# Patient Record
Sex: MALE | Race: WHITE | Employment: OTHER | URBAN - METROPOLITAN AREA
[De-identification: names, ages, dates, MRNs, and addresses within clinical notes are randomized per-mention and may not be internally consistent; named-entity substitution may affect disease eponyms.]

---

## 2017-02-08 ENCOUNTER — OFFICE VISIT (OUTPATIENT)
Dept: INTERNAL MEDICINE | Facility: CLINIC | Age: 77
End: 2017-02-08
Payer: COMMERCIAL

## 2017-02-08 VITALS
WEIGHT: 215 LBS | HEART RATE: 63 BPM | TEMPERATURE: 97.5 F | OXYGEN SATURATION: 98 % | SYSTOLIC BLOOD PRESSURE: 144 MMHG | HEIGHT: 73 IN | BODY MASS INDEX: 28.49 KG/M2 | DIASTOLIC BLOOD PRESSURE: 70 MMHG

## 2017-02-08 DIAGNOSIS — E78.2 MIXED HYPERLIPIDEMIA: ICD-10-CM

## 2017-02-08 DIAGNOSIS — I10 ESSENTIAL HYPERTENSION, BENIGN: Primary | ICD-10-CM

## 2017-02-08 DIAGNOSIS — E11.9 TYPE 2 DIABETES MELLITUS WITHOUT COMPLICATION, WITHOUT LONG-TERM CURRENT USE OF INSULIN (H): ICD-10-CM

## 2017-02-08 DIAGNOSIS — Z12.5 SCREENING FOR PROSTATE CANCER: ICD-10-CM

## 2017-02-08 DIAGNOSIS — N18.30 CKD (CHRONIC KIDNEY DISEASE) STAGE 3, GFR 30-59 ML/MIN (H): ICD-10-CM

## 2017-02-08 LAB — HBA1C MFR BLD: 6.1 % (ref 4.3–6)

## 2017-02-08 PROCEDURE — 83036 HEMOGLOBIN GLYCOSYLATED A1C: CPT | Performed by: INTERNAL MEDICINE

## 2017-02-08 PROCEDURE — 36415 COLL VENOUS BLD VENIPUNCTURE: CPT | Performed by: INTERNAL MEDICINE

## 2017-02-08 PROCEDURE — 99214 OFFICE O/P EST MOD 30 MIN: CPT | Performed by: INTERNAL MEDICINE

## 2017-02-08 PROCEDURE — G0103 PSA SCREENING: HCPCS | Performed by: INTERNAL MEDICINE

## 2017-02-08 PROCEDURE — 80053 COMPREHEN METABOLIC PANEL: CPT | Performed by: INTERNAL MEDICINE

## 2017-02-08 RX ORDER — SIMVASTATIN 10 MG
10 TABLET ORAL AT BEDTIME
Qty: 90 TABLET | Refills: 3 | Status: ON HOLD | OUTPATIENT
Start: 2017-02-08 | End: 2017-09-18

## 2017-02-08 RX ORDER — LISINOPRIL 20 MG/1
20 TABLET ORAL DAILY
Qty: 90 TABLET | Refills: 0 | Status: SHIPPED | OUTPATIENT
Start: 2017-02-08 | End: 2017-06-27

## 2017-02-08 RX ORDER — ATENOLOL 100 MG/1
100 TABLET ORAL DAILY
Qty: 90 TABLET | Refills: 1 | Status: SHIPPED | OUTPATIENT
Start: 2017-02-08 | End: 2017-08-07

## 2017-02-08 NOTE — MR AVS SNAPSHOT
After Visit Summary   2/8/2017    Merlin J Anderson    MRN: [id number]           Patient Information     Date Of Birth          1940        Visit Information        Provider Department      2/8/2017 9:00 AM Sujit Duke MD [facility]        Today's Diagnoses     Essential hypertension, benign    -  1     Mixed hyperlipidemia         Type 2 diabetes mellitus without complication, without long-term current use of insulin (H)         Screening for prostate cancer         CKD (chronic kidney disease) stage 3, GFR 30-59 ml/min            Follow-ups after your visit        Who to contact     If you have questions or need follow up information about today's clinic visit or your schedule please contact [facility] directly at [phone].  Normal or non-critical lab and imaging results will be communicated to you by MyChart, letter or phone within 4 business days after the clinic has received the results. If you do not hear from us within 7 days, please contact the clinic through AboutOnehart or phone. If you have a critical or abnormal lab result, we will notify you by phone as soon as possible.  Submit refill requests through OptiNose or call your pharmacy and they will forward the refill request to us. Please allow 3 business days for your refill to be completed.          Additional Information About Your Visit        MyChart Information     OptiNose gives you secure access to your electronic health record. If you see a primary care provider, you can also send messages to your care team and make appointments. If you have questions, please call your primary care clinic.  If you do not have a primary care provider, please call [phone] and they will assist you.        Care EveryWhere ID     This is your Care EveryWhere ID. This could be used by other organizations to access your [place] medical records  [id number]        Your Vitals Were     Pulse Temperature  "Height BMI (Body Mass Index) Pulse Oximetry       63 97.5  F (36.4  C) (Oral) 6' 1\" (1.854 m) 28.37 kg/m2 98%        Blood Pressure from Last 3 Encounters:   02/08/17 144/70   08/01/16 150/78   12/16/15 130/70    Weight from Last 3 Encounters:   02/08/17 215 lb (97.523 kg)   08/01/16 213 lb (96.616 kg)   06/23/16 210 lb (95.255 kg)              We Performed the Following     Comprehensive metabolic panel     Hemoglobin A1c     Prostate spec antigen screen          Where to get your medicines      These medications were sent to [id number] - [place] - [address]  [address], [facility] [address]     Phone:  [phone]    - atenolol 100 MG tablet  - blood glucose monitoring lancets  - blood glucose monitoring test strip  - lisinopril 20 MG tablet  - metFORMIN 500 MG tablet  - simvastatin 10 MG tablet       Primary Care Provider Office Phone # Fax #    Trever Jiang [phone]       [facility] [address]  [facility] [address]        Thank you!     Thank you for choosing [facility]  for your care. Our goal is always to provide you with excellent care. Hearing back from our patients is one way we can continue to improve our services. Please take a few minutes to complete the written survey that you may receive in the mail after your visit with us. Thank you!             Your Updated Medication List - Protect others around you: Learn how to safely use, store and throw away your medicines at www.disposemymeds.org.          This list is accurate as of: 2/8/17  9:34 AM.  Always use your most recent med list.                   Brand Name Dispense Instructions for use    aspirin 81 MG tablet      Take by mouth daily       atenolol 100 MG tablet    TENORMIN    90 tablet    Take 1 tablet (100 mg) by mouth daily       blood glucose calibration solution     1 Bottle    Use to calibrate blood glucose monitor as needed as directed.    "    blood glucose monitoring lancets     1 Box    Use to test blood sugar 1 times daily or as directed.       blood glucose monitoring test strip    ACCU-CHEK PRISCILLA    100 each    Use to test blood sugars 1 times daily or as directed.       fluticasone 50 MCG/ACT spray    FLONASE    3 Package    Spray 1-2 sprays into both nostrils daily.       lisinopril 20 MG tablet    PRINIVIL/ZESTRIL    90 tablet    Take 1 tablet (20 mg) by mouth daily       metFORMIN 500 MG tablet    GLUCOPHAGE    180 tablet    Take 1 tablet (500 mg) by mouth 2 times daily (with meals)       Multi-vitamin Tabs tablet   Generic drug:  multivitamin, therapeutic with minerals     120    ONCE DAILY       simvastatin 10 MG tablet    ZOCOR    90 tablet    Take 1 tablet (10 mg) by mouth At Bedtime at bedtime.

## 2017-02-08 NOTE — NURSING NOTE
"Chief Complaint   Patient presents with     Diabetes     DM, HTN and Lipids     initial /70 mmHg  Pulse 63  Temp(Src) 97.5  F (36.4  C) (Oral)  Ht 6' 1\" (1.854 m)  Wt 215 lb (97.523 kg)  BMI 28.37 kg/m2  SpO2 98% Estimated body mass index is 28.37 kg/(m^2) as calculated from the following:    Height as of this encounter: 6' 1\" (1.854 m).    Weight as of this encounter: 215 lb (97.523 kg)..  bp completed using cuff size large  CLEMENTINA AMAYA LPN  "

## 2017-02-08 NOTE — PROGRESS NOTES
.  SUBJECTIVE:                                                    Merlin J Anderson is a 76 year old male who presents to clinic today for the following health issues:      New patient seen for assessment of chronic medical conditions. SOLEDAD from Dr Jiang.    Diabetes Follow-up      Patient is checking blood sugars: 1 times a week usually 140s    Diabetic concerns: None     Symptoms of hypoglycemia (low blood sugar): none     Paresthesias (numbness or burning in feet) or sores: No     Date of last diabetic eye exam: x1 year ago   Has H/O DM. On diet , exercise and PO Metformin. Blood sugars are controlled. No parestesias. No hypoglycemias.    Hyperlipidemia Follow-Up      Rate your low fat/cholesterol diet?: good    Taking statin?  Yes, possible muscle aches from statin    Other lipid medications/supplements?:  Fenofibrate, without side effects   Has H/O hyperlipidemia. On medical treatment and diet. No side effects. No muscle weakness, myalgias or upset stomach.     Hypertension Follow-up      Outpatient blood pressures are not being checked.    Low Salt Diet: no added salt     Has h/o HTN. on medical treatment. BP has been controlled. No side effects from medications. No CP, HA, dizziness. good compliance with medications and low salt diet.    Has h/o CRF. Symptoms include none. Monitoring BP, BG, medications, avoiding OTC NSAIDs. Needs periodic recheck of kidney function.    Has had PSA check 6 month ago. Was slightly higher than prior test. Still in normal range.       Amount of exercise or physical activity: treadmill and outside walking in the summer    Problems taking medications regularly: No    Medication side effects: none    Diet: low salt and low fat/cholesterol        Problem list and histories reviewed & adjusted, as indicated.  Additional history: as documented    Problem list, Medication list, Allergies, and Medical/Social/Surgical histories reviewed in EPIC and updated as  "appropriate.    ROS:  Constitutional, HEENT, cardiovascular, pulmonary, GI, , musculoskeletal, neuro, skin, endocrine and psych systems are negative, except as otherwise noted.    OBJECTIVE:                                                    /70 mmHg  Pulse 63  Temp(Src) 97.5  F (36.4  C) (Oral)  Ht 6' 1\" (1.854 m)  Wt 215 lb (97.523 kg)  BMI 28.37 kg/m2  SpO2 98%  Body mass index is 28.37 kg/(m^2).  GENERAL: healthy, alert and no distress  EYES: Eyes grossly normal to inspection, PERRL and conjunctivae and sclerae normal  HENT: ear canals and TM's normal, nose and mouth without ulcers or lesions  NECK: no adenopathy, no asymmetry, masses, or scars and thyroid normal to palpation  RESP: lungs clear to auscultation - no rales, rhonchi or wheezes  CV: regular rate and rhythm, normal S1 S2, no S3 or S4, no murmur, click or rub, no peripheral edema and peripheral pulses strong  ABDOMEN: soft, nontender, no hepatosplenomegaly, no masses and bowel sounds normal  MS: no gross musculoskeletal defects noted, no edema  NEURO: Normal strength and tone, mentation intact and speech normal    Diagnostic Test Results:  lab     ASSESSMENT/PLAN:                                                      Problem List Items Addressed This Visit     Mixed hyperlipidemia    Relevant Medications    simvastatin (ZOCOR) 10 MG tablet    Other Relevant Orders    Comprehensive metabolic panel (Completed)    Essential hypertension, benign - Primary    Relevant Medications    lisinopril (PRINIVIL/ZESTRIL) 20 MG tablet    atenolol (TENORMIN) 100 MG tablet    Other Relevant Orders    Comprehensive metabolic panel (Completed)    CKD (chronic kidney disease) stage 3, GFR 30-59 ml/min    Type 2 diabetes mellitus without complication (H)    Relevant Medications    metFORMIN (GLUCOPHAGE) 500 MG tablet    blood glucose monitoring (ACCU-CHEK PRISCILLA) test strip    blood glucose monitoring (ACCU-CHEK MULTICLIX) lancets    Other Relevant Orders    " Hemoglobin A1c (Completed)      Other Visit Diagnoses     Screening for prostate cancer         Relevant Orders     Prostate spec antigen screen (Completed)            Recheck lab work   Cont treatment   Monitor BP   Monitor BG      Follow-Up:in 6 months     Sujit Dkue MD  [facility]

## 2017-02-09 LAB
ALBUMIN SERPL-MCNC: 4.1 G/DL (ref 3.4–5)
ALP SERPL-CCNC: 75 U/L (ref 40–150)
ALT SERPL W P-5'-P-CCNC: 32 U/L (ref 0–70)
ANION GAP SERPL CALCULATED.3IONS-SCNC: 6 MMOL/L (ref 3–14)
AST SERPL W P-5'-P-CCNC: 20 U/L (ref 0–45)
BILIRUB SERPL-MCNC: 0.7 MG/DL (ref 0.2–1.3)
BUN SERPL-MCNC: 22 MG/DL (ref 7–30)
CALCIUM SERPL-MCNC: 9.4 MG/DL (ref 8.5–10.1)
CHLORIDE SERPL-SCNC: 107 MMOL/L (ref 94–109)
CO2 SERPL-SCNC: 27 MMOL/L (ref 20–32)
CREAT SERPL-MCNC: 1.53 MG/DL (ref 0.66–1.25)
GFR SERPL CREATININE-BSD FRML MDRD: 44 ML/MIN/1.7M2
GLUCOSE SERPL-MCNC: 136 MG/DL (ref 70–99)
POTASSIUM SERPL-SCNC: 5 MMOL/L (ref 3.4–5.3)
PROT SERPL-MCNC: 7.2 G/DL (ref 6.8–8.8)
PSA SERPL-ACNC: 2.51 UG/L (ref 0–4)
SODIUM SERPL-SCNC: 140 MMOL/L (ref 133–144)

## 2017-03-27 DIAGNOSIS — I10 ESSENTIAL HYPERTENSION, BENIGN: ICD-10-CM

## 2017-03-27 NOTE — TELEPHONE ENCOUNTER
Lisinopril      Last Written Prescription Date: 02/08/17  Last Fill Quantity: 90, # refills: 0  Last Office Visit with G, P or [facility] prescribing provider: 02/08/17       Potassium   Date Value Ref Range Status   02/08/2017 5.0 3.4 - 5.3 mmol/L Final     Creatinine   Date Value Ref Range Status   02/08/2017 1.53 (H) 0.66 - 1.25 mg/dL Final     BP Readings from Last 3 Encounters:   02/08/17 144/70   08/01/16 150/78   12/16/15 130/70

## 2017-03-28 RX ORDER — LISINOPRIL 20 MG/1
20 TABLET ORAL DAILY
Qty: 90 TABLET | Refills: 0 | OUTPATIENT
Start: 2017-03-28

## 2017-06-27 DIAGNOSIS — I10 ESSENTIAL HYPERTENSION, BENIGN: ICD-10-CM

## 2017-06-27 RX ORDER — LISINOPRIL 20 MG/1
20 TABLET ORAL DAILY
Qty: 90 TABLET | Refills: 0 | Status: SHIPPED | OUTPATIENT
Start: 2017-06-27 | End: 2017-08-08

## 2017-06-27 NOTE — TELEPHONE ENCOUNTER
Lisinopril      Last Written Prescription Date: 02/08/17  Last Fill Quantity: 90, # refills: 0  Last Office Visit with G, P or [facility] prescribing provider: 02/08/17       Potassium   Date Value Ref Range Status   02/08/2017 5.0 3.4 - 5.3 mmol/L Final     Creatinine   Date Value Ref Range Status   02/08/2017 1.53 (H) 0.66 - 1.25 mg/dL Final     BP Readings from Last 3 Encounters:   02/08/17 144/70   08/01/16 150/78   12/16/15 130/70

## 2017-07-25 DIAGNOSIS — E78.2 MIXED HYPERLIPIDEMIA: ICD-10-CM

## 2017-07-25 RX ORDER — SIMVASTATIN 10 MG
10 TABLET ORAL AT BEDTIME
Qty: 90 TABLET | Refills: 3 | OUTPATIENT
Start: 2017-07-25

## 2017-07-25 NOTE — TELEPHONE ENCOUNTER
Simvastatin     Last Written Prescription Date: 02/08/17  Last Fill Quantity: 90, # refills: 3  Last Office Visit with G, P or [facility] prescribing provider: 02/08/17  Next 5 appointments (look out 90 days)     Aug 08, 2017  8:40 AM CDT   PHYSICAL with Sujit Duke MD   [facility]    [address]  [facility] [address]   [phone]                   Lab Results   Component Value Date    CHOL 137 08/01/2016     Lab Results   Component Value Date    HDL 40 08/01/2016     Lab Results   Component Value Date    LDL 73 08/01/2016     Lab Results   Component Value Date    TRIG 118 08/01/2016     Lab Results   Component Value Date    CHOLHDLRATIO 4.0 11/04/2015       Labs showing if normal/abnormal  Lab Results   Component Value Date    CHOL 137 08/01/2016    TRIG 118 08/01/2016    HDL 40 08/01/2016    LDL 73 08/01/2016    VLDL 43 (H) 11/04/2015    CHOLHDLRATIO 4.0 11/04/2015

## 2017-08-07 DIAGNOSIS — I10 ESSENTIAL HYPERTENSION, BENIGN: ICD-10-CM

## 2017-08-07 RX ORDER — ATENOLOL 100 MG/1
TABLET ORAL
Qty: 90 TABLET | Refills: 0 | Status: SHIPPED | OUTPATIENT
Start: 2017-08-07 | End: 2017-08-08

## 2017-08-07 NOTE — TELEPHONE ENCOUNTER
Atenolol      Last Written Prescription Date: 02/08/17  Last Fill Quantity: 90, # refills: 1    Last Office Visit with [facility], [facility] or [facility] prescribing provider:  02/08/17   Future Office Visit:    Next 5 appointments (look out 90 days)     Aug 08, 2017  8:40 AM CDT   PHYSICAL with Sujit Duke MD   [facility]    [address] [facility] [id number] [phone]                    BP Readings from Last 3 Encounters:   02/08/17 144/70   08/01/16 150/78   12/16/15 130/70

## 2017-08-08 ENCOUNTER — OFFICE VISIT (OUTPATIENT)
Dept: INTERNAL MEDICINE | Facility: CLINIC | Age: 77
End: 2017-08-08
Payer: COMMERCIAL

## 2017-08-08 VITALS
OXYGEN SATURATION: 98 % | SYSTOLIC BLOOD PRESSURE: 164 MMHG | WEIGHT: 215 LBS | HEIGHT: 73 IN | HEART RATE: 60 BPM | BODY MASS INDEX: 28.49 KG/M2 | DIASTOLIC BLOOD PRESSURE: 70 MMHG | TEMPERATURE: 98.4 F

## 2017-08-08 DIAGNOSIS — E78.5 HYPERLIPIDEMIA LDL GOAL <100: ICD-10-CM

## 2017-08-08 DIAGNOSIS — I10 ESSENTIAL HYPERTENSION, BENIGN: ICD-10-CM

## 2017-08-08 DIAGNOSIS — Z00.00 ROUTINE GENERAL MEDICAL EXAMINATION AT A HEALTH CARE FACILITY: Primary | ICD-10-CM

## 2017-08-08 DIAGNOSIS — M1A.9XX0 CHRONIC GOUT WITHOUT TOPHUS, UNSPECIFIED CAUSE, UNSPECIFIED SITE: ICD-10-CM

## 2017-08-08 DIAGNOSIS — Z12.5 SCREENING FOR PROSTATE CANCER: ICD-10-CM

## 2017-08-08 DIAGNOSIS — J30.2 SEASONAL ALLERGIC RHINITIS, UNSPECIFIED CHRONICITY, UNSPECIFIED TRIGGER: ICD-10-CM

## 2017-08-08 DIAGNOSIS — R09.89 BRUIT OF RIGHT CAROTID ARTERY: ICD-10-CM

## 2017-08-08 LAB
ALBUMIN UR-MCNC: 30 MG/DL
APPEARANCE UR: CLEAR
BILIRUB UR QL STRIP: NEGATIVE
COLOR UR AUTO: YELLOW
ERYTHROCYTE [DISTWIDTH] IN BLOOD BY AUTOMATED COUNT: 12.9 % (ref 10–15)
GLUCOSE UR STRIP-MCNC: NEGATIVE MG/DL
HCT VFR BLD AUTO: 40 % (ref 40–53)
HGB BLD-MCNC: 13.5 G/DL (ref 13.3–17.7)
HGB UR QL STRIP: NEGATIVE
KETONES UR STRIP-MCNC: NEGATIVE MG/DL
LEUKOCYTE ESTERASE UR QL STRIP: NEGATIVE
MCH RBC QN AUTO: 31.1 PG (ref 26.5–33)
MCHC RBC AUTO-ENTMCNC: 33.8 G/DL (ref 31.5–36.5)
MCV RBC AUTO: 92 FL (ref 78–100)
NITRATE UR QL: NEGATIVE
PH UR STRIP: 5.5 PH (ref 5–7)
PLATELET # BLD AUTO: 170 10E9/L (ref 150–450)
RBC # BLD AUTO: 4.34 10E12/L (ref 4.4–5.9)
RBC #/AREA URNS AUTO: NORMAL /HPF (ref 0–2)
SP GR UR STRIP: 1.01 (ref 1–1.03)
URN SPEC COLLECT METH UR: ABNORMAL
UROBILINOGEN UR STRIP-ACNC: 0.2 EU/DL (ref 0.2–1)
WBC # BLD AUTO: 7.8 10E9/L (ref 4–11)
WBC #/AREA URNS AUTO: NORMAL /HPF (ref 0–2)

## 2017-08-08 PROCEDURE — 80061 LIPID PANEL: CPT | Performed by: INTERNAL MEDICINE

## 2017-08-08 PROCEDURE — 84443 ASSAY THYROID STIM HORMONE: CPT | Performed by: INTERNAL MEDICINE

## 2017-08-08 PROCEDURE — 80053 COMPREHEN METABOLIC PANEL: CPT | Performed by: INTERNAL MEDICINE

## 2017-08-08 PROCEDURE — 36415 COLL VENOUS BLD VENIPUNCTURE: CPT | Performed by: INTERNAL MEDICINE

## 2017-08-08 PROCEDURE — G0103 PSA SCREENING: HCPCS | Performed by: INTERNAL MEDICINE

## 2017-08-08 PROCEDURE — 81001 URINALYSIS AUTO W/SCOPE: CPT | Performed by: INTERNAL MEDICINE

## 2017-08-08 PROCEDURE — 99397 PER PM REEVAL EST PAT 65+ YR: CPT | Performed by: INTERNAL MEDICINE

## 2017-08-08 PROCEDURE — 85027 COMPLETE CBC AUTOMATED: CPT | Performed by: INTERNAL MEDICINE

## 2017-08-08 PROCEDURE — 84550 ASSAY OF BLOOD/URIC ACID: CPT | Performed by: INTERNAL MEDICINE

## 2017-08-08 RX ORDER — LISINOPRIL 20 MG/1
20 TABLET ORAL DAILY
Qty: 90 TABLET | Refills: 3 | Status: ON HOLD | OUTPATIENT
Start: 2017-08-08 | End: 2017-09-18

## 2017-08-08 RX ORDER — METOPROLOL SUCCINATE 200 MG/1
200 TABLET, EXTENDED RELEASE ORAL DAILY
Qty: 90 TABLET | Refills: 3 | Status: SHIPPED | OUTPATIENT
Start: 2017-08-08 | End: 2017-08-16

## 2017-08-08 RX ORDER — FLUTICASONE PROPIONATE 50 MCG
1-2 SPRAY, SUSPENSION (ML) NASAL DAILY
Qty: 1 BOTTLE | Refills: 3 | Status: ON HOLD | OUTPATIENT
Start: 2017-08-08 | End: 2017-09-18

## 2017-08-08 NOTE — PROGRESS NOTES
SUBJECTIVE:   Merlin J Anderson is a 77 year old male who presents for Preventive Visit.    Are you in the first 12 months of your Medicare coverage?  No    Physical   Annual:     Getting at least 3 servings of Calcium per day::  Yes    Bi-annual eye exam::  Yes    Dental care twice a year::  Yes    Sleep apnea or symptoms of sleep apnea::  None    Frequency of exercise::  4-5 days/week    Duration of exercise::  15-30 minutes    Taking medications regularly::  Yes    Medication side effects::  Not applicable    Additional concerns today::  YES      COGNITIVE SCREEN  1) Repeat 3 items (Banana, Sunrise, Chair)    2) Clock draw: NORMAL  3) 3 item recall: Recalls 3 objects  Results: 3 items recalled: COGNITIVE IMPAIRMENT LESS LIKELY    Mini-CogTM Copyright S Wally. Licensed by the author for use in [facility] Management e-Tools; reprinted with permission (naomi@[email or web address]). All rights reserved.          Reviewed and updated as needed this visit by clinical staff         Reviewed and updated as needed this visit by Provider      Social History   Substance Use Topics     Smoking status: Never Smoker     Smokeless tobacco: Never Used     Alcohol use Yes      Comment: 1 monthly or less.       The patient does not drink >3 drinks per day nor >7 drinks per week.        PROBLEMS TO ADD ON...    Has H/O hyperlipidemia. On medical treatment and diet. No side effects. No muscle weakness, myalgias or upset stomach.   Has h/o HTN. on medical treatment. BP has been controlled. No side effects from medications. No CP, HA, dizziness. good compliance with medications and low salt diet.      Today's PHQ-2 Score: 0  PHQ-2 ( 1999 [employer]) 8/7/2017   Q1: Little interest or pleasure in doing things 0   Q2: Feeling down, depressed or hopeless 0   PHQ-2 Score 0   Q1: Little interest or pleasure in doing things Not at all   Q2: Feeling down, depressed or hopeless Not at all   PHQ-2 Score 0       Do you feel safe in your environment - Yes    Do you have  a Health Care Directive?: Yes: Advance Directive has been received and scanned.    Current providers sharing in care for this patient include:   Patient Care Team:  Sujit Duke MD as PCP - General (Internal Medicine)      Hearing impairment: No    Ability to successfully perform activities of daily living: Yes, no assistance needed     Fall risk:  Fallen 2 or more times in the past year?: No  Any fall with injury in the past year?: No      Home safety:  none identified      The following health maintenance items are reviewed in Epic and correct as of today:Health Maintenance   Topic Date Due     FOOT EXAM Q1 YEAR  02/18/2014     EYE EXAM Q1 YEAR  02/18/2014     TSH W/ FREE T4 REFLEX Q2 YEAR  09/03/2016     MICROALBUMIN Q1 YEAR  12/16/2016     ADVANCE DIRECTIVE PLANNING Q5 YRS  06/18/2017     LIPID MONITORING Q1 YEAR  08/01/2017     FALL RISK ASSESSMENT  08/01/2017     A1C Q6 MO  08/08/2017     INFLUENZA VACCINE (SYSTEM ASSIGNED)  09/01/2017     CREATININE Q1 YEAR  02/08/2018     COLONOSCOPY Q10 YR  10/17/2018     TETANUS Q10 YR  12/16/2025     PNEUMOCOCCAL  Completed     Labs reviewed in EPIC    Pneumonia Vaccine:Adults age 65+ who received Pneumovax (PPSV23) at 65 years or older: Should be given PCV13 > 1 year after their most recent PPSV23    ROS:  C: NEGATIVE for fever, chills, change in weight  I: NEGATIVE for worrisome rashes, moles or lesions  E: NEGATIVE for vision changes or irritation  E/M: NEGATIVE for ear, mouth and throat problems  R: NEGATIVE for significant cough or SOB  B: NEGATIVE for masses, tenderness or discharge  CV: NEGATIVE for chest pain, palpitations or peripheral edema  GI: NEGATIVE for nausea, abdominal pain, heartburn, or change in bowel habits  : NEGATIVE for frequency, dysuria, or hematuria  M: NEGATIVE for significant arthralgias or myalgia  N: NEGATIVE for weakness, dizziness or paresthesias  E: NEGATIVE for temperature intolerance, skin/hair changes  H: NEGATIVE for bleeding  "problems  P: NEGATIVE for changes in mood or affect    OBJECTIVE:   There were no vitals taken for this visit. Estimated body mass index is 28.37 kg/(m^2) as calculated from the following:    Height as of 2/8/17: 6' 1\" (1.854 m).    Weight as of 2/8/17: 215 lb (97.5 kg).  EXAM:   GENERAL: healthy, alert and no distress  EYES: Eyes grossly normal to inspection, PERRL and conjunctivae and sclerae normal  HENT: ear canals and TM's normal, nose and mouth without ulcers or lesions  NECK: no adenopathy, no asymmetry, masses, or scars and thyroid normal to palpation, right carotid bruit   RESP: lungs clear to auscultation - no rales, rhonchi or wheezes  CV: regular rate and rhythm, normal S1 S2, no S3 or S4, no murmur, click or rub, no peripheral edema and peripheral pulses strong  ABDOMEN: soft, nontender, no hepatosplenomegaly, no masses and bowel sounds normal  RECTAL: normal sphincter tone, no rectal masses, prostate normal size, smooth, nontender without nodules or masses  MS: no gross musculoskeletal defects noted, no edema  SKIN: no suspicious lesions or rashes  NEURO: Normal strength and tone, mentation intact and speech normal  PSYCH: mentation appears normal, affect normal/bright    ASSESSMENT / PLAN:       ICD-10-CM    1. Routine general medical examination at a health care facility Z00.00 Lipid panel reflex to direct LDL     Prostate spec antigen screen     CBC with platelets     Comprehensive metabolic panel     TSH with free T4 reflex     *UA reflex to Microscopic and Culture ([facility] (except [place] and [place])     Uric acid     US Carotid Bilateral   2. Essential hypertension, benign I10 lisinopril (PRINIVIL/ZESTRIL) 20 MG tablet     metoprolol (TOPROL-XL) 200 MG 24 hr tablet     CBC with platelets     Comprehensive metabolic panel     TSH with free T4 reflex     *UA reflex to Microscopic and Culture ([facility] (except [place] and [place])     US Carotid " "Bilateral   3. Seasonal allergic rhinitis, unspecified chronicity, unspecified trigger J30.2 fluticasone (FLONASE) 50 MCG/ACT spray   4. Hyperlipidemia LDL goal <100 E78.5 Lipid panel reflex to direct LDL   5. Chronic gout without tophus, unspecified cause, unspecified site [id number].[id number] Uric acid   6. Bruit of right carotid artery R09.89 US Carotid Bilateral   7. Screening for prostate cancer Z12.5 Prostate spec antigen screen       End of Life Planning:  Patient currently has an advanced directive: Yes.  Practitioner is supportive of decision.    COUNSELING:  Reviewed preventive health counseling, as reflected in patient instructions       Regular exercise       Healthy diet/nutrition       Vision screening       Hearing screening       Dental care       Colon cancer screening       Prostate cancer screening    Change from Atenolol to Metoprolol - BP not controlled and Atenolol currently not available/ monitor BP in a month.     Estimated body mass index is 28.37 kg/(m^2) as calculated from the following:    Height as of 2/8/17: 6' 1\" (1.854 m).    Weight as of 2/8/17: 215 lb (97.5 kg).     reports that he has never smoked. He has never used smokeless tobacco.        Appropriate preventive services were discussed with this patient, including applicable screening as appropriate for cardiovascular disease, diabetes, osteopenia/osteoporosis, and glaucoma.  As appropriate for age/gender, discussed screening for colorectal cancer, prostate cancer, breast cancer, and cervical cancer. Checklist reviewing preventive services available has been given to the patient.    Reviewed patients plan of care and provided an AVS. The Intermediate Care Plan ( asthma action plan, low back pain action plan, and migraine action plan) for Merlin meets the Care Plan requirement. This Care Plan has been established and reviewed with the Patient.    Counseling Resources:  ATP IV Guidelines  Pooled Cohorts Equation Calculator  Breast Cancer Risk " Calculator  FRAX Risk Assessment  ICSI Preventive Guidelines  Dietary Guidelines for Americans, 2010  USDA's MyPlate  ASA Prophylaxis  Lung CA Screening    Sujit Duke MD  [facility] for HPI/ROS submitted by the patient on 8/7/2017   PHQ-2 Score: 0

## 2017-08-08 NOTE — MR AVS SNAPSHOT
After Visit Summary   8/8/2017    Merlin J Anderson    MRN: [id number]           Patient Information     Date Of Birth          1940        Visit Information        Provider Department      8/8/2017 8:40 AM Sujit Duke MD [facility]        Today's Diagnoses     Routine general medical examination at a health care facility    -  1    Essential hypertension, benign        Seasonal allergic rhinitis, unspecified chronicity, unspecified trigger        Hyperlipidemia LDL goal <100        Chronic gout without tophus, unspecified cause, unspecified site        Bruit of right carotid artery        Screening for prostate cancer           Follow-ups after your visit        Future tests that were ordered for you today     Open Future Orders        Priority Expected Expires Ordered    US Carotid Bilateral Routine  8/8/2018 8/8/2017            Who to contact     If you have questions or need follow up information about today's clinic visit or your schedule please contact [facility] directly at [phone].  Normal or non-critical lab and imaging results will be communicated to you by MyChart, letter or phone within 4 business days after the clinic has received the results. If you do not hear from us within 7 days, please contact the clinic through "[employer] or phone. If you have a critical or abnormal lab result, we will notify you by phone as soon as possible.  Submit refill requests through [employer] or call your pharmacy and they will forward the refill request to us. Please allow 3 business days for your refill to be completed.          Additional Information About Your Visit        MyChart Information     [employer] gives you secure access to your electronic health record. If you see a primary care provider, you can also send messages to your care team and make appointments. If you have questions, please call your primary care clinic.  If you do not have a primary care  "provider, please call [phone] and they will assist you.        Care EveryWhere ID     This is your Care EveryWhere ID. This could be used by other organizations to access your [facility] medical records  [id number]        Your Vitals Were     Pulse Temperature Height Pulse Oximetry BMI (Body Mass Index)       60 98.4  F (36.9  C) (Oral) 6' 1\" (1.854 m) 98% 28.37 kg/m2        Blood Pressure from Last 3 Encounters:   08/08/17 164/70   02/08/17 144/70   08/01/16 150/78    Weight from Last 3 Encounters:   08/08/17 215 lb (97.5 kg)   02/08/17 215 lb (97.5 kg)   08/01/16 213 lb (96.6 kg)              We Performed the Following     *UA reflex to Microscopic and Culture ([facility] (except [place] and [place])     CBC with platelets     Comprehensive metabolic panel     Lipid panel reflex to direct LDL     Prostate spec antigen screen     TSH with free T4 reflex     Uric acid          Today's Medication Changes          These changes are accurate as of: 8/8/17  9:29 AM.  If you have any questions, ask your nurse or doctor.               Start taking these medicines.        Dose/Directions    metoprolol 200 MG 24 hr tablet   Commonly known as:  TOPROL-XL   Used for:  Essential hypertension, benign   Started by:  Sujit Duke MD        Dose:  200 mg   Take 1 tablet (200 mg) by mouth daily   Quantity:  90 tablet   Refills:  3         Stop taking these medicines if you haven't already. Please contact your care team if you have questions.     atenolol 100 MG tablet   Commonly known as:  TENORMIN   Stopped by:  Sujit Duke MD                Where to get your medicines      These medications were sent to [id number] - [place], [place] - [address]  [address], [facility] [address]     Phone:  [phone]     fluticasone 50 MCG/ACT spray    lisinopril 20 MG tablet    metoprolol 200 MG 24 hr tablet                Primary Care Provider Office Phone # Fax #    Sujit ROSEN " MD Srikanth [phone]       [facility] [address]  [facility] [address]        Equal Access to Services     RAUDEL ESTRELLA : Stephany Galaviz, washerrie noble, qadipakta kaalmada joseph, kaye margaritain hayaana abernathyradha luna alyssa hawley. So [facility] [phone].    ATENCIÓN: Si habla español, tiene a pa disposición servicios gratuitos de asistencia lingüística. Llame al [phone].    We comply with applicable federal civil rights laws and [place] laws. We do not discriminate on the basis of race, color, national origin, age, disability sex, sexual orientation or gender identity.            Thank you!     Thank you for choosing [facility]  for your care. Our goal is always to provide you with excellent care. Hearing back from our patients is one way we can continue to improve our services. Please take a few minutes to complete the written survey that you may receive in the mail after your visit with us. Thank you!             Your Updated Medication List - Protect others around you: Learn how to safely use, store and throw away your medicines at www.disposemymeds.org.          This list is accurate as of: 8/8/17  9:29 AM.  Always use your most recent med list.                   Brand Name Dispense Instructions for use Diagnosis    aspirin 81 MG tablet      Take by mouth daily        blood glucose calibration solution     1 Bottle    Use to calibrate blood glucose monitor as needed as directed.    Type 2 diabetes mellitus without complication (H)       blood glucose monitoring lancets     1 Box    Use to test blood sugar 1 times daily or as directed.    Type 2 diabetes mellitus without complication, without long-term current use of insulin (H)       blood glucose monitoring test strip    ACCU-CHEK PRISCILLA    100 each    Use to test blood sugars 1 times daily or as directed.    Type 2 diabetes mellitus without complication, without long-term current use of insulin  (H)       fluticasone 50 MCG/ACT spray    FLONASE    1 Bottle    Spray 1-2 sprays into both nostrils daily    Seasonal allergic rhinitis, unspecified chronicity, unspecified trigger       lisinopril 20 MG tablet    PRINIVIL/ZESTRIL    90 tablet    Take 1 tablet (20 mg) by mouth daily    Essential hypertension, benign       metFORMIN 500 MG tablet    GLUCOPHAGE    180 tablet    Take 1 tablet (500 mg) by mouth 2 times daily (with meals)    Type 2 diabetes mellitus without complication, without long-term current use of insulin (H)       metoprolol 200 MG 24 hr tablet    TOPROL-XL    90 tablet    Take 1 tablet (200 mg) by mouth daily    Essential hypertension, benign       Multi-vitamin Tabs tablet   Generic drug:  multivitamin, therapeutic with minerals     120    ONCE DAILY    Routine general medical examination at a health care facility, Pain in joint, shoulder region       simvastatin 10 MG tablet    ZOCOR    90 tablet    Take 1 tablet (10 mg) by mouth At Bedtime at bedtime.    Mixed hyperlipidemia

## 2017-08-08 NOTE — NURSING NOTE
"Chief Complaint   Patient presents with     Wellness Visit     Fasting Px       Initial /70  Pulse 60  Temp 98.4  F (36.9  C) (Oral)  Ht 6' 1\" (1.854 m)  Wt 215 lb (97.5 kg)  SpO2 98%  BMI 28.37 kg/m2 Estimated body mass index is 28.37 kg/(m^2) as calculated from the following:    Height as of this encounter: 6' 1\" (1.854 m).    Weight as of this encounter: 215 lb (97.5 kg).  Medication Reconciliation: complete   La Yung MA      "

## 2017-08-09 LAB
ALBUMIN SERPL-MCNC: 4.1 G/DL (ref 3.4–5)
ALP SERPL-CCNC: 63 U/L (ref 40–150)
ALT SERPL W P-5'-P-CCNC: 24 U/L (ref 0–70)
ANION GAP SERPL CALCULATED.3IONS-SCNC: 9 MMOL/L (ref 3–14)
AST SERPL W P-5'-P-CCNC: 16 U/L (ref 0–45)
BILIRUB SERPL-MCNC: 0.7 MG/DL (ref 0.2–1.3)
BUN SERPL-MCNC: 28 MG/DL (ref 7–30)
CALCIUM SERPL-MCNC: 9.5 MG/DL (ref 8.5–10.1)
CHLORIDE SERPL-SCNC: 106 MMOL/L (ref 94–109)
CHOLEST SERPL-MCNC: 156 MG/DL
CO2 SERPL-SCNC: 24 MMOL/L (ref 20–32)
CREAT SERPL-MCNC: 1.69 MG/DL (ref 0.66–1.25)
GFR SERPL CREATININE-BSD FRML MDRD: 40 ML/MIN/1.7M2
GLUCOSE SERPL-MCNC: 145 MG/DL (ref 70–99)
HDLC SERPL-MCNC: 36 MG/DL
LDLC SERPL CALC-MCNC: 75 MG/DL
NONHDLC SERPL-MCNC: 120 MG/DL
POTASSIUM SERPL-SCNC: 5.3 MMOL/L (ref 3.4–5.3)
PROT SERPL-MCNC: 7.2 G/DL (ref 6.8–8.8)
PSA SERPL-ACNC: 2.54 UG/L (ref 0–4)
SODIUM SERPL-SCNC: 139 MMOL/L (ref 133–144)
TRIGL SERPL-MCNC: 227 MG/DL
TSH SERPL DL<=0.005 MIU/L-ACNC: 2.51 MU/L (ref 0.4–4)
URATE SERPL-MCNC: 8.3 MG/DL (ref 3.5–7.2)

## 2017-08-14 ENCOUNTER — TELEPHONE (OUTPATIENT)
Dept: INTERNAL MEDICINE | Facility: CLINIC | Age: 77
End: 2017-08-14

## 2017-08-14 NOTE — TELEPHONE ENCOUNTER
Atenolol      Last Written Prescription Date: 02/08/17  Last Fill Quantity: 90, # refills: 1    Last Office Visit with [facility], [facility] or [facility] prescribing provider:  08/08/17   Future Office Visit:    Next 5 appointments (look out 90 days)     Sep 08, 2017  9:30 AM CDT   Nurse Only with RI IM NURSE   [facility] ([facility])    [address]  [facility] [id number] [phone]                    BP Readings from Last 3 Encounters:   08/08/17 164/70   02/08/17 144/70   08/01/16 150/78

## 2017-08-15 NOTE — TELEPHONE ENCOUNTER
Called pt, left detailed vm (per consent to communicate) that atenolol was changed to metoprolol per shortage.

## 2017-08-16 ENCOUNTER — TELEPHONE (OUTPATIENT)
Dept: INTERNAL MEDICINE | Facility: CLINIC | Age: 77
End: 2017-08-16

## 2017-08-16 DIAGNOSIS — I10 ESSENTIAL HYPERTENSION, BENIGN: ICD-10-CM

## 2017-08-16 RX ORDER — ATENOLOL 100 MG/1
TABLET ORAL
Qty: 90 TABLET | Refills: 3 | Status: ON HOLD | OUTPATIENT
Start: 2017-08-16 | End: 2017-09-18

## 2017-08-16 NOTE — TELEPHONE ENCOUNTER
Patient called stating that he went to [facility] to  the Metoprolol and it was $70 plus dollars. So patient went to the [facility] pharmacy and asked if they have Atenolol and they stated they have Atenolol in stock. Patient is requesting to be switched back to Atenolol and have script sent to the [facility] pharmacy in [place] because it only costs the patient $10 dollars.    PCP please review and advise  Thank you,   JAQUAN Rader

## 2017-08-17 ENCOUNTER — HOSPITAL ENCOUNTER (OUTPATIENT)
Dept: ULTRASOUND IMAGING | Facility: CLINIC | Age: 77
Discharge: HOME | End: 2017-08-17
Attending: INTERNAL MEDICINE | Admitting: INTERNAL MEDICINE
Payer: COMMERCIAL

## 2017-08-17 DIAGNOSIS — I10 ESSENTIAL HYPERTENSION, BENIGN: ICD-10-CM

## 2017-08-17 DIAGNOSIS — R09.89 BRUIT OF RIGHT CAROTID ARTERY: ICD-10-CM

## 2017-08-17 DIAGNOSIS — Z00.00 ROUTINE GENERAL MEDICAL EXAMINATION AT A HEALTH CARE FACILITY: ICD-10-CM

## 2017-08-17 PROCEDURE — 93880 EXTRACRANIAL BILAT STUDY: CPT

## 2017-08-24 ENCOUNTER — TELEPHONE (OUTPATIENT)
Dept: OTHER | Facility: CLINIC | Age: 77
End: 2017-08-24

## 2017-08-24 ENCOUNTER — TELEPHONE (OUTPATIENT)
Dept: INTERNAL MEDICINE | Facility: CLINIC | Age: 77
End: 2017-08-24

## 2017-08-24 DIAGNOSIS — I65.29: Primary | ICD-10-CM

## 2017-08-24 DIAGNOSIS — I65.29 CAROTID ARTERY STENOSIS: Primary | ICD-10-CM

## 2017-08-24 NOTE — TELEPHONE ENCOUNTER
Pt referred to [facility] by  for carotid artery stenosis.    Pt saw  5/2009 and José Luis Hart PA-C 7/21/2010.  Plan was for one year follow-up.  Pt had bilateral carotid US 8/17/17:  IMPRESSION: There has been an interval increase in peak systolic and  diastolic velocities in the right internal carotid artery now equaling  463 and 92 cm/s versus 144 and 31 cm/s on the prior exam. This would  suggest a greater than or equal to 70% stenosis.     No significant change in the peak systolic and diastolic velocities in  the left internal carotid artery. Per sonographic velocity criteria,  there is a 50-69% stenosis in the left internal carotid artery.    Pt needs to be scheduled for CTA head/neck and consult with Vascular Surgery (, if possible).  Will route to scheduling to coordinate an appointment next available.    Madison Cowart RN BSN

## 2017-08-25 NOTE — TELEPHONE ENCOUNTER
Left message on home number for patient to call back to schedule CTA & consult appointment for carotid stenosis with Vascular Surgery.

## 2017-08-28 ENCOUNTER — MYC MEDICAL ADVICE (OUTPATIENT)
Dept: INTERNAL MEDICINE | Facility: CLINIC | Age: 77
End: 2017-08-28

## 2017-08-28 ENCOUNTER — OFFICE VISIT (OUTPATIENT)
Dept: INTERNAL MEDICINE | Facility: CLINIC | Age: 77
End: 2017-08-28
Payer: COMMERCIAL

## 2017-08-28 VITALS
HEART RATE: 64 BPM | DIASTOLIC BLOOD PRESSURE: 84 MMHG | SYSTOLIC BLOOD PRESSURE: 176 MMHG | TEMPERATURE: 98.4 F | BODY MASS INDEX: 28.36 KG/M2 | OXYGEN SATURATION: 99 % | HEIGHT: 73 IN | WEIGHT: 214 LBS

## 2017-08-28 DIAGNOSIS — Z71.89 ADVANCED DIRECTIVES, COUNSELING/DISCUSSION: ICD-10-CM

## 2017-08-28 DIAGNOSIS — I10 ESSENTIAL HYPERTENSION, BENIGN: ICD-10-CM

## 2017-08-28 DIAGNOSIS — I65.23 BILATERAL CAROTID ARTERY STENOSIS: Primary | ICD-10-CM

## 2017-08-28 PROCEDURE — 99214 OFFICE O/P EST MOD 30 MIN: CPT | Performed by: INTERNAL MEDICINE

## 2017-08-28 NOTE — ASSESSMENT & PLAN NOTE
Advance Care Planning 8/28/2017: ACP Review of Chart / Resources Provided:  Reviewed chart for advance care plan.  Merlin J Anderson has no plan or code status on file. Discussed available resources and provided with information.   Added by Flakita Macedo

## 2017-08-28 NOTE — NURSING NOTE
"Chief Complaint   Patient presents with     Results       Initial /84 (BP Location: Right arm, Patient Position: Chair, Cuff Size: Adult Large)  Pulse 64  Temp 98.4  F (36.9  C) (Oral)  Ht 6' 1\" (1.854 m)  Wt 214 lb (97.1 kg)  SpO2 99%  BMI 28.23 kg/m2 Estimated body mass index is 28.23 kg/(m^2) as calculated from the following:    Height as of this encounter: 6' 1\" (1.854 m).    Weight as of this encounter: 214 lb (97.1 kg).  Medication Reconciliation: complete   Flakita Macedo CMA      "

## 2017-08-28 NOTE — MR AVS SNAPSHOT
After Visit Summary   8/28/2017    Merlin J Anderson    MRN: [id number]           Patient Information     Date Of Birth          1940        Visit Information        Provider Department      8/28/2017 7:40 AM Ashely Burrows MD [facility]        Today's Diagnoses     Bilateral carotid artery stenosis    -  1    Essential hypertension, benign        Advanced directives, counseling/discussion           Follow-ups after your visit        Your next 10 appointments already scheduled     Aug 30, 2017  9:00 AM CDT   CTA ANGIOGRAM HEAD NECK with [id number]   [facility] ([facility])    [address]  [facility] [id number] [phone]           Please bring any scans or X-rays taken at other hospitals, if similar tests were done. Also bring a list of your medicines, including vitamins, minerals and over-the-counter drugs. It is safest to leave personal items at home.  Be sure to tell your doctor:   If you have any allergies.   If there s any chance you are pregnant.   If you are breastfeeding.   If you have any special needs.  You will have contrast for this exam. To prepare:   Do not eat or drink for 2 hours before your exam. If you need to take medicine, you may take it with small sips of water. (We may ask you to take liquid medicine as well.)   The day before your exam, drink extra fluids at least six 8-ounce glasses (unless your doctor tells you to restrict your fluids).  Patients over 70 or patients with diabetes or kidney problems:   If you haven t had a blood test (creatinine test) within the last 30 days, go to your clinic or Diagnostic Imaging Department for this test.  If you have diabetes:   If your kidney function is normal, continue taking your metformin (Avandamet, Glucophage, Glucovance, Metaglip) on the day of your exam.   If your kidney function is abnormal, wait 48 hours before restarting  this medicine.  Please wear loose clothing, such as a sweat suit or jogging clothes. Avoid snaps, zippers and other metal. We may ask you to undress and put on a hospital gown.  If you have any questions, please call the Imaging Department where you will have your exam.            Aug 31, 2017  1:15 PM CDT   CONSULT with Catalino Scott MD   [facility]    [address]  [facility] [id number] [phone]            Sep 08, 2017  9:30 AM CDT   Nurse Only with RI IM NURSE   [facility]    [address]  [facility] [address] [phone]              Who to contact     If you have questions or need follow up information about today's clinic visit or your schedule please contact [facility] directly at [phone].  Normal or non-critical lab and imaging results will be communicated to you by [employer], letter or phone within 4 business days after the clinic has received the results. If you do not hear from us within 7 days, please contact the clinic through [employer] or phone. If you have a critical or abnormal lab result, we will notify you by phone as soon as possible.  Submit refill requests through [employer] or call your pharmacy and they will forward the refill request to us. Please allow 3 business days for your refill to be completed.          Additional Information About Your Visit        [employer] Information     [employer] gives you secure access to your electronic health record. If you see a primary care provider, you can also send messages to your care team and make appointments. If you have questions, please call your primary care clinic.  If you do not have a primary care provider, please call [phone] and they will assist you.        Care EveryWhere ID     This is your Care EveryWhere ID. This could be used by other organizations to access your [place]  "medical records  [id number]        Your Vitals Were     Pulse Temperature Height Pulse Oximetry BMI (Body Mass Index)       64 98.4  F (36.9  C) (Oral) 6' 1\" (1.854 m) 99% 28.23 kg/m2        Blood Pressure from Last 3 Encounters:   08/28/17 176/84   08/08/17 164/70   02/08/17 144/70    Weight from Last 3 Encounters:   08/28/17 214 lb (97.1 kg)   08/08/17 215 lb (97.5 kg)   02/08/17 215 lb (97.5 kg)              Today, you had the following     No orders found for display       Primary Care Provider Office Phone # Fax #    Sujit Duke [phone]       [address] [facility] [address]        Equal Access to Services     [facility]: Hadii jovita henning hadasho Soeligio, waaxda luqadaha, qaybta kaalmada aderadhayada, kaye shah . So [facility] [phone].    ATENCIÓN: Si habla español, tiene a pa disposición servicios gratuitos de asistencia lingüística. Sandra al [phone].    We comply with applicable federal civil rights laws and [place] laws. We do not discriminate on the basis of race, color, national origin, age, disability sex, sexual orientation or gender identity.            Thank you!     Thank you for choosing [facility]  for your care. Our goal is always to provide you with excellent care. Hearing back from our patients is one way we can continue to improve our services. Please take a few minutes to complete the written survey that you may receive in the mail after your visit with us. Thank you!             Your Updated Medication List - Protect others around you: Learn how to safely use, store and throw away your medicines at www.disposemymeds.org.          This list is accurate as of: 8/28/17  7:55 PM.  Always use your most recent med list.                   Brand Name Dispense Instructions for use Diagnosis    aspirin 81 MG tablet      Take by mouth daily        atenolol 100 MG tablet    TENORMIN    90 tablet    TAKE 1 TABLET (100 " MG) BYMOUTH DAILY    Essential hypertension, benign       blood glucose calibration solution     1 Bottle    Use to calibrate blood glucose monitor as needed as directed.    Type 2 diabetes mellitus without complication (H)       blood glucose monitoring lancets     1 Box    Use to test blood sugar 1 times daily or as directed.    Type 2 diabetes mellitus without complication, without long-term current use of insulin (H)       blood glucose monitoring test strip    ACCU-CHEK PRISCILLA    100 each    Use to test blood sugars 1 times daily or as directed.    Type 2 diabetes mellitus without complication, without long-term current use of insulin (H)       fluticasone 50 MCG/ACT spray    FLONASE    1 Bottle    Spray 1-2 sprays into both nostrils daily    Seasonal allergic rhinitis, unspecified chronicity, unspecified trigger       lisinopril 20 MG tablet    PRINIVIL/ZESTRIL    90 tablet    Take 1 tablet (20 mg) by mouth daily    Essential hypertension, benign       metFORMIN 500 MG tablet    GLUCOPHAGE    180 tablet    Take 1 tablet (500 mg) by mouth 2 times daily (with meals)    Type 2 diabetes mellitus without complication, without long-term current use of insulin (H)       Multi-vitamin Tabs tablet   Generic drug:  multivitamin, therapeutic with minerals     120    ONCE DAILY    Routine general medical examination at a health care facility, Pain in joint, shoulder region       simvastatin 10 MG tablet    ZOCOR    90 tablet    Take 1 tablet (10 mg) by mouth At Bedtime at bedtime.    Mixed hyperlipidemia

## 2017-08-28 NOTE — PROGRESS NOTES
"Dr Barrios's note          ASSESSMENT & PLAN:                                                      (I65.23) Bilateral carotid artery stenosis  (primary encounter diagnosis)  Comment:   Plan: appointment with vascular center    (I10) Essential hypertension, benign  Comment: Not controlled   Plan: Increase lisinopril to 20 mg twice a day    (Z71.89) Advanced directives, counseling/discussion  Comment:   Plan:        Chief complaint:                                                      Discussed carotids  Wife present      SUBJECTIVE:   Merlin J Anderson is a 77 year old male who presents to clinic today for the following health issues:    The patient and wife are here today to clarify the carotid US results and how to pursue it further. They are very frustrated because they have 2 referrals to vascular specialist: vascular clinic and vascular health center and 2 different numbers. I explained them that [facility] has only once center in [place] and some of the doctors come here in [place]. They haven't called any of the numbers yet.   Wife wants specific names, \" not any surgeon\". I explained her that I trusts all the vs surgeon there. I reviewed the chart. He saw dr Gabriel Scott 2010. At first he didn't remembered seing dr Scott, but then he remembered dr Scott recommended no surgery at that time. Wife wants another name at that clinic so I gave dr Renae's name.     Reviewing the chart I noticed he received a call from the vs ctr but he admits he didn't returned it. The call was about scheduling CT angio and appointment.     Wife asks my opinion about surgery for carotids. I explained them we need more info ( CT angio) before discussing surgery, and this will be discussed with the vs surgeon.     Then she is worried about the upcoming vacation: he will be involved in water sports.  She doesn't want to miss the vacation and she wants reassurance from me that he will be well for the vacation.. I explain that I consider  " "for me health  more important than a vacation ( but it is my personal thought). I think he needs an answer how severe is the carotid stenosis. For sure no water sports. Postponing or the vacation will depend on the Ct angio results.         Review of Systems:                                                      ROS: negative for fever, chills, cough, wheezes, chest pain, shortness of breath, vomiting, abdominal pain, leg swelling     A 10-point review of systems was obtained.  Those pertinent are above and in the in the Subjective section.  The rest of the systems are negative.           OBJECTIVE:             Physical exam:  Blood pressure 176/84, pulse 64, temperature 98.4  F (36.9  C), temperature source Oral, height 6' 1\" (1.854 m), weight 214 lb (97.1 kg), SpO2 99 %.     PMHx: reviewed  Past Medical History:   Diagnosis Date     Diabetes mellitus (H)     Pre diabetic     Essential hypertension, benign       PSHx: reviewed  Past Surgical History:   Procedure Laterality Date     C NONSPECIFIC PROCEDURE  04/97    Neg. colonoscopy.     C NONSPECIFIC PROCEDURE      S/P ing. hernia.     DECOMPRESSION, FUSION LUMBAR POSTERIOR ONE LEVEL, COMBINED  8/24/2012    Procedure: COMBINED DECOMPRESSION, FUSION LUMBAR POSTERIOR ONE LEVEL;  Posterior Fusion wtih Decompression L4-5;  Surgeon: Rod Carbajal MD;  Location: [facility] OR     HERNIA REPAIR          Meds: reviewed  Current Outpatient Prescriptions   Medication Sig Dispense Refill     atenolol (TENORMIN) 100 MG tablet TAKE 1 TABLET (100 MG) BYMOUTH DAILY 90 tablet 3     lisinopril (PRINIVIL/ZESTRIL) 20 MG tablet Take 1 tablet (20 mg) by mouth daily 90 tablet 3     fluticasone (FLONASE) 50 MCG/ACT spray Spray 1-2 sprays into both nostrils daily 1 Bottle 3     simvastatin (ZOCOR) 10 MG tablet Take 1 tablet (10 mg) by mouth At Bedtime at bedtime. 90 tablet 3     metFORMIN (GLUCOPHAGE) 500 MG tablet Take 1 tablet (500 mg) by mouth 2 times daily (with meals) 180 tablet 4     " blood glucose monitoring (ACCU-CHEK PRISCILLA) test strip Use to test blood sugars 1 times daily or as directed. 100 each 1     blood glucose monitoring (ACCU-CHEK MULTICLIX) lancets Use to test blood sugar 1 times daily or as directed. 1 Box 1     blood glucose calibration (ACCU-CHEK PRISCILLA) solution Use to calibrate blood glucose monitor as needed as directed. 1 Bottle 0     aspirin 81 MG tablet Take by mouth daily       MULTI-VITAMIN OR TABS ONCE DAILY 120 0       Soc Hx: reviewed  Fam Hx: reviewed      Time spent with the patient  30 min, more than 50% in counseling and coordinating care, Re above medical problems: carotid stenosis     Ashely Barrios MD  Internal Medicine

## 2017-08-30 ENCOUNTER — HOSPITAL ENCOUNTER (OUTPATIENT)
Dept: CT IMAGING | Facility: CLINIC | Age: 77
Discharge: HOME | End: 2017-08-30
Attending: SURGERY | Admitting: SURGERY
Payer: COMMERCIAL

## 2017-08-30 DIAGNOSIS — I65.29 CAROTID ARTERY STENOSIS: ICD-10-CM

## 2017-08-30 LAB
CREAT BLD-MCNC: 1.6 MG/DL (ref 0.66–1.25)
GFR SERPL CREATININE-BSD FRML MDRD: 42 ML/MIN/1.7M2

## 2017-08-30 PROCEDURE — 25000128 H RX IP 250 OP 636: Performed by: SURGERY

## 2017-08-30 PROCEDURE — 82565 ASSAY OF CREATININE: CPT

## 2017-08-30 PROCEDURE — 70498 CT ANGIOGRAPHY NECK: CPT

## 2017-08-30 RX ORDER — IOPAMIDOL 755 MG/ML
500 INJECTION, SOLUTION INTRAVASCULAR ONCE
Status: COMPLETED | OUTPATIENT
Start: 2017-08-30 | End: 2017-08-30

## 2017-08-30 RX ADMIN — IOPAMIDOL 70 ML: 755 INJECTION, SOLUTION INTRAVENOUS at 09:10

## 2017-08-30 RX ADMIN — SODIUM CHLORIDE 80 ML: 9 INJECTION, SOLUTION INTRAVENOUS at 09:10

## 2017-08-31 ENCOUNTER — ALLIED HEALTH/NURSE VISIT (OUTPATIENT)
Dept: NURSING | Facility: CLINIC | Age: 77
End: 2017-08-31
Payer: COMMERCIAL

## 2017-08-31 ENCOUNTER — OFFICE VISIT (OUTPATIENT)
Dept: SURGERY | Facility: CLINIC | Age: 77
End: 2017-08-31
Payer: COMMERCIAL

## 2017-08-31 VITALS
OXYGEN SATURATION: 98 % | WEIGHT: 214 LBS | DIASTOLIC BLOOD PRESSURE: 64 MMHG | BODY MASS INDEX: 28.36 KG/M2 | HEIGHT: 73 IN | SYSTOLIC BLOOD PRESSURE: 138 MMHG | HEART RATE: 55 BPM

## 2017-08-31 DIAGNOSIS — I65.23 BILATERAL CAROTID ARTERY STENOSIS: Primary | ICD-10-CM

## 2017-08-31 DIAGNOSIS — E11.9 TYPE 2 DIABETES MELLITUS WITHOUT COMPLICATION (H): Primary | ICD-10-CM

## 2017-08-31 PROCEDURE — 99207 ZZC NO CHARGE NURSE ONLY: CPT

## 2017-08-31 PROCEDURE — 99204 OFFICE O/P NEW MOD 45 MIN: CPT | Performed by: SURGERY

## 2017-08-31 NOTE — NURSING NOTE
Pt walks into clinic stating he needs labs done after his CT scan. He takes Metformin and is holding since the scan yesterday. Per his paperwork from Dr Scott's office, he needs to repeat his Creatinine, but they did not order this.     Reviewed with Dr Barrios. She states to order BMP and A1C under Dr Duke and to have him follow up with Dr Duke.   She states OK to have him Hold metformin until he sees Dr Duke on 9/8.

## 2017-08-31 NOTE — MR AVS SNAPSHOT
After Visit Summary   8/31/2017    Merlin J Anderson    MRN: [id number]           Patient Information     Date Of Birth          1940        Visit Information        Provider Department      8/31/2017 3:00 PM Rn, [facility]        Today's Diagnoses     Type 2 diabetes mellitus without complication (H)    -  1       Follow-ups after your visit        Your next 10 appointments already scheduled     Sep 05, 2017 11:30 AM CDT   LAB with [facility]   [facility] ([facility])    [address]  [facility] [id number] [phone]           Patient must bring picture ID. Patient should be prepared to give a urine specimen  Please do not eat 10-12 hours before your appointment if you are coming in fasting for labs on lipids, cholesterol, or glucose (sugar). Pregnant women should follow their Care Team instructions. Water with medications is okay. Do not drink coffee or other fluids. If you have concerns about taking  your medications, please ask at office or if scheduling via [employer], send a message by clicking on Secure Messaging, Message Your Care Team.            Sep 08, 2017 11:20 AM CDT   Office Visit with Sujit Duke MD   [facility] ([facility])    [address]  [facility] [id number] [phone]           Bring a current list of meds and any records pertaining to this visit. For Physicals, please bring immunization records and any forms needing to be filled out. Please arrive 10 minutes early to complete paperwork.              Future tests that were ordered for you today     Open Future Orders        Priority Expected Expires Ordered    Hemoglobin A1c Routine  8/31/2018 8/31/2017    Basic metabolic panel  (Ca, Cl, CO2, Creat, Gluc, K, Na, BUN) Routine  8/31/2018 8/31/2017            Who to contact     If you have questions or need follow up information about today's clinic visit or  your schedule please contact [facility] directly at [phone].  Normal or non-critical lab and imaging results will be communicated to you by MyChart, letter or phone within 4 business days after the clinic has received the results. If you do not hear from us within 7 days, please contact the clinic through MyChart or phone. If you have a critical or abnormal lab result, we will notify you by phone as soon as possible.  Submit refill requests through [employer] or call your pharmacy and they will forward the refill request to us. Please allow 3 business days for your refill to be completed.          Additional Information About Your Visit        [facility] Information     [employer] gives you secure access to your electronic health record. If you see a primary care provider, you can also send messages to your care team and make appointments. If you have questions, please call your primary care clinic.  If you do not have a primary care provider, please call [phone] and they will assist you.        Care EveryWhere ID     This is your Care EveryWhere ID. This could be used by other organizations to access your [facility] medical records  [id number]         Blood Pressure from Last 3 Encounters:   08/31/17 138/64   08/28/17 176/84   08/08/17 164/70    Weight from Last 3 Encounters:   08/31/17 214 lb (97.1 kg)   08/28/17 214 lb (97.1 kg)   08/08/17 215 lb (97.5 kg)               Primary Care Provider Office Phone # Fax #    Sujit Duke [phone]       [address] [facility] [address]        Equal Access to Services     [facility] : Hadii aad ku hadasho Soomaali, waaxda luqadaha, qaybta kaalmada adeegvenancio, kaye shah . So [facility] [phone].    ATENCIÓN: Si habla español, tiene a pa disposición servicios gratuitos de asistencia lingüística. Llame al [phone].    We comply with applicable federal civil rights laws and [place] laws. We  do not discriminate on the basis of race, color, national origin, age, disability sex, sexual orientation or gender identity.            Thank you!     Thank you for choosing [facility]  for your care. Our goal is always to provide you with excellent care. Hearing back from our patients is one way we can continue to improve our services. Please take a few minutes to complete the written survey that you may receive in the mail after your visit with us. Thank you!             Your Updated Medication List - Protect others around you: Learn how to safely use, store and throw away your medicines at www.disposemymeds.org.          This list is accurate as of: 8/31/17  3:04 PM.  Always use your most recent med list.                   Brand Name Dispense Instructions for use Diagnosis    aspirin 81 MG tablet      Take by mouth daily        atenolol 100 MG tablet    TENORMIN    90 tablet    TAKE 1 TABLET (100 MG) BYMOUTH DAILY    Essential hypertension, benign       blood glucose calibration solution     1 Bottle    Use to calibrate blood glucose monitor as needed as directed.    Type 2 diabetes mellitus without complication (H)       blood glucose monitoring lancets     1 Box    Use to test blood sugar 1 times daily or as directed.    Type 2 diabetes mellitus without complication, without long-term current use of insulin (H)       blood glucose monitoring test strip    ACCU-CHEK PRISCILLA    100 each    Use to test blood sugars 1 times daily or as directed.    Type 2 diabetes mellitus without complication, without long-term current use of insulin (H)       fluticasone 50 MCG/ACT spray    FLONASE    1 Bottle    Spray 1-2 sprays into both nostrils daily    Seasonal allergic rhinitis, unspecified chronicity, unspecified trigger       lisinopril 20 MG tablet    PRINIVIL/ZESTRIL    90 tablet    Take 1 tablet (20 mg) by mouth daily    Essential hypertension, benign       metFORMIN 500 MG tablet    GLUCOPHAGE    180  tablet    Take 1 tablet (500 mg) by mouth 2 times daily (with meals)    Type 2 diabetes mellitus without complication, without long-term current use of insulin (H)       Multi-vitamin Tabs tablet   Generic drug:  multivitamin, therapeutic with minerals     120    ONCE DAILY    Routine general medical examination at a health care facility, Pain in joint, shoulder region       simvastatin 10 MG tablet    ZOCOR    90 tablet    Take 1 tablet (10 mg) by mouth At Bedtime at bedtime.    Mixed hyperlipidemia

## 2017-08-31 NOTE — PROGRESS NOTES
HPI:  Merlin J Anderson is a 77-year-old gentleman with metabolic syndrome who presents at this time for evaluation of worsening bilateral carotid stenoses.  I last saw him in 2009 at which time he had minimal carotid disease.  Recent carotid ultrasound was suggestive of a greater than 70% right ICA stenosis.  He underwent a carotid CTA yesterday.  He presents with his wife to discuss that study and his treatment options.    He and his wife live independently in their own home.  He still does his own yard work.  He rides a bike 1 mile per day.  He still drives a car.  He is a right-handed individual who is a retired .  He has no history of stroke or TIA.        ROS (Review of Systems):      Positive for diabetes and DM controlled with Oral Medication.   Cardiovascular: Positive for hypertension and hyperlipidemia. Negative for Stroke/TIA.   MUSCULOSKELETAL: Positive for back pain.  All other systems reviewed and are negative        Physical Exam   Vitals reviewed.  Constitutional: He appears well-developed and well-nourished.   Eyes: EOM are normal. Pupils are equal, round, and reactive to light.   HENT:   Head: Normocephalic and atraumatic.   Neck: No JVD present. Carotid bruit is present.   Somewhat decreased range of motion in the neck   Cardiovascular: Intact distal pulses.    Soft right carotid bruit  Effort normal and breath sounds normal.   Lymphadenopathy:     He has no cervical adenopathy.   Neurological: He is alert and oriented to person, place, and time.   Skin: Skin is warm and dry.   Psychiatric/Behavioral: mood and affect normal, normal behavior, normal thought content and normal judgment     Imaging:  CT ANGIOGRAM OF THE HEAD AND NECK WITHOUT AND WITH CONTRAST  8/30/2017  9:18 AM      HISTORY: Evaluate stenosis of carotid arteries.     TECHNIQUE: Precontrast localizing scans were followed by CT  angiography with an injection of 70 mL nonionic contrast material IV  with scans  through the head and neck.  Images were transferred to a  separate 3-D workstation where multiplanar reformations and 3-D images  were created.  Estimates of carotid stenoses are made relative to the  distal internal carotid artery diameters except as noted.   Radiation  dose for this scan was reduced using automated exposure control,  adjustment of the mA and/or kV according to patient size, or iterative  reconstruction technique.     COMPARISON: Carotid ultrasound 8/17/2017.     FINDINGS:       [place]: Normal. Large-caliber vessels are patent. No  evidence for aneurysm.     Neck arteries:     Right carotid artery: 1 cm distal to the origin of the right internal  carotid artery there is a high-grade short segment stenosis of  approximately 70%. There is also high-grade stenosis of the external  carotid artery at its origin.     Left carotid artery: Calcified atheromatous plaque at the left carotid  bifurcation. There is a stenosis that extends over a 1 cm length and  reaches a maximum of approximately 50-55%.     Antegrade flow in normal caliber vertebral arteries.     The origin of the great vessels off the aortic arch appear patent.         IMPRESSION:  1. Atheromatous disease of both carotid bifurcations with  approximately 70% stenosis of the proximal right internal carotid  artery and 50-55% stenosis left internal carotid artery origin.  2. Normal [place] of [place].     PARVEEN SALAS MD        ASSESSMENT:  1.  Greater than 70% asymptomatic right ICA stenosis and this active 77-year-old gentleman.  2.  Asymptomatic 50% left ICA stenosis.    RECOMMENDATION:  I had a lengthy discussion with Merlin and his wife regarding asymptomatic carotid disease.  We discussed the ACAS trial.  I recommend prophylactic right carotid endarterectomy.  I discussed the specifics of the procedure including potential complications and the anticipated postoperative course.  I quoted him my personal perioperative stroke  rate of less than 1%.  All questions were answered and they verbalize understanding to all the above.  He would like to proceed with right carotid endarterectomy with patch angioplasty.  We will schedule this in a timely manner at [facility].  He may continue his aspirin  uninterrupted.    Total face-to-face time was 45 minutes, greater than 50% spent providing counseling and education.    Gabriel Scott M.D.

## 2017-08-31 NOTE — LETTER
[facility] [address] [address]  [place] [address]  Phone: [phone]  Fax: [phone]        2017      RE:  Merlin J. Anderson-:  40    HPI:  Merlin J Anderson is a 77-year-old gentleman with metabolic syndrome who presents at this time for evaluation of worsening bilateral carotid stenoses.  I last saw him in  at which time he had minimal carotid disease.  Recent carotid ultrasound was suggestive of a greater than 70% right ICA stenosis.  He underwent a carotid CTA yesterday.  He presents with his wife to discuss that study and his treatment options.     He and his wife live independently in their own home.  He still does his own yard work.  He rides a bike 1 mile per day.  He still drives a car.  He is a right-handed individual who is a retired .  He has no history of stroke or TIA.      ROS (Review of Systems):      Positive for diabetes and DM controlled with Oral Medication.   Cardiovascular: Positive for hypertension and hyperlipidemia. Negative for Stroke/TIA.   MUSCULOSKELETAL: Positive for back pain.  All other systems reviewed and are negative      Physical Exam   Vitals reviewed.  Constitutional: He appears well-developed and well-nourished.   Eyes: EOM are normal. Pupils are equal, round, and reactive to light.   HENT:   Head: Normocephalic and atraumatic.   Neck: No JVD present. Carotid bruit is present.   Somewhat decreased range of motion in the neck   Cardiovascular: Intact distal pulses.    Soft right carotid bruit  Effort normal and breath sounds normal.   Lymphadenopathy:     He has no cervical adenopathy.   Neurological: He is alert and oriented to person, place, and time.   Skin: Skin is warm and dry.   Psychiatric/Behavioral: mood and affect normal, normal behavior, normal thought content and normal judgment      Imaging:  CT ANGIOGRAM OF THE HEAD AND NECK WITHOUT AND WITH CONTRAST  2017  9:18 AM        HISTORY: Evaluate stenosis of carotid arteries.      TECHNIQUE: Precontrast localizing scans were followed by CT  angiography with an injection of 70 mL nonionic contrast material IV  with scans through the head and neck.  Images were transferred to a  separate 3-D workstation where multiplanar reformations and 3-D images  were created.  Estimates of carotid stenoses are made relative to the  distal internal carotid artery diameters except as noted.   Radiation  dose for this scan was reduced using automated exposure control,  adjustment of the mA and/or kV according to patient size, or iterative  reconstruction technique.      COMPARISON: Carotid ultrasound 8/17/2017.      FINDINGS:        [place] of [place]: Normal. Large-caliber vessels are patent. No  evidence for aneurysm.      Neck arteries:      Right carotid artery: 1 cm distal to the origin of the right internal  carotid artery there is a high-grade short segment stenosis of  approximately 70%. There is also high-grade stenosis of the external  carotid artery at its origin.      Left carotid artery: Calcified atheromatous plaque at the left carotid  bifurcation. There is a stenosis that extends over a 1 cm length and  reaches a maximum of approximately 50-55%.      Antegrade flow in normal caliber vertebral arteries.      The origin of the great vessels off the aortic arch appear patent.          IMPRESSION:  1. Atheromatous disease of both carotid bifurcations with  approximately 70% stenosis of the proximal right internal carotid  artery and 50-55% stenosis left internal carotid artery origin.  2. Normal [place] of [place].      PARVEEN SALAS MD           ASSESSMENT:  1.  Greater than 70% asymptomatic right ICA stenosis and this active 77-year-old gentleman.  2.  Asymptomatic 50% left ICA stenosis.     RECOMMENDATION:  I had a lengthy discussion with Merlin and his wife regarding asymptomatic carotid disease.  We discussed the ACAS trial.  I  recommend prophylactic right carotid endarterectomy.  I discussed the specifics of the procedure including potential complications and the anticipated postoperative course.  I quoted him my personal perioperative stroke rate of less than 1%.  All questions were answered and they verbalize understanding to all the above.  He would like to proceed with right carotid endarterectomy with patch angioplasty.  We will schedule this in a timely manner at [facility].  He may continue his aspirin  uninterrupted.        Gabriel Scott M.D.

## 2017-09-05 DIAGNOSIS — E11.9 TYPE 2 DIABETES MELLITUS WITHOUT COMPLICATION (H): ICD-10-CM

## 2017-09-05 LAB
ANION GAP SERPL CALCULATED.3IONS-SCNC: 7 MMOL/L (ref 3–14)
BUN SERPL-MCNC: 25 MG/DL (ref 7–30)
CALCIUM SERPL-MCNC: 9.5 MG/DL (ref 8.5–10.1)
CHLORIDE SERPL-SCNC: 107 MMOL/L (ref 94–109)
CO2 SERPL-SCNC: 25 MMOL/L (ref 20–32)
CREAT SERPL-MCNC: 1.61 MG/DL (ref 0.66–1.25)
GFR SERPL CREATININE-BSD FRML MDRD: 42 ML/MIN/1.7M2
GLUCOSE SERPL-MCNC: 129 MG/DL (ref 70–99)
HBA1C MFR BLD: 6.1 % (ref 4.3–6)
POTASSIUM SERPL-SCNC: 5.3 MMOL/L (ref 3.4–5.3)
SODIUM SERPL-SCNC: 139 MMOL/L (ref 133–144)

## 2017-09-05 PROCEDURE — 83036 HEMOGLOBIN GLYCOSYLATED A1C: CPT | Performed by: INTERNAL MEDICINE

## 2017-09-05 PROCEDURE — 36415 COLL VENOUS BLD VENIPUNCTURE: CPT | Performed by: INTERNAL MEDICINE

## 2017-09-05 PROCEDURE — 80048 BASIC METABOLIC PNL TOTAL CA: CPT | Performed by: INTERNAL MEDICINE

## 2017-09-08 ENCOUNTER — OFFICE VISIT (OUTPATIENT)
Dept: INTERNAL MEDICINE | Facility: CLINIC | Age: 77
End: 2017-09-08
Payer: COMMERCIAL

## 2017-09-08 VITALS
WEIGHT: 213.7 LBS | BODY MASS INDEX: 28.32 KG/M2 | HEART RATE: 58 BPM | DIASTOLIC BLOOD PRESSURE: 80 MMHG | OXYGEN SATURATION: 98 % | SYSTOLIC BLOOD PRESSURE: 120 MMHG | HEIGHT: 73 IN | TEMPERATURE: 98.6 F

## 2017-09-08 DIAGNOSIS — E78.2 MIXED HYPERLIPIDEMIA: ICD-10-CM

## 2017-09-08 DIAGNOSIS — Z01.818 PREOP GENERAL PHYSICAL EXAM: Primary | ICD-10-CM

## 2017-09-08 DIAGNOSIS — I65.21 STENOSIS OF RIGHT CAROTID ARTERY: ICD-10-CM

## 2017-09-08 DIAGNOSIS — N18.30 CKD (CHRONIC KIDNEY DISEASE) STAGE 3, GFR 30-59 ML/MIN (H): ICD-10-CM

## 2017-09-08 DIAGNOSIS — I10 BENIGN ESSENTIAL HYPERTENSION: ICD-10-CM

## 2017-09-08 DIAGNOSIS — E11.9 TYPE 2 DIABETES MELLITUS WITHOUT COMPLICATION, WITHOUT LONG-TERM CURRENT USE OF INSULIN (H): ICD-10-CM

## 2017-09-08 DIAGNOSIS — Z23 NEEDS FLU SHOT: ICD-10-CM

## 2017-09-08 DIAGNOSIS — I73.9 PVD (PERIPHERAL VASCULAR DISEASE) (H): ICD-10-CM

## 2017-09-08 PROCEDURE — 90662 IIV NO PRSV INCREASED AG IM: CPT | Performed by: INTERNAL MEDICINE

## 2017-09-08 PROCEDURE — 99215 OFFICE O/P EST HI 40 MIN: CPT | Mod: 25 | Performed by: INTERNAL MEDICINE

## 2017-09-08 PROCEDURE — 93000 ELECTROCARDIOGRAM COMPLETE: CPT | Performed by: INTERNAL MEDICINE

## 2017-09-08 PROCEDURE — G0008 ADMIN INFLUENZA VIRUS VAC: HCPCS | Performed by: INTERNAL MEDICINE

## 2017-09-08 NOTE — NURSING NOTE
"Chief Complaint   Patient presents with     Pre-Op Exam       Initial /80 (BP Location: Left arm, Patient Position: Sitting, Cuff Size: Adult Large)  Pulse 58  Temp 98.6  F (37  C) (Oral)  Ht 6' 1\" (1.854 m)  Wt 213 lb 11.2 oz (96.9 kg)  SpO2 98%  BMI 28.19 kg/m2 Estimated body mass index is 28.19 kg/(m^2) as calculated from the following:    Height as of this encounter: 6' 1\" (1.854 m).    Weight as of this encounter: 213 lb 11.2 oz (96.9 kg).  Medication Reconciliation: complete    "

## 2017-09-08 NOTE — PATIENT INSTRUCTIONS
Before Your Surgery      Call your surgeon if there is any change in your health. This includes signs of a cold or flu (such as a sore throat, runny nose, cough, rash or fever).    Do not smoke, drink alcohol or take over the counter medicine (unless your surgeon or primary care doctor tells you to) for the 24 hours before and after surgery.    If you take prescribed drugs: Follow your doctor s orders about which medicines to take and which to stop until after surgery.    Eating and drinking prior to surgery: follow the instructions from your surgeon    Take a shower or bath the night before surgery. Use the soap your surgeon gave you to gently clean your skin. If you do not have soap from your surgeon, use your regular soap. Do not shave or scrub the surgery site.  Wear clean pajamas and have clean sheets on your bed.     CONTINUE THE SAME MEDICATIONS  HOLD METFORMIN  START CHECKING YOUR BLOOD SUGARS ONCE DAILY  CHECK YOUR BLOOD PRESSURE

## 2017-09-08 NOTE — PROGRESS NOTES
[place] [address]  [facility] [address]  [phone]  Dept: [phone]    PRE-OP EVALUATION:  Today's date: 2017    Merlin J Anderson (: 1940) presents for pre-operative evaluation assessment as requested by Catalino Kramer.  He requires evaluation and anesthesia risk assessment prior to undergoing surgery/procedure for treatment of carotid artery .  Proposed procedure: RIGHT COROTID ENDARTERECTOMY, WITH PATCH ANGIOPLASTY, WITH EEG    Date of Surgery/ Procedure: 17  Time of Surgery/ Procedure: am  Hospital[facility]Surgical Facility: [facility]    Primary Physician: Sujit Duke  Type of Anesthesia Anticipated: General    Patient has a Health Care Directive or Living Will:  NO    1. NO - Do you have a history of heart attack, stroke, stent, bypass or surgery on an artery in the head, neck, heart or legs?  2. NO - Do you ever have any pain or discomfort in your chest?  3. NO - Do you have a history of  Heart Failure?  4. NO - Are you troubled by shortness of breath when: walking on the level, up a slight hill or at night?  5. NO - Do you currently have a cold, bronchitis or other respiratory infection?  6. NO - Do you have a cough, shortness of breath or wheezing?  7. NO - Do you sometimes get pains in the calves of your legs when you walk?  8. NO - Do you or anyone in your family have previous history of blood clots?  9. NO - Do you or does anyone in your family have a serious bleeding problem such as prolonged bleeding following surgeries or cuts?  10. NO - Have you ever had problems with anemia or been told to take iron pills?  11. NO - Have you had any abnormal blood loss such as black, tarry or bloody stools, or abnormal vaginal bleeding?  12. NO - Have you ever had a blood transfusion?  13. NO - Have you or any of your relatives ever had problems with anesthesia?  14. NO - Do you have sleep apnea, excessive snoring or daytime drowsiness?  15. NO - Do you have  any prosthetic heart valves?  16. NO - Do you have prosthetic joints?  17. NO - Is there any chance that you may be pregnant?        HPI:                                                      Brief HPI related to upcoming procedure: scheduled for right carotid artery endarterectomy for progression of carotid stenosis.   No acute complaints, no medication change or new medical conditions.  Has h/o HTN. on medical treatment. BP has not been well controlled. No side effects from medications. No CP, HA, dizziness. good compliance with medications and low salt diet.  Has H/O hyperlipidemia. On medical treatment and diet. No side effects. No muscle weakness, myalgias or upset stomach.   Has H/O DM. On diet , exercise . Blood sugars are controlled. No parestesias. No hypoglycemias.    Patient has stage 3 CKD. Now is off Metformin because of recent angiogram and renal function remains stable , last creatinin 1.6      See problem list for active medical problems.  Problems all longstanding and stable, except as noted/documented.  See ROS for pertinent symptoms related to these conditions.                                                                                                  .    MEDICAL HISTORY:                                                    Patient Active Problem List    Diagnosis Date Noted     Type 2 diabetes mellitus without complication (H) 11/30/2015     Priority: Medium     Seasonal allergic rhinitis 04/15/2013     Priority: Medium     Lumbago 11/13/2012     Priority: Medium     Arthrodesis status 11/13/2012     Priority: Medium     Lumbar radiculopathy 06/27/2012     Priority: Medium     Advanced directives, counseling/discussion 06/18/2012     Priority: Medium     Patient states has Advance Directive and will bring in a copy to clinic. 6/18/2012          CKD (chronic kidney disease) stage 3, GFR 30-59 ml/min 12/27/2011     Priority: Medium     HYPERLIPIDEMIA LDL GOAL <100 10/31/2010     Priority: Medium      HTN (hypertension) 06/29/2009     Priority: Medium     (Problem list name updated by automated process. Provider to review and confirm.)       Mixed hyperlipidemia 11/14/2002     Priority: Medium     Essential hypertension, benign 11/14/2002     Priority: Medium     Allergic rhinitis due to pollen 11/14/2002     Priority: Medium     Gout 11/14/2002     Priority: Medium     Problem list name updated by automated process. Provider to review        Past Medical History:   Diagnosis Date     Diabetes mellitus (H)     Pre diabetic     Essential hypertension, benign      Past Surgical History:   Procedure Laterality Date     C NONSPECIFIC PROCEDURE  04/97    Neg. colonoscopy.     C NONSPECIFIC PROCEDURE      S/P ing. hernia.     DECOMPRESSION, FUSION LUMBAR POSTERIOR ONE LEVEL, COMBINED  8/24/2012    Procedure: COMBINED DECOMPRESSION, FUSION LUMBAR POSTERIOR ONE LEVEL;  Posterior Fusion wtih Decompression L4-5;  Surgeon: Rod Carbajal MD;  Location: [facility] OR     HERNIA REPAIR       Current Outpatient Prescriptions   Medication Sig Dispense Refill     atenolol (TENORMIN) 100 MG tablet TAKE 1 TABLET (100 MG) BYMOUTH DAILY 90 tablet 3     lisinopril (PRINIVIL/ZESTRIL) 20 MG tablet Take 1 tablet (20 mg) by mouth daily 90 tablet 3     fluticasone (FLONASE) 50 MCG/ACT spray Spray 1-2 sprays into both nostrils daily 1 Bottle 3     simvastatin (ZOCOR) 10 MG tablet Take 1 tablet (10 mg) by mouth At Bedtime at bedtime. 90 tablet 3     metFORMIN (GLUCOPHAGE) 500 MG tablet Take 1 tablet (500 mg) by mouth 2 times daily (with meals) 180 tablet 4     aspirin 81 MG tablet Take by mouth daily       MULTI-VITAMIN OR TABS ONCE DAILY 120 0     blood glucose monitoring (ACCU-CHEK PRISCILLA) test strip Use to test blood sugars 1 times daily or as directed. 100 each 1     blood glucose monitoring (ACCU-CHEK MULTICLIX) lancets Use to test blood sugar 1 times daily or as directed. 1 Box 1     blood glucose calibration (ACCU-CHEK PRISCILLA)  "solution Use to calibrate blood glucose monitor as needed as directed. 1 Bottle 0     OTC products: None, except as noted above    No Known Allergies   Latex Allergy: NO    Social History   Substance Use Topics     Smoking status: Never Smoker     Smokeless tobacco: Never Used     Alcohol use Yes      Comment: 1 monthly or less.     History   Drug Use No       REVIEW OF SYSTEMS:                                                    C: NEGATIVE for fever, chills, change in weight  I: NEGATIVE for worrisome rashes, moles or lesions  E: NEGATIVE for vision changes or irritation  E/M: NEGATIVE for ear, mouth and throat problems  R: NEGATIVE for significant cough or SOB  B: NEGATIVE for masses, tenderness or discharge  CV: NEGATIVE for chest pain, palpitations or peripheral edema  GI: NEGATIVE for nausea, abdominal pain, heartburn, or change in bowel habits  : NEGATIVE for frequency, dysuria, or hematuria  M: NEGATIVE for significant arthralgias or myalgia  N: NEGATIVE for weakness, dizziness or paresthesias  E: NEGATIVE for temperature intolerance, skin/hair changes  H: NEGATIVE for bleeding problems  P: NEGATIVE for changes in mood or affect    EXAM:                                                    /80 (BP Location: Left arm, Patient Position: Sitting, Cuff Size: Adult Large)  Pulse 58  Temp 98.6  F (37  C) (Oral)  Ht 6' 1\" (1.854 m)  Wt 213 lb 11.2 oz (96.9 kg)  SpO2 98%  BMI 28.19 kg/m2    GENERAL APPEARANCE: healthy, alert and no distress     EYES: EOMI,  PERRL     HENT: ear canals and TM's normal and nose and mouth without ulcers or lesions     NECK: no adenopathy, no asymmetry, masses, or scars and thyroid normal to palpation     RESP: lungs clear to auscultation - no rales, rhonchi or wheezes     CV: regular rates and rhythm, normal S1 S2, no S3 or S4 and no murmur, click or rub     ABDOMEN:  soft, nontender, no HSM or masses and bowel sounds normal     MS: extremities normal- no gross deformities " noted, no evidence of inflammation in joints, FROM in all extremities.     SKIN: no suspicious lesions or rashes     NEURO: Normal strength and tone, sensory exam grossly normal, mentation intact and speech normal     PSYCH: mentation appears normal. and affect normal/bright     LYMPHATICS: No axillary, cervical, or supraclavicular nodes    DIAGNOSTICS:                                                    EKG: sinus bradycardia, normal axis, normal intervals, no acute ST/T changes c/w ischemia, no LVH by voltage criteria, unchanged from previous tracings    Recent Labs   Lab Test  09/05/17   1129  08/08/17   0937  02/08/17   0933   09/03/14   1037   08/23/12   0905   HGB   --   13.5   --    --   13.6   < >   --    PLT   --   170   --    --   168   < >   --    INR   --    --    --    --    --    --   0.96   NA  139  139  140   < >  139   < >   --    POTASSIUM  5.3  5.3  5.0   < >  5.0   < >  4.2   CR  1.61*  1.69*  1.53*   < >  1.46*   < >  1.35*   A1C  6.1*   --   6.1*   < >  6.3*   < >   --     < > = values in this interval not displayed.        IMPRESSION:                                                    Reason for surgery/procedure: carotid artery stenosis   Diagnosis/reason for consult: preoperative evaluation/ clearance      The proposed surgical procedure is considered INTERMEDIATE risk.    REVISED CARDIAC RISK INDEX  The patient has the following serious cardiovascular risks for perioperative complications such as (MI, PE, VFib and 3  AV Block):  No serious cardiac risks  INTERPRETATION: 0 risks: Class I (very low risk - 0.4% complication rate)    The patient has the following additional risks for perioperative complications:  No identified additional risks    No diagnosis found.    RECOMMENDATIONS:                                                      Continue to hold Metformin. Diabetic monitoring and blood pressure monitoring advised.     --Patient is to take all scheduled medications on the day of  surgery.    APPROVAL GIVEN to proceed with proposed procedure, without further diagnostic evaluation       Signed Electronically by: Sujit Duke MD    Copy of this evaluation report is provided to requesting physician.    [place] Preop Guidelines

## 2017-09-08 NOTE — MR AVS SNAPSHOT
After Visit Summary   9/8/2017    Merlin J Anderson    MRN: [id number]           Patient Information     Date Of Birth          1940        Visit Information        Provider Department      9/8/2017 11:20 AM Sujit Duke MD [facility]        Today's Diagnoses     Preop general physical exam    -  1    Benign essential hypertension          Care Instructions      Before Your Surgery      Call your surgeon if there is any change in your health. This includes signs of a cold or flu (such as a sore throat, runny nose, cough, rash or fever).    Do not smoke, drink alcohol or take over the counter medicine (unless your surgeon or primary care doctor tells you to) for the 24 hours before and after surgery.    If you take prescribed drugs: Follow your doctor s orders about which medicines to take and which to stop until after surgery.    Eating and drinking prior to surgery: follow the instructions from your surgeon    Take a shower or bath the night before surgery. Use the soap your surgeon gave you to gently clean your skin. If you do not have soap from your surgeon, use your regular soap. Do not shave or scrub the surgery site.  Wear clean pajamas and have clean sheets on your bed.     CONTINUE THE SAME MEDICATIONS  HOLD METFORMIN  START CHECKING YOUR BLOOD SUGARS ONCE DAILY  CHECK YOUR BLOOD PRESSURE           Follow-ups after your visit        Your next 10 appointments already scheduled     Sep 18, 2017   Procedure with Catalino Scott MD   [facility] (--)    [address]  [facility] [address]   [phone]            Sep 18, 2017 11:30 AM CDT   [facility] OR with Catalino Scott MD   [facility] ([facility])    [facility] ([facility])   [phone]              Who to contact     If you have questions or need follow up information  "about today's clinic visit or your schedule please contact [facility] directly at [phone].  Normal or non-critical lab and imaging results will be communicated to you by MyChart, letter or phone within 4 business days after the clinic has received the results. If you do not hear from us within 7 days, please contact the clinic through [employer] or phone. If you have a critical or abnormal lab result, we will notify you by phone as soon as possible.  Submit refill requests through [employer]Sage or call your pharmacy and they will forward the refill request to us. Please allow 3 business days for your refill to be completed.          Additional Information About Your Visit        [employer] Information     [employer] gives you secure access to your electronic health record. If you see a primary care provider, you can also send messages to your care team and make appointments. If you have questions, please call your primary care clinic.  If you do not have a primary care provider, please call [phone] and they will assist you.        Care EveryWhere ID     This is your Care EveryWhere ID. This could be used by other organizations to access your [place] medical records  [id number]        Your Vitals Were     Pulse Temperature Height Pulse Oximetry BMI (Body Mass Index)       58 98.6  F (37  C) (Oral) 6' 1\" (1.854 m) 98% 28.19 kg/m2        Blood Pressure from Last 3 Encounters:   09/08/17 120/80   08/31/17 138/64   08/28/17 176/84    Weight from Last 3 Encounters:   09/08/17 213 lb 11.2 oz (96.9 kg)   08/31/17 214 lb (97.1 kg)   08/28/17 214 lb (97.1 kg)              We Performed the Following     EKG 12-lead complete w/read - Clinics          Today's Medication Changes          These changes are accurate as of: 9/8/17 12:19 PM.  If you have any questions, ask your nurse or doctor.               Start taking these medicines.        Dose/Directions    order for DME   Used for:  Benign essential hypertension "   Started by:  Sujit Duke MD        Check blood pressure daily   Quantity:  1 Device   Refills:  0         These medicines have changed or have updated prescriptions.        Dose/Directions    lisinopril 20 MG tablet   Commonly known as:  PRINIVIL/ZESTRIL   This may have changed:  when to take this   Used for:  Essential hypertension, benign        Dose:  20 mg   Take 1 tablet (20 mg) by mouth daily   Quantity:  90 tablet   Refills:  3            Where to get your medicines      Some of these will need a paper prescription and others can be bought over the counter.  Ask your nurse if you have questions.     Bring a paper prescription for each of these medications     order for DME                Primary Care Provider Office Phone # Fax #    Sujit Duke [phone]       [address]  [facility] [address]        Equal Access to Services     RAUDEL ESTRELLA : Stephany henning hadasho Soomaali, waaxda luqadaha, qaybta kaalmada adeegyada, kaye shah . So [facility] [phone].    ATENCIÓN: Si habla español, tiene a pa disposición servicios gratuitos de asistencia lingüística. Llame al [phone].    We comply with applicable federal civil rights laws and [place] laws. We do not discriminate on the basis of race, color, national origin, age, disability sex, sexual orientation or gender identity.            Thank you!     Thank you for choosing [facility]  for your care. Our goal is always to provide you with excellent care. Hearing back from our patients is one way we can continue to improve our services. Please take a few minutes to complete the written survey that you may receive in the mail after your visit with us. Thank you!             Your Updated Medication List - Protect others around you: Learn how to safely use, store and throw away your medicines at www.disposemymeds.org.          This list is accurate as of: 9/8/17 12:19 PM.   Always use your most recent med list.                   Brand Name Dispense Instructions for use Diagnosis    aspirin 81 MG tablet      Take by mouth daily        atenolol 100 MG tablet    TENORMIN    90 tablet    TAKE 1 TABLET (100 MG) BYMOUTH DAILY    Essential hypertension, benign       blood glucose calibration solution     1 Bottle    Use to calibrate blood glucose monitor as needed as directed.    Type 2 diabetes mellitus without complication (H)       blood glucose monitoring lancets     1 Box    Use to test blood sugar 1 times daily or as directed.    Type 2 diabetes mellitus without complication, without long-term current use of insulin (H)       blood glucose monitoring test strip    ACCU-CHEK PRISCILLA    100 each    Use to test blood sugars 1 times daily or as directed.    Type 2 diabetes mellitus without complication, without long-term current use of insulin (H)       fluticasone 50 MCG/ACT spray    FLONASE    1 Bottle    Spray 1-2 sprays into both nostrils daily    Seasonal allergic rhinitis, unspecified chronicity, unspecified trigger       lisinopril 20 MG tablet    PRINIVIL/ZESTRIL    90 tablet    Take 1 tablet (20 mg) by mouth daily    Essential hypertension, benign       metFORMIN 500 MG tablet    GLUCOPHAGE    180 tablet    Take 1 tablet (500 mg) by mouth 2 times daily (with meals)    Type 2 diabetes mellitus without complication, without long-term current use of insulin (H)       Multi-vitamin Tabs tablet   Generic drug:  multivitamin, therapeutic with minerals     120    ONCE DAILY    Routine general medical examination at a health care facility, Pain in joint, shoulder region       order for DME     1 Device    Check blood pressure daily    Benign essential hypertension       simvastatin 10 MG tablet    ZOCOR    90 tablet    Take 1 tablet (10 mg) by mouth At Bedtime at bedtime.    Mixed hyperlipidemia

## 2017-09-14 NOTE — H&P (VIEW-ONLY)
[place] [address]  [facility] [address]  [phone]  Dept: [phone]    PRE-OP EVALUATION:  Today's date: 2017    Merlin J Anderson (: 1940) presents for pre-operative evaluation assessment as requested by Catalino Kramer.  He requires evaluation and anesthesia risk assessment prior to undergoing surgery/procedure for treatment of carotid artery .  Proposed procedure: RIGHT COROTID ENDARTERECTOMY, WITH PATCH ANGIOPLASTY, WITH EEG    Date of Surgery/ Procedure: 17  Time of Surgery/ Procedure: am  Hospital/Surgical Facility: [facility]    Primary Physician: Sujit Duke  Type of Anesthesia Anticipated: General    Patient has a Health Care Directive or Living Will:  NO    1. NO - Do you have a history of heart attack, stroke, stent, bypass or surgery on an artery in the head, neck, heart or legs?  2. NO - Do you ever have any pain or discomfort in your chest?  3. NO - Do you have a history of  Heart Failure?  4. NO - Are you troubled by shortness of breath when: walking on the level, up a slight hill or at night?  5. NO - Do you currently have a cold, bronchitis or other respiratory infection?  6. NO - Do you have a cough, shortness of breath or wheezing?  7. NO - Do you sometimes get pains in the calves of your legs when you walk?  8. NO - Do you or anyone in your family have previous history of blood clots?  9. NO - Do you or does anyone in your family have a serious bleeding problem such as prolonged bleeding following surgeries or cuts?  10. NO - Have you ever had problems with anemia or been told to take iron pills?  11. NO - Have you had any abnormal blood loss such as black, tarry or bloody stools, or abnormal vaginal bleeding?  12. NO - Have you ever had a blood transfusion?  13. NO - Have you or any of your relatives ever had problems with anesthesia?  14. NO - Do you have sleep apnea, excessive snoring or daytime drowsiness?  15. NO - Do you have  any prosthetic heart valves?  16. NO - Do you have prosthetic joints?  17. NO - Is there any chance that you may be pregnant?        HPI:                                                      Brief HPI related to upcoming procedure: scheduled for right carotid artery endarterectomy for progression of carotid stenosis.   No acute complaints, no medication change or new medical conditions.  Has h/o HTN. on medical treatment. BP has not been well controlled. No side effects from medications. No CP, HA, dizziness. good compliance with medications and low salt diet.  Has H/O hyperlipidemia. On medical treatment and diet. No side effects. No muscle weakness, myalgias or upset stomach.   Has H/O DM. On diet , exercise . Blood sugars are controlled. No parestesias. No hypoglycemias.    Patient has stage 3 CKD. Now is off Metformin because of recent angiogram and renal function remains stable , last creatinin 1.6      See problem list for active medical problems.  Problems all longstanding and stable, except as noted/documented.  See ROS for pertinent symptoms related to these conditions.                                                                                                  .    MEDICAL HISTORY:                                                    Patient Active Problem List    Diagnosis Date Noted     Type 2 diabetes mellitus without complication (H) 11/30/2015     Priority: Medium     Seasonal allergic rhinitis 04/15/2013     Priority: Medium     Lumbago 11/13/2012     Priority: Medium     Arthrodesis status 11/13/2012     Priority: Medium     Lumbar radiculopathy 06/27/2012     Priority: Medium     Advanced directives, counseling/discussion 06/18/2012     Priority: Medium     Patient states has Advance Directive and will bring in a copy to clinic. 6/18/2012          CKD (chronic kidney disease) stage 3, GFR 30-59 ml/min 12/27/2011     Priority: Medium     HYPERLIPIDEMIA LDL GOAL <100 10/31/2010     Priority: Medium      HTN (hypertension) 06/29/2009     Priority: Medium     (Problem list name updated by automated process. Provider to review and confirm.)       Mixed hyperlipidemia 11/14/2002     Priority: Medium     Essential hypertension, benign 11/14/2002     Priority: Medium     Allergic rhinitis due to pollen 11/14/2002     Priority: Medium     Gout 11/14/2002     Priority: Medium     Problem list name updated by automated process. Provider to review        Past Medical History:   Diagnosis Date     Diabetes mellitus (H)     Pre diabetic     Essential hypertension, benign      Past Surgical History:   Procedure Laterality Date     C NONSPECIFIC PROCEDURE  04/97    Neg. colonoscopy.     C NONSPECIFIC PROCEDURE      S/P ing. hernia.     DECOMPRESSION, FUSION LUMBAR POSTERIOR ONE LEVEL, COMBINED  8/24/2012    Procedure: COMBINED DECOMPRESSION, FUSION LUMBAR POSTERIOR ONE LEVEL;  Posterior Fusion wtih Decompression L4-5;  Surgeon: Rod Carbajal MD;  Location: [facility] OR     HERNIA REPAIR       Current Outpatient Prescriptions   Medication Sig Dispense Refill     atenolol (TENORMIN) 100 MG tablet TAKE 1 TABLET (100 MG) BYMOUTH DAILY 90 tablet 3     lisinopril (PRINIVIL/ZESTRIL) 20 MG tablet Take 1 tablet (20 mg) by mouth daily 90 tablet 3     fluticasone (FLONASE) 50 MCG/ACT spray Spray 1-2 sprays into both nostrils daily 1 Bottle 3     simvastatin (ZOCOR) 10 MG tablet Take 1 tablet (10 mg) by mouth At Bedtime at bedtime. 90 tablet 3     metFORMIN (GLUCOPHAGE) 500 MG tablet Take 1 tablet (500 mg) by mouth 2 times daily (with meals) 180 tablet 4     aspirin 81 MG tablet Take by mouth daily       MULTI-VITAMIN OR TABS ONCE DAILY 120 0     blood glucose monitoring (ACCU-CHEK PRISCILLA) test strip Use to test blood sugars 1 times daily or as directed. 100 each 1     blood glucose monitoring (ACCU-CHEK MULTICLIX) lancets Use to test blood sugar 1 times daily or as directed. 1 Box 1     blood glucose calibration (ACCU-CHEK PRISCILLA)  "solution Use to calibrate blood glucose monitor as needed as directed. 1 Bottle 0     OTC products: None, except as noted above    No Known Allergies   Latex Allergy: NO    Social History   Substance Use Topics     Smoking status: Never Smoker     Smokeless tobacco: Never Used     Alcohol use Yes      Comment: 1 monthly or less.     History   Drug Use No       REVIEW OF SYSTEMS:                                                    C: NEGATIVE for fever, chills, change in weight  I: NEGATIVE for worrisome rashes, moles or lesions  E: NEGATIVE for vision changes or irritation  E/M: NEGATIVE for ear, mouth and throat problems  R: NEGATIVE for significant cough or SOB  B: NEGATIVE for masses, tenderness or discharge  CV: NEGATIVE for chest pain, palpitations or peripheral edema  GI: NEGATIVE for nausea, abdominal pain, heartburn, or change in bowel habits  : NEGATIVE for frequency, dysuria, or hematuria  M: NEGATIVE for significant arthralgias or myalgia  N: NEGATIVE for weakness, dizziness or paresthesias  E: NEGATIVE for temperature intolerance, skin/hair changes  H: NEGATIVE for bleeding problems  P: NEGATIVE for changes in mood or affect    EXAM:                                                    /80 (BP Location: Left arm, Patient Position: Sitting, Cuff Size: Adult Large)  Pulse 58  Temp 98.6  F (37  C) (Oral)  Ht 6' 1\" (1.854 m)  Wt 213 lb 11.2 oz (96.9 kg)  SpO2 98%  BMI 28.19 kg/m2    GENERAL APPEARANCE: healthy, alert and no distress     EYES: EOMI,  PERRL     HENT: ear canals and TM's normal and nose and mouth without ulcers or lesions     NECK: no adenopathy, no asymmetry, masses, or scars and thyroid normal to palpation     RESP: lungs clear to auscultation - no rales, rhonchi or wheezes     CV: regular rates and rhythm, normal S1 S2, no S3 or S4 and no murmur, click or rub     ABDOMEN:  soft, nontender, no HSM or masses and bowel sounds normal     MS: extremities normal- no gross deformities " noted, no evidence of inflammation in joints, FROM in all extremities.     SKIN: no suspicious lesions or rashes     NEURO: Normal strength and tone, sensory exam grossly normal, mentation intact and speech normal     PSYCH: mentation appears normal. and affect normal/bright     LYMPHATICS: No axillary, cervical, or supraclavicular nodes    DIAGNOSTICS:                                                    EKG: sinus bradycardia, normal axis, normal intervals, no acute ST/T changes c/w ischemia, no LVH by voltage criteria, unchanged from previous tracings    Recent Labs   Lab Test  09/05/17   1129  08/08/17   0937  02/08/17   0933   09/03/14   1037   08/23/12   0905   HGB   --   13.5   --    --   13.6   < >   --    PLT   --   170   --    --   168   < >   --    INR   --    --    --    --    --    --   0.96   NA  139  139  140   < >  139   < >   --    POTASSIUM  5.3  5.3  5.0   < >  5.0   < >  4.2   CR  1.61*  1.69*  1.53*   < >  1.46*   < >  1.35*   A1C  6.1*   --   6.1*   < >  6.3*   < >   --     < > = values in this interval not displayed.        IMPRESSION:                                                    Reason for surgery/procedure: carotid artery stenosis   Diagnosis/reason for consult: preoperative evaluation/ clearance      The proposed surgical procedure is considered INTERMEDIATE risk.    REVISED CARDIAC RISK INDEX  The patient has the following serious cardiovascular risks for perioperative complications such as (MI, PE, VFib and 3  AV Block):  No serious cardiac risks  INTERPRETATION: 0 risks: Class I (very low risk - 0.4% complication rate)    The patient has the following additional risks for perioperative complications:  No identified additional risks    No diagnosis found.    RECOMMENDATIONS:                                                      Continue to hold Metformin. Diabetic monitoring and blood pressure monitoring advised.     --Patient is to take all scheduled medications on the day of  surgery.    APPROVAL GIVEN to proceed with proposed procedure, without further diagnostic evaluation       Signed Electronically by: Sujit uDke MD    Copy of this evaluation report is provided to requesting physician.    [facility] Preop Guidelines

## 2017-09-18 ENCOUNTER — APPOINTMENT (OUTPATIENT)
Dept: SURGERY | Facility: PHYSICIAN GROUP | Age: 77
End: 2017-09-18
Payer: COMMERCIAL

## 2017-09-18 ENCOUNTER — HOSPITAL ENCOUNTER (INPATIENT)
Facility: CLINIC | Age: 77
LOS: 2 days | Discharge: HOME | DRG: 039 | End: 2017-09-20
Attending: SURGERY | Admitting: SURGERY
Payer: COMMERCIAL

## 2017-09-18 ENCOUNTER — ANESTHESIA (OUTPATIENT)
Dept: SURGERY | Facility: CLINIC | Age: 77
DRG: 039 | End: 2017-09-18
Payer: COMMERCIAL

## 2017-09-18 ENCOUNTER — ANESTHESIA EVENT (OUTPATIENT)
Dept: SURGERY | Facility: CLINIC | Age: 77
DRG: 039 | End: 2017-09-18
Payer: COMMERCIAL

## 2017-09-18 DIAGNOSIS — R33.9 URINARY RETENTION: ICD-10-CM

## 2017-09-18 DIAGNOSIS — Z98.890 STATUS POST CAROTID ENDARTERECTOMY: ICD-10-CM

## 2017-09-18 DIAGNOSIS — I77.9 RIGHT-SIDED CAROTID ARTERY DISEASE (H): Primary | ICD-10-CM

## 2017-09-18 DIAGNOSIS — E11.9 TYPE 2 DIABETES MELLITUS WITHOUT COMPLICATION, WITHOUT LONG-TERM CURRENT USE OF INSULIN (H): ICD-10-CM

## 2017-09-18 DIAGNOSIS — K59.03 DRUG-INDUCED CONSTIPATION: ICD-10-CM

## 2017-09-18 LAB
ABO + RH BLD: NORMAL
ABO + RH BLD: NORMAL
ANION GAP SERPL CALCULATED.3IONS-SCNC: 8 MMOL/L (ref 3–14)
BLD GP AB SCN SERPL QL: NORMAL
BLOOD BANK CMNT PATIENT-IMP: NORMAL
BUN SERPL-MCNC: 29 MG/DL (ref 7–30)
CALCIUM SERPL-MCNC: 9.2 MG/DL (ref 8.5–10.1)
CHLORIDE SERPL-SCNC: 109 MMOL/L (ref 94–109)
CHOLEST SERPL-MCNC: 133 MG/DL
CO2 SERPL-SCNC: 24 MMOL/L (ref 20–32)
CREAT SERPL-MCNC: 1.64 MG/DL (ref 0.66–1.25)
GFR SERPL CREATININE-BSD FRML MDRD: 41 ML/MIN/1.7M2
GLUCOSE BLDC GLUCOMTR-MCNC: 171 MG/DL (ref 70–99)
GLUCOSE BLDC GLUCOMTR-MCNC: 173 MG/DL (ref 70–99)
GLUCOSE BLDC GLUCOMTR-MCNC: 197 MG/DL (ref 70–99)
GLUCOSE SERPL-MCNC: 152 MG/DL (ref 70–99)
HBA1C MFR BLD: 6.3 % (ref 4.3–6)
HDLC SERPL-MCNC: 37 MG/DL
HGB BLD-MCNC: 13.4 G/DL (ref 13.3–17.7)
LDLC SERPL CALC-MCNC: 61 MG/DL
NONHDLC SERPL-MCNC: 96 MG/DL
PLATELET # BLD AUTO: 164 10E9/L (ref 150–450)
POTASSIUM SERPL-SCNC: 4.6 MMOL/L (ref 3.4–5.3)
SODIUM SERPL-SCNC: 141 MMOL/L (ref 133–144)
SPECIMEN EXP DATE BLD: NORMAL
TRIGL SERPL-MCNC: 174 MG/DL

## 2017-09-18 PROCEDURE — 35301 RECHANNELING OF ARTERY: CPT | Mod: RT | Performed by: SURGERY

## 2017-09-18 PROCEDURE — 25000128 H RX IP 250 OP 636: Performed by: NURSE ANESTHETIST, CERTIFIED REGISTERED

## 2017-09-18 PROCEDURE — 25000132 ZZH RX MED GY IP 250 OP 250 PS 637: Performed by: SURGERY

## 2017-09-18 PROCEDURE — 25000125 ZZHC RX 250: Performed by: SURGERY

## 2017-09-18 PROCEDURE — 40000884 ZZH STATISTIC STEP DOWN HRS NIGHT

## 2017-09-18 PROCEDURE — 25000566 ZZH SEVOFLURANE, EA 15 MIN: Performed by: SURGERY

## 2017-09-18 PROCEDURE — 86900 BLOOD TYPING SEROLOGIC ABO: CPT | Performed by: SURGERY

## 2017-09-18 PROCEDURE — 95955 EEG DURING SURGERY: CPT

## 2017-09-18 PROCEDURE — 40000170 ZZH STATISTIC PRE-PROCEDURE ASSESSMENT II: Performed by: SURGERY

## 2017-09-18 PROCEDURE — C1763 CONN TISS, NON-HUMAN: HCPCS | Performed by: SURGERY

## 2017-09-18 PROCEDURE — 71000013 ZZH RECOVERY PHASE 1 LEVEL 1 EA ADDTL HR: Performed by: SURGERY

## 2017-09-18 PROCEDURE — 25000131 ZZH RX MED GY IP 250 OP 636 PS 637: Performed by: INTERNAL MEDICINE

## 2017-09-18 PROCEDURE — 03CK0ZZ EXTIRPATION OF MATTER FROM RIGHT INTERNAL CAROTID ARTERY, OPEN APPROACH: ICD-10-PCS | Performed by: SURGERY

## 2017-09-18 PROCEDURE — 80061 LIPID PANEL: CPT | Performed by: SURGERY

## 2017-09-18 PROCEDURE — 25000128 H RX IP 250 OP 636: Performed by: SURGERY

## 2017-09-18 PROCEDURE — 83036 HEMOGLOBIN GLYCOSYLATED A1C: CPT | Performed by: SURGERY

## 2017-09-18 PROCEDURE — 36415 COLL VENOUS BLD VENIPUNCTURE: CPT | Performed by: SURGERY

## 2017-09-18 PROCEDURE — 93010 ELECTROCARDIOGRAM REPORT: CPT | Performed by: INTERNAL MEDICINE

## 2017-09-18 PROCEDURE — 37000008 ZZH ANESTHESIA TECHNICAL FEE, 1ST 30 MIN: Performed by: SURGERY

## 2017-09-18 PROCEDURE — 03UK0KZ SUPPLEMENT RIGHT INTERNAL CAROTID ARTERY WITH NONAUTOLOGOUS TISSUE SUBSTITUTE, OPEN APPROACH: ICD-10-PCS | Performed by: SURGERY

## 2017-09-18 PROCEDURE — 86901 BLOOD TYPING SEROLOGIC RH(D): CPT | Performed by: SURGERY

## 2017-09-18 PROCEDURE — 25000128 H RX IP 250 OP 636: Performed by: ANESTHESIOLOGY

## 2017-09-18 PROCEDURE — 27210794 ZZH OR GENERAL SUPPLY STERILE: Performed by: SURGERY

## 2017-09-18 PROCEDURE — 71000012 ZZH RECOVERY PHASE 1 LEVEL 1 FIRST HR: Performed by: SURGERY

## 2017-09-18 PROCEDURE — 93005 ELECTROCARDIOGRAM TRACING: CPT

## 2017-09-18 PROCEDURE — 36000063 ZZH SURGERY LEVEL 4 EA 15 ADDTL MIN: Performed by: SURGERY

## 2017-09-18 PROCEDURE — 85049 AUTOMATED PLATELET COUNT: CPT | Performed by: SURGERY

## 2017-09-18 PROCEDURE — 99223 1ST HOSP IP/OBS HIGH 75: CPT | Performed by: INTERNAL MEDICINE

## 2017-09-18 PROCEDURE — 00000146 ZZHCL STATISTIC GLUCOSE BY METER IP

## 2017-09-18 PROCEDURE — 27210995 ZZH RX 272: Performed by: SURGERY

## 2017-09-18 PROCEDURE — 25000132 ZZH RX MED GY IP 250 OP 250 PS 637: Performed by: INTERNAL MEDICINE

## 2017-09-18 PROCEDURE — 40000885 ZZH STATISTIC STEP DOWN HRS EVENING

## 2017-09-18 PROCEDURE — S0020 INJECTION, BUPIVICAINE HYDRO: HCPCS | Performed by: SURGERY

## 2017-09-18 PROCEDURE — 21400002 ZZH R&B CCU CICU CRITICAL

## 2017-09-18 PROCEDURE — 80048 BASIC METABOLIC PNL TOTAL CA: CPT | Performed by: SURGERY

## 2017-09-18 PROCEDURE — 37000009 ZZH ANESTHESIA TECHNICAL FEE, EACH ADDTL 15 MIN: Performed by: SURGERY

## 2017-09-18 PROCEDURE — 25000125 ZZHC RX 250: Performed by: NURSE ANESTHETIST, CERTIFIED REGISTERED

## 2017-09-18 PROCEDURE — 36000093 ZZH SURGERY LEVEL 4 1ST 30 MIN: Performed by: SURGERY

## 2017-09-18 PROCEDURE — 99207 ZZC NON-BILLABLE SERV PER CHARTING: CPT | Performed by: PHYSICIAN ASSISTANT

## 2017-09-18 PROCEDURE — 86850 RBC ANTIBODY SCREEN: CPT | Performed by: SURGERY

## 2017-09-18 PROCEDURE — 85018 HEMOGLOBIN: CPT | Performed by: SURGERY

## 2017-09-18 PROCEDURE — 40000061 ZZH STATISTIC EEG TIME EA 10 MIN

## 2017-09-18 DEVICE — GRAFT PATCH VASC XENOSURE BIOLOGIC 0.8X08CM E0.8P8: Type: IMPLANTABLE DEVICE | Site: CAROTID | Status: FUNCTIONAL

## 2017-09-18 RX ORDER — LIDOCAINE HYDROCHLORIDE 20 MG/ML
INJECTION, SOLUTION INFILTRATION; PERINEURAL PRN
Status: DISCONTINUED | OUTPATIENT
Start: 2017-09-18 | End: 2017-09-18

## 2017-09-18 RX ORDER — ONDANSETRON 2 MG/ML
4 INJECTION INTRAMUSCULAR; INTRAVENOUS EVERY 6 HOURS PRN
Status: DISCONTINUED | OUTPATIENT
Start: 2017-09-18 | End: 2017-09-20 | Stop reason: HOSPADM

## 2017-09-18 RX ORDER — PROCHLORPERAZINE MALEATE 5 MG
5 TABLET ORAL EVERY 6 HOURS PRN
Status: DISCONTINUED | OUTPATIENT
Start: 2017-09-18 | End: 2017-09-20 | Stop reason: HOSPADM

## 2017-09-18 RX ORDER — NALOXONE HYDROCHLORIDE 0.4 MG/ML
.1-.4 INJECTION, SOLUTION INTRAMUSCULAR; INTRAVENOUS; SUBCUTANEOUS
Status: DISCONTINUED | OUTPATIENT
Start: 2017-09-18 | End: 2017-09-18

## 2017-09-18 RX ORDER — BISACODYL 10 MG
10 SUPPOSITORY, RECTAL RECTAL DAILY PRN
Status: DISCONTINUED | OUTPATIENT
Start: 2017-09-18 | End: 2017-09-20 | Stop reason: HOSPADM

## 2017-09-18 RX ORDER — ASPIRIN 600 MG/1
600 SUPPOSITORY RECTAL ONCE
Status: COMPLETED | OUTPATIENT
Start: 2017-09-18 | End: 2017-09-18

## 2017-09-18 RX ORDER — EPHEDRINE SULFATE 50 MG/ML
INJECTION, SOLUTION INTRAMUSCULAR; INTRAVENOUS; SUBCUTANEOUS PRN
Status: DISCONTINUED | OUTPATIENT
Start: 2017-09-18 | End: 2017-09-18

## 2017-09-18 RX ORDER — HEPARIN SODIUM 1000 [USP'U]/ML
INJECTION, SOLUTION INTRAVENOUS; SUBCUTANEOUS PRN
Status: DISCONTINUED | OUTPATIENT
Start: 2017-09-18 | End: 2017-09-18

## 2017-09-18 RX ORDER — MULTIPLE VITAMINS W/ MINERALS TAB 9MG-400MCG
1 TAB ORAL EVERY MORNING
Status: DISCONTINUED | OUTPATIENT
Start: 2017-09-19 | End: 2017-09-20 | Stop reason: HOSPADM

## 2017-09-18 RX ORDER — LISINOPRIL 20 MG/1
20 TABLET ORAL 2 TIMES DAILY
Status: DISCONTINUED | OUTPATIENT
Start: 2017-09-18 | End: 2017-09-20 | Stop reason: HOSPADM

## 2017-09-18 RX ORDER — ONDANSETRON 4 MG/1
4 TABLET, ORALLY DISINTEGRATING ORAL EVERY 30 MIN PRN
Status: DISCONTINUED | OUTPATIENT
Start: 2017-09-18 | End: 2017-09-18

## 2017-09-18 RX ORDER — FENTANYL CITRATE 0.05 MG/ML
25-50 INJECTION, SOLUTION INTRAMUSCULAR; INTRAVENOUS
Status: DISCONTINUED | OUTPATIENT
Start: 2017-09-18 | End: 2017-09-18

## 2017-09-18 RX ORDER — NEOSTIGMINE METHYLSULFATE 1 MG/ML
VIAL (ML) INJECTION PRN
Status: DISCONTINUED | OUTPATIENT
Start: 2017-09-18 | End: 2017-09-18

## 2017-09-18 RX ORDER — POTASSIUM CHLORIDE 7.45 MG/ML
10 INJECTION INTRAVENOUS
Status: DISCONTINUED | OUTPATIENT
Start: 2017-09-18 | End: 2017-09-20 | Stop reason: HOSPADM

## 2017-09-18 RX ORDER — ONDANSETRON 4 MG/1
4 TABLET, ORALLY DISINTEGRATING ORAL EVERY 6 HOURS PRN
Status: DISCONTINUED | OUTPATIENT
Start: 2017-09-18 | End: 2017-09-20 | Stop reason: HOSPADM

## 2017-09-18 RX ORDER — MULTIPLE VITAMINS W/ MINERALS TAB 9MG-400MCG
1 TAB ORAL EVERY MORNING
COMMUNITY

## 2017-09-18 RX ORDER — FENTANYL CITRATE 50 UG/ML
50-100 INJECTION, SOLUTION INTRAMUSCULAR; INTRAVENOUS EVERY 5 MIN PRN
Status: DISCONTINUED | OUTPATIENT
Start: 2017-09-18 | End: 2017-09-18

## 2017-09-18 RX ORDER — AMOXICILLIN 250 MG
1-2 CAPSULE ORAL 2 TIMES DAILY PRN
Status: DISCONTINUED | OUTPATIENT
Start: 2017-09-18 | End: 2017-09-20 | Stop reason: HOSPADM

## 2017-09-18 RX ORDER — ONDANSETRON 2 MG/ML
4 INJECTION INTRAMUSCULAR; INTRAVENOUS EVERY 6 HOURS PRN
Status: DISCONTINUED | OUTPATIENT
Start: 2017-09-18 | End: 2017-09-18

## 2017-09-18 RX ORDER — SODIUM CHLORIDE, SODIUM LACTATE, POTASSIUM CHLORIDE, CALCIUM CHLORIDE 600; 310; 30; 20 MG/100ML; MG/100ML; MG/100ML; MG/100ML
INJECTION, SOLUTION INTRAVENOUS CONTINUOUS
Status: DISCONTINUED | OUTPATIENT
Start: 2017-09-18 | End: 2017-09-18

## 2017-09-18 RX ORDER — CEFAZOLIN SODIUM 2 G/100ML
2 INJECTION, SOLUTION INTRAVENOUS
Status: COMPLETED | OUTPATIENT
Start: 2017-09-18 | End: 2017-09-18

## 2017-09-18 RX ORDER — LIDOCAINE 40 MG/G
CREAM TOPICAL
Status: DISCONTINUED | OUTPATIENT
Start: 2017-09-18 | End: 2017-09-20 | Stop reason: HOSPADM

## 2017-09-18 RX ORDER — POTASSIUM CHLORIDE 1500 MG/1
20-40 TABLET, EXTENDED RELEASE ORAL
Status: DISCONTINUED | OUTPATIENT
Start: 2017-09-18 | End: 2017-09-20 | Stop reason: HOSPADM

## 2017-09-18 RX ORDER — LABETALOL HYDROCHLORIDE 5 MG/ML
INJECTION, SOLUTION INTRAVENOUS PRN
Status: DISCONTINUED | OUTPATIENT
Start: 2017-09-18 | End: 2017-09-18

## 2017-09-18 RX ORDER — PROCHLORPERAZINE 25 MG
12.5 SUPPOSITORY, RECTAL RECTAL EVERY 12 HOURS PRN
Status: DISCONTINUED | OUTPATIENT
Start: 2017-09-18 | End: 2017-09-20 | Stop reason: HOSPADM

## 2017-09-18 RX ORDER — ONDANSETRON 4 MG/1
4 TABLET, ORALLY DISINTEGRATING ORAL EVERY 6 HOURS PRN
Status: DISCONTINUED | OUTPATIENT
Start: 2017-09-18 | End: 2017-09-18

## 2017-09-18 RX ORDER — PROPOFOL 10 MG/ML
INJECTION, EMULSION INTRAVENOUS PRN
Status: DISCONTINUED | OUTPATIENT
Start: 2017-09-18 | End: 2017-09-18

## 2017-09-18 RX ORDER — ASPIRIN 81 MG/1
81 TABLET ORAL EVERY MORNING
Status: DISCONTINUED | OUTPATIENT
Start: 2017-09-19 | End: 2017-09-20 | Stop reason: HOSPADM

## 2017-09-18 RX ORDER — ACETAMINOPHEN 325 MG/1
650 TABLET ORAL EVERY 4 HOURS PRN
Status: DISCONTINUED | OUTPATIENT
Start: 2017-09-21 | End: 2017-09-20 | Stop reason: HOSPADM

## 2017-09-18 RX ORDER — ATENOLOL 100 MG/1
100 TABLET ORAL EVERY MORNING
Status: DISCONTINUED | OUTPATIENT
Start: 2017-09-19 | End: 2017-09-20 | Stop reason: HOSPADM

## 2017-09-18 RX ORDER — LABETALOL HYDROCHLORIDE 5 MG/ML
10 INJECTION, SOLUTION INTRAVENOUS
Status: DISCONTINUED | OUTPATIENT
Start: 2017-09-18 | End: 2017-09-18

## 2017-09-18 RX ORDER — POTASSIUM CL/LIDO/0.9 % NACL 10MEQ/0.1L
10 INTRAVENOUS SOLUTION, PIGGYBACK (ML) INTRAVENOUS
Status: DISCONTINUED | OUTPATIENT
Start: 2017-09-18 | End: 2017-09-20 | Stop reason: HOSPADM

## 2017-09-18 RX ORDER — SODIUM CHLORIDE, SODIUM LACTATE, POTASSIUM CHLORIDE, CALCIUM CHLORIDE 600; 310; 30; 20 MG/100ML; MG/100ML; MG/100ML; MG/100ML
INJECTION, SOLUTION INTRAVENOUS CONTINUOUS PRN
Status: DISCONTINUED | OUTPATIENT
Start: 2017-09-18 | End: 2017-09-18

## 2017-09-18 RX ORDER — OXYCODONE HYDROCHLORIDE 5 MG/1
5-10 TABLET ORAL
Status: DISCONTINUED | OUTPATIENT
Start: 2017-09-18 | End: 2017-09-18 | Stop reason: DRUGHIGH

## 2017-09-18 RX ORDER — HYDROMORPHONE HYDROCHLORIDE 1 MG/ML
.3-.5 INJECTION, SOLUTION INTRAMUSCULAR; INTRAVENOUS; SUBCUTANEOUS EVERY 5 MIN PRN
Status: DISCONTINUED | OUTPATIENT
Start: 2017-09-18 | End: 2017-09-18

## 2017-09-18 RX ORDER — ATORVASTATIN CALCIUM 10 MG/1
10 TABLET, FILM COATED ORAL DAILY
Status: DISCONTINUED | OUTPATIENT
Start: 2017-09-18 | End: 2017-09-20 | Stop reason: HOSPADM

## 2017-09-18 RX ORDER — HYDROMORPHONE HYDROCHLORIDE 1 MG/ML
0.2 INJECTION, SOLUTION INTRAMUSCULAR; INTRAVENOUS; SUBCUTANEOUS
Status: DISCONTINUED | OUTPATIENT
Start: 2017-09-18 | End: 2017-09-18 | Stop reason: DRUGHIGH

## 2017-09-18 RX ORDER — DEXAMETHASONE SODIUM PHOSPHATE 4 MG/ML
INJECTION, SOLUTION INTRA-ARTICULAR; INTRALESIONAL; INTRAMUSCULAR; INTRAVENOUS; SOFT TISSUE PRN
Status: DISCONTINUED | OUTPATIENT
Start: 2017-09-18 | End: 2017-09-18

## 2017-09-18 RX ORDER — NICOTINE POLACRILEX 4 MG
15-30 LOZENGE BUCCAL
Status: DISCONTINUED | OUTPATIENT
Start: 2017-09-18 | End: 2017-09-20 | Stop reason: HOSPADM

## 2017-09-18 RX ORDER — SODIUM CHLORIDE 9 MG/ML
INJECTION, SOLUTION INTRAVENOUS CONTINUOUS
Status: DISCONTINUED | OUTPATIENT
Start: 2017-09-18 | End: 2017-09-20 | Stop reason: HOSPADM

## 2017-09-18 RX ORDER — MEPERIDINE HYDROCHLORIDE 25 MG/ML
12.5 INJECTION INTRAMUSCULAR; INTRAVENOUS; SUBCUTANEOUS EVERY 5 MIN PRN
Status: DISCONTINUED | OUTPATIENT
Start: 2017-09-18 | End: 2017-09-18

## 2017-09-18 RX ORDER — HYDRALAZINE HYDROCHLORIDE 20 MG/ML
2.5-5 INJECTION INTRAMUSCULAR; INTRAVENOUS EVERY 10 MIN PRN
Status: DISCONTINUED | OUTPATIENT
Start: 2017-09-18 | End: 2017-09-18

## 2017-09-18 RX ORDER — NALOXONE HYDROCHLORIDE 0.4 MG/ML
.1-.4 INJECTION, SOLUTION INTRAMUSCULAR; INTRAVENOUS; SUBCUTANEOUS
Status: DISCONTINUED | OUTPATIENT
Start: 2017-09-18 | End: 2017-09-20 | Stop reason: HOSPADM

## 2017-09-18 RX ORDER — HEPARIN SODIUM 5000 [USP'U]/.5ML
5000 INJECTION, SOLUTION INTRAVENOUS; SUBCUTANEOUS EVERY 8 HOURS
Status: DISCONTINUED | OUTPATIENT
Start: 2017-09-19 | End: 2017-09-20 | Stop reason: HOSPADM

## 2017-09-18 RX ORDER — DEXTROSE MONOHYDRATE 25 G/50ML
25-50 INJECTION, SOLUTION INTRAVENOUS
Status: DISCONTINUED | OUTPATIENT
Start: 2017-09-18 | End: 2017-09-20 | Stop reason: HOSPADM

## 2017-09-18 RX ORDER — ALBUTEROL SULFATE 0.83 MG/ML
2.5 SOLUTION RESPIRATORY (INHALATION) EVERY 4 HOURS PRN
Status: DISCONTINUED | OUTPATIENT
Start: 2017-09-18 | End: 2017-09-20 | Stop reason: HOSPADM

## 2017-09-18 RX ORDER — HYDROMORPHONE HYDROCHLORIDE 1 MG/ML
.2-.4 INJECTION, SOLUTION INTRAMUSCULAR; INTRAVENOUS; SUBCUTANEOUS
Status: DISCONTINUED | OUTPATIENT
Start: 2017-09-18 | End: 2017-09-20 | Stop reason: HOSPADM

## 2017-09-18 RX ORDER — GLYCOPYRROLATE 0.2 MG/ML
INJECTION, SOLUTION INTRAMUSCULAR; INTRAVENOUS PRN
Status: DISCONTINUED | OUTPATIENT
Start: 2017-09-18 | End: 2017-09-18

## 2017-09-18 RX ORDER — HEPARIN SODIUM 1000 [USP'U]/ML
INJECTION, SOLUTION INTRAVENOUS; SUBCUTANEOUS PRN
Status: DISCONTINUED | OUTPATIENT
Start: 2017-09-18 | End: 2017-09-18 | Stop reason: HOSPADM

## 2017-09-18 RX ORDER — PROTAMINE SULFATE 10 MG/ML
INJECTION, SOLUTION INTRAVENOUS PRN
Status: DISCONTINUED | OUTPATIENT
Start: 2017-09-18 | End: 2017-09-18

## 2017-09-18 RX ORDER — FENTANYL CITRATE 50 UG/ML
INJECTION, SOLUTION INTRAMUSCULAR; INTRAVENOUS PRN
Status: DISCONTINUED | OUTPATIENT
Start: 2017-09-18 | End: 2017-09-18

## 2017-09-18 RX ORDER — MAGNESIUM HYDROXIDE 1200 MG/15ML
LIQUID ORAL PRN
Status: DISCONTINUED | OUTPATIENT
Start: 2017-09-18 | End: 2017-09-18 | Stop reason: HOSPADM

## 2017-09-18 RX ORDER — ACETAMINOPHEN 325 MG/1
650 TABLET ORAL EVERY 4 HOURS PRN
Status: DISCONTINUED | OUTPATIENT
Start: 2017-09-18 | End: 2017-09-18

## 2017-09-18 RX ORDER — LABETALOL HYDROCHLORIDE 5 MG/ML
10 INJECTION, SOLUTION INTRAVENOUS EVERY 10 MIN PRN
Status: DISCONTINUED | OUTPATIENT
Start: 2017-09-18 | End: 2017-09-20 | Stop reason: HOSPADM

## 2017-09-18 RX ORDER — ONDANSETRON 2 MG/ML
INJECTION INTRAMUSCULAR; INTRAVENOUS PRN
Status: DISCONTINUED | OUTPATIENT
Start: 2017-09-18 | End: 2017-09-18

## 2017-09-18 RX ORDER — BUPIVACAINE HYDROCHLORIDE 5 MG/ML
INJECTION, SOLUTION PERINEURAL PRN
Status: DISCONTINUED | OUTPATIENT
Start: 2017-09-18 | End: 2017-09-18 | Stop reason: HOSPADM

## 2017-09-18 RX ORDER — OXYCODONE HYDROCHLORIDE 5 MG/1
5 TABLET ORAL
Status: DISCONTINUED | OUTPATIENT
Start: 2017-09-18 | End: 2017-09-18

## 2017-09-18 RX ORDER — AMOXICILLIN 250 MG
1-2 CAPSULE ORAL 2 TIMES DAILY
Status: DISCONTINUED | OUTPATIENT
Start: 2017-09-18 | End: 2017-09-20 | Stop reason: HOSPADM

## 2017-09-18 RX ORDER — FLUTICASONE PROPIONATE 50 MCG
2 SPRAY, SUSPENSION (ML) NASAL DAILY PRN
Status: DISCONTINUED | OUTPATIENT
Start: 2017-09-18 | End: 2017-09-20 | Stop reason: HOSPADM

## 2017-09-18 RX ORDER — POTASSIUM CHLORIDE 1.5 G/1.58G
20-40 POWDER, FOR SOLUTION ORAL
Status: DISCONTINUED | OUTPATIENT
Start: 2017-09-18 | End: 2017-09-20 | Stop reason: HOSPADM

## 2017-09-18 RX ORDER — ACETAMINOPHEN 325 MG/1
975 TABLET ORAL EVERY 8 HOURS
Status: DISCONTINUED | OUTPATIENT
Start: 2017-09-18 | End: 2017-09-20 | Stop reason: HOSPADM

## 2017-09-18 RX ORDER — POLYETHYLENE GLYCOL 3350 17 G/17G
17 POWDER, FOR SOLUTION ORAL DAILY PRN
Status: DISCONTINUED | OUTPATIENT
Start: 2017-09-18 | End: 2017-09-20 | Stop reason: HOSPADM

## 2017-09-18 RX ORDER — ONDANSETRON 2 MG/ML
4 INJECTION INTRAMUSCULAR; INTRAVENOUS EVERY 30 MIN PRN
Status: DISCONTINUED | OUTPATIENT
Start: 2017-09-18 | End: 2017-09-18

## 2017-09-18 RX ORDER — POTASSIUM CHLORIDE 29.8 MG/ML
20 INJECTION INTRAVENOUS
Status: DISCONTINUED | OUTPATIENT
Start: 2017-09-18 | End: 2017-09-20 | Stop reason: HOSPADM

## 2017-09-18 RX ADMIN — DEXMEDETOMIDINE HYDROCHLORIDE 0.4 MCG/KG/HR: 100 INJECTION, SOLUTION INTRAVENOUS at 10:35

## 2017-09-18 RX ADMIN — ONDANSETRON 4 MG: 2 INJECTION INTRAMUSCULAR; INTRAVENOUS at 14:10

## 2017-09-18 RX ADMIN — INSULIN ASPART 2 UNITS: 100 INJECTION, SOLUTION INTRAVENOUS; SUBCUTANEOUS at 19:27

## 2017-09-18 RX ADMIN — PROPOFOL 200 MG: 10 INJECTION, EMULSION INTRAVENOUS at 10:23

## 2017-09-18 RX ADMIN — ROCURONIUM BROMIDE 20 MG: 10 INJECTION INTRAVENOUS at 12:40

## 2017-09-18 RX ADMIN — GLYCOPYRROLATE 0.6 MG: 0.2 INJECTION, SOLUTION INTRAMUSCULAR; INTRAVENOUS at 15:09

## 2017-09-18 RX ADMIN — SODIUM CHLORIDE, POTASSIUM CHLORIDE, SODIUM LACTATE AND CALCIUM CHLORIDE: 600; 310; 30; 20 INJECTION, SOLUTION INTRAVENOUS at 15:04

## 2017-09-18 RX ADMIN — LABETALOL HYDROCHLORIDE 5 MG: 5 INJECTION, SOLUTION INTRAVENOUS at 15:11

## 2017-09-18 RX ADMIN — SODIUM CHLORIDE, POTASSIUM CHLORIDE, SODIUM LACTATE AND CALCIUM CHLORIDE: 600; 310; 30; 20 INJECTION, SOLUTION INTRAVENOUS at 09:07

## 2017-09-18 RX ADMIN — Medication 10 MG: at 10:26

## 2017-09-18 RX ADMIN — FENTANYL CITRATE 100 MCG: 50 INJECTION, SOLUTION INTRAMUSCULAR; INTRAVENOUS at 10:23

## 2017-09-18 RX ADMIN — CEFAZOLIN SODIUM 1 G: 2 INJECTION, SOLUTION INTRAVENOUS at 13:00

## 2017-09-18 RX ADMIN — SODIUM CHLORIDE, POTASSIUM CHLORIDE, SODIUM LACTATE AND CALCIUM CHLORIDE: 600; 310; 30; 20 INJECTION, SOLUTION INTRAVENOUS at 14:32

## 2017-09-18 RX ADMIN — FENTANYL CITRATE 50 MCG: 50 INJECTION, SOLUTION INTRAMUSCULAR; INTRAVENOUS at 12:42

## 2017-09-18 RX ADMIN — PHENYLEPHRINE HYDROCHLORIDE 50 MCG: 10 INJECTION, SOLUTION INTRAMUSCULAR; INTRAVENOUS; SUBCUTANEOUS at 12:18

## 2017-09-18 RX ADMIN — LISINOPRIL 20 MG: 20 TABLET ORAL at 20:22

## 2017-09-18 RX ADMIN — Medication 2000 UNITS: at 13:12

## 2017-09-18 RX ADMIN — CEFAZOLIN SODIUM 2 G: 2 INJECTION, SOLUTION INTRAVENOUS at 10:34

## 2017-09-18 RX ADMIN — PHENYLEPHRINE HYDROCHLORIDE 0.25 MCG/KG/MIN: 10 INJECTION, SOLUTION INTRAMUSCULAR; INTRAVENOUS; SUBCUTANEOUS at 10:35

## 2017-09-18 RX ADMIN — GLYCOPYRROLATE 0.2 MG: 0.2 INJECTION, SOLUTION INTRAMUSCULAR; INTRAVENOUS at 10:59

## 2017-09-18 RX ADMIN — ASPIRIN 600 MG: 600 SUPPOSITORY RECTAL at 16:12

## 2017-09-18 RX ADMIN — Medication 5 MG: at 10:39

## 2017-09-18 RX ADMIN — SODIUM CHLORIDE, POTASSIUM CHLORIDE, SODIUM LACTATE AND CALCIUM CHLORIDE: 600; 310; 30; 20 INJECTION, SOLUTION INTRAVENOUS at 10:27

## 2017-09-18 RX ADMIN — ROCURONIUM BROMIDE 50 MG: 10 INJECTION INTRAVENOUS at 10:23

## 2017-09-18 RX ADMIN — FENTANYL CITRATE 75 MCG: 50 INJECTION, SOLUTION INTRAMUSCULAR; INTRAVENOUS at 09:35

## 2017-09-18 RX ADMIN — SODIUM CHLORIDE, POTASSIUM CHLORIDE, SODIUM LACTATE AND CALCIUM CHLORIDE: 600; 310; 30; 20 INJECTION, SOLUTION INTRAVENOUS at 11:00

## 2017-09-18 RX ADMIN — PHENYLEPHRINE HYDROCHLORIDE 50 MCG: 10 INJECTION, SOLUTION INTRAMUSCULAR; INTRAVENOUS; SUBCUTANEOUS at 14:34

## 2017-09-18 RX ADMIN — LIDOCAINE HYDROCHLORIDE 100 MG: 20 INJECTION, SOLUTION INFILTRATION; PERINEURAL at 10:23

## 2017-09-18 RX ADMIN — Medication 10 MG: at 10:43

## 2017-09-18 RX ADMIN — LABETALOL HYDROCHLORIDE 5 MG: 5 INJECTION, SOLUTION INTRAVENOUS at 15:14

## 2017-09-18 RX ADMIN — FENTANYL CITRATE 100 MCG: 50 INJECTION, SOLUTION INTRAMUSCULAR; INTRAVENOUS at 10:52

## 2017-09-18 RX ADMIN — Medication 8000 UNITS: at 12:12

## 2017-09-18 RX ADMIN — PHENYLEPHRINE HYDROCHLORIDE 25 MCG: 10 INJECTION, SOLUTION INTRAMUSCULAR; INTRAVENOUS; SUBCUTANEOUS at 14:56

## 2017-09-18 RX ADMIN — PHENYLEPHRINE HYDROCHLORIDE 50 MCG: 10 INJECTION, SOLUTION INTRAMUSCULAR; INTRAVENOUS; SUBCUTANEOUS at 14:42

## 2017-09-18 RX ADMIN — MIDAZOLAM HYDROCHLORIDE 2 MG: 1 INJECTION, SOLUTION INTRAMUSCULAR; INTRAVENOUS at 10:12

## 2017-09-18 RX ADMIN — ROCURONIUM BROMIDE 10 MG: 10 INJECTION INTRAVENOUS at 13:52

## 2017-09-18 RX ADMIN — PROTAMINE SULFATE 40 MG: 10 INJECTION, SOLUTION INTRAVENOUS at 14:42

## 2017-09-18 RX ADMIN — SENNOSIDES AND DOCUSATE SODIUM 1 TABLET: 8.6; 5 TABLET ORAL at 20:22

## 2017-09-18 RX ADMIN — ROCURONIUM BROMIDE 20 MG: 10 INJECTION INTRAVENOUS at 11:27

## 2017-09-18 RX ADMIN — ACETAMINOPHEN 975 MG: 325 TABLET, FILM COATED ORAL at 21:52

## 2017-09-18 RX ADMIN — DEXAMETHASONE SODIUM PHOSPHATE 4 MG: 4 INJECTION, SOLUTION INTRA-ARTICULAR; INTRALESIONAL; INTRAMUSCULAR; INTRAVENOUS; SOFT TISSUE at 13:56

## 2017-09-18 RX ADMIN — NEOSTIGMINE METHYLSULFATE 3 MG: 1 INJECTION INTRAMUSCULAR; INTRAVENOUS; SUBCUTANEOUS at 15:09

## 2017-09-18 RX ADMIN — ROCURONIUM BROMIDE 10 MG: 10 INJECTION INTRAVENOUS at 14:06

## 2017-09-18 ASSESSMENT — VISUAL ACUITY: OU: NORMAL ACUITY

## 2017-09-18 NOTE — DISCHARGE INSTRUCTIONS
Carotid Endartectomy  Post-Operative Instructions    Typical length of stay in the hospital is 1-2 days.  On occasion, an evening in the Intensive Care Unit may be required for blood pressure regulation.    Activity    Most people are able to resume normal activities 1-2 weeks after surgery.  The key is to gradually increase your level of activity as you are able.  It is not uncommon to feel easily fatigued - this will improve with time.      You should avoid heavy lifting more than 30 lbs for 1 week.      You may return to work when you feel able - typically 1-2 weeks after surgery, depending on the type of work you do.      You should not drive a car for 1 week after surgery.  In addition,  you can not be taking prescription strength pain medication and you must feel strong enough to drive safely.    Incision Care    You can shower or bathe 2 days after surgery - avoid rubbing and soaking your incision.      You may have strips of white tape called  steri-strips  across your incision; they should stay on for 5-7 days or until the ends start to curl up.  You can then remove the steri-strips, or we will remove them at your post-operative appointment.      Avoid shaving directly over the incision for 2-3 weeks.    Common Concerns    You may notice mild skin numbness on the side of your surgery in your neck, chin, face, and earlobe.  This is due to nerve  irritation  and will gradually resolve over the next several months.  Call our office if you experience any short-lasting episode of numbness or weakness affecting one side of your body.      Some bruising and firmness around you incision can be expected.  This will soften and resolve over the next few weeks.  You may notice that some discoloration spreads to an area lower than the incision, such as the shoulder, neck or upper chest.  Likewise, swelling can occur near the incision or below the chin.  Call our office if the swelling or bruising worsens, or if you have  difficulty swallowing.    Diet    You may resume a regular diet before you leave the hospital.  You may find that it is best to try smaller, more frequent meals until your appetite returns.    Medications    You may be started on a blood thinner after surgery - typically Aspirin and / or Plavix.      You may be given a prescription for pain medication after surgery.  If you need to have this refilled, please call your pharmacy where you had it filled.  They will then call us for approval.  Please do not call after hours for a refill on pain medication.    Post-Operative Appointments    You need to see your surgeon in the clinic approximately 1-2 weeks after surgery.  Post-operative appointment:   Date: _____________________________  Time: ____________________________  Dr. ______________________________      You will have an ultrasound test and evaluation with your surgeon 90 days (3 months) after surgery.      We will notify you by mail when you are due to schedule further ultrasound testing and evaluation; typically this is done annually.     When You Should Call Our Office    Body aches, chills or temperature greater than 101      Incision redness or drainage      Loss of vision in one eye      Loss of strength / weakness in one side of your body      Severe headache      Difficulty with speech      If you will be having an invasive procedure, such as a colonoscopy or dental work within one year of your surgery, you may need to take an antibiotic prior to the procedure if you had a Dacron patch with your surgery; please call us so we can assist you with this.    Call our main number, [phone], for assistance with any concerns or questions.     Revised 5/2014

## 2017-09-18 NOTE — PROGRESS NOTES
"Surgeon at bedside talking to patient regarding surgery outcome and plan of care.parameter given for BP \"less than 150\".   "

## 2017-09-18 NOTE — PROGRESS NOTES
ENDARTERECTOMY MONITORING WITH EEG # [id number], ORDERED BY DR RODRIGUEZ  SURGERY WENT WELL, PT DELAYED WAKING UP; MOVED ALL EXTREMITIES

## 2017-09-18 NOTE — ANESTHESIA PREPROCEDURE EVALUATION
Anesthesia Evaluation     . Pt has had prior anesthetic.     No history of anesthetic complications          ROS/MED HX    ENT/Pulmonary:      (-) sleep apnea   Neurologic:       Cardiovascular:     (+) Dyslipidemia, hypertension----. : . . . :. . 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      Psychiatric:         Infectious Disease:         Malignancy:         Other:                                    Anesthesia Plan      History & Physical Review  History and physical reviewed and following examination; no interval change.    ASA Status:  3 .    NPO Status:  > 8 hours    Plan for General and ETT with Intravenous induction. Maintenance will be Balanced.    PONV prophylaxis:  Ondansetron (or other 5HT-3) and Dexamethasone or Solumedrol  Additional equipment: Arterial Line and 2nd IV precedex gtt      Postoperative Care  Postoperative pain management:  IV analgesics and Oral pain medications.      Consents  Anesthetic plan, risks, benefits and alternatives discussed with:  Patient..                      Procedure: Procedure(s):  ENDARTERECTOMY CAROTID  Preop diagnosis: RIGHT COROTID STENOSIS    No Known Allergies  Past Medical History:   Diagnosis Date     Diabetes mellitus (H)     Pre diabetic     Essential hypertension, benign      Past Surgical History:   Procedure Laterality Date     C NONSPECIFIC PROCEDURE  04/97    Neg. colonoscopy.     C NONSPECIFIC PROCEDURE      S/P ing. hernia.     DECOMPRESSION, FUSION LUMBAR POSTERIOR ONE LEVEL, COMBINED  8/24/2012    Procedure: COMBINED DECOMPRESSION, FUSION LUMBAR POSTERIOR ONE LEVEL;  Posterior Fusion wtih Decompression L4-5;  Surgeon: Rod Carbajal MD;  Location:  OR      HERNIA REPAIR       Prior to Admission medications    Medication Sig Start Date End Date Taking? Authorizing Provider   ASPIRIN EC PO Take 81 mg by mouth every morning   Yes Reported, Patient   Atenolol (TENORMIN PO) Take 100 mg by mouth every morning   Yes Reported, Patient   Fluticasone Propionate (FLONASE NA) Spray 1-2 sprays in nostril daily as needed   Yes Reported, Patient   multivitamin, therapeutic with minerals (MULTI-VITAMIN) TABS tablet Take 1 tablet by mouth every morning   Yes Reported, Patient   Lisinopril (PRINIVIL PO) Take 20 mg by mouth 2 times daily   Yes Reported, Patient   Simvastatin (ZOCOR PO) Take 10 mg by mouth At Bedtime   Yes Reported, Patient   [place] [facility] [place] 9/8/17   Sujit Duke MD   blood glucose monitoring (ACCU-CHEK PRISCILLA) test strip Use to test blood sugars 1 times daily or as directed. 2/8/17   Sujit Duke MD   blood glucose monitoring (ACCU-CHEK MULTICLIX) lancets Use to test blood sugar 1 times daily or as directed. 2/8/17   Sujit Duke MD   blood glucose calibration (ACCU-CHEK PRISCILLA) solution Use to calibrate blood glucose monitor as needed as directed. 12/29/15   Trever Jiang MD     Current Facility-Administered Medications Ordered in Epic   Medication Dose Route Frequency Last Rate Last Dose     chlorhexidine 2 % pads   Topical Once        Or     antimicrobial soap   Topical Once         ceFAZolin sodium-dextrose (ANCEF) infusion 2 g  2 g Intravenous Pre-Op/Pre-procedure x 1 dose         lidocaine 1 % 1 mL  1 mL Other Q1H PRN         lactated ringers infusion   Intravenous Continuous         No current [facility]-ordered outpatient prescriptions on file.     Wt Readings from Last 1 Encounters:   09/08/17 96.9 kg (213 lb 11.2 oz)     Temp Readings from Last 1 Encounters:   09/08/17 37  C (98.6  F) (Oral)     BP Readings from Last 6 Encounters:   09/08/17 120/80   08/31/17 138/64   08/28/17 176/84   08/08/17 164/70   02/08/17 144/70    08/01/16 150/78     Pulse Readings from Last 4 Encounters:   09/08/17 58   08/31/17 55   08/28/17 64   08/08/17 60     Resp Readings from Last 1 Encounters:   12/16/15 16     SpO2 Readings from Last 1 Encounters:   09/08/17 98%     Recent Labs   Lab Test  09/05/17   1129  08/08/17   0937   NA  139  139   POTASSIUM  5.3  5.3   CHLORIDE  107  106   CO2  25  24   ANIONGAP  7  9   GLC  129*  145*   BUN  25  28   CR  1.61*  1.69*   VICKY  9.5  9.5     Recent Labs   Lab Test  08/08/17   0937  02/08/17   0933   AST  16  20   ALT  24  32     Recent Labs   Lab Test  08/08/17   0937  09/03/14   1037   WBC  7.8  6.5   HGB  13.5  13.6   PLT  170  168     Recent Labs   Lab Test  08/23/12   0905   INR  0.96      No results for input(s): TROPI in the last 04056 hours.  RECENT LABS:   ECG:   ECHO:   CXR:

## 2017-09-18 NOTE — ANESTHESIA POSTPROCEDURE EVALUATION
Patient: Merlin CHAU Timmy    Procedure(s):  RIGHT CAROTID ENDARTERECTOMY, WITH PATCH ANGIOPLASTY, WITH ELECTOENCEPHALOGAM            (EEG) - Wound Class: I-Clean    Diagnosis:RIGHT COROTID STENOSIS  Diagnosis Additional Information: No value filed.    Anesthesia Type:  General, ETT    Note:  Anesthesia Post Evaluation    Patient location during evaluation: PACU  Patient participation: Able to fully participate in evaluation  Level of consciousness: awake  Pain management: adequate  Airway patency: patent  Cardiovascular status: acceptable  Respiratory status: acceptable  Hydration status: acceptable  PONV: none     Anesthetic complications: None          Last vitals:  Vitals:    09/18/17 1703 09/18/17 1723 09/18/17 1725   BP:  131/66    Resp:  14    Temp:      SpO2: 99%  97%         Electronically Signed By: China Gu  September 18, 2017  5:37 PM

## 2017-09-18 NOTE — IP AVS SNAPSHOT
[place] [facility] [facility]    [address] [place] [address]    Phone:  [phone]                                       After Visit Summary   9/18/2017    Merlin J Anderson    MRN: [id number]           After Visit Summary Signature Page     I have received my discharge instructions, and my questions have been answered. I have discussed any challenges I see with this plan with the nurse or doctor.    ..........................................................................................................................................  Patient/Patient Representative Signature      ..........................................................................................................................................  Patient Representative Print Name and Relationship to Patient    ..................................................               ................................................  Date                                            Time    ..........................................................................................................................................  Reviewed by Signature/Title    ...................................................              ..............................................  Date                                                            Time

## 2017-09-18 NOTE — PROGRESS NOTES
Admission medication history interview status for the 9/18/2017  admission is complete. See EPIC admission navigator for prior to admission medications     Medication history source reliability:Good    Medication history interview source(s):Patient    Medication history resources (including written lists, pill bottles, clinic record):Patient mailed in a med list prior to day of procedure.    Primary pharmacy.[facility], [place], [facility] [place] ([place])    Additional medication history information not noted on PTA med list :None    Time spent in this activity: 30 minutes    Prior to Admission medications    Medication Sig Last Dose Taking? Auth Provider   ASPIRIN EC PO Take 81 mg by mouth every morning 9/18/2017 at 0700 Yes Reported, Patient   Atenolol (TENORMIN PO) Take 100 mg by mouth every morning 9/18/2017 at 0700 Yes Reported, Patient   Fluticasone Propionate (FLONASE NA) Spray 1-2 sprays in nostril daily as needed 9/17/2017 at am Yes Reported, Patient   multivitamin, therapeutic with minerals (MULTI-VITAMIN) TABS tablet Take 1 tablet by mouth every morning 9/17/2017 at 0800 Yes Reported, Patient   Lisinopril (PRINIVIL PO) Take 20 mg by mouth 2 times daily 9/18/2017 at 0700 Yes Reported, Patient   Simvastatin (ZOCOR PO) Take 10 mg by mouth At Bedtime 9/17/2017 at hs Yes Reported, Patient   order for DME Check blood pressure daily   Sujit Duke MD   blood glucose monitoring (ACCU-CHEK PRISCILLA) test strip Use to test blood sugars 1 times daily or as directed.   Sujit Duke MD   blood glucose monitoring (ACCU-CHEK MULTICLIX) lancets Use to test blood sugar 1 times daily or as directed.   Sujit Duke MD   blood glucose calibration (ACCU-CHEK PRISCILLA) solution Use to calibrate blood glucose monitor as needed as directed.   Trever Jiang MD

## 2017-09-18 NOTE — IP AVS SNAPSHOT
MRN:[id number]                      After Visit Summary   9/18/2017    Merlin J Anderson    MRN: [id number]           Thank you!     Thank you for choosing [facility] for your care. Our goal is always to provide you with excellent care. Hearing back from our patients is one way we can continue to improve our services. Please take a few minutes to complete the written survey that you may receive in the mail after you visit with us. Thank you!        Patient Information     Date Of Birth          1940        Designated Caregiver       Most Recent Value    Caregiver    Will someone help with your care after discharge? yes    Name of designated caregiver sun     Phone number of caregiver [phone]    Caregiver address [address]       About your hospital stay     You were admitted on:  September 18, 2017 You last received care in the:  [place] [address] [facility]    You were discharged on:  September 20, 2017        Reason for your hospital stay       Asymptomatic Right Carotid Stenosis                  Who to Call     For medical emergencies, please call 911.  For non-urgent questions about your medical care, please call your primary care provider or clinic, [phone]  For questions related to your surgery, please call your surgery clinic        Attending Provider     Provider Specialty    Catalino Scott MD Vascular Surgery       Primary Care Provider Office Phone # Fax #    Sujit Duke [phone]       When to contact your care team       Intractable headache with nausea vomiting, changes in vision, speech. Any motor or sensory changes. Redness, pain and drainage from the wound.                  After Care Instructions     Activity       Your activity upon discharge: no heavy lifting >10lbs, pushing or pulling with the implant side for 6 weeks.            Diet       Follow this diet upon discharge: Orders Placed This  Encounter      Combination Diet Regular Diet Adult; 7431-5964 Calories: Moderate Consistent CHO (4-6 CHO units/meal); Low Saturated Fat Diet            Discharge Instructions           Discharge Instructions       Prior to discharge, discharging Rn to please:    1-Call Dr. Naresh Alvarez's office to schedule the patient for a voiding trial with any MD in Dr. Alvarez's office in one week.  2-Call [facility] at [phone] to schedule f/u with operating Vascular Surgeon.            Wound care and dressings       Instructions to care for your wound at home: Remove dressing over the right neck 09/20. May shower. Sponge over incision with soap and water. Do not scrub. Pat dry. Do not submerge underwater for 4 weeks.                  Follow-up Appointments     Follow Up and recommended labs and tests       Follow up with Dr. Scott in 2 weeks for post-operative check.                  Further instructions from your care team       Carotid Endartectomy  Post-Operative Instructions    Typical length of stay in the hospital is 1-2 days.  On occasion, an evening in the Intensive Care Unit may be required for blood pressure regulation.    Activity    Most people are able to resume normal activities 1-2 weeks after surgery.  The key is to gradually increase your level of activity as you are able.  It is not uncommon to feel easily fatigued - this will improve with time.      You should avoid heavy lifting more than 30 lbs for 1 week.      You may return to work when you feel able - typically 1-2 weeks after surgery, depending on the type of work you do.      You should not drive a car for 1 week after surgery.  In addition,  you can not be taking prescription strength pain medication and you must feel strong enough to drive safely.    Incision Care    You can shower or bathe 2 days after surgery - avoid rubbing and soaking your incision.      You may have strips of white tape called  steri-strips  across your incision;  they should stay on for 5-7 days or until the ends start to curl up.  You can then remove the steri-strips, or we will remove them at your post-operative appointment.      Avoid shaving directly over the incision for 2-3 weeks.    Common Concerns    You may notice mild skin numbness on the side of your surgery in your neck, chin, face, and earlobe.  This is due to nerve  irritation  and will gradually resolve over the next several months.  Call our office if you experience any short-lasting episode of numbness or weakness affecting one side of your body.      Some bruising and firmness around you incision can be expected.  This will soften and resolve over the next few weeks.  You may notice that some discoloration spreads to an area lower than the incision, such as the shoulder, neck or upper chest.  Likewise, swelling can occur near the incision or below the chin.  Call our office if the swelling or bruising worsens, or if you have difficulty swallowing.    Diet    You may resume a regular diet before you leave the hospital.  You may find that it is best to try smaller, more frequent meals until your appetite returns.    Medications    You may be started on a blood thinner after surgery - typically Aspirin and / or Plavix.      You may be given a prescription for pain medication after surgery.  If you need to have this refilled, please call your pharmacy where you had it filled.  They will then call us for approval.  Please do not call after hours for a refill on pain medication.    Post-Operative Appointments    You need to see your surgeon in the clinic approximately 1-2 weeks after surgery.  Post-operative appointment:   Date: _____________________________  Time: ____________________________  Dr. ______________________________      You will have an ultrasound test and evaluation with your surgeon 90 days (3 months) after surgery.      We will notify you by mail when you are due to schedule further ultrasound  testing and evaluation; typically this is done annually.     When You Should Call Our Office    Body aches, chills or temperature greater than 101      Incision redness or drainage      Loss of vision in one eye      Loss of strength / weakness in one side of your body      Severe headache      Difficulty with speech      If you will be having an invasive procedure, such as a colonoscopy or dental work within one year of your surgery, you may need to take an antibiotic prior to the procedure if you had a Dacron patch with your surgery; please call us so we can assist you with this.    Call our main number, [phone], for assistance with any concerns or questions.     Revised 5/2014      Pending Results     No orders found from 9/16/2017 to 9/19/2017.            Statement of Approval     Ordered          09/20/17 0959  I have reviewed and agree with all the recommendations and orders detailed in this document.  EFFECTIVE NOW     Approved and electronically signed by:  Juan Garcia MD           09/19/17 0758  I have reviewed and agree with all the recommendations and orders detailed in this document.  EFFECTIVE NOW     Associated Diagnoses:  Right-sided carotid artery disease (H) [I77.9], Status post carotid endarterectomy [Z98.890]    Approved and electronically signed by:  Edgar Enrique MD             Admission Information     Date & Time Provider Department Dept. Phone    9/18/2017 Catalino Scott MD [place] [address] [facility] [phone]      Your Vitals Were     Blood Pressure Pulse Temperature Respirations Height Weight    129/63 (BP Location: Left arm) 58 98.4  F (36.9  C) (Oral) 16 1.829 m (6') 95.6 kg (210 lb 11.2 oz)    Pulse Oximetry BMI (Body Mass Index)                95% 28.58 kg/m2          MyChart Information     [email or web address] gives you secure access to your electronic health record. If you see a primary care provider, you can also send messages to your care team and  make appointments. If you have questions, please call your primary care clinic.  If you do not have a primary care provider, please call [phone] and they will assist you.        Care EveryWhere ID     This is your Care EveryWhere ID. This could be used by other organizations to access your [facility] medical records  [id number]        Equal Access to Services     RAUDEL ESTRELLA : Hadashly henning hadpreeti Soeligio, waaxda luqadaha, qaybta kaalmada joseph, kaye gordonbonnystephon shah . So [facility] [phone].    ATENCIÓN: Si habla español, tiene a pa disposición servicios gratuitos de asistencia lingüística. Sandra al [phone].    We comply with applicable federal civil rights laws and [place] laws. We do not discriminate on the basis of race, color, national origin, age, disability sex, sexual orientation or gender identity.               Review of your medicines      START taking        Dose / Directions    atorvastatin 40 MG tablet   Commonly known as:  LIPITOR        Dose:  40 mg   Take 1 tablet (40 mg) by mouth daily   Quantity:  90 tablet   Refills:  0       metFORMIN 500 MG tablet   Commonly known as:  GLUCOPHAGE   Used for:  Type 2 diabetes mellitus without complication, without long-term current use of insulin (H)        Dose:  500 mg   Take 1 tablet (500 mg) by mouth 2 times daily (with meals)   Quantity:  180 tablet   Refills:  3       oxyCODONE 5 MG IR tablet   Commonly known as:  ROXICODONE   Used for:  Status post carotid endarterectomy        Dose:  5 mg   Take 1 tablet (5 mg) by mouth every 4 hours as needed for moderate to severe pain or pain maximum 30 tablet(s) per day   Quantity:  30 tablet   Refills:  0       senna-docusate 8.6-50 MG per tablet   Commonly known as:  SENOKOT-S;PERICOLACE   Used for:  Drug-induced constipation        Dose:  1-2 tablet   Take 1-2 tablets by mouth 2 times daily   Quantity:  100 tablet   Refills:  0       tamsulosin 0.4 MG capsule   Commonly known as:   FLOMAX   Used for:  Urinary retention        Dose:  0.8 mg   Take 2 capsules (0.8 mg) by mouth daily   Quantity:  14 capsule   Refills:  0         CONTINUE these medicines which have NOT CHANGED        Dose / Directions    ASPIRIN EC PO        Dose:  81 mg   Take 81 mg by mouth every morning   Refills:  0       blood glucose calibration solution   Used for:  Type 2 diabetes mellitus without complication (H)        Use to calibrate blood glucose monitor as needed as directed.   Quantity:  1 Bottle   Refills:  0       blood glucose monitoring lancets   Used for:  Type 2 diabetes mellitus without complication, without long-term current use of insulin (H)        Use to test blood sugar 1 times daily or as directed.   Quantity:  1 Box   Refills:  1       blood glucose monitoring test strip   Commonly known as:  ACCU-CHEK PRISCILLA   Used for:  Type 2 diabetes mellitus without complication, without long-term current use of insulin (H)        Use to test blood sugars 1 times daily or as directed.   Quantity:  100 each   Refills:  1       FLONASE NA        Dose:  1-2 spray   Spray 1-2 sprays in nostril daily as needed   Refills:  0       Multi-vitamin Tabs tablet        Dose:  1 tablet   Take 1 tablet by mouth every morning   Refills:  0       order for DME   Used for:  Benign essential hypertension        Check blood pressure daily   Quantity:  1 Device   Refills:  0       PRINIVIL PO        Dose:  20 mg   Take 20 mg by mouth 2 times daily   Refills:  0       TENORMIN PO        Dose:  100 mg   Take 100 mg by mouth every morning   Refills:  0         STOP taking     ZOCOR PO                Where to get your medicines      These medications were sent to [facility] - [address]  [address], [place] [address]     Phone:  [phone]     tamsulosin 0.4 MG capsule         Some of these will need a paper prescription and others can be bought over the counter. Ask your nurse if you  have questions.     Bring a paper prescription for each of these medications     atorvastatin 40 MG tablet    oxyCODONE 5 MG IR tablet    senna-docusate 8.6-50 MG per tablet       You don't need a prescription for these medications     metFORMIN 500 MG tablet                Protect others around you: Learn how to safely use, store and throw away your medicines at www.disposemymeds.org.             Medication List: This is a list of all your medications and when to take them. Check marks below indicate your daily home schedule. Keep this list as a reference.      Medications           Morning Afternoon Evening Bedtime As Needed    ASPIRIN EC PO   Take 81 mg by mouth every morning   Last time this was given:  81 mg on 9/20/2017  9:23 AM                                   atorvastatin 40 MG tablet   Commonly known as:  LIPITOR   Take 1 tablet (40 mg) by mouth daily   Last time this was given:  10 mg on 9/20/2017  9:23 AM   Next Dose Due:  Start TOMORROW evening, 9/20 (already had this morning 9/19)                                   blood glucose calibration solution   Use to calibrate blood glucose monitor as needed as directed.                                   blood glucose monitoring lancets   Use to test blood sugar 1 times daily or as directed.                                   blood glucose monitoring test strip   Commonly known as:  ACCU-CHEK PRISCILLA   Use to test blood sugars 1 times daily or as directed.                                   FLONASE NA   Spray 1-2 sprays in nostril daily as needed                                   metFORMIN 500 MG tablet   Commonly known as:  GLUCOPHAGE   Take 1 tablet (500 mg) by mouth 2 times daily (with meals)                                Multi-vitamin Tabs tablet   Take 1 tablet by mouth every morning   Last time this was given:  1 tablet on 9/20/2017  9:23 AM                                   order for DME   Check blood pressure daily                                    oxyCODONE 5 MG IR tablet   Commonly known as:  ROXICODONE   Take 1 tablet (5 mg) by mouth every 4 hours as needed for moderate to severe pain or pain maximum 30 tablet(s) per day                                   PRINIVIL PO   Take 20 mg by mouth 2 times daily   Last time this was given:  20 mg on 9/20/2017  9:23 AM                                      senna-docusate 8.6-50 MG per tablet   Commonly known as:  SENOKOT-S;PERICOLACE   Take 1-2 tablets by mouth 2 times daily   Last time this was given:  2 tablets on 9/20/2017  9:23 AM                                   tamsulosin 0.4 MG capsule   Commonly known as:  FLOMAX   Take 2 capsules (0.8 mg) by mouth daily   Last time this was given:  0.8 mg on 9/20/2017  7:44 AM                                TENORMIN PO   Take 100 mg by mouth every morning   Last time this was given:  100 mg on 9/20/2017  9:23 AM

## 2017-09-18 NOTE — CONSULTS
VASCULAR MEDICINE CONSULTATION      REQUESTING PHYSICIAN:  Catalino Scott MD.      PRIMARY PHYSICIAN:  Sujit Duke MD.      REASON FOR CONSULT REQUEST:  Hypertension, hyperlipidemia and glycemic evaluation and management in a 77-year-old multifocal (bilateral carotid artery disease) patient presenting for prophylactic right carotid endarterectomy and a high-grade right internal carotid artery asymptomatic stenosis.      IMPRESSION:       1.  Hyperlipidemia.      Merlin Anderson is a lipemically well-controlled hyperlipidemic vasculopath.  His LDL cholesterol while taking 10 mg of simvastatin is 61 mg/dl.  HDL is 37, triglycerides are 174 and total cholesterol is 133.  Non-HDL cholesterol is 96.  At present I recommend the following:        a.  Stop Zocor.        b.  Initiate Lipitor 10 mg daily.  While admittedly already lipemically well controlled, the patient is nonetheless a vasculopath.  As such, I would prefer that the patient be on a high potency statin.  Given the fact that his LDL is already well controlled at 61, I would not recommend aggressive statin dosing and, as such, would just simply maintain 10 mg per day.     2.  Type 2 diabetes.      The patient is a glycemically well-controlled type 2 diabetic.  He does not require oral medication to control his diabetes.  As such, I recommend the following:        a.  Q.i.d. Accu-Cheks with medium insulin resistance sliding scale coverage NovoLog of glycemic excursions        b.  Moderate intensity carbohydrate diet.        c.  Hospitalist consult for after hours medical coverage.      CHIEF COMPLAINT:  Asymptomatic progressive carotid artery stenosis.      HISTORY OF PRESENT ILLNESS:  The patient is a 77-year-old white male, hypertensive, hyperlipidemic type 2 diabetic who has known asymptomatic carotid stenoses.  The patient was last seen in 2009, at which time he was noted to have minimal carotid artery disease.  A recent carotid ultrasound was  suggestive of greater than 70% right internal carotid artery stenosis.  A CT angiogram was undertaken recently, and this revealed a 70% right internal carotid artery stenosis and a 50-55% left internal carotid artery stenosis.  Prophylactic carotid endarterectomy was recommended, and the patient wishes to proceed accordingly.   previous.      PAST MEDICAL HISTORY:   1.  Hypertension.   2.  Diabetes.   3.  Hyperlipidemia.   4.  Bilateral carotid artery stenoses.      PAST SURGICAL HISTORY:   1.  Herniorrhaphy, details and date unknown.   2.  Lumbar fusion and decompression on 2012.   3.  Colonoscopy In .   4.  Inguinal hernia repair, date unknown.      FAMILY HISTORY:  Notable for a father who is  with diabetes.      MEDICATIONS PRIOR TO ADMISSION:     1.  Aspirin 81 mg daily.   2.  Atenolol 100 mg daily.   3.  Lisinopril 20 mg p.o. b.i.d.   4.  MVI.   5.  Flonase 1-2 sprays to each nostril daily.   6.  Simvastatin 10 mg daily.      ALLERGIES:  No known drug allergies.      PHYSICAL EXAMINATION:   VITAL SIGNS:  Blood pressure is 152/71, respirations are 20, heart rate is 51 and temperature is 97.8 degrees Fahrenheit.   HEENT:  Oropharynx is within normal limits.   NECK:  No JVD, thyromegaly or lymphadenopathy.  There is a soft right carotid bruit.  No left carotid bruit.   LUNGS:  Clear to auscultation bilaterally without rales, wheezes or rhonchi.   HEART:  Regular rate and rhythm.  Normal S1 and S2.  No S3, S4, murmur, gallop or rub.   ABDOMEN:  Normoactive bowel sounds.  Soft, nondistended and nontender.   EXTREMITIES:  Without cyanosis, clubbing or edema.   NEUROLOGIC:  Nonfocal.   DERMATOLOGIC:  No suspicious lumps or masses.      LABORATORY DATA:  LDL is 61.  Hemoglobin A1c is 6.3%.         KEELY SIMPSON MD             D: 2017 12:42   T: 2017 13:23   MT: [id number]      Name:     ANDERSON, MERLIN   MRN:      [id number]        Account:       [id number]   :      1940            Consult Date:  09/18/2017      Document: [id number]       cc: Sujit Scott MD

## 2017-09-18 NOTE — ANESTHESIA CARE TRANSFER NOTE
Patient: Merlin J Anderson    Procedure(s):  RIGHT CAROTID ENDARTERECTOMY, WITH PATCH ANGIOPLASTY, WITH ELECTOENCEPHALOGAM            (EEG) - Wound Class: I-Clean    Diagnosis: RIGHT COROTID STENOSIS  Diagnosis Additional Information: No value filed.    Anesthesia Type:   General, ETT     Note:  Airway :Face Mask  Patient transferred to:PACU        Vitals: (Last set prior to Anesthesia Care Transfer)    CRNA VITALS  9/18/2017 1510 - 9/18/2017 1550      9/18/2017             ART BP: 92/47    ART Mean: 71                Electronically Signed By: DIRK Roldan CRNA  September 18, 2017  3:50 PM

## 2017-09-18 NOTE — PROGRESS NOTES
Vascular Surgery Progress Note    Patient seen and evaluated at the bedside. No complaints. Pain well controlled.     /53  Temp 98.4  F (36.9  C) (Temporal)  Resp (!) 49  Ht 6'  Wt 209 lb  SpO2 97%  BMI 28.35 kg/m2      Intake/Output Summary (Last 24 hours) at 09/18/17 1600  Last data filed at 09/18/17 1504   Gross per 24 hour   Intake             3000 ml   Output              725 ml   Net             2275 ml     General: Elderly male resting supine NAD  HEENT: Right neck dressing CDI. No visible swelling at the neck.   Cor: RRR  Pulm: Unlabored breathing   Neuro: CN II-XII intact. Gross motor and sensation intact. Tongue midline on protrusion.  Strenght 5/5    Assessment: Mr. Warner is a 76 yo right handed male with a history of hypertension and diabetes s/p right carotid endarterectomy for asymptomatic right carotid stenosis.     Plan:     1. Incentive spirometry  2. Bedrest tonight. OOB to chair tomorrow.   3. Discontinue baldwin 6 am.   4. SBP goal < 150mmHG.   5. Advance diet as tolerated.   6. Plan for discharge tomorrow.     Edgar Enriuqe MD   Vascular Surgery Fellow  Pager: [phone]

## 2017-09-18 NOTE — PROGRESS NOTES
HOSPITALIST CONSULT CHART CHECK:    Hospitalist service was consulted for cross coverage only. We will peripherally follow and chart check throughout the week.  We will assume care over the weekend for Vascular Medicine if the patient remains in the hospital and Vascular Surgery is not rounding.     - For vascular medical concerns during business hours M-F, call the [facility] at [phone] to have the rounding/on call Vascular Medicine (NOT VASCULAR SURGERY) MD paged.    - After business hours M-F, for medical concerns on this patient, please page hospitalist staff.    - For vascular surgical questions, please page the appropriate surgeon (primary vascular surgeon or on call vascular surgeon) based upon the time of day.     Assessment & Plan   Merlin J Anderson is a 77 year old male who was admitted on 9/18/2017.    POD # 0 Right carotid endarterectomy  -Vascular surgery is managing at this point.     -Analgesic management and DVT prophylaxis to Vascular Surgery.     -HGB checked in the morning to assess for acute blood loss anemia.     -Encourage IS.     -SBP goal < 150mmHG.    Hyperlipidemia:  -Zocor discontinue and Lipitor started    Diabetes Mellitus type 2:  - Medium dose SSI  -Does not require oral agents for management.  - blood glucoses have been less than 200 since admission  -last A1c on 9/18/17 was 6.3      DVT Prophylaxis: Defer to primary service  Code Status: Full Code      Rosalva Childs PA-C

## 2017-09-18 NOTE — ANESTHESIA PROCEDURE NOTES
ARTERIAL LINE PROCEDURE NOTE:   Pre-Procedure  Performed by JO ANN FORDE  Location: pre-op      Pre-Anesthestic Checklist: patient identified, IV checked, risks and benefits discussed, informed consent, monitors and equipment checked and pre-op evaluation    Timeout  Correct Patient: Yes   Correct Procedure: Yes   Correct Site: Yes   Correct Laterality: N/A   Correct Position: Yes   Site Marked: N/A   .   Procedure Documentation      supine  .Skin infiltrated with mL of 1% lidocaine. Injection technique: Seldinger Technique  .  .  Patient Prep;all elements of maximal sterile barrier technique followed, mask, hat, sterile gown, sterile gloves, draped, hand hygiene, chlorhexidine gluconate and isopropyl alcohol    Assessment/Narrative    Catheter: 20 gauge, 1.75 in/4.5 cm quick cath (integral wire)          Arterial waveform: Yes IBP within 10% of NIBP: Yes

## 2017-09-18 NOTE — BRIEF OP NOTE
[facility] Brief Operative Note    Pre-operative diagnosis: RIGHT COROTID STENOSIS   Post-operative diagnosis Same   Procedure: Procedure(s):  RIGHT CAROTID ENDARTERECTOMY, WITH PATCH ANGIOPLASTY, WITH ELECTOENCEPHALOGAM            (EEG) - Wound Class: I-Clean   Surgeon(s): Surgeon(s) and Role:     * Catalino Scott MD - Primary     * Edgar Enrique MD - Assisting  Bautista Garcia MD - Assisting   Estimated blood loss: 75 mL    Specimens: * No specimens in log *   Findings: Right carotid bifurcation stenosis from atherosclerotic disease ~70% s/p CEA w/ normal EEG throughout, no shunting required. Bovine pericardial patch repair.    Bautista Garcia  General Surgery PGY4  [phone]

## 2017-09-19 LAB
ANION GAP SERPL CALCULATED.3IONS-SCNC: 7 MMOL/L (ref 3–14)
BASOPHILS # BLD AUTO: 0 10E9/L (ref 0–0.2)
BASOPHILS NFR BLD AUTO: 0.2 %
BUN SERPL-MCNC: 28 MG/DL (ref 7–30)
CALCIUM SERPL-MCNC: 8.8 MG/DL (ref 8.5–10.1)
CHLORIDE SERPL-SCNC: 109 MMOL/L (ref 94–109)
CO2 SERPL-SCNC: 25 MMOL/L (ref 20–32)
CREAT SERPL-MCNC: 1.56 MG/DL (ref 0.66–1.25)
DIFFERENTIAL METHOD BLD: ABNORMAL
EOSINOPHIL # BLD AUTO: 0 10E9/L (ref 0–0.7)
EOSINOPHIL NFR BLD AUTO: 0.1 %
ERYTHROCYTE [DISTWIDTH] IN BLOOD BY AUTOMATED COUNT: 12.9 % (ref 10–15)
GFR SERPL CREATININE-BSD FRML MDRD: 43 ML/MIN/1.7M2
GLUCOSE BLDC GLUCOMTR-MCNC: 131 MG/DL (ref 70–99)
GLUCOSE BLDC GLUCOMTR-MCNC: 134 MG/DL (ref 70–99)
GLUCOSE BLDC GLUCOMTR-MCNC: 136 MG/DL (ref 70–99)
GLUCOSE BLDC GLUCOMTR-MCNC: 171 MG/DL (ref 70–99)
GLUCOSE BLDC GLUCOMTR-MCNC: 193 MG/DL (ref 70–99)
GLUCOSE SERPL-MCNC: 149 MG/DL (ref 70–99)
HCT VFR BLD AUTO: 36.1 % (ref 40–53)
HGB BLD-MCNC: 12.7 G/DL (ref 13.3–17.7)
IMM GRANULOCYTES # BLD: 0 10E9/L (ref 0–0.4)
IMM GRANULOCYTES NFR BLD: 0.2 %
LYMPHOCYTES # BLD AUTO: 1 10E9/L (ref 0.8–5.3)
LYMPHOCYTES NFR BLD AUTO: 7.4 %
MCH RBC QN AUTO: 31.8 PG (ref 26.5–33)
MCHC RBC AUTO-ENTMCNC: 35.2 G/DL (ref 31.5–36.5)
MCV RBC AUTO: 90 FL (ref 78–100)
MONOCYTES # BLD AUTO: 1.1 10E9/L (ref 0–1.3)
MONOCYTES NFR BLD AUTO: 8 %
NEUTROPHILS # BLD AUTO: 11.2 10E9/L (ref 1.6–8.3)
NEUTROPHILS NFR BLD AUTO: 84.1 %
NRBC # BLD AUTO: 0 10*3/UL
NRBC BLD AUTO-RTO: 0 /100
PLATELET # BLD AUTO: 175 10E9/L (ref 150–450)
POTASSIUM SERPL-SCNC: 4.5 MMOL/L (ref 3.4–5.3)
RBC # BLD AUTO: 4 10E12/L (ref 4.4–5.9)
SODIUM SERPL-SCNC: 141 MMOL/L (ref 133–144)
WBC # BLD AUTO: 13.3 10E9/L (ref 4–11)

## 2017-09-19 PROCEDURE — 85025 COMPLETE CBC W/AUTO DIFF WBC: CPT | Performed by: INTERNAL MEDICINE

## 2017-09-19 PROCEDURE — 80048 BASIC METABOLIC PNL TOTAL CA: CPT | Performed by: INTERNAL MEDICINE

## 2017-09-19 PROCEDURE — 99233 SBSQ HOSP IP/OBS HIGH 50: CPT | Performed by: INTERNAL MEDICINE

## 2017-09-19 PROCEDURE — 00000146 ZZHCL STATISTIC GLUCOSE BY METER IP

## 2017-09-19 PROCEDURE — 36415 COLL VENOUS BLD VENIPUNCTURE: CPT | Performed by: INTERNAL MEDICINE

## 2017-09-19 PROCEDURE — 99207 ZZC NON-BILLABLE SERV PER CHARTING: CPT | Performed by: PHYSICIAN ASSISTANT

## 2017-09-19 PROCEDURE — 25000132 ZZH RX MED GY IP 250 OP 250 PS 637: Performed by: SURGERY

## 2017-09-19 PROCEDURE — 25000132 ZZH RX MED GY IP 250 OP 250 PS 637: Performed by: INTERNAL MEDICINE

## 2017-09-19 PROCEDURE — 25000128 H RX IP 250 OP 636: Performed by: SURGERY

## 2017-09-19 PROCEDURE — 21400002 ZZH R&B CCU CICU CRITICAL

## 2017-09-19 RX ORDER — AMOXICILLIN 250 MG
1-2 CAPSULE ORAL 2 TIMES DAILY
Qty: 100 TABLET | Refills: 0 | Status: ON HOLD | OUTPATIENT
Start: 2017-09-19 | End: 2018-10-26

## 2017-09-19 RX ORDER — ATORVASTATIN CALCIUM 40 MG/1
40 TABLET, FILM COATED ORAL DAILY
Qty: 90 TABLET | Refills: 0 | Status: SHIPPED | OUTPATIENT
Start: 2017-09-19 | End: 2017-12-11

## 2017-09-19 RX ORDER — TAMSULOSIN HYDROCHLORIDE 0.4 MG/1
0.4 CAPSULE ORAL DAILY
Qty: 14 CAPSULE | Refills: 0 | Status: SHIPPED | OUTPATIENT
Start: 2017-09-19 | End: 2017-09-20

## 2017-09-19 RX ORDER — TAMSULOSIN HYDROCHLORIDE 0.4 MG/1
0.4 CAPSULE ORAL DAILY
Status: DISCONTINUED | OUTPATIENT
Start: 2017-09-19 | End: 2017-09-20 | Stop reason: DRUGHIGH

## 2017-09-19 RX ORDER — OXYCODONE HYDROCHLORIDE 5 MG/1
5 TABLET ORAL EVERY 4 HOURS PRN
Qty: 30 TABLET | Refills: 0 | Status: SHIPPED | OUTPATIENT
Start: 2017-09-19 | End: 2018-04-04

## 2017-09-19 RX ADMIN — LISINOPRIL 20 MG: 20 TABLET ORAL at 08:16

## 2017-09-19 RX ADMIN — SENNOSIDES AND DOCUSATE SODIUM 2 TABLET: 8.6; 5 TABLET ORAL at 08:16

## 2017-09-19 RX ADMIN — LISINOPRIL 20 MG: 20 TABLET ORAL at 21:07

## 2017-09-19 RX ADMIN — TAMSULOSIN HYDROCHLORIDE 0.4 MG: 0.4 CAPSULE ORAL at 13:11

## 2017-09-19 RX ADMIN — HEPARIN SODIUM 5000 UNITS: 5000 INJECTION, SOLUTION INTRAVENOUS; SUBCUTANEOUS at 05:54

## 2017-09-19 RX ADMIN — ACETAMINOPHEN 975 MG: 325 TABLET, FILM COATED ORAL at 21:06

## 2017-09-19 RX ADMIN — ASPIRIN 81 MG: 81 TABLET, COATED ORAL at 08:16

## 2017-09-19 RX ADMIN — ATORVASTATIN CALCIUM 10 MG: 10 TABLET, FILM COATED ORAL at 08:16

## 2017-09-19 RX ADMIN — SENNOSIDES AND DOCUSATE SODIUM 2 TABLET: 8.6; 5 TABLET ORAL at 21:06

## 2017-09-19 RX ADMIN — ATENOLOL 100 MG: 100 TABLET ORAL at 08:16

## 2017-09-19 RX ADMIN — INSULIN ASPART 1 UNITS: 100 INJECTION, SOLUTION INTRAVENOUS; SUBCUTANEOUS at 18:42

## 2017-09-19 RX ADMIN — SODIUM CHLORIDE: 9 INJECTION, SOLUTION INTRAVENOUS at 00:18

## 2017-09-19 RX ADMIN — HEPARIN SODIUM 5000 UNITS: 5000 INJECTION, SOLUTION INTRAVENOUS; SUBCUTANEOUS at 13:11

## 2017-09-19 RX ADMIN — ACETAMINOPHEN 975 MG: 325 TABLET, FILM COATED ORAL at 05:53

## 2017-09-19 RX ADMIN — ACETAMINOPHEN 975 MG: 325 TABLET, FILM COATED ORAL at 13:11

## 2017-09-19 RX ADMIN — MULTIPLE VITAMINS W/ MINERALS TAB 1 TABLET: TAB at 08:16

## 2017-09-19 RX ADMIN — HEPARIN SODIUM 5000 UNITS: 5000 INJECTION, SOLUTION INTRAVENOUS; SUBCUTANEOUS at 21:07

## 2017-09-19 ASSESSMENT — VISUAL ACUITY: OU: NORMAL ACUITY

## 2017-09-19 NOTE — PROGRESS NOTES
Vascular Surgery Progress Note     Patient seen and examined at the bedside. No complaints overnight. Drank water with no difficulty swallowing (choking, coughing).     /62  Temp 97.8  F (36.6  C)  Resp 16  Ht 6'  Wt 211 lb 3.2 oz  SpO2 98%  BMI 28.64 kg/m2    Intake/Output Summary (Last 24 hours) at 09/19/17 0746  Last data filed at 09/19/17 0600   Gross per 24 hour   Intake             4813 ml   Output             1975 ml   Net             2838 ml     General: Elderly male resting supine NAD  HEENT: Right neck dressing CDI. No visible swelling at the neck.   Cor: RRR  Pulm: Unlabored breathing   Neuro: CN II-XII intact. Shoulder shrug symmetrical. No depression of oral commissure with smile Gross motor and sensation intact. Tongue midline on protrusion.  Strength 5/5     Assessment: Mr. Warner is a 78 yo right handed male with a history of hypertension and diabetes POD 1 s/p right carotid endarterectomy for asymptomatic right carotid stenosis.      Plan:     1. Jose discontinued this AM. Await adequate void.   2. Ambulate ad berkley  3. Diet as tolerated.   4. Plan for discharge today if he voids.   5. Patient instructed to remove dressing tomorrow.     Edgar Enrique MD   Vascular Surgery Fellow  Pager: [phone]    As above.  Still with urinary retention.  Will keep another day - appreciate Dr Garcia's assistance.    Gabriel Scott MD

## 2017-09-19 NOTE — PLAN OF CARE
Problem: Goal Outcome Summary  Goal: Goal Outcome Summary  Outcome: Improving  VSS, neuros intact, adequate urine in baldwin, minimal pain, dressing CDI, plan to discharge tomorrow

## 2017-09-19 NOTE — OP NOTE
DATE OF PROCEDURE:  09/18/2017      PREOPERATIVE DIAGNOSIS:  Asymptomatic right carotid stenosis.      POSTOPERATIVE DIAGNOSIS:  Asymptomatic right carotid stenosis.      PROCEDURE:  Right carotid endarterectomy with bovine pericardial patch angioplasty.      SURGEON:  Catalino Scott MD      :  DILLON LOWERY MD   ASSISTANT:   FLOR AMARO MD, [facility] surgery resident      ANESTHESIA:  General endotracheal anesthesia.      ESTIMATED BLOOD LOSS:  75 mL.      OPERATIVE INDICATIONS:  Merlin Anderson is a 77-year-old gentleman with metabolic syndrome who has been followed for many years for a slowly progressing right internal carotid stenosis.  His most recent imaging has demonstrated an approximately 80% right internal carotid stenosis.  He is a very active individual and after a discussion as to the risks and/or benefits of carotid endarterectomy he has decided to proceed with surgery.      OPERATIVE FINDINGS:  Exposure was a bit more challenging due to this patient's arthritic neck and the fact that he could not extend his neck or turn his head very well.  There was a focal 80-85% stenosis involving the internal carotid artery 1 cm above the level of the bifurcation.  Distal to that point the vessel was fairly soft and disease-free.      DESCRIPTION OF PROCEDURE:  After informed consent was obtained, the patient was brought to the operating room and placed on the table in a supine position, after which general endotracheal anesthesia was achieved without incident.  His right neck was prepped and draped in the usual sterile fashion.  We began with a right-sided sternocleidomastoid incision.  Dissection proceeded sharply downward to isolate the mid cervical common carotid at the level of the omohyoid muscle.  A posterior vagus nerve was identified and carefully avoided throughout the remainder of this dissection.  Dissection continued cephalad along the common carotid artery.  We divided a  facial vein between silk ties.  The external carotid and superior thyroidal arteries were dissected free and both were controlled with a single red vessel loop.  Dissection then continued distally up the internal carotid.  We followed the ansa cephalad to its confluence with the hypoglossal nerve.  Great care was taken to avoid any injury to the hypoglossal nerve, but we did divide the ansa between silk ties.  With these maneuvers, we had roughly 3 cm of the internal carotid exposed and we were above the palpable level of disease.  Distal control was achieved with a yellow vessel loop.  He was given 8,000 units of IV heparin.  We performed test clamping of the carotid and there were no changes in our EEG waveform.  An arteriotomy was made on the mid cervical carotid and extended up the internal carotid to a point above the level of the disease.  The proximal and distal intima was scored with a [place] blade.  The endarterectomy plane was developed and continued distally.  We obtained an excellent distal taper point.  We also obtained an excellent proximal taper point.  We performed an eversion endarterectomy of the external carotid artery and the plaque was removed from the field.  Residual shards of fibrinous debris were removed from the endarterectomized surface until we were fully satisfied with the quality of the endarterectomy.  The proximal and distal taper points were tacked down with a few interrupted 7-0 Prolene sutures.  A bovine pericardial patch was selected.  This was subsequently secured using running 6-0 Prolene suture.  Prior to placing the final stitches in the patch angioplasty suture line, all clamps and vessel loops were briefly released to flush any debris out of the endarterectomized lumen.  The lumen was copiously irrigated and observed to be clear.  We placed the final sutures in the patch angioplasty suture line and this was secured as flow was preferentially restored up the external carotid.   After several heartbeats we briefly relinquished control of the internal carotid.  Immediately noted was a small hole in the vessel just distal to our distal taper point.  We reobtained carotid control and this area was repaired with a single pledgeted 7-0 Prolene suture.  Following this, flow was restored once again first up the external carotid before relinquishing control of the distal internal carotid.  At this point, we had excellent hemostasis of the carotid artery itself.  There was some diffuse oozing coming from the wound bed.  Surgicel gauze and gentle compression was held over the wound for about 10 minutes.  We performed some additional pinpoint electrocautery and ligated a few bleeding areas with interrupted silk ties.  Heparin was partially reversed with 40 mg of protamine.  Ultimately we had excellent hemostasis.  Closure then proceeded utilizing interrupted 2-0 Vicryl to reapproximate deep cervical fascia.  Platysma was closed with a running 3-0 Vicryl suture.  Skin was closed with a 4-0 Monocryl running subcuticular stitch.  Steri-Strips and a sterile dressing were applied.  The final sponge and needle count were reported as correct.  The patient tolerated the procedure without incident.  He was observed to follow commands with all 4 extremities.  He was subsequently extubated and returned hemodynamically stable to the recovery room.  Again, in the recovery room, he followed commands with all 4 extremities and his tongue was in the midline.         CATALINO SCOTT MD             D: 2017 18:22   T: 2017 23:21   MT: [id number]      Name:     ANDERSON, MERLIN   MRN:      [id number]        Account:        [id number]   :      1940           Procedure Date: 2017      Document: [id number]       cc: Catalino Scott MD

## 2017-09-19 NOTE — PLAN OF CARE
Problem: Goal Outcome Summary  Goal: Goal Outcome Summary  Outcome: Adequate for Discharge Date Met:  09/19/17  Pt A&Ox4. VSS, AF. Neuro checks normal.  R carotid dressing CDI with no drainage.  Pt feeling well and ready for DC. Pain under control with no intervention per pt.  Pt only able to void  ~100mL x2 all day and bladder scanned for >600mL. MD aware, pt to stay tonight. Flomax ordered and given. Pt and wife aware.

## 2017-09-19 NOTE — PLAN OF CARE
Problem: Goal Outcome Summary  Goal: Goal Outcome Summary  Outcome: No Change  A&O. Denies pain. Neuros intact. PVRs >700, waiting to place catheter d/t pt slowly having increased void amounts. Able to place baldwin per Dr. Baldwin if retention persists

## 2017-09-19 NOTE — PLAN OF CARE
Problem: Goal Outcome Summary  Goal: Goal Outcome Summary  Outcome: Improving  A&O.  AVSS.  Neuros intact.   Denies pain.  Tele SR with 1st degree AVB.  LLFA saline locked.  Garcia with adequate output.  Right neck dressing CDI.  IS 1500.  Denies passing gas.

## 2017-09-19 NOTE — PROGRESS NOTES
HOSPITALIST CONSULT CHART CHECK:    Hospitalist service was consulted for cross coverage only. We will peripherally follow and chart check throughout the week.  We will assume care over the weekend for Vascular Medicine if the patient remains in the hospital and Vascular Surgery is not rounding.     - For vascular medical concerns during business hours M-F, call the [facility] at [phone] to have the rounding/on call Vascular Medicine (NOT VASCULAR SURGERY) MD paged.    - After business hours M-F, for medical concerns on this patient, please page hospitalist staff.    - For vascular surgical questions, please page the appropriate surgeon (primary vascular surgeon or on call vascular surgeon) based upon the time of day.     Assessment & Plan   Merlin J Anderson is a 77 year old male who was admitted on 9/18/2017.    POD # 1 Right carotid endarterectomy  -Vascular surgery is managing at this point.     -Analgesic management and DVT prophylaxis to Vascular Surgery.     -HGB checked in the morning to assess for acute blood loss anemia.     -Encourage IS.     -SBP goal < 150mmHG.    Hyperlipidemia:  -Zocor discontinue and Lipitor started    Diabetes Mellitus type 2:  - Medium dose SSI  - Does not require oral agents for management.  - blood glucoses have been less than 200 since admission  - last A1c on 9/18/17 was 6.3      DVT Prophylaxis: Defer to primary service  Code Status: Prior      Raúl Grimes PA-C

## 2017-09-19 NOTE — PROGRESS NOTES
[facility]  Vascular Medicine Progress Note          Assessment and Plan:   Active Problems:        Carotid artery disease (H)    Assessment: doing well POD1 Rt CEA, CN intact    Plan: discharge home today if able to urinate (or with Garcia and voiding trial) , Flomax to be added      Hyperlipidemia, LDL goal < 70    Assessment: at goal on simva    Plan: switched to Lipitor due to CV disease, get lipid panel in three months through PCP      Type 2 Diabetes    Assessment: a1c < 6.5% on no oral meds, ACs a little high last night    Plan: SS cvg while here               Interval History:   doing well; no cp, sob, n/v/d, or abd pain.              Review of Systems:   The 10 point Review of Systems is negative other than noted in the HPI             Medications:       chlorhexidine   Topical Once    Or     antimicrobial soap   Topical Once     aspirin EC EC tablet 81 mg  81 mg Oral QAM     atenolol (TENORMIN) tablet 100 mg  100 mg Oral QAM     lisinopril (PRINIVIL/ZESTRIL) tablet 20 mg  20 mg Oral BID     multivitamin, therapeutic with minerals  1 tablet Oral QAM     atorvastatin  10 mg Oral Daily     insulin aspart  1-7 Units Subcutaneous TID AC     insulin aspart  1-5 Units Subcutaneous At Bedtime     sodium chloride (PF)  3 mL Intracatheter Q8H     heparin  5,000 Units Subcutaneous Q8H     acetaminophen  975 mg Oral Q8H     senna-docusate  1-2 tablet Oral BID                  Physical Exam:     Patient Vitals for the past 24 hrs:   BP Temp Temp src Pulse Heart Rate Resp SpO2 Weight   09/19/17 0815 - - - 64 - - - -   09/19/17 0754 128/63 97.9  F (36.6  C) Oral - 58 16 97 % -   09/19/17 0744 - - - - - - - 95.8 kg (211 lb 3.2 oz)   09/19/17 0600 126/62 - - - 59 16 98 % -   09/19/17 0400 117/59 97.8  F (36.6  C) - - 60 16 97 % -   09/19/17 0200 112/58 - - - 66 16 96 % -   09/19/17 0000 104/62 98  F (36.7  C) - - 71 16 96 % -   09/18/17 2200 121/64 - - - 76 17 95 % -   09/18/17 2100 123/57 - - - 73 17 95 %  -   09/18/17 2000 121/64 - - - 73 18 94 % -   09/18/17 1958 - 98  F (36.7  C) Oral - - - - -   09/18/17 1830 130/61 - - - 78 16 94 % -   09/18/17 1800 126/65 - - - 79 19 95 % -   09/18/17 1730 132/69 - - - 81 19 96 % -   09/18/17 1725 - - - - - - 97 % -   09/18/17 1723 131/66 - - - 85 14 - -   09/18/17 1703 - - - - - - 99 % -   09/18/17 1700 120/57 98.8  F (37.1  C) - - 82 15 96 % -   09/18/17 1650 125/60 98.4  F (36.9  C) - - 73 17 98 % -   09/18/17 1640 120/61 98.4  F (36.9  C) - - 74 18 99 % -   09/18/17 1630 124/62 98.6  F (37  C) - - 77 19 98 % -   09/18/17 1620 127/59 98.6  F (37  C) - - 71 13 98 % -   09/18/17 1610 128/61 98.4  F (36.9  C) - - 73 21 97 % -   09/18/17 1600 121/80 98.8  F (37.1  C) - - 70 17 99 % -   09/18/17 1550 129/53 - - - 68 20 95 % -   09/18/17 1545 128/56 98.4  F (36.9  C) Temporal - - 20 97 % -   09/18/17 0935 155/76 - - - 55 20 97 % -     Wt Readings from Last 4 Encounters:   09/19/17 95.8 kg (211 lb 3.2 oz)   09/08/17 96.9 kg (213 lb 11.2 oz)   08/31/17 97.1 kg (214 lb)   08/28/17 97.1 kg (214 lb)   I/O last 3 completed shifts:  In: 4813 [P.O.:550; I.V.:4263]  Out: 1975 [Urine:1900; Blood:75]      Constitutional: normal  Eyes: normal  ENT: normal  Neck: no hematoma, incision C/D/I  Hematologic / Lymphatic: normal  Back: normal  Lungs: No increased work of breathing, good air exchange, clear to auscultation bilaterally, no crackles or wheezing.  Cardiovascular: Regular rate and rhythm, normal S1 and S2, no S3 or S4, and no murmur noted.  Chest / Breast: normal  Abdomen: normal  Musculoskeletal: normal  Neurologic: normal  Neuropsychiatric: normal  Skin: normal           Data:   All laboratory and imaging data in the past 24 hours reviewed  Results for orders placed or performed during the hospital encounter of 09/18/17 (from the past 24 hour(s))   Glucose by meter   Result Value Ref Range    Glucose 173 (H) 70 - 99 mg/dL   Glucose by meter   Result Value Ref Range    Glucose 197 (H) 70  - 99 mg/dL   Glucose by meter   Result Value Ref Range    Glucose 171 (H) 70 - 99 mg/dL   Glucose by meter   Result Value Ref Range    Glucose 193 (H) 70 - 99 mg/dL   Glucose by meter   Result Value Ref Range    Glucose 131 (H) 70 - 99 mg/dL   Basic metabolic panel   Result Value Ref Range    Sodium 141 133 - 144 mmol/L    Potassium 4.5 3.4 - 5.3 mmol/L    Chloride 109 94 - 109 mmol/L    Carbon Dioxide 25 20 - 32 mmol/L    Anion Gap 7 3 - 14 mmol/L    Glucose 149 (H) 70 - 99 mg/dL    Urea Nitrogen 28 7 - 30 mg/dL    Creatinine 1.56 (H) 0.66 - 1.25 mg/dL    GFR Estimate 43 (L) >60 mL/min/1.7m2    GFR Estimate If Black 52 (L) >60 mL/min/1.7m2    Calcium 8.8 8.5 - 10.1 mg/dL   CBC with platelets differential   Result Value Ref Range    WBC 13.3 (H) 4.0 - 11.0 10e9/L    RBC Count 4.00 (L) 4.4 - 5.9 10e12/L    Hemoglobin 12.7 (L) 13.3 - 17.7 g/dL    Hematocrit 36.1 (L) 40.0 - 53.0 %    MCV 90 78 - 100 fl    MCH 31.8 26.5 - 33.0 pg    MCHC 35.2 31.5 - 36.5 g/dL    RDW 12.9 10.0 - 15.0 %    Platelet Count 175 150 - 450 10e9/L    Diff Method Automated Method     % Neutrophils 84.1 %    % Lymphocytes 7.4 %    % Monocytes 8.0 %    % Eosinophils 0.1 %    % Basophils 0.2 %    % Immature Granulocytes 0.2 %    Nucleated RBCs 0 0 /100    Absolute Neutrophil 11.2 (H) 1.6 - 8.3 10e9/L    Absolute Lymphocytes 1.0 0.8 - 5.3 10e9/L    Absolute Monocytes 1.1 0.0 - 1.3 10e9/L    Absolute Eosinophils 0.0 0.0 - 0.7 10e9/L    Absolute Basophils 0.0 0.0 - 0.2 10e9/L    Abs Immature Granulocytes 0.0 0 - 0.4 10e9/L    Absolute Nucleated RBC 0.0

## 2017-09-19 NOTE — PROGRESS NOTES
Still having difficulty voiding. Flomax started. Patient wishes to remain hospitalized tonight. Garcia catheter will be placed if has continued urinary retention. He will be discharged to home tomorrow with a leg bag and a  appointment in one week for a voiding trial if he has persistent retention tomorrow.

## 2017-09-20 VITALS
HEIGHT: 72 IN | BODY MASS INDEX: 28.54 KG/M2 | OXYGEN SATURATION: 95 % | RESPIRATION RATE: 16 BRPM | HEART RATE: 58 BPM | DIASTOLIC BLOOD PRESSURE: 63 MMHG | SYSTOLIC BLOOD PRESSURE: 129 MMHG | TEMPERATURE: 98.4 F | WEIGHT: 210.7 LBS

## 2017-09-20 LAB
GLUCOSE BLDC GLUCOMTR-MCNC: 129 MG/DL (ref 70–99)
INTERPRETATION ECG - MUSE: NORMAL

## 2017-09-20 PROCEDURE — 25000132 ZZH RX MED GY IP 250 OP 250 PS 637: Performed by: INTERNAL MEDICINE

## 2017-09-20 PROCEDURE — 25000132 ZZH RX MED GY IP 250 OP 250 PS 637: Performed by: SURGERY

## 2017-09-20 PROCEDURE — 00000146 ZZHCL STATISTIC GLUCOSE BY METER IP

## 2017-09-20 PROCEDURE — 25000125 ZZHC RX 250: Performed by: SURGERY

## 2017-09-20 PROCEDURE — 99233 SBSQ HOSP IP/OBS HIGH 50: CPT | Performed by: INTERNAL MEDICINE

## 2017-09-20 PROCEDURE — 25000128 H RX IP 250 OP 636: Performed by: SURGERY

## 2017-09-20 RX ORDER — TAMSULOSIN HYDROCHLORIDE 0.4 MG/1
0.8 CAPSULE ORAL DAILY
Status: DISCONTINUED | OUTPATIENT
Start: 2017-09-20 | End: 2017-09-20 | Stop reason: HOSPADM

## 2017-09-20 RX ORDER — TAMSULOSIN HYDROCHLORIDE 0.4 MG/1
0.8 CAPSULE ORAL DAILY
Qty: 14 CAPSULE | Refills: 0 | Status: SHIPPED | OUTPATIENT
Start: 2017-09-20 | End: 2018-04-04

## 2017-09-20 RX ADMIN — ACETAMINOPHEN 975 MG: 325 TABLET, FILM COATED ORAL at 06:10

## 2017-09-20 RX ADMIN — HEPARIN SODIUM 5000 UNITS: 5000 INJECTION, SOLUTION INTRAVENOUS; SUBCUTANEOUS at 06:11

## 2017-09-20 RX ADMIN — ATENOLOL 100 MG: 100 TABLET ORAL at 09:23

## 2017-09-20 RX ADMIN — LIDOCAINE HYDROCHLORIDE 10 ML: 20 JELLY TOPICAL at 06:52

## 2017-09-20 RX ADMIN — LISINOPRIL 20 MG: 20 TABLET ORAL at 09:23

## 2017-09-20 RX ADMIN — ATORVASTATIN CALCIUM 10 MG: 10 TABLET, FILM COATED ORAL at 09:23

## 2017-09-20 RX ADMIN — ASPIRIN 81 MG: 81 TABLET, COATED ORAL at 09:23

## 2017-09-20 RX ADMIN — TAMSULOSIN HYDROCHLORIDE 0.8 MG: 0.4 CAPSULE ORAL at 07:44

## 2017-09-20 RX ADMIN — SENNOSIDES AND DOCUSATE SODIUM 2 TABLET: 8.6; 5 TABLET ORAL at 09:23

## 2017-09-20 RX ADMIN — MULTIPLE VITAMINS W/ MINERALS TAB 1 TABLET: TAB at 09:23

## 2017-09-20 ASSESSMENT — VISUAL ACUITY
OU: NORMAL ACUITY
OU: NORMAL ACUITY

## 2017-09-20 NOTE — PROGRESS NOTES
Vascular Surgery Progress Note     Patient seen and evaluated at the bedside. Pain is well controlled. No motor-sensory complaints. PVR 600cc this am per RN. Patient has no complaints of suprapubic discomfort.     /68 (BP Location: Left arm)  Pulse 66  Temp 98.4  F (36.9  C) (Oral)  Resp 16  Ht 6'  Wt 210 lb 11.2 oz  SpO2 97%  BMI 28.58 kg/m2    Intake/Output Summary (Last 24 hours) at 09/20/17 0730  Last data filed at 09/20/17 0604   Gross per 24 hour   Intake              125 ml   Output             2800 ml   Net            -2675 ml     General: Elderly male resting supine NAD  HEENT: Right neck incision CDI. No visible swelling at the neck. No hematoma.  Cor: RRR  Pulm: Unlabored breathing   Abd: S/NT/ND.   Neuro: CN II-XII intact. Shoulder shrug symmetrical. No depression of oral commissure with smile Gross motor and sensation intact. Tongue midline on protrusion.  Strength 5/5      Assessment: Mr. Warner is a 76 yo right handed male with a history of hypertension and diabetes POD 2 s/p right carotid endarterectomy for asymptomatic right carotid stenosis.       Plan:     1. Continue to hold narcotic medication. 0.8 mg Flomax given this morning. Straight cath x 1 now. Will reassess. Patient may need baldwin if unable to void adequately by noon.   2. Ambulate as tolerated  3. Likely discharge today.        Edgar Enrique MD   Vascular Surgery Fellow  Pager: [phone]

## 2017-09-20 NOTE — PLAN OF CARE
"Problem: Goal Outcome Summary  Goal: Goal Outcome Summary  Outcome: Improving  A/o4. VSS. IS 2000, needs encouragement. Neruos intact. Urinary retention improving, last void 325, and pt states \"urge to go happening more frequent.\" Incision CDI. Pain minimal 3/10, mgt w/scheduled tyl. Plan to d/c Wed if voiding continues.      "

## 2017-09-20 NOTE — PLAN OF CARE
Problem: Goal Outcome Summary  Goal: Goal Outcome Summary  Outcome: Adequate for Discharge Date Met:  09/20/17  Pt ready for DC and agrees with plan. Pain under control per pt.  VSS, AF. Neuro intact. Ambulating around room independently.  Garcia care, leg bag and standard bag teaching done with pt and wife with video, handout, and demonstrations. Pt and wife demonstrated understanding of cares and technique.  Wife is taking pt home.  DC instructions thoroughly reviewed with pt. Pt's belongings and meds sent with pt.  Pt denies any outstanding needs, Qs, or concerns and states understanding of plan.

## 2017-09-20 NOTE — DISCHARGE SUMMARY
[facility]    Discharge Summary  Vascular Medicine    Date of Admission:  9/18/2017  Date of Discharge:  9/20/2017  Discharging Provider: Juan Baldwin MD  Date of Service (when I saw the patient): 09/20/17    Discharge Diagnoses   1. Asymptomatic right carotid artery stenosis s/p right carotid endarterectomy 9/18/17    2. Hyperlipidemia      3.  Type 2 diabetes    4. Post-operative urinary retention      Hospital Course   Merlin J Anderson was admitted on 9/18/2017.  The following problems were addressed during his hospitalization:    1. Asymptomatic right carotid artery stenosis s/p right carotid endarterectomy 9/18/17    He tolerated surgery well. His pain is well controlled. He did have trouble with urinary retention and will be discharging to home with a baldwin catheter (see below). He is discharging to home on aspirin and should follow up with Dr. Scott in 2 weeks for a post-operative check.     2. Hyperlipidemia.       He is a lipemically well-controlled hyperlipidemic vasculopath. His LDL cholesterol while taking 10 mg of simvastatin is 61 mg/dl.  HDL is 37, triglycerides are 174 and total cholesterol is 133.  Non-HDL cholesterol is 96. Although his lipids are well controlled, he is a vasculopath. Therefore, it is preferred that he be on a high potency statin. He will be discharged on Lipitor 10 mg daily.      3.  Type 2 diabetes.       The patient is a glycemically well-controlled type 2 diabetic. He has been on metformin 500 mg BID as an outpatient. This should be resumed on discharge. His sugars were monitored in the hospital and he was provided with sliding scale insulin as needed.       4. Post-operative urinary retention    He failed a voiding trial and continues to have urinary retention. Therefore, he will be discharged to home with Flomax and a baldwin catheter. He should follow up with Urology in one week for another voiding trial.     Code Status   Full Code    Primary Care  Physician   Sujit Duke    Time Spent on this Encounter   Spent greater than 30 minutes discharging this patient.    Discharge Disposition   Discharged to home  Condition at discharge: Stable    Consultations This Hospital Stay   ANESTHESIOLOGY IP CONSULT  HOSPITALIST IP CONSULT    Discharge Orders     Reason for your hospital stay   Asymptomatic Right Carotid Stenosis     Follow Up and recommended labs and tests   Follow up with Dr. Scott in 2 weeks for post-operative check.     Activity   Your activity upon discharge: no heavy lifting >10lbs, pushing or pulling with the implant side for 6 weeks.     Wound care and dressings   Instructions to care for your wound at home: Remove dressing over the right neck 09/20. May shower. Sponge over incision with soap and water. Do not scrub. Pat dry. Do not submerge underwater for 4 weeks.     Discharge Instructions     When to contact your care team   Intractable headache with nausea vomiting, changes in vision, speech. Any motor or sensory changes. Redness, pain and drainage from the wound.     Discharge Instructions   Prior to discharge, discharging Rn to please:    1-Call Dr. Naresh Alvarez's office to schedule the patient for a voiding trial with any MD in Dr. Alvarez's office in one week.  2-Call [facility] at [phone] to schedule f/u with operating Vascular Surgeon.     Full Code     Diet   Follow this diet upon discharge: Orders Placed This Encounter     Combination Diet Regular Diet Adult; 0791-0004 Calories: Moderate Consistent CHO (4-6 CHO units/meal); Low Saturated Fat Diet       Discharge Medications   Current Discharge Medication List      START taking these medications    Details   tamsulosin (FLOMAX) 0.4 MG capsule Take 2 capsules (0.8 mg) by mouth daily  Qty: 14 capsule, Refills: 0    Associated Diagnoses: Urinary retention      metFORMIN (GLUCOPHAGE) 500 MG tablet Take 1 tablet (500 mg) by mouth 2 times daily (with meals)  Qty: 180 tablet,  Refills: 3    Associated Diagnoses: Type 2 diabetes mellitus without complication, without long-term current use of insulin (H)      senna-docusate (SENOKOT-S;PERICOLACE) 8.6-50 MG per tablet Take 1-2 tablets by mouth 2 times daily  Qty: 100 tablet, Refills: 0    Associated Diagnoses: Drug-induced constipation      atorvastatin (LIPITOR) 40 MG tablet Take 1 tablet (40 mg) by mouth daily  Qty: 90 tablet, Refills: 0    Associated Diagnoses: Right-sided carotid artery disease (H)      oxyCODONE (ROXICODONE) 5 MG IR tablet Take 1 tablet (5 mg) by mouth every 4 hours as needed for moderate to severe pain or pain maximum 30 tablet(s) per day  Qty: 30 tablet, Refills: 0    Associated Diagnoses: Status post carotid endarterectomy         CONTINUE these medications which have NOT CHANGED    Details   ASPIRIN EC PO Take 81 mg by mouth every morning      Atenolol (TENORMIN PO) Take 100 mg by mouth every morning      Fluticasone Propionate (FLONASE NA) Spray 1-2 sprays in nostril daily as needed      multivitamin, therapeutic with minerals (MULTI-VITAMIN) TABS tablet Take 1 tablet by mouth every morning      Lisinopril (PRINIVIL PO) Take 20 mg by mouth 2 times daily      order for DME Check blood pressure daily  Qty: 1 Device, Refills: 0    Associated Diagnoses: Benign essential hypertension      blood glucose monitoring (ACCU-CHEK PRISCILLA) test strip Use to test blood sugars 1 times daily or as directed.  Qty: 100 each, Refills: 1    Associated Diagnoses: Type 2 diabetes mellitus without complication, without long-term current use of insulin (H)      blood glucose monitoring (ACCU-CHEK MULTICLIX) lancets Use to test blood sugar 1 times daily or as directed.  Qty: 1 Box, Refills: 1    Associated Diagnoses: Type 2 diabetes mellitus without complication, without long-term current use of insulin (H)      blood glucose calibration (ACCU-CHEK PRISCILLA) solution Use to calibrate blood glucose monitor as needed as directed.  Qty: 1 Bottle,  Refills: 0    Associated Diagnoses: Type 2 diabetes mellitus without complication (H)         STOP taking these medications       Simvastatin (ZOCOR PO) Comments:   Reason for Stopping:             Allergies   No Known Allergies     Data   Most Recent 3 CBC's:  Recent Labs   Lab Test  09/19/17 0820 09/18/17   0905 08/08/17   0937  09/03/14   1037   WBC  13.3*   --   7.8  6.5   HGB  12.7*  13.4  13.5  13.6   MCV  90   --   92  93   PLT  175  164  170  168      Most Recent 3 BMP's:  Recent Labs   Lab Test  09/19/17   0820 09/18/17   0905  09/05/17   1129   NA  141  141  139   POTASSIUM  4.5  4.6  5.3   CHLORIDE  109  109  107   CO2  25  24  25   BUN  28  29  25   CR  1.56*  1.64*  1.61*   ANIONGAP  7  8  7   VICKY  8.8  9.2  9.5   GLC  149*  152*  129*     Most Recent 2 LFT's:  Recent Labs   Lab Test  08/08/17   0937  02/08/17   0933   AST  16  20   ALT  24  32   ALKPHOS  63  75   BILITOTAL  0.7  0.7     Most Recent INR's and Anticoagulation Dosing History:  Anticoagulation Dose History     Recent Dosing and Labs Latest Ref Rng & Units 8/23/2012    INR 0.86 - 1.14 0.96        Most Recent Cholesterol Panel:  Recent Labs   Lab Test  09/18/17   0905   CHOL  133   LDL  61   HDL  37*   TRIG  174*     Most Recent TSH, T4 and A1c Labs:  Recent Labs   Lab Test  09/18/17   0905   08/08/17   0937   TSH   --    --   2.51   A1C  6.3*   < >   --     < > = values in this interval not displayed.     Results for orders placed or performed during the hospital encounter of 08/30/17   CTA Angiogram Head Neck    Narrative    CT ANGIOGRAM OF THE HEAD AND NECK WITHOUT AND WITH CONTRAST  8/30/2017  9:18 AM     HISTORY: Evaluate stenosis of carotid arteries.    TECHNIQUE: Precontrast localizing scans were followed by CT  angiography with an injection of 70 mL nonionic contrast material IV  with scans through the head and neck.  Images were transferred to a  separate 3-D workstation where multiplanar reformations and 3-D images  were  created.  Estimates of carotid stenoses are made relative to the  distal internal carotid artery diameters except as noted.   Radiation  dose for this scan was reduced using automated exposure control,  adjustment of the mA and/or kV according to patient size, or iterative  reconstruction technique.    COMPARISON: Carotid ultrasound 8/17/2017.    FINDINGS:      [place]: Normal. Large-caliber vessels are patent. No  evidence for aneurysm.    Neck arteries:    Right carotid artery: 1 cm distal to the origin of the right internal  carotid artery there is a high-grade short segment stenosis of  approximately 70%. There is also high-grade stenosis of the external  carotid artery at its origin.    Left carotid artery: Calcified atheromatous plaque at the left carotid  bifurcation. There is a stenosis that extends over a 1 cm length and  reaches a maximum of approximately 50-55%.    Antegrade flow in normal caliber vertebral arteries.    The origin of the great vessels off the aortic arch appear patent.      Impression    IMPRESSION:  1. Atheromatous disease of both carotid bifurcations with  approximately 70% stenosis of the proximal right internal carotid  artery and 50-55% stenosis left internal carotid artery origin.  2. Normal [place].    PARVEEN SALAS MD

## 2017-09-20 NOTE — PROGRESS NOTES
[facility]  Vascular Medicine Progress Note          Assessment and Plan:   Active Problems:        Carotid artery disease (H)    Assessment: doing well POD2 Rt CEA, CN intact, urinary retention iprioving    Plan: discharge home today if able to urinate (or with Garcia and voiding trial in  office in one week) , Flomax to be added      Hyperlipidemia, LDL goal < 70    Assessment: at goal on simva    Plan: switched to Lipitor due to CV disease, get lipid panel in three months through PCP      Type 2 Diabetes    Assessment: a1c < 6.5% on no oral meds, ACs a little high last night    Plan: SS cvg while here               Interval History:   doing well; no cp, sob, n/v/d, or abd pain.              Review of Systems:   The 10 point Review of Systems is negative other than noted in the HPI             Medications:       lidocaine         tamsulosin  0.8 mg Oral Daily     chlorhexidine   Topical Once    Or     antimicrobial soap   Topical Once     aspirin EC EC tablet 81 mg  81 mg Oral QAM     atenolol  100 mg Oral QAM     lisinopril (PRINIVIL/ZESTRIL) tablet 20 mg  20 mg Oral BID     multivitamin, therapeutic with minerals  1 tablet Oral QAM     atorvastatin  10 mg Oral Daily     insulin aspart  1-7 Units Subcutaneous TID AC     insulin aspart  1-5 Units Subcutaneous At Bedtime     sodium chloride (PF)  3 mL Intracatheter Q8H     heparin  5,000 Units Subcutaneous Q8H     acetaminophen  975 mg Oral Q8H     senna-docusate  1-2 tablet Oral BID                  Physical Exam:     Patient Vitals for the past 24 hrs:   BP Temp Temp src Pulse Heart Rate Resp SpO2 Weight   09/20/17 0752 132/69 97.8  F (36.6  C) Oral 61 - 16 94 % -   09/20/17 0500 - - - - - - - 95.6 kg (210 lb 11.2 oz)   09/20/17 0400 142/68 98.4  F (36.9  C) Oral - 64 16 97 % -   09/20/17 0215 - - - - - 16 - -   09/20/17 0126 - - - - - 16 - -   09/19/17 2352 137/67 98.2  F (36.8  C) Oral - 66 16 94 % -   09/19/17 2005 141/68 98.5  F (36.9  C)  Oral 66 - 16 94 % -   09/19/17 1553 135/63 98  F (36.7  C) Oral 60 - 16 95 % -     Wt Readings from Last 4 Encounters:   09/20/17 95.6 kg (210 lb 11.2 oz)   09/08/17 96.9 kg (213 lb 11.2 oz)   08/31/17 97.1 kg (214 lb)   08/28/17 97.1 kg (214 lb)   I/O last 3 completed shifts:  In: 125 [P.O.:125]  Out: 2800 [Urine:2800]      Constitutional: normal  Eyes: normal  ENT: normal  Neck: no hematoma, incision C/D/I  Hematologic / Lymphatic: normal  Back: normal  Lungs: No increased work of breathing, good air exchange, clear to auscultation bilaterally, no crackles or wheezing.  Cardiovascular: Regular rate and rhythm, normal S1 and S2, no S3 or S4, and no murmur noted.  Chest / Breast: normal  Abdomen: normal  Musculoskeletal: normal  Neurologic: normal  Neuropsychiatric: normal  Skin: normal           Data:   All laboratory and imaging data in the past 24 hours reviewed  Results for orders placed or performed during the hospital encounter of 09/18/17 (from the past 24 hour(s))   Glucose by meter   Result Value Ref Range    Glucose 136 (H) 70 - 99 mg/dL   Glucose by meter   Result Value Ref Range    Glucose 171 (H) 70 - 99 mg/dL   Glucose by meter   Result Value Ref Range    Glucose 134 (H) 70 - 99 mg/dL   Glucose by meter   Result Value Ref Range    Glucose 129 (H) 70 - 99 mg/dL

## 2017-09-20 NOTE — PROCEDURES
CAROTID ENDARTERECTOMY ELECTROENCEPHALOGRAPHIC MONITORING       EEG #       Dr. Catalino Scott performed surgery on Merlin Anderson on  for right carotid stenosis.      The baseline wake recording and anesthetic patterns were symmetric.  There was no change with occlusion or following restoration of flow.       IMPRESSION:  The study indicates that collateral flow was adequate during the acute period of occlusion.         MIC WESLEY MD, PHD, [facility]             D: 2017 09:25   T: 2017 10:17   MT: CD      Name:     ANDERSON, MERLIN   MRN:      [id number]        Account:        [id number]   :      1940           Procedure Date: 2017      Document: [id number]

## 2017-09-20 NOTE — PLAN OF CARE
Problem: Goal Outcome Summary  Goal: Goal Outcome Summary  Outcome: Improving  Patient A&Ox4. VSS on RA. CMS and neuros intact. Incision open to air. Tolerating mod carb diet. Up independently. Voiding in urinal. Scheduled tylenol for pain. Possible discharge today pending retention improvement.

## 2017-09-21 ENCOUNTER — TELEPHONE (OUTPATIENT)
Dept: INTERNAL MEDICINE | Facility: CLINIC | Age: 77
End: 2017-09-21

## 2017-09-21 DIAGNOSIS — I77.9 CAROTID ARTERIAL DISEASE (H): Primary | ICD-10-CM

## 2017-09-21 NOTE — TELEPHONE ENCOUNTER
IP F/U    Date: 09/20/17  Diagnosis: Carotid Arterial Disease with stenosis  Is patient active in care coordination? No  Was patient in TCU? No

## 2017-09-25 ENCOUNTER — ALLIED HEALTH/NURSE VISIT (OUTPATIENT)
Dept: PHARMACY | Facility: CLINIC | Age: 77
End: 2017-09-25
Payer: COMMERCIAL

## 2017-09-25 DIAGNOSIS — E11.9 TYPE 2 DIABETES MELLITUS WITHOUT COMPLICATION, WITHOUT LONG-TERM CURRENT USE OF INSULIN (H): ICD-10-CM

## 2017-09-25 DIAGNOSIS — I77.9 RIGHT-SIDED CAROTID ARTERY DISEASE (H): Primary | ICD-10-CM

## 2017-09-25 DIAGNOSIS — I10 ESSENTIAL HYPERTENSION: ICD-10-CM

## 2017-09-25 PROCEDURE — 99605 MTMS BY PHARM NP 15 MIN: CPT | Performed by: PHARMACIST

## 2017-09-25 NOTE — PROGRESS NOTES
SUBJECTIVE/OBJECTIVE:                Merlin J Anderson is a 77 year old male called for a transitions of care visit.  He was discharged from [facility] on 9/20/17 for Carotid Endarterectomy. Spoke to Fatou, his wife today (consent to communication on file)    Chief Complaint: No concerns - she questions why only one side was blocked.    Allergies/ADRs: None  Tobacco: No tobacco use     Alcohol: Less than 1 beverage / month  PMH: Reviewed in Epic    Medication Adherence: no issues reported    Carotid Arterial Disease: Followed by Dr. Garcia.  Wife reports pain managed with APAP; has not needed the oxycodone.  Has urinary retention s/p surgery and discharge with cath and tamsulosin.  Scheduled to have cath removed this Wed.  Statin therapy was changed to high-intensity statin at discharge, no concerns with change in medication and no side effects.    Diabetes:  Pt currently taking metformin 500 mg BID. Pt is not experiencing side effects.  SMBG: one time daily.   Ranges (patient reported): 'good'  Patient is not experiencing hypoglycemia  Recent symptoms of high blood sugar? none  Eye exam: due  Foot exam: due  Microalbumin is not < 30 mg/g. Pt is taking an ACEi/ARB.  Aspirin: Taking 81mg daily and denies side effects    Hypertension: Current medications include atenolol 100 mg daily and lisinopril 20 mg BID.  Patient does not self-monitor BP.  Patient reports no current medication side effects.      Current labs include:BP Readings from Last 3 Encounters:   09/20/17 129/63   09/08/17 120/80   08/31/17 138/64     Today's Vitals: There were no vitals taken for this visit.  Lab Results   Component Value Date    A1C 6.3 09/18/2017   .  Lab Results   Component Value Date    CHOL 133 09/18/2017     Lab Results   Component Value Date    TRIG 174 09/18/2017     Lab Results   Component Value Date    HDL 37 09/18/2017     Lab Results   Component Value Date    LDL 61 09/18/2017       Liver Function Studies -   Recent Labs   Lab  Test  08/08/17   0937   PROTTOTAL  7.2   ALBUMIN  4.1   BILITOTAL  0.7   ALKPHOS  63   AST  16   ALT  24       Lab Results   Component Value Date    UCRR 108 12/16/2015    MICROL 117 12/16/2015    UMALCR 108.33 (H) 12/16/2015       Last Basic Metabolic Panel:  Lab Results   Component Value Date     09/19/2017      Lab Results   Component Value Date    POTASSIUM 4.5 09/19/2017     Lab Results   Component Value Date    CHLORIDE 109 09/19/2017     Lab Results   Component Value Date    BUN 28 09/19/2017     Lab Results   Component Value Date    CR 1.56 09/19/2017     GFR Estimate   Date Value Ref Range Status   09/19/2017 43 (L) >60 mL/min/1.7m2 Final     Comment:     Non  GFR Calc   09/18/2017 41 (L) >60 mL/min/1.7m2 Final     Comment:     Non  GFR Calc   09/05/2017 42 (L) >60 mL/min/1.7m2 Final     Comment:     Non  GFR Calc     GFR Estimate If Black   Date Value Ref Range Status   09/19/2017 52 (L) >60 mL/min/1.7m2 Final     Comment:      GFR Calc   09/18/2017 50 (L) >60 mL/min/1.7m2 Final     Comment:      GFR Calc   09/05/2017 51 (L) >60 mL/min/1.7m2 Final     Comment:      GFR Calc     TSH   Date Value Ref Range Status   08/08/2017 2.51 0.40 - 4.00 mU/L Final   ]    Most Recent Immunizations   Administered Date(s) Administered     Influenza (H1N1) 01/10/2010     Influenza (High Dose) 3 valent vaccine 09/08/2017     Influenza (IIV3) 09/30/2011     Pneumococcal (PCV 13) 10/02/2015     Pneumococcal 23 valent 12/31/2010     TD (ADULT, 7+) 04/15/2008     TDAP Vaccine (Adacel) 12/16/2015     Zoster vaccine, live 08/24/2011       ASSESSMENT:                 Current medications were reviewed today.        Medication Adherence: no issues identified  Carotid Arterial Disease: stable.  Schedule for follow-up with surgeon and will bring up her question about why only one side was blocked.  She will schedule with Dr. Duke  after Wed apt.    Diabetes:  Home readings stable per patient.  A1c at goal.    Hypertension: stable    PLAN:                Post Discharge Medication Reconciliation Status: discharge medications reconciled, continue medications without change.    Continue current regimen.  Schedule with PCP as planned.    I spent 15 minutes with this patient today  . All changes were made via collaborative practice agreement with Sujit Duke A copy of the visit note was provided to the patient's primary care provider.    Will follow up as needed.    The patient was sent via [employer] a summary of these recommendations as an after visit summary.    Tricia Chambers , Pharm D  [phone] (phone)  [phone] (pager)  Medication Therapy Management Pharmacist

## 2017-09-25 NOTE — TELEPHONE ENCOUNTER
"ED/Discharge Protocol    \"Hi, my name is Shahana Ramirez, a registered nurse, and I am calling on behalf of Dr. Duke's office at [facility].  I am calling to follow up and see how things are going for you after your recent visit.\"    \"I see that you were in the (ER/UC/IP) on 9/18/17.    How are you doing now that you are home?\" pt reports doing well. Denies pain, has been using Tylenol occasionally for pain control.    Is patient experiencing symptoms that may require a hospital visit?  None.    Discharge Instructions    \"Let's review your discharge instructions.  What is/are the follow-up recommendations?  Pt. Response: F/U with urology for catheter removal. F/U with surgeon in 2 weeks.    \"Were you instructed to make a follow-up appointment?\"  Pt. Response: Yes.  Has appointment been made?   Yes      \"When you see the provider, I would recommend that you bring your discharge instructions with you.    Medications    \"How many new medications are you on since your hospitalization/ED visit?\"    2 or more - Epic MTM referral needed  \"How many of your current medicines changed (dose, timing, name, etc.) while you were in the hospital/ED visit?\"   0-1  \"Do you have questions about your medications?\"   No  \"Were you newly diagnosed with heart failure, COPD, diabetes or did you have a heart attack?\"   No  For patients on insulin: \"Did you start on insulin in the hospital or did you have your insulin dose changed?\"   No    Medication reconciliation completed? Yes    Was MTM referral placed (*Make sure to put transitions as reason for referral)?   Yes - \"The Medication Therapy  will call you within the next few days to schedule an appointment with an MTM pharmacist to discuss your medicines and make sure they are working as well as they possibly can for you. This visit can be done in a [facility] clinic or on the phone and is at no charge to you.\"    Call Summary    \"Do you have any questions or concerns " "about your condition or care plan at the moment?\"    Yes pt states his right foot is slightly swollen, called Dr. Scott's office, pt was advised to ambulate. Pt reports swelling decreased with ambulation, swelling was not significant, denies any bruising, SOB, or chest pain. Advised pt to elevate LE when sitting, increase ambulation and if symptoms do not resolve or worsen, call to clinic to schedule an office visit. Patient verbalizes understanding, agrees to plan of care, and has no further questions.  Triage nurse advice given: F/U as instructed per discharge summary.    Patient was in ER once in the past year (assess appropriateness of ER visits.)      \"If you have questions or things don't continue to improve, we encourage you contact us through the main clinic number,  [phone].  Even if the clinic is not open, triage nurses are available 24/7 to help you.     We would like you to know that our clinic has extended hours (provide information).  We also have urgent care (provide details on closest location and hours/contact info)\"      \"Thank you for your time and take care!\"        "

## 2017-09-25 NOTE — MR AVS SNAPSHOT
After Visit Summary   9/25/2017    Merlin J Anderson    MRN: [id number]           Patient Information     Date Of Birth          1940        Visit Information        Provider Department      9/25/2017 3:00 PM Tricia Chambers, [facility]        Today's Diagnoses     Right-sided carotid artery disease (H)    -  1    Type 2 diabetes mellitus without complication, without long-term current use of insulin (H)        Essential hypertension           Follow-ups after your visit        Your next 10 appointments already scheduled     Oct 05, 2017  1:15 PM CDT   Post Op with Catalino Scott MD   [facility]    [address] [facility] [address]  [facility] [address] [phone]              Who to contact     If you have questions or need follow up information about today's clinic visit or your schedule please contact [facility] directly at [phone].  Normal or non-critical lab and imaging results will be communicated to you by [employer], letter or phone within 4 business days after the clinic has received the results. If you do not hear from us within 7 days, please contact the clinic through [employer] or phone. If you have a critical or abnormal lab result, we will notify you by phone as soon as possible.  Submit refill requests through BuzzDoes or call your pharmacy and they will forward the refill request to us. Please allow 3 business days for your refill to be completed.          Additional Information About Your Visit        MyChart Information     [employer]Does gives you secure access to your electronic health record. If you see a primary care provider, you can also send messages to your care team and make appointments. If you have questions, please call your primary care clinic.  If you do not have a primary care provider, please call [phone] and they will assist you.        Care EveryWhere ID     This is your Care  EveryWhere ID. This could be used by other organizations to access your [place] medical records  [id number]         Blood Pressure from Last 3 Encounters:   09/20/17 129/63   09/08/17 120/80   08/31/17 138/64    Weight from Last 3 Encounters:   09/20/17 210 lb 11.2 oz (95.6 kg)   09/08/17 213 lb 11.2 oz (96.9 kg)   08/31/17 214 lb (97.1 kg)              Today, you had the following     No orders found for display       Primary Care Provider Office Phone # Fax #    Sujit Duke [phone]       [address] [facility] [address]        Equal Access to Services     RAUDEL ESTRELLA : Hadii jovita henning hadasho Soomaali, waaxda luqadaha, qaybta kaalmada adeegyada, kaye shah . So [facility] [phone].    ATENCIÓN: Si habla español, tiene a pa disposición servicios gratuitos de asistencia lingüística. Llame al [phone].    We comply with applicable federal civil rights laws and [place] laws. We do not discriminate on the basis of race, color, national origin, age, disability sex, sexual orientation or gender identity.            Thank you!     Thank you for choosing [facility]  for your care. Our goal is always to provide you with excellent care. Hearing back from our patients is one way we can continue to improve our services. Please take a few minutes to complete the written survey that you may receive in the mail after your visit with us. Thank you!             Your Updated Medication List - Protect others around you: Learn how to safely use, store and throw away your medicines at www.disposemymeds.org.          This list is accurate as of: 9/25/17  3:25 PM.  Always use your most recent med list.                   Brand Name Dispense Instructions for use Diagnosis    ASPIRIN EC PO      Take 81 mg by mouth every morning        atorvastatin 40 MG tablet    LIPITOR    90 tablet    Take 1 tablet (40 mg) by mouth daily    Right-sided carotid artery disease  (H)       blood glucose calibration solution     1 Bottle    Use to calibrate blood glucose monitor as needed as directed.    Type 2 diabetes mellitus without complication (H)       blood glucose monitoring lancets     1 Box    Use to test blood sugar 1 times daily or as directed.    Type 2 diabetes mellitus without complication, without long-term current use of insulin (H)       blood glucose monitoring test strip    ACCU-CHEK PRISCILLA    100 each    Use to test blood sugars 1 times daily or as directed.    Type 2 diabetes mellitus without complication, without long-term current use of insulin (H)       FLONASE NA      Spray 1-2 sprays in nostril daily as needed        metFORMIN 500 MG tablet    GLUCOPHAGE    180 tablet    Take 1 tablet (500 mg) by mouth 2 times daily (with meals)    Type 2 diabetes mellitus without complication, without long-term current use of insulin (H)       Multi-vitamin Tabs tablet      Take 1 tablet by mouth every morning        order for DME     1 Device    Check blood pressure daily    Benign essential hypertension       oxyCODONE 5 MG IR tablet    ROXICODONE    30 tablet    Take 1 tablet (5 mg) by mouth every 4 hours as needed for moderate to severe pain or pain maximum 30 tablet(s) per day    Status post carotid endarterectomy       PRINIVIL PO      Take 20 mg by mouth 2 times daily        senna-docusate 8.6-50 MG per tablet    SENOKOT-S;PERICOLACE    100 tablet    Take 1-2 tablets by mouth 2 times daily    Drug-induced constipation       tamsulosin 0.4 MG capsule    FLOMAX    14 capsule    Take 2 capsules (0.8 mg) by mouth daily    Urinary retention       TENORMIN PO      Take 100 mg by mouth every morning

## 2017-09-27 ENCOUNTER — TRANSFERRED RECORDS (OUTPATIENT)
Dept: HEALTH INFORMATION MANAGEMENT | Facility: CLINIC | Age: 77
End: 2017-09-27

## 2017-09-29 NOTE — ANESTHESIA POSTPROCEDURE EVALUATION
Patient: Merlin J Anderson    Procedure(s):  RIGHT CAROTID ENDARTERECTOMY, WITH PATCH ANGIOPLASTY, WITH ELECTOENCEPHALOGAM            (EEG) - Wound Class: I-Clean    Diagnosis:RIGHT COROTID STENOSIS  Diagnosis Additional Information: No value filed.    Anesthesia Type:  General, ETT    Note:  Anesthesia Post Evaluation    Patient location during evaluation: PACU  Patient participation: Able to fully participate in evaluation  Level of consciousness: awake  Pain management: adequate  Airway patency: patent  Cardiovascular status: acceptable  Respiratory status: acceptable  Hydration status: acceptable  PONV: none     Anesthetic complications: None          Last vitals:  Vitals:    09/20/17 0400 09/20/17 0752 09/20/17 1111   BP: 142/68 132/69 129/63   Pulse:  61 58   Resp: 16 16 16   Temp: 36.9  C (98.4  F) 36.6  C (97.8  F) 36.9  C (98.4  F)   SpO2: 97% 94% 95%         Electronically Signed By: Dayanara Rodriguez MD, MD  September 29, 2017  2:33 PM

## 2017-09-29 NOTE — ADDENDUM NOTE
Addendum  created 09/29/17 1434 by Jennifer, Dayanara Dent MD    Anesthesia Attestations filed, Sign clinical note

## 2017-10-05 ENCOUNTER — OFFICE VISIT (OUTPATIENT)
Dept: SURGERY | Facility: CLINIC | Age: 77
End: 2017-10-05
Payer: COMMERCIAL

## 2017-10-05 VITALS
HEIGHT: 72 IN | OXYGEN SATURATION: 98 % | DIASTOLIC BLOOD PRESSURE: 74 MMHG | WEIGHT: 210 LBS | BODY MASS INDEX: 28.44 KG/M2 | HEART RATE: 60 BPM | SYSTOLIC BLOOD PRESSURE: 122 MMHG

## 2017-10-05 DIAGNOSIS — Z09 SURGICAL FOLLOWUP VISIT: Primary | ICD-10-CM

## 2017-10-05 PROCEDURE — 99024 POSTOP FOLLOW-UP VISIT: CPT | Performed by: SURGERY

## 2017-10-05 NOTE — PROGRESS NOTES
Merlin J Anderson presents today 2 weeks status post right carotid endarterectomy performed for a high-grade, asymptomatic right carotid stenosis.  The only issue from that surgery was postoperative urinary retention.  He was discharged to home with a Garcia catheter and it was subsequently removed in his urologist office last week.  Apart from that, he was without complaints.    Exam:  The right neck incision is healing nicely.  His neurologic exam is intact.    IMPRESSION:  1.  2 weeks status post right carotid endarterectomy clinically doing well.    PLAN:  He will continue his medical regimen including daily aspirin.  Follow-up will be with me in 3 months for a postoperative right carotid ultrasound.    Gabriel Scott M.D.

## 2017-10-05 NOTE — LETTER
[facility] [place] [address]  [place] [address]  Phone: [phone]  Fax: [phone]    2017    Re: Merlin CHAU Timmy : 1940    Merlin J Anderson presents today 2 weeks status post right carotid endarterectomy performed for a high-grade, asymptomatic right carotid stenosis.  The only issue from that surgery was postoperative urinary retention.  He was discharged to home with a Garcia catheter and it was subsequently removed in his urologist office last week.  Apart from that, he was without complaints.     Exam:  The right neck incision is healing nicely.  His neurologic exam is intact.     IMPRESSION:  1.  2 weeks status post right carotid endarterectomy clinically doing well.     PLAN:  He will continue his medical regimen including daily aspirin.  Follow-up will be with me in 3 months for a postoperative right carotid ultrasound.     Gabriel Scott M.D.

## 2017-10-05 NOTE — MR AVS SNAPSHOT
After Visit Summary   10/5/2017    Merlin J Anderson    MRN: [id number]           Patient Information     Date Of Birth          1940        Visit Information        Provider Department      10/5/2017 1:15 PM Catalino Scott MD [facility]      Today's Diagnoses     Surgical followup visit    -  1       Follow-ups after your visit        Follow-up notes from your care team     Return in about 3 months (around 1/5/2018) for Right carotid ultrasound.      Who to contact     If you have questions or need follow up information about today's clinic visit or your schedule please contact [facility] directly at [phone].  Normal or non-critical lab and imaging results will be communicated to you by MyChart, letter or phone within 4 business days after the clinic has received the results. If you do not hear from us within 7 days, please contact the clinic through [employer] or phone. If you have a critical or abnormal lab result, we will notify you by phone as soon as possible.  Submit refill requests through Sano or call your pharmacy and they will forward the refill request to us. Please allow 3 business days for your refill to be completed.          Additional Information About Your Visit        MyChart Information     [employer] gives you secure access to your electronic health record. If you see a primary care provider, you can also send messages to your care team and make appointments. If you have questions, please call your primary care clinic.  If you do not have a primary care provider, please call [phone] and they will assist you.        Care EveryWhere ID     This is your Care EveryWhere ID. This could be used by other organizations to access your [place] medical records  [id number]        Your Vitals Were     Pulse Height Pulse Oximetry BMI (Body Mass Index)          60 6' (1.829 m) 98% 28.48 kg/m2          Blood Pressure from Last 3 Encounters:   10/05/17 122/74   09/20/17 129/63   09/08/17 120/80    Weight from Last 3 Encounters:   10/05/17 210 lb (95.3 kg)   09/20/17 210 lb 11.2 oz (95.6 kg)   09/08/17 213 lb 11.2 oz (96.9 kg)              Today, you had the following     No orders found for display       Primary Care Provider Office Phone # Fax #    Sujit Duke [phone]       [address] [facility] [address]        Equal Access to Services     [facility]: Hadii aad ku hadasho Soomaali, waaxda luqadaha, qaybta kaalmada adeegyada, kaye shah . So [facility] [phone].    ATENCIÓN: Si habla español, tiene a pa disposición servicios gratuitos de asistencia lingüística. Llame al [phone].    We comply with applicable federal civil rights laws and [place] laws. We do not discriminate on the basis of race, color, national origin, age, disability, sex, sexual orientation, or gender identity.            Thank you!     Thank you for choosing [facility]  for your care. Our goal is always to provide you with excellent care. Hearing back from our patients is one way we can continue to improve our services. Please take a few minutes to complete the written survey that you may receive in the mail after your visit with us. Thank you!             Your Updated Medication List - Protect others around you: Learn how to safely use, store and throw away your medicines at www.disposemymeds.org.          This list is accurate as of: 10/5/17  1:36 PM.  Always use your most recent med list.                   Brand Name Dispense Instructions for use Diagnosis    ASPIRIN EC PO      Take 81 mg by mouth every morning        atorvastatin 40 MG tablet    LIPITOR    90 tablet    Take 1 tablet (40 mg) by mouth daily    Right-sided carotid artery disease (H)       blood glucose calibration solution     1 Bottle    Use to calibrate blood glucose monitor as  needed as directed.    Type 2 diabetes mellitus without complication (H)       blood glucose monitoring lancets     1 Box    Use to test blood sugar 1 times daily or as directed.    Type 2 diabetes mellitus without complication, without long-term current use of insulin (H)       blood glucose monitoring test strip    ACCU-CHEK PRISCILLA    100 each    Use to test blood sugars 1 times daily or as directed.    Type 2 diabetes mellitus without complication, without long-term current use of insulin (H)       FLONASE NA      Spray 1-2 sprays in nostril daily as needed        metFORMIN 500 MG tablet    GLUCOPHAGE    180 tablet    Take 1 tablet (500 mg) by mouth 2 times daily (with meals)    Type 2 diabetes mellitus without complication, without long-term current use of insulin (H)       Multi-vitamin Tabs tablet      Take 1 tablet by mouth every morning        order for DME     1 Device    Check blood pressure daily    Benign essential hypertension       oxyCODONE 5 MG IR tablet    ROXICODONE    30 tablet    Take 1 tablet (5 mg) by mouth every 4 hours as needed for moderate to severe pain or pain maximum 30 tablet(s) per day    Status post carotid endarterectomy       PRINIVIL PO      Take 20 mg by mouth 2 times daily        senna-docusate 8.6-50 MG per tablet    SENOKOT-S;PERICOLACE    100 tablet    Take 1-2 tablets by mouth 2 times daily    Drug-induced constipation       tamsulosin 0.4 MG capsule    FLOMAX    14 capsule    Take 2 capsules (0.8 mg) by mouth daily    Urinary retention       TENORMIN PO      Take 100 mg by mouth every morning

## 2017-10-06 DIAGNOSIS — I65.29 CAROTID STENOSIS: Primary | ICD-10-CM

## 2017-12-11 DIAGNOSIS — I77.9 RIGHT-SIDED CAROTID ARTERY DISEASE (H): ICD-10-CM

## 2017-12-12 RX ORDER — ATORVASTATIN CALCIUM 40 MG/1
40 TABLET, FILM COATED ORAL DAILY
Qty: 90 TABLET | Refills: 0 | Status: SHIPPED | OUTPATIENT
Start: 2017-12-12 | End: 2018-03-12

## 2018-01-18 ENCOUNTER — HOSPITAL ENCOUNTER (OUTPATIENT)
Dept: ULTRASOUND IMAGING | Facility: CLINIC | Age: 78
Discharge: HOME | End: 2018-01-18
Attending: SURGERY | Admitting: SURGERY
Payer: COMMERCIAL

## 2018-01-18 ENCOUNTER — OFFICE VISIT (OUTPATIENT)
Dept: SURGERY | Facility: CLINIC | Age: 78
End: 2018-01-18
Attending: SURGERY
Payer: COMMERCIAL

## 2018-01-18 VITALS
RESPIRATION RATE: 16 BRPM | SYSTOLIC BLOOD PRESSURE: 158 MMHG | HEIGHT: 72 IN | WEIGHT: 210 LBS | OXYGEN SATURATION: 97 % | HEART RATE: 60 BPM | DIASTOLIC BLOOD PRESSURE: 70 MMHG | BODY MASS INDEX: 28.44 KG/M2

## 2018-01-18 DIAGNOSIS — Z98.890 HISTORY OF RIGHT-SIDED CAROTID ENDARTERECTOMY: Primary | ICD-10-CM

## 2018-01-18 DIAGNOSIS — I65.29 CAROTID STENOSIS: ICD-10-CM

## 2018-01-18 PROCEDURE — 99213 OFFICE O/P EST LOW 20 MIN: CPT | Performed by: SURGERY

## 2018-01-18 PROCEDURE — 93882 EXTRACRANIAL UNI/LTD STUDY: CPT | Mod: RT

## 2018-01-18 NOTE — LETTER
[facility] [address] [place]  [place] [address]  Phone: [phone]  Fax: [phone]    2018    Re: Merlin J Anderson, : 1940    Merlin J Anderson returns today for months status post right carotid endarterectomy performed for an asymptomatic 70% right carotid stenosis.  Apart from mild yo-incisional paresthesias he is entirely without complaints.     Exam:  Well-healed right-sided neck incision.  Neurologic exam nonfocal.     Imaging:  RIGHT CAROTID ULTRASOUND   2018 12:52 PM       HISTORY:  Carotid stenosis. Follow-up carotid endarterectomy.      COMPARISON: 2017.      RIGHT CAROTID FINDINGS:  Minimal plaque at the common carotid, carotid  bulb and internal carotid artery.  Right ICA PSV:  143  cm/sec.  Right ICA EDV:  43 cm/sec.  Right ICA/CCA PSV Ratio:  1.8    These indicate  50 - 69%  diameter stenosis of the right ICA.    Right Vertebral: Antegrade flow.   Right ECA: Antegrade flow.       Causes of Decreased Accuracy:   None.           IMPRESSION:   50-69% diameter stenosis of the right ICA relative to  the distal ICA diameter. Postoperative changes of carotid  endarterectomy.      TRACY DELGADO DO      ASSESSMENT:  4 months status post right carotid endarterectomy clinically doing well.  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     PLAN:  I reviewed all the above with Mr. Warner.  He will continue his medical regimen including daily aspirin.  Vascular surgical follow-up will be with me in 1 year for repeat bilateral carotid ultrasonography.     Gabriel Scott M.D.

## 2018-01-18 NOTE — NURSING NOTE
Chief Complaint   Patient presents with     RECHECK     R carotid-  HX of right CEA  3 month F/U       Initial /70 (BP Location: Left arm, Cuff Size: Adult Large)  Pulse 60  Resp 16  Ht 6' (1.829 m)  Wt 210 lb (95.3 kg)  SpO2 97%  BMI 28.48 kg/m2 Estimated body mass index is 28.48 kg/(m^2) as calculated from the following:    Height as of this encounter: 6' (1.829 m).    Weight as of this encounter: 210 lb (95.3 kg).  Medication Reconciliation: complete     Chelsi Reyes  CMA

## 2018-01-18 NOTE — PROGRESS NOTES
Merlin J Anderson returns today for months status post right carotid endarterectomy performed for an asymptomatic 70% right carotid stenosis.  Apart from mild yo-incisional paresthesias he is entirely without complaints.    Exam:  Well-healed right-sided neck incision.  Neurologic exam nonfocal.    Imaging:  RIGHT CAROTID ULTRASOUND   1/18/2018 12:52 PM      HISTORY:  Carotid stenosis. Follow-up carotid endarterectomy.     COMPARISON: 8/17/2017.     RIGHT CAROTID FINDINGS:  Minimal plaque at the common carotid, carotid  bulb and internal carotid artery.  Right ICA PSV:  143  cm/sec.  Right ICA EDV:  43 cm/sec.  Right ICA/CCA PSV Ratio:  1.8    These indicate  50 - 69%  diameter stenosis of the right ICA.    Right Vertebral: Antegrade flow.   Right ECA: Antegrade flow.      Causes of Decreased Accuracy:   None.          IMPRESSION:   50-69% diameter stenosis of the right ICA relative to  the distal ICA diameter. Postoperative changes of carotid  endarterectomy.     TRACY DELGADO DO     ASSESSMENT:  4 months status post right carotid endarterectomy clinically doing well.  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    PLAN:  I reviewed all the above with Mr. Warner.  He will continue his medical regimen including daily aspirin.  Vascular surgical follow-up will be with me in 1 year for repeat bilateral carotid ultrasonography.    Total face-to-face time was 15 minutes, greater than 50% spent providing counseling and education.    Gabriel Scott M.D.

## 2018-01-18 NOTE — MR AVS SNAPSHOT
After Visit Summary   1/18/2018    Merlin J Anderson    MRN: [id number]           Patient Information     Date Of Birth          1940        Visit Information        Provider Department      1/18/2018 1:15 PM Catalino Scott MD [facility]      Today's Diagnoses     History of right-sided carotid endarterectomy    -  1       Follow-ups after your visit        Follow-up notes from your care team     Return in about 1 year (around 1/18/2019) for Bilateral carotid ultrasound.      Who to contact     If you have questions or need follow up information about today's clinic visit or your schedule please contact [facility] directly at [phone].  Normal or non-critical lab and imaging results will be communicated to you by [employer], letter or phone within 4 business days after the clinic has received the results. If you do not hear from us within 7 days, please contact the clinic through [employer] or phone. If you have a critical or abnormal lab result, we will notify you by phone as soon as possible.  Submit refill requests through [employer]Mobo or call your pharmacy and they will forward the refill request to us. Please allow 3 business days for your refill to be completed.          Additional Information About Your Visit        MyChart Information     [employer] gives you secure access to your electronic health record. If you see a primary care provider, you can also send messages to your care team and make appointments. If you have questions, please call your primary care clinic.  If you do not have a primary care provider, please call [phone] and they will assist you.        Care EveryWhere ID     This is your Care EveryWhere ID. This could be used by other organizations to access your [place] medical records  [id number]        Your Vitals Were     Pulse Respirations Height Pulse Oximetry BMI (Body Mass Index)       60  16 6' (1.829 m) 97% 28.48 kg/m2        Blood Pressure from Last 3 Encounters:   01/18/18 158/70   10/05/17 122/74   09/20/17 129/63    Weight from Last 3 Encounters:   01/18/18 210 lb (95.3 kg)   10/05/17 210 lb (95.3 kg)   09/20/17 210 lb 11.2 oz (95.6 kg)              Today, you had the following     No orders found for display       Primary Care Provider Office Phone # Fax #    Sujit Duke [phone]       [address] [facility] [address]        Equal Access to Services     [facility] : Hadii jovita Galaviz, walitoda aide, qaybta kaalmada joseph, kaye shah . So [facility] [phone].    ATENCIÓN: Si habla español, tiene a pa disposición servicios gratuitos de asistencia lingüística. Llame al [phone].    We comply with applicable federal civil rights laws and [place] laws. We do not discriminate on the basis of race, color, national origin, age, disability, sex, sexual orientation, or gender identity.            Thank you!     Thank you for choosing [facility]  for your care. Our goal is always to provide you with excellent care. Hearing back from our patients is one way we can continue to improve our services. Please take a few minutes to complete the written survey that you may receive in the mail after your visit with us. Thank you!             Your Updated Medication List - Protect others around you: Learn how to safely use, store and throw away your medicines at www.disposemymeds.org.          This list is accurate as of: 1/18/18  1:33 PM.  Always use your most recent med list.                   Brand Name Dispense Instructions for use Diagnosis    ASPIRIN EC PO      Take 81 mg by mouth every morning        atorvastatin 40 MG tablet    LIPITOR    90 tablet    Take 1 tablet (40 mg) by mouth daily    Right-sided carotid artery disease (H)       blood glucose calibration solution     1 Bottle    Use to  calibrate blood glucose monitor as needed as directed.    Type 2 diabetes mellitus without complication (H)       blood glucose monitoring lancets     1 Box    Use to test blood sugar 1 times daily or as directed.    Type 2 diabetes mellitus without complication, without long-term current use of insulin (H)       blood glucose monitoring test strip    ACCU-CHEK PRISCILLA    100 each    Use to test blood sugars 1 times daily or as directed.    Type 2 diabetes mellitus without complication, without long-term current use of insulin (H)       FLONASE NA      Spray 1-2 sprays in nostril daily as needed        metFORMIN 500 MG tablet    GLUCOPHAGE    180 tablet    Take 1 tablet (500 mg) by mouth 2 times daily (with meals)    Type 2 diabetes mellitus without complication, without long-term current use of insulin (H)       Multi-vitamin Tabs tablet      Take 1 tablet by mouth every morning        order for DME     1 Device    Check blood pressure daily    Benign essential hypertension       oxyCODONE IR 5 MG tablet    ROXICODONE    30 tablet    Take 1 tablet (5 mg) by mouth every 4 hours as needed for moderate to severe pain or pain maximum 30 tablet(s) per day    Status post carotid endarterectomy       PRINIVIL PO      Take 20 mg by mouth 2 times daily        senna-docusate 8.6-50 MG per tablet    SENOKOT-S;PERICOLACE    100 tablet    Take 1-2 tablets by mouth 2 times daily    Drug-induced constipation       tamsulosin 0.4 MG capsule    FLOMAX    14 capsule    Take 2 capsules (0.8 mg) by mouth daily    Urinary retention       TENORMIN PO      Take 100 mg by mouth every morning

## 2018-02-19 DIAGNOSIS — I10 BENIGN ESSENTIAL HYPERTENSION: Primary | ICD-10-CM

## 2018-02-20 RX ORDER — ATENOLOL 100 MG/1
100 TABLET ORAL EVERY MORNING
Qty: 30 TABLET | Refills: 1 | Status: SHIPPED | OUTPATIENT
Start: 2018-02-20 | End: 2018-04-04

## 2018-02-20 NOTE — TELEPHONE ENCOUNTER
"Requested Prescriptions   Pending Prescriptions Disp Refills     atenolol (TENORMIN) 100 MG tablet 15 tablet      Sig: Take 1 tablet (100 mg) by mouth every morning    Beta-Blockers Protocol Passed    2/19/2018  2:43 PM       Passed - Blood pressure under 140/90 in past 12 months    BP Readings from Last 3 Encounters:   01/18/18 158/70   10/05/17 122/74   09/20/17 129/63                Passed - Patient is age 6 or older       Passed - Recent or future visit with authorizing provider's specialty    Patient had office visit in the last year or has a visit in the next 30 days with authorizing provider.  See \"Patient Info\" tab in inbasket, or \"Choose Columns\" in Meds & Orders section of the refill encounter.             Routing refill request to provider for review/approval because:   out of range:  Blood pressure        "

## 2018-03-12 DIAGNOSIS — I77.9 RIGHT-SIDED CAROTID ARTERY DISEASE (H): ICD-10-CM

## 2018-03-15 RX ORDER — ATORVASTATIN CALCIUM 40 MG/1
40 TABLET, FILM COATED ORAL DAILY
Qty: 90 TABLET | Refills: 1 | Status: SHIPPED | OUTPATIENT
Start: 2018-03-15 | End: 2018-09-11

## 2018-03-28 DIAGNOSIS — E11.9 TYPE 2 DIABETES MELLITUS WITHOUT COMPLICATION, WITHOUT LONG-TERM CURRENT USE OF INSULIN (H): ICD-10-CM

## 2018-03-28 NOTE — TELEPHONE ENCOUNTER
Refill request for Metformin. Pt has upcoming diabetes appt. On 4/4/18    Last OV 9/8/17.   Provider approval needed, elevated Cr.     Lab Results   Component Value Date    A1C 6.3 09/18/2017    A1C 6.1 09/05/2017    A1C 6.1 02/08/2017    A1C 6.0 08/01/2016    A1C 6.5 12/16/2015     BP Readings from Last 3 Encounters:   01/18/18 158/70   10/05/17 122/74   09/20/17 129/63     Creatinine   Date Value Ref Range Status   09/19/2017 1.56 (H) 0.66 - 1.25 mg/dL Final   ]  LDL Cholesterol Calculated   Date Value Ref Range Status   09/18/2017 61 <100 mg/dL Final     Comment:     Desirable:       <100 mg/dl   ]

## 2018-04-04 ENCOUNTER — OFFICE VISIT (OUTPATIENT)
Dept: INTERNAL MEDICINE | Facility: CLINIC | Age: 78
End: 2018-04-04
Payer: COMMERCIAL

## 2018-04-04 VITALS
OXYGEN SATURATION: 99 % | SYSTOLIC BLOOD PRESSURE: 124 MMHG | HEIGHT: 72 IN | TEMPERATURE: 97.7 F | HEART RATE: 63 BPM | DIASTOLIC BLOOD PRESSURE: 80 MMHG | WEIGHT: 213.7 LBS | BODY MASS INDEX: 28.94 KG/M2

## 2018-04-04 DIAGNOSIS — E78.5 HYPERLIPIDEMIA LDL GOAL <100: Primary | ICD-10-CM

## 2018-04-04 DIAGNOSIS — R33.9 URINARY RETENTION: ICD-10-CM

## 2018-04-04 DIAGNOSIS — E11.9 TYPE 2 DIABETES MELLITUS WITHOUT COMPLICATION, WITHOUT LONG-TERM CURRENT USE OF INSULIN (H): ICD-10-CM

## 2018-04-04 DIAGNOSIS — I10 BENIGN ESSENTIAL HYPERTENSION: ICD-10-CM

## 2018-04-04 LAB
ALBUMIN SERPL-MCNC: 3.8 G/DL (ref 3.4–5)
ALP SERPL-CCNC: 88 U/L (ref 40–150)
ALT SERPL W P-5'-P-CCNC: 33 U/L (ref 0–70)
ANION GAP SERPL CALCULATED.3IONS-SCNC: 4 MMOL/L (ref 3–14)
AST SERPL W P-5'-P-CCNC: 21 U/L (ref 0–45)
BILIRUB SERPL-MCNC: 0.8 MG/DL (ref 0.2–1.3)
BUN SERPL-MCNC: 26 MG/DL (ref 7–30)
CALCIUM SERPL-MCNC: 9 MG/DL (ref 8.5–10.1)
CHLORIDE SERPL-SCNC: 107 MMOL/L (ref 94–109)
CO2 SERPL-SCNC: 29 MMOL/L (ref 20–32)
CREAT SERPL-MCNC: 1.46 MG/DL (ref 0.66–1.25)
GFR SERPL CREATININE-BSD FRML MDRD: 47 ML/MIN/1.7M2
GLUCOSE SERPL-MCNC: 181 MG/DL (ref 70–99)
HBA1C MFR BLD: 6.7 % (ref 0–6.4)
POTASSIUM SERPL-SCNC: 4.5 MMOL/L (ref 3.4–5.3)
PROT SERPL-MCNC: 7 G/DL (ref 6.8–8.8)
SODIUM SERPL-SCNC: 140 MMOL/L (ref 133–144)

## 2018-04-04 PROCEDURE — 36415 COLL VENOUS BLD VENIPUNCTURE: CPT | Performed by: INTERNAL MEDICINE

## 2018-04-04 PROCEDURE — 80053 COMPREHEN METABOLIC PANEL: CPT | Performed by: INTERNAL MEDICINE

## 2018-04-04 PROCEDURE — 83036 HEMOGLOBIN GLYCOSYLATED A1C: CPT | Performed by: INTERNAL MEDICINE

## 2018-04-04 PROCEDURE — 99214 OFFICE O/P EST MOD 30 MIN: CPT | Performed by: INTERNAL MEDICINE

## 2018-04-04 RX ORDER — TAMSULOSIN HYDROCHLORIDE 0.4 MG/1
0.4 CAPSULE ORAL DAILY
Qty: 90 CAPSULE | Refills: 3 | Status: SHIPPED | OUTPATIENT
Start: 2018-04-04 | End: 2019-05-20

## 2018-04-04 RX ORDER — ATENOLOL 100 MG/1
100 TABLET ORAL EVERY MORNING
Qty: 90 TABLET | Refills: 3 | Status: ON HOLD | OUTPATIENT
Start: 2018-04-04 | End: 2018-10-31

## 2018-04-04 RX ORDER — LISINOPRIL 20 MG/1
20 TABLET ORAL DAILY
Qty: 90 TABLET | Refills: 3 | Status: ON HOLD | OUTPATIENT
Start: 2018-04-04 | End: 2018-10-31

## 2018-04-04 NOTE — PROGRESS NOTES
SUBJECTIVE:   Merlin J Anderson is a 77 year old male who presents to clinic today for the following health issues:      Diabetes Follow-up    Has H/O DM. On diet , exercise and PO Metformin. Blood sugars are fluctuating, elevated to 200. No parestesias. No hypoglycemias.        Patient is checking blood sugars: 2 times a week    Diabetic concerns: None     Symptoms of hypoglycemia (low blood sugar): none     Paresthesias (numbness or burning in feet) or sores: No     Date of last diabetic eye exam: 2016    Hyperlipidemia Follow-Up    Has H/O hyperlipidemia. On medical treatment  With Lipitor, exercise and diet. No side effects. No muscle weakness, myalgias or upset stomach.         Rate your low fat/cholesterol diet?: fair    Taking statin?  Yes, no muscle aches from statin    Other lipid medications/supplements?:  none    Hypertension Follow-up    Has h/o HTN. on medical treatment. BP has been controlled. No side effects from medications. No CP, HA, dizziness. good compliance with medications and low salt diet.      Outpatient blood pressures are being checked at store.  Results are 130/65.    Low Salt Diet: no added salt    BP Readings from Last 2 Encounters:   01/18/18 158/70   10/05/17 122/74     Hemoglobin A1C (%)   Date Value   09/18/2017 6.3 (H)   09/05/2017 6.1 (H)     LDL Cholesterol Calculated (mg/dL)   Date Value   09/18/2017 61   08/08/2017 75       Amount of exercise or physical activity: 4-5 days/week for an average of 15-30 minutes    Problems taking medications regularly: No    Medication side effects: none    Diet: low salt        PROBLEMS TO ADD ON...    Has had episode of urine retention after surgery 6 months ago. Had a Garcia for a week, then was on Flomax. Tried to stop it but symptoms recurred. Now has restarted it and feels better.     Problem list and histories reviewed & adjusted, as indicated.  Additional history: as documented    Patient Active Problem List   Diagnosis     Mixed  hyperlipidemia     Essential hypertension, benign     Allergic rhinitis due to pollen     Gout     HTN (hypertension)     HYPERLIPIDEMIA LDL GOAL <100     CKD (chronic kidney disease) stage 3, GFR 30-59 ml/min     Advanced directives, counseling/discussion     Lumbar radiculopathy     Lumbago     Arthrodesis status     Seasonal allergic rhinitis     Type 2 diabetes mellitus without complication (H)     PVD (peripheral vascular disease) (H)     Carotid arterial disease (H)     Carotid artery disease (H)     Past Surgical History:   Procedure Laterality Date     C NONSPECIFIC PROCEDURE  04/97    Neg. colonoscopy.     C NONSPECIFIC PROCEDURE      S/P ing. hernia.     DECOMPRESSION, FUSION LUMBAR POSTERIOR ONE LEVEL, COMBINED  8/24/2012    Procedure: COMBINED DECOMPRESSION, FUSION LUMBAR POSTERIOR ONE LEVEL;  Posterior Fusion wtih Decompression L4-5;  Surgeon: Rod Carbajal MD;  Location: [facility] OR     ENDARTERECTOMY CAROTID Right 9/18/2017    Procedure: ENDARTERECTOMY CAROTID;  RIGHT CAROTID ENDARTERECTOMY, WITH PATCH ANGIOPLASTY, WITH ELECTOENCEPHALOGAM            (EEG);  Surgeon: Catalino Scott MD;  Location: [facility] OR     HERNIA REPAIR         Social History   Substance Use Topics     Smoking status: Never Smoker     Smokeless tobacco: Never Used     Alcohol use Yes      Comment: 1 monthly or less.     Family History   Problem Relation Age of Onset     DIABETES Father      Unknown/Adopted Mother          Current Outpatient Prescriptions   Medication Sig Dispense Refill     metFORMIN (GLUCOPHAGE) 500 MG tablet Take 1 tablet (500 mg) by mouth 2 times daily (with meals) 180 tablet 1     atenolol (TENORMIN) 100 MG tablet Take 1 tablet (100 mg) by mouth every morning 90 tablet 3     tamsulosin (FLOMAX) 0.4 MG capsule Take 1 capsule (0.4 mg) by mouth daily 90 capsule 3     lisinopril (PRINIVIL) 20 MG tablet Take 1 tablet (20 mg) by mouth daily 90 tablet 3     atorvastatin (LIPITOR) 40 MG tablet Take 1 tablet (40 mg)  by mouth daily 90 tablet 1     ASPIRIN EC PO Take 81 mg by mouth every morning       Fluticasone Propionate (FLONASE NA) Spray 1-2 sprays in nostril daily as needed       multivitamin, therapeutic with minerals (MULTI-VITAMIN) TABS tablet Take 1 tablet by mouth every morning       order for DME Check blood pressure daily 1 Device 0     blood glucose monitoring (ACCU-CHEK PRISCILLA) test strip Use to test blood sugars 1 times daily or as directed. 100 each 1     blood glucose monitoring (ACCU-CHEK MULTICLIX) lancets Use to test blood sugar 1 times daily or as directed. 1 Box 1     blood glucose calibration (ACCU-CHEK PRISCILLA) solution Use to calibrate blood glucose monitor as needed as directed. 1 Bottle 0     [DISCONTINUED] metFORMIN (GLUCOPHAGE) 500 MG tablet Take 1 tablet (500 mg) by mouth 2 times daily (with meals) 60 tablet 0     [DISCONTINUED] atenolol (TENORMIN) 100 MG tablet Take 1 tablet (100 mg) by mouth every morning 30 tablet 1     senna-docusate (SENOKOT-S;PERICOLACE) 8.6-50 MG per tablet Take 1-2 tablets by mouth 2 times daily (Patient not taking: Reported on 1/18/2018) 100 tablet 0     [DISCONTINUED] Lisinopril (PRINIVIL PO) Take 20 mg by mouth 2 times daily         Reviewed and updated as needed this visit by clinical staff  Meds       Reviewed and updated as needed this visit by Provider         ROS:  Constitutional, HEENT, cardiovascular, pulmonary, gi and gu systems are negative, except as otherwise noted.    OBJECTIVE:     /80 (BP Location: Left arm, Patient Position: Sitting, Cuff Size: Adult Large)  Pulse 63  Temp 97.7  F (36.5  C) (Oral)  Ht 6' (1.829 m)  Wt 213 lb 11.2 oz (96.9 kg)  SpO2 99%  BMI 28.98 kg/m2  Body mass index is 28.98 kg/(m^2).   GENERAL: healthy, alert and no distress  NECK: no adenopathy, no asymmetry, masses, or scars and thyroid normal to palpation  RESP: lungs clear to auscultation - no rales, rhonchi or wheezes  CV: regular rate and rhythm, normal S1 S2, no S3 or  S4, no murmur, click or rub, no peripheral edema and peripheral pulses strong  ABDOMEN: soft, nontender, no hepatosplenomegaly, no masses and bowel sounds normal  MS: no gross musculoskeletal defects noted, no edema    Diagnostic Test Results:  none     ASSESSMENT/PLAN:     Problem List Items Addressed This Visit     HYPERLIPIDEMIA LDL GOAL <100 - Primary    Type 2 diabetes mellitus without complication (H)    Relevant Medications    metFORMIN (GLUCOPHAGE) 500 MG tablet    Other Relevant Orders    Hemoglobin A1c    Comprehensive metabolic panel      Other Visit Diagnoses     Benign essential hypertension        Relevant Medications    atenolol (TENORMIN) 100 MG tablet    lisinopril (PRINIVIL) 20 MG tablet    Urinary retention        Relevant Medications    tamsulosin (FLOMAX) 0.4 MG capsule           Assess DM control  Cont treatment   Monitor BP, controlled   Cont Flomax , improved BPH symptoms     Follow-Up:in 6 months     Sujit Duke MD  [facility]

## 2018-04-04 NOTE — MR AVS SNAPSHOT
After Visit Summary   4/4/2018    Merlin J Anderson    MRN: [id number]           Patient Information     Date Of Birth          1940        Visit Information        Provider Department      4/4/2018 8:20 AM Sujit Duke MD [facility]        Today's Diagnoses     Type 2 diabetes mellitus without complication, without long-term current use of insulin (H)        Benign essential hypertension        Urinary retention           Follow-ups after your visit        Who to contact     If you have questions or need follow up information about today's clinic visit or your schedule please contact [facility] directly at [phone].  Normal or non-critical lab and imaging results will be communicated to you by MyChart, letter or phone within 4 business days after the clinic has received the results. If you do not hear from us within 7 days, please contact the clinic through [employer] or phone. If you have a critical or abnormal lab result, we will notify you by phone as soon as possible.  Submit refill requests through [employer] or call your pharmacy and they will forward the refill request to us. Please allow 3 business days for your refill to be completed.          Additional Information About Your Visit        MyChart Information     [employer] gives you secure access to your electronic health record. If you see a primary care provider, you can also send messages to your care team and make appointments. If you have questions, please call your primary care clinic.  If you do not have a primary care provider, please call [phone] and they will assist you.        Care EveryWhere ID     This is your Care EveryWhere ID. This could be used by other organizations to access your [facility] medical records  [id number]        Your Vitals Were     Pulse Temperature Height Pulse Oximetry BMI (Body Mass Index)       63 97.7  F (36.5  C) (Oral) 6' (1.829 m) 99% 28.98 kg/m2         Blood Pressure from Last 3 Encounters:   04/04/18 124/80   01/18/18 158/70   10/05/17 122/74    Weight from Last 3 Encounters:   04/04/18 213 lb 11.2 oz (96.9 kg)   01/18/18 210 lb (95.3 kg)   10/05/17 210 lb (95.3 kg)              We Performed the Following     Comprehensive metabolic panel     Hemoglobin A1c          Today's Medication Changes          These changes are accurate as of 4/4/18  8:53 AM.  If you have any questions, ask your nurse or doctor.               These medicines have changed or have updated prescriptions.        Dose/Directions    lisinopril 20 MG tablet   Commonly known as:  PRINIVIL   This may have changed:    - medication strength  - when to take this   Used for:  Benign essential hypertension   Changed by:  Sujit Duke MD        Dose:  20 mg   Take 1 tablet (20 mg) by mouth daily   Quantity:  90 tablet   Refills:  3         Stop taking these medicines if you haven't already. Please contact your care team if you have questions.     oxyCODONE IR 5 MG tablet   Commonly known as:  ROXICODONE   Stopped by:  Sujit Duke MD                Where to get your medicines      These medications were sent to [id number] - [place], [place] - [address]  [address] [address] [facility] [address]     Phone:  [phone]     atenolol 100 MG tablet    lisinopril 20 MG tablet    metFORMIN 500 MG tablet    tamsulosin 0.4 MG capsule                Primary Care Provider Office Phone # Fax #    Sujit Duke [phone]       [address] [facility] [address]        Equal Access to Services     [facility] AH: Hadii aad ku hadasho Soomaali, waaxda luqadaha, qaybta kaalmada adeegyada, kaye gauthier hayryan hawley. So [facility] [phone].    ATENCIÓN: Si habla español, tiene a pa disposición servicios gratuitos de asistencia lingüística. Llame al [phone].    We comply with applicable federal civil rights laws and [place] laws. We do not  discriminate on the basis of race, color, national origin, age, disability, sex, sexual orientation, or gender identity.            Thank you!     Thank you for choosing [facility]  for your care. Our goal is always to provide you with excellent care. Hearing back from our patients is one way we can continue to improve our services. Please take a few minutes to complete the written survey that you may receive in the mail after your visit with us. Thank you!             Your Updated Medication List - Protect others around you: Learn how to safely use, store and throw away your medicines at www.disposemymeds.org.          This list is accurate as of 4/4/18  8:53 AM.  Always use your most recent med list.                   Brand Name Dispense Instructions for use Diagnosis    ASPIRIN EC PO      Take 81 mg by mouth every morning        atenolol 100 MG tablet    TENORMIN    90 tablet    Take 1 tablet (100 mg) by mouth every morning    Benign essential hypertension       atorvastatin 40 MG tablet    LIPITOR    90 tablet    Take 1 tablet (40 mg) by mouth daily    Right-sided carotid artery disease (H)       blood glucose calibration solution     1 Bottle    Use to calibrate blood glucose monitor as needed as directed.    Type 2 diabetes mellitus without complication (H)       blood glucose monitoring lancets     1 Box    Use to test blood sugar 1 times daily or as directed.    Type 2 diabetes mellitus without complication, without long-term current use of insulin (H)       blood glucose monitoring test strip    ACCU-CHEK PRISCILLA    100 each    Use to test blood sugars 1 times daily or as directed.    Type 2 diabetes mellitus without complication, without long-term current use of insulin (H)       FLONASE NA      Spray 1-2 sprays in nostril daily as needed        lisinopril 20 MG tablet    PRINIVIL    90 tablet    Take 1 tablet (20 mg) by mouth daily    Benign essential hypertension       metFORMIN 500 MG  tablet    GLUCOPHAGE    180 tablet    Take 1 tablet (500 mg) by mouth 2 times daily (with meals)    Type 2 diabetes mellitus without complication, without long-term current use of insulin (H)       Multi-vitamin Tabs tablet      Take 1 tablet by mouth every morning        order for DME     1 Device    Check blood pressure daily    Benign essential hypertension       senna-docusate 8.6-50 MG per tablet    SENOKOT-S;PERICOLACE    100 tablet    Take 1-2 tablets by mouth 2 times daily    Drug-induced constipation       tamsulosin 0.4 MG capsule    FLOMAX    90 capsule    Take 1 capsule (0.4 mg) by mouth daily    Urinary retention

## 2018-04-04 NOTE — NURSING NOTE
Chief Complaint   Patient presents with     Recheck Medication       Initial /80 (BP Location: Left arm, Patient Position: Sitting, Cuff Size: Adult Large)  Pulse 63  Temp 97.7  F (36.5  C) (Oral)  Ht 6' (1.829 m)  Wt 213 lb 11.2 oz (96.9 kg)  SpO2 99%  BMI 28.98 kg/m2 Estimated body mass index is 28.98 kg/(m^2) as calculated from the following:    Height as of this encounter: 6' (1.829 m).    Weight as of this encounter: 213 lb 11.2 oz (96.9 kg).  Medication Reconciliation: complete   Katelynn Guan MA

## 2018-05-18 ENCOUNTER — OFFICE VISIT (OUTPATIENT)
Dept: INTERNAL MEDICINE | Facility: CLINIC | Age: 78
End: 2018-05-18
Payer: COMMERCIAL

## 2018-05-18 VITALS
TEMPERATURE: 98 F | HEIGHT: 72 IN | DIASTOLIC BLOOD PRESSURE: 68 MMHG | SYSTOLIC BLOOD PRESSURE: 148 MMHG | HEART RATE: 64 BPM | OXYGEN SATURATION: 97 % | WEIGHT: 216 LBS | BODY MASS INDEX: 29.26 KG/M2

## 2018-05-18 DIAGNOSIS — H25.9 AGE-RELATED CATARACT OF BOTH EYES, UNSPECIFIED AGE-RELATED CATARACT TYPE: ICD-10-CM

## 2018-05-18 DIAGNOSIS — N18.30 CKD (CHRONIC KIDNEY DISEASE) STAGE 3, GFR 30-59 ML/MIN (H): ICD-10-CM

## 2018-05-18 DIAGNOSIS — I10 ESSENTIAL HYPERTENSION, BENIGN: ICD-10-CM

## 2018-05-18 DIAGNOSIS — E11.9 TYPE 2 DIABETES MELLITUS WITHOUT COMPLICATION, WITHOUT LONG-TERM CURRENT USE OF INSULIN (H): ICD-10-CM

## 2018-05-18 DIAGNOSIS — Z01.818 PREOP GENERAL PHYSICAL EXAM: Primary | ICD-10-CM

## 2018-05-18 DIAGNOSIS — E78.5 HYPERLIPIDEMIA LDL GOAL <100: ICD-10-CM

## 2018-05-18 PROCEDURE — 99215 OFFICE O/P EST HI 40 MIN: CPT | Performed by: INTERNAL MEDICINE

## 2018-05-18 NOTE — PATIENT INSTRUCTIONS
Before Your Surgery      Call your surgeon if there is any change in your health. This includes signs of a cold or flu (such as a sore throat, runny nose, cough, rash or fever).    Do not smoke, drink alcohol or take over the counter medicine (unless your surgeon or primary care doctor tells you to) for the 24 hours before and after surgery.    If you take prescribed drugs: Follow your doctor s orders about which medicines to take and which to stop until after surgery.    Eating and drinking prior to surgery: follow the instructions from your surgeon    Take a shower or bath the night before surgery. Use the soap your surgeon gave you to gently clean your skin. If you do not have soap from your surgeon, use your regular soap. Do not shave or scrub the surgery site.  Wear clean pajamas and have clean sheets on your bed.     Hold Metformin on the day of surgery.   Keep all other medications as usual.

## 2018-05-18 NOTE — PROGRESS NOTES
[place] [address]  [facility] [address]  [phone]  Dept: [phone]    PRE-OP EVALUATION:  Today's date: 2018    Merlin J Anderson (: 1940) presents for pre-operative evaluation assessment as requested by Dr. Martinez.  He requires evaluation and anesthesia risk assessment prior to undergoing surgery/procedure for treatment of eye cataracts .    Fax number for surgical facility: [phone]  Primary Physician: Sujit Duke  Type of Anesthesia Anticipated: to be determined  Rt Cataract Removal  Patient has a Health Care Directive or Living Will:  YES     Preop Questions 2018   Who is doing your surgery? [facility]   What are you having done? cataract removal   Date of Surgery/Procedure: may 22   1.  Do you have a history of Heart attack, stroke, stent, coronary bypass surgery, or other heart surgery? No   2.  Do you ever have any pain or discomfort in your chest? No   3.  Do you have a history of  Heart Failure? No   4.   Are you troubled by shortness of breath when:  walking on a level surface, or up a slight hill, or at night? No   5.  Do you currently have a cold, bronchitis or other respiratory infection? No   6.  Do you have a cough, shortness of breath, or wheezing? No   7.  Do you sometimes get pains in the calves of your legs when you walk? No   8. Do you or anyone in your family have previous history of blood clots? No   9.  Do you or does anyone in your family have a serious bleeding problem such as prolonged bleeding following surgeries or cuts? No   10. Have you ever had problems with anemia or been told to take iron pills? No   11. Have you had any abnormal blood loss such as black, tarry or bloody stools? No   12. Have you ever had a blood transfusion? UNKNOWN -    13. Have you or any of your relatives ever had problems with anesthesia? No   14. Do you have sleep apnea, excessive snoring or daytime drowsiness? No   15. Do you have any  prosthetic heart valves? No   16. Do you have prosthetic joints? No         HPI:     HPI related to upcoming procedure: scheduled for bilateral eye cataract surgery.   No acute complaints, no medication change or new medical conditions.  Has h/o HTN. on medical treatment. BP has been controlled. No side effects from medications. No CP, HA, dizziness. good compliance with medications and low salt diet.  Has H/O hyperlipidemia. On medical treatment and diet. No side effects. No muscle weakness, myalgias or upset stomach.   Has H/O DM. On diet , exercise and PO Metformin. Blood sugars are controlled. No parestesias. No hypoglycemias.        See problem list for active medical problems.  Problems all longstanding and stable, except as noted/documented.  See ROS for pertinent symptoms related to these conditions.                                                                                                                                                          .    MEDICAL HISTORY:     Patient Active Problem List    Diagnosis Date Noted     Carotid arterial disease (H) 09/18/2017     Priority: Medium     Carotid artery disease (H) 09/18/2017     Priority: Medium     PVD (peripheral vascular disease) (H) 09/08/2017     Priority: Medium     Type 2 diabetes mellitus without complication (H) 11/30/2015     Priority: Medium     Seasonal allergic rhinitis 04/15/2013     Priority: Medium     Lumbago 11/13/2012     Priority: Medium     Arthrodesis status 11/13/2012     Priority: Medium     Lumbar radiculopathy 06/27/2012     Priority: Medium     Advanced directives, counseling/discussion 06/18/2012     Priority: Medium     Patient states has Advance Directive and will bring in a copy to clinic. 6/18/2012          CKD (chronic kidney disease) stage 3, GFR 30-59 ml/min 12/27/2011     Priority: Medium     HYPERLIPIDEMIA LDL GOAL <100 10/31/2010     Priority: Medium     HTN (hypertension) 06/29/2009     Priority: Medium      (Problem list name updated by automated process. Provider to review and confirm.)       Mixed hyperlipidemia 11/14/2002     Priority: Medium     Essential hypertension, benign 11/14/2002     Priority: Medium     Allergic rhinitis due to pollen 11/14/2002     Priority: Medium     Gout 11/14/2002     Priority: Medium     Problem list name updated by automated process. Provider to review        Past Medical History:   Diagnosis Date     Diabetes mellitus (H)     Pre diabetic     Essential hypertension, benign      Past Surgical History:   Procedure Laterality Date     C NONSPECIFIC PROCEDURE  04/97    Neg. colonoscopy.     C NONSPECIFIC PROCEDURE      S/P ing. hernia.     DECOMPRESSION, FUSION LUMBAR POSTERIOR ONE LEVEL, COMBINED  8/24/2012    Procedure: COMBINED DECOMPRESSION, FUSION LUMBAR POSTERIOR ONE LEVEL;  Posterior Fusion wtih Decompression L4-5;  Surgeon: Rod Carbajal MD;  Location: [facility] OR     ENDARTERECTOMY CAROTID Right 9/18/2017    Procedure: ENDARTERECTOMY CAROTID;  RIGHT CAROTID ENDARTERECTOMY, WITH PATCH ANGIOPLASTY, WITH ELECTOENCEPHALOGAM            (EEG);  Surgeon: Catalino Scott MD;  Location: [facility] OR     HERNIA REPAIR       Current Outpatient Prescriptions   Medication Sig Dispense Refill     ASPIRIN EC PO Take 81 mg by mouth every morning       atenolol (TENORMIN) 100 MG tablet Take 1 tablet (100 mg) by mouth every morning 90 tablet 3     atorvastatin (LIPITOR) 40 MG tablet Take 1 tablet (40 mg) by mouth daily 90 tablet 1     blood glucose calibration (ACCU-CHEK PRISCILLA) solution Use to calibrate blood glucose monitor as needed as directed. 1 Bottle 0     blood glucose monitoring (ACCU-CHEK PRISCILLA) test strip Use to test blood sugars 1 times daily or as directed. 100 each 1     blood glucose monitoring (ACCU-CHEK MULTICLIX) lancets Use to test blood sugar 1 times daily or as directed. 1 Box 1     Fluticasone Propionate (FLONASE NA) Spray 1-2 sprays in nostril daily as needed        lisinopril (PRINIVIL) 20 MG tablet Take 1 tablet (20 mg) by mouth daily 90 tablet 3     metFORMIN (GLUCOPHAGE) 500 MG tablet Take 1 tablet (500 mg) by mouth 2 times daily (with meals) 180 tablet 1     multivitamin, therapeutic with minerals (MULTI-VITAMIN) TABS tablet Take 1 tablet by mouth every morning       order for DME Check blood pressure daily 1 Device 0     senna-docusate (SENOKOT-S;PERICOLACE) 8.6-50 MG per tablet Take 1-2 tablets by mouth 2 times daily (Patient not taking: Reported on 1/18/2018) 100 tablet 0     tamsulosin (FLOMAX) 0.4 MG capsule Take 1 capsule (0.4 mg) by mouth daily 90 capsule 3     OTC products: None, except as noted above    No Known Allergies   Latex Allergy: NO    Social History   Substance Use Topics     Smoking status: Never Smoker     Smokeless tobacco: Never Used     Alcohol use Yes      Comment: 1 monthly or less.     History   Drug Use No       REVIEW OF SYSTEMS:   CONSTITUTIONAL: NEGATIVE for fever, chills, change in weight  INTEGUMENTARY/SKIN: NEGATIVE for worrisome rashes, moles or lesions  EYES: NEGATIVE for vision changes or irritation  ENT/MOUTH: NEGATIVE for ear, mouth and throat problems  RESP: NEGATIVE for significant cough or SOB  BREAST: NEGATIVE for masses, tenderness or discharge  CV: NEGATIVE for chest pain, palpitations or peripheral edema  GI: NEGATIVE for nausea, abdominal pain, heartburn, or change in bowel habits  : NEGATIVE for frequency, dysuria, or hematuria  MUSCULOSKELETAL: NEGATIVE for significant arthralgias or myalgia  NEURO: NEGATIVE for weakness, dizziness or paresthesias  ENDOCRINE: NEGATIVE for temperature intolerance, skin/hair changes  HEME: NEGATIVE for bleeding problems  PSYCHIATRIC: NEGATIVE for changes in mood or affect    EXAM:   Ht 6' (1.829 m)    GENERAL APPEARANCE: healthy, alert and no distress     EYES: EOMI,  PERRL     HENT: ear canals and TM's normal and nose and mouth without ulcers or lesions     NECK: no adenopathy, no  asymmetry, masses, or scars and thyroid normal to palpation     RESP: lungs clear to auscultation - no rales, rhonchi or wheezes     CV: regular rates and rhythm, normal S1 S2, no S3 or S4 and no murmur, click or rub     ABDOMEN:  soft, nontender, no HSM or masses and bowel sounds normal     MS: extremities normal- no gross deformities noted, no evidence of inflammation in joints, FROM in all extremities.     SKIN: no suspicious lesions or rashes     NEURO: Normal strength and tone, sensory exam grossly normal, mentation intact and speech normal     PSYCH: mentation appears normal. and affect normal/bright     LYMPHATICS: No cervical adenopathy    DIAGNOSTICS:   EKG: appears normal, NSR, normal axis, normal intervals, no acute ST/T changes c/w ischemia, no LVH by voltage criteria, unchanged from previous tracings    Recent Labs   Lab Test  04/04/18   0901  09/19/17   0820  09/18/17   0905   08/23/12   0905   HGB   --   12.7*  13.4   < >   --    PLT   --   175  164   < >   --    INR   --    --    --    --   0.96   NA  140  141  141   < >   --    POTASSIUM  4.5  4.5  4.6   < >  4.2   CR  1.46*  1.56*  1.64*   < >  1.35*   A1C  6.7*   --   6.3*   < >   --     < > = values in this interval not displayed.        IMPRESSION:   Reason for surgery/procedure: eye cataracts   Diagnosis/reason for consult: preoperative evaluation/ clearance      The proposed surgical procedure is considered LOW risk.    REVISED CARDIAC RISK INDEX  The patient has the following serious cardiovascular risks for perioperative complications such as (MI, PE, VFib and 3  AV Block):  No serious cardiac risks  INTERPRETATION: 0 risks: Class I (very low risk - 0.4% complication rate)    The patient has the following additional risks for perioperative complications:  No identified additional risks      ICD-10-CM    1. Preop general physical exam Z01.818        RECOMMENDATIONS:         --Patient is to take all scheduled medications on the day of surgery  EXCEPT for modifications listed below.  Hold Metformin for the morning of surgery.     APPROVAL GIVEN to proceed with proposed procedure, without further diagnostic evaluation       Signed Electronically by: Sujit Duke MD    Copy of this evaluation report is provided to requesting physician.    [facility] Preop Guidelines    Revised Cardiac Risk Index

## 2018-05-18 NOTE — MR AVS SNAPSHOT
After Visit Summary   5/18/2018    Merlin J [place]    MRN: [id number]           Patient Information     Date Of Birth          1940        Visit Information        Provider Department      5/18/2018 2:00 PM Sujit Duke MD [facility]        Today's Diagnoses     Preop general physical exam    -  1      Care Instructions      Before Your Surgery      Call your surgeon if there is any change in your health. This includes signs of a cold or flu (such as a sore throat, runny nose, cough, rash or fever).    Do not smoke, drink alcohol or take over the counter medicine (unless your surgeon or primary care doctor tells you to) for the 24 hours before and after surgery.    If you take prescribed drugs: Follow your doctor s orders about which medicines to take and which to stop until after surgery.    Eating and drinking prior to surgery: follow the instructions from your surgeon    Take a shower or bath the night before surgery. Use the soap your surgeon gave you to gently clean your skin. If you do not have soap from your surgeon, use your regular soap. Do not shave or scrub the surgery site.  Wear clean pajamas and have clean sheets on your bed.     Hold Metformin on the day of surgery.   Keep all other medications as usual.           Follow-ups after your visit        Who to contact     If you have questions or need follow up information about today's clinic visit or your schedule please contact [facility] directly at [phone].  Normal or non-critical lab and imaging results will be communicated to you by MyChart, letter or phone within 4 business days after the clinic has received the results. If you do not hear from us within 7 days, please contact the clinic through MyChart or phone. If you have a critical or abnormal lab result, we will notify you by phone as soon as possible.  Submit refill requests through [facility] or call your pharmacy and they will  forward the refill request to us. Please allow 3 business days for your refill to be completed.          Additional Information About Your Visit        [email or web address] Information     [facility] gives you secure access to your electronic health record. If you see a primary care provider, you can also send messages to your care team and make appointments. If you have questions, please call your primary care clinic.  If you do not have a primary care provider, please call [phone] and they will assist you.        Care EveryWhere ID     This is your Care EveryWhere ID. This could be used by other organizations to access your [place] medical records  [id number]        Your Vitals Were     Pulse Temperature Height Pulse Oximetry BMI (Body Mass Index)       64 98  F (36.7  C) (Oral) 6' (1.829 m) 97% 29.29 kg/m2        Blood Pressure from Last 3 Encounters:   05/18/18 148/68   04/04/18 124/80   01/18/18 158/70    Weight from Last 3 Encounters:   05/18/18 216 lb (98 kg)   04/04/18 213 lb 11.2 oz (96.9 kg)   01/18/18 210 lb (95.3 kg)              Today, you had the following     No orders found for display       Primary Care Provider Office Phone # Fax #    Sujit Duke [phone]       [address] [facility] [address]        Equal Access to Services     [facility] SAMEER : Hadii aad ku hadasho Soomaali, waaxda luqadaha, qaybta kaalmada adeegyada, kaye shah . So [facility] [phone].    ATENCIÓN: Si habla español, tiene a pa disposición servicios gratuitos de asistencia lingüística. Llame al [phone].    We comply with applicable federal civil rights laws and [place] laws. We do not discriminate on the basis of race, color, national origin, age, disability, sex, sexual orientation, or gender identity.            Thank you!     Thank you for choosing [facility]  for your care. Our goal is always to provide you with excellent care. Hearing back from our  patients is one way we can continue to improve our services. Please take a few minutes to complete the written survey that you may receive in the mail after your visit with us. Thank you!             Your Updated Medication List - Protect others around you: Learn how to safely use, store and throw away your medicines at www.disposemymeds.org.          This list is accurate as of 5/18/18  2:21 PM.  Always use your most recent med list.                   Brand Name Dispense Instructions for use Diagnosis    ASPIRIN EC PO      Take 81 mg by mouth every morning        atenolol 100 MG tablet    TENORMIN    90 tablet    Take 1 tablet (100 mg) by mouth every morning    Benign essential hypertension       atorvastatin 40 MG tablet    LIPITOR    90 tablet    Take 1 tablet (40 mg) by mouth daily    Right-sided carotid artery disease (H)       blood glucose calibration solution     1 Bottle    Use to calibrate blood glucose monitor as needed as directed.    Type 2 diabetes mellitus without complication (H)       blood glucose monitoring lancets     1 Box    Use to test blood sugar 1 times daily or as directed.    Type 2 diabetes mellitus without complication, without long-term current use of insulin (H)       blood glucose monitoring test strip    ACCU-CHEK PRISCILLA    100 each    Use to test blood sugars 1 times daily or as directed.    Type 2 diabetes mellitus without complication, without long-term current use of insulin (H)       FLONASE NA      Spray 1-2 sprays in nostril daily as needed        lisinopril 20 MG tablet    PRINIVIL    90 tablet    Take 1 tablet (20 mg) by mouth daily    Benign essential hypertension       metFORMIN 500 MG tablet    GLUCOPHAGE    180 tablet    Take 1 tablet (500 mg) by mouth 2 times daily (with meals)    Type 2 diabetes mellitus without complication, without long-term current use of insulin (H)       Multi-vitamin Tabs tablet      Take 1 tablet by mouth every morning        order for DME     1  Device    Check blood pressure daily    Benign essential hypertension       senna-docusate 8.6-50 MG per tablet    SENOKOT-S;PERICOLACE    100 tablet    Take 1-2 tablets by mouth 2 times daily    Drug-induced constipation       tamsulosin 0.4 MG capsule    FLOMAX    90 capsule    Take 1 capsule (0.4 mg) by mouth daily    Urinary retention

## 2018-09-11 DIAGNOSIS — E78.5 HYPERLIPIDEMIA LDL GOAL <100: Primary | ICD-10-CM

## 2018-09-11 DIAGNOSIS — I77.9 RIGHT-SIDED CAROTID ARTERY DISEASE (H): ICD-10-CM

## 2018-09-12 NOTE — TELEPHONE ENCOUNTER
"Requested Prescriptions   Pending Prescriptions Disp Refills     atorvastatin (LIPITOR) 40 MG tablet  Last Written Prescription Date:  3/15/18  Last Fill Quantity: 90,  # refills: 1   Last office visit: 5/18/2018 with prescribing provider:  Srikanth   Future Office Visit:     90 tablet 1     Sig: Take 1 tablet (40 mg) by mouth daily    Statins Protocol Passed    9/11/2018  7:37 PM       Passed - LDL on file in past 12 months    Recent Labs   Lab Test  09/18/17   0905   LDL  61            Passed - No abnormal creatine kinase in past 12 months    No lab results found.            Passed - Recent (12 mo) or future (30 days) visit within the authorizing provider's specialty    Patient had office visit in the last 12 months or has a visit in the next 30 days with authorizing provider or within the authorizing provider's specialty.  See \"Patient Info\" tab in inbasket, or \"Choose Columns\" in Meds & Orders section of the refill encounter.           Passed - Patient is age 18 or older          "

## 2018-09-13 RX ORDER — ATORVASTATIN CALCIUM 40 MG/1
40 TABLET, FILM COATED ORAL DAILY
Qty: 90 TABLET | Refills: 0 | Status: SHIPPED | OUTPATIENT
Start: 2018-09-13 | End: 2018-10-02

## 2018-09-13 NOTE — TELEPHONE ENCOUNTER
Medication is being filled for 1 time refill only due to:  Patient needs labs --lipid panel in Sept 2018. Future labs ordered in chart.     happyview message sent.

## 2018-10-02 ENCOUNTER — OFFICE VISIT (OUTPATIENT)
Dept: INTERNAL MEDICINE | Facility: CLINIC | Age: 78
End: 2018-10-02
Payer: COMMERCIAL

## 2018-10-02 ENCOUNTER — TELEPHONE (OUTPATIENT)
Dept: INTERNAL MEDICINE | Facility: CLINIC | Age: 78
End: 2018-10-02

## 2018-10-02 VITALS
BODY MASS INDEX: 28.71 KG/M2 | OXYGEN SATURATION: 95 % | WEIGHT: 212 LBS | DIASTOLIC BLOOD PRESSURE: 76 MMHG | TEMPERATURE: 98 F | HEIGHT: 72 IN | SYSTOLIC BLOOD PRESSURE: 140 MMHG | HEART RATE: 63 BPM

## 2018-10-02 DIAGNOSIS — E78.5 HYPERLIPIDEMIA LDL GOAL <100: ICD-10-CM

## 2018-10-02 DIAGNOSIS — I77.9 RIGHT-SIDED CAROTID ARTERY DISEASE, UNSPECIFIED TYPE (H): ICD-10-CM

## 2018-10-02 DIAGNOSIS — R06.02 SOB (SHORTNESS OF BREATH): ICD-10-CM

## 2018-10-02 DIAGNOSIS — E11.9 TYPE 2 DIABETES MELLITUS WITHOUT COMPLICATION, WITHOUT LONG-TERM CURRENT USE OF INSULIN (H): Primary | ICD-10-CM

## 2018-10-02 DIAGNOSIS — Z12.5 SCREENING FOR PROSTATE CANCER: ICD-10-CM

## 2018-10-02 DIAGNOSIS — I10 ESSENTIAL HYPERTENSION: ICD-10-CM

## 2018-10-02 LAB
ERYTHROCYTE [DISTWIDTH] IN BLOOD BY AUTOMATED COUNT: 12.8 % (ref 10–15)
HBA1C MFR BLD: 6.7 % (ref 0–5.6)
HCT VFR BLD AUTO: 39.7 % (ref 40–53)
HGB BLD-MCNC: 13.5 G/DL (ref 13.3–17.7)
MCH RBC QN AUTO: 31.2 PG (ref 26.5–33)
MCHC RBC AUTO-ENTMCNC: 34 G/DL (ref 31.5–36.5)
MCV RBC AUTO: 92 FL (ref 78–100)
PLATELET # BLD AUTO: 166 10E9/L (ref 150–450)
RBC # BLD AUTO: 4.33 10E12/L (ref 4.4–5.9)
WBC # BLD AUTO: 7.7 10E9/L (ref 4–11)

## 2018-10-02 PROCEDURE — 80061 LIPID PANEL: CPT | Performed by: INTERNAL MEDICINE

## 2018-10-02 PROCEDURE — 83036 HEMOGLOBIN GLYCOSYLATED A1C: CPT | Performed by: INTERNAL MEDICINE

## 2018-10-02 PROCEDURE — 80053 COMPREHEN METABOLIC PANEL: CPT | Performed by: INTERNAL MEDICINE

## 2018-10-02 PROCEDURE — 99214 OFFICE O/P EST MOD 30 MIN: CPT | Performed by: INTERNAL MEDICINE

## 2018-10-02 PROCEDURE — 36415 COLL VENOUS BLD VENIPUNCTURE: CPT | Performed by: INTERNAL MEDICINE

## 2018-10-02 PROCEDURE — 84443 ASSAY THYROID STIM HORMONE: CPT | Performed by: INTERNAL MEDICINE

## 2018-10-02 PROCEDURE — G0103 PSA SCREENING: HCPCS | Performed by: INTERNAL MEDICINE

## 2018-10-02 PROCEDURE — 82043 UR ALBUMIN QUANTITATIVE: CPT | Performed by: INTERNAL MEDICINE

## 2018-10-02 PROCEDURE — 85027 COMPLETE CBC AUTOMATED: CPT | Performed by: INTERNAL MEDICINE

## 2018-10-02 RX ORDER — ATORVASTATIN CALCIUM 40 MG/1
40 TABLET, FILM COATED ORAL DAILY
Qty: 90 TABLET | Refills: 3 | Status: SHIPPED | OUTPATIENT
Start: 2018-10-02 | End: 2019-09-16

## 2018-10-02 NOTE — NURSING NOTE
Vital signs:  Temp: 98  F (36.7  C) Temp src: Oral BP: 140/76 Pulse: 63     SpO2: 95 %     Height: 6' (182.9 cm) Weight: 212 lb (96.2 kg)  Estimated body mass index is 28.75 kg/(m^2) as calculated from the following:    Height as of this encounter: 6' (1.829 m).    Weight as of this encounter: 212 lb (96.2 kg).

## 2018-10-02 NOTE — LETTER
October 3, 2018      Merlin J Anderson  [address] [place] [address]        Dear ,    We are writing to inform you of your test results.    Slightly decreased kidney function, no change, rest of the results are acceptable. Follow up in 6 months.    Resulted Orders   CBC with platelets   Result Value Ref Range    WBC 7.7 4.0 - 11.0 10e9/L    RBC Count 4.33 (L) 4.4 - 5.9 10e12/L    Hemoglobin 13.5 13.3 - 17.7 g/dL    Hematocrit 39.7 (L) 40.0 - 53.0 %    MCV 92 78 - 100 fl    MCH 31.2 26.5 - 33.0 pg    MCHC 34.0 31.5 - 36.5 g/dL    RDW 12.8 10.0 - 15.0 %    Platelet Count 166 150 - 450 10e9/L   Comprehensive metabolic panel   Result Value Ref Range    Sodium 136 133 - 144 mmol/L    Potassium 4.4 3.4 - 5.3 mmol/L    Chloride 106 94 - 109 mmol/L    Carbon Dioxide 24 20 - 32 mmol/L    Anion Gap 6 3 - 14 mmol/L    Glucose 161 (H) 70 - 99 mg/dL      Comment:      Fasting specimen    Urea Nitrogen 25 7 - 30 mg/dL    Creatinine 1.43 (H) 0.66 - 1.25 mg/dL    GFR Estimate 48 (L) >60 mL/min/1.7m2      Comment:      Non  GFR Calc    GFR Estimate If Black 58 (L) >60 mL/min/1.7m2      Comment:       GFR Calc    Calcium 9.1 8.5 - 10.1 mg/dL    Bilirubin Total 0.7 0.2 - 1.3 mg/dL    Albumin 3.8 3.4 - 5.0 g/dL    Protein Total 6.9 6.8 - 8.8 g/dL    Alkaline Phosphatase 77 40 - 150 U/L    ALT 30 0 - 70 U/L    AST 18 0 - 45 U/L   Lipid panel reflex to direct LDL Fasting   Result Value Ref Range    Cholesterol 110 <200 mg/dL    Triglycerides 182 (H) <150 mg/dL      Comment:      Borderline high:  150-199 mg/dl  High:             200-499 mg/dl  Very high:       >499 mg/dl  Fasting specimen      HDL Cholesterol 41 >39 mg/dL    LDL Cholesterol Calculated 33 <100 mg/dL      Comment:      Desirable:       <100 mg/dl    Non HDL Cholesterol 69 <130 mg/dL   TSH with free T4 reflex   Result Value Ref Range    TSH 2.05 0.40 - 4.00 mU/L   Prostate spec antigen screen   Result Value Ref Range     PSA 3.28 0 - 4 ug/L      Comment:      Assay Method:  Chemiluminescence using Siemens Vista analyzer   Hemoglobin A1c   Result Value Ref Range    Hemoglobin A1C 6.7 (H) 0 - 5.6 %      Comment:      Normal <5.7% Prediabetes 5.7-6.4%  Diabetes 6.5% or higher - adopted from ADA   consensus guidelines.     Albumin Random Urine Quantitative with Creat Ratio   Result Value Ref Range    Creatinine Urine 88 mg/dL    Albumin Urine mg/L 261 mg/L    Albumin Urine mg/g Cr 294.92 (H) 0 - 17 mg/g Cr       If you have any questions or concerns, please call the clinic at the number listed above.       Sincerely,        Sujit Duke MD

## 2018-10-02 NOTE — PROGRESS NOTES
SUBJECTIVE:   Merlin J Anderson is a 78 year old male who presents to clinic today for the following health issues:    Patient is seen for a follow up visit.  Concerns for SOB on exertion. For several month.   His son has unexpectedly  2 weeks ago. Going through grieving with his wife.     Diabetes Follow-up  Has H/O DM. On diet , exercise and PO medications. Blood sugars are controlled. No parestesias. No hypoglycemias.      Patient is checking blood sugars: not at all    Diabetic concerns: None     Symptoms of hypoglycemia (low blood sugar): none     Paresthesias (numbness or burning in feet) or sores: No     Date of last diabetic eye exam: 2018    Diabetes Management Resources    Hyperlipidemia Follow-Up  Has H/O hyperlipidemia. On medical treatment and diet. No side effects. No muscle weakness, myalgias or upset stomach.       Rate your low fat/cholesterol diet?: fair    Taking statin?  Yes, no muscle aches from statin    Other lipid medications/supplements?:  none    Hypertension Follow-up  Has h/o HTN. on medical treatment. BP has been controlled. No side effects from medications. No CP, HA, dizziness. good compliance with medications and low salt diet.      Outpatient blood pressures are being checked at home occasionally.  Results are normal.    Low Salt Diet: tries low salt diet    BP Readings from Last 2 Encounters:   18 148/68   18 124/80     Hemoglobin A1C (%)   Date Value   2018 6.7 (H)   2017 6.3 (H)     LDL Cholesterol Calculated (mg/dL)   Date Value   2017 61   2017 75       Amount of exercise or physical activity: daily bike ride ( 30 minutes)    Problems taking medications regularly: No    Medication side effects: none    Diet: regular         PROBLEMS TO ADD ON...  Has h/o PVD- had CEA surgery last year. Follows with vascular.   Discussed preventive measures.     Problem list and histories reviewed & adjusted, as indicated.  Additional history: as  documented    Patient Active Problem List   Diagnosis     Mixed hyperlipidemia     Essential hypertension, benign     Allergic rhinitis due to pollen     Gout     HTN (hypertension)     HYPERLIPIDEMIA LDL GOAL <100     CKD (chronic kidney disease) stage 3, GFR 30-59 ml/min (H)     Advanced directives, counseling/discussion     Lumbar radiculopathy     Lumbago     Arthrodesis status     Seasonal allergic rhinitis     Type 2 diabetes mellitus without complication (H)     PVD (peripheral vascular disease) (H)     Carotid arterial disease (H)     Carotid artery disease (H)     Past Surgical History:   Procedure Laterality Date     C NONSPECIFIC PROCEDURE  04/97    Neg. colonoscopy.     C NONSPECIFIC PROCEDURE      S/P ing. hernia.     DECOMPRESSION, FUSION LUMBAR POSTERIOR ONE LEVEL, COMBINED  8/24/2012    Procedure: COMBINED DECOMPRESSION, FUSION LUMBAR POSTERIOR ONE LEVEL;  Posterior Fusion wtih Decompression L4-5;  Surgeon: Rod Carbajal MD;  Location: [facility] OR     ENDARTERECTOMY CAROTID Right 9/18/2017    Procedure: ENDARTERECTOMY CAROTID;  RIGHT CAROTID ENDARTERECTOMY, WITH PATCH ANGIOPLASTY, WITH ELECTOENCEPHALOGAM            (EEG);  Surgeon: Catalino Scott MD;  Location: [facility] OR     HERNIA REPAIR         Social History   Substance Use Topics     Smoking status: Never Smoker     Smokeless tobacco: Never Used     Alcohol use Yes      Comment: 1 monthly or less.     Family History   Problem Relation Age of Onset     Diabetes Father      Unknown/Adopted Mother          Current Outpatient Prescriptions   Medication Sig Dispense Refill     ASPIRIN EC PO Take 81 mg by mouth every morning       atenolol (TENORMIN) 100 MG tablet Take 1 tablet (100 mg) by mouth every morning 90 tablet 3     atorvastatin (LIPITOR) 40 MG tablet Take 1 tablet (40 mg) by mouth daily 90 tablet 3     Fluticasone Propionate (FLONASE NA) Spray 1-2 sprays in nostril daily as needed       lisinopril (PRINIVIL) 20 MG tablet Take 1 tablet (20  mg) by mouth daily 90 tablet 3     metFORMIN (GLUCOPHAGE) 500 MG tablet Take 1 tablet (500 mg) by mouth 2 times daily (with meals) 180 tablet 1     multivitamin, therapeutic with minerals (MULTI-VITAMIN) TABS tablet Take 1 tablet by mouth every morning       senna-docusate (SENOKOT-S;PERICOLACE) 8.6-50 MG per tablet Take 1-2 tablets by mouth 2 times daily 100 tablet 0     tamsulosin (FLOMAX) 0.4 MG capsule Take 1 capsule (0.4 mg) by mouth daily 90 capsule 3     blood glucose calibration (ACCU-CHEK PRISCILLA) solution Use to calibrate blood glucose monitor as needed as directed. 1 Bottle 0     blood glucose monitoring (ACCU-CHEK PRISCILLA) test strip Use to test blood sugars 1 times daily or as directed. 100 each 1     blood glucose monitoring (ACCU-CHEK MULTICLIX) lancets Use to test blood sugar 1 times daily or as directed. 1 Box 1     [DISCONTINUED] atorvastatin (LIPITOR) 40 MG tablet Take 1 tablet (40 mg) by mouth daily 90 tablet 0     [DISCONTINUED] metFORMIN (GLUCOPHAGE) 500 MG tablet Take 1 tablet (500 mg) by mouth 2 times daily (with meals) 180 tablet 1       Reviewed and updated as needed this visit by clinical staff       Reviewed and updated as needed this visit by Provider         ROS:  Constitutional, HEENT, cardiovascular, pulmonary, GI, , musculoskeletal, neuro, skin, endocrine and psych systems are negative, except as otherwise noted.    OBJECTIVE:     /76  Pulse 63  Temp 98  F (36.7  C) (Oral)  Ht 6' (1.829 m)  Wt 212 lb (96.2 kg)  SpO2 95%  BMI 28.75 kg/m2  Body mass index is 28.75 kg/(m^2).   GENERAL: healthy, alert and no distress  EYES: Eyes grossly normal to inspection, PERRL and conjunctivae and sclerae normal  HENT: ear canals and TM's normal, nose and mouth without ulcers or lesions  NECK: no adenopathy, no asymmetry, masses, or scars and thyroid normal to palpation  RESP: lungs clear to auscultation - no rales, rhonchi or wheezes  CV: regular rate and rhythm, normal S1 S2, no S3 or S4,  no murmur, click or rub, no peripheral edema and peripheral pulses strong  ABDOMEN: soft, nontender, no hepatosplenomegaly, no masses and bowel sounds normal  MS: no gross musculoskeletal defects noted, no edema  SKIN: no suspicious lesions or rashes  NEURO: Normal strength and tone, mentation intact and speech normal    Diagnostic Test Results:  none     ASSESSMENT/PLAN:     Problem List Items Addressed This Visit     HTN (hypertension)    Relevant Orders    CBC with platelets    Comprehensive metabolic panel    TSH with free T4 reflex    HYPERLIPIDEMIA LDL GOAL <100    Relevant Medications    atorvastatin (LIPITOR) 40 MG tablet    Other Relevant Orders    Lipid panel reflex to direct LDL Fasting    Type 2 diabetes mellitus without complication (H) - Primary    Relevant Medications    metFORMIN (GLUCOPHAGE) 500 MG tablet    Other Relevant Orders    Hemoglobin A1c    Albumin Random Urine Quantitative with Creat Ratio    Carotid artery disease (H)    Relevant Medications    atorvastatin (LIPITOR) 40 MG tablet      Other Visit Diagnoses     Screening for prostate cancer        Relevant Orders    Prostate spec antigen screen    SOB (shortness of breath)        Relevant Orders    Exercise Stress Echocardiogram           Assess lab work   Cont treatment  Preventive labs   Refer for stress ECHO, has risk factors for IHD, symptoms of SOB    Follow-Up:in 6 months     Sujit Duke MD  [facility]

## 2018-10-02 NOTE — TELEPHONE ENCOUNTER
Panel Management Review      Patient has the following on his problem list:     Diabetes    ASA: Passed    Last A1C  Lab Results   Component Value Date    A1C 6.7 10/02/2018    A1C 6.7 04/04/2018    A1C 6.3 09/18/2017    A1C 6.1 09/05/2017    A1C 6.1 02/08/2017     A1C tested: Passed    Last LDL:    Lab Results   Component Value Date    CHOL 133 09/18/2017     Lab Results   Component Value Date    HDL 37 09/18/2017     Lab Results   Component Value Date    LDL 61 09/18/2017     Lab Results   Component Value Date    TRIG 174 09/18/2017     Lab Results   Component Value Date    CHOLHDLRATIO 4.0 11/04/2015     Lab Results   Component Value Date    NHDL 96 09/18/2017       Is the patient on a Statin? YES             Is the patient on Aspirin? YES    Medications     HMG CoA Reductase Inhibitors    atorvastatin (LIPITOR) 40 MG tablet    atorvastatin (LIPITOR) 40 MG tablet    Salicylates    ASPIRIN EC PO          Last three blood pressure readings:  BP Readings from Last 3 Encounters:   10/02/18 140/76   05/18/18 148/68   04/04/18 124/80       Date of last diabetes office visit: today     Tobacco History:     History   Smoking Status     Never Smoker   Smokeless Tobacco     Never Used         Hypertension   Last three blood pressure readings:  BP Readings from Last 3 Encounters:   10/02/18 140/76   05/18/18 148/68   04/04/18 124/80     Blood pressure: MONITOR    HTN Guidelines:  Age 18-59 BP range:  Less than 140/90  Age 60-85 with Diabetes:  Less than 140/90  Age 60-85 without Diabetes:  less than 150/90      Composite cancer screening  Chart review shows that this patient is due/due soon for the following Colonoscopy  Summary:    Patient is due/failing the following:   COLONOSCOPY    Action needed:   Patient needs referral/order: Colonoscopy    Type of outreach:    Spoke to Pt, will not order Colonoscopy at this time    Questions for provider review:    None                                                                                                                                     La Yung MA       Chart routed to none .

## 2018-10-02 NOTE — MR AVS SNAPSHOT
After Visit Summary   10/2/2018    Merlin J Anderson    MRN: [id number]           Patient Information     Date Of Birth          1940        Visit Information        Provider Department      10/2/2018 9:20 AM Sujit Duke MD [facility]        Today's Diagnoses     Type 2 diabetes mellitus without complication, without long-term current use of insulin (H)    -  1    Right-sided carotid artery disease, unspecified type (H)        Hyperlipidemia LDL goal <100        Screening for prostate cancer        Essential hypertension        SOB (shortness of breath)           Follow-ups after your visit        Future tests that were ordered for you today     Open Future Orders        Priority Expected Expires Ordered    Exercise Stress Echocardiogram Routine  10/2/2019 10/2/2018            Who to contact     If you have questions or need follow up information about today's clinic visit or your schedule please contact [facility] directly at [phone].  Normal or non-critical lab and imaging results will be communicated to you by MyChart, letter or phone within 4 business days after the clinic has received the results. If you do not hear from us within 7 days, please contact the clinic through [employer] or phone. If you have a critical or abnormal lab result, we will notify you by phone as soon as possible.  Submit refill requests through [employer] or call your pharmacy and they will forward the refill request to us. Please allow 3 business days for your refill to be completed.          Additional Information About Your Visit        MyChart Information     [employer] gives you secure access to your electronic health record. If you see a primary care provider, you can also send messages to your care team and make appointments. If you have questions, please call your primary care clinic.  If you do not have a primary care provider, please call [phone] and they will assist  you.        Care EveryWhere ID     This is your Care EveryWhere ID. This could be used by other organizations to access your [facility] medical records  [id number]        Your Vitals Were     Pulse Temperature Height Pulse Oximetry BMI (Body Mass Index)       63 98  F (36.7  C) (Oral) 6' (1.829 m) 95% 28.75 kg/m2        Blood Pressure from Last 3 Encounters:   10/02/18 140/76   05/18/18 148/68   04/04/18 124/80    Weight from Last 3 Encounters:   10/02/18 212 lb (96.2 kg)   05/18/18 216 lb (98 kg)   04/04/18 213 lb 11.2 oz (96.9 kg)              We Performed the Following     Albumin Random Urine Quantitative with Creat Ratio     CBC with platelets     Comprehensive metabolic panel     Hemoglobin A1c     Lipid panel reflex to direct LDL Fasting     Prostate spec antigen screen     TSH with free T4 reflex          Where to get your medicines      These medications were sent to [id number] - [place], [place] - [address]  [address], [facility] [address]     Phone:  [phone]     atorvastatin 40 MG tablet    metFORMIN 500 MG tablet          Primary Care Provider Office Phone # Fax #    Sujit Duke [phone]       [address]  [facility] [address]        Equal Access to Services     RAUDEL ESTRELLA AH: Hadii jovita ku hadasho Soomaali, waaxda luqadaha, qaybta kaalmada adeegyada, waxay margaritain haynehemiasn sushil hawley. So [facility] [phone].    ATENCIÓN: Si habla español, tiene a pa disposición servicios gratuitos de asistencia lingüística. Llame al [phone].    We comply with applicable federal civil rights laws and [place] laws. We do not discriminate on the basis of race, color, national origin, age, disability, sex, sexual orientation, or gender identity.            Thank you!     Thank you for choosing [facility]  for your care. Our goal is always to provide you with excellent care. Hearing back from our patients is one way we can continue  to improve our services. Please take a few minutes to complete the written survey that you may receive in the mail after your visit with us. Thank you!             Your Updated Medication List - Protect others around you: Learn how to safely use, store and throw away your medicines at www.disposemymeds.org.          This list is accurate as of 10/2/18 10:05 AM.  Always use your most recent med list.                   Brand Name Dispense Instructions for use Diagnosis    ASPIRIN EC PO      Take 81 mg by mouth every morning        atenolol 100 MG tablet    TENORMIN    90 tablet    Take 1 tablet (100 mg) by mouth every morning    Benign essential hypertension       atorvastatin 40 MG tablet    LIPITOR    90 tablet    Take 1 tablet (40 mg) by mouth daily    Right-sided carotid artery disease, unspecified type (H), Hyperlipidemia LDL goal <100       blood glucose calibration solution     1 Bottle    Use to calibrate blood glucose monitor as needed as directed.    Type 2 diabetes mellitus without complication (H)       blood glucose monitoring lancets     1 Box    Use to test blood sugar 1 times daily or as directed.    Type 2 diabetes mellitus without complication, without long-term current use of insulin (H)       blood glucose monitoring test strip    ACCU-CHEK PRISCILLA    100 each    Use to test blood sugars 1 times daily or as directed.    Type 2 diabetes mellitus without complication, without long-term current use of insulin (H)       FLONASE NA      Spray 1-2 sprays in nostril daily as needed        lisinopril 20 MG tablet    PRINIVIL    90 tablet    Take 1 tablet (20 mg) by mouth daily    Benign essential hypertension       metFORMIN 500 MG tablet    GLUCOPHAGE    180 tablet    Take 1 tablet (500 mg) by mouth 2 times daily (with meals)    Type 2 diabetes mellitus without complication, without long-term current use of insulin (H)       Multi-vitamin Tabs tablet      Take 1 tablet by mouth every morning         senna-docusate 8.6-50 MG per tablet    SENOKOT-S;PERICOLACE    100 tablet    Take 1-2 tablets by mouth 2 times daily    Drug-induced constipation       tamsulosin 0.4 MG capsule    FLOMAX    90 capsule    Take 1 capsule (0.4 mg) by mouth daily    Urinary retention

## 2018-10-03 LAB
ALBUMIN SERPL-MCNC: 3.8 G/DL (ref 3.4–5)
ALP SERPL-CCNC: 77 U/L (ref 40–150)
ALT SERPL W P-5'-P-CCNC: 30 U/L (ref 0–70)
ANION GAP SERPL CALCULATED.3IONS-SCNC: 6 MMOL/L (ref 3–14)
AST SERPL W P-5'-P-CCNC: 18 U/L (ref 0–45)
BILIRUB SERPL-MCNC: 0.7 MG/DL (ref 0.2–1.3)
BUN SERPL-MCNC: 25 MG/DL (ref 7–30)
CALCIUM SERPL-MCNC: 9.1 MG/DL (ref 8.5–10.1)
CHLORIDE SERPL-SCNC: 106 MMOL/L (ref 94–109)
CHOLEST SERPL-MCNC: 110 MG/DL
CO2 SERPL-SCNC: 24 MMOL/L (ref 20–32)
CREAT SERPL-MCNC: 1.43 MG/DL (ref 0.66–1.25)
CREAT UR-MCNC: 88 MG/DL
GFR SERPL CREATININE-BSD FRML MDRD: 48 ML/MIN/1.7M2
GLUCOSE SERPL-MCNC: 161 MG/DL (ref 70–99)
HDLC SERPL-MCNC: 41 MG/DL
LDLC SERPL CALC-MCNC: 33 MG/DL
MICROALBUMIN UR-MCNC: 261 MG/L
MICROALBUMIN/CREAT UR: 294.92 MG/G CR (ref 0–17)
NONHDLC SERPL-MCNC: 69 MG/DL
POTASSIUM SERPL-SCNC: 4.4 MMOL/L (ref 3.4–5.3)
PROT SERPL-MCNC: 6.9 G/DL (ref 6.8–8.8)
PSA SERPL-ACNC: 3.28 UG/L (ref 0–4)
SODIUM SERPL-SCNC: 136 MMOL/L (ref 133–144)
TRIGL SERPL-MCNC: 182 MG/DL
TSH SERPL DL<=0.005 MIU/L-ACNC: 2.05 MU/L (ref 0.4–4)

## 2018-10-25 ENCOUNTER — TELEPHONE (OUTPATIENT)
Dept: CARDIOLOGY | Facility: CLINIC | Age: 78
End: 2018-10-25

## 2018-10-25 ENCOUNTER — OFFICE VISIT (OUTPATIENT)
Dept: CARDIOLOGY | Facility: CLINIC | Age: 78
End: 2018-10-25
Payer: COMMERCIAL

## 2018-10-25 ENCOUNTER — HOSPITAL ENCOUNTER (OUTPATIENT)
Dept: CARDIOLOGY | Facility: CLINIC | Age: 78
Discharge: HOME | End: 2018-10-25
Attending: INTERNAL MEDICINE | Admitting: INTERNAL MEDICINE
Payer: COMMERCIAL

## 2018-10-25 DIAGNOSIS — N28.9 RENAL INSUFFICIENCY: Primary | ICD-10-CM

## 2018-10-25 DIAGNOSIS — R94.39 ABNORMAL CARDIOVASCULAR STRESS TEST: Primary | ICD-10-CM

## 2018-10-25 DIAGNOSIS — R06.02 SOB (SHORTNESS OF BREATH): ICD-10-CM

## 2018-10-25 PROCEDURE — 25500064 ZZH RX 255 OP 636: Performed by: INTERNAL MEDICINE

## 2018-10-25 PROCEDURE — 93321 DOPPLER ECHO F-UP/LMTD STD: CPT | Mod: 26 | Performed by: INTERNAL MEDICINE

## 2018-10-25 PROCEDURE — 93321 DOPPLER ECHO F-UP/LMTD STD: CPT | Mod: TC

## 2018-10-25 PROCEDURE — 93325 DOPPLER ECHO COLOR FLOW MAPG: CPT | Mod: 26 | Performed by: INTERNAL MEDICINE

## 2018-10-25 PROCEDURE — 99205 OFFICE O/P NEW HI 60 MIN: CPT | Performed by: INTERNAL MEDICINE

## 2018-10-25 PROCEDURE — 93016 CV STRESS TEST SUPVJ ONLY: CPT | Performed by: INTERNAL MEDICINE

## 2018-10-25 PROCEDURE — 93350 STRESS TTE ONLY: CPT | Mod: 26 | Performed by: INTERNAL MEDICINE

## 2018-10-25 PROCEDURE — 93018 CV STRESS TEST I&R ONLY: CPT | Performed by: INTERNAL MEDICINE

## 2018-10-25 RX ORDER — POTASSIUM CHLORIDE 750 MG/1
20 TABLET, EXTENDED RELEASE ORAL
Status: CANCELLED | OUTPATIENT
Start: 2018-10-25

## 2018-10-25 RX ORDER — LIDOCAINE 40 MG/G
CREAM TOPICAL
Status: CANCELLED | OUTPATIENT
Start: 2018-10-25

## 2018-10-25 RX ORDER — ASPIRIN 81 MG/1
81 TABLET ORAL DAILY
Status: CANCELLED | OUTPATIENT
Start: 2018-10-25

## 2018-10-25 RX ORDER — NITROGLYCERIN 0.4 MG/1
TABLET SUBLINGUAL
Qty: 25 TABLET | Refills: 3 | Status: SHIPPED | OUTPATIENT
Start: 2018-10-25

## 2018-10-25 RX ORDER — SODIUM CHLORIDE 9 MG/ML
INJECTION, SOLUTION INTRAVENOUS CONTINUOUS
Status: CANCELLED | OUTPATIENT
Start: 2018-10-25

## 2018-10-25 RX ADMIN — HUMAN ALBUMIN MICROSPHERES AND PERFLUTREN 3 ML: 10; .22 INJECTION, SOLUTION INTRAVENOUS at 10:30

## 2018-10-25 NOTE — LETTER
10/25/2018    Sujit Duke MD  [address] [facility] [address]    RE: Merlin J Anderson       Dear Colleague,    I had the pleasure of seeing Merlin J Anderson in the [facility].    HPI and Plan:   See dictation(#[id number])    No orders of the defined types were placed in this encounter.      Orders Placed This Encounter   Medications     nitroGLYcerin (NITROSTAT) 0.4 MG sublingual tablet     Sig: For chest pain place 1 tablet under the tongue every 5 minutes for 3 doses. If symptoms persist 5 minutes after 1st dose call 911.     Dispense:  25 tablet     Refill:  3       There are no discontinued medications.      Encounter Diagnosis   Name Primary?     Abnormal cardiovascular stress test Yes       CURRENT MEDICATIONS:  Current Outpatient Prescriptions   Medication Sig Dispense Refill     nitroGLYcerin (NITROSTAT) 0.4 MG sublingual tablet For chest pain place 1 tablet under the tongue every 5 minutes for 3 doses. If symptoms persist 5 minutes after 1st dose call 911. 25 tablet 3     ASPIRIN EC PO Take 81 mg by mouth every morning       atenolol (TENORMIN) 100 MG tablet Take 1 tablet (100 mg) by mouth every morning 90 tablet 3     atorvastatin (LIPITOR) 40 MG tablet Take 1 tablet (40 mg) by mouth daily 90 tablet 3     blood glucose calibration (ACCU-CHEK PRISCILLA) solution Use to calibrate blood glucose monitor as needed as directed. 1 Bottle 0     blood glucose monitoring (ACCU-CHEK PRISCILLA) test strip Use to test blood sugars 1 times daily or as directed. 100 each 1     blood glucose monitoring (ACCU-CHEK MULTICLIX) lancets Use to test blood sugar 1 times daily or as directed. 1 Box 1     Fluticasone Propionate (FLONASE NA) Spray 1-2 sprays in nostril daily as needed       lisinopril (PRINIVIL) 20 MG tablet Take 1 tablet (20 mg) by mouth daily 90 tablet 3     metFORMIN (GLUCOPHAGE) 500 MG tablet Take 1 tablet (500 mg) by mouth 2 times daily (with meals) 180 tablet 1      multivitamin, therapeutic with minerals (MULTI-VITAMIN) TABS tablet Take 1 tablet by mouth every morning       senna-docusate (SENOKOT-S;PERICOLACE) 8.6-50 MG per tablet Take 1-2 tablets by mouth 2 times daily 100 tablet 0     tamsulosin (FLOMAX) 0.4 MG capsule Take 1 capsule (0.4 mg) by mouth daily 90 capsule 3       ALLERGIES   No Known Allergies    PAST MEDICAL HISTORY:  Past Medical History:   Diagnosis Date     Diabetes mellitus (H)     Pre diabetic     Essential hypertension, benign        PAST SURGICAL HISTORY:  Past Surgical History:   Procedure Laterality Date     C NONSPECIFIC PROCEDURE  04/97    Neg. colonoscopy.     C NONSPECIFIC PROCEDURE      S/P ing. hernia.     DECOMPRESSION, FUSION LUMBAR POSTERIOR ONE LEVEL, COMBINED  8/24/2012    Procedure: COMBINED DECOMPRESSION, FUSION LUMBAR POSTERIOR ONE LEVEL;  Posterior Fusion wtih Decompression L4-5;  Surgeon: Rod Carbajal MD;  Location: [facility] OR     ENDARTERECTOMY CAROTID Right 9/18/2017    Procedure: ENDARTERECTOMY CAROTID;  RIGHT CAROTID ENDARTERECTOMY, WITH PATCH ANGIOPLASTY, WITH ELECTOENCEPHALOGAM            (EEG);  Surgeon: Catalino Scott MD;  Location: [facility] OR     HERNIA REPAIR         FAMILY HISTORY:  Family History   Problem Relation Age of Onset     Diabetes Father      Unknown/Adopted Mother        SOCIAL HISTORY:  Social History     Social History     Marital status:      Spouse name: N/A     Number of children: N/A     Years of education: N/A     Social History Main Topics     Smoking status: Never Smoker     Smokeless tobacco: Never Used     Alcohol use Yes      Comment: 1 monthly or less.     Drug use: No     Sexual activity: Yes     Partners: Female     Other Topics Concern     Caffeine Concern No     Occupational Exposure No     Hobby Hazards No     Sleep Concern No     Stress Concern No     Weight Concern No     Special Diet No     Back Care No     Exercise Yes     bikes, treadmill      Seat Belt Yes     Social History  Narrative       Review of Systems: a complete ROS was performed and was negative except as in HPI.  Skin:          Eyes:         ENT:         Respiratory:          Cardiovascular:         Gastroenterology:        Genitourinary:         Musculoskeletal:         Neurologic:         Psychiatric:         Heme/Lymph/Imm:         Endocrine:           Physical Exam:  Vitals: There were no vitals taken for this visit.    Constitutional:           Skin:             Head:           Eyes:           Lymph:      ENT:           Neck:           Respiratory:            Cardiac:                                                           GI:           Extremities and Muscular Skeletal:                 Neurological:           Psych:             CC  No referring provider defined for this encounter.                    Thank you for allowing me to participate in the care of your patient.      Sincerely,     Davi Rebolledo MD     [facility]    cc:   No referring provider defined for this encounter.

## 2018-10-25 NOTE — TELEPHONE ENCOUNTER
Procedure Prep Instructions    Left Coronary Angiogram    Scheduled: 10-26-18  Location: [facility]  Check-in time: 630 am   Procedure time: 830 am     Nothing to eat or drink after midnight, the night before the procedure.       Medications that can be taken on the morning of the procedure (with small sips of water): ASA, Atenolol, Lisinopril       Patient should take his other medications when he returns home.       Patient will need to hold his Metformin on the morning of the procedure until Monday 10-29-18.     Patient will need to arrange for someone to drive him home, and stay with him for 24 hours after the procedure.     Patient may want to pack an overnight bag just in case he may need to stay overnight.    ----------------------------------------------------------------------------------------------------------------------      Called patient to review prep instructions, patient was not available. I left patient a message to return my call.      A BMP is needed on 10-29-18. When lab results are available, patient will receive a call from a nurse to review his BMP results and to let him know if he can restart his Metformin.      Joe PARTIDA

## 2018-10-25 NOTE — LETTER
10/25/2018      Sujit Duke MD  [address] [facility] [address]      RE: Merlin J Anderson       Dear Colleague,    I had the pleasure of seeing Merlin J Anderson in the [facility].    Service Date: 10/25/2018      CARDIOLOGY OFFICE VISIT       REASON FOR CARDIOLOGY CLINIC VISIT:  Abnormal stress test.      HISTORY OF PRESENT ILLNESS:  Mr. Warner is a very pleasant 78-year-old gentleman with history of diabetes, hypertension, dyslipidemia, chronic kidney disease stage III with baseline creatinine around 1.5, history of right carotid artery endarterectomy last year who came here for an outpatient stress echocardiogram.  This was ordered by Dr. Duke, his primary care physician.  The patient was seen in the beginning of this month and he was complaining of longstanding dyspnea on exertion.  This prompted an exercise stress echocardiogram today.  He exercised for 3 minutes 10 seconds.  The exercise was stopped because of dyspnea.  The patient achieved 74% of the maximum predicted heart rate.  He did not have any chest pain.  EKG showed 2 mm horizontal ST depression in inferolateral leads with ST elevation in lead AVR.  On echocardiographic portion, the LV function and wall motion were normal at rest but post exercise, there was increase in the size of LV and decrease in the LV systolic function to around 30%-35% and there was severe hypokinesia of septal, apical lateral and rgn-gd-hmmmat anterior wall.  I am seeing the patient today as an urgent add on.  The patient is accompanied by his wife.  He tells me that he has been experiencing dyspnea on exertion for a long time, several months, if not more than a year.  Fortunately, the symptoms are stable.  He tells me that he gets predictable dyspnea after riding a bike for a mile or raking leaves for about 15 minutes.  With normal physical day-to-day activities like simply walking at home, climbing stairs at home or  simple vacuuming at home, he feels well.  No chest discomfort at rest or with physical activity.  No dizziness, presyncope or syncope.  He denies any bleeding issues.  Presently, he is on baby aspirin.  He is on atenolol 100 mg daily.  He is on lisinopril 20 mg daily.  He is on Lipitor 40 mg daily.  His latest creatinine was 1.43.  His latest LDL is 33.  Hemoglobin A1c is 6.7.  No bleeding issues, no anticipated surgeries.      PHYSICAL EXAMINATION:   VITAL SIGNS:  Blood pressure 120/60, heart rate 64.   GENERAL:  The patient appears pleasant, comfortable.   NECK:  Right carotid endarterectomy scar seen.  No bruit heard.  JVP appears normal.   CARDIOVASCULAR SYSTEM:  S1, S2 normal, no murmur, rub or gallop.   RESPIRATORY SYSTEM:  Clear to auscultation bilaterally.   GASTROINTESTINAL SYSTEM:  Abdomen soft, nontender.   EXTREMITIES:  No pitting pedal edema.   NEUROLOGICAL:  Alert, oriented x3.   PSYCHIATRIC:  Normal affect.   SKIN:  No obvious rash.   HEENT:  No pallor or icterus.      RADIOLOGIC STUDIES:  Stress echocardiogram results as noted above, personally reviewed.      IMPRESSION AND PLAN:  A very pleasant 78-year-old gentleman with history of hypertension, diabetes, dyslipidemia, chronic kidney disease stage III who has been having exertional shortness of breath for several months, probably a year, fortunately stable and nature as noted above.   Today, exercise stress echocardiogram is highly concerning for multivessel coronary artery disease.  I had a long discussion with patient and his wife regarding the nature of his symptoms and abnormal stress echocardiogram.  Images were also reviewed with the patient and his wife.  Fortunately, he is on appropriate CAD medical regimen therapy of aspirin, high-intensity statin, beta blocker and ACE inhibitor.  LDL and blood pressure are both well controlled.  We discussed the option of coronary angiogram, the rationale for coronary angiogram, the risks involved which  include but are not limited to risk of stroke, MI, death, need for PRBC transfusion, slightly higher than average risk of further renal dysfunction, need for emergent bypass surgery.  The patient understands the rationale for coronary angiogram, the risks involved and the alternative and wishes to proceed with coronary angiogram.  We will try to get to this coronary angiogram done soon, probably by tomorrow.  Fortunately, no resting symptoms and as noted above, the symptoms have been stable for several months.  I have also given him a prescription for sublingual nitroglycerin in the rare case he gets any resting chest discomfort.  If he gets resting symptoms or resting chest discomfort especially, he should call 911.  I refrained him from doing any strenuous physical activity but he can continue light physical activity level for which he does not have any symptoms at this time.  I recommend pre-coronary angiogram IV hydration for renal protection.  If needed, he will be a reasonable candidate for long-term dual antiplatelet therapy.  I also discussed with the patient and his wife there is a high chance that we will find multivessel coronary artery disease and this may require CABG and review with CV Surgery.   1.  Newly diagnosed CAD on the basis of abnormal stress test which is suggestive of multivessel coronary disease.   2.  Symptoms of dyspnea on exertion, fortunately stable which is his anginal equivalent.  No chest discomfort.   3.  *** hypertension, diabetes, dyslipidemia which also seem well controlled.   4.  Chronic kidney disease, stage III.  Creatinine appears baseline around 1.5.      RECOMMENDATIONS:     1.  Coronary angiogram with possible revascularization.   2.  Continue aspirin, statin, beta blocker, ACE inhibitor.   3.  Sublingual nitroglycerin prescribed, use as discussed.  The patient is not taking any Cialis and Viagra and I discussed that patient should not combine any nitroglycerin with any of  these medications.   4.  No strenuous physical exercise until coronary angiogram is done.      Seventy minutes of total time was spent with more than 50% in counseling and coordination of care.         VICTORIANO NUNES MD             D: 10/25/2018   T: 10/25/2018   MT: TEO      Name:     ANDERSON, MERLIN   MRN:      [id number]        Account:      [id number]   :      1940           Service Date: 10/25/2018      Document: [id number]         No facility-administered encounter medications on file as of 10/25/2018.      Outpatient Encounter Prescriptions as of 10/25/2018   Medication Sig Dispense Refill     nitroGLYcerin (NITROSTAT) 0.4 MG sublingual tablet For chest pain place 1 tablet under the tongue every 5 minutes for 3 doses. If symptoms persist 5 minutes after 1st dose call 911. 25 tablet 3     ASPIRIN EC PO Take 81 mg by mouth every morning       atenolol (TENORMIN) 100 MG tablet Take 1 tablet (100 mg) by mouth every morning 90 tablet 3     atorvastatin (LIPITOR) 40 MG tablet Take 1 tablet (40 mg) by mouth daily 90 tablet 3     blood glucose calibration (ACCU-CHEK PRISCILLA) solution Use to calibrate blood glucose monitor as needed as directed. 1 Bottle 0     blood glucose monitoring (ACCU-CHEK PRISCILLA) test strip Use to test blood sugars 1 times daily or as directed. 100 each 1     blood glucose monitoring (ACCU-CHEK MULTICLIX) lancets Use to test blood sugar 1 times daily or as directed. 1 Box 1     Fluticasone Propionate (FLONASE NA) Spray 1-2 sprays in nostril daily as needed       lisinopril (PRINIVIL) 20 MG tablet Take 1 tablet (20 mg) by mouth daily 90 tablet 3     metFORMIN (GLUCOPHAGE) 500 MG tablet Take 1 tablet (500 mg) by mouth 2 times daily (with meals) 180 tablet 1     multivitamin, therapeutic with minerals (MULTI-VITAMIN) TABS tablet Take 1 tablet by mouth every morning       tamsulosin (FLOMAX) 0.4 MG capsule Take 1 capsule (0.4 mg) by mouth daily 90 capsule 3     [DISCONTINUED] senna-docusate  (SENOKOT-S;PERICOLACE) 8.6-50 MG per tablet Take 1-2 tablets by mouth 2 times daily 100 tablet 0       Again, thank you for allowing me to participate in the care of your patient.      Sincerely,    Davi Rebolledo MD     [facility]

## 2018-10-25 NOTE — PROGRESS NOTES
HPI and Plan:   See dictation(#[id number])    No orders of the defined types were placed in this encounter.      Orders Placed This Encounter   Medications     nitroGLYcerin (NITROSTAT) 0.4 MG sublingual tablet     Sig: For chest pain place 1 tablet under the tongue every 5 minutes for 3 doses. If symptoms persist 5 minutes after 1st dose call 911.     Dispense:  25 tablet     Refill:  3       There are no discontinued medications.      Encounter Diagnosis   Name Primary?     Abnormal cardiovascular stress test Yes       CURRENT MEDICATIONS:  Current Outpatient Prescriptions   Medication Sig Dispense Refill     nitroGLYcerin (NITROSTAT) 0.4 MG sublingual tablet For chest pain place 1 tablet under the tongue every 5 minutes for 3 doses. If symptoms persist 5 minutes after 1st dose call 911. 25 tablet 3     ASPIRIN EC PO Take 81 mg by mouth every morning       atenolol (TENORMIN) 100 MG tablet Take 1 tablet (100 mg) by mouth every morning 90 tablet 3     atorvastatin (LIPITOR) 40 MG tablet Take 1 tablet (40 mg) by mouth daily 90 tablet 3     blood glucose calibration (ACCU-CHEK PRISCILLA) solution Use to calibrate blood glucose monitor as needed as directed. 1 Bottle 0     blood glucose monitoring (ACCU-CHEK PRISCILLA) test strip Use to test blood sugars 1 times daily or as directed. 100 each 1     blood glucose monitoring (ACCU-CHEK MULTICLIX) lancets Use to test blood sugar 1 times daily or as directed. 1 Box 1     Fluticasone Propionate (FLONASE NA) Spray 1-2 sprays in nostril daily as needed       lisinopril (PRINIVIL) 20 MG tablet Take 1 tablet (20 mg) by mouth daily 90 tablet 3     metFORMIN (GLUCOPHAGE) 500 MG tablet Take 1 tablet (500 mg) by mouth 2 times daily (with meals) 180 tablet 1     multivitamin, therapeutic with minerals (MULTI-VITAMIN) TABS tablet Take 1 tablet by mouth every morning       senna-docusate (SENOKOT-S;PERICOLACE) 8.6-50 MG per tablet Take 1-2 tablets by mouth 2 times daily 100 tablet 0      tamsulosin (FLOMAX) 0.4 MG capsule Take 1 capsule (0.4 mg) by mouth daily 90 capsule 3       ALLERGIES   No Known Allergies    PAST MEDICAL HISTORY:  Past Medical History:   Diagnosis Date     Diabetes mellitus (H)     Pre diabetic     Essential hypertension, benign        PAST SURGICAL HISTORY:  Past Surgical History:   Procedure Laterality Date     C NONSPECIFIC PROCEDURE  04/97    Neg. colonoscopy.     C NONSPECIFIC PROCEDURE      S/P ing. hernia.     DECOMPRESSION, FUSION LUMBAR POSTERIOR ONE LEVEL, COMBINED  8/24/2012    Procedure: COMBINED DECOMPRESSION, FUSION LUMBAR POSTERIOR ONE LEVEL;  Posterior Fusion wtih Decompression L4-5;  Surgeon: Rod Carbajal MD;  Location: [facility] OR     ENDARTERECTOMY CAROTID Right 9/18/2017    Procedure: ENDARTERECTOMY CAROTID;  RIGHT CAROTID ENDARTERECTOMY, WITH PATCH ANGIOPLASTY, WITH ELECTOENCEPHALOGAM            (EEG);  Surgeon: Catalino Scott MD;  Location: [facility] OR     HERNIA REPAIR         FAMILY HISTORY:  Family History   Problem Relation Age of Onset     Diabetes Father      Unknown/Adopted Mother        SOCIAL HISTORY:  Social History     Social History     Marital status:      Spouse name: N/A     Number of children: N/A     Years of education: N/A     Social History Main Topics     Smoking status: Never Smoker     Smokeless tobacco: Never Used     Alcohol use Yes      Comment: 1 monthly or less.     Drug use: No     Sexual activity: Yes     Partners: Female     Other Topics Concern     Caffeine Concern No     Occupational Exposure No     Hobby Hazards No     Sleep Concern No     Stress Concern No     Weight Concern No     Special Diet No     Back Care No     Exercise Yes     bikes, treadmill      Seat Belt Yes     Social History Narrative       Review of Systems: a complete ROS was performed and was negative except as in HPI.  Skin:          Eyes:         ENT:         Respiratory:          Cardiovascular:         Gastroenterology:        Genitourinary:          Musculoskeletal:         Neurologic:         Psychiatric:         Heme/Lymph/Imm:         Endocrine:           Physical Exam:  Vitals: There were no vitals taken for this visit.    Constitutional:           Skin:             Head:           Eyes:           Lymph:      ENT:           Neck:           Respiratory:            Cardiac:                                                           GI:           Extremities and Muscular Skeletal:                 Neurological:           Psych:             CC  No referring provider defined for this encounter.

## 2018-10-25 NOTE — PROGRESS NOTES
Service Date: 10/25/2018      CARDIOLOGY OFFICE VISIT       REASON FOR CARDIOLOGY CLINIC VISIT:  Abnormal stress test.      HISTORY OF PRESENT ILLNESS:  Mr. Warner is a very pleasant 78-year-old gentleman with history of diabetes, hypertension, dyslipidemia, chronic kidney disease stage III with baseline creatinine around 1.5, history of right carotid artery endarterectomy last year who came here for an outpatient stress echocardiogram.  This was ordered by Dr. Duke, his primary care physician.  The patient was seen in the beginning of this month and he was complaining of longstanding dyspnea on exertion.  This prompted an exercise stress echocardiogram today.  He exercised for 3 minutes 10 seconds.  The exercise was stopped because of dyspnea.  The patient achieved 74% of the maximum predicted heart rate.  He did not have any chest pain.  EKG showed 2 mm horizontal ST depression in inferolateral leads with ST elevation in lead AVR.  On echocardiographic portion, the LV function and wall motion were normal at rest but post exercise, there was increase in the size of LV and decrease in the LV systolic function to around 30%-35% and there was severe hypokinesia of septal, apical lateral and bsx-yx-tagzac anterior wall.  I am seeing the patient today as an urgent add on.  The patient is accompanied by his wife.  He tells me that he has been experiencing dyspnea on exertion for a long time, several months, if not more than a year.  Fortunately, the symptoms are stable.  He tells me that he gets predictable dyspnea after riding a bike for a mile or raking leaves for about 15 minutes.  With normal physical day-to-day activities like simply walking at home, climbing stairs at home or simple vacuuming at home, he feels well.  No chest discomfort at rest or with physical activity.  No dizziness, presyncope or syncope.  He denies any bleeding issues.  Presently, he is on baby aspirin.  He is on atenolol 100 mg daily.  He  is on lisinopril 20 mg daily.  He is on Lipitor 40 mg daily.  His latest creatinine was 1.43.  His latest LDL is 33.  Hemoglobin A1c is 6.7.  No bleeding issues, no anticipated surgeries.      PHYSICAL EXAMINATION:   VITAL SIGNS:  Blood pressure 120/60, heart rate 64.   GENERAL:  The patient appears pleasant, comfortable.   NECK:  Right carotid endarterectomy scar seen.  No bruit heard.  JVP appears normal.   CARDIOVASCULAR SYSTEM:  S1, S2 normal, no murmur, rub or gallop.   RESPIRATORY SYSTEM:  Clear to auscultation bilaterally.   GASTROINTESTINAL SYSTEM:  Abdomen soft, nontender.   EXTREMITIES:  No pitting pedal edema.   NEUROLOGICAL:  Alert, oriented x3.   PSYCHIATRIC:  Normal affect.   SKIN:  No obvious rash.   HEENT:  No pallor or icterus.      RADIOLOGIC STUDIES:  Stress echocardiogram results as noted above, personally reviewed.      IMPRESSION AND PLAN:  A very pleasant 78-year-old gentleman with history of hypertension, diabetes, dyslipidemia, chronic kidney disease stage III who has been having exertional shortness of breath for several months, probably a year, fortunately stable and nature as noted above.   Today, exercise stress echocardiogram is highly concerning for multivessel coronary artery disease.  I had a long discussion with patient and his wife regarding the nature of his symptoms and abnormal stress echocardiogram.  Images were also reviewed with the patient and his wife.  Fortunately, he is on appropriate CAD medical regimen therapy of aspirin, high-intensity statin, beta blocker and ACE inhibitor.  LDL and blood pressure are both well controlled.  We discussed the option of coronary angiogram, the rationale for coronary angiogram, the risks involved which include but are not limited to risk of stroke, MI, death, need for PRBC transfusion, slightly higher than average risk of further renal dysfunction, need for emergent bypass surgery.  The patient understands the rationale for coronary  angiogram, the risks involved and the alternative and wishes to proceed with coronary angiogram.  We will try to get to this coronary angiogram done soon, probably by tomorrow.  Fortunately, no resting symptoms and as noted above, the symptoms have been stable for several months.  I have also given him a prescription for sublingual nitroglycerin in the rare case he gets any resting chest discomfort.  If he gets resting symptoms or resting chest discomfort especially, he should call 911.  I refrained him from doing any strenuous physical activity but he can continue light physical activity level for which he does not have any symptoms at this time.  I recommend pre-coronary angiogram IV hydration for renal protection.  If needed, he will be a reasonable candidate for long-term dual antiplatelet therapy.  I also discussed with the patient and his wife there is a high chance that we will find multivessel coronary artery disease and this may require CABG and review with CV Surgery.   1.  Newly diagnosed CAD on the basis of abnormal stress test which is suggestive of multivessel coronary disease.   2.  Symptoms of dyspnea on exertion, fortunately stable which is his anginal equivalent.  No chest discomfort.   3.  CAD risk factors of hypertension, diabetes, dyslipidemia which also seem well controlled.   4.  Chronic kidney disease, stage III.  Creatinine appears baseline around 1.5.      RECOMMENDATIONS:     1.  Coronary angiogram with possible revascularization.   2.  Continue aspirin, statin, beta blocker, ACE inhibitor.   3.  Sublingual nitroglycerin prescribed, use as discussed.  The patient is not taking any Cialis and Viagra and I discussed that patient should not combine any nitroglycerin with any of these medications.   4.  No strenuous physical exercise until coronary angiogram is done.      Seventy minutes of total time was spent with more than 50% in counseling and coordination of care.         VICTORIANO NUNES,  MD             D: 10/25/2018   T: 10/25/2018   MT: TEO      Name:     ANDERSON, MERLIN   MRN:      [id number]        Account:      [id number]   :      1940           Service Date: 10/25/2018      Document: [id number]

## 2018-10-25 NOTE — PROGRESS NOTES
Patient came in to cardio for a treadmill stress echo.  Patient did have symptoms on the treadmill so Dr. Rebolledo was contacted.  Dr. Rebolledo went over the results with the patient and was set up for an angiogram for tomorrow 10/26/2018.

## 2018-10-25 NOTE — LETTER
10/25/2018      Sujit Duke MD  [address] [facility] [address]      RE: Merlin J Anderson       Dear Colleague,    I had the pleasure of seeing Merlin J Anderson in the [facility].    Service Date: 10/25/2018      CARDIOLOGY OFFICE VISIT       REASON FOR CARDIOLOGY CLINIC VISIT:  Abnormal stress test.      HISTORY OF PRESENT ILLNESS:  Mr. Warner is a very pleasant 78-year-old gentleman with history of diabetes, hypertension, dyslipidemia, chronic kidney disease stage III with baseline creatinine around 1.5, history of right carotid artery endarterectomy last year who came here for an outpatient stress echocardiogram.  This was ordered by Dr. Duke, his primary care physician.  The patient was seen in the beginning of this month and he was complaining of longstanding dyspnea on exertion.  This prompted an exercise stress echocardiogram today.  He exercised for 3 minutes 10 seconds.  The exercise was stopped because of dyspnea.  The patient achieved 74% of the maximum predicted heart rate.  He did not have any chest pain.  EKG showed 2 mm horizontal ST depression in inferolateral leads with ST elevation in lead AVR.  On echocardiographic portion, the LV function and wall motion were normal at rest but post exercise, there was increase in the size of LV and decrease in the LV systolic function to around 30%-35% and there was severe hypokinesia of septal, apical lateral and hoy-sm-xkapko anterior wall.  I am seeing the patient today as an urgent add on.  The patient is accompanied by his wife.  He tells me that he has been experiencing dyspnea on exertion for a long time, several months, if not more than a year.  Fortunately, the symptoms are stable.  He tells me that he gets predictable dyspnea after riding a bike for a mile or raking leaves for about 15 minutes.  With normal physical day-to-day activities like simply walking at home, climbing stairs at home or  simple vacuuming at home, he feels well.  No chest discomfort at rest or with physical activity.  No dizziness, presyncope or syncope.  He denies any bleeding issues.  Presently, he is on baby aspirin.  He is on atenolol 100 mg daily.  He is on lisinopril 20 mg daily.  He is on Lipitor 40 mg daily.  His latest creatinine was 1.43.  His latest LDL is 33.  Hemoglobin A1c is 6.7.  No bleeding issues, no anticipated surgeries.      PHYSICAL EXAMINATION:   VITAL SIGNS:  Blood pressure 120/60, heart rate 64.   GENERAL:  The patient appears pleasant, comfortable.   NECK:  Right carotid endarterectomy scar seen.  No bruit heard.  JVP appears normal.   CARDIOVASCULAR SYSTEM:  S1, S2 normal, no murmur, rub or gallop.   RESPIRATORY SYSTEM:  Clear to auscultation bilaterally.   GASTROINTESTINAL SYSTEM:  Abdomen soft, nontender.   EXTREMITIES:  No pitting pedal edema.   NEUROLOGICAL:  Alert, oriented x3.   PSYCHIATRIC:  Normal affect.   SKIN:  No obvious rash.   HEENT:  No pallor or icterus.      RADIOLOGIC STUDIES:  Stress echocardiogram results as noted above, personally reviewed.      IMPRESSION AND PLAN:  A very pleasant 78-year-old gentleman with history of hypertension, diabetes, dyslipidemia, chronic kidney disease stage III who has been having exertional shortness of breath for several months, probably a year, fortunately stable and nature as noted above.   Today, exercise stress echocardiogram is highly concerning for multivessel coronary artery disease.  I had a long discussion with patient and his wife regarding the nature of his symptoms and abnormal stress echocardiogram.  Images were also reviewed with the patient and his wife.  Fortunately, he is on appropriate CAD medical regimen therapy of aspirin, high-intensity statin, beta blocker and ACE inhibitor.  LDL and blood pressure are both well controlled.  We discussed the option of coronary angiogram, the rationale for coronary angiogram, the risks involved which  include but are not limited to risk of stroke, MI, death, need for PRBC transfusion, slightly higher than average risk of further renal dysfunction, need for emergent bypass surgery.  The patient understands the rationale for coronary angiogram, the risks involved and the alternative and wishes to proceed with coronary angiogram.  We will try to get to this coronary angiogram done soon, probably by tomorrow.  Fortunately, no resting symptoms and as noted above, the symptoms have been stable for several months.  I have also given him a prescription for sublingual nitroglycerin in the rare case he gets any resting chest discomfort.  If he gets resting symptoms or resting chest discomfort especially, he should call 911.  I refrained him from doing any strenuous physical activity but he can continue light physical activity level for which he does not have any symptoms at this time.  I recommend pre-coronary angiogram IV hydration for renal protection.  If needed, he will be a reasonable candidate for long-term dual antiplatelet therapy.  I also discussed with the patient and his wife there is a high chance that we will find multivessel coronary artery disease and this may require CABG and review with CV Surgery.   1.  Newly diagnosed CAD on the basis of abnormal stress test which is suggestive of multivessel coronary disease.   2.  Symptoms of dyspnea on exertion, fortunately stable which is his anginal equivalent.  No chest discomfort.   3.  CAD risk factors of hypertension, diabetes, dyslipidemia which also seem well controlled.   4.  Chronic kidney disease, stage III.  Creatinine appears baseline around 1.5.      RECOMMENDATIONS:     1.  Coronary angiogram with possible revascularization.   2.  Continue aspirin, statin, beta blocker, ACE inhibitor.   3.  Sublingual nitroglycerin prescribed, use as discussed.  The patient is not taking any Cialis and Viagra and I discussed that patient should not combine any  nitroglycerin with any of these medications.   4.  No strenuous physical exercise until coronary angiogram is done.      Seventy minutes of total time was spent with more than 50% in counseling and coordination of care.         VICTORIANO NUNES MD             D: 10/25/2018   T: 10/25/2018   MT: TEO      Name:     ANDERSON, MERLIN   MRN:      [id number]        Account:      [id number]   :      1940           Service Date: 10/25/2018      Document: [id number]           Outpatient Encounter Prescriptions as of 10/25/2018   Medication Sig Dispense Refill     nitroGLYcerin (NITROSTAT) 0.4 MG sublingual tablet For chest pain place 1 tablet under the tongue every 5 minutes for 3 doses. If symptoms persist 5 minutes after 1st dose call 911. 25 tablet 3     atorvastatin (LIPITOR) 40 MG tablet Take 1 tablet (40 mg) by mouth daily 90 tablet 3     blood glucose calibration (ACCU-CHEK PRISCILLA) solution Use to calibrate blood glucose monitor as needed as directed. 1 Bottle 0     blood glucose monitoring (ACCU-CHEK PRISCILLA) test strip Use to test blood sugars 1 times daily or as directed. 100 each 1     blood glucose monitoring (ACCU-CHEK MULTICLIX) lancets Use to test blood sugar 1 times daily or as directed. 1 Box 1     metFORMIN (GLUCOPHAGE) 500 MG tablet Take 1 tablet (500 mg) by mouth 2 times daily (with meals) 180 tablet 1     multivitamin, therapeutic with minerals (MULTI-VITAMIN) TABS tablet Take 1 tablet by mouth every morning       tamsulosin (FLOMAX) 0.4 MG capsule Take 1 capsule (0.4 mg) by mouth daily 90 capsule 3     [DISCONTINUED] ASPIRIN EC PO Take 81 mg by mouth every morning       [DISCONTINUED] atenolol (TENORMIN) 100 MG tablet Take 1 tablet (100 mg) by mouth every morning 90 tablet 3     [DISCONTINUED] Fluticasone Propionate (FLONASE NA) Spray 1-2 sprays in nostril daily as needed       [DISCONTINUED] lisinopril (PRINIVIL) 20 MG tablet Take 1 tablet (20 mg) by mouth daily 90 tablet 3     [DISCONTINUED]  senna-docusate (SENOKOT-S;PERICOLACE) 8.6-50 MG per tablet Take 1-2 tablets by mouth 2 times daily 100 tablet 0     No facility-administered encounter medications on file as of 10/25/2018.        Again, thank you for allowing me to participate in the care of your patient.      Sincerely,    Davi Rebolledo MD     [facility]

## 2018-10-25 NOTE — TELEPHONE ENCOUNTER
Per scheduling. Patient had his stress test today at [facility]. Toby (from [facility] cath lab) reviewed prep instruction with patient. BMP still needs to be scheduled. Joe PARTIDA

## 2018-10-26 ENCOUNTER — APPOINTMENT (OUTPATIENT)
Dept: GENERAL RADIOLOGY | Facility: CLINIC | Age: 78
DRG: 233 | End: 2018-10-26
Attending: PHYSICIAN ASSISTANT
Payer: COMMERCIAL

## 2018-10-26 ENCOUNTER — APPOINTMENT (OUTPATIENT)
Dept: ULTRASOUND IMAGING | Facility: CLINIC | Age: 78
DRG: 233 | End: 2018-10-26
Attending: PHYSICIAN ASSISTANT
Payer: COMMERCIAL

## 2018-10-26 ENCOUNTER — APPOINTMENT (OUTPATIENT)
Dept: CARDIOLOGY | Facility: CLINIC | Age: 78
DRG: 233 | End: 2018-10-26
Attending: INTERNAL MEDICINE
Payer: COMMERCIAL

## 2018-10-26 ENCOUNTER — ANESTHESIA (OUTPATIENT)
Dept: SURGERY | Facility: CLINIC | Age: 78
DRG: 233 | End: 2018-10-26
Payer: COMMERCIAL

## 2018-10-26 ENCOUNTER — APPOINTMENT (OUTPATIENT)
Dept: CARDIOLOGY | Facility: CLINIC | Age: 78
DRG: 233 | End: 2018-10-26
Attending: SURGERY
Payer: COMMERCIAL

## 2018-10-26 ENCOUNTER — ANESTHESIA EVENT (OUTPATIENT)
Dept: SURGERY | Facility: CLINIC | Age: 78
DRG: 233 | End: 2018-10-26
Payer: COMMERCIAL

## 2018-10-26 ENCOUNTER — HOSPITAL ENCOUNTER (INPATIENT)
Facility: CLINIC | Age: 78
LOS: 5 days | Discharge: NURSING, REHAB OR LONG-TERM CARE | DRG: 233 | End: 2018-10-31
Attending: INTERNAL MEDICINE | Admitting: SURGERY
Payer: COMMERCIAL

## 2018-10-26 ENCOUNTER — HOSPITAL ENCOUNTER (OUTPATIENT)
Facility: CLINIC | Age: 78
DRG: 233 | End: 2018-10-26
Attending: HOSPITALIST | Admitting: HOSPITALIST
Payer: COMMERCIAL

## 2018-10-26 DIAGNOSIS — Z98.890 STATUS POST CORONARY ANGIOGRAM: ICD-10-CM

## 2018-10-26 DIAGNOSIS — R94.39 ABNORMAL CARDIOVASCULAR STRESS TEST: ICD-10-CM

## 2018-10-26 DIAGNOSIS — Z95.1 S/P CABG (CORONARY ARTERY BYPASS GRAFT): Primary | ICD-10-CM

## 2018-10-26 LAB
ABO + RH BLD: NORMAL
ABO + RH BLD: NORMAL
ALBUMIN SERPL-MCNC: 2.5 G/DL (ref 3.4–5)
ALBUMIN SERPL-MCNC: 3.6 G/DL (ref 3.4–5)
ALP SERPL-CCNC: 48 U/L (ref 40–150)
ALP SERPL-CCNC: 79 U/L (ref 40–150)
ALT SERPL W P-5'-P-CCNC: 21 U/L (ref 0–70)
ALT SERPL W P-5'-P-CCNC: 31 U/L (ref 0–70)
ANION GAP SERPL CALCULATED.3IONS-SCNC: 11 MMOL/L (ref 3–14)
ANION GAP SERPL CALCULATED.3IONS-SCNC: 12 MMOL/L (ref 3–14)
ANION GAP SERPL CALCULATED.3IONS-SCNC: 7 MMOL/L (ref 3–14)
APTT PPP: 29 SEC (ref 22–37)
APTT PPP: 38 SEC (ref 22–37)
APTT PPP: 53 SEC (ref 22–37)
AST SERPL W P-5'-P-CCNC: 22 U/L (ref 0–45)
AST SERPL W P-5'-P-CCNC: 53 U/L (ref 0–45)
BASE DEFICIT BLDA-SCNC: 4.8 MMOL/L
BASE DEFICIT BLDA-SCNC: 8.5 MMOL/L
BASE DEFICIT BLDV-SCNC: 9.5 MMOL/L
BASOPHILS # BLD AUTO: 0 10E9/L (ref 0–0.2)
BASOPHILS NFR BLD AUTO: 0.3 %
BILIRUB DIRECT SERPL-MCNC: 0.2 MG/DL (ref 0–0.2)
BILIRUB SERPL-MCNC: 0.7 MG/DL (ref 0.2–1.3)
BILIRUB SERPL-MCNC: 0.8 MG/DL (ref 0.2–1.3)
BLD GP AB SCN SERPL QL: NORMAL
BLD PROD TYP BPU: NORMAL
BLD UNIT ID BPU: 0
BLOOD BANK CMNT PATIENT-IMP: NORMAL
BLOOD PRODUCT CODE: NORMAL
BPU ID: NORMAL
BUN SERPL-MCNC: 21 MG/DL (ref 7–30)
BUN SERPL-MCNC: 21 MG/DL (ref 7–30)
BUN SERPL-MCNC: 24 MG/DL (ref 7–30)
CA-I BLD-MCNC: 4.4 MG/DL (ref 4.4–5.2)
CA-I BLD-SCNC: 4.9 MG/DL (ref 4.4–5.2)
CALCIUM SERPL-MCNC: 7.2 MG/DL (ref 8.5–10.1)
CALCIUM SERPL-MCNC: 7.6 MG/DL (ref 8.5–10.1)
CALCIUM SERPL-MCNC: 9.1 MG/DL (ref 8.5–10.1)
CHLORIDE SERPL-SCNC: 109 MMOL/L (ref 94–109)
CHLORIDE SERPL-SCNC: 112 MMOL/L (ref 94–109)
CHLORIDE SERPL-SCNC: 113 MMOL/L (ref 94–109)
CO2 BLD-SCNC: 24 MMOL/L (ref 21–28)
CO2 SERPL-SCNC: 19 MMOL/L (ref 20–32)
CO2 SERPL-SCNC: 19 MMOL/L (ref 20–32)
CO2 SERPL-SCNC: 25 MMOL/L (ref 20–32)
CREAT SERPL-MCNC: 1.25 MG/DL (ref 0.66–1.25)
CREAT SERPL-MCNC: 1.4 MG/DL (ref 0.66–1.25)
CREAT SERPL-MCNC: 1.46 MG/DL (ref 0.66–1.25)
DIFFERENTIAL METHOD BLD: ABNORMAL
EOSINOPHIL # BLD AUTO: 0 10E9/L (ref 0–0.7)
EOSINOPHIL NFR BLD AUTO: 0.3 %
ERYTHROCYTE [DISTWIDTH] IN BLOOD BY AUTOMATED COUNT: 12.8 % (ref 10–15)
ERYTHROCYTE [DISTWIDTH] IN BLOOD BY AUTOMATED COUNT: 13.1 % (ref 10–15)
ERYTHROCYTE [DISTWIDTH] IN BLOOD BY AUTOMATED COUNT: 13.2 % (ref 10–15)
ERYTHROCYTE [DISTWIDTH] IN BLOOD BY AUTOMATED COUNT: 13.2 % (ref 10–15)
FIBRINOGEN PPP-MCNC: 218 MG/DL (ref 200–420)
GFR SERPL CREATININE-BSD FRML MDRD: 47 ML/MIN/1.7M2
GFR SERPL CREATININE-BSD FRML MDRD: 49 ML/MIN/1.7M2
GFR SERPL CREATININE-BSD FRML MDRD: 56 ML/MIN/1.7M2
GLUCOSE BLDC GLUCOMTR-MCNC: 100 MG/DL (ref 70–99)
GLUCOSE BLDC GLUCOMTR-MCNC: 105 MG/DL (ref 70–99)
GLUCOSE BLDC GLUCOMTR-MCNC: 154 MG/DL (ref 70–99)
GLUCOSE BLDC GLUCOMTR-MCNC: 161 MG/DL (ref 70–99)
GLUCOSE BLDC GLUCOMTR-MCNC: 164 MG/DL (ref 70–99)
GLUCOSE BLDC GLUCOMTR-MCNC: 191 MG/DL (ref 70–99)
GLUCOSE BLDC GLUCOMTR-MCNC: 208 MG/DL (ref 70–99)
GLUCOSE SERPL-MCNC: 142 MG/DL (ref 70–99)
GLUCOSE SERPL-MCNC: 159 MG/DL (ref 70–99)
GLUCOSE SERPL-MCNC: 213 MG/DL (ref 70–99)
HCO3 BLD-SCNC: 18 MMOL/L (ref 21–28)
HCO3 BLD-SCNC: 20 MMOL/L (ref 21–28)
HCO3 BLDV-SCNC: 17 MMOL/L (ref 21–28)
HCT VFR BLD AUTO: 20.2 % (ref 40–53)
HCT VFR BLD AUTO: 24.7 % (ref 40–53)
HCT VFR BLD AUTO: 25.1 % (ref 40–53)
HCT VFR BLD AUTO: 35.8 % (ref 40–53)
HCT VFR BLD CALC: 29 %PCV (ref 40–53)
HGB BLD CALC-MCNC: 9.9 G/DL (ref 13.3–17.7)
HGB BLD-MCNC: 12.2 G/DL (ref 13.3–17.7)
HGB BLD-MCNC: 6.9 G/DL (ref 13.3–17.7)
HGB BLD-MCNC: 8.4 G/DL (ref 13.3–17.7)
HGB BLD-MCNC: 8.7 G/DL (ref 13.3–17.7)
IMM GRANULOCYTES # BLD: 0 10E9/L (ref 0–0.4)
IMM GRANULOCYTES NFR BLD: 0.3 %
INR PPP: 1.08 (ref 0.86–1.14)
INR PPP: 1.67 (ref 0.86–1.14)
INR PPP: 1.76 (ref 0.86–1.14)
KCT BLD-ACNC: 148 SEC (ref 75–150)
LACTATE BLD-SCNC: 2 MMOL/L (ref 0.7–2)
LYMPHOCYTES # BLD AUTO: 1.1 10E9/L (ref 0.8–5.3)
LYMPHOCYTES NFR BLD AUTO: 11.7 %
MAGNESIUM SERPL-MCNC: 1.9 MG/DL (ref 1.6–2.3)
MAGNESIUM SERPL-MCNC: 2.2 MG/DL (ref 1.6–2.3)
MCH RBC QN AUTO: 30.7 PG (ref 26.5–33)
MCH RBC QN AUTO: 30.9 PG (ref 26.5–33)
MCH RBC QN AUTO: 31.2 PG (ref 26.5–33)
MCH RBC QN AUTO: 31.8 PG (ref 26.5–33)
MCHC RBC AUTO-ENTMCNC: 33.5 G/DL (ref 31.5–36.5)
MCHC RBC AUTO-ENTMCNC: 34.1 G/DL (ref 31.5–36.5)
MCHC RBC AUTO-ENTMCNC: 34.2 G/DL (ref 31.5–36.5)
MCHC RBC AUTO-ENTMCNC: 35.2 G/DL (ref 31.5–36.5)
MCV RBC AUTO: 90 FL (ref 78–100)
MCV RBC AUTO: 90 FL (ref 78–100)
MCV RBC AUTO: 91 FL (ref 78–100)
MCV RBC AUTO: 92 FL (ref 78–100)
MONOCYTES # BLD AUTO: 0.5 10E9/L (ref 0–1.3)
MONOCYTES NFR BLD AUTO: 4.9 %
NEUTROPHILS # BLD AUTO: 7.7 10E9/L (ref 1.6–8.3)
NEUTROPHILS NFR BLD AUTO: 82.5 %
NRBC # BLD AUTO: 0 10*3/UL
NRBC BLD AUTO-RTO: 0 /100
NUM BPU REQUESTED: 1
NUM BPU REQUESTED: 4
OXYHGB MFR BLD: 98 % (ref 92–100)
OXYHGB MFR BLD: 98 % (ref 92–100)
OXYHGB MFR BLDV: 65 %
PCO2 BLD: 36 MM HG (ref 35–45)
PCO2 BLD: 38 MM HG (ref 35–45)
PCO2 BLD: 41 MM HG (ref 35–45)
PCO2 BLDV: 40 MM HG (ref 40–50)
PH BLD: 7.27 PH (ref 7.35–7.45)
PH BLD: 7.36 PH (ref 7.35–7.45)
PH BLD: 7.38 PH (ref 7.35–7.45)
PH BLDV: 7.24 PH (ref 7.32–7.43)
PHOSPHATE SERPL-MCNC: 2.7 MG/DL (ref 2.5–4.5)
PLATELET # BLD AUTO: 135 10E9/L (ref 150–450)
PLATELET # BLD AUTO: 72 10E9/L (ref 150–450)
PLATELET # BLD AUTO: 81 10E9/L (ref 150–450)
PLATELET # BLD AUTO: 91 10E9/L (ref 150–450)
PO2 BLD: 136 MM HG (ref 80–105)
PO2 BLD: 157 MM HG (ref 80–105)
PO2 BLD: 481 MM HG (ref 80–105)
PO2 BLDV: 37 MM HG (ref 25–47)
POTASSIUM BLD-SCNC: 4.4 MMOL/L (ref 3.4–5.3)
POTASSIUM SERPL-SCNC: 4.5 MMOL/L (ref 3.4–5.3)
POTASSIUM SERPL-SCNC: 4.6 MMOL/L (ref 3.4–5.3)
POTASSIUM SERPL-SCNC: 4.8 MMOL/L (ref 3.4–5.3)
PROT SERPL-MCNC: 4.2 G/DL (ref 6.8–8.8)
PROT SERPL-MCNC: 6.5 G/DL (ref 6.8–8.8)
RBC # BLD AUTO: 2.21 10E12/L (ref 4.4–5.9)
RBC # BLD AUTO: 2.72 10E12/L (ref 4.4–5.9)
RBC # BLD AUTO: 2.74 10E12/L (ref 4.4–5.9)
RBC # BLD AUTO: 3.97 10E12/L (ref 4.4–5.9)
SAO2 % BLDA FROM PO2: 100 % (ref 92–100)
SODIUM BLD-SCNC: 140 MMOL/L (ref 133–144)
SODIUM SERPL-SCNC: 141 MMOL/L (ref 133–144)
SODIUM SERPL-SCNC: 143 MMOL/L (ref 133–144)
SODIUM SERPL-SCNC: 143 MMOL/L (ref 133–144)
SPECIMEN EXP DATE BLD: NORMAL
TRANSFUSION STATUS PATIENT QL: NORMAL
WBC # BLD AUTO: 5.1 10E9/L (ref 4–11)
WBC # BLD AUTO: 5.9 10E9/L (ref 4–11)
WBC # BLD AUTO: 6.3 10E9/L (ref 4–11)
WBC # BLD AUTO: 9.3 10E9/L (ref 4–11)

## 2018-10-26 PROCEDURE — 25800025 ZZH RX 258: Performed by: PHYSICIAN ASSISTANT

## 2018-10-26 PROCEDURE — 25000128 H RX IP 250 OP 636: Performed by: NURSE ANESTHETIST, CERTIFIED REGISTERED

## 2018-10-26 PROCEDURE — 86901 BLOOD TYPING SEROLOGIC RH(D): CPT | Performed by: SURGERY

## 2018-10-26 PROCEDURE — 86850 RBC ANTIBODY SCREEN: CPT | Performed by: SURGERY

## 2018-10-26 PROCEDURE — 85027 COMPLETE CBC AUTOMATED: CPT | Performed by: INTERNAL MEDICINE

## 2018-10-26 PROCEDURE — 27210794 ZZH OR GENERAL SUPPLY STERILE: Performed by: SURGERY

## 2018-10-26 PROCEDURE — 27210914 ZZH SHEATH CR8

## 2018-10-26 PROCEDURE — 25000301 ZZH OR RX SURGIFLO W/THROMBIN KIT 2ML 1991 OPNP: Performed by: SURGERY

## 2018-10-26 PROCEDURE — 85610 PROTHROMBIN TIME: CPT | Performed by: INTERNAL MEDICINE

## 2018-10-26 PROCEDURE — P9037 PLATE PHERES LEUKOREDU IRRAD: HCPCS | Performed by: INTERNAL MEDICINE

## 2018-10-26 PROCEDURE — 33967 INSERT I-AORT PERCUT DEVICE: CPT | Performed by: INTERNAL MEDICINE

## 2018-10-26 PROCEDURE — 99152 MOD SED SAME PHYS/QHP 5/>YRS: CPT

## 2018-10-26 PROCEDURE — P9041 ALBUMIN (HUMAN),5%, 50ML: HCPCS

## 2018-10-26 PROCEDURE — 25000128 H RX IP 250 OP 636

## 2018-10-26 PROCEDURE — 25000131 ZZH RX MED GY IP 250 OP 636 PS 637: Performed by: SURGERY

## 2018-10-26 PROCEDURE — 0W9B30Z DRAINAGE OF LEFT PLEURAL CAVITY WITH DRAINAGE DEVICE, PERCUTANEOUS APPROACH: ICD-10-PCS | Performed by: SURGERY

## 2018-10-26 PROCEDURE — 93010 ELECTROCARDIOGRAM REPORT: CPT | Performed by: INTERNAL MEDICINE

## 2018-10-26 PROCEDURE — 25000125 ZZHC RX 250: Performed by: NURSE ANESTHETIST, CERTIFIED REGISTERED

## 2018-10-26 PROCEDURE — 80048 BASIC METABOLIC PNL TOTAL CA: CPT | Performed by: PHYSICIAN ASSISTANT

## 2018-10-26 PROCEDURE — 93005 ELECTROCARDIOGRAM TRACING: CPT

## 2018-10-26 PROCEDURE — 85014 HEMATOCRIT: CPT

## 2018-10-26 PROCEDURE — 82330 ASSAY OF CALCIUM: CPT | Performed by: PHYSICIAN ASSISTANT

## 2018-10-26 PROCEDURE — 27210995 ZZH RX 272

## 2018-10-26 PROCEDURE — 02100Z9 BYPASS CORONARY ARTERY, ONE ARTERY FROM LEFT INTERNAL MAMMARY, OPEN APPROACH: ICD-10-PCS | Performed by: SURGERY

## 2018-10-26 PROCEDURE — 40000986 XR CHEST PORT 1 VW

## 2018-10-26 PROCEDURE — C9113 INJ PANTOPRAZOLE SODIUM, VIA: HCPCS | Performed by: PHYSICIAN ASSISTANT

## 2018-10-26 PROCEDURE — 93458 L HRT ARTERY/VENTRICLE ANGIO: CPT

## 2018-10-26 PROCEDURE — 25000125 ZZHC RX 250: Performed by: PHYSICIAN ASSISTANT

## 2018-10-26 PROCEDURE — 25000128 H RX IP 250 OP 636: Performed by: PHYSICIAN ASSISTANT

## 2018-10-26 PROCEDURE — 36000073 ZZH SURGERY LEVEL 6 1ST 30 MIN: Performed by: SURGERY

## 2018-10-26 PROCEDURE — 85027 COMPLETE CBC AUTOMATED: CPT | Performed by: SURGERY

## 2018-10-26 PROCEDURE — 25000565 ZZH ISOFLURANE, EA 15 MIN: Performed by: SURGERY

## 2018-10-26 PROCEDURE — 87641 MR-STAPH DNA AMP PROBE: CPT | Performed by: INTERNAL MEDICINE

## 2018-10-26 PROCEDURE — 80048 BASIC METABOLIC PNL TOTAL CA: CPT | Performed by: INTERNAL MEDICINE

## 2018-10-26 PROCEDURE — 99153 MOD SED SAME PHYS/QHP EA: CPT

## 2018-10-26 PROCEDURE — 82803 BLOOD GASES ANY COMBINATION: CPT

## 2018-10-26 PROCEDURE — 83735 ASSAY OF MAGNESIUM: CPT | Performed by: PHYSICIAN ASSISTANT

## 2018-10-26 PROCEDURE — P9016 RBC LEUKOCYTES REDUCED: HCPCS | Performed by: SURGERY

## 2018-10-26 PROCEDURE — 85610 PROTHROMBIN TIME: CPT | Performed by: PHYSICIAN ASSISTANT

## 2018-10-26 PROCEDURE — 40000275 ZZH STATISTIC RCP TIME EA 10 MIN

## 2018-10-26 PROCEDURE — 83605 ASSAY OF LACTIC ACID: CPT

## 2018-10-26 PROCEDURE — 85347 COAGULATION TIME ACTIVATED: CPT

## 2018-10-26 PROCEDURE — 41000023 ZZH PERA-PERFUSION, SH ONLY,  EACH ADDTL 15 MIN: Performed by: SURGERY

## 2018-10-26 PROCEDURE — 25000128 H RX IP 250 OP 636: Performed by: INTERNAL MEDICINE

## 2018-10-26 PROCEDURE — 25500064 ZZH RX 255 OP 636: Performed by: INTERNAL MEDICINE

## 2018-10-26 PROCEDURE — 36000075 ZZH SURGERY LEVEL 6 EA 15 ADDTL MIN: Performed by: SURGERY

## 2018-10-26 PROCEDURE — 25000125 ZZHC RX 250: Performed by: SURGERY

## 2018-10-26 PROCEDURE — 021209W BYPASS CORONARY ARTERY, THREE ARTERIES FROM AORTA WITH AUTOLOGOUS VENOUS TISSUE, OPEN APPROACH: ICD-10-PCS | Performed by: SURGERY

## 2018-10-26 PROCEDURE — 86923 COMPATIBILITY TEST ELECTRIC: CPT | Performed by: SURGERY

## 2018-10-26 PROCEDURE — 27210892 ZZH CATH CR4

## 2018-10-26 PROCEDURE — 00000146 ZZHCL STATISTIC GLUCOSE BY METER IP

## 2018-10-26 PROCEDURE — 85027 COMPLETE CBC AUTOMATED: CPT | Performed by: PHYSICIAN ASSISTANT

## 2018-10-26 PROCEDURE — 27211089 ZZH KIT ACIST INJECTOR CR3

## 2018-10-26 PROCEDURE — 82330 ASSAY OF CALCIUM: CPT

## 2018-10-26 PROCEDURE — 99152 MOD SED SAME PHYS/QHP 5/>YRS: CPT | Performed by: INTERNAL MEDICINE

## 2018-10-26 PROCEDURE — 40000264 ECHO COMPLETE WITH OPTISON

## 2018-10-26 PROCEDURE — 36415 COLL VENOUS BLD VENIPUNCTURE: CPT | Performed by: SURGERY

## 2018-10-26 PROCEDURE — 93880 EXTRACRANIAL BILAT STUDY: CPT

## 2018-10-26 PROCEDURE — 82805 BLOOD GASES W/O2 SATURATION: CPT | Performed by: SURGERY

## 2018-10-26 PROCEDURE — 25000125 ZZHC RX 250

## 2018-10-26 PROCEDURE — 25000128 H RX IP 250 OP 636: Performed by: SURGERY

## 2018-10-26 PROCEDURE — 27210787 ZZH MANIFOLD CR2

## 2018-10-26 PROCEDURE — 36415 COLL VENOUS BLD VENIPUNCTURE: CPT

## 2018-10-26 PROCEDURE — 5A1221Z PERFORMANCE OF CARDIAC OUTPUT, CONTINUOUS: ICD-10-PCS | Performed by: SURGERY

## 2018-10-26 PROCEDURE — 83605 ASSAY OF LACTIC ACID: CPT | Performed by: PHYSICIAN ASSISTANT

## 2018-10-26 PROCEDURE — 84295 ASSAY OF SERUM SODIUM: CPT

## 2018-10-26 PROCEDURE — 40000852 ZZH STATISTIC HEART CATH LAB OR EP LAB

## 2018-10-26 PROCEDURE — 25000125 ZZHC RX 250: Performed by: INTERNAL MEDICINE

## 2018-10-26 PROCEDURE — 93458 L HRT ARTERY/VENTRICLE ANGIO: CPT | Mod: 26 | Performed by: INTERNAL MEDICINE

## 2018-10-26 PROCEDURE — 06BQ4ZZ EXCISION OF LEFT SAPHENOUS VEIN, PERCUTANEOUS ENDOSCOPIC APPROACH: ICD-10-PCS | Performed by: SURGERY

## 2018-10-26 PROCEDURE — 27210848 ZZH CATH IABP PUMP CR18

## 2018-10-26 PROCEDURE — 40000008 ZZH STATISTIC AIRWAY CARE

## 2018-10-26 PROCEDURE — 27210946 ZZH KIT HC TOTES DISP CR8

## 2018-10-26 PROCEDURE — 37000008 ZZH ANESTHESIA TECHNICAL FEE, 1ST 30 MIN: Performed by: SURGERY

## 2018-10-26 PROCEDURE — 99291 CRITICAL CARE FIRST HOUR: CPT | Performed by: INTERNAL MEDICINE

## 2018-10-26 PROCEDURE — 25000132 ZZH RX MED GY IP 250 OP 250 PS 637: Performed by: INTERNAL MEDICINE

## 2018-10-26 PROCEDURE — 94003 VENT MGMT INPAT SUBQ DAY: CPT

## 2018-10-26 PROCEDURE — 40000235 ZZH STATISTIC TELEMETRY

## 2018-10-26 PROCEDURE — 84100 ASSAY OF PHOSPHORUS: CPT | Performed by: PHYSICIAN ASSISTANT

## 2018-10-26 PROCEDURE — 27210795 ZZH PAD DEFIB QUICK CR4

## 2018-10-26 PROCEDURE — 80053 COMPREHEN METABOLIC PANEL: CPT | Performed by: INTERNAL MEDICINE

## 2018-10-26 PROCEDURE — 25000566 ZZH SEVOFLURANE, EA 15 MIN: Performed by: SURGERY

## 2018-10-26 PROCEDURE — 85384 FIBRINOGEN ACTIVITY: CPT | Performed by: INTERNAL MEDICINE

## 2018-10-26 PROCEDURE — 41000022 ZZH PER-PERFUSION, SH ONLY,  1ST 30 MIN: Performed by: SURGERY

## 2018-10-26 PROCEDURE — 82805 BLOOD GASES W/O2 SATURATION: CPT | Performed by: PHYSICIAN ASSISTANT

## 2018-10-26 PROCEDURE — 87640 STAPH A DNA AMP PROBE: CPT | Performed by: INTERNAL MEDICINE

## 2018-10-26 PROCEDURE — 20000003 ZZH R&B ICU

## 2018-10-26 PROCEDURE — 93306 TTE W/DOPPLER COMPLETE: CPT | Mod: 26 | Performed by: INTERNAL MEDICINE

## 2018-10-26 PROCEDURE — 80076 HEPATIC FUNCTION PANEL: CPT | Performed by: INTERNAL MEDICINE

## 2018-10-26 PROCEDURE — 37000009 ZZH ANESTHESIA TECHNICAL FEE, EACH ADDTL 15 MIN: Performed by: SURGERY

## 2018-10-26 PROCEDURE — 84132 ASSAY OF SERUM POTASSIUM: CPT

## 2018-10-26 PROCEDURE — 94002 VENT MGMT INPAT INIT DAY: CPT

## 2018-10-26 PROCEDURE — 86900 BLOOD TYPING SEROLOGIC ABO: CPT | Performed by: SURGERY

## 2018-10-26 PROCEDURE — 4A023N7 MEASUREMENT OF CARDIAC SAMPLING AND PRESSURE, LEFT HEART, PERCUTANEOUS APPROACH: ICD-10-PCS | Performed by: INTERNAL MEDICINE

## 2018-10-26 PROCEDURE — B2111ZZ FLUOROSCOPY OF MULTIPLE CORONARY ARTERIES USING LOW OSMOLAR CONTRAST: ICD-10-PCS | Performed by: INTERNAL MEDICINE

## 2018-10-26 PROCEDURE — 85730 THROMBOPLASTIN TIME PARTIAL: CPT | Performed by: INTERNAL MEDICINE

## 2018-10-26 PROCEDURE — 33967 INSERT I-AORT PERCUT DEVICE: CPT

## 2018-10-26 PROCEDURE — 3E043XZ INTRODUCTION OF VASOPRESSOR INTO CENTRAL VEIN, PERCUTANEOUS APPROACH: ICD-10-PCS | Performed by: INTERNAL MEDICINE

## 2018-10-26 PROCEDURE — P9041 ALBUMIN (HUMAN),5%, 50ML: HCPCS | Performed by: NURSE ANESTHETIST, CERTIFIED REGISTERED

## 2018-10-26 PROCEDURE — 27210856 ZZH ACCESS HEART CATH CR2

## 2018-10-26 PROCEDURE — 85730 THROMBOPLASTIN TIME PARTIAL: CPT | Performed by: PHYSICIAN ASSISTANT

## 2018-10-26 PROCEDURE — 5A02210 ASSISTANCE WITH CARDIAC OUTPUT USING BALLOON PUMP, CONTINUOUS: ICD-10-PCS | Performed by: INTERNAL MEDICINE

## 2018-10-26 PROCEDURE — 93970 EXTREMITY STUDY: CPT

## 2018-10-26 PROCEDURE — 83735 ASSAY OF MAGNESIUM: CPT | Performed by: INTERNAL MEDICINE

## 2018-10-26 PROCEDURE — 85025 COMPLETE CBC W/AUTO DIFF WBC: CPT | Performed by: INTERNAL MEDICINE

## 2018-10-26 RX ORDER — FLUMAZENIL 0.1 MG/ML
0.2 INJECTION, SOLUTION INTRAVENOUS
Status: DISCONTINUED | OUTPATIENT
Start: 2018-10-26 | End: 2018-10-26 | Stop reason: HOSPADM

## 2018-10-26 RX ORDER — ONDANSETRON 2 MG/ML
4 INJECTION INTRAMUSCULAR; INTRAVENOUS EVERY 4 HOURS PRN
Status: DISCONTINUED | OUTPATIENT
Start: 2018-10-26 | End: 2018-10-26 | Stop reason: HOSPADM

## 2018-10-26 RX ORDER — SODIUM CHLORIDE 9 MG/ML
INJECTION, SOLUTION INTRAVENOUS CONTINUOUS
Status: DISCONTINUED | OUTPATIENT
Start: 2018-10-26 | End: 2018-10-26 | Stop reason: HOSPADM

## 2018-10-26 RX ORDER — NIFEDIPINE 10 MG/1
10 CAPSULE ORAL
Status: DISCONTINUED | OUTPATIENT
Start: 2018-10-26 | End: 2018-10-26 | Stop reason: HOSPADM

## 2018-10-26 RX ORDER — FUROSEMIDE 10 MG/ML
20 INJECTION INTRAMUSCULAR; INTRAVENOUS
Status: DISCONTINUED | OUTPATIENT
Start: 2018-10-26 | End: 2018-10-31 | Stop reason: HOSPADM

## 2018-10-26 RX ORDER — BUPIVACAINE HYDROCHLORIDE 2.5 MG/ML
1-10 INJECTION, SOLUTION EPIDURAL; INFILTRATION; INTRACAUDAL
Status: DISCONTINUED | OUTPATIENT
Start: 2018-10-26 | End: 2018-10-26 | Stop reason: HOSPADM

## 2018-10-26 RX ORDER — PROCHLORPERAZINE MALEATE 5 MG
5 TABLET ORAL EVERY 6 HOURS PRN
Status: DISCONTINUED | OUTPATIENT
Start: 2018-10-26 | End: 2018-10-31 | Stop reason: HOSPADM

## 2018-10-26 RX ORDER — FUROSEMIDE 10 MG/ML
20-100 INJECTION INTRAMUSCULAR; INTRAVENOUS
Status: DISCONTINUED | OUTPATIENT
Start: 2018-10-26 | End: 2018-10-26 | Stop reason: HOSPADM

## 2018-10-26 RX ORDER — DEXTROSE MONOHYDRATE 25 G/50ML
12.5-5 INJECTION, SOLUTION INTRAVENOUS EVERY 30 MIN PRN
Status: DISCONTINUED | OUTPATIENT
Start: 2018-10-26 | End: 2018-10-26 | Stop reason: HOSPADM

## 2018-10-26 RX ORDER — NICARDIPINE HYDROCHLORIDE 2.5 MG/ML
100 INJECTION INTRAVENOUS
Status: DISCONTINUED | OUTPATIENT
Start: 2018-10-26 | End: 2018-10-26 | Stop reason: HOSPADM

## 2018-10-26 RX ORDER — DIPHENHYDRAMINE HYDROCHLORIDE 50 MG/ML
25-50 INJECTION INTRAMUSCULAR; INTRAVENOUS
Status: DISCONTINUED | OUTPATIENT
Start: 2018-10-26 | End: 2018-10-26 | Stop reason: HOSPADM

## 2018-10-26 RX ORDER — PROPOFOL 10 MG/ML
INJECTION, EMULSION INTRAVENOUS
Status: COMPLETED
Start: 2018-10-26 | End: 2018-10-26

## 2018-10-26 RX ORDER — PROPOFOL 10 MG/ML
INJECTION, EMULSION INTRAVENOUS PRN
Status: DISCONTINUED | OUTPATIENT
Start: 2018-10-26 | End: 2018-10-26

## 2018-10-26 RX ORDER — SODIUM NITROPRUSSIDE 25 MG/ML
100-200 INJECTION INTRAVENOUS
Status: DISCONTINUED | OUTPATIENT
Start: 2018-10-26 | End: 2018-10-26 | Stop reason: HOSPADM

## 2018-10-26 RX ORDER — CLOPIDOGREL 300 MG/1
300-600 TABLET, FILM COATED ORAL
Status: DISCONTINUED | OUTPATIENT
Start: 2018-10-26 | End: 2018-10-26 | Stop reason: HOSPADM

## 2018-10-26 RX ORDER — ACETAMINOPHEN 325 MG/1
325-650 TABLET ORAL EVERY 4 HOURS PRN
Status: DISCONTINUED | OUTPATIENT
Start: 2018-10-26 | End: 2018-10-26

## 2018-10-26 RX ORDER — SODIUM CHLORIDE 9 MG/ML
INJECTION, SOLUTION INTRAVENOUS CONTINUOUS
Status: DISCONTINUED | OUTPATIENT
Start: 2018-10-26 | End: 2018-10-26

## 2018-10-26 RX ORDER — CEFAZOLIN SODIUM 2 G/100ML
2 INJECTION, SOLUTION INTRAVENOUS EVERY 8 HOURS
Status: COMPLETED | OUTPATIENT
Start: 2018-10-27 | End: 2018-10-27

## 2018-10-26 RX ORDER — MAGNESIUM SULFATE HEPTAHYDRATE 40 MG/ML
2 INJECTION, SOLUTION INTRAVENOUS DAILY PRN
Status: DISCONTINUED | OUTPATIENT
Start: 2018-10-26 | End: 2018-10-31 | Stop reason: HOSPADM

## 2018-10-26 RX ORDER — PRASUGREL 10 MG/1
10-60 TABLET, FILM COATED ORAL
Status: DISCONTINUED | OUTPATIENT
Start: 2018-10-26 | End: 2018-10-26 | Stop reason: HOSPADM

## 2018-10-26 RX ORDER — MUPIROCIN 20 MG/G
0.5 OINTMENT TOPICAL 2 TIMES DAILY
Status: DISCONTINUED | OUTPATIENT
Start: 2018-10-26 | End: 2018-10-27

## 2018-10-26 RX ORDER — IOPAMIDOL 755 MG/ML
30 INJECTION, SOLUTION INTRAVASCULAR ONCE
Status: COMPLETED | OUTPATIENT
Start: 2018-10-26 | End: 2018-10-26

## 2018-10-26 RX ORDER — EPINEPHRINE 1 MG/ML
0.3 INJECTION, SOLUTION, CONCENTRATE INTRAVENOUS
Status: DISCONTINUED | OUTPATIENT
Start: 2018-10-26 | End: 2018-10-26 | Stop reason: HOSPADM

## 2018-10-26 RX ORDER — POTASSIUM CHLORIDE 1500 MG/1
20-40 TABLET, EXTENDED RELEASE ORAL
Status: DISCONTINUED | OUTPATIENT
Start: 2018-10-26 | End: 2018-10-31 | Stop reason: HOSPADM

## 2018-10-26 RX ORDER — METHYLPREDNISOLONE SODIUM SUCCINATE 125 MG/2ML
125 INJECTION, POWDER, LYOPHILIZED, FOR SOLUTION INTRAMUSCULAR; INTRAVENOUS
Status: DISCONTINUED | OUTPATIENT
Start: 2018-10-26 | End: 2018-10-26 | Stop reason: HOSPADM

## 2018-10-26 RX ORDER — ONDANSETRON 2 MG/ML
4 INJECTION INTRAMUSCULAR; INTRAVENOUS EVERY 6 HOURS PRN
Status: DISCONTINUED | OUTPATIENT
Start: 2018-10-26 | End: 2018-10-31 | Stop reason: HOSPADM

## 2018-10-26 RX ORDER — POTASSIUM CHLORIDE 29.8 MG/ML
20 INJECTION INTRAVENOUS
Status: DISCONTINUED | OUTPATIENT
Start: 2018-10-26 | End: 2018-10-31 | Stop reason: HOSPADM

## 2018-10-26 RX ORDER — PROTAMINE SULFATE 10 MG/ML
INJECTION, SOLUTION INTRAVENOUS PRN
Status: DISCONTINUED | OUTPATIENT
Start: 2018-10-26 | End: 2018-10-26

## 2018-10-26 RX ORDER — NITROGLYCERIN 5 MG/ML
100-200 VIAL (ML) INTRAVENOUS
Status: DISCONTINUED | OUTPATIENT
Start: 2018-10-26 | End: 2018-10-26 | Stop reason: HOSPADM

## 2018-10-26 RX ORDER — MEPERIDINE HYDROCHLORIDE 25 MG/ML
12.5-25 INJECTION INTRAMUSCULAR; INTRAVENOUS; SUBCUTANEOUS
Status: DISCONTINUED | OUTPATIENT
Start: 2018-10-26 | End: 2018-10-27

## 2018-10-26 RX ORDER — ADENOSINE 3 MG/ML
12-12000 INJECTION, SOLUTION INTRAVENOUS
Status: DISCONTINUED | OUTPATIENT
Start: 2018-10-26 | End: 2018-10-26 | Stop reason: HOSPADM

## 2018-10-26 RX ORDER — POTASSIUM CHLORIDE 1500 MG/1
20 TABLET, EXTENDED RELEASE ORAL
Status: DISCONTINUED | OUTPATIENT
Start: 2018-10-26 | End: 2018-10-26 | Stop reason: HOSPADM

## 2018-10-26 RX ORDER — CEFAZOLIN SODIUM 1 G/3ML
INJECTION, POWDER, FOR SOLUTION INTRAMUSCULAR; INTRAVENOUS PRN
Status: DISCONTINUED | OUTPATIENT
Start: 2018-10-26 | End: 2018-10-26

## 2018-10-26 RX ORDER — PHENYLEPHRINE HCL IN 0.9% NACL 1 MG/10 ML
20-100 SYRINGE (ML) INTRAVENOUS
Status: DISCONTINUED | OUTPATIENT
Start: 2018-10-26 | End: 2018-10-26 | Stop reason: HOSPADM

## 2018-10-26 RX ORDER — PROTAMINE SULFATE 10 MG/ML
25-100 INJECTION, SOLUTION INTRAVENOUS EVERY 5 MIN PRN
Status: DISCONTINUED | OUTPATIENT
Start: 2018-10-26 | End: 2018-10-26 | Stop reason: HOSPADM

## 2018-10-26 RX ORDER — MORPHINE SULFATE 2 MG/ML
1-2 INJECTION, SOLUTION INTRAMUSCULAR; INTRAVENOUS EVERY 5 MIN PRN
Status: DISCONTINUED | OUTPATIENT
Start: 2018-10-26 | End: 2018-10-26 | Stop reason: HOSPADM

## 2018-10-26 RX ORDER — HYDRALAZINE HYDROCHLORIDE 20 MG/ML
10 INJECTION INTRAMUSCULAR; INTRAVENOUS EVERY 30 MIN PRN
Status: DISCONTINUED | OUTPATIENT
Start: 2018-10-26 | End: 2018-10-31 | Stop reason: HOSPADM

## 2018-10-26 RX ORDER — FENTANYL CITRATE 50 UG/ML
50-100 INJECTION, SOLUTION INTRAMUSCULAR; INTRAVENOUS
Status: DISCONTINUED | OUTPATIENT
Start: 2018-10-26 | End: 2018-10-29

## 2018-10-26 RX ORDER — ATROPINE SULFATE 0.1 MG/ML
.5-1 INJECTION INTRAVENOUS
Status: DISCONTINUED | OUTPATIENT
Start: 2018-10-26 | End: 2018-10-26 | Stop reason: HOSPADM

## 2018-10-26 RX ORDER — VECURONIUM BROMIDE 1 MG/ML
INJECTION, POWDER, LYOPHILIZED, FOR SOLUTION INTRAVENOUS PRN
Status: DISCONTINUED | OUTPATIENT
Start: 2018-10-26 | End: 2018-10-26

## 2018-10-26 RX ORDER — NALOXONE HYDROCHLORIDE 0.4 MG/ML
0.4 INJECTION, SOLUTION INTRAMUSCULAR; INTRAVENOUS; SUBCUTANEOUS EVERY 5 MIN PRN
Status: DISCONTINUED | OUTPATIENT
Start: 2018-10-26 | End: 2018-10-26 | Stop reason: HOSPADM

## 2018-10-26 RX ORDER — NITROGLYCERIN 20 MG/100ML
.07-2 INJECTION INTRAVENOUS CONTINUOUS PRN
Status: DISCONTINUED | OUTPATIENT
Start: 2018-10-26 | End: 2018-10-26 | Stop reason: HOSPADM

## 2018-10-26 RX ORDER — DEXTROSE MONOHYDRATE, SODIUM CHLORIDE, AND POTASSIUM CHLORIDE 50; 1.49; 4.5 G/1000ML; G/1000ML; G/1000ML
INJECTION, SOLUTION INTRAVENOUS CONTINUOUS
Status: DISCONTINUED | OUTPATIENT
Start: 2018-10-26 | End: 2018-10-28

## 2018-10-26 RX ORDER — FENTANYL CITRATE 50 UG/ML
25-50 INJECTION, SOLUTION INTRAMUSCULAR; INTRAVENOUS
Status: DISCONTINUED | OUTPATIENT
Start: 2018-10-26 | End: 2018-10-26

## 2018-10-26 RX ORDER — ACETAMINOPHEN 325 MG/1
975 TABLET ORAL EVERY 8 HOURS
Status: DISCONTINUED | OUTPATIENT
Start: 2018-10-26 | End: 2018-10-27

## 2018-10-26 RX ORDER — MAGNESIUM SULFATE HEPTAHYDRATE 40 MG/ML
4 INJECTION, SOLUTION INTRAVENOUS EVERY 4 HOURS PRN
Status: DISCONTINUED | OUTPATIENT
Start: 2018-10-26 | End: 2018-10-31 | Stop reason: HOSPADM

## 2018-10-26 RX ORDER — DOBUTAMINE HYDROCHLORIDE 200 MG/100ML
2-20 INJECTION INTRAVENOUS CONTINUOUS PRN
Status: DISCONTINUED | OUTPATIENT
Start: 2018-10-26 | End: 2018-10-26 | Stop reason: HOSPADM

## 2018-10-26 RX ORDER — HEPARIN SODIUM 1000 [USP'U]/ML
1000-10000 INJECTION, SOLUTION INTRAVENOUS; SUBCUTANEOUS EVERY 5 MIN PRN
Status: DISCONTINUED | OUTPATIENT
Start: 2018-10-26 | End: 2018-10-26 | Stop reason: HOSPADM

## 2018-10-26 RX ORDER — CLOPIDOGREL BISULFATE 75 MG/1
75 TABLET ORAL
Status: DISCONTINUED | OUTPATIENT
Start: 2018-10-26 | End: 2018-10-26 | Stop reason: HOSPADM

## 2018-10-26 RX ORDER — VERAPAMIL HYDROCHLORIDE 2.5 MG/ML
1-2.5 INJECTION, SOLUTION INTRAVENOUS
Status: COMPLETED | OUTPATIENT
Start: 2018-10-26 | End: 2018-10-26

## 2018-10-26 RX ORDER — ASPIRIN 81 MG/1
81-324 TABLET, CHEWABLE ORAL
Status: DISCONTINUED | OUTPATIENT
Start: 2018-10-26 | End: 2018-10-26 | Stop reason: HOSPADM

## 2018-10-26 RX ORDER — NITROGLYCERIN 0.4 MG/1
0.4 TABLET SUBLINGUAL EVERY 5 MIN PRN
Status: DISCONTINUED | OUTPATIENT
Start: 2018-10-26 | End: 2018-10-26 | Stop reason: HOSPADM

## 2018-10-26 RX ORDER — FENTANYL CITRATE 50 UG/ML
50 INJECTION, SOLUTION INTRAMUSCULAR; INTRAVENOUS
Status: CANCELLED | OUTPATIENT
Start: 2018-10-26

## 2018-10-26 RX ORDER — HEPARIN SODIUM 1000 [USP'U]/ML
INJECTION, SOLUTION INTRAVENOUS; SUBCUTANEOUS PRN
Status: DISCONTINUED | OUTPATIENT
Start: 2018-10-26 | End: 2018-10-26

## 2018-10-26 RX ORDER — NITROGLYCERIN 5 MG/ML
100-500 VIAL (ML) INTRAVENOUS
Status: DISCONTINUED | OUTPATIENT
Start: 2018-10-26 | End: 2018-10-26 | Stop reason: HOSPADM

## 2018-10-26 RX ORDER — LIDOCAINE HYDROCHLORIDE 10 MG/ML
30 INJECTION, SOLUTION EPIDURAL; INFILTRATION; INTRACAUDAL; PERINEURAL
Status: DISCONTINUED | OUTPATIENT
Start: 2018-10-26 | End: 2018-10-26 | Stop reason: HOSPADM

## 2018-10-26 RX ORDER — VERAPAMIL HYDROCHLORIDE 2.5 MG/ML
1-2.5 INJECTION, SOLUTION INTRAVENOUS
Status: DISCONTINUED | OUTPATIENT
Start: 2018-10-26 | End: 2018-10-26 | Stop reason: HOSPADM

## 2018-10-26 RX ORDER — PROPOFOL 10 MG/ML
5-75 INJECTION, EMULSION INTRAVENOUS CONTINUOUS
Status: DISCONTINUED | OUTPATIENT
Start: 2018-10-26 | End: 2018-10-27

## 2018-10-26 RX ORDER — HYDRALAZINE HYDROCHLORIDE 20 MG/ML
10-20 INJECTION INTRAMUSCULAR; INTRAVENOUS
Status: DISCONTINUED | OUTPATIENT
Start: 2018-10-26 | End: 2018-10-26 | Stop reason: HOSPADM

## 2018-10-26 RX ORDER — FENTANYL CITRATE 50 UG/ML
25-50 INJECTION, SOLUTION INTRAMUSCULAR; INTRAVENOUS
Status: DISCONTINUED | OUTPATIENT
Start: 2018-10-26 | End: 2018-10-26 | Stop reason: HOSPADM

## 2018-10-26 RX ORDER — LIDOCAINE HYDROCHLORIDE 10 MG/ML
1-10 INJECTION, SOLUTION EPIDURAL; INFILTRATION; INTRACAUDAL; PERINEURAL
Status: COMPLETED | OUTPATIENT
Start: 2018-10-26 | End: 2018-10-26

## 2018-10-26 RX ORDER — METOPROLOL TARTRATE 25 MG/1
25 TABLET, FILM COATED ORAL EVERY 12 HOURS
Status: DISCONTINUED | OUTPATIENT
Start: 2018-10-27 | End: 2018-10-31 | Stop reason: HOSPADM

## 2018-10-26 RX ORDER — METOPROLOL TARTRATE 1 MG/ML
5 INJECTION, SOLUTION INTRAVENOUS EVERY 5 MIN PRN
Status: DISCONTINUED | OUTPATIENT
Start: 2018-10-26 | End: 2018-10-26 | Stop reason: HOSPADM

## 2018-10-26 RX ORDER — ALBUMIN, HUMAN INJ 5% 5 %
SOLUTION INTRAVENOUS CONTINUOUS PRN
Status: DISCONTINUED | OUTPATIENT
Start: 2018-10-26 | End: 2018-10-26

## 2018-10-26 RX ORDER — DEXTROSE MONOHYDRATE 25 G/50ML
25-50 INJECTION, SOLUTION INTRAVENOUS
Status: DISCONTINUED | OUTPATIENT
Start: 2018-10-26 | End: 2018-10-31 | Stop reason: HOSPADM

## 2018-10-26 RX ORDER — POTASSIUM CHLORIDE 7.45 MG/ML
10 INJECTION INTRAVENOUS
Status: DISCONTINUED | OUTPATIENT
Start: 2018-10-26 | End: 2018-10-31 | Stop reason: HOSPADM

## 2018-10-26 RX ORDER — ASPIRIN 81 MG/1
81 TABLET ORAL DAILY
Status: DISCONTINUED | OUTPATIENT
Start: 2018-10-26 | End: 2018-10-26 | Stop reason: HOSPADM

## 2018-10-26 RX ORDER — CEFAZOLIN SODIUM 1 G/3ML
1 INJECTION, POWDER, FOR SOLUTION INTRAMUSCULAR; INTRAVENOUS SEE ADMIN INSTRUCTIONS
Status: CANCELLED | OUTPATIENT
Start: 2018-10-26

## 2018-10-26 RX ORDER — METHOCARBAMOL 500 MG/1
500 TABLET, FILM COATED ORAL 4 TIMES DAILY PRN
Status: DISCONTINUED | OUTPATIENT
Start: 2018-10-26 | End: 2018-10-31 | Stop reason: HOSPADM

## 2018-10-26 RX ORDER — LIDOCAINE 40 MG/G
CREAM TOPICAL
Status: DISCONTINUED | OUTPATIENT
Start: 2018-10-26 | End: 2018-10-26 | Stop reason: HOSPADM

## 2018-10-26 RX ORDER — DOPAMINE HYDROCHLORIDE 160 MG/100ML
2-20 INJECTION, SOLUTION INTRAVENOUS CONTINUOUS PRN
Status: DISCONTINUED | OUTPATIENT
Start: 2018-10-26 | End: 2018-10-26 | Stop reason: HOSPADM

## 2018-10-26 RX ORDER — MUPIROCIN 20 MG/G
OINTMENT TOPICAL 2 TIMES DAILY
Status: CANCELLED | OUTPATIENT
Start: 2018-10-26 | End: 2018-10-27

## 2018-10-26 RX ORDER — LIDOCAINE HYDROCHLORIDE 20 MG/ML
INJECTION, SOLUTION INFILTRATION; PERINEURAL PRN
Status: DISCONTINUED | OUTPATIENT
Start: 2018-10-26 | End: 2018-10-26

## 2018-10-26 RX ORDER — NITROGLYCERIN 5 MG/ML
VIAL (ML) INTRAVENOUS PRN
Status: DISCONTINUED | OUTPATIENT
Start: 2018-10-26 | End: 2018-10-26

## 2018-10-26 RX ORDER — LIDOCAINE 40 MG/G
CREAM TOPICAL
Status: DISCONTINUED | OUTPATIENT
Start: 2018-10-26 | End: 2018-10-26

## 2018-10-26 RX ORDER — PROTAMINE SULFATE 10 MG/ML
1-5 INJECTION, SOLUTION INTRAVENOUS
Status: DISCONTINUED | OUTPATIENT
Start: 2018-10-26 | End: 2018-10-26 | Stop reason: HOSPADM

## 2018-10-26 RX ORDER — METOPROLOL TARTRATE 25 MG/1
25 TABLET, FILM COATED ORAL
Status: CANCELLED | OUTPATIENT
Start: 2018-10-26

## 2018-10-26 RX ORDER — POTASSIUM CHLORIDE 29.8 MG/ML
20 INJECTION INTRAVENOUS
Status: DISCONTINUED | OUTPATIENT
Start: 2018-10-26 | End: 2018-10-26 | Stop reason: HOSPADM

## 2018-10-26 RX ORDER — LIDOCAINE HYDROCHLORIDE 10 MG/ML
1-10 INJECTION, SOLUTION EPIDURAL; INFILTRATION; INTRACAUDAL; PERINEURAL
Status: DISCONTINUED | OUTPATIENT
Start: 2018-10-26 | End: 2018-10-26 | Stop reason: HOSPADM

## 2018-10-26 RX ORDER — SODIUM CHLORIDE, SODIUM LACTATE, POTASSIUM CHLORIDE, CALCIUM CHLORIDE 600; 310; 30; 20 MG/100ML; MG/100ML; MG/100ML; MG/100ML
INJECTION, SOLUTION INTRAVENOUS CONTINUOUS PRN
Status: DISCONTINUED | OUTPATIENT
Start: 2018-10-26 | End: 2018-10-26

## 2018-10-26 RX ORDER — ASPIRIN 325 MG
325 TABLET ORAL
Status: DISCONTINUED | OUTPATIENT
Start: 2018-10-26 | End: 2018-10-26 | Stop reason: HOSPADM

## 2018-10-26 RX ORDER — NALOXONE HYDROCHLORIDE 0.4 MG/ML
.1-.4 INJECTION, SOLUTION INTRAMUSCULAR; INTRAVENOUS; SUBCUTANEOUS
Status: DISCONTINUED | OUTPATIENT
Start: 2018-10-26 | End: 2018-10-26

## 2018-10-26 RX ORDER — ENALAPRILAT 1.25 MG/ML
1.25-2.5 INJECTION INTRAVENOUS
Status: DISCONTINUED | OUTPATIENT
Start: 2018-10-26 | End: 2018-10-26 | Stop reason: HOSPADM

## 2018-10-26 RX ORDER — ONDANSETRON 4 MG/1
4 TABLET, ORALLY DISINTEGRATING ORAL EVERY 6 HOURS PRN
Status: DISCONTINUED | OUTPATIENT
Start: 2018-10-26 | End: 2018-10-31 | Stop reason: HOSPADM

## 2018-10-26 RX ORDER — CEFAZOLIN SODIUM 2 G/100ML
2 INJECTION, SOLUTION INTRAVENOUS
Status: CANCELLED | OUTPATIENT
Start: 2018-10-26

## 2018-10-26 RX ORDER — POTASSIUM CHLORIDE 7.45 MG/ML
10 INJECTION INTRAVENOUS
Status: DISCONTINUED | OUTPATIENT
Start: 2018-10-26 | End: 2018-10-26 | Stop reason: HOSPADM

## 2018-10-26 RX ORDER — HEPARIN SODIUM 5000 [USP'U]/.5ML
5000 INJECTION, SOLUTION INTRAVENOUS; SUBCUTANEOUS EVERY 8 HOURS
Status: DISCONTINUED | OUTPATIENT
Start: 2018-10-27 | End: 2018-10-31 | Stop reason: HOSPADM

## 2018-10-26 RX ORDER — EPHEDRINE SULFATE 50 MG/ML
INJECTION, SOLUTION INTRAMUSCULAR; INTRAVENOUS; SUBCUTANEOUS PRN
Status: DISCONTINUED | OUTPATIENT
Start: 2018-10-26 | End: 2018-10-26

## 2018-10-26 RX ORDER — SODIUM CHLORIDE 9 MG/ML
INJECTION, SOLUTION INTRAVENOUS CONTINUOUS PRN
Status: DISCONTINUED | OUTPATIENT
Start: 2018-10-26 | End: 2018-10-26

## 2018-10-26 RX ORDER — CHLORHEXIDINE GLUCONATE ORAL RINSE 1.2 MG/ML
15 SOLUTION DENTAL 2 TIMES DAILY
Status: DISCONTINUED | OUTPATIENT
Start: 2018-10-26 | End: 2018-10-27

## 2018-10-26 RX ORDER — ALBUMIN, HUMAN INJ 5% 5 %
500-1000 SOLUTION INTRAVENOUS
Status: COMPLETED | OUTPATIENT
Start: 2018-10-26 | End: 2018-10-26

## 2018-10-26 RX ORDER — HYDROCODONE BITARTRATE AND ACETAMINOPHEN 5; 325 MG/1; MG/1
1-2 TABLET ORAL EVERY 4 HOURS PRN
Status: DISCONTINUED | OUTPATIENT
Start: 2018-10-26 | End: 2018-10-26

## 2018-10-26 RX ORDER — OXYCODONE HYDROCHLORIDE 5 MG/1
5-10 TABLET ORAL EVERY 4 HOURS PRN
Status: DISCONTINUED | OUTPATIENT
Start: 2018-10-26 | End: 2018-10-31 | Stop reason: HOSPADM

## 2018-10-26 RX ORDER — NALOXONE HYDROCHLORIDE 0.4 MG/ML
.2-.4 INJECTION, SOLUTION INTRAMUSCULAR; INTRAVENOUS; SUBCUTANEOUS
Status: DISCONTINUED | OUTPATIENT
Start: 2018-10-26 | End: 2018-10-26

## 2018-10-26 RX ORDER — POTASSIUM CHLORIDE 1.5 G/1.58G
20-40 POWDER, FOR SOLUTION ORAL
Status: DISCONTINUED | OUTPATIENT
Start: 2018-10-26 | End: 2018-10-31 | Stop reason: HOSPADM

## 2018-10-26 RX ORDER — ATROPINE SULFATE 0.1 MG/ML
0.5 INJECTION INTRAVENOUS EVERY 5 MIN PRN
Status: DISCONTINUED | OUTPATIENT
Start: 2018-10-26 | End: 2018-10-26

## 2018-10-26 RX ORDER — LIDOCAINE 4 G/G
1 PATCH TOPICAL
Status: DISCONTINUED | OUTPATIENT
Start: 2018-10-27 | End: 2018-10-31 | Stop reason: HOSPADM

## 2018-10-26 RX ORDER — NALOXONE HYDROCHLORIDE 0.4 MG/ML
.1-.4 INJECTION, SOLUTION INTRAMUSCULAR; INTRAVENOUS; SUBCUTANEOUS
Status: DISCONTINUED | OUTPATIENT
Start: 2018-10-26 | End: 2018-10-31 | Stop reason: HOSPADM

## 2018-10-26 RX ORDER — ALBUMIN, HUMAN INJ 5% 5 %
SOLUTION INTRAVENOUS
Status: COMPLETED
Start: 2018-10-26 | End: 2018-10-26

## 2018-10-26 RX ORDER — LIDOCAINE 40 MG/G
CREAM TOPICAL
Status: DISCONTINUED | OUTPATIENT
Start: 2018-10-26 | End: 2018-10-31 | Stop reason: HOSPADM

## 2018-10-26 RX ORDER — NITROGLYCERIN 5 MG/ML
100-500 VIAL (ML) INTRAVENOUS
Status: COMPLETED | OUTPATIENT
Start: 2018-10-26 | End: 2018-10-26

## 2018-10-26 RX ORDER — LORAZEPAM 2 MG/ML
.5-2 INJECTION INTRAMUSCULAR EVERY 4 HOURS PRN
Status: DISCONTINUED | OUTPATIENT
Start: 2018-10-26 | End: 2018-10-26 | Stop reason: HOSPADM

## 2018-10-26 RX ORDER — HYDROMORPHONE HYDROCHLORIDE 1 MG/ML
.2-.4 INJECTION, SOLUTION INTRAMUSCULAR; INTRAVENOUS; SUBCUTANEOUS
Status: DISCONTINUED | OUTPATIENT
Start: 2018-10-26 | End: 2018-10-31 | Stop reason: HOSPADM

## 2018-10-26 RX ORDER — NOREPINEPHRINE BITARTRATE/D5W 16MG/250ML
0.03-0.4 PLASTIC BAG, INJECTION (ML) INTRAVENOUS CONTINUOUS
Status: DISCONTINUED | OUTPATIENT
Start: 2018-10-26 | End: 2018-10-27

## 2018-10-26 RX ORDER — FLUMAZENIL 0.1 MG/ML
0.2 INJECTION, SOLUTION INTRAVENOUS
Status: DISCONTINUED | OUTPATIENT
Start: 2018-10-26 | End: 2018-10-26

## 2018-10-26 RX ORDER — NICOTINE POLACRILEX 4 MG
15-30 LOZENGE BUCCAL
Status: DISCONTINUED | OUTPATIENT
Start: 2018-10-26 | End: 2018-10-31 | Stop reason: HOSPADM

## 2018-10-26 RX ORDER — ACETAMINOPHEN 325 MG/1
650 TABLET ORAL EVERY 4 HOURS PRN
Status: DISCONTINUED | OUTPATIENT
Start: 2018-10-29 | End: 2018-10-27

## 2018-10-26 RX ORDER — POTASSIUM CL/LIDO/0.9 % NACL 10MEQ/0.1L
10 INTRAVENOUS SOLUTION, PIGGYBACK (ML) INTRAVENOUS
Status: DISCONTINUED | OUTPATIENT
Start: 2018-10-26 | End: 2018-10-31 | Stop reason: HOSPADM

## 2018-10-26 RX ORDER — PROPOFOL 10 MG/ML
INJECTION, EMULSION INTRAVENOUS CONTINUOUS PRN
Status: DISCONTINUED | OUTPATIENT
Start: 2018-10-26 | End: 2018-10-26

## 2018-10-26 RX ADMIN — FENTANYL CITRATE 50 MCG: 50 INJECTION, SOLUTION INTRAMUSCULAR; INTRAVENOUS at 13:17

## 2018-10-26 RX ADMIN — PHENYLEPHRINE HYDROCHLORIDE 100 MCG: 10 INJECTION, SOLUTION INTRAMUSCULAR; INTRAVENOUS; SUBCUTANEOUS at 14:24

## 2018-10-26 RX ADMIN — FENTANYL CITRATE 100 MCG: 50 INJECTION INTRAMUSCULAR; INTRAVENOUS at 23:17

## 2018-10-26 RX ADMIN — VECURONIUM BROMIDE 5 MG: 1 INJECTION, POWDER, LYOPHILIZED, FOR SOLUTION INTRAVENOUS at 12:44

## 2018-10-26 RX ADMIN — FENTANYL CITRATE 100 MCG: 50 INJECTION, SOLUTION INTRAMUSCULAR; INTRAVENOUS at 14:14

## 2018-10-26 RX ADMIN — Medication 5 MG: at 14:47

## 2018-10-26 RX ADMIN — VECURONIUM BROMIDE 5 MG: 1 INJECTION, POWDER, LYOPHILIZED, FOR SOLUTION INTRAVENOUS at 17:15

## 2018-10-26 RX ADMIN — LIDOCAINE HYDROCHLORIDE 100 MG: 20 INJECTION, SOLUTION INFILTRATION; PERINEURAL at 12:12

## 2018-10-26 RX ADMIN — VECURONIUM BROMIDE 5 MG: 1 INJECTION, POWDER, LYOPHILIZED, FOR SOLUTION INTRAVENOUS at 16:22

## 2018-10-26 RX ADMIN — SODIUM CHLORIDE: 9 INJECTION, SOLUTION INTRAVENOUS at 07:36

## 2018-10-26 RX ADMIN — PHENYLEPHRINE HYDROCHLORIDE 100 MCG: 10 INJECTION, SOLUTION INTRAMUSCULAR; INTRAVENOUS; SUBCUTANEOUS at 12:20

## 2018-10-26 RX ADMIN — FENTANYL CITRATE 50 MCG: 50 INJECTION, SOLUTION INTRAMUSCULAR; INTRAVENOUS at 14:40

## 2018-10-26 RX ADMIN — SODIUM CHLORIDE: 9 INJECTION, SOLUTION INTRAVENOUS at 20:00

## 2018-10-26 RX ADMIN — FENTANYL CITRATE 50 MCG: 50 INJECTION, SOLUTION INTRAMUSCULAR; INTRAVENOUS at 14:30

## 2018-10-26 RX ADMIN — CHLORHEXIDINE GLUCONATE 15 ML: 1.2 RINSE ORAL at 21:53

## 2018-10-26 RX ADMIN — ALBUMIN (HUMAN) 500 ML: 12.5 SOLUTION INTRAVENOUS at 20:24

## 2018-10-26 RX ADMIN — MIDAZOLAM HYDROCHLORIDE 2 MG: 1 INJECTION, SOLUTION INTRAMUSCULAR; INTRAVENOUS at 14:14

## 2018-10-26 RX ADMIN — PHENYLEPHRINE HYDROCHLORIDE 100 MCG: 10 INJECTION, SOLUTION INTRAMUSCULAR; INTRAVENOUS; SUBCUTANEOUS at 12:14

## 2018-10-26 RX ADMIN — Medication 5 MG: at 12:16

## 2018-10-26 RX ADMIN — VECURONIUM BROMIDE 5 MG: 1 INJECTION, POWDER, LYOPHILIZED, FOR SOLUTION INTRAVENOUS at 13:53

## 2018-10-26 RX ADMIN — PANTOPRAZOLE SODIUM 40 MG: 40 INJECTION, POWDER, FOR SOLUTION INTRAVENOUS at 21:01

## 2018-10-26 RX ADMIN — PHENYLEPHRINE HYDROCHLORIDE 200 MCG: 10 INJECTION, SOLUTION INTRAMUSCULAR; INTRAVENOUS; SUBCUTANEOUS at 12:17

## 2018-10-26 RX ADMIN — Medication 5 G/HR: at 12:42

## 2018-10-26 RX ADMIN — MIDAZOLAM HYDROCHLORIDE 2 MG: 1 INJECTION, SOLUTION INTRAMUSCULAR; INTRAVENOUS at 16:22

## 2018-10-26 RX ADMIN — NITROGLYCERIN 200 MCG: 5 INJECTION, SOLUTION INTRAVENOUS at 09:02

## 2018-10-26 RX ADMIN — MIDAZOLAM HYDROCHLORIDE 2 MG: 1 INJECTION, SOLUTION INTRAMUSCULAR; INTRAVENOUS at 11:57

## 2018-10-26 RX ADMIN — SODIUM CHLORIDE, POTASSIUM CHLORIDE, SODIUM LACTATE AND CALCIUM CHLORIDE: 600; 310; 30; 20 INJECTION, SOLUTION INTRAVENOUS at 12:25

## 2018-10-26 RX ADMIN — SODIUM CHLORIDE: 9 INJECTION, SOLUTION INTRAVENOUS at 14:35

## 2018-10-26 RX ADMIN — FENTANYL CITRATE 250 MCG: 50 INJECTION, SOLUTION INTRAMUSCULAR; INTRAVENOUS at 12:12

## 2018-10-26 RX ADMIN — FENTANYL CITRATE 50 MCG: 50 INJECTION, SOLUTION INTRAMUSCULAR; INTRAVENOUS at 11:33

## 2018-10-26 RX ADMIN — SODIUM CHLORIDE 1 UNITS/HR: 9 INJECTION, SOLUTION INTRAVENOUS at 21:05

## 2018-10-26 RX ADMIN — VERAPAMIL HYDROCHLORIDE 2.5 MG: 2.5 INJECTION, SOLUTION INTRAVENOUS at 09:03

## 2018-10-26 RX ADMIN — Medication 5 MG: at 14:45

## 2018-10-26 RX ADMIN — ALBUMIN (HUMAN): 12.5 SOLUTION INTRAVENOUS at 18:33

## 2018-10-26 RX ADMIN — NOREPINEPHRINE BITARTRATE 0.03 MCG/KG/MIN: 1 INJECTION INTRAVENOUS at 16:14

## 2018-10-26 RX ADMIN — HEPARIN SODIUM 5000 UNITS: 1000 INJECTION, SOLUTION INTRAVENOUS; SUBCUTANEOUS at 09:03

## 2018-10-26 RX ADMIN — CEFAZOLIN 1 G: 1 INJECTION, POWDER, FOR SOLUTION INTRAMUSCULAR; INTRAVENOUS at 13:40

## 2018-10-26 RX ADMIN — PHENYLEPHRINE HYDROCHLORIDE 100 MCG: 10 INJECTION, SOLUTION INTRAMUSCULAR; INTRAVENOUS; SUBCUTANEOUS at 14:01

## 2018-10-26 RX ADMIN — PROPOFOL 20 MG: 10 INJECTION, EMULSION INTRAVENOUS at 13:13

## 2018-10-26 RX ADMIN — MIDAZOLAM 1 MG: 1 INJECTION INTRAMUSCULAR; INTRAVENOUS at 08:58

## 2018-10-26 RX ADMIN — PROPOFOL 90 MG: 10 INJECTION, EMULSION INTRAVENOUS at 12:12

## 2018-10-26 RX ADMIN — HYDRALAZINE HYDROCHLORIDE 10 MG: 20 INJECTION INTRAMUSCULAR; INTRAVENOUS at 23:07

## 2018-10-26 RX ADMIN — PROPOFOL 50 MCG/KG/MIN: 10 INJECTION, EMULSION INTRAVENOUS at 18:45

## 2018-10-26 RX ADMIN — PROPOFOL 50 MCG/KG/MIN: 10 INJECTION, EMULSION INTRAVENOUS at 21:04

## 2018-10-26 RX ADMIN — HEPARIN SODIUM 38000 UNITS: 1000 INJECTION, SOLUTION INTRAVENOUS; SUBCUTANEOUS at 14:00

## 2018-10-26 RX ADMIN — ROCURONIUM BROMIDE 50 MG: 10 INJECTION INTRAVENOUS at 12:12

## 2018-10-26 RX ADMIN — PHENYLEPHRINE HYDROCHLORIDE 100 MCG: 10 INJECTION, SOLUTION INTRAMUSCULAR; INTRAVENOUS; SUBCUTANEOUS at 14:43

## 2018-10-26 RX ADMIN — NITROGLYCERIN 0.07 MCG/KG/MIN: 20 INJECTION INTRAVENOUS at 22:45

## 2018-10-26 RX ADMIN — FENTANYL CITRATE 100 MCG: 50 INJECTION, SOLUTION INTRAMUSCULAR; INTRAVENOUS at 14:16

## 2018-10-26 RX ADMIN — MIDAZOLAM HYDROCHLORIDE 2 MG: 1 INJECTION, SOLUTION INTRAMUSCULAR; INTRAVENOUS at 13:13

## 2018-10-26 RX ADMIN — NITROGLYCERIN 20 MCG: 5 INJECTION, SOLUTION INTRAVENOUS at 17:39

## 2018-10-26 RX ADMIN — PHENYLEPHRINE HYDROCHLORIDE 100 MCG: 10 INJECTION, SOLUTION INTRAMUSCULAR; INTRAVENOUS; SUBCUTANEOUS at 17:50

## 2018-10-26 RX ADMIN — SODIUM BICARBONATE 50 MEQ: 84 INJECTION, SOLUTION INTRAVENOUS at 22:33

## 2018-10-26 RX ADMIN — EPINEPHRINE 0.02 MCG/KG/MIN: 1 INJECTION INTRAMUSCULAR; INTRAVENOUS; SUBCUTANEOUS at 16:52

## 2018-10-26 RX ADMIN — Medication 0.4 MG: at 21:38

## 2018-10-26 RX ADMIN — PHENYLEPHRINE HYDROCHLORIDE 100 MCG: 10 INJECTION, SOLUTION INTRAMUSCULAR; INTRAVENOUS; SUBCUTANEOUS at 17:51

## 2018-10-26 RX ADMIN — PHENYLEPHRINE HYDROCHLORIDE 100 MCG: 10 INJECTION, SOLUTION INTRAMUSCULAR; INTRAVENOUS; SUBCUTANEOUS at 17:55

## 2018-10-26 RX ADMIN — HUMAN ALBUMIN MICROSPHERES AND PERFLUTREN 5 ML: 10; .22 INJECTION, SOLUTION INTRAVENOUS at 10:30

## 2018-10-26 RX ADMIN — PHENYLEPHRINE HYDROCHLORIDE 100 MCG: 10 INJECTION, SOLUTION INTRAMUSCULAR; INTRAVENOUS; SUBCUTANEOUS at 14:47

## 2018-10-26 RX ADMIN — PHENYLEPHRINE HYDROCHLORIDE 0.35 MCG/KG/MIN: 10 INJECTION, SOLUTION INTRAMUSCULAR; INTRAVENOUS; SUBCUTANEOUS at 15:35

## 2018-10-26 RX ADMIN — PHENYLEPHRINE HYDROCHLORIDE 100 MCG: 10 INJECTION, SOLUTION INTRAMUSCULAR; INTRAVENOUS; SUBCUTANEOUS at 12:23

## 2018-10-26 RX ADMIN — IOPAMIDOL 30 ML: 755 INJECTION, SOLUTION INTRAVASCULAR at 09:15

## 2018-10-26 RX ADMIN — Medication 5 MG: at 14:24

## 2018-10-26 RX ADMIN — POTASSIUM CHLORIDE, DEXTROSE MONOHYDRATE AND SODIUM CHLORIDE: 150; 5; 450 INJECTION, SOLUTION INTRAVENOUS at 20:03

## 2018-10-26 RX ADMIN — PHENYLEPHRINE HYDROCHLORIDE 100 MCG: 10 INJECTION, SOLUTION INTRAMUSCULAR; INTRAVENOUS; SUBCUTANEOUS at 14:46

## 2018-10-26 RX ADMIN — FENTANYL CITRATE 100 MCG: 50 INJECTION, SOLUTION INTRAMUSCULAR; INTRAVENOUS at 13:12

## 2018-10-26 RX ADMIN — MIDAZOLAM HYDROCHLORIDE 2 MG: 1 INJECTION, SOLUTION INTRAMUSCULAR; INTRAVENOUS at 17:35

## 2018-10-26 RX ADMIN — CEFAZOLIN 1 G: 1 INJECTION, POWDER, FOR SOLUTION INTRAMUSCULAR; INTRAVENOUS at 17:42

## 2018-10-26 RX ADMIN — FENTANYL CITRATE 150 MCG: 50 INJECTION, SOLUTION INTRAMUSCULAR; INTRAVENOUS at 13:43

## 2018-10-26 RX ADMIN — Medication 5 MG: at 12:23

## 2018-10-26 RX ADMIN — LIDOCAINE HYDROCHLORIDE 10 ML: 10 INJECTION, SOLUTION EPIDURAL; INFILTRATION; INTRACAUDAL; PERINEURAL at 09:00

## 2018-10-26 RX ADMIN — CEFAZOLIN 1 G: 1 INJECTION, POWDER, FOR SOLUTION INTRAMUSCULAR; INTRAVENOUS at 15:43

## 2018-10-26 RX ADMIN — PROPOFOL 50 MG: 10 INJECTION, EMULSION INTRAVENOUS at 17:38

## 2018-10-26 RX ADMIN — PROTAMINE SULFATE 250 MG: 10 INJECTION, SOLUTION INTRAVENOUS at 17:35

## 2018-10-26 RX ADMIN — SODIUM CHLORIDE: 9 INJECTION, SOLUTION INTRAVENOUS at 18:43

## 2018-10-26 RX ADMIN — SODIUM CHLORIDE, POTASSIUM CHLORIDE, SODIUM LACTATE AND CALCIUM CHLORIDE: 600; 310; 30; 20 INJECTION, SOLUTION INTRAVENOUS at 12:20

## 2018-10-26 RX ADMIN — Medication 5 MG: at 12:28

## 2018-10-26 RX ADMIN — FENTANYL CITRATE 50 MCG: 50 INJECTION, SOLUTION INTRAMUSCULAR; INTRAVENOUS at 16:22

## 2018-10-26 RX ADMIN — Medication 5 MG: at 18:30

## 2018-10-26 RX ADMIN — MIDAZOLAM 1 MG: 1 INJECTION INTRAMUSCULAR; INTRAVENOUS at 11:34

## 2018-10-26 RX ADMIN — CEFAZOLIN 1 G: 1 INJECTION, POWDER, FOR SOLUTION INTRAMUSCULAR; INTRAVENOUS at 13:41

## 2018-10-26 RX ADMIN — ALBUMIN (HUMAN): 12.5 SOLUTION INTRAVENOUS at 18:04

## 2018-10-26 RX ADMIN — PROPOFOL 20 MG: 10 INJECTION, EMULSION INTRAVENOUS at 17:39

## 2018-10-26 RX ADMIN — PHENYLEPHRINE HYDROCHLORIDE 100 MCG: 10 INJECTION, SOLUTION INTRAMUSCULAR; INTRAVENOUS; SUBCUTANEOUS at 12:28

## 2018-10-26 RX ADMIN — PHENYLEPHRINE HYDROCHLORIDE 100 MCG: 10 INJECTION, SOLUTION INTRAMUSCULAR; INTRAVENOUS; SUBCUTANEOUS at 18:31

## 2018-10-26 RX ADMIN — FENTANYL CITRATE 250 MCG: 50 INJECTION, SOLUTION INTRAMUSCULAR; INTRAVENOUS at 17:38

## 2018-10-26 RX ADMIN — Medication 1 G/HR: at 13:42

## 2018-10-26 RX ADMIN — ALBUMIN HUMAN 500 ML: 50 SOLUTION INTRAVENOUS at 20:24

## 2018-10-26 RX ADMIN — FENTANYL CITRATE 100 MCG: 50 INJECTION, SOLUTION INTRAMUSCULAR; INTRAVENOUS at 13:13

## 2018-10-26 RX ADMIN — SODIUM CHLORIDE: 9 INJECTION, SOLUTION INTRAVENOUS at 12:25

## 2018-10-26 RX ADMIN — MUPIROCIN 0.5 G: 20 OINTMENT TOPICAL at 21:31

## 2018-10-26 RX ADMIN — HUMAN INSULIN 2 UNITS/HR: 100 INJECTION, SOLUTION SUBCUTANEOUS at 18:11

## 2018-10-26 RX ADMIN — PHENYLEPHRINE HYDROCHLORIDE 100 MCG: 10 INJECTION, SOLUTION INTRAMUSCULAR; INTRAVENOUS; SUBCUTANEOUS at 14:45

## 2018-10-26 RX ADMIN — Medication 5 MG: at 14:01

## 2018-10-26 RX ADMIN — PHENYLEPHRINE HYDROCHLORIDE 100 MCG: 10 INJECTION, SOLUTION INTRAMUSCULAR; INTRAVENOUS; SUBCUTANEOUS at 18:30

## 2018-10-26 RX ADMIN — FENTANYL CITRATE 50 MCG: 50 INJECTION, SOLUTION INTRAMUSCULAR; INTRAVENOUS at 08:57

## 2018-10-26 RX ADMIN — Medication 0.03 MCG/KG/MIN: at 19:15

## 2018-10-26 RX ADMIN — PROPOFOL 30 MG: 10 INJECTION, EMULSION INTRAVENOUS at 13:14

## 2018-10-26 ASSESSMENT — LIFESTYLE VARIABLES: TOBACCO_USE: 0

## 2018-10-26 ASSESSMENT — ACTIVITIES OF DAILY LIVING (ADL): ADLS_ACUITY_SCORE: 9

## 2018-10-26 NOTE — ANESTHESIA PREPROCEDURE EVALUATION
Anesthesia Evaluation     .             ROS/MED HX    ENT/Pulmonary:     (+)THOMAS risk factors snores loudly, hypertension, obese, , . .   (-) tobacco use and sleep apnea   Neurologic:       Cardiovascular:     (+) Dyslipidemia, hypertension-Peripheral Vascular Disease-- Carotid Stenosis, CAD, --. : . . . :. . Previous cardiac testing date:results:Stress Testdate:10/25/18 results:Interpretation Summary  The patient exercised 3:10.  The patient exhibited no chest pain during exercise.  Exercise was stopped due to dyspnea.  2 mm horizontal ST depression in inferolateral leads with ST elevation in lead  aVR.  Target Heart Rate was not achieved due to dyspnea.  Left ventricular cavity size increases with exercise.  Global LV systolic function deteriorates with exercise.  There is severe hypokinesia of septal, apical, lateral and mid to distal  anterior walls with exercise.     Overall abnormal stress test suggestive of mutivessel coronary artery disease.  Patient will be seen in cardiology consultation today.  _____________________________________________________________________________  __     Stress  The patient exercised 3:10.  RPP [id number].  There was a normal BP response to exercise.  The patient exhibited no chest pain during exercise.  Exercise was stopped due to dyspnea.  Target Heart Rate was not achieved due to dyspnea.  2 mm horizontal ST depression in inferolateral leads with ST elevation in lead  aVR.  The visual ejection fraction is estimated at 30-35%.  Left ventricular cavity size increases with exercise.  Global LV systolic function deteriorates with exercise.  There were stress-induced wall motion abnormalities observed.  There is severe hypokinesia of septal, apical, lateral and mid to distal  anterior walls with exercise.     Baseline  sinus rhythm, 1 degree AV block.  No regional wall motion abnormalities noted.  The visual ejection fraction is estimated at 55-60%.     Stress Results              Protocol:  Jose          Maximum Predicted HR:   142 bpm             Target HR: 121 bpm        % Maximum Predicted HR: 74 %        Stage  DurationHeart Rate  BP                    Comment             (mm:ss)   (bpm)    Stage 1   3:10      105   120/60Duke Treadmill Score: -2 (Moderate Risk)   RecoveryR  6:00      68    130/70Functional Aerobic Capacity: Below Average               Stress Duration:   3:10 mm:ss *        Recovery Time: 6:00 mm:ss         Maximum Stress HR: 105 bpm *           METS:          5     Left Ventricle  Left ventricular systolic function is normal. The visual ejection fraction is  estimated at 55-60%.        Mitral Valve  There is trace mitral regurgitation.     Tricuspid Valve  There is trace tricuspid regurgitation.     Aortic Valve  mild aortic valve sclerosis. No aortic stenosis is present. date: results: date: results:          METS/Exercise Tolerance:     Hematologic:         Musculoskeletal:         GI/Hepatic:        (-) GERD   Renal/Genitourinary:     (+) chronic renal disease, type: CRI,       Endo:     (+) type II DM .      Psychiatric:         Infectious Disease:         Malignancy:         Other:                     Physical Exam  Normal systems: pulmonary and dental    Airway   Mallampati: III  TM distance: >3 FB  Neck ROM: full    Dental   Comment: Upper permanent plate    Cardiovascular   Rhythm and rate: regular and normal      Pulmonary                     Anesthesia Plan      History & Physical Review  History and physical reviewed and following examination; no interval change.    ASA Status:  4 .    NPO Status:  > 8 hours    Plan for General and ETT with Intravenous induction. Maintenance will be Inhalation.      Additional equipment: Arterial Line, Central Line, CVP, PA Catheter and BETH Induction with Lidocaine, Fentanyl, Midazolam and Dejan  50mL bag of NS and micro-dripper for protamine admin  Transport on propofol gtt      Postoperative Care  Postoperative pain  management:  IV analgesics.      Consents  Anesthetic plan, risks, benefits and alternatives discussed with:  Patient and Spouse..                          .

## 2018-10-26 NOTE — IP AVS SNAPSHOT
"[place] [address] [facility]: [phone]                                              INTERAGENCY TRANSFER FORM - PHYSICIAN ORDERS   10/26/2018                    Hospital Admission Date: 10/26/2018  MERLIN J ANDERSON   : 1940  Sex: Male        Attending Provider: Heath Kessler MD     Allergies:  No Known Allergies    Infection:  None   Service:  CARDIOTHORAC    Ht:  1.829 m (6' 0.01\")   Wt:  100.5 kg (221 lb 9 oz)   Admission Wt:  96.2 kg (212 lb)    BMI:  30.04 kg/m 2   BSA:  2.26 m 2            Patient PCP Information     Provider PCP Type    Sujit Duke MD General      ED Clinical Impression     Diagnosis Description Comment Added By Time Added    Abnormal cardiovascular stress test [R94.39] Abnormal cardiovascular stress test [R94.39]  Epic, Userprod 10/26/2018  6:20 AM    Status post coronary angiogram [Z98.890] Status post coronary angiogram [Z98.890]  Sai Fuentes MD 10/26/2018  9:21 AM    S/P CABG (coronary artery bypass graft) [Z95.1] S/P CABG (coronary artery bypass graft) [Z95.1]  Chelsi Alexandra PA-C 10/26/2018  7:10 PM      Hospital Problems as of 10/31/2018              Priority Class Noted POA    S/P CABG (coronary artery bypass graft) Medium  10/26/2018 Yes      Non-Hospital Problems as of 10/31/2018              Priority Class Noted    Mixed hyperlipidemia Medium  2002    Essential hypertension, benign Medium  2002    Allergic rhinitis due to pollen Medium  2002    Gout Medium  2002    HTN (hypertension) Medium  2009    HYPERLIPIDEMIA LDL GOAL <100 Medium  10/31/2010    CKD (chronic kidney disease) stage 3, GFR 30-59 ml/min (H) Medium  2011    Advanced directives, counseling/discussion Medium  2012    Lumbar radiculopathy Medium  2012    Lumbago Medium  2012    Arthrodesis status Medium  2012    Seasonal allergic rhinitis Medium  4/15/2013    Type 2 diabetes mellitus without " complication (H) Medium  11/30/2015    PVD (peripheral vascular disease) (H) Medium  9/8/2017    Carotid arterial disease (H) Medium  9/18/2017    Carotid artery disease (H) Medium  9/18/2017      Code Status History     Date Active Date Inactive Code Status Order ID Comments User Context    10/31/2018 10:22 AM  Full Code [id number]  Seth Olivarez PA-C Outpatient    10/26/2018  7:16 PM 10/31/2018 10:22 AM Full Code [id number]  Chelsi Alexandra PA-C Inpatient    9/19/2017  7:58 AM 10/26/2018  7:16 PM Full Code [id number]  Edgar Enrique MD Outpatient    9/18/2017  5:17 PM 9/19/2017  7:58 AM Full Code [id number]  Juan Garcia MD Inpatient         Medication Review      START taking        Dose / Directions Comments    acetaminophen 325 MG tablet   Commonly known as:  TYLENOL        Dose:  650 mg   Take 2 tablets (650 mg) by mouth every 4 hours as needed for mild pain   Quantity:  100 tablet   Refills:  0        furosemide 20 MG tablet   Commonly known as:  LASIX        Dose:  20 mg   Start taking on:  11/1/2018   Take 1 tablet (20 mg) by mouth daily for 5 days   Quantity:  5 tablet   Refills:  0        metoprolol tartrate 25 MG tablet   Commonly known as:  LOPRESSOR        Dose:  25 mg   Take 1 tablet (25 mg) by mouth every 12 hours   Quantity:  60 tablet   Refills:  0        oxyCODONE IR 5 MG tablet   Commonly known as:  ROXICODONE        Dose:  5 mg   Take 1 tablet (5 mg) by mouth every 6 hours as needed for moderate to severe pain   Quantity:  10 tablet   Refills:  0        potassium chloride SA 10 MEQ CR tablet   Commonly known as:  K-DUR/KLOR-CON M        Dose:  10 mEq   Take 1 tablet (10 mEq) by mouth daily for 5 days   Quantity:  5 tablet   Refills:  0          CONTINUE these medications which may have CHANGED, or have new prescriptions. If we are uncertain of the size of tablets/capsules you have at home, strength may be listed as something that might have changed.        Dose / Directions  Comments    aspirin 325 MG EC tablet   This may have changed:    - medication strength  - how much to take  - when to take this        Dose:  325 mg   Start taking on:  11/1/2018   Take 1 tablet (325 mg) by mouth daily   Quantity:  40 tablet   Refills:  0        lisinopril 2.5 MG tablet   Commonly known as:  PRINIVIL/Zestril   This may have changed:    - medication strength  - how much to take        Dose:  2.5 mg   Start taking on:  11/1/2018   Take 1 tablet (2.5 mg) by mouth daily   Quantity:  30 tablet   Refills:  0          CONTINUE these medications which have NOT CHANGED        Dose / Directions Comments    atorvastatin 40 MG tablet   Commonly known as:  LIPITOR   Used for:  Right-sided carotid artery disease, unspecified type (H), Hyperlipidemia LDL goal <100        Dose:  40 mg   Take 1 tablet (40 mg) by mouth daily   Quantity:  90 tablet   Refills:  3        blood glucose calibration solution   Used for:  Type 2 diabetes mellitus without complication (H)        Use to calibrate blood glucose monitor as needed as directed.   Quantity:  1 Bottle   Refills:  0        blood glucose monitoring lancets   Used for:  Type 2 diabetes mellitus without complication, without long-term current use of insulin (H)        Use to test blood sugar 1 times daily or as directed.   Quantity:  1 Box   Refills:  1        blood glucose monitoring test strip   Commonly known as:  ACCU-CHEK PRISCILLA   Used for:  Type 2 diabetes mellitus without complication, without long-term current use of insulin (H)        Use to test blood sugars 1 times daily or as directed.   Quantity:  100 each   Refills:  1        FLONASE NA        Dose:  1-2 spray   Spray 1-2 sprays in nostril daily as needed   Refills:  0        metFORMIN 500 MG tablet   Commonly known as:  GLUCOPHAGE   Used for:  Type 2 diabetes mellitus without complication, without long-term current use of insulin (H)        Dose:  500 mg   Take 1 tablet (500 mg) by mouth 2 times daily  (with meals)   Quantity:  180 tablet   Refills:  1        Multi-vitamin Tabs tablet        Dose:  1 tablet   Take 1 tablet by mouth every morning   Refills:  0        nitroGLYcerin 0.4 MG sublingual tablet   Commonly known as:  NITROSTAT   Used for:  Abnormal cardiovascular stress test        For chest pain place 1 tablet under the tongue every 5 minutes for 3 doses. If symptoms persist 5 minutes after 1st dose call 911.   Quantity:  25 tablet   Refills:  3        tamsulosin 0.4 MG capsule   Commonly known as:  FLOMAX   Used for:  Urinary retention        Dose:  0.4 mg   Take 1 capsule (0.4 mg) by mouth daily   Quantity:  90 capsule   Refills:  3          STOP taking     atenolol 100 MG tablet   Commonly known as:  TENORMIN                   Summary of Visit     Reason for your hospital stay       You had a coronary artery bypass grafting x 4.             After Care     Activity - Up ad berkley           Advance Diet as Tolerated       Follow this diet upon discharge: Orders Placed This Encounter      Room Service      Low Consistent CHO Diet       Daily weights       Call Provider for weight gain of more than 2 pounds per day or 5 pounds per week.       Discharge Instructions - IF on Metformin (Glucophage or Glucovance) or Metformin containing medications       IF on Metformin (Glucophage or Glucovance) or Metformin containing medications , schedule a Basic Metabolic Panel at [facility] or Primary Clinic in 48 - 72 hours post procedure and PRIOR TO resuming the Metformin or Metformin containing medications.  Hold Metformin (Glucophage or Glucovance) or Metformin containing medications until after the Basic Metabolic Panel on the 2nd or 3rd day following the procedure.  May resume after blood draw is complete.       General info for SNF       Length of Stay Estimate: Short Term Care: Estimated # of Days <30  Condition at Discharge: Improving  Level of care:skilled   Rehabilitation Potential: Good  Admission H&P remains  valid and up-to-date: Yes  Recent Chemotherapy: N/A  Use Nursing Home Standing Orders: Yes       Glucose monitor nursing POCT       Daily in am.       Mantoux instructions       Give two-step Mantoux (PPD) Per Facility Policy Yes             Referrals     CARDIAC REHAB REFERRAL       Please be aware that coverage of these services is subject to the terms and limitations of your health insurance plan.  Call member services at your health plan with any benefit or coverage questions.     Order is sent electronically to central rehab scheduling. Call [phone] if you haven't been contacted regarding these appointments within 2 business days of discharge.   Advance to Wellness and Exercise for Life (WEL) Program or to another maintenance exercise program upon completion of supervised exercise therapy.    Weight mgt program is only offered at [facility].    *This therapy referral will be filtered to a centralized scheduling office at [facility] and the patient will receive a call to schedule an appointment at a [place] location most convenient for them. *             Occupational Therapy Adult Consult       Evaluate and treat as clinically indicated.    Reason:  S/p CABG x 4       Physical Therapy Adult Consult       Evaluate and treat as clinically indicated.    Reason:  S/p CABG x 4             Lab Orders     Basic metabolic panel  (Ca, Cl, CO2, Creat, Gluc, K, Na, BUN)       Cr             Radiology & Cardiology Orders     Future Labs/Procedures Complete By Expires    Echocardiogram  As directed 10/26/2019    Process Instructions:    Administration of IV contrast will be tailored to this examination per the appropriate written protocol listed in the Echocardiography department Protocol Book, or by the supervising Cardiologist. This may result in an order change.    Use of contrast is at the discretion of the supervising Cardiologist.    Scheduling Instructions:    Please call your clinic of choice  to schedule this procedure:    [facility]:   [phone]  [facility]:   [phone]  [facility]:  [phone]  [facility]): [phone] (Friday)  [facility]:   [phone]  [facility]:  [phone]  [facility]): [phone]  [facility] [phone]    Comments:    Administration of IV contrast will be tailored to this examination per the appropriate written protocol listed in the Echocardiography department Protocol Book, or by the supervising Cardiologist. This may result in an order change.    Use of contrast is at the discretion of the supervising Cardiologist.      Radiology & Cardiology Orders     Echocardiogram       Administration of IV contrast will be tailored to this examination per the appropriate written protocol listed in the Echocardiography department Protocol Book, or by the supervising Cardiologist. This may result in an order change.    Use of contrast is at the discretion of the supervising Cardiologist.   Administration of IV contrast will be tailored to this examination per the appropriate written protocol listed in the Echocardiography department Protocol Book, or by the supervising Cardiologist. This may result in an order change.    Use of contrast is at the discretion of the supervising Cardiologist.   Please call your clinic of choice to schedule this procedure:    [facility]:   [phone]  [facility]:   [phone]  [facility]:  [phone]  [facility]): [phone] (Friday)  [facility]:   [phone]  [facility]:  [phone]  [facility]): [phone]  [facility] [phone]             Your next 10 appointments already scheduled     2018  2:30 PM CST   Cardiac Evaluation with [facility]   [facility] ([facility])    [address]  [facility] [address]   [phone]            Nov 14, 2018  2:00 PM CST   Post-Op with [facility] CARDIOTHORACIC SURGERY, [place]   [facility] Cardiothoracic ([facility])    [address] [place] [address]   [phone]            Jan 17, 2019  2:45 PM CST   Return Visit with Davi Rebolledo MD   [facility] ([facility])    [address]  [place] [facility] [place] [facility] [place] [address]   [phone]              Follow-Up Appointment Instructions     Future Labs/Procedures    Follow Up and recommended labs and tests     Comments:    BMP to be drawn on Friday, November 2, 2018 secondary to lasix and KCl and CKD.      Follow-Up Appointment Instructions     Follow Up and recommended labs and tests       BMP to be drawn on Friday, November 2, 2018 secondary to lasix and KCl and CKD.             Statement of Approval     Ordered          10/31/18 1023  I have reviewed and agree with all the recommendations and orders detailed in this document.  EFFECTIVE NOW     Approved and electronically signed by:  Seth Olivarez PA-C

## 2018-10-26 NOTE — ANESTHESIA PROCEDURE NOTES
CENTRAL LINE INSERTION PROCEDURE NOTE:   Pre-Procedure  Performed by HALEY PETER  Location: OR      Pre-Anesthestic Checklist: patient identified, risks and benefits discussed and informed consent    Timeout  Correct Patient: Yes   Correct Procedure: Yes   Correct Site: Yes   Correct Laterality: N/A   Correct Position: Yes   Site Marked: N/A   .   Procedure Documentation   Procedure: central line and new line (Introducer and PA Catheter)  Position: Trendelenburg  Patient Prep;chlorhexidine gluconate and isopropyl alcohol, patient drapedall elements of maximal sterile barrier technique not followed for medical reasons      Insertion Site:right, internal jugular  Using U/S with sterile probe cover and sterile gel, vein evaluated for patency/adequacy of cortis insertion is adequate, and using realtime U/S imaging the pete was punctured, and needle was observed entering vein on U/S. A permanent image is entered into the patient's record.   Skin infiltrated with 0 mL of.  Catheter: PA Catheter, 9 Indonesian, 10 cm (sheath introducer)  Assessment/Narrative         Secured by suture  Tegaderm and Biopatch dressing used.  blood aspirated from all lumens  All lumens flushed: Yes  Verification method: Placement to be verified post-op  Comments:  PA Catheter  No complications  Line placed in operating room post induction  Thin wall to right internal jugular x 1, no dysrhythmias with wire insertion or floating PA Catheter

## 2018-10-26 NOTE — H&P
[facility]    History and Physical  Hospitalist       Date of Admission:  10/26/2018  Date of Service (when I saw the patient): 10/26/2018    Assessment & Plan   Merlin J Anderson is a 78 year old male with a past medical history of diabetes mellitus type II, hypertension, dyslipidemia, CKD-III who underwent previously scheduled coronary angiography today with Dr. Fuentes due to abnormal stress echocardiogram. Patient was identified to have severe three-vessel coronary artery disease including severe left main stenosis. Cardiovascular surgery was consulted for evaluation of possible CABG, planning to proceed today.     Coronary artery disease.  Hypertension, dyslipidemia.  Recent diagnosis on the basis of abnormal stress test performed yesterday, 10/25/2018 which was suggestive of multivessel coronary artery disease. He has been experiencing predictable dyspnea on exertion for several months. Underwent outpatient exercise stress echocardiogram, which was abnormal with 2 mm horizontal ST depression in inferolateral leads as well as ST elevation in lead aVR on stress. Additionally, post exercise there was an increase in the size of the left ventricle with decrease in LVSF to 30-35% with associated severe hypokinesia of septal, apical lateral and mid to distal anterior wall. Patient subsequently underwent coronary angiography day of admission which indicated severe three-vessel coronary artery disease with severe left main stenosis.  - Cardiology & CV surgery following, management per recommendations  - Pt to OR this morning following balloon pump placement in angio    CKD stage III.   Baseline creatinine appears to be around 1.5.     Diabetes mellitus type 2.  Prior to admission maintained on metformin. Last hemoglobin A1c was 6.7 on 10/2/2018.  - Management postoperatively per CV surgery/intensivist    DVT Prophylaxis: Pneumatic Compression Devices  Code Status: Full Code    Patient is presently in  angio suites receiving balloon pump, then will proceed to OR for CABG. Hospitalist will defer medical management to CV surgery / intensivist, please call when extubated and stable for transfer.    Mehdi Mcintosh PA-C    Primary Care Physician   Sujit Duke    Chief Complaint   LUNA    History of Present Illness   Merlin J Anderson is a 78 year old male with a past medical history of diabetes mellitus type II, hypertension, dyslipidemia, CKD-III who initially presented to primary care office earlier this month for routine visit was noted to have dyspnea on exertion for several months. He was referred to undergo outpatient exercise stress echocardiogram which was performed on 10/25/2018. Patient did not experience symptoms during exercise, however, was noted to have markedly abnormal imaging suggestive of multivessel coronary artery disease. He was seen as an add-on in cardiology clinic that afternoon and referred for coronary angiography which was performed today. Coronary angiography identified severe three-vessel coronary artery disease including severe left main stenosis. For this, cardiovascular surgery was consulted for evaluation of possible CABG. Patient was recommended to remain in hospital for evaluation. Hospitalist service was contacted for admission.    Past Medical History    1. Diabetes mellitus type 2. Last hemoglobin A1c was 6.7 on 10/2/2018.  2. CKD stage III with a baseline creatinine of approximately 1.5.  3. Carotid arterial disease, status post right CEA.  4. Hypertension.  5. Dyslipidemia.    Past Surgical History   1. Right carotid endarterectomy.  2. Hernia repair.  3. Lumbar decompression and fusion.  4. Colonoscopy, negative.    Prior to Admission Medications   Prior to Admission Medications   Prescriptions Last Dose Informant Patient Reported? Taking?   ASPIRIN EC PO 10/26/2018 at Unknown time Self Yes Yes   Sig: Take 81 mg by mouth every morning   Fluticasone Propionate (FLONASE NA)  10/26/2018 at Unknown time Self Yes Yes   Sig: Spray 1-2 sprays in nostril daily as needed   atenolol (TENORMIN) 100 MG tablet 10/25/2018 at Unknown time  No Yes   Sig: Take 1 tablet (100 mg) by mouth every morning   atorvastatin (LIPITOR) 40 MG tablet 10/25/2018 at Unknown time  No Yes   Sig: Take 1 tablet (40 mg) by mouth daily   blood glucose calibration (ACCU-CHEK PRISCILLA) solution   No No   Sig: Use to calibrate blood glucose monitor as needed as directed.   blood glucose monitoring (ACCU-CHEK PRISCILLA) test strip   No No   Sig: Use to test blood sugars 1 times daily or as directed.   blood glucose monitoring (ACCU-CHEK MULTICLIX) lancets   No No   Sig: Use to test blood sugar 1 times daily or as directed.   lisinopril (PRINIVIL) 20 MG tablet 10/25/2018 at Unknown time  No Yes   Sig: Take 1 tablet (20 mg) by mouth daily   metFORMIN (GLUCOPHAGE) 500 MG tablet Past Week at Unknown time  No Yes   Sig: Take 1 tablet (500 mg) by mouth 2 times daily (with meals)   multivitamin, therapeutic with minerals (MULTI-VITAMIN) TABS tablet 10/25/2018 at Unknown time Self Yes Yes   Sig: Take 1 tablet by mouth every morning   nitroGLYcerin (NITROSTAT) 0.4 MG sublingual tablet Unknown at Unknown time  No No   Sig: For chest pain place 1 tablet under the tongue every 5 minutes for 3 doses. If symptoms persist 5 minutes after 1st dose call 911.   tamsulosin (FLOMAX) 0.4 MG capsule 10/26/2018 at Unknown time  No Yes   Sig: Take 1 capsule (0.4 mg) by mouth daily      Facility-Administered Medications: None     Allergies   No Known Allergies    Social History   Nonsmoker, rare ETOH    Family History   Father with diabetes    Review of Systems   10-point review of systems was performed & is otherwise negative, refer to HPI    Physical Exam   Temp: 96  F (35.6  C) Temp src: Oral BP: 132/67 Pulse: 58 Heart Rate: 62 Resp: 16 SpO2: 95 % O2 Device: None (Room air)    Vital Signs with Ranges  Temp:  [96  F (35.6  C)] 96  F (35.6  C)  Pulse:   [58] 58  Heart Rate:  [62] 62  Resp:  [16-18] 16  BP: (121-136)/(61-72) 132/67  SpO2:  [93 %-98 %] 95 %  212 lbs .02 oz    Data   Data reviewed today:  I personally reviewed no images or EKG's today.    Recent Labs  Lab 10/26/18  0725   WBC 5.9   HGB 12.2*   MCV 90   *   INR 1.08      POTASSIUM 4.5   CHLORIDE 109   CO2 25   BUN 24   CR 1.46*   ANIONGAP 7   VICKY 9.1   *       No results found for this or any previous visit (from the past 24 hour(s)).

## 2018-10-26 NOTE — CONSULTS
Consult Date:  10/26/2018      REASON FOR CONSULTATION:  Severe 3-vessel coronary artery disease.      HISTORY OF PRESENT ILLNESS:  The patient is a very pleasant 78-year-old gentleman with history of diabetes, hypertension, hyperlipidemia, chronic kidney disease, and carotid artery disease, status post right carotid endarterectomy, who was evaluated in the outpatient by my colleague, Dr. Rebolledo, for an evaluation of progressive dyspnea on exertion.  He underwent a stress test which was very abnormal.  He was subsequently referred for coronary angiogram as an outpatient.      The patient underwent coronary angiogram this morning, which demonstrated severe 3-vessel coronary artery disease, including severe distal left main stenosis.  The patient is currently not having chest pain.  He is resting comfortably.      PAST MEDICAL HISTORY:   1.  Hypertension.   2.  Hyperlipidemia.   3.  Chronic kidney disease.   4.  Peripheral artery disease.   5.  Carotid artery disease.      ALLERGIES:  NO KNOWN DRUG ALLERGIES.      HOME MEDICATIONS:   1.  Aspirin 81 mg daily.   2.  Atenolol 100 mg daily.   3.  Atorvastatin 40 mg daily.   4.  Lisinopril 20 mg daily.   5.  Metformin 500 mg p.o. b.i.d.   6.  Flomax 0.4 mg daily.      SOCIAL HISTORY:  He is a previous smoker.  Denies alcohol use.      FAMILY HISTORY:  Positive for premature coronary artery disease.      PHYSICAL EXAMINATION:   VITAL SIGNS:  Blood pressure is 123/76, pulse is 62.   GENERAL:  He is resting, appears to be in acute distress.   NECK:  Jugular venous pressure is normal.  Carotid upstrokes are brisk.   LUNGS:  Clear to auscultation bilaterally.   HEART:  Normal S1 and S2.  No murmurs, rubs, or gallops.   ABDOMEN:  Soft, nontender.   EXTREMITIES:  Warm, no cyanosis, clubbing, or edema.   SKIN:  No rashes or lesions.   VESSELS:  2+ radial, 2+ femoral.      IMPRESSION AND PLAN:   1.  Progressive dyspnea on exertion, likely angina equivalent.   2.  Severe 3-vessel  coronary artery disease, including severe distal left main stenosis.   3.  Hypertension.   4.  Hyperlipidemia.   5.  Diabetes.   6.  Chronic kidney disease, stage III.      The patient will require CV Surgery consultation for possible coronary artery bypass grafting.  He will be admitted to the hospital and likely undergo CABG either today or within the next few days.      He is on a reasonable medical program, which I would recommend that we continue.      We will obtain an echocardiogram to assess his LV function and to rule out significant valvular heart disease.  Will also obtain carotid ultrasound and map his lower extremity veins in preparation for CABG.      It was a pleasure seeing Mr. Warner in the Cath Lab this morning.  I appreciate the opportunity to participate in the patient's care.         JUSTYNA BARAHONA MD             D: 10/26/2018   T: 10/26/2018   MT: SAIRA      Name:     ANDERSON, MERLIN   MRN:      [id number]        Account:       [id number]   :      1940           Consult Date:  10/26/2018      Document: [id number]       cc: Sujit Duke MD

## 2018-10-26 NOTE — IP AVS SNAPSHOT
"` `     [place] [address] [facility]: [phone]                                              INTERAGENCY TRANSFER FORM - NURSING   10/26/2018                    Hospital Admission Date: 10/26/2018  MERLIN J ANDERSON   : 1940  Sex: Male        Attending Provider: Heath Kessler MD     Allergies:  No Known Allergies    Infection:  None   Service:  CARDIOTHORAC    Ht:  1.829 m (6' 0.01\")   Wt:  100.5 kg (221 lb 9 oz)   Admission Wt:  96.2 kg (212 lb)    BMI:  30.04 kg/m 2   BSA:  2.26 m 2            Patient PCP Information     Provider PCP Type    Sujit Duke MD General      Current Code Status     Date Active Code Status Order ID Comments User Context       Prior      Code Status History     Date Active Date Inactive Code Status Order ID Comments User Context    10/31/2018 10:22 AM  Full Code [id number]  Seth Olivarez PA-C Outpatient    10/26/2018  7:16 PM 10/31/2018 10:22 AM Full Code [id number]  Chelsi Alexandra PA-C Inpatient    2017  7:58 AM 10/26/2018  7:16 PM Full Code [id number]  Edgar Enrique MD Outpatient    2017  5:17 PM 2017  7:58 AM Full Code [id number]  Juan Garcia MD Inpatient      Advance Directives        Scanned docmt in ACP Activity?           No scanned doc        Hospital Problems as of 10/31/2018              Priority Class Noted POA    S/P CABG (coronary artery bypass graft) Medium  10/26/2018 Yes      Non-Hospital Problems as of 10/31/2018              Priority Class Noted    Mixed hyperlipidemia Medium  2002    Essential hypertension, benign Medium  2002    Allergic rhinitis due to pollen Medium  2002    Gout Medium  2002    HTN (hypertension) Medium  2009    HYPERLIPIDEMIA LDL GOAL <100 Medium  10/31/2010    CKD (chronic kidney disease) stage 3, GFR 30-59 ml/min (H) Medium  2011    Advanced directives, counseling/discussion Medium  2012    Lumbar radiculopathy Medium  " 6/27/2012    Lumbago Medium  11/13/2012    Arthrodesis status Medium  11/13/2012    Seasonal allergic rhinitis Medium  4/15/2013    Type 2 diabetes mellitus without complication (H) Medium  11/30/2015    PVD (peripheral vascular disease) (H) Medium  9/8/2017    Carotid arterial disease (H) Medium  9/18/2017    Carotid artery disease (H) Medium  9/18/2017      Immunizations     Name Date      Influenza (H1N1) 01/10/10     Influenza (High Dose) 3 valent vaccine 09/08/17     Influenza (High Dose) 3 valent vaccine 10/02/15     Influenza (High Dose) 3 valent vaccine 10/08/13     Influenza (High Dose) 3 valent vaccine 09/24/12     Influenza (IIV3) PF 09/30/11     Pneumo Conj 13-V (2010&after) 10/02/15     Pneumococcal 23 valent 12/31/10     TD (ADULT, 7+) 04/15/08     TDAP Vaccine (Adacel) 12/16/15     Zoster vaccine, live 08/24/11          END      ASSESSMENT     Discharge Profile Flowsheet     EXPECTED DISCHARGE     SKIN      Expected Discharge Date  10/31/18 (TCU) 10/30/18 1522   Inspection of bony prominences  Procedural focused assessment (identify areas inspected) 10/31/18 1306    DISCHARGE NEEDS ASSESSMENT     Skin WDL  ex 10/31/18 1306    Equipment Currently Used at Home  none 10/28/18 1530   Skin Color/Characteristics  bruised (ecchymotic) 10/31/18 1306    Transportation Available  car (family or friend will provide) 10/28/18 1530   Skin Temperature  warm 10/31/18 1306    # of Referrals Placed by CTS  Post Acute Facilities 10/30/18 1032   Skin Moisture  dry 10/31/18 1306    GASTROINTESTINAL (ADULT,PEDIATRIC,OB)     Skin Integrity  incision(s);bruise(s) 10/31/18 1306    GI WDL  WDL 10/31/18 1306   Inspection under devices  Full 10/31/18 1306    All Quadrants Bowel Sounds  audible and active in all quadrants 10/31/18 0450   Procedural focused assessment (identify areas inspected)   -- (incisions) 10/31/18 1306    Passing flatus  yes 10/31/18 0450   SAFETY      COMMUNICATION ASSESSMENT     Safety WDL  WDL 10/31/18  "1306    Patient's communication style  spoken language (English or Bilingual) 09/18/17 0901   Safety Equipment  oxygen flowmeter 10/29/18 1426    Patient's primary language  English 10/26/18 0731   All Alarms  alarm(s) activated and audible 10/31/18 1306                 Assessment WDL (Within Defined Limits) Definitions           Safety WDL     Effective: 09/28/15    Row Information: <b>WDL Definition:</b> Bed in low position, wheels locked; call light in reach; upper side rails up x 2; ID band on<br> <font color=\"gray\"><i>Item=AS safety wdl>>List=AS safety wdl>>Version=F14</i></font>      Skin WDL     Effective: 09/28/15    Row Information: <b>WDL Definition:</b> Warm; dry; intact; elastic; without discoloration; pressure points without redness<br> <font color=\"gray\"><i>Item=AS skin wdl>>List=AS skin wdl>>Version=F14</i></font>      Vitals     Vital Signs Flowsheet     COMMENTS     RIGHT RADIAL INTERVENTIONAL PROCEDURE ACCESS      Comments  postactivity 10/30/18 1441   Site Assessment  WDL 10/31/18 1301    VITAL SIGNS     Hemostasis management  Unchanged 10/31/18 0757    Temp  98.2  F (36.8  C) 10/31/18 1203   Radial device volume remaining  0 mL 10/26/18 2217    Temp src  Oral 10/31/18 1203   CMS Right Arm  WDL 10/31/18 1301    Resp  18 10/31/18 1203   Radial Pulse - Right Arm  Normal 10/31/18 0757    Pulse  79 10/31/18 1203   RIGHT GROIN INTERVENTIONAL PROCEDURE ACCESS      Heart Rate  76 10/31/18 1203   Site Assessment  WDL 10/31/18 1301    Pulse/Heart Rate Source  Monitor 10/31/18 1203   Hemostasis management  Unchanged 10/31/18 1301    BP  113/59 10/31/18 1203   Femoral Bruit  not present 10/31/18 0757    BP Location  Right arm 10/31/18 1203   CMS Right Extremity  WDL 10/31/18 1301    OXYGEN THERAPY     Dorsalis Pulse - Right Leg  Normal 10/31/18 0757    SpO2  98 % 10/31/18 1203   Posterior Tibial Pulse - Right Leg  Normal 10/31/18 0757    O2 Device  None (Room air) 10/31/18 1203   POSITIONING      FiO2 (%)  " 40 % 10/27/18 1526   Body Position  independently positioning 10/31/18 1306    Oxygen Delivery  2 LPM 10/29/18 0121   Head of Bed (HOB)  HOB at 20-30 degrees 10/31/18 0757    PAIN/COMFORT     Positioning/Transfer Devices  pillows;in use 10/31/18 0757    Patient Currently in Pain  denies 10/31/18 1301   Chair  Upright in chair 10/31/18 0715    Preferred Pain Scale  number (Numeric Rating Pain Scale) 10/30/18 0339   DAILY CARE      Patient's Stated Pain Goal  No pain 10/28/18 2220   Activity Management  up in chair 10/31/18 1033    0-10 Pain Scale  5 10/29/18 0050   Activity Assistance Provided  assistance, 1 person 10/31/18 1033    Word Pain Scale  0 10/28/18 2220   Assistive Device Utilized  gait belt;walker 10/31/18 1033    Pain Location  Incision 10/29/18 0324   ECG      Pain Orientation  Mid 10/29/18 0324   ECG Rhythm  Normal sinus rhythm 10/31/18 0757    Pain Descriptors  Aching 10/28/18 0853   Ectopy  None 10/28/18 0644    Pain Management Interventions  analgesia administered 10/28/18 0853   Ectopy Frequency  Rare 10/27/18 0238    Pain Intervention(s)  Medication (See eMAR) 10/29/18 0324   Lead Monitored  Lead II;V 1 10/28/18 0644    Response to Interventions  Absence of nonverbal indicators of pain 10/31/18 0257   Equipment  electrodes changed;telemetry batteries changed 10/31/18 0839    CRITICAL-CARE PAIN OBSERVATION TOOL (CPOT)     PACEMAKER      Facial Expression  0 10/27/18 1729   Pacemaker  -- 10/30/18 1028    Body Movements  0 10/27/18 1729   Atrial Output (milliamps)  10 milliamps 10/27/18 0032    Compliance w/ventilator (intubated patients)  Extubated 10/27/18 1729   Atrial Sensitivity (mV)  0.4 10/27/18 0032    Vocalization (extubated patients)  0 10/27/18 1729   AV Interval (mSec)  200 10/27/18 0032    Muscle Tension  0 10/27/18 1729   Ventricular Output (milliamps)  10 milliamps 10/27/18 0032    Total  0 10/27/18 1729   Ventricular Sensitivity (mV)  2 10/27/18 0032    ANALGESIA SIDE EFFECTS  "MONITORING     Pacemaker Set Rate (beats/min)  80 BPM 10/27/18 0032    Side Effects Monitoring: Respiratory Quality  R 10/31/18 1301   Pacer Mode  Off 10/28/18 1822    Side Effects Monitoring: Respiratory Depth  N 10/31/18 1301   Function  -- 10/30/18 1028    Side Effects Monitoring: Sedation Level  1 10/31/18 1301   Paced Amount  -- 10/30/18 1028    ANURAG COMA SCALE     Standby Status  -- 10/30/18 1028    Best Eye Response  4-->(E4) spontaneous 10/30/18 1815   Battery Check  Done 10/27/18 0700    Best Motor Response  6-->(M6) obeys commands 10/30/18 1815   Site Assessment  -- 10/30/18 1028    Best Verbal Response  5-->(V5) oriented 10/30/18 1815   Dressing Status  -- 10/30/18 1028    Anurag Coma Scale Score  15 10/30/18 1815   Pacemaker Type  Epicardial 10/28/18 1822    Assessment Qualifiers  patient not sedated/intubated 10/28/18 0440   POINT OF CARE TESTING      HEIGHT AND WEIGHT     Puncture Site  arterial line 10/28/18 0440    Height  1.829 m (6' 0.01\") 10/26/18 0647   Bedside Glucose (mg/dl )   132 mg/dl 10/28/18 0440    Weight  100.5 kg (221 lb 9 oz) 10/31/18 0715   OTHER      Weight Method  Standing scale 10/31/18 0715   Temp  97.2  F (36.2  C) 10/26/18 1845    Bed Scale  Standard (fitted sheet, draw sheet/ pad, cover/flat sheet, blanket, two pillows) 10/29/18 0659   Temp 2  97.2  F (36.2  C) 10/26/18 1848    BSA (Calculated - sq m)  2.21 10/26/18 0647   Pulse  109 10/26/18 1852    BMI (Calculated)  28.81 10/26/18 0647   SpO2  100 % 10/26/18 1852            Patient Lines/Drains/Airways Status    Active LINES/DRAINS/AIRWAYS     Name: Placement date: Placement time: Site: Days: Last dressing change:    Urethral Catheter 09/20/17   1132      406     Peripheral IV 10/26/18 Left 10/26/18   0737      5     Wound 10/26/18 Lower;Mid Lip Laceration laceration on lower lip 10/26/18   2000   Lip   4     Incision/Surgical Site 08/24/12 Back 08/24/12   3456 5128     Incision/Surgical Site 09/18/17 Right " Neck 17   1459    407     Incision/Surgical Site 10/26/18 Midline Chest 10/26/18   1416    4     Incision/Surgical Site 10/26/18 Left Ankle 10/26/18   1416    4     Incision/Surgical Site 10/26/18 Left Groin 10/26/18   1719    4     Incision/Surgical Site 10/26/18 Left;Inner Knee 10/26/18   1719    4             Patient Lines/Drains/Airways Status    Active PICC/CVC     None            Intake/Output Detail Report     Date Intake           Output   Net    Shift P.O. I.V. Other IV Piggyback Colloid Blood Components Total Urine Chest Tube Total       Noc 10/29/18 2300 - 10/30/18 0659 250 -- -- -- -- -- 250 375 -- 375 -125    Day 10/30/18 07 - 10/30/18 1459 960 -- -- -- -- -- 960 200 -- 200 760    Joycelyn 10/30/18 1500 - 10/30/18 2259 -- -- -- -- -- -- -- 150 -- 150 -150    Noc 10/30/18 2300 - 10/31/18 0659 -- -- -- -- -- -- -- 300 -- 300 -300    Day 10/31/18 07 - 10/31/18 1459 360 -- -- -- -- -- 360 300 -- 300 60      Last Void/BM       Most Recent Value    Urine Occurrence 1 at 10/31/2018 0600    Stool Occurrence 1 at 10/31/2018 1025      Case Management/Discharge Planning     Case Management/Discharge Planning Flowsheet     REFERRAL INFORMATION     Who is your support system?  Wife;Children 10/30/18 1032    Did the Initial Social Work Assessment result in a Social Work Case?  Yes 10/30/18 1032   Spouse's Name  Fatou 10/30/18 1032    Admission Type  inpatient 10/30/18 1032   Description of Support System  Supportive;Involved 10/30/18 1032    Arrived From  home or self-care 10/30/18 1032   Support Assessment  Adequate family and caregiver support;Adequate social supports 10/30/18 1032    Referral Source  physician 10/30/18 1032   COPING/STRESS      # of Referrals Placed by [facility]  Post Acute Facilities 10/30/18 1032   Major Change/Loss/Stressor  death of a loved one (son  a vicky ago ) 10/27/18 1422    Reason For Consult  discharge planning 10/30/18 1032   EXPECTED DISCHARGE      Record Reviewed  clinical  discipline documentation;history and physical;medical record;patient profile;plan of care 10/30/18 1032   Expected Discharge Date  10/31/18 (TCU) 10/30/18 1522    CTS Assigned to Adalid  Bonnie Doran 10/30/18 1032   DISCHARGE PLANNING      LIVING ENVIRONMENT     Transportation Available  car (family or friend will provide) 10/28/18 1530    Lives With  spouse 10/30/18 1032   FINAL RESOURCES      Living Arrangements  house 10/30/18 1032   Equipment Currently Used at Home  none 10/28/18 1530    Quality Of Family Relationships  supportive;involved 10/30/18 1032   ABUSE RISK SCREEN      Able to Return to Prior Living Arrangements  no 10/30/18 1032   QUESTION TO PATIENT:  Has a member of your family or a partner(now or in the past) intimidated, hurt, manipulated, or controlled you in any way?  patient declined to answer or is unable to answer 10/26/18 0731    HOME SAFETY     QUESTION TO PATIENT: Do you feel safe going back to the place where you are living?  patient declined to answer or is unable to answer 10/26/18 0731    Patient Feels Safe Living in Home?  yes 10/30/18 1032   OBSERVATION: Is there reason to believe there has been maltreatment of a vulnerable adult (ie. Physical/Sexual/Emotional abuse, self neglect, lack of adequate food, shelter, medical care, or financial exploitation)?  no 10/26/18 0731    ASSESSMENT OF FAMILY/SOCIAL SUPPORT     OTHER      Marital Status   10/30/18 1032   Are you depressed or being treated for depression?  No 10/27/18 1757

## 2018-10-26 NOTE — IP AVS SNAPSHOT
[place] [address] [facility]    [address] [place] [address]    Phone:  [phone]                                       After Visit Summary   10/26/2018    Merlin J Anderson    MRN: [id number]           After Visit Summary Signature Page     I have received my discharge instructions, and my questions have been answered. I have discussed any challenges I see with this plan with the nurse or doctor.    ..........................................................................................................................................  Patient/Patient Representative Signature      ..........................................................................................................................................  Patient Representative Print Name and Relationship to Patient    ..................................................               ................................................  Date                                   Time    ..........................................................................................................................................  Reviewed by Signature/Title    ...................................................              ..............................................  Date                                               Time          22EPIC Rev 08/18

## 2018-10-26 NOTE — TELEPHONE ENCOUNTER
Patient returned my call and left a message yesterday evening. Patient is currently having his procedure. Spoke to Toby, in cath lab. BMP appt scheduled. Provided number for patient to call back if time does not work. TGaana maria PARTIDA

## 2018-10-26 NOTE — LETTER
Transition Communication Hand-off for Care Transitions to Next Level of Care Provider    Name: Merlin J Anderson  : 1940  MRN #: [id number]  Primary Care Provider: Sujit Duke     [facility] [address]     Reason for Hospitalization:  CAD in native artery [I25.10]  Admit Date/Time: 10/26/2018  6:20 AM  Discharge Date: 10/31/2018  Payor Source: Payor: ARE / Plan: [employer] / Product Type: HMO /     Readmission Assessment Measure (ALEKSEY) Risk Score/category: low         Reason for Communication Hand-off Referral: Other informational    [facility].       Concern for non-adherence with plan of care:   Y/N no  Discharge Needs Assessment:  Needs       Most Recent Value    Equipment Currently Used at Home none    Transportation Available car [family or friend will provide]    # of Referrals Placed by CTS Post Acute Facilities          Follow-up specialty is recommended: Yes    Follow-up plan:  Future Appointments  Date Time Provider Department Center   10/31/2018 4:30 PM [facility]   2018 2:30 PM 3, [facility]   2018 2:00 PM [facility] CARDIOTHORACIC SURGERY, ABDOUL MYERS [facility] Owned   2019 2:45 PM Davi Rebolledo MD [facility]       Any outstanding tests or procedures:        Radiology & Cardiology Orders     Future Labs/Procedures Complete By Expires    Echocardiogram  As directed 10/26/2019    Process Instructions:    Administration of IV contrast will be tailored to this examination per the appropriate written protocol listed in the Echocardiography department Protocol Book, or by the supervising Cardiologist. This may result in an order change.    Use of contrast is at the discretion of the supervising Cardiologist.    Scheduling Instructions:    Please call your clinic of choice to schedule this procedure:    [facility]:   [phone]  [facility]:   [phone]  [facility]:  [phone]  [facility]): [phone] (Friday)  [facility]:   [phone]  [facility]:  [phone]  [facility]): [phone]  [facility] [phone]    Comments:    Administration of IV contrast will be tailored to this examination per the appropriate written protocol listed in the Echocardiography department Protocol Book, or by the supervising Cardiologist. This may result in an order change.    Use of contrast is at the discretion of the supervising Cardiologist.        Referrals     Future Labs/Procedures    CARDIAC REHAB REFERRAL     Process Instructions:    Advance to Wellness and Exercise for Life (WEL) Program or to another maintenance exercise program upon completion of supervised exercise therapy.    Weight mgt program is only offered at [facility].    *This therapy referral will be filtered to a centralized scheduling office at [facility] and the patient will receive a call to schedule an appointment at a [place] location most convenient for them. *        Comments:    Please be aware that coverage of these services is subject to the terms and limitations of your health insurance plan.  Call member services at your health plan with any benefit or coverage questions.     Order is sent electronically to central rehab scheduling. Call [phone] if you haven't been contacted regarding these appointments within 2 business days of discharge.    Occupational Therapy Adult Consult     Comments:    Evaluate and treat as clinically indicated.    Reason:  S/p CABG x 4    Physical Therapy Adult Consult     Comments:    Evaluate and treat as clinically indicated.    Reason:  S/p CABG x 4            Key Recommendations:    Discharge to [facility] for continued rehab after open heart bypass surgery.    Susanne Gaspar    AVS/Discharge Summary  is the source of truth; this is a helpful guide for improved communication of patient story

## 2018-10-26 NOTE — OR NURSING
ICU WAS UNABLE TO FIND PATIENT'S SPOUSE, PATIENT'S GOLD RING WILL REMAIN IN THE OR DURING PROCEDURE. WILL TRANSFER RING WITH PATIENT TO ICU UPON PATIENT'S TRANSFER AT END OF PROCEDURE.

## 2018-10-26 NOTE — ANESTHESIA PROCEDURE NOTES
ARTERIAL LINE PROCEDURE NOTE:   Pre-Procedure  Performed by RON SHIELDS  Location: pre-op      Pre-Anesthestic Checklist: patient identified, IV checked, site marked, risks and benefits discussed, informed consent, monitors and equipment checked, pre-op evaluation, at physician/surgeon's request and post-op pain management    Timeout  Correct Patient: Yes   Correct Procedure: Yes   Correct Site: Yes   Correct Laterality: Yes   Correct Position: Yes   Site Marked: Yes   .   Procedure Documentation  Procedure: arterial line and new line  Diagnosis:CAD ASA 4  Supine  Insertion Site:radial.Skin infiltrated with 1 mL of 1% lidocaine. Injection technique: Seldinger Technique  .  .  Patient Prep;all elements of maximal sterile barrier technique followed, mask, hat, sterile gown, sterile gloves, draped, hand hygiene, chlorhexidine gluconate and isopropyl alcohol    Assessment/Narrative    Catheter: 20 gauge, 1.75 in/4.5 cm quick cath (integral wire)     Secured by suture  Tegaderm dressing used.    Arterial waveform: Yes IBP within 10% of NIBP: Yes

## 2018-10-26 NOTE — IP AVS SNAPSHOT
MRN:[id number]                      After Visit Summary   10/26/2018    Merlin J Anderson    MRN: [id number]           Thank you!     Thank you for choosing [facility] for your care. Our goal is always to provide you with excellent care. Hearing back from our patients is one way we can continue to improve our services. Please take a few minutes to complete the written survey that you may receive in the mail after you visit with us. Thank you!        Patient Information     Date Of Birth          1940        Designated Caregiver       Most Recent Value    Caregiver    Will someone help with your care after discharge? 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                  Who to Call     For medical emergencies, please call 911.  For non-urgent questions about your medical care, please call your primary care provider or clinic, [phone]  For questions related to your surgery, please call your surgery clinic        Attending Provider     Provider Specialty    Davi Rebolledo MD Cardiology    Inman, Heath Garrison MD Thoracic Diseases       Primary Care Provider Office Phone # Fax #    Sujit Duke [phone]      After Care Instructions     Activity - Up ad berkley           Advance Diet as Tolerated       Follow this diet upon discharge: Orders Placed This Encounter      Room Service      Low Consistent CHO Diet            Daily weights       Call Provider for weight gain of more than 2 pounds per day or 5 pounds per week.            Discharge Instructions - IF on Metformin (Glucophage or Glucovance) or Metformin  containing medications       IF on Metformin (Glucophage or Glucovance) or Metformin containing medications , schedule a Basic Metabolic Panel at [facility] or Primary Clinic in 48 - 72 hours post procedure and PRIOR TO resuming the Metformin or Metformin containing medications.  Hold Metformin (Glucophage or Glucovance) or Metformin containing medications until after the Basic Metabolic Panel on the 2nd or 3rd day following the procedure.  May resume after blood draw is complete.            General info for SNF       Length of Stay Estimate: Short Term Care: Estimated # of Days <30  Condition at Discharge: Improving  Level of care:skilled   Rehabilitation Potential: Good  Admission H&P remains valid and up-to-date: Yes  Recent Chemotherapy: N/A  Use Nursing Home Standing Orders: Yes            Glucose monitor nursing POCT       Daily in am.            Mantoux instructions       Give two-step Mantoux (PPD) Per Facility Policy Yes                  Follow-up Appointments     Follow Up and recommended labs and tests       BMP to be drawn on Friday, November 2, 2018 secondary to lasix and KCl and CKD.                  Your next 10 appointments already scheduled     Nov 05, 2018  2:30 PM CST   Cardiac Evaluation with  Cardiac Rehab 3   [facility] ([facility])    [address], [address]  [facility] [place] [facility] [id number] [phone]            Nov 14, 2018  2:00 PM CST   Post-Op with [facility] CARDIOTHORACIC [place]   [facility] ([facility])    [address] [place] [id number] [phone]            Jan 17, 2019  2:45 PM CST   Return Visit with Davi Rebolledo MD   [facility] ([facility])    [address]  [facility] [id number] [phone]              Additional Services     CARDIAC REHAB REFERRAL       Please be aware that coverage of these services is subject to the terms and limitations  of your health insurance plan.  Call member services at your health plan with any benefit or coverage questions.     Order is sent electronically to central rehab scheduling. Call [phone] if you haven't been contacted regarding these appointments within 2 business days of discharge.            Occupational Therapy Adult Consult       Evaluate and treat as clinically indicated.    Reason:  S/p CABG x 4            Physical Therapy Adult Consult       Evaluate and treat as clinically indicated.    Reason:  S/p CABG x 4                  Future tests that were ordered for you     Echocardiogram       Administration of IV contrast will be tailored to this examination per the appropriate written protocol listed in the Echocardiography department Protocol Book, or by the supervising Cardiologist. This may result in an order change.    Use of contrast is at the discretion of the supervising Cardiologist.            Basic metabolic panel  (Ca, Cl, CO2, Creat, Gluc, K, Na, BUN)       Cr                  Further instructions from your care team       YOUR CARE AFTER HEART SURGERY   DISCHARGE INTRUCTIONS      Weight - Weigh yourself daily and record on daily weight sheet provided in this packet.     Incentive Spirometer - Continue to use 3 to 4 times a day for 10 days; follow use of spirometer with coughing. Use attached sheet to report progress.     Daily shower - Wash all surgical incisions daily with liquid antibacterial soap, such as Dial.   No tub baths until incisions are healed. The sticky glue used to close your incisions may peel off slowly.    Incisions - Avoid all lotions, oils, ointments or sunscreen on incisions for 8 weeks. Avoid sunlight on incision sites for 8 weeks and then use sunscreen on incisions for one year.   You may have numbness, tingling, and increased sensitivity around your incision lines for several weeks/months as the nerves grow back. Some drainage from the sites where your chest tubes were  is normal and can persist for a while until it has healed. It is best to keep this open to air to allow scab formation and healing, you can cover it with a gauze pad or band-aid if needed.     Diet - Eat a well balanced diet to promote healing. It can be normal to have a decreased appetite for a while after surgery. You can eat whatever it takes to keep up a normal food/calorie intake in the immediate post-operative period for about a month. Try to limit high sodium (salt) foods to prevent fluid retention.  If you are a diabetic, continue to balance your carbohydrate intake with your medicine. Eventually you will need to adopt a heart healthy diet, especially if you have coronary artery disease.     Daily exercise/activity precautions - Cardiac rehab therapist will review your restrictions with you.  No lifting, pushing or pulling anything over 10 pounds for 12 weeks if you are a diabetic or 8 weeks if you are not a diabetic (reference: a gallon of milk weighs 8 pounds).    Positioning -Keep your legs elevated above the level of your heart when you are in bed. You may lie on your side and stomach if it s comfortable and you have no sternal click (popping in breastbone).    Pain medications - Use as directed. Call surgeon for pain medication refill if needed. Other medications should be filled by your primary doctor.     Depression - It is not uncommon after surgery.  If it lasts longer than two weeks or you feel you need to talk to someone, contact your primary physician.    Driving -No driving for 4 weeks from the day of surgery (or until your surgeon has approved it).    Work and Travel - Consult with your physician about specific work and travel restrictions    Cardiac Rehabilitation - Usually begins approximately one week after discharge and lasts up to 6 weeks. In the meantime, continue to work on the rehab activities you learned in the hospital and maintain your physical activity. Aerobic activity, especially  walking, is a great way to regain your physical endurance.     Checking your Blood sugar (glucose) - If you have been instructed to begin checking your blood sugars at home, record them on the back page of this packet and take the packet with you to your follow appointment with you primary physician (usually about 1 week after surgery).          CALL YOUR SURGEON IF ANY OF THE FOLLOWING OCCURS:      Fever - If you feel chills, shaking, or your temperature is over 101 degrees    Sternal Click - Some popping or clicking sensations can be normal as the chest has been stretched open. If you notice a significant change or if pain becomes bothersome, please call.    Angina/Chest Pain - Or symptoms similar to those you experienced before surgery    Infection - If incisions look infected (increasing redness, tenderness, swelling, warmth, odor, drainage, or change in color if drainage).    Weight gain - Please call if weight gain of 2-3pounds over 24 hours or if greater than 5 pounds in 1 week as this may indicate fluid overload and could signify a problem with your heart.     Swelling - If you notice worsening swelling in your legs. A certain amount of swelling is expected, especially if you had a vein taken out of your leg for bypass surgery.      Shortness of breath - Not relieved by rest    Persistent heart palpitations - Rapid, pounding heartbeat    Dizziness/lightheadedness - While sitting or at rest    Pain - Not relieved by pain medications      Your Daily Weight  Weigh yourself every morning in the same clothes after urinating and before breakfast.* Call your physician if your weight increases 2-3 lbs in one day or 3-5 lbs in a week**  My baseline weight (first morning home):____________   Date Weight   Date Weight   1.    17.     2.    18.     3.    19.     4.    20.     5.    21.     6.    22.     7.    23.     8.    24.     9.    25.     10.    26.     11.    27.     12.    28.     13.    29.     14.    30.    "  15.    31.     16.    32.           Incentive Spirometer- After Surgery Progress Record    Discharge Volume_______________ml    As you recover from your surgery it is important to continue the use of your incentive spirometer at home.  We recommend that you use your spirometer at least 3-4 times per day.  You should take 5 slow deep breaths with each use. Inhale slowly to raise the piston in the chamber as high as you are able.  Hold breath to count of 3 and then exhale normally.  Allow piston to return to bottom of chamber, rest and repeat 4 more times. It is helpful to see your progress by recording your average volume in the spaces provided below.    Day  1       Day  2       Day  3       Day  4       Day  5       Day  6       Day  7       Day  8       Day  9       Day  10             Pending Results     Date and Time Order Name Status Description    10/29/2018 1837 EKG 12-lead, tracing only Preliminary     10/26/2018 1916 EKG 12-lead, tracing only Preliminary             Statement of Approval     Ordered          10/31/18 1023  I have reviewed and agree with all the recommendations and orders detailed in this document.  EFFECTIVE NOW     Approved and electronically signed by:  Seth Olivarez PA-C             Admission Information     Date & Time Provider Department Dept. Phone    10/26/2018 Heath Kessler MD [place] [address] [facility] [phone]      Your Vitals Were     Blood Pressure Pulse Temperature Respirations Height Weight    113/59 (BP Location: Right arm) 79 98.2  F (36.8  C) (Oral) 18 1.829 m (6' 0.01\") 100.5 kg (221 lb 9 oz)    Pulse Oximetry BMI (Body Mass Index)                98% 30.04 kg/m2          MyChart Information     [employer] gives you secure access to your electronic health record. If you see a primary care provider, you can also send messages to your care team and make appointments. If you have questions, please call your primary care clinic.  If you do " not have a primary care provider, please call [phone] and they will assist you.        Care EveryWhere ID     This is your Care EveryWhere ID. This could be used by other organizations to access your [facility] medical records  [id number]        Equal Access to Services     RAUDEL ESTRELLA : Hadii aad ku hadhetaldavid Karlaeligio, walitoda luqadaha, qaybta kaalmada adekarrie, kaye gauthier josena ling malenastephon hawley. So [facility] [phone].    ATENCIÓN: Si habla español, tiene a pa disposición servicios gratuitos de asistencia lingüística. Llame al [phone].    We comply with applicable federal civil rights laws and [place] laws. We do not discriminate on the basis of race, color, national origin, age, disability, sex, sexual orientation, or gender identity.               Review of your medicines      START taking        Dose / Directions    acetaminophen 325 MG tablet   Commonly known as:  TYLENOL        Dose:  650 mg   Take 2 tablets (650 mg) by mouth every 4 hours as needed for mild pain   Quantity:  100 tablet   Refills:  0       furosemide 20 MG tablet   Commonly known as:  LASIX        Dose:  20 mg   Start taking on:  11/1/2018   Take 1 tablet (20 mg) by mouth daily for 5 days   Quantity:  5 tablet   Refills:  0       metoprolol tartrate 25 MG tablet   Commonly known as:  LOPRESSOR        Dose:  25 mg   Take 1 tablet (25 mg) by mouth every 12 hours   Quantity:  60 tablet   Refills:  0       oxyCODONE IR 5 MG tablet   Commonly known as:  ROXICODONE        Dose:  5 mg   Take 1 tablet (5 mg) by mouth every 6 hours as needed for moderate to severe pain   Quantity:  10 tablet   Refills:  0       potassium chloride SA 10 MEQ CR tablet   Commonly known as:  K-DUR/KLOR-CON M        Dose:  10 mEq   Take 1 tablet (10 mEq) by mouth daily for 5 days   Quantity:  5 tablet   Refills:  0         CONTINUE these medicines which may have CHANGED, or have new prescriptions. If we are uncertain of the size of tablets/capsules you  have at home, strength may be listed as something that might have changed.        Dose / Directions    aspirin 325 MG EC tablet   This may have changed:    - medication strength  - how much to take  - when to take this        Dose:  325 mg   Start taking on:  11/1/2018   Take 1 tablet (325 mg) by mouth daily   Quantity:  40 tablet   Refills:  0       lisinopril 2.5 MG tablet   Commonly known as:  PRINIVIL/Zestril   This may have changed:    - medication strength  - how much to take        Dose:  2.5 mg   Start taking on:  11/1/2018   Take 1 tablet (2.5 mg) by mouth daily   Quantity:  30 tablet   Refills:  0         CONTINUE these medicines which have NOT CHANGED        Dose / Directions    atorvastatin 40 MG tablet   Commonly known as:  LIPITOR   Used for:  Right-sided carotid artery disease, unspecified type (H), Hyperlipidemia LDL goal <100        Dose:  40 mg   Take 1 tablet (40 mg) by mouth daily   Quantity:  90 tablet   Refills:  3       blood glucose calibration solution   Used for:  Type 2 diabetes mellitus without complication (H)        Use to calibrate blood glucose monitor as needed as directed.   Quantity:  1 Bottle   Refills:  0       blood glucose monitoring lancets   Used for:  Type 2 diabetes mellitus without complication, without long-term current use of insulin (H)        Use to test blood sugar 1 times daily or as directed.   Quantity:  1 Box   Refills:  1       blood glucose monitoring test strip   Commonly known as:  ACCU-CHEK PRISCILLA   Used for:  Type 2 diabetes mellitus without complication, without long-term current use of insulin (H)        Use to test blood sugars 1 times daily or as directed.   Quantity:  100 each   Refills:  1       FLONASE NA        Dose:  1-2 spray   Spray 1-2 sprays in nostril daily as needed   Refills:  0       metFORMIN 500 MG tablet   Commonly known as:  GLUCOPHAGE   Used for:  Type 2 diabetes mellitus without complication, without long-term current use of insulin (H)         Dose:  500 mg   Take 1 tablet (500 mg) by mouth 2 times daily (with meals)   Quantity:  180 tablet   Refills:  1       Multi-vitamin Tabs tablet        Dose:  1 tablet   Take 1 tablet by mouth every morning   Refills:  0       nitroGLYcerin 0.4 MG sublingual tablet   Commonly known as:  NITROSTAT   Used for:  Abnormal cardiovascular stress test        For chest pain place 1 tablet under the tongue every 5 minutes for 3 doses. If symptoms persist 5 minutes after 1st dose call 911.   Quantity:  25 tablet   Refills:  3       tamsulosin 0.4 MG capsule   Commonly known as:  FLOMAX   Used for:  Urinary retention        Dose:  0.4 mg   Take 1 capsule (0.4 mg) by mouth daily   Quantity:  90 capsule   Refills:  3         STOP taking     atenolol 100 MG tablet   Commonly known as:  TENORMIN                Where to get your medicines      Some of these will need a paper prescription and others can be bought over the counter. Ask your nurse if you have questions.     You don't need a prescription for these medications     acetaminophen 325 MG tablet    aspirin 325 MG EC tablet    furosemide 20 MG tablet    lisinopril 2.5 MG tablet    metoprolol tartrate 25 MG tablet    potassium chloride SA 10 MEQ CR tablet         Information about where to get these medications is not yet available     ! Ask your nurse or doctor about these medications     oxyCODONE IR 5 MG tablet                Protect others around you: Learn how to safely use, store and throw away your medicines at www.disposemymeds.org.        Information about OPIOIDS     PRESCRIPTION OPIOIDS: WHAT YOU NEED TO KNOW   We gave you an opioid (narcotic) pain medicine. It is important to manage your pain, but opioids are not always the best choice. You should first try all the other options your care team gave you. Take this medicine for as short a time (and as few doses) as possible.    Some activities can increase your pain, such as bandage changes or therapy  sessions. It may help to take your pain medicine 30 to 60 minutes before these activities. Reduce your stress by getting enough sleep, working on hobbies you enjoy and practicing relaxation or meditation. Talk to your care team about ways to manage your pain beyond prescription opioids.    These medicines have risks:    DO NOT drive when on new or higher doses of pain medicine. These medicines can affect your alertness and reaction times, and you could be arrested for driving under the influence (DUI). If you need to use opioids long-term, talk to your care team about driving.    DO NOT operate heavy machinery    DO NOT do any other dangerous activities while taking these medicines.    DO NOT drink any alcohol while taking these medicines.     If the opioid prescribed includes acetaminophen, DO NOT take with any other medicines that contain acetaminophen. Read all labels carefully. Look for the word  acetaminophen  or  Tylenol.  Ask your pharmacist if you have questions or are unsure.    You can get addicted to pain medicines, especially if you have a history of addiction (chemical, alcohol or substance dependence). Talk to your care team about ways to reduce this risk.    All opioids tend to cause constipation. Drink plenty of water and eat foods that have a lot of fiber, such as fruits, vegetables, prune juice, apple juice and high-fiber cereal. Take a laxative (Miralax, milk of magnesia, Colace, Senna) if you don t move your bowels at least every other day. Other side effects include upset stomach, sleepiness, dizziness, throwing up, tolerance (needing more of the medicine to have the same effect), physical dependence and slowed breathing.    Store your pills in a secure place, locked if possible. We will not replace any lost or stolen medicine. If you don t finish your medicine, please throw away (dispose) as directed by your pharmacist. The [place] Pollution [employer] has more information about safe  disposal: https://www.pca.[facility].[place].us/living-green/managing-unwanted-medications             Medication List: This is a list of all your medications and when to take them. Check marks below indicate your daily home schedule. Keep this list as a reference.      Medications           Morning Afternoon Evening Bedtime As Needed    acetaminophen 325 MG tablet   Commonly known as:  TYLENOL   Take 2 tablets (650 mg) by mouth every 4 hours as needed for mild pain   Last time this was given:  650 mg on 10/29/2018 12:49 AM                                aspirin 325 MG EC tablet   Take 1 tablet (325 mg) by mouth daily   Start taking on:  11/1/2018   Last time this was given:  325 mg on 10/31/2018  9:20 AM                                atorvastatin 40 MG tablet   Commonly known as:  LIPITOR   Take 1 tablet (40 mg) by mouth daily   Last time this was given:  40 mg on 10/31/2018  9:19 AM                                blood glucose calibration solution   Use to calibrate blood glucose monitor as needed as directed.                                blood glucose monitoring lancets   Use to test blood sugar 1 times daily or as directed.                                blood glucose monitoring test strip   Commonly known as:  ACCU-CHEK PRISCILLA   Use to test blood sugars 1 times daily or as directed.                                FLONASE NA   Spray 1-2 sprays in nostril daily as needed                                furosemide 20 MG tablet   Commonly known as:  LASIX   Take 1 tablet (20 mg) by mouth daily for 5 days   Start taking on:  11/1/2018   Last time this was given:  20 mg on 10/31/2018  9:20 AM                                lisinopril 2.5 MG tablet   Commonly known as:  PRINIVIL/Zestril   Take 1 tablet (2.5 mg) by mouth daily   Start taking on:  11/1/2018   Last time this was given:  2.5 mg on 10/31/2018  9:20 AM                                metFORMIN 500 MG tablet   Commonly known as:  GLUCOPHAGE   Take 1 tablet (500 mg) by  mouth 2 times daily (with meals)   Last time this was given:  500 mg on 10/31/2018  9:20 AM                                metoprolol tartrate 25 MG tablet   Commonly known as:  LOPRESSOR   Take 1 tablet (25 mg) by mouth every 12 hours   Last time this was given:  25 mg on 10/31/2018  9:20 AM                                Multi-vitamin Tabs tablet   Take 1 tablet by mouth every morning                                nitroGLYcerin 0.4 MG sublingual tablet   Commonly known as:  NITROSTAT   For chest pain place 1 tablet under the tongue every 5 minutes for 3 doses. If symptoms persist 5 minutes after 1st dose call 911.                                oxyCODONE IR 5 MG tablet   Commonly known as:  ROXICODONE   Take 1 tablet (5 mg) by mouth every 6 hours as needed for moderate to severe pain   Last time this was given:  5 mg on 10/28/2018  4:07 AM                                potassium chloride SA 10 MEQ CR tablet   Commonly known as:  K-DUR/KLOR-CON M   Take 1 tablet (10 mEq) by mouth daily for 5 days   Last time this was given:  20 mEq on 10/30/2018  8:51 AM                                tamsulosin 0.4 MG capsule   Commonly known as:  FLOMAX   Take 1 capsule (0.4 mg) by mouth daily   Last time this was given:  0.4 mg on 10/31/2018  9:20 AM

## 2018-10-26 NOTE — PROGRESS NOTES
Pt back from the procedure~1015.  VSS.  +CMS and Pulses.  Site CDI.  Denies any pain.  Spouse at bedside and MD updated pt and spouse.  Surgery consult needed.  MD updated with admitting MD. Will cont to monitor.

## 2018-10-26 NOTE — IP AVS SNAPSHOT
Anderson, Merlin J #[id number] (CSN:[id number])  (78 year old M)  (Adm: 10/26/18)     [id number]               [place] [address] SURGICAL SPECIALITIES: [phone]            Patient Demographics     Patient Name Sex          Age SSN Address Phone    Timmy Merlin J Male 1940 (78 year old) [id number] [facility] [address] [phone] (Home) *Preferred*  none (Work)  [phone] (Mobile)      Emergency Contact(s)     Name Relation Home Work Mobile    Faotu Warner Spouse [phone]     Jerrod Warner Son [phone]        Admission Information     Attending Provider Admitting Provider Admission Type Admission Date/Time    Heath Kessler MD Huddleston, Stephen James, MD Elective 10/26/18  0620    Discharge Date Hospital Service Auth/Cert Status Service Area     Cardiothoracic Surgery [facility]    Unit Room/Bed Admission Status       [facility] SURG SPECIALTIES  Admission (Confirmed)       Admission     Complaint    CV - C - Alfredo - 8:30 , UNKNOWN, S/P CABG (coronary artery bypass graft)      Hospital Account     Name Acct ID Class Status Primary Coverage    Anderson, Merlin J [id number] Inpatient Open UCARE - [employer]ARE FOR SENIORS            Guarantor Account (for Hospital Account #[id number])     Name Relation to Pt Service Area Active? Acct Type    Anderson, Merlin J  FCS Yes Personal/Family    Address Phone          [address] [place] [address] [phone](H)  none(O)              Coverage Information (for Hospital Account #[id number])     F/O Payor/Plan Precert #    UCARE/UCARE FOR SENIORS     Subscriber Subscriber #    Anderson, Merlin J [id number]    Address Phone    [address]  [place] [id number] [phone]                                                INTERAGENCY TRANSFER FORM - PHYSICIAN ORDERS   10/26/2018                       [place] [address] SURGICAL  "SPECIALITIES: [phone]            Attending Provider: Heath Kessler MD     Allergies:  No Known Allergies    Infection:  None   Service:  CARDIOTHORAC    Ht:  1.829 m (6' 0.01\")   Wt:  100.5 kg (221 lb 9 oz)   Admission Wt:  96.2 kg (212 lb)    BMI:  30.04 kg/m 2   BSA:  2.26 m 2            ED Clinical Impression     Diagnosis Description Comment Added By Time Added    Abnormal cardiovascular stress test [R94.39] Abnormal cardiovascular stress test [R94.39]  Epic, Userprod 10/26/2018  6:20 AM    Status post coronary angiogram [Z98.890] Status post coronary angiogram [Z98.890]  Sai Fuentes MD 10/26/2018  9:21 AM    S/P CABG (coronary artery bypass graft) [Z95.1] S/P CABG (coronary artery bypass graft) [Z95.1]  Chelsi Alexandra PA-C 10/26/2018  7:10 PM      Hospital Problems as of 10/31/2018              Priority Class Noted POA    S/P CABG (coronary artery bypass graft) Medium  10/26/2018 Yes      Non-Hospital Problems as of 10/31/2018              Priority Class Noted    Mixed hyperlipidemia Medium  11/14/2002    Essential hypertension, benign Medium  11/14/2002    Allergic rhinitis due to pollen Medium  11/14/2002    Gout Medium  11/14/2002    HTN (hypertension) Medium  6/29/2009    HYPERLIPIDEMIA LDL GOAL <100 Medium  10/31/2010    CKD (chronic kidney disease) stage 3, GFR 30-59 ml/min (H) Medium  12/27/2011    Advanced directives, counseling/discussion Medium  6/18/2012    Lumbar radiculopathy Medium  6/27/2012    Lumbago Medium  11/13/2012    Arthrodesis status Medium  11/13/2012    Seasonal allergic rhinitis Medium  4/15/2013    Type 2 diabetes mellitus without complication (H) Medium  11/30/2015    PVD (peripheral vascular disease) (H) Medium  9/8/2017    Carotid arterial disease (H) Medium  9/18/2017    Carotid artery disease (H) Medium  9/18/2017      Code Status History     Date Active Date Inactive Code Status Order ID Comments User Context    10/31/2018 10:22 AM  Full Code " [id number]  Seth Olivarez PA-C Outpatient    10/26/2018  7:16 PM 10/31/2018 10:22 AM Full Code [id number]  Chelsi Alexandra PA-C Inpatient    9/19/2017  7:58 AM 10/26/2018  7:16 PM Full Code [id number]  Edgar Enrique MD Outpatient    9/18/2017  5:17 PM 9/19/2017  7:58 AM Full Code [id number]  Juan Garcia MD Inpatient      Current Code Status     Date Active Code Status Order ID Comments User Context       Prior      Summary of Visit     Reason for your hospital stay       You had a coronary artery bypass grafting x 4.                Medication Review      START taking        Dose / Directions Comments    acetaminophen 325 MG tablet   Commonly known as:  TYLENOL        Dose:  650 mg   Take 2 tablets (650 mg) by mouth every 4 hours as needed for mild pain   Quantity:  100 tablet   Refills:  0        furosemide 20 MG tablet   Commonly known as:  LASIX        Dose:  20 mg   Start taking on:  11/1/2018   Take 1 tablet (20 mg) by mouth daily for 5 days   Quantity:  5 tablet   Refills:  0        metoprolol tartrate 25 MG tablet   Commonly known as:  LOPRESSOR        Dose:  25 mg   Take 1 tablet (25 mg) by mouth every 12 hours   Quantity:  60 tablet   Refills:  0        oxyCODONE IR 5 MG tablet   Commonly known as:  ROXICODONE        Dose:  5 mg   Take 1 tablet (5 mg) by mouth every 6 hours as needed for moderate to severe pain   Quantity:  10 tablet   Refills:  0        potassium chloride SA 10 MEQ CR tablet   Commonly known as:  K-DUR/KLOR-CON M        Dose:  10 mEq   Take 1 tablet (10 mEq) by mouth daily for 5 days   Quantity:  5 tablet   Refills:  0          CONTINUE these medications which may have CHANGED, or have new prescriptions. If we are uncertain of the size of tablets/capsules you have at home, strength may be listed as something that might have changed.        Dose / Directions Comments    aspirin 325 MG EC tablet   This may have changed:    - medication strength  - how much to take  - when  to take this        Dose:  325 mg   Start taking on:  11/1/2018   Take 1 tablet (325 mg) by mouth daily   Quantity:  40 tablet   Refills:  0        lisinopril 2.5 MG tablet   Commonly known as:  PRINIVIL/Zestril   This may have changed:    - medication strength  - how much to take        Dose:  2.5 mg   Start taking on:  11/1/2018   Take 1 tablet (2.5 mg) by mouth daily   Quantity:  30 tablet   Refills:  0          CONTINUE these medications which have NOT CHANGED        Dose / Directions Comments    atorvastatin 40 MG tablet   Commonly known as:  LIPITOR   Used for:  Right-sided carotid artery disease, unspecified type (H), Hyperlipidemia LDL goal <100        Dose:  40 mg   Take 1 tablet (40 mg) by mouth daily   Quantity:  90 tablet   Refills:  3        blood glucose calibration solution   Used for:  Type 2 diabetes mellitus without complication (H)        Use to calibrate blood glucose monitor as needed as directed.   Quantity:  1 Bottle   Refills:  0        blood glucose monitoring lancets   Used for:  Type 2 diabetes mellitus without complication, without long-term current use of insulin (H)        Use to test blood sugar 1 times daily or as directed.   Quantity:  1 Box   Refills:  1        blood glucose monitoring test strip   Commonly known as:  ACCU-CHEK PRISCILLA   Used for:  Type 2 diabetes mellitus without complication, without long-term current use of insulin (H)        Use to test blood sugars 1 times daily or as directed.   Quantity:  100 each   Refills:  1        FLONASE NA        Dose:  1-2 spray   Spray 1-2 sprays in nostril daily as needed   Refills:  0        metFORMIN 500 MG tablet   Commonly known as:  GLUCOPHAGE   Used for:  Type 2 diabetes mellitus without complication, without long-term current use of insulin (H)        Dose:  500 mg   Take 1 tablet (500 mg) by mouth 2 times daily (with meals)   Quantity:  180 tablet   Refills:  1        Multi-vitamin Tabs tablet        Dose:  1 tablet   Take 1  tablet by mouth every morning   Refills:  0        nitroGLYcerin 0.4 MG sublingual tablet   Commonly known as:  NITROSTAT   Used for:  Abnormal cardiovascular stress test        For chest pain place 1 tablet under the tongue every 5 minutes for 3 doses. If symptoms persist 5 minutes after 1st dose call 911.   Quantity:  25 tablet   Refills:  3        tamsulosin 0.4 MG capsule   Commonly known as:  FLOMAX   Used for:  Urinary retention        Dose:  0.4 mg   Take 1 capsule (0.4 mg) by mouth daily   Quantity:  90 capsule   Refills:  3          STOP taking     atenolol 100 MG tablet   Commonly known as:  TENORMIN                   After Care     Activity - Up ad berkley           Advance Diet as Tolerated       Follow this diet upon discharge: Orders Placed This Encounter      Room Service      Low Consistent CHO Diet       Daily weights       Call Provider for weight gain of more than 2 pounds per day or 5 pounds per week.       Discharge Instructions - IF on Metformin (Glucophage or Glucovance) or Metformin containing medications       IF on Metformin (Glucophage or Glucovance) or Metformin containing medications , schedule a Basic Metabolic Panel at [facility] or Primary Clinic in 48 - 72 hours post procedure and PRIOR TO resuming the Metformin or Metformin containing medications.  Hold Metformin (Glucophage or Glucovance) or Metformin containing medications until after the Basic Metabolic Panel on the 2nd or 3rd day following the procedure.  May resume after blood draw is complete.       General info for SNF       Length of Stay Estimate: Short Term Care: Estimated # of Days <30  Condition at Discharge: Improving  Level of care:skilled   Rehabilitation Potential: Good  Admission H&P remains valid and up-to-date: Yes  Recent Chemotherapy: N/A  Use Nursing Home Standing Orders: Yes       Glucose monitor nursing POCT       Daily in am.       Mantoux instructions       Give two-step Mantoux (PPD) Per Facility Policy Yes              Lab Orders     Basic metabolic panel  (Ca, Cl, CO2, Creat, Gluc, K, Na, BUN)       Cr             Radiology & Cardiology Orders     Echocardiogram       Administration of IV contrast will be tailored to this examination per the appropriate written protocol listed in the Echocardiography department Protocol Book, or by the supervising Cardiologist. This may result in an order change.    Use of contrast is at the discretion of the supervising Cardiologist.   Administration of IV contrast will be tailored to this examination per the appropriate written protocol listed in the Echocardiography department Protocol Book, or by the supervising Cardiologist. This may result in an order change.    Use of contrast is at the discretion of the supervising Cardiologist.   Please call your clinic of choice to schedule this procedure:    [facility]:   [phone]  [facility]:   [phone]  [facility]:  [phone]  [facility]): [phone] (Friday)  [facility]:   [phone]  [facility]:  [phone]  [facility]): [phone]  [facility] [phone]             Referrals     CARDIAC REHAB REFERRAL       Please be aware that coverage of these services is subject to the terms and limitations of your health insurance plan.  Call member services at your health plan with any benefit or coverage questions.     Order is sent electronically to central rehab scheduling. Call [phone] if you haven't been contacted regarding these appointments within 2 business days of discharge.   Advance to Wellness and Exercise for Life (WEL) Program or to another maintenance exercise program upon completion of supervised exercise therapy.    Weight mgt program is only offered at [facility].    *This therapy referral will be filtered to a centralized scheduling office at [facility] and the patient will receive a call to schedule an  "appointment at a [place] location most convenient for them. *             Occupational Therapy Adult Consult       Evaluate and treat as clinically indicated.    Reason:  S/p CABG x 4       Physical Therapy Adult Consult       Evaluate and treat as clinically indicated.    Reason:  S/p CABG x 4             Follow-Up Appointment Instructions     Follow Up and recommended labs and tests       BMP to be drawn on Friday, November 2, 2018 secondary to lasix and KCl and CKD.             Your next 10 appointments already scheduled     Nov 05, 2018  2:30 PM CST   Cardiac Evaluation with [facility]   [facility] ([facility])    [address], [address]  [facility] [place] [address]   [phone]            Nov 14, 2018  2:00 PM CST   Post-Op with [facility] CARDIOTHORACIC SURGERY, PA   [facility] Cardi[facility]acic ([facility])    [address] [place] [address]   [phone]            Jan 17, 2019  2:45 PM CST   Return Visit with Davi Rebolledo MD   [facility] ([facility])    [address]  [place] [address]   [phone]              Statement of Approval     Ordered          10/31/18 1023  I have reviewed and agree with all the recommendations and orders detailed in this document.  EFFECTIVE NOW     Approved and electronically signed by:  Seth Olivarez PA-C                                                 INTERAGENCY TRANSFER FORM - NURSING   10/26/2018                       [place] [address] SURGICAL SPECIALITIES: [phone]            Attending Provider: Heath Kessler MD     Allergies:  No Known Allergies    Infection:  None   Service:  CARDIOTHORAC    Ht:  1.829 m (6' 0.01\")   Wt:  100.5 kg (221 lb 9 oz)   Admission Wt:  96.2 kg (212 lb)    BMI:  30.04 kg/m 2   BSA:  2.26 m 2            Advance Directives        Scanned docmt in ACP Activity?           No scanned doc      "   Immunizations     Name Date      Influenza (H1N1) 01/10/10     Influenza (High Dose) 3 valent vaccine 09/08/17     Influenza (High Dose) 3 valent vaccine 10/02/15     Influenza (High Dose) 3 valent vaccine 10/08/13     Influenza (High Dose) 3 valent vaccine 09/24/12     Influenza (IIV3) PF 09/30/11     Pneumo Conj 13-V (2010&after) 10/02/15     Pneumococcal 23 valent 12/31/10     TD (ADULT, 7+) 04/15/08     TDAP Vaccine (Adacel) 12/16/15     Zoster vaccine, live 08/24/11       ASSESSMENT     Discharge Profile Flowsheet     EXPECTED DISCHARGE     SKIN      Expected Discharge Date  10/31/18 (TCU) 10/30/18 1522   Inspection of bony prominences  Procedural focused assessment (identify areas inspected) 10/31/18 0450    DISCHARGE NEEDS ASSESSMENT     Skin WDL  ex 10/31/18 0450    Equipment Currently Used at Home  none 10/28/18 1530   Skin Color/Characteristics  bruised (ecchymotic) 10/31/18 0450    Transportation Available  car (family or friend will provide) 10/28/18 1530   Skin Temperature  warm 10/31/18 0450    # of Referrals Placed by CTS  Post Acute Facilities 10/30/18 1032   Skin Moisture  dry 10/31/18 0450    GASTROINTESTINAL (ADULT,PEDIATRIC,OB)     Skin Integrity  incision(s);bruise(s) 10/31/18 0450    GI WDL  WDL 10/31/18 0450   Inspection under devices  Full 10/31/18 0450    All Quadrants Bowel Sounds  audible and active in all quadrants 10/31/18 0450   Procedural focused assessment (identify areas inspected)   -- (incisions) 10/31/18 0450    Passing flatus  yes 10/31/18 0450   SAFETY      COMMUNICATION ASSESSMENT     Safety WDL  WDL 10/31/18 0450    Patient's communication style  spoken language (English or Bilingual) 09/18/17 0901   Safety Equipment  oxygen flowmeter 10/29/18 1426    Patient's primary language  English 10/26/18 0731   All Alarms  alarm(s) activated and audible 10/31/18 0450                 Assessment WDL (Within Defined Limits) Definitions           Safety WDL     Effective: 09/28/15    Row  "Information: <b>WDL Definition:</b> Bed in low position, wheels locked; call light in reach; upper side rails up x 2; ID band on<br> <font color=\"gray\"><i>Item=AS safety wdl>>List=AS safety wdl>>Version=F14</i></font>      Skin WDL     Effective: 09/28/15    Row Information: <b>WDL Definition:</b> Warm; dry; intact; elastic; without discoloration; pressure points without redness<br> <font color=\"gray\"><i>Item=AS skin wdl>>List=AS skin wdl>>Version=F14</i></font>      Vitals     Vital Signs Flowsheet     COMMENTS     RIGHT RADIAL INTERVENTIONAL PROCEDURE ACCESS      Comments  postactivity 10/30/18 1441   Site Assessment  WDL 10/31/18 0757    VITAL SIGNS     Hemostasis management  Unchanged 10/31/18 0757    Temp  98.6  F (37  C) 10/31/18 0754   Radial device volume remaining  0 mL 10/26/18 2217    Temp src  Oral 10/31/18 0754   CMS Right Arm  WDL 10/31/18 0757    Resp  18 10/31/18 0754   Radial Pulse - Right Arm  Normal 10/31/18 0757    Pulse  96 10/31/18 0754   RIGHT GROIN INTERVENTIONAL PROCEDURE ACCESS      Heart Rate  98 10/31/18 0754   Site Assessment  WDL 10/31/18 0757    Pulse/Heart Rate Source  Monitor 10/31/18 0754   Hemostasis management  Unchanged 10/31/18 0757    BP  117/66 10/31/18 0754   Femoral Bruit  not present 10/31/18 0757    BP Location  Right arm 10/31/18 0754   CMS Right Extremity  WDL 10/31/18 0757    OXYGEN THERAPY     Dorsalis Pulse - Right Leg  Normal 10/31/18 0757    SpO2  94 % 10/31/18 0754   Posterior Tibial Pulse - Right Leg  Normal 10/31/18 0757    O2 Device  None (Room air) 10/31/18 0754   POSITIONING      FiO2 (%)  40 % 10/27/18 1526   Body Position  independently positioning 10/31/18 0757    Oxygen Delivery  2 LPM 10/29/18 0121   Head of Bed (HOB)  HOB at 20-30 degrees 10/31/18 0757    PAIN/COMFORT     Positioning/Transfer Devices  pillows;in use 10/31/18 0757    Patient Currently in Pain  denies 10/31/18 0757   Chair  Upright in chair 10/31/18 0715    Preferred Pain Scale  number " (Numeric Rating Pain Scale) 10/30/18 0339   DAILY CARE      Patient's Stated Pain Goal  No pain 10/28/18 2220   Activity Management  up in chair 10/31/18 1033    0-10 Pain Scale  5 10/29/18 0050   Activity Assistance Provided  assistance, 1 person 10/31/18 1033    Word Pain Scale  0 10/28/18 2220   Assistive Device Utilized  gait belt;walker 10/31/18 1033    Pain Location  Incision 10/29/18 0324   ECG      Pain Orientation  Mid 10/29/18 0324   ECG Rhythm  Normal sinus rhythm 10/31/18 0757    Pain Descriptors  Aching 10/28/18 0853   Ectopy  None 10/28/18 0644    Pain Management Interventions  analgesia administered 10/28/18 0853   Ectopy Frequency  Rare 10/27/18 0238    Pain Intervention(s)  Medication (See eMAR) 10/29/18 0324   Lead Monitored  Lead II;V 1 10/28/18 0644    Response to Interventions  Absence of nonverbal indicators of pain 10/31/18 0257   Equipment  electrodes changed;telemetry batteries changed 10/31/18 0839    CRITICAL-CARE PAIN OBSERVATION TOOL (CPOT)     PACEMAKER      Facial Expression  0 10/27/18 1729   Pacemaker  -- 10/30/18 1028    Body Movements  0 10/27/18 1729   Atrial Output (milliamps)  10 milliamps 10/27/18 0032    Compliance w/ventilator (intubated patients)  Extubated 10/27/18 1729   Atrial Sensitivity (mV)  0.4 10/27/18 0032    Vocalization (extubated patients)  0 10/27/18 1729   AV Interval (mSec)  200 10/27/18 0032    Muscle Tension  0 10/27/18 1729   Ventricular Output (milliamps)  10 milliamps 10/27/18 0032    Total  0 10/27/18 1729   Ventricular Sensitivity (mV)  2 10/27/18 0032    ANALGESIA SIDE EFFECTS MONITORING     Pacemaker Set Rate (beats/min)  80 BPM 10/27/18 0032    Side Effects Monitoring: Respiratory Quality  R 10/31/18 0757   Pacer Mode  Off 10/28/18 1822    Side Effects Monitoring: Respiratory Depth  N 10/31/18 0757   Function  -- 10/30/18 1028    Side Effects Monitoring: Sedation Level  1 10/31/18 0757   Paced Amount  -- 10/30/18 1028    ANURAG COMA SCALE      "Standby Status  -- 10/30/18 1028    Best Eye Response  4-->(E4) spontaneous 10/30/18 1815   Battery Check  Done 10/27/18 0700    Best Motor Response  6-->(M6) obeys commands 10/30/18 1815   Site Assessment  -- 10/30/18 1028    Best Verbal Response  5-->(V5) oriented 10/30/18 1815   Dressing Status  -- 10/30/18 1028    Deanne Coma Scale Score  15 10/30/18 1815   Pacemaker Type  Epicardial 10/28/18 1822    Assessment Qualifiers  patient not sedated/intubated 10/28/18 0440   POINT OF CARE TESTING      HEIGHT AND WEIGHT     Puncture Site  arterial line 10/28/18 0440    Height  1.829 m (6' 0.01\") 10/26/18 0647   Bedside Glucose (mg/dl )   132 mg/dl 10/28/18 0440    Weight  100.5 kg (221 lb 9 oz) 10/31/18 0715   OTHER      Weight Method  Standing scale 10/31/18 0715   Temp  97.2  F (36.2  C) 10/26/18 1845    Bed Scale  Standard (fitted sheet, draw sheet/ pad, cover/flat sheet, blanket, two pillows) 10/29/18 0659   Temp 2  97.2  F (36.2  C) 10/26/18 1848    BSA (Calculated - sq m)  2.21 10/26/18 0647   Pulse  109 10/26/18 1852    BMI (Calculated)  28.81 10/26/18 0647   SpO2  100 % 10/26/18 1852            Patient Lines/Drains/Airways Status    Active LINES/DRAINS/AIRWAYS     Name: Placement date: Placement time: Site: Days: Last dressing change:    Urethral Catheter 09/20/17   1132      405     Peripheral IV 10/26/18 Left 10/26/18   0737      5     Wound 10/26/18 Lower;Mid Lip Laceration laceration on lower lip 10/26/18   2000   Lip   4     Incision/Surgical Site 08/24/12 Back 08/24/12   1147    2258     Incision/Surgical Site 09/18/17 Right Neck 09/18/17   1459    407     Incision/Surgical Site 10/26/18 Midline Chest 10/26/18   1416    4     Incision/Surgical Site 10/26/18 Left Ankle 10/26/18   1416    4     Incision/Surgical Site 10/26/18 Left Groin 10/26/18   1719    4     Incision/Surgical Site 10/26/18 Left;Inner Knee 10/26/18   1719    4             Patient Lines/Drains/Airways Status    Active PICC/CVC  "    None            Intake/Output Detail Report     Date Intake           Output   Net    Shift P.O. I.V. Other IV Piggyback Colloid Blood Components Total Urine Chest Tube Total       Noc 10/29/18 2300 - 10/30/18 0659 250 -- -- -- -- -- 250 375 -- 375 -125    Day 10/30/18 0700 - 10/30/18 1459 960 -- -- -- -- -- 960 200 -- 200 760    Joycelyn 10/30/18 1500 - 10/30/18 2259 -- -- -- -- -- -- -- 150 -- 150 -150    Noc 10/30/18 2300 - 10/31/18 0659 -- -- -- -- -- -- -- 300 -- 300 -300    Day 10/31/18 07 - 10/31/18 1459 -- -- -- -- -- -- -- 300 -- 300 -300      Last Void/BM       Most Recent Value    Urine Occurrence 1 at 10/31/2018 0600    Stool Occurrence 1 at 10/31/2018 1025      Case Management/Discharge Planning     Case Management/Discharge Planning Flowsheet     REFERRAL INFORMATION     Who is your support system?  Wife;Children 10/30/18 1032    Did the Initial Social Work Assessment result in a Social Work Case?  Yes 10/30/18 1032   Spouse's Name  Fatou 10/30/18 1032    Admission Type  inpatient 10/30/18 1032   Description of Support System  Supportive;Involved 10/30/18 1032    Arrived From  home or self-care 10/30/18 1032   Support Assessment  Adequate family and caregiver support;Adequate social supports 10/30/18 1032    Referral Source  physician 10/30/18 1032   COPING/STRESS      # of Referrals Placed by CTS  Post Acute Facilities 10/30/18 1032   Major Change/Loss/Stressor  death of a loved one (son  a vicky ago ) 10/27/18 1422    Reason For Consult  discharge planning 10/30/18 1032   EXPECTED DISCHARGE      Record Reviewed  clinical discipline documentation;history and physical;medical record;patient profile;plan of care 10/30/18 1032   Expected Discharge Date  10/31/18 (TCU) 10/30/18 1522    CTS Assigned to Case  Bonnie Doran 10/30/18 1032   DISCHARGE PLANNING      LIVING ENVIRONMENT     Transportation Available  car (family or friend will provide) 10/28/18 1530    Lives With  spouse 10/30/18 1032   FINAL  RESOURCES      Living Arrangements  house 10/30/18 1032   Equipment Currently Used at Home  none 10/28/18 1530    Quality Of Family Relationships  supportive;involved 10/30/18 1032   ABUSE RISK SCREEN      Able to Return to Prior Living Arrangements  no 10/30/18 1032   QUESTION TO PATIENT:  Has a member of your family or a partner(now or in the past) intimidated, hurt, manipulated, or controlled you in any way?  patient declined to answer or is unable to answer 10/26/18 0731    HOME SAFETY     QUESTION TO PATIENT: Do you feel safe going back to the place where you are living?  patient declined to answer or is unable to answer 10/26/18 0731    Patient Feels Safe Living in Home?  yes 10/30/18 1032   OBSERVATION: Is there reason to believe there has been maltreatment of a vulnerable adult (ie. Physical/Sexual/Emotional abuse, self neglect, lack of adequate food, shelter, medical care, or financial exploitation)?  no 10/26/18 0731    ASSESSMENT OF FAMILY/SOCIAL SUPPORT     OTHER      Marital Status   10/30/18 1032   Are you depressed or being treated for depression?  No 10/27/18 1754                  [place] [address] [facility]: [phone]            Medication Administration Report for Anderson, Merlin J as of 10/31/18 1049   Legend:    Given Hold Not Given Due Canceled Entry Other Actions    Time Time (Time) Time  Time-Action       Inactive    Active    Linked        Medications 10/25/18 10/26/18 10/27/18 10/28/18 10/29/18 10/30/18 10/31/18    acetaminophen (TYLENOL) tablet 650 mg  Dose: 650 mg  Freq: EVERY 4 HOURS PRN Route: PO  PRN Reason: mild pain  Start: 10/27/18 1059   Admin Instructions: Maximum acetaminophen dose from all sources = 75 mg/kg/day not to exceed 4 grams/day.    Admin. Amount: 2 tablet (2 × 325 mg tablet)  Last Admin: 10/29/18 0049  Dispense Loc:  ADS 33A              2024 (650 mg)-Given        1140 (650 mg)-Given       1923 (650 mg)-Given        0049 (650 mg)-Given              aspirin EC tablet 325 mg  Dose: 325 mg  Freq: DAILY Route: PO  Start: 10/27/18 0900   Admin Instructions: Chest tube output less than 300 mL/8 hours    Admin. Amount: 1 tablet (1 × 325 mg tablet)  Last Admin: 10/31/18 0920  Dispense Loc:  ADS 33A       (0817)-Not Given        0807 (325 mg)-Given        0836 (325 mg)-Given        0852 (325 mg)-Given        0920 (325 mg)-Given           atorvastatin (LIPITOR) tablet 40 mg  Dose: 40 mg  Freq: DAILY Route: PO  Start: 10/28/18 1100   Admin. Amount: 1 tablet (1 × 40 mg tablet)  Last Admin: 10/31/18 0919  Dispense Loc:  ADS 33A        1140 (40 mg)-Given        0836 (40 mg)-Given        0851 (40 mg)-Given        0919 (40 mg)-Given           fluticasone (FLONASE) 50 MCG/ACT spray 1 spray  Dose: 1 spray  Freq: DAILY PRN Route: BOTH NOSTRIL  PRN Reasons: rhinitis,allergies  Start: 10/28/18 0852   Admin. Amount: 1 spray  Dispense Loc:  Main Pharmacy               furosemide (LASIX) injection 20 mg  Dose: 20 mg  Freq: ONCE PRN Route: IV  PRN Comment: for urine output less than 30 mL/hr. Hold for PAD less than 15 or CVP less than 10 or Serum Creatinine greater than 1.2  Start: 10/26/18 1915   Admin Instructions: Discontinue upon initiation of transition orders.  Discontinue upon discharge from ICU  For ordered IV doses 1-40 mg, give IV Push undiluted over 1-2 minutes.    Admin. Amount: 20 mg = 2 mL Conc: 10 mg/mL  Dispense Loc:  ADS 33A  Infused Over: 1-2 Minutes  Administrations Remainin  Volume: 2 mL               furosemide (LASIX) tablet 20 mg  Dose: 20 mg  Freq: DAILY Route: PO  Start: 10/30/18 0900   Admin. Amount: 1 tablet (1 × 20 mg tablet)  Last Admin: 10/31/18 0920  Dispense Loc:  ADS 33A          1141 (20 mg)-Given        0920 (20 mg)-Given           glucose gel 15-30 g  Dose: 15-30 g  Freq: EVERY 15 MIN PRN Route: PO  PRN Reason: low blood sugar  Start: 10/26/18 2043   Admin Instructions: Give 15 g for BG 51 to 69 mg/dL IF patient is conscious  and able to swallow. Give 30 g for BG less than or equal to 50 mg/dL IF patient is conscious and able to swallow. Do NOT give glucose gel via enteral tube.  IF patient has enteral tube: give apple juice 120 mL (4 oz or 15 g of CHO) via enteral tube for BG 51 to 69 mg/dL.  Give apple juice 240 mL (8 oz or 30 g of CHO) via enteral tube for BG less than or equal to 50 mg/dL.    ~Oral gel is preferable for conscious and able to swallow patient.   ~IF gel unavailable or patient refuses may provide apple juice 120 mL (4 oz or 15 g of CHO). Document juice on I and O flowsheet.    Admin. Amount: 15-30 g  Dispense Loc: [facility] ADS 33A  Volume: 93.75 mL              Or  dextrose 50 % injection 25-50 mL  Dose: 25-50 mL  Freq: EVERY 15 MIN PRN Route: IV  PRN Reason: low blood sugar  Start: 10/26/18 2043   Admin Instructions: Use if have IV access, BG less than 70 mg/dL and meet dose criteria below:  Dose if conscious and alert (or disorientated) and NPO = 25 mL  Dose if unconscious / not alert = 50 mL  Vesicant. For ordered doses up to 25 g, give IV Push undiluted. Give each 5g over 1 minute.    Admin. Amount: 25-50 mL  Dispense Loc: [facility] ADS 33A  Infused Over: 1-5 Minutes  Volume: 50 mL              Or  glucagon injection 1 mg  Dose: 1 mg  Freq: EVERY 15 MIN PRN Route: SC  PRN Reason: low blood sugar  PRN Comment: May repeat x 1 only  Start: 10/26/18 2043   Admin Instructions: May give SQ or IM. ONLY use glucagon IF patient has NO IV access AND is UNABLE to swallow AND blood glucose is LESS than or EQUAL to 50 mg/dL.  If ordered IV, give IV Push over 1 minute. Reconstitute with 1mL sterile water.    Admin. Amount: 1 mg  Dispense Loc: SH ADS 33A               heparin sodium PF injection 5,000 Units  Dose: 5,000 Units  Freq: EVERY 8 HOURS Route: SC  Start: 10/27/18 1200   Admin Instructions: HOLD if platelet count falls below 70,000/microliter and notify provider.  High concentration heparin. Not for line flush or cath care.    Admin.  Amount: 5,000 Units = 0.5 mL Conc: 5,000 Units/0.5 mL  Last Admin: 10/31/18 0441  Dispense Loc:  ADS 33A  Volume: 0.5 mL       (1129)-Not Given [C]       2024 (5,000 Units)-Given        0406 (5,000 Units)-Given       1142 (5,000 Units)-Given       1922 (5,000 Units)-Given        0436 (5,000 Units)-Given       1215 (5,000 Units)-Given       1932 (5,000 Units)-Given        0340 (5,000 Units)-Given       1141 (5,000 Units)-Given       2001 (5,000 Units)-Given        0441 (5,000 Units)-Given       [ ] 1200       [ ] 2000           hydrALAZINE (APRESOLINE) injection 10 mg  Dose: 10 mg  Freq: EVERY 30 MIN PRN Route: IV  PRN Reason: high blood pressure  PRN Comment: Systolic Blood Pressure greater than 140 mmHg or Diastolic Blood Pressure greater than 90 mmHg  Start: 10/26/18 1915   Admin Instructions: For ordered IV doses 1-40 mg, give IV Push undiluted over 1 minute.    Admin. Amount: 10 mg = 0.5 mL Conc: 20 mg/mL  Last Admin: 10/26/18 2307  Dispense Loc:  ADS 33A  Infused Over: 1-2 Minutes  Volume: 1 mL      2307 (10 mg)-Given                HYDROmorphone (PF) (DILAUDID) injection 0.2-0.4 mg  Dose: 0.2-0.4 mg  Freq: EVERY 2 HOURS PRN Route: IV  PRN Reason: moderate to severe pain  Start: 10/26/18 2116   Admin Instructions: For ordered IV doses 0.1-4 mg give IV Push undiluted. Administer each 2mg over 2-5 minutes.    Admin. Amount: 0.2-0.4 mg  Last Admin: 10/26/18 2138  Dispense Loc:  ADS 33A      2138 (0.4 mg)-Given                insulin aspart (NovoLOG) inj (RAPID ACTING)  Dose: 1-5 Units  Freq: AT BEDTIME Route: SC  Start: 10/28/18 2200   Admin Instructions: MEDIUM INSULIN RESISTANCE DOSING    Do Not give Bedtime Correction Insulin if BG less than  200.   For  - 249 give 1 units.   For  - 299 give 2 units.   For  - 349 give 3 units.   For  -399 give 4 units.   For BG greater than or equal to 400 give 5 units.  Notify provider if glucose greater than or equal to 350 mg/dL after  administration of correction dose.  If given at mealtime, administer within 30 minutes of start of meal    Admin. Amount: 1-5 Units  Dispense Loc: Contact Rx for dose  Volume: 3 mL        (2126)-Not Given [C]        (2208)-Not Given        (2205)-Not Given [C]        [ ] 2200           insulin aspart (NovoLOG) inj (RAPID ACTING)  Dose: 1-7 Units  Freq: 3 TIMES DAILY BEFORE MEALS Route: SC  Start: 10/28/18 0900   Admin Instructions: Correction Scale - MEDIUM INSULIN RESISTANCE DOSING     Do Not give Correction Insulin if Pre-Meal BG less than 140.   For Pre-Meal  - 189 give 1 unit.   For Pre-Meal  - 239 give 2 units.   For Pre-Meal  - 289 give 3 units.   For Pre-Meal  - 339 give 4 units.   For Pre-Meal - 399 give 5 units.   For Pre-Meal -449 give 6 units  For Pre-Meal BG greater than or equal to 450 give 7 units.   To be given with prandial insulin, and based on pre-meal blood glucose.    Notify provider if glucose greater than or equal to 350 mg/dL after administration of correction dose.  If given at mealtime, administer within 30 minutes of start of meal    Admin. Amount: 1-7 Units  Last Admin: 10/31/18 0921  Dispense Loc: Contact Rx for dose  Volume: 3 mL        (0930)-Not Given       1437 (1 Units)-Given       1719 (1 Units)-Given [C]        0837 (1 Units)-Given       1216 (1 Units)-Given [C]       1734 (1 Units)-Given [C]        0852 (1 Units)-Given       (1510)-Not Given       1707 (1 Units)-Given [C]        0921 (1 Units)-Given       [ ] 1200       [ ] 1700           insulin aspart (NovoLOG) inj (RAPID ACTING)  Freq: WITH SNACKS OR SUPPLEMENTS Route: SC  PRN Reason: high blood sugar  Start: 10/26/18 1915   Admin Instructions: DOSE = 1 unit/CARBOHYDRATE UNIT.  1 Carb unit equals 15 grams of carbohydrate (CHO).  Only chart total amount of units given.  Administer ONLY IF patient on IV insulin infusion, either 5 minutes before snack or immediately after snack.  Do NOT give if  "pre-prandial glucose less than or equal to 60 mg/dL.  If given at mealtime, administer within 30 minutes of start of meal    Dispense Loc: Contact Rx for dose  Volume: 3 mL               Lidocaine (LIDOCARE) 4 % Patch 1 patch  Dose: 1 patch  Freq: EVERY 24 HOURS 0800 Route: TD  Start: 10/27/18 0800   Admin Instructions: Apply patch(s) to sides of sternal incision. To prevent lidocaine toxicity, patient should be patch free for 12 hrs daily. Patches may be cut to smaller size prior to removing release liner.  NEVER APPLY HEAT OVER PATCH which increases absorption and risk of local anesthetic toxicity. Do not apply over area where liposomal bupivacaine was injected for 96 hours post injection.    Admin. Amount: 1 patch  Last Admin: 10/31/18 0919  Dispense Loc:  ADS 33A  Infused Over: 12 Hours       0813 (1 patch)-Given        0807 (1 patch)-Given        0837 (1 patch)-Given        0851 (1 patch)-Given        0919 (1 patch)-Given           lidocaine (LMX4) cream  Freq: EVERY 1 HOUR PRN Route: Top  PRN Reason: pain  PRN Comment: with VAD insertion or accessing implanted port.  Start: 10/26/18 1915   Admin Instructions: Do NOT give if patient has a history of allergy to any local anesthetic or any \"laron\" product.   Apply 30 minutes prior to VAD insertion or port access.  MAX Dose:  2.5 g (  of 5 g tube)    Dispense Loc:  ADS 33 TOWER               lidocaine 1 % 1 mL  Dose: 1 mL  Freq: EVERY 1 HOUR PRN Route: OTHER  PRN Comment: mild pain with VAD insertion or accessing implanted port  Start: 10/26/18 1915   Admin Instructions: Do NOT give if patient has a history of allergy to any local anesthetic or any \"laron\" product. MAX dose 1 mL subcutaneous OR intradermal in divided doses.    Admin. Amount: 1 mL  Dispense Loc: [facility] ADS 33A  Volume: 20 mL               lidocaine patch in PLACE  Freq: EVERY 8 HOURS Route: TD  Start: 10/27/18 0800   Admin Instructions: Chart every shift, confirming that patch is still in place on " patient (no barcode scan needed). See patch order for dose information.  NEVER APPLY HEAT OVER PATCH which will increase absorption and may lead to risk of local anesthetic toxicity. Do not apply over area where liposomal bupivacaine injected for 96 hours.    Last Admin: 10/31/18 0921  Dispense Loc:  Main Pharmacy       0853 ( )-Patch in Place       1540 ( )-Patch in Place        0028 ( )-Negative       0808 ( )-Patch in Place       1646 ( )-Given               0843 ( )-Given       1621 ( )-Patch in Place               0852 ( )-Patch in Place       1642 ( )-Patch in Place        (0149)-Not Given [C]       0921 ( )-Patch in Place       [ ] 1600           lidocaine patch REMOVAL  Freq: EVERY 24 HOURS 2000 Route: TD  Start: 10/27/18 2000   Admin Instructions: Patient should have a 12 hour patch free interval    Dispense Loc:  Main Pharmacy       2024 ( )-Patch Removed        1926 ( )-Patch Removed        1937 ( )-Patch Removed        1936 ( )-Patch Removed        [ ] 2000           lisinopril (PRINIVIL/Zestril) tablet 2.5 mg  Dose: 2.5 mg  Freq: DAILY Route: PO  Start: 10/29/18 0900   Admin. Amount: 1 tablet (1 × 2.5 mg tablet)  Last Admin: 10/31/18 0920  Dispense Loc:  ADS 33A         1003 (2.5 mg)-Given        0851 (2.5 mg)-Given        0920 (2.5 mg)-Given           magnesium sulfate 2 g in water intermittent infusion  Dose: 2 g  Freq: DAILY PRN Route: IV  PRN Reason: magnesium supplementation  Start: 10/26/18 1915   Admin Instructions: For Serum Mg++ 1.6 - 2 mg/dL  Give 2 g and recheck magnesium level next AM.    Admin. Amount: 2 g = 50 mL Conc: 0.04 g/mL  Dispense Loc: Contact Rx for dose  Infused Over: 60 Minutes  Volume: 50 mL               magnesium sulfate 4 g in 100 mL sterile water (premade)  Dose: 4 g  Freq: EVERY 4 HOURS PRN Route: IV  PRN Reason: magnesium supplementation  Start: 10/26/18 1915   Admin Instructions: For serum Mg++ less than 1.6 mg/dL  Give 4 g and recheck magnesium level 2 hours after  dose, and next AM.    Admin. Amount: 4 g = 100 mL Conc: 4 g/100 mL  Dispense Loc: Contact Rx for dose  Infused Over: 120 Minutes  Volume: 100 mL               metFORMIN (GLUCOPHAGE) tablet 500 mg  Dose: 500 mg  Freq: 2 TIMES DAILY WITH MEALS Route: PO  Start: 10/29/18 0900   Admin Instructions: If the patient receives intravenous, iodinated contrast and patient GFR is greater than 60 mL/min/1.7m2, continue metformin.  Contact provider for 'hold' or 'no hold' instructions if no GFR or if GFR is less than 60 mL/min/1.7m2.    Admin. Amount: 1 tablet (1 × 500 mg tablet)  Last Admin: 10/31/18 0920  Dispense Loc:  ADS 33A         1003 (500 mg)-Given       1729 (500 mg)-Given [C]        0851 (500 mg)-Given       1705 (500 mg)-Given        0920 (500 mg)-Given       [ ] 1800           methocarbamol (ROBAXIN) tablet 500 mg  Dose: 500 mg  Freq: 4 TIMES DAILY PRN Route: PO  PRN Reason: muscle spasms  Start: 10/26/18 1915   Admin Instructions: Hold for sedation.    Admin. Amount: 1 tablet (1 × 500 mg tablet)  Last Admin: 10/28/18 0807  Dispense Loc:  ADS 33A        0807 (500 mg)-Given              metoprolol tartrate (LOPRESSOR) tablet 25 mg  Dose: 25 mg  Freq: EVERY 12 HOURS Route: PO  Start: 10/27/18 0800   Admin Instructions: Hold if Heart Rate less than 65 beats per minute or Systolic Blood Pressure less than 95 mmHg, bronchospasm, on inotropic continuous infusions, or being paced.  Prescriber should consider discontinuing after 7 days if patient not on beta blocker prior to surgery.    Admin. Amount: 1 tablet (1 × 25 mg tablet)  Last Admin: 10/31/18 0920  Dispense Loc:  ADS 33A       (0753)-Not Given       (2025)-Not Given        0807 (25 mg)-Given       1923 (25 mg)-Given        0836 (25 mg)-Given       1930 (25 mg)-Given        0851 (25 mg)-Given       2000 (25 mg)-Given        0920 (25 mg)-Given       [ ] 2000           naloxone (NARCAN) injection 0.1-0.4 mg  Dose: 0.1-0.4 mg  Freq: EVERY 2 MIN PRN Route: IV  PRN  Reason: opioid reversal  Start: 10/26/18 1915   Admin Instructions: For respiratory rate LESS than or EQUAL to 8.  Partial reversal dose:  0.1 mg titrated q 2 minutes for Analgesia Side Effects Monitoring Sedation Level of 3 (frequently drowsy, arousable, drifts to sleep during conversation).Full reversal dose:  0.4 mg bolus for Analgesia Side Effects Monitoring Sedation Level of 4 (somnolent, minimal or no response to stimulation).  For ordered IV doses 0.1-2mg give IVP. Give each 0.4mg over 15 seconds in emergency situations. For non-emergent situations further dilute in 9mL of NS to facilitate titration of response.    Admin. Amount: 0.1-0.4 mg = 0.25-1 mL Conc: 0.4 mg/mL  Dispense Loc:  ADS 33A  Volume: 1 mL               ondansetron (ZOFRAN-ODT) ODT tab 4 mg  Dose: 4 mg  Freq: EVERY 6 HOURS PRN Route: PO  PRN Reasons: nausea,vomiting  Start: 10/26/18 1915   Admin Instructions: This is Step 1 of nausea and vomiting management.  If nausea not resolved in 15 minutes, go to Step 2 prochlorperazine (COMPAZINE). Do not push through foil backing. Peel back foil and gently remove. Place on tongue immediately. Administration with liquid unnecessary  With dry hands, peel back foil backing and gently remove tablet; do not push oral disintegrating tablet through foil backing; administer immediately on tongue and oral disintegrating tablet dissolves in seconds; then swallow with saliva; liquid not required.    Admin. Amount: 1 tablet (1 × 4 mg tablet)  Dispense Loc:  ADS 33A              Or  ondansetron (ZOFRAN) injection 4 mg  Dose: 4 mg  Freq: EVERY 6 HOURS PRN Route: IV  PRN Reasons: nausea,vomiting  Start: 10/26/18 1915   Admin Instructions: This is Step 1 of nausea and vomiting management.  If nausea not resolved in 15 minutes, go to Step 2 prochlorperazine (COMPAZINE).  Irritant. For ordered IV doses 0.1-4 mg, give IV Push undiluted over 2-5 minutes.    Admin. Amount: 4 mg = 2 mL Conc: 4 mg/2 mL  Dispense Loc:   ADS 33A  Infused Over: 2-5 Minutes  Volume: 2 mL               oxyCODONE IR (ROXICODONE) tablet 5-10 mg  Dose: 5-10 mg  Freq: EVERY 4 HOURS PRN Route: PO  PRN Reason: other  PRN Comment: pain control or improvement in physical function. Hold dose for analgesic side effects.  Start: 10/26/18 1915   Admin Instructions: Start with the lowest dose. May adjust dose by 5 mg every 4 hours as needed. Notify provider to assess for uncontrolled pain or analgesic side effects. Hold while on PCA or with regular IV opioid dosing. Maximum total is 60 mg in 24 hours.    Admin. Amount: 1-2 tablet (1-2 × 5 mg tablet)  Last Admin: 10/28/18 0407  Dispense Loc:  ADS 33A       1807 (5 mg)-Given       2204 (5 mg)-Given        0407 (5 mg)-Given              pantoprazole (PROTONIX) EC tablet 40 mg  Dose: 40 mg  Freq: EVERY MORNING BEFORE BREAKFAST Route: PO  Start: 10/29/18 0730   Admin Instructions: Pharmacist Dose Change per: IV-PO Policy.    DO NOT CRUSH.    Admin. Amount: 1 tablet (1 × 40 mg tablet)  Last Admin: 10/31/18 0919  Dispense Loc:  ADS 33A         0631 (40 mg)-Given        0609 (40 mg)-Given               0919 (40 mg)-Given           potassium chloride (KLOR-CON) Packet 20-40 mEq  Dose: 20-40 mEq  Freq: EVERY 2 HOURS PRN Route: ORAL OR FEED  PRN Reason: potassium supplementation  Start: 10/26/18 1915   Admin Instructions: Use if unable to tolerate tablets.    If Serum K+ 3.4-4.0, dose = 20 mEq x1. Recheck K+ level the next AM.  If Serum K+ 3.0-3.3, dose = 60 mEq po total dose (40 mEq x 1 followed in 2 hours by 20 mEq X1). Recheck K+ level 4 hours after dose and the next AM.  If Serum K+ 2.5-2.9, dose = 80 mEq po total dose (40 mEq Q2H x2). Recheck K+ level 4 hours after dose and the next AM.  If Serum K+ less than 2.5, See IV order.  Dissolve packet contents in 4-8 ounces of cold water or juice.    Admin. Amount: 20-40 mEq  Dispense Loc:  ADS 33A               potassium chloride 10 mEq in 100 mL intermittent infusion  with 10 mg lidocaine  Dose: 10 mEq  Freq: EVERY 1 HOUR PRN Route: IV  PRN Reason: potassium supplementation  Start: 10/26/18 1915   Admin Instructions: Infuse via PERIPHERAL LINE. Use potassium with lidocaine for pain with peripheral administration.  If Serum K+ 3.4-4.0, dose = 10 mEq/hr x2 doses. Recheck K+ level the next AM.  If Serum K+ 3.0-3.3, dose = 10 mEq/hr x4 doses (40 mEq IV total dose). Recheck K+ level 2 hours after dose and the next AM.  If Serum K+ less than 3.0, dose = 10 mEq/hr x6 doses (60 mEq IV total dose). Recheck K+ level 2 hours after dose and the next AM.    Admin. Amount: 10 mEq = 100 mL Conc: 10 mEq/100 mL  Dispense Loc: Contact Rx for dose  Infused Over: 1 Hours  Volume: 100 mL               potassium chloride 10 mEq in 100 mL sterile water intermittent infusion (premix)  Dose: 10 mEq  Freq: EVERY 1 HOUR PRN Route: IV  PRN Reason: potassium supplementation  Start: 10/26/18 1915   Admin Instructions: Infuse via PERIPHERAL LINE or CENTRAL LINE. Use for central line replacement if patient weight less than 65 kg, if patient is on TPN with high potassium content or if unit does not stock 20 mEq bags.  If Serum K+ 3.4-4.0, dose = 10 mEq/hr x2 doses. Recheck K+ level the next AM.  If Serum K+ 3.0-3.3, dose = 10 mEq/hr x4 doses (40 mEq IV total dose). Recheck K+ level 2 hours after dose and the next AM.  If Serum K+ less than 3.0, dose = 10 mEq/hr x6 doses (60 mEq IV total dose). Recheck K+ level 2 hours after dose and the next AM.    Admin. Amount: 10 mEq = 100 mL Conc: 10 mEq/100 mL  Dispense Loc: Contact Rx for dose  Infused Over: 60 Minutes  Volume: 100 mL               potassium chloride 20 mEq in 50 mL intermittent infusion  Dose: 20 mEq  Freq: EVERY 1 HOUR PRN Route: IV  PRN Reason: potassium supplementation  Start: 10/26/18 1915   Admin Instructions: Infuse via CENTRAL LINE Only.  May need EKG if less than 65 kg or on TPN - Max rate is 0.3 mEq/kg/hr for patients not on EKG monitoring.    If  Serum K+ 3.4-4.0, dose = 20 mEq/hr x1 doses. Recheck K+ level the next AM.  If Serum K+ 3.0-3.3, dose = 20 mEq/hr x2 doses (40 mEq IV total dose).  Recheck K+ level 2 hours after dose and the next AM.  If Serum K+ less than 3.0, dose = 20 mEq/hr x3 doses (60 mEq IV total dose). Recheck K+ level 2 hours after dose and the next AM.    Admin. Amount: 20 mEq = 50 mL Conc: 20 mEq/50 mL  Dispense Loc:  ADS 33 TOWER  Volume: 50 mL               potassium chloride SA (K-DUR/KLOR-CON M) CR tablet 20-40 mEq  Dose: 20-40 mEq  Freq: EVERY 2 HOURS PRN Route: PO  PRN Reason: potassium supplementation  Start: 10/26/18 1915   Admin Instructions: Use if able to take PO.   If Serum K+ 3.4-4.0, dose = 20 mEq x1. Recheck K+ level the next AM.  If Serum K+ 3.0-3.3, dose = 60 mEq po total dose (40 mEq x1 followed in 2 hours by 20 mEq x1). Recheck K+ level 4 hours after dose and the next AM.  If Serum K+ 2.5-2.9, dose = 80 mEq po total dose (40 mEq Q2H x2). Recheck K+ level 4 hours after dose and the next AM.  If Serum K+ less than 2.5, See IV order.  DO NOT CRUSH    Admin. Amount: 1-2 tablet (1-2 × 20 mEq tablet)  Last Admin: 10/30/18 0851  Dispense Loc:  ADS 33A          0851 (20 mEq)-Given            potassium phosphate 10 mmol in D5W 250 mL intermittent infusion  Dose: 10 mmol  Freq: DAILY PRN Route: IV  PRN Reason: phosphorous supplementation  Start: 10/26/18 1915   Admin Instructions: For serum phosphorus level 2.5-2.7  Do not infuse Phosphorus in the same line as TPN.   Give 10 mmol and recheck phosphorus level the next AM.  Each mmol of phosphate provides 1.47 mEq of Potassium. Multiply the patient's phosphate dose by 1.47 to determine the amount of potassium in this dose.    Admin. Amount: 10 mmol  Dispense Loc:  Main Pharmacy  Infused Over: 4 Hours  Volume: 250 mL   Mixture Administration Information:   Medication Type Amount   potassium phosphate 3 MMOLE/ML SOLN Medications 10 mmol   D5W 5 % SOLN Base 250 mL                        potassium phosphate 15 mmol in D5W 250 mL intermittent infusion  Dose: 15 mmol  Freq: DAILY PRN Route: IV  PRN Reason: phosphorous supplementation  Start: 10/26/18 1915   Admin Instructions: For serum phosphorus level 2.0-2.4  Do not infuse Phosphorus in the same line as TPN.   Give 15 mmol and recheck phosphorus level next AM.  Each mmol of phosphate provides 1.47 mEq of Potassium. Multiply the patient's phosphate dose by 1.47 to determine the amount of potassium in this dose.    Admin. Amount: 15 mmol  Last Admin: 10/27/18 0520  Dispense Loc:  Main Pharmacy  Infused Over: 4 Hours  Volume: 250 mL   Mixture Administration Information:   Medication Type Amount   potassium phosphate 3 MMOLE/ML SOLN Medications 15 mmol   D5W 5 % SOLN Base 250 mL               0520 (15 mmol)-New Bag               potassium phosphate 20 mmol in D5W 250 mL intermittent infusion  Dose: 20 mmol  Freq: EVERY 6 HOURS PRN Route: IV  PRN Reason: phosphorous supplementation  Start: 10/26/18 1915   Admin Instructions: For serum phosphorus level 1.1-1.9  For CENTRAL Line ONLY  Do not infuse Phosphorus in the same line as TPN.   Give 20 mmol and recheck phosphorus level 2 hours after dose and next AM.  Each mmol of phosphate provides 1.47 mEq of Potassium. Multiply the patient's phosphate dose by 1.47 to determine the amount of potassium in this dose.    Admin. Amount: 20 mmol  Dispense Loc:  Main Pharmacy  Infused Over: 4 Hours  Volume: 250 mL   Mixture Administration Information:   Medication Type Amount   potassium phosphate 3 MMOLE/ML SOLN Medications 20 mmol   D5W 5 % SOLN Base 250 mL                       potassium phosphate 20 mmol in D5W 500 mL intermittent infusion  Dose: 20 mmol  Freq: EVERY 6 HOURS PRN Route: IV  PRN Reason: phosphorous supplementation  Start: 10/26/18 1915   Admin Instructions: For serum phosphorus level 1.1-1.9  For peripheral line  Do not infuse Phosphorus in the same line as TPN.   Give 20 mmol and  recheck phosphorus level 2 hours after dose and next AM. Repeat if necessary.  Each mmol of phosphate provides 1.47 mEq of Potassium. Multiply the patient's phosphate dose by 1.47 to determine the amount of potassium in this dose.    Admin. Amount: 20 mmol  Dispense Loc:  Main Pharmacy  Infused Over: 4 Hours  Volume: 500 mL   Mixture Administration Information:   Medication Type Amount   potassium phosphate 3 MMOLE/ML SOLN Medications 20 mmol   D5W 5 % SOLN Base 500 mL                       potassium phosphate 25 mmol in D5W 500 mL intermittent infusion  Dose: 25 mmol  Freq: EVERY 8 HOURS PRN Route: IV  PRN Reason: phosphorous supplementation  Start: 10/26/18 1915   Admin Instructions: For serum phosphorus level less than 1.1  Do not infuse Phosphorus in the same line as TPN.   Give 25 mmol and recheck phosphorus level 2 hours after dose and next AM.  Each mmol of phosphate provides 1.47 mEq of Potassium. Multiply the patient's phosphate dose by 1.47 to determine the amount of potassium in this dose.    Admin. Amount: 25 mmol  Dispense Loc:  Main Pharmacy  Infused Over: 6 Hours  Volume: 500 mL   Mixture Administration Information:   Medication Type Amount   potassium phosphate 3 MMOLE/ML SOLN Medications 25 mmol   D5W 5 % SOLN Base 500 mL                       prochlorperazine (COMPAZINE) injection 5 mg  Dose: 5 mg  Freq: EVERY 6 HOURS PRN Route: IV  PRN Reasons: nausea,vomiting  Start: 10/26/18 1915   Admin Instructions: This is Step 2 of nausea and vomiting management.   If nausea not resolved in 15 minutes, give metoclopramide (REGLAN) if ordered (step 3 of nausea and vomiting management)  For ordered IV doses 0.1-10 mg, give IV Push undiluted. Each 5mg over 1 minute.    Admin. Amount: 5 mg = 1 mL Conc: 5 mg/mL  Dispense Loc:  ADS 33A  Infused Over: 1-2 Minutes  Volume: 1 mL              Or  prochlorperazine (COMPAZINE) tablet 5 mg  Dose: 5 mg  Freq: EVERY 6 HOURS PRN Route: PO  PRN Reasons:  nausea,vomiting  Start: 10/26/18 1915   Admin Instructions: This is Step 2 of nausea and vomiting management.   If nausea not resolved in 15 minutes, give metoclopramide (REGLAN) if ordered (step 3 of nausea and vomiting management)    Admin. Amount: 1 tablet (1 × 5 mg tablet)  Dispense Loc:  ADS 33A               sodium chloride (PF) 0.9% PF flush 3 mL  Dose: 3 mL  Freq: EVERY 8 HOURS Route: IK  Start: 10/26/18 1930   Admin Instructions: And Q1H PRN, to lock peripheral IV dormant line.    Admin. Amount: 3 mL  Last Admin: 10/31/18 0441  Dispense Loc: [facility] Floor Stock  Volume: 3 mL   Current Line: Peripheral IV 10/26/18 Left     2154 (3 mL)-Given        0648 (3 mL)-Given       (1129)-Not Given       2204 (3 mL)-Given        0434 (3 mL)-Given       1143 (3 mL)-Given       1929 (3 mL)-Given        0437 (3 mL)-Given       1219 (3 mL)-Given       1940 (3 mL)-Given        0343 (3 mL)-Given       1141 (3 mL)-Given       2004 (3 mL)-Given        0441 (3 mL)-Given       [ ] 1200       [ ] 2000           sodium chloride (PF) 0.9% PF flush 3 mL  Dose: 3 mL  Freq: EVERY 1 HOUR PRN Route: IK  PRN Reason: line flush  PRN Comment: for peripheral IV flush post IV meds  Start: 10/26/18 1915   Admin. Amount: 3 mL  Dispense Loc: [facility] Floor Stock  Volume: 3 mL               tamsulosin (FLOMAX) capsule 0.4 mg  Dose: 0.4 mg  Freq: DAILY Route: PO  Start: 10/28/18 1100   Admin Instructions: Administer 30 minutes after the same meal each day.  Capsules should be swallowed whole; do not crush chew or open.    Admin. Amount: 1 capsule (1 × 0.4 mg capsule)  Last Admin: 10/31/18 0920  Dispense Loc:  ADS 33A        1140 (0.4 mg)-Given        0837 (0.4 mg)-Given        0851 (0.4 mg)-Given        0920 (0.4 mg)-Given          Discontinued Medications  Medications 10/25/18 10/26/18 10/27/18 10/28/18 10/29/18 10/30/18 10/31/18         Dose: 650 mg  Freq: EVERY 4 HOURS PRN Route: RE  PRN Reason: mild pain  Start: 10/27/18 1059   End: 10/29/18 0846    Admin Instructions: Maximum acetaminophen dose from all sources = 75 mg/kg/day not to exceed 4 grams/day.    Admin. Amount: 1 suppository (1 × 650 mg suppository)  Last Admin: 10/27/18 1203  Dispense Loc:  ADS 33A       1203 (650 mg)-Given                                     0846-Med Discontinued           Freq: CONTINUOUS PRN Route: IV  PRN Comment: Give if on IV Insulin Infusion, and Parenteral or Enteral nutrition held or cycled off.   Start: 10/26/18 2043   End: 10/29/18 0846   Admin Instructions: Infuse IV D10W at TPN/TF rate whenever nutrition is held or cycled off.    Dispense Loc: [facility] Stock  Volume: 1,000 mL   Mixture Administration Information:   Medication Type Amount   dextrose 10 % SOLN Base 1,000 mL                 0846-Med Discontinued           Dose:  mcg  Freq: EVERY 1 HOUR PRN Route: IV  PRN Reason: other  PRN Comment: pain control or improvement in physical function. Hold dose for analgesic side effects.  Start: 10/26/18 1915   End: 10/29/18 0846   Admin Instructions: Start at the lowest dose. May adjust dose by 5 mcg every 1 hour as needed.  Notify provider to assess for uncontrolled pain or analgesic side effects.  Hold while on IV PCA or with regular IV opioid dosing.  For ordered IV doses 1-100 mcg give IV Push undiluted over a minimum of 3-5 minutes.    Admin. Amount:  mcg = 1-2 mL Conc: 50 mcg/mL  Last Admin: 10/27/18 1215  Dispense Loc:  ADS 33A  Volume: 2 mL      2317 (100 mcg)-Given        0221 (100 mcg)-Given       0648 (50 mcg)-Given       1215 (50 mcg)-Given         0846-Med Discontinued           Dose: 15-30 g  Freq: EVERY 15 MIN PRN Route: PO  PRN Reason: low blood sugar  Start: 10/28/18 1045   End: 10/28/18 1057   Admin Instructions: Give 15 g for BG 51 to 69 mg/dL IF patient is conscious and able to swallow. Give 30 g for BG less than or equal to 50 mg/dL IF patient is conscious and able to swallow. Do NOT give glucose gel via enteral tube.  IF patient has  enteral tube: give apple juice 120 mL (4 oz or 15 g of CHO) via enteral tube for BG 51 to 69 mg/dL.  Give apple juice 240 mL (8 oz or 30 g of CHO) via enteral tube for BG less than or equal to 50 mg/dL.    ~Oral gel is preferable for conscious and able to swallow patient.   ~IF gel unavailable or patient refuses may provide apple juice 120 mL (4 oz or 15 g of CHO). Document juice on I and O flowsheet.    Admin. Amount: 15-30 g  Volume: 75 mL        1057-Med Discontinued         Or    Dose: 25-50 mL  Freq: EVERY 15 MIN PRN Route: IV  PRN Reason: low blood sugar  Start: 10/28/18 1045   End: 10/28/18 1057   Admin Instructions: Use if have IV access, BG less than 70 mg/dL and meet dose criteria below:  Dose if conscious and alert (or disorientated) and NPO = 25 mL  Dose if unconscious / not alert = 50 mL  Vesicant. For ordered doses up to 25 g, give IV Push undiluted. Give each 5g over 1 minute.    Admin. Amount: 25-50 mL  Infused Over: 1-5 Minutes  Volume: 50 mL        1057-Med Discontinued         Or    Dose: 1 mg  Freq: EVERY 15 MIN PRN Route: SC  PRN Reason: low blood sugar  PRN Comment: May repeat x 1 only  Start: 10/28/18 1045   End: 10/28/18 1057   Admin Instructions: May give SQ or IM. ONLY use glucagon IF patient has NO IV access AND is UNABLE to swallow AND blood glucose is LESS than or EQUAL to 50 mg/dL.  If ordered IV, give IV Push over 1 minute. Reconstitute with 1mL sterile water.    Admin. Amount: 1 mg        1057-Med Discontinued            Dose: 1-5 Units  Freq: AT BEDTIME Route: SC  Start: 10/28/18 2200   End: 10/28/18 1058   Admin Instructions: MEDIUM INSULIN RESISTANCE DOSING    Do Not give Bedtime Correction Insulin if BG less than  200.   For  - 249 give 1 units.   For  - 299 give 2 units.   For  - 349 give 3 units.   For  -399 give 4 units.   For BG greater than or equal to 400 give 5 units.  Notify provider if glucose greater than or equal to 350 mg/dL after administration  of correction dose.  If given at mealtime, administer within 30 minutes of start of meal    Admin. Amount: 1-5 Units  Volume: 0.05 mL        1058-Med Discontinued            Dose: 1-7 Units  Freq: 3 TIMES DAILY BEFORE MEALS Route: SC  Start: 10/28/18 1200   End: 10/28/18 1058   Admin Instructions: Correction Scale - MEDIUM INSULIN RESISTANCE DOSING     Do Not give Correction Insulin if Pre-Meal BG less than 140.   For Pre-Meal  - 189 give 1 unit.   For Pre-Meal  - 239 give 2 units.   For Pre-Meal  - 289 give 3 units.   For Pre-Meal  - 339 give 4 units.   For Pre-Meal - 399 give 5 units.   For Pre-Meal -449 give 6 units  For Pre-Meal BG greater than or equal to 450 give 7 units.   To be given with prandial insulin, and based on pre-meal blood glucose.    Notify provider if glucose greater than or equal to 350 mg/dL after administration of correction dose.  If given at mealtime, administer within 30 minutes of start of meal    Admin. Amount: 1-7 Units  Volume: 0.07 mL        1058-Med Discontinued            Freq: 3 TIMES DAILY WITH MEALS Route: SC  Start: 10/26/18 1930   End: 10/29/18 0846   Admin Instructions: DOSE = 1 unit/CARBOHYDRATE UNIT.  1 Carb unit equals 15 grams of carbohydrate (CHO).  Only chart total amount of units given.  Administer ONLY IF patient on IV insulin infusion, either 5 minutes before meal or immediately after meal.    Do NOT give if pre-prandial glucose less than or equal to 60 mg/dL.  If given at mealtime, administer within 30 minutes of start of meal    Last Admin: 10/28/18 1437  Dispense Loc: Contact Rx for dose  Volume: 3 mL      (2058)-Not Given        (0817)-Not Given       (1129)-Not Given       (1730)-Not Given        0808 (3 Units)-Given [C]       1437 (2 Units)-Given       (1924)-Not Given [C]        (0837)-Not Given       0846-Med Discontinued           Dose: 5 Units  Freq: EVERY MORNING BEFORE BREAKFAST Route: SC  Start: 10/28/18 0900   End:  10/29/18 0836   Admin. Amount: 5 Units  Last Admin: 10/28/18 1009  Dispense Loc:  Main Pharmacy  Volume: 0.05 mL        1009 (5 Units)-Given        0836-Med Discontinued                 Dose: 25 mg  Freq: EVERY 12 HOURS Route: PER NG TUBE  Start: 10/27/18 0800   End: 10/29/18 0826   Admin Instructions: Hold if Heart Rate less than 65 beats per minute or Systolic Blood Pressure less than 95 mmHg, bronchospasm, on inotropic continuous infusions, or being paced.  Prescriber should consider discontinuing after 7 days if patient not on beta blocker prior to surgery.    Admin. Amount: 25 mg = 2.5 mL Conc: 10 mg/mL  Dispense Loc: Contact Rx for dose  Volume: 2.5 mL                                     0826-Med Discontinued         0826-Med Discontinued                                      Rate: 10 mL/hr   Freq: CONTINUOUS Route: IV  Last Dose: Stopped (10/28/18 1000)  Start: 10/27/18 0400   End: 10/29/18 0846   Admin Instructions: TKO    Last Admin: 10/28/18 0029  Dispense Loc: [facility] Floor Stock  Volume: 1,000 mL      2000 ( )-New Bag        1800 ( )-Rate/Dose Change        0000 ( )-Rate/Dose Change       0029 ( )-Rate/Dose Verify       1000-Stopped        0846-Med Discontinued      Medications 10/25/18 10/26/18 10/27/18 10/28/18 10/29/18 10/30/18 10/31/18               INTERAGENCY TRANSFER FORM - NOTES (H&P, Discharge Summary, Consults, Procedures, Therapies)   10/26/2018                       [place] [address] SURGICAL SPECIALITIES: [phone]               History & Physicals      H&P by Mehdi Mcintosh PA-C at 10/26/2018 10:37 AM     Author:  Mehdi Mcintosh PA-C Service:  Hospitalist Author Type:  Physician Assistant - EUN    Filed:  10/26/2018 11:47 AM Date of Service:  10/26/2018 10:37 AM Creation Time:  10/26/2018 10:19 AM    Status:  Attested :  Mehdi Mcintosh PA-C (Physician Assistant - C)    Cosigner:  Travis Warner DO at 10/26/2018 12:44 PM        Attestation signed by Travis Warner DO at  10/26/2018 12:44 PM        Physician Attestation   I, Travis Warner, have reviewed and discussed with the advanced practice provider their history, physical and plan for Merlin J Anderson. I did not participate in a shared visit by interviewing or examining the patient and this should be billed as an advanced practice provider only visit.    Travis Warner  Date of Service (when I saw the patient): I did not personally see this patient today.                               [facility]    History and Physical  Hospitalist       Date of Admission:  10/26/2018  Date of Service (when I saw the patient): 10/26/2018    Assessment & Plan   Merlin J Anderson is a 78 year old male with a past medical history of[AF1.1] diabetes mellitus type II, hypertension, dyslipidemia, CKD-III who underwent previously scheduled coronary angiography today with Dr. Fuentes due to abnormal stress echocardiogram. Patient was identified to have severe three-vessel coronary artery disease including severe left main stenosis. Cardiovascular surgery was consulted for evaluation of possible CABG[AF1.2], planning to proceed today[AF1.3].     Coronary artery disease.  Hypertension, dyslipidemia.  Recent diagnosis on the basis of abnormal stress test performed yesterday, 10/25/2018 which was suggestive of multivessel coronary artery disease. He has been experiencing predictable dyspnea on exertion for several months. Underwent outpatient exercise stress echocardiogram, which was abnormal with 2 mm horizontal ST depression in inferolateral leads as well as ST elevation in lead aVR on stress. Additionally, post exercise there was an increase in the size of the left ventricle with decrease in LVSF to 30-35% with associated severe hypokinesia of septal, apical lateral and mid to distal anterior wall. Patient subsequently underwent coronary angiography day of admission which indicated severe three-vessel coronary artery disease  with severe left main stenosis.  - Cardiology[AF1.2] & CV surgery following, management per recommendations  - Pt to OR this morning following balloon pump placement in angio[AF1.3]    CKD stage III.   Baseline creatinine appears to be around 1.5.     Diabetes mellitus type 2.  Prior to admission maintained on metformin. Last hemoglobin A1c was 6.7 on 10/2/2018.  -[AF1.2] Management postoperatively per CV surgery/intensivist[AF1.3]    DVT Prophylaxis:[AF1.1] Pneumatic Compression Devices[AF1.2]  Code Status:[AF1.1] Full Code[AF1.2]    Patient is presently in angio suites receiving balloon pump, then will proceed to OR for CABG. Hospitalist will defer medical management to CV surgery / intensivist, please call when extubated and stable for transfer.[AF1.3]    Mehdi Mcintosh PA-C    Primary Care Physician   Sujit Duke    Chief Complaint[AF1.1]   LUNA[AF1.2]    History of Present Illness   Merlin J Anderson is a 78 year old male with a past medical history of[AF1.1] diabetes mellitus type II, hypertension, dyslipidemia, CKD-III who initially presented to primary care office earlier this month for routine visit was noted to have dyspnea on exertion for several months. He was referred to undergo outpatient exercise stress echocardiogram which was performed on 10/25/2018. Patient did not experience symptoms during exercise, however, was noted to have markedly abnormal imaging suggestive of multivessel coronary artery disease. He was seen as an add-on in cardiology clinic that afternoon and referred for coronary angiography which was performed today. Coronary angiography identified severe three-vessel coronary artery disease including severe left main stenosis. For this, cardiovascular surgery was consulted for evaluation of possible CABG. Patient was recommended to remain in hospital for evaluation. Hospitalist service was contacted for admission.[AF1.2]    Past Medical History[AF1.1]    1. Diabetes mellitus type  2. Last hemoglobin A1c was 6.7 on 10/2/2018.  2. CKD stage III with a baseline creatinine of approximately 1.5.  3. Carotid arterial disease, status post right CEA.  4. Hypertension.  5. Dyslipidemia.[AF1.2]    Past Surgical History[AF1.1]   1. Right carotid endarterectomy.  2. Hernia repair.  3. Lumbar decompression and fusion.  4. Colonoscopy, negative.[AF1.2]    Prior to Admission Medications   Prior to Admission Medications   Prescriptions Last Dose Informant Patient Reported? Taking?   ASPIRIN EC PO 10/26/2018 at Unknown time Self Yes Yes   Sig: Take 81 mg by mouth every morning   Fluticasone Propionate (FLONASE NA) 10/26/2018 at Unknown time Self Yes Yes   Sig: Spray 1-2 sprays in nostril daily as needed   atenolol (TENORMIN) 100 MG tablet 10/25/2018 at Unknown time  No Yes   Sig: Take 1 tablet (100 mg) by mouth every morning   atorvastatin (LIPITOR) 40 MG tablet 10/25/2018 at Unknown time  No Yes   Sig: Take 1 tablet (40 mg) by mouth daily   blood glucose calibration (ACCU-CHEK PRISCILLA) solution   No No   Sig: Use to calibrate blood glucose monitor as needed as directed.   blood glucose monitoring (ACCU-CHEK PRISCILLA) test strip   No No   Sig: Use to test blood sugars 1 times daily or as directed.   blood glucose monitoring (ACCU-CHEK MULTICLIX) lancets   No No   Sig: Use to test blood sugar 1 times daily or as directed.   lisinopril (PRINIVIL) 20 MG tablet 10/25/2018 at Unknown time  No Yes   Sig: Take 1 tablet (20 mg) by mouth daily   metFORMIN (GLUCOPHAGE) 500 MG tablet Past Week at Unknown time  No Yes   Sig: Take 1 tablet (500 mg) by mouth 2 times daily (with meals)   multivitamin, therapeutic with minerals (MULTI-VITAMIN) TABS tablet 10/25/2018 at Unknown time Self Yes Yes   Sig: Take 1 tablet by mouth every morning   nitroGLYcerin (NITROSTAT) 0.4 MG sublingual tablet Unknown at Unknown time  No No   Sig: For chest pain place 1 tablet under the tongue every 5 minutes for 3 doses. If symptoms persist 5  minutes after 1st dose call 911.   tamsulosin (FLOMAX) 0.4 MG capsule 10/26/2018 at Unknown time  No Yes   Sig: Take 1 capsule (0.4 mg) by mouth daily      Facility-Administered Medications: None     Allergies   No Known Allergies    Social History[AF1.1]   Nonsmoker, rare ETOH[AF1.3]    Family History[AF1.1]   Father with diabetes[AF1.3]    Review of Systems   10-point review of systems was performed & is otherwise negative, refer to HPI    Physical Exam   Temp: 96  F (35.6  C) Temp src: Oral BP: 132/67 Pulse: 58 Heart Rate: 62 Resp: 16 SpO2: 95 % O2 Device: None (Room air)    Vital Signs with Ranges  Temp:  [96  F (35.6  C)] 96  F (35.6  C)  Pulse:  [58] 58  Heart Rate:  [62] 62  Resp:  [16-18] 16  BP: (121-136)/(61-72) 132/67  SpO2:  [93 %-98 %] 95 %  212 lbs .02 oz    Data   Data reviewed today:  I personally reviewed[AF1.1] no images or EKG's today[AF1.2].    Recent Labs  Lab 10/26/18  0725   WBC 5.9   HGB 12.2*   MCV 90   *   INR 1.08      POTASSIUM 4.5   CHLORIDE 109   CO2 25   BUN 24   CR 1.46*   ANIONGAP 7   VICKY 9.1   *       No results found for this or any previous visit (from the past 24 hour(s)).[AF1.1]       Revision History        User Key Date/Time User Provider Type Action    > AF1.3 10/26/2018 11:47 AM Mehdi Mcintosh PA-C Physician Assistant - EUN Sign     AF1.2 10/26/2018 10:37 AM Mehdi Mcintosh PA-C Physician Assistant - C      AF1.1 10/26/2018 10:19 AM Mehdi Mcintosh PA-C Physician Assistant - EUN             H&P signed by Lizbet Drake at 10/25/2018  1:52 PM      Author:  Lizbet Drake Service:  (none) Author Type:  Physician    Filed:  10/25/2018  1:52 PM Date of Service:  10/25/2018  1:50 PM Creation Time:  10/25/2018  1:52 PM    Status:  Signed :  Scan, Provider (Physician)     Scan on 10/25/2018  1:52 PM by Scan, Provider : [facility] HISTORY AND PHYSICAL 10/25/2018 1          Revision History        User Key Date/Time User Provider Type Action    > [N/A] 10/25/2018  1:52 PM  Scan, Provider Physician Sign                  Discharge Summaries     No notes of this type exist for this encounter.         Consult Notes      Consults by Dayanara Longoria LICSW at 10/30/2018 10:39 AM     Author:  Dayanara Longoria LICSW Service:  Social Work Author Type:      Filed:  10/30/2018  3:57 PM Date of Service:  10/30/2018 10:39 AM Creation Time:  10/30/2018 10:32 AM    Status:  Addendum :  Dayanara Longoria LICSW ()     Consult Orders:    1. Care Transition RN/SW IP Consult [[id number]] ordered by Agustin Alvarez MD at 10/29/18 0902                Care Transition Initial Assessment - SW  Reason For Consult: discharge planning  Met with: Patient    Active Problems:    S/P CABG (coronary artery bypass graft)       DATA  Lives With: spouse  Living Arrangements: house  Description of Support System: Supportive, Involved  Who is your support system?: Wife, Children  Support Assessment: Adequate family and caregiver support, Adequate social supports.  Quality Of Family Relationships: supportive, involved  Transportation Available: car (family or friend will provide)    ASSESSMENT  Cognitive Status:  awake, alert and oriented    SW has reviewed pt records. Pt is Merlin, a 77yo gentleman who was admitted on 10/26/18 and underwent a CABGx4. It is recommended at this time that pt discharge to a TCU. JUNE met with pt this morning to introduce self/role, perform assessment, and discuss discharge planning. Pt shared that he resides in a house (split level) in [place] with his wife, Fatou. Pt was independent prior to admission with all ADLs and did not require any assistive equipment for mobility. JUNE discussed the recommendation for TCU stay with pt and he verbalized understanding and is agreeable. JUNE inquired about any familiarity with facilities and he requests that referral be sent to [facility]. JUNE will also send to two facilities near  home, [facility] and  [facility], though [facility] first preference. SW sent referrals via DOD. SW to update pt as able, anticipate discharge in 1-2 days per CV surgery.     PLAN  Financial costs for the patient includes: None known at this time.  Patient given options and choices for discharge: Yes.  Patient/family is agreeable to the plan?  Yes  Patient Goals and Preferences: TCU.  Patient anticipates discharging to:  TCU.[CL1.1]      ADDENDUM: SW returned to room later today when wife present to discuss discharge planning. Pt and wife confirmed preference for [facility] with [facility] as a second preference. Pt wife will provide transport. SW discussed insurance coverage and general expectations for TCU. SW to check in tomorrow 10/31, wife usually at [facility] around noon and therefore if pt ready for discharge will plan for discharge around this time.[CL1.2]    ESE Silver LICSW  Daytime (8:00am-4:30pm): [phone]  After-Hours  Pager (4:30pm-11:30pm): [phone][CL1.1]                Revision History        User Key Date/Time User Provider Type Action    > CL1.2 10/30/2018  3:57 PM Dayanara Longoria LICSW  Addend     CL1.1 10/30/2018 10:39 AM Dayanara Longoria LICSW  Sign            Consults by Maria Ines Grossman RD, LD at 10/27/2018  9:25 AM     Author:  Maria Ines Grossman RD, LD Service:  Nutrition Author Type:  Registered Dietitian    Filed:  10/27/2018  9:25 AM Date of Service:  10/27/2018  9:25 AM Creation Time:  10/27/2018  9:25 AM    Status:  Signed :  Maria Ines Grossman RD, LD (Registered Dietitian)     Consult Orders:    1. Nutrition Services Adult IP Consult [[id number]] ordered by Chelsi Alexandra PA-C at 10/26/18 1911                 CARDIAC SURGERY NUTRITION CONSULT    Received standing order to assess and educate patient.    Will follow and complete assessment once patient is extubated and/or is transferred to medical unit.    Patient will receive nutrition education during  the Outpatient Cardiac Rehab Program (nutrition classes/dietitian counseling).    Maria Ines Grossman RD, LD, [facility]   Clinical Dietitian - [facility][KK1.1]          Revision History        User Key Date/Time User Provider Type Action    > KK1.1 10/27/2018  9:25 AM Maria Ines Grossman RD, JACK Registered Dietitian Sign            Consults by Huber Quinteros MD at 10/26/2018  8:58 PM     Author:  Huber Quinteros MD Service:  General Medicine Author Type:  Physician    Filed:  10/26/2018  8:58 PM Date of Service:  10/26/2018  8:58 PM Creation Time:  10/26/2018  8:26 PM    Status:  Signed :  Huber Quinteros MD (Physician)     Consult Orders:    1. Intensivist IP Consult: Patient to be seen: Routine - within 24 hours; Ventilator Management; Consultant may enter orders: Yes [[id number]] ordered by Chelsi Alexandra PA-C at 10/26/18 1911                Critical Care  Note      10/26/2018    Name: Merlin J Anderson MRN#: [id number]   Age: 78 year old YOB: 1940     Hsptl Day# 0  ICU DAY #0    MV DAY #0             Problem List:   Active Problems:    S/P CABG (coronary artery bypass graft)  critical L main stenosis with stable angina  ckd 3         Summary/Hospital Course:     78M who had an outpatient evaluation for dyspnea with exertion and ultimately was found to have severe L main stenosis and 3 vessel disease on cath.  Had balloon pump placed and went for cab4 today.  Now to ICU for post-op cares.      Assessment and plan :     Merlin J Anderson IS a 78 year old male admitted on 10/26/2018 for cardiogenic shock in setting of severe coronary artery disease.     My assessment and plan by system for this patient is as follows:    Neurology/Psychiatry:   1. Analgesia -- tylenol, prn narcotics  2.  Anxiety -- wean sedative drips to extubate.  No acute anxiety needs at this time.    Cardiovascular:   1. Shock, post-procedure, likely mixed vasoplegic and cardiogenic -  Likely some degree of  residual cardiogenic shock given the degree of CAD.  bp stable/acceptable on balloon pump at 1:1.  Will keep at 1:1 tonight and wean in AM.  Most likely also an element of vasoplegia which would be expected after a procedure of this magnitude. Continue phenylephrine and epinephrine prn.  2.  S/p CAB -- ASA    Pulmonary/Ventilator Management:   1. Airway -- intubated post-op.  Weaning to extubate once balloon pump out.  2. No underlying pulmonary problems on medical history or cxr.     GI and Nutrition :   1. Diet when cleared by CV surgery   2. Acid suppression for PUD prophy    Renal/Fluids/Electrolytes:   1. Monitor UOP/creatinine post-op.  2. Replete electrolytes prn  3. IVF and albumin administration for volume prn per protocol    Infectious Disease:   1. Prophylactic abx post-op per CV surgery    Endocrine:   1. Monitor for stress induced hyperglycemia. ICU insulin protocol, goal sugar <180     Hematology/Oncology:   1. Hgb/pltlts stable post op.  pRBC for goal hgb 8.0 or as indicated by CV surgery     IV/Access:   1. Venous access - Central access from OR  2. Arterial access - remove A-line when off vasoactives and extubated    ICU Prophylaxis:   1. DVT: Hep Subq/mechanical  2. VAP: HOB 30 degrees, chlorhexidine rinse  3. Stress Ulcer: PPI  4. Restraints: Nonviolent soft two point restraints required and necessary for patient safety and continued cares and good effect as patient continues to pull at necessary lines, tubes despite education and distraction. Will readdress daily.   5. IV Access - central access required and necessary for continued patient cares  6. Disposition - ICU          Medical History:     Past Medical History:   Diagnosis Date     Diabetes mellitus (H)     Pre diabetic     Essential hypertension, benign      Past Surgical History:   Procedure Laterality Date     C NONSPECIFIC PROCEDURE  04/97    Neg. colonoscopy.     C NONSPECIFIC PROCEDURE      S/P ing. hernia.     COLONOSCOPY        DECOMPRESSION, FUSION LUMBAR POSTERIOR ONE LEVEL, COMBINED  8/24/2012    Procedure: COMBINED DECOMPRESSION, FUSION LUMBAR POSTERIOR ONE LEVEL;  Posterior Fusion wtih Decompression L4-5;  Surgeon: Rod Carbajal MD;  Location: [facility] OR     ENDARTERECTOMY CAROTID Right 9/18/2017    Procedure: ENDARTERECTOMY CAROTID;  RIGHT CAROTID ENDARTERECTOMY, WITH PATCH ANGIOPLASTY, WITH ELECTOENCEPHALOGAM            (EEG);  Surgeon: Catalino Scott MD;  Location: [facility] OR     HERNIA REPAIR       Social History     Social History     Marital status:      Spouse name: N/A     Number of children: N/A     Years of education: N/A     Occupational History     Not on file.     Social History Main Topics     Smoking status: Never Smoker     Smokeless tobacco: Never Used     Alcohol use Yes      Comment: 1 monthly or less.     Drug use: No     Sexual activity: Yes     Partners: Female     Other Topics Concern     Caffeine Concern No     Occupational Exposure No     Hobby Hazards No     Sleep Concern No     Stress Concern No     Weight Concern No     Special Diet No     Back Care No     Exercise Yes     bikes, treadmill      Seat Belt Yes     Social History Narrative      No Known Allergies           Key Medications:       acetaminophen  975 mg Oral Q8H     [START ON 10/27/2018] aspirin  325 mg Oral Daily     [START ON 10/27/2018] ceFAZolin  2 g Intravenous Q8H     [START ON 10/27/2018] heparin  5,000 Units Subcutaneous Q8H     insulin aspart   Subcutaneous TID w/meals     [START ON 10/27/2018] lidocaine  1 patch Transdermal Q24H     [START ON 10/27/2018] lidocaine   Transdermal Q8H     [START ON 10/27/2018] lidocaine   Transdermal Q24h     [START ON 10/27/2018] metoprolol tartrate  25 mg Oral Q12H    Or     [START ON 10/27/2018] metoprolol  25 mg Per NG tube Q12H     mupirocin  0.5 g Both Nostrils BID     pantoprazole (PROTONIX) IV  40 mg Intravenous Daily     sodium chloride (PF)  3 mL Intracatheter Q8H       aminocaproic  acid (AMICAR) infusion ADULT 1 g/hr (10/26/18 1915)     dextrose 5% and 0.45% NaCl + KCl 20 mEq/L 30 mL/hr at 10/26/18 2003     EPINEPHrine IV infusion ADULT Stopped (10/26/18 1950)     norepinephrine 0.03 mcg/kg/min (10/26/18 2015)     phenylephrine IV infusion ADULT Stopped (10/26/18 2001)        Home Meds    No current facility-administered medications on file prior to encounter.   Current Outpatient Prescriptions on File Prior to Encounter:  ASPIRIN EC PO Take 81 mg by mouth every morning   atenolol (TENORMIN) 100 MG tablet Take 1 tablet (100 mg) by mouth every morning   atorvastatin (LIPITOR) 40 MG tablet Take 1 tablet (40 mg) by mouth daily   Fluticasone Propionate (FLONASE NA) Spray 1-2 sprays in nostril daily as needed   lisinopril (PRINIVIL) 20 MG tablet Take 1 tablet (20 mg) by mouth daily   metFORMIN (GLUCOPHAGE) 500 MG tablet Take 1 tablet (500 mg) by mouth 2 times daily (with meals)   multivitamin, therapeutic with minerals (MULTI-VITAMIN) TABS tablet Take 1 tablet by mouth every morning   tamsulosin (FLOMAX) 0.4 MG capsule Take 1 capsule (0.4 mg) by mouth daily   blood glucose calibration (ACCU-CHEK PRISCILLA) solution Use to calibrate blood glucose monitor as needed as directed.   blood glucose monitoring (ACCU-CHEK PRISCILLA) test strip Use to test blood sugars 1 times daily or as directed.   blood glucose monitoring (ACCU-CHEK MULTICLIX) lancets Use to test blood sugar 1 times daily or as directed.   nitroGLYcerin (NITROSTAT) 0.4 MG sublingual tablet For chest pain place 1 tablet under the tongue every 5 minutes for 3 doses. If symptoms persist 5 minutes after 1st dose call 911.              Physical Examination:   Temp:  [96  F (35.6  C)] 96  F (35.6  C)  Pulse:  [58] 58  Heart Rate:  [62] 62  Resp:  [16-18] 16  BP: (121-140)/() 123/76  FiO2 (%):  [40 %] 40 %  SpO2:  [93 %-100 %] 100 %    Intake/Output Summary (Last 24 hours) at 10/26/18 2026  Last data filed at 10/26/18 1912   Gross per 24 hour    Intake             5224 ml   Output              350 ml   Net             4874 ml     Wt Readings from Last 4 Encounters:   10/26/18 96.2 kg (212 lb)   10/02/18 96.2 kg (212 lb)   05/18/18 98 kg (216 lb)   04/04/18 96.9 kg (213 lb 11.2 oz)     BP - Mean:  [] 109  Ventilation Mode: CMV/AC  (Continuous Mandatory Ventilation/ Assist Control)  FiO2 (%): 40 %  Rate Set (breaths/minute): 16 breaths/min  Tidal Volume Set (mL): 650 mL  PEEP (cm H2O): 5 cmH2O  Oxygen Concentration (%): 40 %  Resp: 16    Recent Labs  Lab 10/26/18  1309   PH 7.38   PCO2 41   PO2 481*   HCO3 24       GEN: no acute distress, intubated/sedated  HEENT: head ncat, sclera anicteric, OP patent, trachea midline, PERRL  PULM: unlabored synchronous with vent, clear anteriorly    CV/COR: RRR S1S2 no gallop,  No rub, no murmur.  Midline incision dressed, c/d/i.  ABD: soft nontender, hypoactive bowel sounds, no mass  EXT:   No Edema,   Warm x4  NEURO: sedated but no gross deficit apparent  SKIN: no obvious rash  LINES: clean, dry intact           Data:   All data and imaging reviewed     ROUTINE ICU LABS (Last four results)  CMP  Recent Labs  Lab 10/26/18  1755 10/26/18  1309 10/26/18  0725    140 141   POTASSIUM 4.8 4.4 4.5   CHLORIDE 112*  --  109   CO2 19*  --  25   ANIONGAP 12  --  7   *  --  142*   BUN 21  --  24   CR 1.25  --  1.46*   GFRESTIMATED 56*  --  47*   GFRESTBLACK 68  --  56*   VICKY 7.6*  --  9.1   MAG 2.2  --   --    PROTTOTAL 4.2*  --  6.5*   ALBUMIN 2.5*  --  3.6   BILITOTAL 0.8  --  0.7   ALKPHOS 48  --  79   AST 53*  --  22   ALT 21  --  31     CBC  Recent Labs  Lab 10/26/18  1755 10/26/18  1309 10/26/18  0725   WBC 9.3  --  5.9   RBC 2.72*  --  3.97*   HGB 8.4* 9.9* 12.2*   HCT 25.1*  --  35.8*   MCV 92  --  90   MCH 30.9  --  30.7   MCHC 33.5  --  34.1   RDW 13.2  --  12.8   PLT 91*  --  135*     INR  Recent Labs  Lab 10/26/18  1755 10/26/18  0725   INR 1.67* 1.08     Arterial Blood Gas  Recent Labs  Lab  10/26/18  1309   PH 7.38   PCO2 41   PO2 481*   HCO3 24       All cultures:  No results for input(s): CULT in the last 168 hours.  Recent Results (from the past 24 hour(s))   US Carotid Bilateral    Narrative    PROCEDURE:  Bilateral carotid doppler ultrasound    DATE OF PROCEDURE:   10/26/2018 11:01 AM    CLINICAL HISTORY/INDICATION:    78-year-old male preoperative CABG    COMPARISON:  8/17/2017    TECHNIQUE:  Grayscale, color-flow and spectral waveform analysis with velocities  were performed of the bilateral carotid arteries.     FINDINGS:    Right:   Plaque: No significant plaques    Right ICA Proximal PSV/EDV:  156/56  cm/sec.  Right ICA Mid PSV/EDV:  198/58 cm/sec.  Right ICA Dist PSV/EDV:  134/44 cm/sec.  Right ICA/CCA PSV Ratio:  2.1    These indicate  50 - 69% diameter stenosis of the right ICA.    Right Vertebral: antegrade flow  Right ECA: antegrade flow    Left:   Plaque: There is scattered hyperechoic plaque about the bifurcation    Left ICA Proximal PSV/EDV: 307/93  cm/sec.  Left ICA Mid PSV/EDV:  256/88 cm/sec.  Left ICA Dist PSV/EDV: 172/43 cm/sec.  Left ICA/CCA PSV Ratio:  2.4    These indicate greater than 70% diameter stenosis of the left ICA.    Left Vertebral: antegrade flow  Left ECA: antegrade flow      Impression    IMPRESSION:    1. 50-69% stenosis of the right internal carotid artery relative to  the normal diameter internal carotid artery.  2. 70% or greater stenosis of the left internal carotid artery  relative to the normal diameter internal carotid artery. Worsened from  prior examination.    Degree of stenosis measured relative to the diameter of the normal  internal carotid artery per NASCET or NASCET type criteria    <50% stenosis  -ICA PSV <125 cm/sec and plaque or intimal thickening is visible  sonographically.   -ICA/CCA PSV ratio <2.0 and ICA EDV < 40 cm/sec    50-69% stenosis  -ICA -230 cm/sec and plaque visible sonographically  -ICA/CCA PSC ratio 2.0-4.0 and ICA EDV of   cm/sec    70% or greater stenosis  -ICA PSV is >230 cm/sec and visible plaque and luminal narrowing are  seen at grayscale and color doppler  -ICA/CCA PSV ratio >4 and ICA EDV >100 cm/sec    JEFF MCKEON MD   US Lower Extremity Venous Mapping Bilateral    Narrative    PROCEDURE:  Ultrasound mapping bilateral lower extremities    DATE OF PROCEDURE:  10/26/2018 11:02 AM     CLINICAL HISTORY/INDICATION:   Pre CABG;     FINDINGS/    Impression    IMPRESSION:   1. Right greater saphenous vein in the thigh ranges from 7.7 mm to 4.1  mm.  2. Right greater saphenous vein at and below the knee ranges from 4.2  mm to 2.9 mm.  3. Left greater saphenous vein in the thigh ranges from 6.8 mm to 2.9  mm.  4. Left greater saphenous vein at and below the knee ranges from 4.3  mm to 2.9 mm.    JEFF MCKEON MD   XR Chest Port 1 View    Narrative    CHEST ONE VIEW PORTABLE   10/26/2018 7:39 PM     HISTORY: Postoperative CVTS surgery.    COMPARISON: None.      Impression    IMPRESSION: The tip of the endotracheal tube is 4.4 cm above the  aravind. The tip of the right internal jugular pulmonary arterial  catheter is projected over the most proximal portion of the right  pulmonary artery. There are median sternotomy wires and clips related  to coronary artery bypass. A mediastinal drain is noted. No  pneumothorax or significant pleural effusion. Retrocardiac airspace  opacity may represent atelectasis.    DEVAN HYATT MD     Labs (personally reviewed) 17:55 :   Lytes ok.  creat downtrending 1.25.  hgb with expected drop 9.9->8.4 appropriate to surgery.  pltls acceptable 91.      CXR (personally reviewed/interpreted):  Exeter and ETT in acceptable postion.  Lungs clear.    EKG (personally reviewed/interpreted):  Sinus 73.  Mild pr prolongation 278, other intervals ok.  Borderline R axis.  Tw inversions in lateral and inferior leads.    Billing: This patient is critically ill: Yes. Total critical care time today 45  min.[DW1.1]                            Revision History        User Key Date/Time User Provider Type Action    > DW1.1 10/26/2018  8:58 PM Huber Quinteros MD Physician Sign                     Progress Notes - Physician (Notes for yesterday and today)      Progress Notes signed by Davi Rebolledo MD at 10/30/2018  3:40 PM      Author:  Davi Rebolledo MD Service:  Cardiology Author Type:  Physician    Filed:  10/30/2018  3:40 PM Encounter Date:  10/25/2018 Status:  Signed    :  Davi Rebolledo MD (Physician)           Service Date: 10/25/2018      CARDIOLOGY OFFICE VISIT       REASON FOR CARDIOLOGY CLINIC VISIT:  Abnormal stress test.      HISTORY OF PRESENT ILLNESS:  Mr. Warner is a very pleasant 78-year-old gentleman with history of diabetes, hypertension, dyslipidemia, chronic kidney disease stage III with baseline creatinine around 1.5, history of right carotid artery endarterectomy last year who came here for an outpatient stress echocardiogram.  This was ordered by Dr. Duke, his primary care physician.  The patient was seen in the beginning of this month and he was complaining of longstanding dyspnea on exertion.  This prompted an exercise stress echocardiogram today.  He exercised for 3 minutes 10 seconds.  The exercise was stopped because of dyspnea.  The patient achieved 74% of the maximum predicted heart rate.  He did not have any chest pain.  EKG showed 2 mm horizontal ST depression in inferolateral leads with ST elevation in lead AVR.  On echocardiographic portion, the LV function and wall motion were normal at rest but post exercise, there was increase in the size of LV and decrease in the LV systolic function to around 30%-35% and there was severe hypokinesia of septal, apical lateral and tda-nw-ynosja anterior wall.  I am seeing the patient today as an urgent add on.  The patient is accompanied by his wife.  He tells me that he has been experiencing dyspnea on exertion for a long time,  several months, if not more than a year.  Fortunately, the symptoms are stable.  He tells me that he gets predictable dyspnea after riding a bike for a mile or raking leaves for about 15 minutes.  With normal physical day-to-day activities like simply walking at home, climbing stairs at home or simple vacuuming at home, he feels well.  No chest discomfort at rest or with physical activity.  No dizziness, presyncope or syncope.  He denies any bleeding issues.  Presently, he is on baby aspirin.  He is on atenolol 100 mg daily.  He is on lisinopril 20 mg daily.  He is on Lipitor 40 mg daily.  His latest creatinine was 1.43.  His latest LDL is 33.  Hemoglobin A1c is 6.7.  No bleeding issues, no anticipated surgeries.      PHYSICAL EXAMINATION:   VITAL SIGNS:  Blood pressure 120/60, heart rate 64.   GENERAL:  The patient appears pleasant, comfortable.   NECK:  Right carotid endarterectomy scar seen.  No bruit heard.  JVP appears normal.   CARDIOVASCULAR SYSTEM:  S1, S2 normal, no murmur, rub or gallop.   RESPIRATORY SYSTEM:  Clear to auscultation bilaterally.   GASTROINTESTINAL SYSTEM:  Abdomen soft, nontender.   EXTREMITIES:  No pitting pedal edema.   NEUROLOGICAL:  Alert, oriented x3.   PSYCHIATRIC:  Normal affect.   SKIN:  No obvious rash.   HEENT:  No pallor or icterus.      RADIOLOGIC STUDIES:  Stress echocardiogram results as noted above, personally reviewed.      IMPRESSION AND PLAN:  A very pleasant 78-year-old gentleman with history of hypertension, diabetes, dyslipidemia, chronic kidney disease stage III who has been having exertional shortness of breath for several months, probably a year, fortunately stable and nature as noted above.   Today, exercise stress echocardiogram is highly concerning for multivessel coronary artery disease.  I had a long discussion with patient and his wife regarding the nature of his symptoms and abnormal stress echocardiogram.  Images were also reviewed with the patient and his  wife.  Fortunately, he is on appropriate CAD medical regimen therapy of aspirin, high-intensity statin, beta blocker and ACE inhibitor.  LDL and blood pressure are both well controlled.  We discussed the option of coronary angiogram, the rationale for coronary angiogram, the risks involved which include but are not limited to risk of stroke, MI, death, need for PRBC transfusion, slightly higher than average risk of further renal dysfunction, need for emergent bypass surgery.  The patient understands the rationale for coronary angiogram, the risks involved and the alternative and wishes to proceed with coronary angiogram.  We will try to get to this coronary angiogram done soon, probably by tomorrow.  Fortunately, no resting symptoms and as noted above, the symptoms have been stable for several months.  I have also given him a prescription for sublingual nitroglycerin in the rare case he gets any resting chest discomfort.  If he gets resting symptoms or resting chest discomfort especially, he should call 911.  I refrained him from doing any strenuous physical activity but he can continue light physical activity level for which he does not have any symptoms at this time.  I recommend pre-coronary angiogram IV hydration for renal protection.  If needed, he will be a reasonable candidate for long-term dual antiplatelet therapy.  I also discussed with the patient and his wife there is a high chance that we will find multivessel coronary artery disease and this may require CABG and review with CV Surgery.   1.  Newly diagnosed CAD on the basis of abnormal stress test which is suggestive of multivessel coronary disease.   2.  Symptoms of dyspnea on exertion, fortunately stable which is his anginal equivalent.  No chest discomfort.   3.  CAD risk factors of hypertension, diabetes, dyslipidemia which also seem well controlled.   4.  Chronic kidney disease, stage III.  Creatinine appears baseline around 1.5.       RECOMMENDATIONS:     1.  Coronary angiogram with possible revascularization.   2.  Continue aspirin, statin, beta blocker, ACE inhibitor.   3.  Sublingual nitroglycerin prescribed, use as discussed.  The patient is not taking any Cialis and Viagra and I discussed that patient should not combine any nitroglycerin with any of these medications.   4.  No strenuous physical exercise until coronary angiogram is done.      Seventy minutes of total time was spent with more than 50% in counseling and coordination of care.         VICTORIANO NUNES MD             D: 10/25/2018   T: 10/25/2018   MT: TEO      Name:     ANDERSON, MERLIN   MRN:      [id number]        Account:      [id number]   :      1940           Service Date: 10/25/2018      Document: [id number][SV1.1]         Revision History        User Key Date/Time User Provider Type Action    > SV1.1 10/30/2018  3:40 PM Victoriano Nunes MD Physician Sign     [N/A] 10/25/2018 12:35 PM Victoriano Nunes MD Physician Edit            Progress Notes by Seth Olivarez PA-C at 10/30/2018  8:35 AM     Author:  Seth Olivarez PA-C Service:  Thoracic Surgery Author Type:  Physician Assistant - C    Filed:  10/30/2018 10:28 AM Date of Service:  10/30/2018  8:35 AM Creation Time:  10/30/2018  8:35 AM    Status:  Attested Addendum :  Seth Olivarez PA-C (Physician Assistant - C)    Cosigner:  Jass Britton MD at 10/30/2018 12:56 PM        Attestation signed by Jass Britton MD at 10/30/2018 12:56 PM        Physician Attestation   Patient seen and examined, I agree with the information in this note. Assessment and plan discussed.    Jass Britton                               CT Surgery Progress Note    Severe L main, multivessel coronary artery disease, and stable angina s/p CABG x 4 POD #4    Subjective:  States that pain is being controlled. Denies any hallucinations. Breathing feels shallow. Working with IS and flutter. Appetite is ok.  "Denies any nausea. +BM, denies any popping/clicking in his breast bone, needs to work with rehab, was able to get some sleep, lives with wife, staff still using lift to get pt out of bed    Objective:  Lying in bed, comfortable, -O2  /66 (BP Location: Right arm)  Pulse 93  Temp 99.3  F (37.4  C) (Oral)  Resp 16  Ht 1.829 m (6' 0.01\")  Wt 100.3 kg (221 lb 1.9 oz)  SpO2 92%  BMI 29.98 kg/m2  CT removed  CV - RRR, telemetry SR 90s, +edema LE  Pulm - decreased breath sounds throughout, -1000 ml  Abd - BS+, NT/ND  Derm - sternal incision D/I   L LE incisions D/I  Lab - WBC 5.3   Hgb/Hct 7.7/22.5 (8.9/26.5)   Plt 106   Na 139   K 3.9   Cr/BUN 1.44/35 (1.65/32)   Glucose 142-189  +baldwin    A/P:  1. S/p CABG x 4 POD #4  2. HTN - lopressor 25 mg bid, lisinopril 2.5 mg daily, add lasix 20 mg daily  3. Hyperlipidemia - atorvastatin 40 mg   4. DM type II - metformin 500 mg bid, sliding scale insulin  5. CKD stage 3 - baseline Cr 1.4-1.5, monitor, back to baseline  6. Deconditioned - therapies recommending TCU  7. BPH - flomax 0.4 mg  8. Acute blood loss anemia - will d/w Dr Britton, likely plan to give 1 unit of PRBC  Encouraged IS/TCDB/amb. Sternal precautions. Remove baldwin. Looking at possible discharge to TCU 1-2 days. Continue to monitor.    Seth Olivarez PA-C  [phone][DC1.1]    Addendum: Seen with CT surgery team. Will hold on PRBC. Will diurese and recheck labs in am tomorrow. Continue to monitor.[DC1.2]      Revision History        User Key Date/Time User Provider Type Action    > [N/A] 10/30/2018 10:28 AM Seth Olivarez PA-C Physician Assistant - C Addend     DC1.2 10/30/2018 10:28 AM Seth Olivarez PA-C Physician Assistant - EUN Addend     DC1.1 10/30/2018  8:41 AM Seth Olivarez PA-C Physician Assistant - EUN Sign            Progress Notes by Unruly Rizo DO at 10/30/2018  8:56 AM     Author:  Unruly Rizo DO Service:  Hospitalist Author Type:  Physician    Filed:  " 10/30/2018 11:21 AM Date of Service:  10/30/2018  8:56 AM Creation Time:  10/30/2018  8:56 AM    Status:  Signed :  Unruly Rizo DO (Physician)         [facility]    Hospitalist Progress Note    Assessment & Plan[JH1.1]   Merlin J Anderson is a 78 year old male with past medical history significant for DM type II, essential hypertension, hyperlipidemia, chronic kidney disease and carotid artery disease, status post right carotid endarterectomy who was evaluated in cardiology clinic and sent in for CABG evaluation.  Patient subsequently went straight to the OR and we were consulted for medical management      Coronary artery disease, status post CABG POD #3  HTN/HLP   H/o Carotid artery disease s/p R Carotid endarterectomy  Patient was evaluated in cardiology clinic for evaluation of progressive dyspnea on exertion.  He underwent stress test which was abnormal.  He was subsequently referred for coronary angiogram as an outpatient.  Patient underwent coronary angiogram on October 26 which demonstrated severe three-vessel coronary artery disease including severe distal left main stenosis.  Patient was admitted for further evaluation and management and was placed on balloon pump.  Patient underwent coronary artery bypass grafting x4 with left leg endoscopic vein harvesting  On 10/26/18.  Patient was extubated on 10/27/18.   Cardiovascular surgery is following closely.  Internal medicine is consulted for medical comanagement.  - Defer post-op management to surgery   - Cardiology also following   -  mg  - Lipitor 40 mg   - Lisinopril 2.5 mg   - Lopressor 25 mg BID      KOLTON on CKD Stage III. Resolved   Baseline Cr appears to be 1.4-1.6.  Had increase to 1.9 on 10/28/18 but improved to 1.65 today   - Continue to monitor       DM type II   Patient takes metformin 500 mg p.o. twice daily at home.  Initially patient was on insulin infusion post CABG.  Last HgbA1c from 10/28 was 6.1.  -  PTA Metformin   - SSI   - Continue carb coverage 1 unit of NovoLog for 15 g of carb.      History of benign prostate hyperplasia  - Continue patient on Flomax 0.4 mg p.o. daily      Allergic rhinitis  - PTA Flonase[JH1.2]    DVT Prophylaxis:[JH1.1] Heparin SQ[JH1.2]  Code Status: Full Code    Disposition: Expected discharge[JH1.1] per surgical service.  Possibly TCU[JH1.2]    Unruly Rizo DO  Text Page (7am to 6pm)    Interval History   Patient seen and examined.[JH1.1]  Patient's pain is currently well controlled.  No difficulty breathing currently.  Tolerating diet.[JH1.2]     -Data reviewed today: I reviewed all new labs and imaging results over the last 24 hours. I personally reviewed[JH1.1] no images or EKG's today[JH1.2].    Physical Exam   Temp: 99.3  F (37.4  C) Temp src: Oral BP: 132/66 Pulse: 93 Heart Rate: 94 Resp: 16 SpO2: 92 % O2 Device: None (Room air)    Vitals:    10/28/18 0800 10/29/18 0600 10/30/18 0659   Weight: 100.8 kg (222 lb 3.6 oz) 99.9 kg (220 lb 3.2 oz) 100.3 kg (221 lb 1.9 oz)     Vital Signs with Ranges  Temp:  [98.4  F (36.9  C)-99.9  F (37.7  C)] 99.3  F (37.4  C)  Pulse:  [78-96] 93  Heart Rate:  [82-94] 94  Resp:  [16-20] 16  BP: (107-154)/(55-76) 132/66  SpO2:  [92 %-96 %] 92 %  I/O last 3 completed shifts:  In: 1250 [P.O.:1250]  Out: 1025 [Urine:1025]    Constitutional: Awake, alert, cooperative, no apparent distress  Respiratory: Clear to auscultation bilaterally, no crackles or wheezing  Cardiovascular: Regular rate and rhythm, normal S1 and S2, and no murmur noted  GI: Normal bowel sounds, soft, non-distended, non-tender  Skin/Integumen: Midline sternotomy incision healing well with no surrounding erythema.  No rashes, no cyanosis  MSK: No edema     Medications       aspirin  325 mg Oral Daily     atorvastatin  40 mg Oral Daily     furosemide  20 mg Oral Daily     heparin  5,000 Units Subcutaneous Q8H     insulin aspart  1-7 Units Subcutaneous TID AC     insulin aspart  1-5  Units Subcutaneous At Bedtime     lidocaine  1 patch Transdermal Q24H     lidocaine   Transdermal Q8H     lidocaine   Transdermal Q24h     lisinopril  2.5 mg Oral Daily     metFORMIN  500 mg Oral BID w/meals     metoprolol tartrate  25 mg Oral Q12H     pantoprazole  40 mg Oral QAM AC     sodium chloride (PF)  3 mL Intracatheter Q8H     tamsulosin  0.4 mg Oral Daily       Data     Recent Labs  Lab 10/30/18  0735 10/29/18  0738 10/28/18  1242 10/28/18  0405  10/26/18  2045  10/26/18  1755  10/26/18  0746   WBC 5.3 6.1  --  6.7  < > 6.3  --  9.3  --  5.9   HGB 7.7* 8.9*  --  8.4*  < > 6.9*  < > 8.4*  < > 12.2*   MCV 90 92  --  91  < > 91  --  92  --  90   * 96*  --  96*  < > 81*  --  91*  --  135*   INR  --   --   --   --   --  1.76*  --  1.67*  --  1.08    141 141 141  < > 143  < > 143  < > 141   POTASSIUM 3.9 4.2 4.5 4.6  < > 4.6  < > 4.8  < > 4.5   CHLORIDE 109 110* 111* 111*  < > 113*  --  112*  --  109   CO2 25 26 23 23  < > 19*  --  19*  --  25   BUN 35* 32* 29 30  < > 21  --  21  --  24   CR 1.44* 1.65* 1.84* 1.90*  < > 1.40*  --  1.25  --  1.46*   ANIONGAP 5 5 7 7  < > 11  --  12  --  7   VICKY 8.0* 8.4* 7.9* 7.4*  < > 7.2*  --  7.6*  --  9.1   * 149* 166* 139*  < > 159*  --  213*  --  142*   ALBUMIN  --   --   --   --   --   --   --  2.5*  --  3.6   PROTTOTAL  --   --   --   --   --   --   --  4.2*  --  6.5*   BILITOTAL  --   --   --   --   --   --   --  0.8  --  0.7   ALKPHOS  --   --   --   --   --   --   --  48  --  79   ALT  --   --   --   --   --   --   --  21  --  31   AST  --   --   --   --   --   --   --  53*  --  22   < > = values in this interval not displayed.    Imaging:   No results found for this or any previous visit (from the past 24 hour(s)).[JH1.1]      Revision History        User Key Date/Time User Provider Type Action    > JH1.2 10/30/2018 11:21 AM Unruly Rizo, DO Physician Sign     JH1.1 10/30/2018  8:56 AM Unruly Rizo DO Physician                    Procedure Notes     No notes of this type exist for this encounter.         Progress Notes - Therapies (Notes from 10/28/18 through 10/31/18)      Progress Notes by Caro Bowen PT at 10/28/2018  5:42 PM     Author:  Caro Bowen PT Service:  (none) Author Type:  Physical Therapist    Filed:  10/28/2018  5:42 PM Date of Service:  10/28/2018  5:42 PM Creation Time:  10/28/2018  5:42 PM    Status:  Signed :  Caro Bowen PT (Physical Therapist)          10/28/18 1415   Quick Adds   Type of Visit Initial PT Evaluation       Present no   Living Environment   Lives With spouse   Living Arrangements house   Home Accessibility bed and bath on same level   Number of Stairs to Enter Home 7   Number of Stairs Within Home 12   Stair Railings at Home inside, present at both sides   Transportation Available car  (family or friend will provide)   Living Environment Comment wife is available to assist as needed   Self-Care   Dominant Hand right   Usual Activity Tolerance good   Current Activity Tolerance fair   Regular Exercise yes   Activity/Exercise Type biking;walking   Exercise Amount/Frequency 5-7 times/wk   Equipment Currently Used at Home none   Functional Level Prior   Ambulation 0-->independent   Transferring 0-->independent   Toileting 0-->independent   Bathing 0-->independent   Dressing 0-->independent   Eating 0-->independent   Communication 0-->understands/communicates without difficulty   Fall history within last six months yes   Number of times patient has fallen within last six months 1   Which of the above functional risks had a recent onset or change? ambulation;transferring;toileting;bathing;dressing   General Information   Onset of Illness/Injury or Date of Surgery - Date 10/26/18   Referring Physician [place]   Patient/Family Goals Statement Patient would like to go home   Pertinent History of Current Problem (include personal factors  and/or comorbidities that impact the POC) status post CABG X4   Precautions/Limitations fall precautions;sternal precautions   Cognitive Status Examination   Orientation person   Level of Consciousness alert   Follows Commands and Answers Questions 50% of the time   Personal Safety and Judgment impaired   Pain Assessment   Patient Currently in Pain Yes, see Vital Sign flowsheet  (Patient in pain at incision site and shoulders)   Integumentary/Edema   Integumentary/Edema Comments bilateral LE edema   Posture    Posture Forward head position   Range of Motion (ROM)   ROM Comment ROM within functional limits and sternal precautions.    Strength   Strength Comments based on observation pt has global strength of  2+/5. functional activity tolerance is impaired.   Bed Mobility   Bed Mobility Comments sup>sit max assist of 2. bridging, rolling and scooting on bed total assist.    Transfer Skills   Transfer Comments bed to chair transfer via lift   Balance   Balance Comments sitting balance mod assist. Pt needed verbal cues to maintain midline and not anterior lean   Coordination   Coordination Comments slow to process commands   General Therapy Interventions   Planned Therapy Interventions balance training;bed mobility training;gait training;neuromuscular re-education;ROM;strengthening;stretching;transfer training;progressive activity/exercise   Clinical Impression   Criteria for Skilled Therapeutic Intervention yes, treatment indicated   PT Diagnosis Decreased functional activity tolerance    Influenced by the following impairments weakness, decreased balance, slow processing, sternal precautions and difficulty recalling them   Functional limitations due to impairments limited in transfers, ambulation and bed mobility   Clinical Presentation Stable/Uncomplicated   Clinical Presentation Rationale Pt has multiple comorbidities. Pt has a supportive spouse at home to assist, not at baseline with mobility, stairs in home  "  Clinical Decision Making (Complexity) Low complexity   Therapy Frequency` daily   Predicted Duration of Therapy Intervention (days/wks) 5 days   Anticipated Discharge Disposition Transitional Care Facility   Risk & Benefits of therapy have been explained Yes   Patient, Family & other staff in agreement with plan of care Yes   [facility] AM-PAC  \"6 Clicks\" V.2 Basic Mobility Inpatient Short Form   1. Turning from your back to your side while in a flat bed without using bedrails? 2 - A Lot   2. Moving from lying on your back to sitting on the side of a flat bed without using bedrails? 2 - A Lot   3. Moving to and from a bed to a chair (including a wheelchair)? 1 - Total   4. Standing up from a chair using your arms (e.g., wheelchair, or bedside chair)? 1 - Total   5. To walk in hospital room? 1 - Total   6. Climbing 3-5 steps with a railing? 1 - Total   Basic Mobility Raw Score (Score out of 24.Lower scores equate to lower levels of function) 8   Total Evaluation Time   Total Evaluation Time (Minutes) 10[SS1.1]        Revision History        User Key Date/Time User Provider Type Action    > SS1.1 10/28/2018  5:42 PM Caro Bowen, PT Physical Therapist Sign            Progress Notes by Robert Higgins OT at 10/28/2018 10:21 AM     Author:  Robert Higgins OT Service:  Acute IP Rehab Author Type:  Occupational Therapist    Filed:  10/28/2018 10:21 AM Date of Service:  10/28/2018 10:21 AM Creation Time:  10/28/2018 10:21 AM    Status:  Signed :  Robert Higgins OT (Occupational Therapist)          10/28/18 0920   Quick Adds   Type of Visit Initial Occupational Therapy Evaluation   Living Environment   Lives With spouse   Living Arrangements house  (split level home style home )   Home Accessibility bed and bath on same level   Number of Stairs to Enter Home 7   Number of Stairs Within Home 7   Transportation Available car;family or friend will provide   Living Environment Comment Per pt, " spouse is available to assist as needed    Self-Care   Usual Activity Tolerance good   Current Activity Tolerance fair   Regular Exercise yes   Activity/Exercise Type (sationary bike)   Exercise Amount/Frequency daily   Equipment Currently Used at Home none   Functional Level Prior   Ambulation 0-->independent   Transferring 0-->independent   Toileting 0-->independent   Bathing 0-->independent   Dressing 0-->independent   Fall history within last six months yes  (per pt )   Number of times patient has fallen within last six months 1   General Information   Onset of Illness/Injury or Date of Surgery - Date 10/26/18   Referring Physician Chelsi Alexandra PA-C   Patient/Family Goals Statement Home   Additional Occupational Profile Info/Pertinent History of Current Problem Per chart: Merlin J Anderson is a 78 year old male with past medical history significant for type 2 diabetes, essential hypertension, hyperlipidemia, chronic kidney disease and carotid artery disease, status post right carotid endarterectomy who was evaluated in cardiology clinic for evaluation of progressive dyspnea on exertion.  He underwent stress test which was abnormal.  He was subsequently referred for coronary angiogram as an outpatient.  Patient underwent coronary angiogram on October 26 which demonstrated severe three-vessel coronary artery disease including severe distal left main stenosis.  Patient was admitted for further evaluation and management and was placed on balloon pump.  Patient underwent coronary artery bypass grafting x4 with left leg endoscopic vein harvesting  On 10/26/18.  Patient was extubated yesterday afternoon.  Cardiovascular surgery is following closely.  Internal medicine is consulted for medical comanagement.   Precautions/Limitations fall precautions;sternal precautions   Heart Disease Risk Factors Race;Age;Gender;Medical history   Cognitive Status Examination   Orientation (oriented to month and place  )   Level  "of Consciousness alert   Visual Perception   Visual Perception No deficits were identified   Pain Assessment   Patient Currently in Pain Yes, see Vital Sign flowsheet   Mobility   Bed Mobility Bed mobility skill: Sit to supine;Bed mobility skill: Supine to sit   Bed Mobility Skill: Sit to Supine   Level of [place]: Sit/Supine maximum assist (25% patients effort)   Physical Assist/Nonphysical Assist: Sit/Supine 2 persons   Bed Mobility Skill: Supine to Sit   Level of [place]: Supine/Sit maximum assist (25% patients effort)   Physical Assist/Nonphysical Assist: Supine/Sit 2 persons   Transfer Skills   Transfer Transfer Safety Analysis Bed/Chair;Transfer Skill: Stand to Sit;Transfer Safety Analysis Sit/Stand   Transfer Skill: Sit to Stand   Level of [place]: Sit/Stand (unable to assess )   Instrumental Activities of Daily Living (IADL)   Previous Responsibilities driving;meal prep;yardwork;medication management;finances   General Therapy Interventions   Planned Therapy Interventions ADL retraining;IADL retraining;cognition;risk factor education;progressive activity/exercise;home program guidelines;transfer training;strengthening   Clinical Impression   Criteria for Skilled Therapeutic Interventions Met yes, treatment indicated   OT Diagnosis decreased endurance for I/ADls    Influenced by the following impairments decreased endurance for I/ADls    Assessment of Occupational Performance 3-5 Performance Deficits   Identified Performance Deficits decreased endurance for I/ADls  (dressing, bathing, toileting, driving)   Clinical Decision Making (Complexity) Moderate complexity   Therapy Frequency daily   Predicted Duration of Therapy Intervention (days/wks) 7 days   Anticipated Discharge Disposition Acute Rehabilitation Facility   Risks and Benefits of Treatment have been explained. Yes   Patient, Family & other staff in agreement with plan of care Yes   [facility] AM-PAC TM \"6 Clicks\"   2016, " "Trustees of [facility], under license to [employer].  All rights reserved.   6 Clicks Short Forms Daily Activity Inpatient Short Form   [facility] AM-PAC  \"6 Clicks\" Daily Activity Inpatient Short Form   1. Putting on and taking off regular lower body clothing? 2 - A Lot   2. Bathing (including washing, rinsing, drying)? 2 - A Lot   3. Toileting, which includes using toilet, bedpan or urinal? 2 - A Lot   4. Putting on and taking off regular upper body clothing? 3 - A Little   5. Taking care of personal grooming such as brushing teeth? 2 - A Lot   6. Eating meals? 4 - None   Daily Activity Raw Score (Score out of 24.Lower scores equate to lower levels of function) 15   Total Evaluation Time   Total Evaluation Time (Minutes) 8[SC1.1]        Revision History        User Key Date/Time User Provider Type Action    > SC1.1 10/28/2018 10:21 AM Robert Higgins, OT Occupational Therapist Sign                                                      INTERAGENCY TRANSFER FORM - LAB / IMAGING / EKG / EMG RESULTS   10/26/2018                       [place] [id number] [facility]: [phone]            Unresulted Labs (24h ago through future)    Start       Ordered    10/31/18 0000  Basic metabolic panel  (Ca, Cl, CO2, Creat, Gluc, K, Na, BUN)  Routine     Comments:  Cr    10/31/18 1043    10/30/18 0000  Platelet count  (heparin 5000 units SQ Q8H starting POD 1. Do not order if PLT less than 70,000.)  EVERY THREE DAYS,   Routine     Comments:  Every 3 days while on VTE prophylaxis. If no result is listed, this lab has not been done the past 365 days. LATEST LAB RESULT: Platelet Count (10e9/L)       Date                     Value                 10/26/2018               91 (L)           ----------    10/26/18 1915    Unscheduled  Hemoglobin  CONDITIONAL (SPECIFY),   Routine     Comments:  IF patient has signs and symptoms of bleeding.    10/26/18 1915    Unscheduled  Potassium  (Potassium Replacement - " "\"High\" - Replacement for all levels less than 4.1 mmol/L)  CONDITIONAL (SPECIFY),   Routine     Comments:  Obtain Potassium Level for these conditions:  *IF no potassium result within 24 hrs before initiation of order set, draw potassium level with next lab collect.    *2 HOURS AFTER last IV potassium replacement dose and 4 hours after an oral replacement dose when potassium replacement given for level less than 3.4.  *Next morning after potassium dose.     Repeat Potassium Replacement if necessary.    10/26/18 1915    Unscheduled  Magnesium  (Magnesium Replacement - Adult - \"High\" - Replacement for all levels less than or equal to 2 mg/dL)  CONDITIONAL (SPECIFY),   Routine     Comments:  Obtain Magnesium Level for these conditions:  *IF no magnesium result within 24 hrs before initiation of order set, draw magnesium level with next lab collect.    *2 HOURS AFTER last magnesium replacement dose when magnesium replacement given for level less than 1.6  *Next morning after magnesium dose.     Repeat Magnesium Replacement if necessary.    10/26/18 1915    Unscheduled  Phosphorus  (POTASSIUM Phosphate - \"High\" - Replacement for all levels less than 2.8 mg/dL)  CONDITIONAL (SPECIFY),   Routine     Comments:  Obtain Phosphorus Level for these conditions:  *IF no phosphorus result within 24 hrs before initiation of order set, draw phosphorus level with next lab collect.    *2 HOURS AFTER last phosphorus replacement dose when phosphorus replacement given for level less than 2.0  *Next morning after phosphorus dose.     Repeat Phosphorus Replacement if necessary.    10/26/18 1915    Unscheduled  Glucose - Diabetes  CONDITIONAL X 1 STAT,   STAT     Comments:  for changes in mental status, diaphoresis, or unexplained tachycardia    10/26/18 2044         Lab Results - 3 Days      CBC with platelets [[id number]] (Abnormal)  Resulted: 10/31/18 0806, Result status: Final result    Ordering provider: Seth Olivarez PA-C  10/31/18 " 0000 Resulting lab: [facility]    Specimen Information    Type Source Collected On   Blood  10/31/18 0720          Components       Value Reference Range Flag Lab   WBC 6.5 4.0 - 11.0 10e9/L  FrStHsLb   RBC Count 2.70 4.4 - 5.9 10e12/L L FrStHsLb   Hemoglobin 8.1 13.3 - 17.7 g/dL L FrStHsLb   Hematocrit 24.7 40.0 - 53.0 % L FrStHsLb   MCV 92 78 - 100 fl  FrStHsLb   MCH 30.0 26.5 - 33.0 pg  FrStHsLb   MCHC 32.8 31.5 - 36.5 g/dL  FrStHsLb   RDW 13.5 10.0 - 15.0 %  FrStHsLb   Platelet Count 177 150 - 450 10e9/L  FrStHsLb            Glucose by meter [[id number]] (Abnormal)  Resulted: 10/31/18 0806, Result status: Final result    Ordering provider: Heath Kessler MD  10/31/18 0751 Resulting lab: PO[facility] OF CARE TEST, GLUCOSE    Specimen Information    Type Source Collected On     10/31/18 0751          Components       Value Reference Range Flag Lab   Glucose 144 70 - 99 mg/dL H 170            Basic metabolic panel [[id number]] (Abnormal)  Resulted: 10/31/18 0759, Result status: Final result    Ordering provider: Seth Olivarez PA-C  10/31/18 0000 Resulting lab: [facility]    Specimen Information    Type Source Collected On   Blood  10/31/18 0720          Components       Value Reference Range Flag Lab   Sodium 141 133 - 144 mmol/L  FrStHsLb   Potassium 4.0 3.4 - 5.3 mmol/L  FrStHsLb   Chloride 111 94 - 109 mmol/L H FrStHsLb   Carbon Dioxide 22 20 - 32 mmol/L  FrStHsLb   Anion Gap 8 3 - 14 mmol/L  FrStHsLb   Glucose 149 70 - 99 mg/dL H FrStHsLb   Urea Nitrogen 42 7 - 30 mg/dL H FrStHsLb   Creatinine 1.43 0.66 - 1.25 mg/dL H FrStHsLb   GFR Estimate 48 >60 mL/min/1.7m2 L FrStHsLb   Comment:  Non  GFR Calc   GFR Estimate If Black 58 >60 mL/min/1.7m2 L FrStHsLb   Comment:  African American GFR Calc   Calcium 8.4 8.5 - 10.1 mg/dL L FrStHsLb            Glucose by meter [[id number]] (Abnormal)  Resulted: 10/31/18 0241, Result status: Final result    Ordering provider:  Heath Kessler MD  10/31/18 0229 Resulting lab: POINT OF CARE TEST, GLUCOSE    Specimen Information    Type Source Collected On     10/31/18 0229          Components       Value Reference Range Flag Lab   Glucose 142 70 - 99 mg/dL H 170            Glucose by meter [[id number]] (Abnormal)  Resulted: 10/30/18 2213, Result status: Final result    Ordering provider: Heath Kessler MD  10/30/18 2159 Resulting lab: POINT OF CARE TEST, GLUCOSE    Specimen Information    Type Source Collected On     10/30/18 2159          Components       Value Reference Range Flag Lab   Glucose 160 70 - 99 mg/dL H 170            Glucose by meter [[id number]] (Abnormal)  Resulted: 10/30/18 1704, Result status: Final result    Ordering provider: Heath Kessler MD  10/30/18 1648 Resulting lab: POINT OF CARE TEST, GLUCOSE    Specimen Information    Type Source Collected On     10/30/18 1648          Components       Value Reference Range Flag Lab   Glucose 155 70 - 99 mg/dL H 170            Glucose by meter [[id number]] (Abnormal)  Resulted: 10/30/18 1213, Result status: Final result    Ordering provider: Heath Kessler MD  10/30/18 1157 Resulting lab: POINT OF CARE TEST, GLUCOSE    Specimen Information    Type Source Collected On     10/30/18 1157          Components       Value Reference Range Flag Lab   Glucose 181 70 - 99 mg/dL H 170            Glucose by meter [[id number]] (Abnormal)  Resulted: 10/30/18 0808, Result status: Final result    Ordering provider: Heath Kessler MD  10/30/18 0756 Resulting lab: POINT OF CARE TEST, GLUCOSE    Specimen Information    Type Source Collected On     10/30/18 0756          Components       Value Reference Range Flag Lab   Glucose 157 70 - 99 mg/dL H 170            Basic metabolic panel [[id number]] (Abnormal)  Resulted: 10/30/18 0804, Result status: Final result    Ordering provider: Seth Olivarez PA-C  10/30/18 0000 Resulting lab: [place]  [facility]    Specimen Information    Type Source Collected On   Blood  10/30/18 0735          Components       Value Reference Range Flag Lab   Sodium 139 133 - 144 mmol/L  FrStHsLb   Potassium 3.9 3.4 - 5.3 mmol/L  FrStHsLb   Chloride 109 94 - 109 mmol/L  FrStHsLb   Carbon Dioxide 25 20 - 32 mmol/L  FrStHsLb   Anion Gap 5 3 - 14 mmol/L  FrStHsLb   Glucose 152 70 - 99 mg/dL H FrStHsLb   Urea Nitrogen 35 7 - 30 mg/dL H FrStHsLb   Creatinine 1.44 0.66 - 1.25 mg/dL H FrStHsLb   GFR Estimate 47 >60 mL/min/1.7m2 L FrStHsLb   Comment:  Non  GFR Calc   GFR Estimate If Black 57 >60 mL/min/1.7m2 L FrStHsLb   Comment:  African American GFR Calc   Calcium 8.0 8.5 - 10.1 mg/dL L FrStHsLb            CBC with platelets [[id number]] (Abnormal)  Resulted: 10/30/18 0750, Result status: Final result    Ordering provider: eSth Olivarez PA-C  10/30/18 0000 Resulting lab: [facility]    Specimen Information    Type Source Collected On   Blood  10/30/18 0735          Components       Value Reference Range Flag Lab   WBC 5.3 4.0 - 11.0 10e9/L  FrStHsLb   RBC Count 2.50 4.4 - 5.9 10e12/L L FrStHsLb   Hemoglobin 7.7 13.3 - 17.7 g/dL L FrStHsLb   Hematocrit 22.5 40.0 - 53.0 % L FrStHsLb   MCV 90 78 - 100 fl  FrStHsLb   MCH 30.8 26.5 - 33.0 pg  FrStHsLb   MCHC 34.2 31.5 - 36.5 g/dL  FrStHsLb   RDW 13.7 10.0 - 15.0 %  FrStHsLb   Platelet Count 106 150 - 450 10e9/L L FrStHsLb            Glucose by meter [[id number]] (Abnormal)  Resulted: 10/29/18 2229, Result status: Final result    Ordering provider: Heath Kessler MD  10/29/18 2215 Resulting lab: POINT OF CARE TEST, GLUCOSE    Specimen Information    Type Source Collected On     10/29/18 2215          Components       Value Reference Range Flag Lab   Glucose 189 70 - 99 mg/dL H 170            Glucose by meter [[id number]] (Abnormal)  Resulted: 10/29/18 1734, Result status: Final result    Ordering provider: Heath Kessler MD   10/29/18 1713 Resulting lab: POINT OF CARE TEST, GLUCOSE    Specimen Information    Type Source Collected On     10/29/18 1713          Components       Value Reference Range Flag Lab   Glucose 142 70 - 99 mg/dL H 170            Glucose by meter [[id number]] (Abnormal)  Resulted: 10/29/18 1209, Result status: Final result    Ordering provider: Heath Kessler MD  10/29/18 1152 Resulting lab: POINT OF CARE TEST, GLUCOSE    Specimen Information    Type Source Collected On     10/29/18 1152          Components       Value Reference Range Flag Lab   Glucose 167 70 - 99 mg/dL H 170            Basic metabolic panel [[id number]] (Abnormal)  Resulted: 10/29/18 0839, Result status: Final result    Ordering provider: Sea Gates MD  10/29/18 0000 Resulting lab: [facility]    Specimen Information    Type Source Collected On   Blood  10/29/18 0738          Components       Value Reference Range Flag Lab   Sodium 141 133 - 144 mmol/L  FrStHsLb   Potassium 4.2 3.4 - 5.3 mmol/L  FrStHsLb   Chloride 110 94 - 109 mmol/L H FrStHsLb   Carbon Dioxide 26 20 - 32 mmol/L  FrStHsLb   Anion Gap 5 3 - 14 mmol/L  FrStHsLb   Glucose 149 70 - 99 mg/dL H FrStHsLb   Urea Nitrogen 32 7 - 30 mg/dL H FrStHsLb   Creatinine 1.65 0.66 - 1.25 mg/dL H FrStHsLb   GFR Estimate 41 >60 mL/min/1.7m2 L FrStHsLb   Comment:  Non  GFR Calc   GFR Estimate If Black 49 >60 mL/min/1.7m2 L FrStHsLb   Comment:  African American GFR Calc   Calcium 8.4 8.5 - 10.1 mg/dL L FrStHsLb            Magnesium [[id number]] (Abnormal)  Resulted: 10/29/18 0839, Result status: Final result    Ordering provider: Sea Gates MD  10/29/18 0000 Resulting lab: [facility]    Specimen Information    Type Source Collected On   Blood  10/29/18 0738          Components       Value Reference Range Flag Lab   Magnesium 2.6 1.6 - 2.3 mg/dL H FrStHsLb            Phosphorus [[id number]]  Resulted: 10/29/18 0839, Result status: Final  result    Ordering provider: Sea Gates MD  10/29/18 0000 Resulting lab: [facility]    Specimen Information    Type Source Collected On   Blood  10/29/18 0738          Components       Value Reference Range Flag Lab   Phosphorus 2.9 2.5 - 4.5 mg/dL  FrStHsLb            Glucose by meter [[id number]] (Abnormal)  Resulted: 10/29/18 0826, Result status: Final result    Ordering provider: Heath Kessler MD  10/29/18 0810 Resulting lab: POINT OF CARE TEST, GLUCOSE    Specimen Information    Type Source Collected On     10/29/18 0810          Components       Value Reference Range Flag Lab   Glucose 154 70 - 99 mg/dL H 170            CBC with platelets [[id number]] (Abnormal)  Resulted: 10/29/18 0820, Result status: Final result    Ordering provider: Sea Gates MD  10/29/18 0000 Resulting lab: [facility]    Specimen Information    Type Source Collected On   Blood  10/29/18 0738          Components       Value Reference Range Flag Lab   WBC 6.1 4.0 - 11.0 10e9/L  FrStHsLb   RBC Count 2.87 4.4 - 5.9 10e12/L L FrStHsLb   Hemoglobin 8.9 13.3 - 17.7 g/dL L FrStHsLb   Hematocrit 26.5 40.0 - 53.0 % L FrStHsLb   MCV 92 78 - 100 fl  FrStHsLb   MCH 31.0 26.5 - 33.0 pg  FrStHsLb   MCHC 33.6 31.5 - 36.5 g/dL  FrStHsLb   RDW 13.9 10.0 - 15.0 %  FrStHsLb   Platelet Count 96 150 - 450 10e9/L L FrStHsLb            Lactic acid whole blood [[id number]]  Resulted: 10/29/18 0803, Result status: In process    Ordering provider: Heath Kessler MD  10/26/18 1722 Resulting lab: [facility]    Specimen Information    Type Source Collected On     10/26/18 1722            Lactic acid whole blood [[id number]]  Resulted: 10/29/18 0802, Result status: In process    Ordering provider: Heath Kessler MD  10/26/18 1602 Resulting lab: [facility]    Specimen Information    Type Source Collected On     10/26/18 1602            Glucose by meter [[id number]] (Abnormal)  Resulted: 10/29/18 0218,  Result status: Final result    Ordering provider: Heath Kessler MD  10/29/18 0206 Resulting lab: POINT OF CARE TEST, GLUCOSE    Specimen Information    Type Source Collected On     10/29/18 0206          Components       Value Reference Range Flag Lab   Glucose 151 70 - 99 mg/dL H 170            Glucose by meter [[id number]] (Abnormal)  Resulted: 10/28/18 2120, Result status: Final result    Ordering provider: Heath Kessler MD  10/28/18 2103 Resulting lab: POINT OF CARE TEST, GLUCOSE    Specimen Information    Type Source Collected On     10/28/18 2103          Components       Value Reference Range Flag Lab   Glucose 169 70 - 99 mg/dL H 170            Glucose by meter [[id number]] (Abnormal)  Resulted: 10/28/18 1727, Result status: Final result    Ordering provider: Heath Kessler MD  10/28/18 1714 Resulting lab: POINT OF CARE TEST, GLUCOSE    Specimen Information    Type Source Collected On     10/28/18 1714          Components       Value Reference Range Flag Lab   Glucose 155 70 - 99 mg/dL H 170            Phosphorus [[id number]]  Resulted: 10/28/18 1307, Result status: Final result    Ordering provider: Sea Gates MD  10/28/18 0000 Resulting lab: [facility]    Specimen Information    Type Source Collected On   Blood  10/28/18 1242          Components       Value Reference Range Flag Lab   Phosphorus 3.4 2.5 - 4.5 mg/dL  FrStHsLb            Hemoglobin A1c [[id number]] (Abnormal)  Resulted: 10/28/18 1307, Result status: Final result    Ordering provider: Sea Gates MD  10/28/18 1045 Resulting lab: [facility]    Specimen Information    Type Source Collected On   Blood  10/28/18 1242          Components       Value Reference Range Flag Lab   Hemoglobin A1C 6.1 0 - 5.6 % H FrStHsLb   Comment:         Normal <5.7% Prediabetes 5.7-6.4%  Diabetes 6.5% or higher - adopted from ADA   consensus guidelines.              Basic metabolic panel  [[id number]] (Abnormal)  Resulted: 10/28/18 1307, Result status: Final result    Ordering provider: Sea Gates MD  10/28/18 1242 Resulting lab: [facility]    Specimen Information    Type Source Collected On     10/28/18 1242          Components       Value Reference Range Flag Lab   Sodium 141 133 - 144 mmol/L  FrStHsLb   Potassium 4.5 3.4 - 5.3 mmol/L  FrStHsLb   Chloride 111 94 - 109 mmol/L H FrStHsLb   Carbon Dioxide 23 20 - 32 mmol/L  FrStHsLb   Anion Gap 7 3 - 14 mmol/L  FrStHsLb   Glucose 166 70 - 99 mg/dL H FrStHsLb   Urea Nitrogen 29 7 - 30 mg/dL  FrStHsLb   Creatinine 1.84 0.66 - 1.25 mg/dL H FrStHsLb   GFR Estimate 36 >60 mL/min/1.7m2 L FrStHsLb   Comment:  Non  GFR Calc   GFR Estimate If Black 43 >60 mL/min/1.7m2 L FrStHsLb   Comment:  African American GFR Calc   Calcium 7.9 8.5 - 10.1 mg/dL L FrStHsLb            Magnesium [[id number]]  Resulted: 10/28/18 1307, Result status: Final result    Ordering provider: Sea Gates MD  10/28/18 1242 Resulting lab: [facility]    Specimen Information    Type Source Collected On     10/28/18 1242          Components       Value Reference Range Flag Lab   Magnesium 2.3 1.6 - 2.3 mg/dL  FrStHsLb            Glucose by meter [[id number]] (Abnormal)  Resulted: 10/28/18 1258, Result status: Final result    Ordering provider: Heath Kessler MD  10/28/18 1241 Resulting lab: POINT OF CARE TEST, GLUCOSE    Specimen Information    Type Source Collected On     10/28/18 1241          Components       Value Reference Range Flag Lab   Glucose 156 70 - 99 mg/dL H 170            Glucose by meter [[id number]] (Abnormal)  Resulted: 10/28/18 0814, Result status: Final result    Ordering provider: Heath Kessler MD  10/28/18 0802 Resulting lab: POINT OF CARE TEST, GLUCOSE    Specimen Information    Type Source Collected On     10/28/18 0802          Components       Value Reference Range Flag Lab   Glucose 138  70 - 99 mg/dL H 170            Glucose by meter [[id number]] (Abnormal)  Resulted: 10/28/18 0615, Result status: Final result    Ordering provider: Heath Kessler MD  10/28/18 0603 Resulting lab: POINT OF CARE TEST, GLUCOSE    Specimen Information    Type Source Collected On     10/28/18 0603          Components       Value Reference Range Flag Lab   Glucose 121 70 - 99 mg/dL H 170            Phosphorus (AM Draw) [[id number]]  Resulted: 10/28/18 0438, Result status: Final result    Ordering provider: Jass Britton MD  10/28/18 0000 Resulting lab: [facility]    Specimen Information    Type Source Collected On   Blood  10/28/18 0405          Components       Value Reference Range Flag Lab   Phosphorus 4.2 2.5 - 4.5 mg/dL  FrStHsLb            Basic metabolic panel [[id number]] (Abnormal)  Resulted: 10/28/18 0438, Result status: Final result    Ordering provider: Heath Kessler MD  10/28/18 0030 Resulting lab: [facility]    Specimen Information    Type Source Collected On   Blood  10/28/18 0405          Components       Value Reference Range Flag Lab   Sodium 141 133 - 144 mmol/L  FrStHsLb   Potassium 4.6 3.4 - 5.3 mmol/L  FrStHsLb   Chloride 111 94 - 109 mmol/L H FrStHsLb   Carbon Dioxide 23 20 - 32 mmol/L  FrStHsLb   Anion Gap 7 3 - 14 mmol/L  FrStHsLb   Glucose 139 70 - 99 mg/dL H FrStHsLb   Urea Nitrogen 30 7 - 30 mg/dL  FrStHsLb   Creatinine 1.90 0.66 - 1.25 mg/dL H FrStHsLb   GFR Estimate 34 >60 mL/min/1.7m2 L FrStHsLb   Comment:  Non  GFR Calc   GFR Estimate If Black 42 >60 mL/min/1.7m2 L FrStHsLb   Comment:  African American GFR Calc   Calcium 7.4 8.5 - 10.1 mg/dL L FrStHsLb            Magnesium [[id number]] (Abnormal)  Resulted: 10/28/18 0438, Result status: Final result    Ordering provider: Heath Kessler MD  10/28/18 0030 Resulting lab: [facility]    Specimen Information    Type Source Collected On    Blood  10/28/18 0405          Components       Value Reference Range Flag Lab   Magnesium 2.4 1.6 - 2.3 mg/dL H FrStHsLb            CBC with platelets [[id number]] (Abnormal)  Resulted: 10/28/18 0426, Result status: Final result    Ordering provider: Sea Gates MD  10/28/18 0000 Resulting lab: [facility]    Specimen Information    Type Source Collected On   Blood  10/28/18 0405          Components       Value Reference Range Flag Lab   WBC 6.7 4.0 - 11.0 10e9/L  FrStHsLb   RBC Count 2.75 4.4 - 5.9 10e12/L L FrStHsLb   Hemoglobin 8.4 13.3 - 17.7 g/dL L FrStHsLb   Hematocrit 25.0 40.0 - 53.0 % L FrStHsLb   MCV 91 78 - 100 fl  FrStHsLb   MCH 30.5 26.5 - 33.0 pg  FrStHsLb   MCHC 33.6 31.5 - 36.5 g/dL  FrStHsLb   RDW 14.0 10.0 - 15.0 %  FrStHsLb   Platelet Count 96 150 - 450 10e9/L L FrStHsLb            Glucose by meter [[id number]] (Abnormal)  Resulted: 10/28/18 0425, Result status: Final result    Ordering provider: Heath Kessler MD  10/28/18 0414 Resulting lab: POINT OF CARE TEST, GLUCOSE    Specimen Information    Type Source Collected On     10/28/18 0414          Components       Value Reference Range Flag Lab   Glucose 132 70 - 99 mg/dL H 170            Glucose by meter [[id number]] (Abnormal)  Resulted: 10/28/18 0221, Result status: Final result    Ordering provider: Heath Kessler MD  10/28/18 0210 Resulting lab: POINT OF CARE TEST, GLUCOSE    Specimen Information    Type Source Collected On     10/28/18 0210          Components       Value Reference Range Flag Lab   Glucose 139 70 - 99 mg/dL H 170            Glucose by meter [[id number]]  Resulted: 10/28/18 0115, Result status: Final result    Ordering provider: Heath Kessler MD  10/28/18 0104 Resulting lab: POINT OF CARE TEST, GLUCOSE    Specimen Information    Type Source Collected On     10/28/18 0104          Components       Value Reference Range Flag Lab   Glucose 93 70 - 99 mg/dL  170             Glucose by meter [[id number]] (Abnormal)  Resulted: 10/28/18 0012, Result status: Final result    Ordering provider: Heath Kessler MD  10/28/18 0001 Resulting lab: POINT OF CARE TEST, GLUCOSE    Specimen Information    Type Source Collected On     10/28/18 0001          Components       Value Reference Range Flag Lab   Glucose 139 70 - 99 mg/dL H 170            Testing Performed By     Lab - Abbreviation Name Director Address Valid Date Range    14 - [facility] Unknown [address] [place] [address] 05/08/15 1057 - Present    45 - [facility] Unknown Unknown 01/28/02 0000 - Present    170 - Unknown POINT OF CARE TEST, GLUCOSE Unknown Unknown 10/31/11 1114 - Present               Imaging Results - 3 Days      XR Chest 2 Views [[id number]]  Resulted: 10/30/18 1355, Result status: Final result    Ordering provider: Seth Olivarez PA-C  10/30/18 0844 Resulted by: Ron Monzon MD    Performed: 10/30/18 1330 - 10/30/18 1346 Resulting lab: RADIOLOGY RESULTS    Narrative:       CHEST TWO VIEWS  10/30/2018 1:46 PM     HISTORY:  Removal of chest tubes, status post CABG.     COMPARISON: October 28, 2018       Impression:       IMPRESSION: Chest tubes removed, no pneumothorax. Left lower lobe  probable atelectasis again evident. Question minimal left pleural  effusion.    RON MONZON MD      XR Chest 2 Views [[id number]]  Resulted: 10/28/18 1100, Result status: Final result    Ordering provider: Sea Gates MD  10/28/18 0006 Resulted by: Clifford Monroe MD    Performed: 10/28/18 1024 - 10/28/18 1032 Resulting lab: RADIOLOGY RESULTS    Narrative:       CHEST TWO VIEWS  10/28/2018 10:32 AM     HISTORY: Status post CABG.    COMPARISON: 10/28/2018      Impression:       IMPRESSION: Interval extubation and removal of Magna-Cira Cira  catheter. Left-sided chest tube remains in place. There is no  pneumothorax. Mediastinal drain also unchanged. Lungs are  relatively  clear, although, there may be a trace amount atelectasis in the left  base. Heart size is stable.    OSMEL JIN MD      Testing Performed By     Lab - Abbreviation Name Director Address Valid Date Range    104 - [facility] RADIOLOGY RESULTS Unknown Unknown 05 1553 - Present               ECG/EMG Results      ECHO STRESS WITH OPTISON [[id number]]  Resulted: 10/25/18 1014, Result status: Edited Result - FINAL    Ordering provider: Sharee Duke MD  10/25/18 0946 Resulted by: Davi Rebolledo MD    Performed: 10/25/18 1025 - 10/25/18 1026 Resulting lab: RADIOLOGY RESULTS    Narrative:       [id number]  [id number]  [id number]  [id number]^CHAITANYA^SHAREE^JESSIKA           [facility]  Echocardiography Laboratory  [address] [place] [address]        Name: ANDERSON, MERLIN J  MRN: [id number]  : 1940  Study Date: 10/25/2018 10:14 AM  Age: 78 yrs  Gender: Male  Patient Location: [facility]  Reason For Study: , SOB (shortness of breath)  History: Shortness of Breath  Ordering Physician: SHAREE DUKE  Referring Physician: SHAREE DUKE  Performed By: Parul Scott RDCS     BSA: 2.2 m2  Height: 72 in  Weight: 212 lb  HR: 71  BP: 144/60 mmHg  _____________________________________________________________________________  __        Procedure  Stress Echo Complete. Contrast Optison.  _____________________________________________________________________________  __        Interpretation Summary  The patient exercised 3:10.  The patient exhibited no chest pain during exercise.  Exercise was stopped due to dyspnea.  2 mm horizontal ST depression in inferolateral leads with ST elevation in lead  aVR.  Target Heart Rate was not achieved due to dyspnea.  Left ventricular cavity size increases with exercise.  Global LV systolic function deteriorates with exercise.  There is severe hypokinesia of septal, apical, lateral and mid to distal  anterior walls with exercise.     Overall abnormal  stress test suggestive of mutivessel coronary artery disease.  Patient will be seen in cardiology consultation today.  _____________________________________________________________________________  __     Stress  The patient exercised 3:10.  RPP [id number].  There was a normal BP response to exercise.  The patient exhibited no chest pain during exercise.  Exercise was stopped due to dyspnea.  Target Heart Rate was not achieved due to dyspnea.  2 mm horizontal ST depression in inferolateral leads with ST elevation in lead  aVR.  The visual ejection fraction is estimated at 30-35%.  Left ventricular cavity size increases with exercise.  Global LV systolic function deteriorates with exercise.  There were stress-induced wall motion abnormalities observed.  There is severe hypokinesia of septal, apical, lateral and mid to distal  anterior walls with exercise.     Baseline  sinus rhythm, 1 degree AV block.  No regional wall motion abnormalities noted.  The visual ejection fraction is estimated at 55-60%.     Stress Results             Protocol:  Jose          Maximum Predicted HR:   142 bpm             Target HR: 121 bpm        % Maximum Predicted HR: 74 %        Stage  DurationHeart Rate  BP                    Comment             (mm:ss)   (bpm)    Stage 1   3:10      105   120/60Duke Treadmill Score: -2 (Moderate Risk)   RecoveryR  6:00      68    130/70Functional Aerobic Capacity: Below Average               Stress Duration:   3:10 mm:ss *        Recovery Time: 6:00 mm:ss         Maximum Stress HR: 105 bpm *           METS:          5     Left Ventricle  Left ventricular systolic function is normal. The visual ejection fraction is  estimated at 55-60%.        Mitral Valve  There is trace mitral regurgitation.     Tricuspid Valve  There is trace tricuspid regurgitation.     Aortic Valve  mild aortic valve sclerosis. No aortic stenosis is  present.  _____________________________________________________________________________  __                                Report approved by: Will Yoder 10/25/2018 11:57 AM                    _____________________________________________________________________________  __       1    Type Source Collected On     10/25/18 1014          View Image (below)        Exercise Stress Echocardiogram [[id number]]  Resulted: 10/25/18 1025, Result status: In process    Performed: 10/25/18 1025 - 10/25/18 1025 Resulting lab: [facility]      ECHO COMPLETE WITH OPTISON [[id number]]  Resulted: 10/26/18 0950, Result status: Edited Result - FINAL    Ordering provider: Heath Kessler MD  10/26/18 0938 Resulted by: Trevon Cho MD    Performed: 10/26/18 1015 - 10/26/18 1017 Resulting lab: RADIOLOGY RESULTS    Narrative:       [id number]  [id number]  [id number]  [id number]^DAYAN^HEATH^ALEC           [facility]  Echocardiography Laboratory  [address] [place] [address]        Name: ANDERSON, MERLIN J  MRN: [id number]  : 1940  Study Date: 10/26/2018 09:50 AM  Age: 78 yrs  Gender: Male  Patient Location: [facility]  Reason For Study: CAD  Ordering Physician: HEATH KESSLER  Referring Physician: SHAREE TAVARES  Performed By: Jessica Maurer     BSA: 2.2 m2  Height: 72 in  Weight: 212 lb  HR: 63  BP: 136/66 mmHg  _____________________________________________________________________________  __        Procedure  Complete Portable Echo Adult. Contrast Optison.  _____________________________________________________________________________  __        Interpretation Summary     The visual ejection fraction is estimated at 60-65%.  The right ventricle is normal in size and function.  Dilation of the inferior vena cava is present with normal respiratory  variation in diameter.  _____________________________________________________________________________  __        Left Ventricle  The left  ventricle is normal in size. There is mild concentric left  ventricular hypertrophy. The left ventricular ejection fraction is normal. The  visual ejection fraction is estimated at 60-65%. Grade II or moderate  diastolic dysfunction. Diastolic Doppler findings (E/E' ratio and/or other  parameters) suggest left ventricular filling pressures are increased.     Right Ventricle  The right ventricle is normal in size and function.     Atria  The left atrium is mildly dilated. Right atrial size is normal.     Mitral Valve  There is mild (1+) mitral regurgitation.        Tricuspid Valve  There is trace tricuspid regurgitation. Right ventricular systolic pressure  could not be approximated due to inadequate tricuspid regurgitation.     Aortic Valve  There is mild trileaflet aortic sclerosis. No aortic regurgitation is present.  No aortic stenosis is present.     Pulmonic Valve  The pulmonic valve is not well visualized. There is trace pulmonic valvular  regurgitation.     Vessels  The aortic root is not well visualized. The aortic root is normal size.  Dilation of the inferior vena cava is present with normal respiratory  variation in diameter.     Pericardium  Trivial pericardial effusion.        Rhythm  Sinus rhythm was noted.  _____________________________________________________________________________  __  MMode/2D Measurements & Calculations  RVDd: 3.2 cm  IVSd: 1.3 cm     LVIDd: 4.3 cm  LVIDs: 2.6 cm  LVPWd: 1.4 cm  FS: 39.6 %  LV mass(C)d: 222.3 grams  LV mass(C)dI: 101.8 grams/m2  Ao root diam: 3.3 cm  LA dimension: 3.9 cm  asc Aorta Diam: 3.4 cm  LA/Ao: 1.2  LVOT diam: 2.2 cm  LVOT area: 4.0 cm2  LA Volume (BP): 76.6 ml  LA Volume Index (BP): 35.1 ml/m2  RWT: 0.63           Doppler Measurements & Calculations  MV E max veronica: 92.2 cm/sec  MV A max veronica: 108.2 cm/sec  MV E/A: 0.85  MV dec time: 0.25 sec  PA V2 max: 74.0 cm/sec  PA max P.2 mmHg  PA acc time: 0.11 sec  E/E' avg: 15.3  Lateral E/e': 11.9  Medial  E/e': 18.6           _____________________________________________________________________________  __           Report approved by: Will Hawthorne 10/26/2018 10:32 AM       1    Type Source Collected On     10/26/18 0950          View Image (below)        Echocardiogram [[id number]]  Resulted: 10/26/18 1017, Result status: In process    Ordering provider: Heath Kessler MD  10/26/18 0938 Performed: 10/26/18 1015 - 10/26/18 1015    Resulting lab: [facility]                   Encounter-Level Documents:     There are no encounter-level documents.      Order-Level Documents:     There are no order-level documents.

## 2018-10-26 NOTE — PROGRESS NOTES
TR band down to 0 at 1115. No hematoma and site remains CDI. Pt ready to go back to cath lab for a balloon pump placement. Pt left around 1115.  From there pt will go to the OR.  Pt spouse was brought up to the ICU waiting lounge and was able to check in.

## 2018-10-26 NOTE — IP AVS SNAPSHOT
` [place] [address] [facility]: [phone]            Medication Administration Report for Anderson, Merlin J as of 10/31/18 1313   Legend:    Given Hold Not Given Due Canceled Entry Other Actions    Time Time (Time) Time  Time-Action       Inactive    Active    Linked        Medications 10/25/18 10/26/18 10/27/18 10/28/18 10/29/18 10/30/18 10/31/18    acetaminophen (TYLENOL) tablet 650 mg  Dose: 650 mg  Freq: EVERY 4 HOURS PRN Route: PO  PRN Reason: mild pain  Start: 10/27/18 1059   Admin Instructions: Maximum acetaminophen dose from all sources = 75 mg/kg/day not to exceed 4 grams/day.    Admin. Amount: 2 tablet (2 × 325 mg tablet)  Last Admin: 10/29/18 0049  Dispense Loc:  ADS 33A              2024 (650 mg)-Given        1140 (650 mg)-Given       1923 (650 mg)-Given        0049 (650 mg)-Given             aspirin EC tablet 325 mg  Dose: 325 mg  Freq: DAILY Route: PO  Start: 10/27/18 0900   Admin Instructions: Chest tube output less than 300 mL/8 hours    Admin. Amount: 1 tablet (1 × 325 mg tablet)  Last Admin: 10/31/18 0920  Dispense Loc:  ADS 33A       (0817)-Not Given        0807 (325 mg)-Given        0836 (325 mg)-Given        0852 (325 mg)-Given        0920 (325 mg)-Given           atorvastatin (LIPITOR) tablet 40 mg  Dose: 40 mg  Freq: DAILY Route: PO  Start: 10/28/18 1100   Admin. Amount: 1 tablet (1 × 40 mg tablet)  Last Admin: 10/31/18 0919  Dispense Loc:  ADS 33A        1140 (40 mg)-Given        0836 (40 mg)-Given        0851 (40 mg)-Given        0919 (40 mg)-Given           fluticasone (FLONASE) 50 MCG/ACT spray 1 spray  Dose: 1 spray  Freq: DAILY PRN Route: BOTH NOSTRIL  PRN Reasons: rhinitis,allergies  Start: 10/28/18 0852   Admin. Amount: 1 spray  Dispense Loc:  Main Pharmacy               furosemide (LASIX) injection 20 mg  Dose: 20 mg  Freq: ONCE PRN Route: IV  PRN Comment: for urine output less than 30 mL/hr. Hold for PAD less than 15 or CVP less than 10 or Serum  Creatinine greater than 1.2  Start: 10/26/18 1915   Admin Instructions: Discontinue upon initiation of transition orders.  Discontinue upon discharge from ICU  For ordered IV doses 1-40 mg, give IV Push undiluted over 1-2 minutes.    Admin. Amount: 20 mg = 2 mL Conc: 10 mg/mL  Dispense Loc:  ADS 33A  Infused Over: 1-2 Minutes  Administrations Remainin  Volume: 2 mL               furosemide (LASIX) tablet 20 mg  Dose: 20 mg  Freq: DAILY Route: PO  Start: 10/30/18 0900   Admin. Amount: 1 tablet (1 × 20 mg tablet)  Last Admin: 10/31/18 0920  Dispense Loc:  ADS 33A          1141 (20 mg)-Given        0920 (20 mg)-Given           glucose gel 15-30 g  Dose: 15-30 g  Freq: EVERY 15 MIN PRN Route: PO  PRN Reason: low blood sugar  Start: 10/26/18 2043   Admin Instructions: Give 15 g for BG 51 to 69 mg/dL IF patient is conscious and able to swallow. Give 30 g for BG less than or equal to 50 mg/dL IF patient is conscious and able to swallow. Do NOT give glucose gel via enteral tube.  IF patient has enteral tube: give apple juice 120 mL (4 oz or 15 g of CHO) via enteral tube for BG 51 to 69 mg/dL.  Give apple juice 240 mL (8 oz or 30 g of CHO) via enteral tube for BG less than or equal to 50 mg/dL.    ~Oral gel is preferable for conscious and able to swallow patient.   ~IF gel unavailable or patient refuses may provide apple juice 120 mL (4 oz or 15 g of CHO). Document juice on I and O flowsheet.    Admin. Amount: 15-30 g  Dispense Loc:  ADS 33A  Volume: 93.75 mL              Or  dextrose 50 % injection 25-50 mL  Dose: 25-50 mL  Freq: EVERY 15 MIN PRN Route: IV  PRN Reason: low blood sugar  Start: 10/26/18 2043   Admin Instructions: Use if have IV access, BG less than 70 mg/dL and meet dose criteria below:  Dose if conscious and alert (or disorientated) and NPO = 25 mL  Dose if unconscious / not alert = 50 mL  Vesicant. For ordered doses up to 25 g, give IV Push undiluted. Give each 5g over 1 minute.    Admin. Amount:  25-50 mL  Dispense Loc:  ADS 33A  Infused Over: 1-5 Minutes  Volume: 50 mL              Or  glucagon injection 1 mg  Dose: 1 mg  Freq: EVERY 15 MIN PRN Route: SC  PRN Reason: low blood sugar  PRN Comment: May repeat x 1 only  Start: 10/26/18 2043   Admin Instructions: May give SQ or IM. ONLY use glucagon IF patient has NO IV access AND is UNABLE to swallow AND blood glucose is LESS than or EQUAL to 50 mg/dL.  If ordered IV, give IV Push over 1 minute. Reconstitute with 1mL sterile water.    Admin. Amount: 1 mg  Dispense Loc:  ADS 33A               heparin sodium PF injection 5,000 Units  Dose: 5,000 Units  Freq: EVERY 8 HOURS Route: SC  Start: 10/27/18 1200   Admin Instructions: HOLD if platelet count falls below 70,000/microliter and notify provider.  High concentration heparin. Not for line flush or cath care.    Admin. Amount: 5,000 Units = 0.5 mL Conc: 5,000 Units/0.5 mL  Last Admin: 10/31/18 0441  Dispense Loc:  ADS 33A  Volume: 0.5 mL       (1129)-Not Given [C]       2024 (5,000 Units)-Given        0406 (5,000 Units)-Given       1142 (5,000 Units)-Given       1922 (5,000 Units)-Given        0436 (5,000 Units)-Given       1215 (5,000 Units)-Given       1932 (5,000 Units)-Given        0340 (5,000 Units)-Given       1141 (5,000 Units)-Given       2001 (5,000 Units)-Given        0441 (5,000 Units)-Given       (1245)-Not Given       [ ] 2000           hydrALAZINE (APRESOLINE) injection 10 mg  Dose: 10 mg  Freq: EVERY 30 MIN PRN Route: IV  PRN Reason: high blood pressure  PRN Comment: Systolic Blood Pressure greater than 140 mmHg or Diastolic Blood Pressure greater than 90 mmHg  Start: 10/26/18 1915   Admin Instructions: For ordered IV doses 1-40 mg, give IV Push undiluted over 1 minute.    Admin. Amount: 10 mg = 0.5 mL Conc: 20 mg/mL  Last Admin: 10/26/18 2307  Dispense Loc:  ADS 33A  Infused Over: 1-2 Minutes  Volume: 1 mL      2307 (10 mg)-Given                HYDROmorphone (PF) (DILAUDID) injection  0.2-0.4 mg  Dose: 0.2-0.4 mg  Freq: EVERY 2 HOURS PRN Route: IV  PRN Reason: moderate to severe pain  Start: 10/26/18 2116   Admin Instructions: For ordered IV doses 0.1-4 mg give IV Push undiluted. Administer each 2mg over 2-5 minutes.    Admin. Amount: 0.2-0.4 mg  Last Admin: 10/26/18 2138  Dispense Loc:  ADS 33A      2138 (0.4 mg)-Given                insulin aspart (NovoLOG) inj (RAPID ACTING)  Dose: 1-5 Units  Freq: AT BEDTIME Route: SC  Start: 10/28/18 2200   Admin Instructions: MEDIUM INSULIN RESISTANCE DOSING    Do Not give Bedtime Correction Insulin if BG less than  200.   For  - 249 give 1 units.   For  - 299 give 2 units.   For  - 349 give 3 units.   For  -399 give 4 units.   For BG greater than or equal to 400 give 5 units.  Notify provider if glucose greater than or equal to 350 mg/dL after administration of correction dose.  If given at mealtime, administer within 30 minutes of start of meal    Admin. Amount: 1-5 Units  Dispense Loc: Contact Rx for dose  Volume: 3 mL        (2126)-Not Given [C]        (2208)-Not Given        (2205)-Not Given [C]        [ ] 2200           insulin aspart (NovoLOG) inj (RAPID ACTING)  Dose: 1-7 Units  Freq: 3 TIMES DAILY BEFORE MEALS Route: SC  Start: 10/28/18 0900   Admin Instructions: Correction Scale - MEDIUM INSULIN RESISTANCE DOSING     Do Not give Correction Insulin if Pre-Meal BG less than 140.   For Pre-Meal  - 189 give 1 unit.   For Pre-Meal  - 239 give 2 units.   For Pre-Meal  - 289 give 3 units.   For Pre-Meal  - 339 give 4 units.   For Pre-Meal - 399 give 5 units.   For Pre-Meal -449 give 6 units  For Pre-Meal BG greater than or equal to 450 give 7 units.   To be given with prandial insulin, and based on pre-meal blood glucose.    Notify provider if glucose greater than or equal to 350 mg/dL after administration of correction dose.  If given at mealtime, administer within 30 minutes of start of meal     Admin. Amount: 1-7 Units  Last Admin: 10/31/18 1243  Dispense Loc: Contact Rx for dose  Volume: 3 mL        (0930)-Not Given       1437 (1 Units)-Given       1719 (1 Units)-Given [C]        0837 (1 Units)-Given       1216 (1 Units)-Given [C]       1734 (1 Units)-Given [C]        0852 (1 Units)-Given       (1510)-Not Given       1707 (1 Units)-Given [C]        0921 (1 Units)-Given       1243 (2 Units)-Given       [ ] 1700           insulin aspart (NovoLOG) inj (RAPID ACTING)  Freq: WITH SNACKS OR SUPPLEMENTS Route: SC  PRN Reason: high blood sugar  Start: 10/26/18 1915   Admin Instructions: DOSE = 1 unit/CARBOHYDRATE UNIT.  1 Carb unit equals 15 grams of carbohydrate (CHO).  Only chart total amount of units given.  Administer ONLY IF patient on IV insulin infusion, either 5 minutes before snack or immediately after snack.  Do NOT give if pre-prandial glucose less than or equal to 60 mg/dL.  If given at mealtime, administer within 30 minutes of start of meal    Dispense Loc: Contact Rx for dose  Volume: 3 mL               Lidocaine (LIDOCARE) 4 % Patch 1 patch  Dose: 1 patch  Freq: EVERY 24 HOURS 0800 Route: TD  Start: 10/27/18 0800   Admin Instructions: Apply patch(s) to sides of sternal incision. To prevent lidocaine toxicity, patient should be patch free for 12 hrs daily. Patches may be cut to smaller size prior to removing release liner.  NEVER APPLY HEAT OVER PATCH which increases absorption and risk of local anesthetic toxicity. Do not apply over area where liposomal bupivacaine was injected for 96 hours post injection.    Admin. Amount: 1 patch  Last Admin: 10/31/18 0919  Dispense Loc: SH ADS 33A  Infused Over: 12 Hours       0813 (1 patch)-Given        0807 (1 patch)-Given        0837 (1 patch)-Given        0851 (1 patch)-Given        0919 (1 patch)-Given           lidocaine (LMX4) cream  Freq: EVERY 1 HOUR PRN Route: Top  PRN Reason: pain  PRN Comment: with VAD insertion or accessing implanted  "port.  Start: 10/26/18 1915   Admin Instructions: Do NOT give if patient has a history of allergy to any local anesthetic or any \"laron\" product.   Apply 30 minutes prior to VAD insertion or port access.  MAX Dose:  2.5 g (  of 5 g tube)    Dispense Loc:  ADS 33 TOWER               lidocaine 1 % 1 mL  Dose: 1 mL  Freq: EVERY 1 HOUR PRN Route: OTHER  PRN Comment: mild pain with VAD insertion or accessing implanted port  Start: 10/26/18 1915   Admin Instructions: Do NOT give if patient has a history of allergy to any local anesthetic or any \"laron\" product. MAX dose 1 mL subcutaneous OR intradermal in divided doses.    Admin. Amount: 1 mL  Dispense Loc:  ADS 33A  Volume: 20 mL               lidocaine patch in PLACE  Freq: EVERY 8 HOURS Route: TD  Start: 10/27/18 0800   Admin Instructions: Chart every shift, confirming that patch is still in place on patient (no barcode scan needed). See patch order for dose information.  NEVER APPLY HEAT OVER PATCH which will increase absorption and may lead to risk of local anesthetic toxicity. Do not apply over area where liposomal bupivacaine injected for 96 hours.    Last Admin: 10/31/18 0921  Dispense Loc:  Main Pharmacy       0853 ( )-Patch in Place       1540 ( )-Patch in Place        0028 ( )-Negative       0808 ( )-Patch in Place       1646 ( )-Given               0843 ( )-Given       1621 ( )-Patch in Place               0852 ( )-Patch in Place       1642 ( )-Patch in Place        (0149)-Not Given [C]       0921 ( )-Patch in Place       [ ] 1600           lidocaine patch REMOVAL  Freq: EVERY 24 HOURS 2000 Route: TD  Start: 10/27/18 2000   Admin Instructions: Patient should have a 12 hour patch free interval    Dispense Loc:  Main Pharmacy       2024 ( )-Patch Removed        1926 ( )-Patch Removed        1937 ( )-Patch Removed        1936 ( )-Patch Removed        [ ] 2000           lisinopril (PRINIVIL/Zestril) tablet 2.5 mg  Dose: 2.5 mg  Freq: DAILY Route: " PO  Start: 10/29/18 0900   Admin. Amount: 1 tablet (1 × 2.5 mg tablet)  Last Admin: 10/31/18 0920  Dispense Loc:  ADS 33A         1003 (2.5 mg)-Given        0851 (2.5 mg)-Given        0920 (2.5 mg)-Given           magnesium sulfate 2 g in water intermittent infusion  Dose: 2 g  Freq: DAILY PRN Route: IV  PRN Reason: magnesium supplementation  Start: 10/26/18 1915   Admin Instructions: For Serum Mg++ 1.6 - 2 mg/dL  Give 2 g and recheck magnesium level next AM.    Admin. Amount: 2 g = 50 mL Conc: 0.04 g/mL  Dispense Loc: Contact Rx for dose  Infused Over: 60 Minutes  Volume: 50 mL               magnesium sulfate 4 g in 100 mL sterile water (premade)  Dose: 4 g  Freq: EVERY 4 HOURS PRN Route: IV  PRN Reason: magnesium supplementation  Start: 10/26/18 1915   Admin Instructions: For serum Mg++ less than 1.6 mg/dL  Give 4 g and recheck magnesium level 2 hours after dose, and next AM.    Admin. Amount: 4 g = 100 mL Conc: 4 g/100 mL  Dispense Loc: Contact Rx for dose  Infused Over: 120 Minutes  Volume: 100 mL               metFORMIN (GLUCOPHAGE) tablet 500 mg  Dose: 500 mg  Freq: 2 TIMES DAILY WITH MEALS Route: PO  Start: 10/29/18 0900   Admin Instructions: If the patient receives intravenous, iodinated contrast and patient GFR is greater than 60 mL/min/1.7m2, continue metformin.  Contact provider for 'hold' or 'no hold' instructions if no GFR or if GFR is less than 60 mL/min/1.7m2.    Admin. Amount: 1 tablet (1 × 500 mg tablet)  Last Admin: 10/31/18 0920  Dispense Loc:  ADS 33A         1003 (500 mg)-Given       1729 (500 mg)-Given [C]        0851 (500 mg)-Given       1705 (500 mg)-Given        0920 (500 mg)-Given       [ ] 1800           methocarbamol (ROBAXIN) tablet 500 mg  Dose: 500 mg  Freq: 4 TIMES DAILY PRN Route: PO  PRN Reason: muscle spasms  Start: 10/26/18 1915   Admin Instructions: Hold for sedation.    Admin. Amount: 1 tablet (1 × 500 mg tablet)  Last Admin: 10/28/18 0807  Dispense Loc:  ADS 33A         0807 (500 mg)-Given              metoprolol tartrate (LOPRESSOR) tablet 25 mg  Dose: 25 mg  Freq: EVERY 12 HOURS Route: PO  Start: 10/27/18 0800   Admin Instructions: Hold if Heart Rate less than 65 beats per minute or Systolic Blood Pressure less than 95 mmHg, bronchospasm, on inotropic continuous infusions, or being paced.  Prescriber should consider discontinuing after 7 days if patient not on beta blocker prior to surgery.    Admin. Amount: 1 tablet (1 × 25 mg tablet)  Last Admin: 10/31/18 0920  Dispense Loc:  ADS 33A       (0753)-Not Given       (2025)-Not Given        0807 (25 mg)-Given       1923 (25 mg)-Given        0836 (25 mg)-Given       1930 (25 mg)-Given        0851 (25 mg)-Given       2000 (25 mg)-Given        0920 (25 mg)-Given       [ ] 2000           naloxone (NARCAN) injection 0.1-0.4 mg  Dose: 0.1-0.4 mg  Freq: EVERY 2 MIN PRN Route: IV  PRN Reason: opioid reversal  Start: 10/26/18 1915   Admin Instructions: For respiratory rate LESS than or EQUAL to 8.  Partial reversal dose:  0.1 mg titrated q 2 minutes for Analgesia Side Effects Monitoring Sedation Level of 3 (frequently drowsy, arousable, drifts to sleep during conversation).Full reversal dose:  0.4 mg bolus for Analgesia Side Effects Monitoring Sedation Level of 4 (somnolent, minimal or no response to stimulation).  For ordered IV doses 0.1-2mg give IVP. Give each 0.4mg over 15 seconds in emergency situations. For non-emergent situations further dilute in 9mL of NS to facilitate titration of response.    Admin. Amount: 0.1-0.4 mg = 0.25-1 mL Conc: 0.4 mg/mL  Dispense Loc:  ADS 33A  Volume: 1 mL               ondansetron (ZOFRAN-ODT) ODT tab 4 mg  Dose: 4 mg  Freq: EVERY 6 HOURS PRN Route: PO  PRN Reasons: nausea,vomiting  Start: 10/26/18 1915   Admin Instructions: This is Step 1 of nausea and vomiting management.  If nausea not resolved in 15 minutes, go to Step 2 prochlorperazine (COMPAZINE). Do not push through foil backing. Peel back  foil and gently remove. Place on tongue immediately. Administration with liquid unnecessary  With dry hands, peel back foil backing and gently remove tablet; do not push oral disintegrating tablet through foil backing; administer immediately on tongue and oral disintegrating tablet dissolves in seconds; then swallow with saliva; liquid not required.    Admin. Amount: 1 tablet (1 × 4 mg tablet)  Dispense Loc: SH ADS 33A              Or  ondansetron (ZOFRAN) injection 4 mg  Dose: 4 mg  Freq: EVERY 6 HOURS PRN Route: IV  PRN Reasons: nausea,vomiting  Start: 10/26/18 1915   Admin Instructions: This is Step 1 of nausea and vomiting management.  If nausea not resolved in 15 minutes, go to Step 2 prochlorperazine (COMPAZINE).  Irritant. For ordered IV doses 0.1-4 mg, give IV Push undiluted over 2-5 minutes.    Admin. Amount: 4 mg = 2 mL Conc: 4 mg/2 mL  Dispense Loc: SH ADS 33A  Infused Over: 2-5 Minutes  Volume: 2 mL               oxyCODONE IR (ROXICODONE) tablet 5-10 mg  Dose: 5-10 mg  Freq: EVERY 4 HOURS PRN Route: PO  PRN Reason: other  PRN Comment: pain control or improvement in physical function. Hold dose for analgesic side effects.  Start: 10/26/18 1915   Admin Instructions: Start with the lowest dose. May adjust dose by 5 mg every 4 hours as needed. Notify provider to assess for uncontrolled pain or analgesic side effects. Hold while on PCA or with regular IV opioid dosing. Maximum total is 60 mg in 24 hours.    Admin. Amount: 1-2 tablet (1-2 × 5 mg tablet)  Last Admin: 10/28/18 0407  Dispense Loc: SH ADS 33A       1807 (5 mg)-Given       2204 (5 mg)-Given        0407 (5 mg)-Given              pantoprazole (PROTONIX) EC tablet 40 mg  Dose: 40 mg  Freq: EVERY MORNING BEFORE BREAKFAST Route: PO  Start: 10/29/18 0730   Admin Instructions: Pharmacist Dose Change per: IV-PO Policy.    DO NOT CRUSH.    Admin. Amount: 1 tablet (1 × 40 mg tablet)  Last Admin: 10/31/18 0919  Dispense Loc:  ADS 33A         0631 (40  mg)-Given        0609 (40 mg)-Given               0919 (40 mg)-Given           potassium chloride (KLOR-CON) Packet 20-40 mEq  Dose: 20-40 mEq  Freq: EVERY 2 HOURS PRN Route: ORAL OR FEED  PRN Reason: potassium supplementation  Start: 10/26/18 1915   Admin Instructions: Use if unable to tolerate tablets.    If Serum K+ 3.4-4.0, dose = 20 mEq x1. Recheck K+ level the next AM.  If Serum K+ 3.0-3.3, dose = 60 mEq po total dose (40 mEq x 1 followed in 2 hours by 20 mEq X1). Recheck K+ level 4 hours after dose and the next AM.  If Serum K+ 2.5-2.9, dose = 80 mEq po total dose (40 mEq Q2H x2). Recheck K+ level 4 hours after dose and the next AM.  If Serum K+ less than 2.5, See IV order.  Dissolve packet contents in 4-8 ounces of cold water or juice.    Admin. Amount: 20-40 mEq  Dispense Loc: SH ADS 33A               potassium chloride 10 mEq in 100 mL intermittent infusion with 10 mg lidocaine  Dose: 10 mEq  Freq: EVERY 1 HOUR PRN Route: IV  PRN Reason: potassium supplementation  Start: 10/26/18 1915   Admin Instructions: Infuse via PERIPHERAL LINE. Use potassium with lidocaine for pain with peripheral administration.  If Serum K+ 3.4-4.0, dose = 10 mEq/hr x2 doses. Recheck K+ level the next AM.  If Serum K+ 3.0-3.3, dose = 10 mEq/hr x4 doses (40 mEq IV total dose). Recheck K+ level 2 hours after dose and the next AM.  If Serum K+ less than 3.0, dose = 10 mEq/hr x6 doses (60 mEq IV total dose). Recheck K+ level 2 hours after dose and the next AM.    Admin. Amount: 10 mEq = 100 mL Conc: 10 mEq/100 mL  Dispense Loc: Contact Rx for dose  Infused Over: 1 Hours  Volume: 100 mL               potassium chloride 10 mEq in 100 mL sterile water intermittent infusion (premix)  Dose: 10 mEq  Freq: EVERY 1 HOUR PRN Route: IV  PRN Reason: potassium supplementation  Start: 10/26/18 1915   Admin Instructions: Infuse via PERIPHERAL LINE or CENTRAL LINE. Use for central line replacement if patient weight less than 65 kg, if patient is on  TPN with high potassium content or if unit does not stock 20 mEq bags.  If Serum K+ 3.4-4.0, dose = 10 mEq/hr x2 doses. Recheck K+ level the next AM.  If Serum K+ 3.0-3.3, dose = 10 mEq/hr x4 doses (40 mEq IV total dose). Recheck K+ level 2 hours after dose and the next AM.  If Serum K+ less than 3.0, dose = 10 mEq/hr x6 doses (60 mEq IV total dose). Recheck K+ level 2 hours after dose and the next AM.    Admin. Amount: 10 mEq = 100 mL Conc: 10 mEq/100 mL  Dispense Loc: Contact Rx for dose  Infused Over: 60 Minutes  Volume: 100 mL               potassium chloride 20 mEq in 50 mL intermittent infusion  Dose: 20 mEq  Freq: EVERY 1 HOUR PRN Route: IV  PRN Reason: potassium supplementation  Start: 10/26/18 1915   Admin Instructions: Infuse via CENTRAL LINE Only.  May need EKG if less than 65 kg or on TPN - Max rate is 0.3 mEq/kg/hr for patients not on EKG monitoring.    If Serum K+ 3.4-4.0, dose = 20 mEq/hr x1 doses. Recheck K+ level the next AM.  If Serum K+ 3.0-3.3, dose = 20 mEq/hr x2 doses (40 mEq IV total dose).  Recheck K+ level 2 hours after dose and the next AM.  If Serum K+ less than 3.0, dose = 20 mEq/hr x3 doses (60 mEq IV total dose). Recheck K+ level 2 hours after dose and the next AM.    Admin. Amount: 20 mEq = 50 mL Conc: 20 mEq/50 mL  Dispense Loc: SH ADS 33 TOWER  Volume: 50 mL               potassium chloride SA (K-DUR/KLOR-CON M) CR tablet 20-40 mEq  Dose: 20-40 mEq  Freq: EVERY 2 HOURS PRN Route: PO  PRN Reason: potassium supplementation  Start: 10/26/18 1915   Admin Instructions: Use if able to take PO.   If Serum K+ 3.4-4.0, dose = 20 mEq x1. Recheck K+ level the next AM.  If Serum K+ 3.0-3.3, dose = 60 mEq po total dose (40 mEq x1 followed in 2 hours by 20 mEq x1). Recheck K+ level 4 hours after dose and the next AM.  If Serum K+ 2.5-2.9, dose = 80 mEq po total dose (40 mEq Q2H x2). Recheck K+ level 4 hours after dose and the next AM.  If Serum K+ less than 2.5, See IV order.  DO NOT CRUSH     Admin. Amount: 1-2 tablet (1-2 × 20 mEq tablet)  Last Admin: 10/30/18 0851  Dispense Loc:  ADS 33A          0851 (20 mEq)-Given            potassium phosphate 10 mmol in D5W 250 mL intermittent infusion  Dose: 10 mmol  Freq: DAILY PRN Route: IV  PRN Reason: phosphorous supplementation  Start: 10/26/18 1915   Admin Instructions: For serum phosphorus level 2.5-2.7  Do not infuse Phosphorus in the same line as TPN.   Give 10 mmol and recheck phosphorus level the next AM.  Each mmol of phosphate provides 1.47 mEq of Potassium. Multiply the patient's phosphate dose by 1.47 to determine the amount of potassium in this dose.    Admin. Amount: 10 mmol  Dispense Loc:  Main Pharmacy  Infused Over: 4 Hours  Volume: 250 mL   Mixture Administration Information:   Medication Type Amount   potassium phosphate 3 MMOLE/ML SOLN Medications 10 mmol   D5W 5 % SOLN Base 250 mL                       potassium phosphate 15 mmol in D5W 250 mL intermittent infusion  Dose: 15 mmol  Freq: DAILY PRN Route: IV  PRN Reason: phosphorous supplementation  Start: 10/26/18 1915   Admin Instructions: For serum phosphorus level 2.0-2.4  Do not infuse Phosphorus in the same line as TPN.   Give 15 mmol and recheck phosphorus level next AM.  Each mmol of phosphate provides 1.47 mEq of Potassium. Multiply the patient's phosphate dose by 1.47 to determine the amount of potassium in this dose.    Admin. Amount: 15 mmol  Last Admin: 10/27/18 0520  Dispense Loc:  Main Pharmacy  Infused Over: 4 Hours  Volume: 250 mL   Mixture Administration Information:   Medication Type Amount   potassium phosphate 3 MMOLE/ML SOLN Medications 15 mmol   D5W 5 % SOLN Base 250 mL               0520 (15 mmol)-New Bag               potassium phosphate 20 mmol in D5W 250 mL intermittent infusion  Dose: 20 mmol  Freq: EVERY 6 HOURS PRN Route: IV  PRN Reason: phosphorous supplementation  Start: 10/26/18 1915   Admin Instructions: For serum phosphorus level 1.1-1.9  For CENTRAL  Line ONLY  Do not infuse Phosphorus in the same line as TPN.   Give 20 mmol and recheck phosphorus level 2 hours after dose and next AM.  Each mmol of phosphate provides 1.47 mEq of Potassium. Multiply the patient's phosphate dose by 1.47 to determine the amount of potassium in this dose.    Admin. Amount: 20 mmol  Dispense Loc: [facility]  Infused Over: 4 Hours  Volume: 250 mL   Mixture Administration Information:   Medication Type Amount   potassium phosphate 3 MMOLE/ML SOLN Medications 20 mmol   D5W 5 % SOLN Base 250 mL                       potassium phosphate 20 mmol in D5W 500 mL intermittent infusion  Dose: 20 mmol  Freq: EVERY 6 HOURS PRN Route: IV  PRN Reason: phosphorous supplementation  Start: 10/26/18 1915   Admin Instructions: For serum phosphorus level 1.1-1.9  For peripheral line  Do not infuse Phosphorus in the same line as TPN.   Give 20 mmol and recheck phosphorus level 2 hours after dose and next AM. Repeat if necessary.  Each mmol of phosphate provides 1.47 mEq of Potassium. Multiply the patient's phosphate dose by 1.47 to determine the amount of potassium in this dose.    Admin. Amount: 20 mmol  Dispense Loc: [facility]  Infused Over: 4 Hours  Volume: 500 mL   Mixture Administration Information:   Medication Type Amount   potassium phosphate 3 MMOLE/ML SOLN Medications 20 mmol   D5W 5 % SOLN Base 500 mL                       potassium phosphate 25 mmol in D5W 500 mL intermittent infusion  Dose: 25 mmol  Freq: EVERY 8 HOURS PRN Route: IV  PRN Reason: phosphorous supplementation  Start: 10/26/18 1915   Admin Instructions: For serum phosphorus level less than 1.1  Do not infuse Phosphorus in the same line as TPN.   Give 25 mmol and recheck phosphorus level 2 hours after dose and next AM.  Each mmol of phosphate provides 1.47 mEq of Potassium. Multiply the patient's phosphate dose by 1.47 to determine the amount of potassium in this dose.    Admin. Amount: 25 mmol  Dispense Loc: [facility]arm[facility]  Infused Over: 6 Hours  Volume: 500 mL   Mixture Administration Information:   Medication Type Amount   potassium phosphate 3 MMOLE/ML SOLN Medications 25 mmol   D5W 5 % SOLN Base 500 mL                       prochlorperazine (COMPAZINE) injection 5 mg  Dose: 5 mg  Freq: EVERY 6 HOURS PRN Route: IV  PRN Reasons: nausea,vomiting  Start: 10/26/18 1915   Admin Instructions: This is Step 2 of nausea and vomiting management.   If nausea not resolved in 15 minutes, give metoclopramide (REGLAN) if ordered (step 3 of nausea and vomiting management)  For ordered IV doses 0.1-10 mg, give IV Push undiluted. Each 5mg over 1 minute.    Admin. Amount: 5 mg = 1 mL Conc: 5 mg/mL  Dispense Loc:  ADS 33A  Infused Over: 1-2 Minutes  Volume: 1 mL              Or  prochlorperazine (COMPAZINE) tablet 5 mg  Dose: 5 mg  Freq: EVERY 6 HOURS PRN Route: PO  PRN Reasons: nausea,vomiting  Start: 10/26/18 1915   Admin Instructions: This is Step 2 of nausea and vomiting management.   If nausea not resolved in 15 minutes, give metoclopramide (REGLAN) if ordered (step 3 of nausea and vomiting management)    Admin. Amount: 1 tablet (1 × 5 mg tablet)  Dispense Loc:  ADS 33A               sodium chloride (PF) 0.9% PF flush 3 mL  Dose: 3 mL  Freq: EVERY 8 HOURS Route: IK  Start: 10/26/18 1930   Admin Instructions: And Q1H PRN, to lock peripheral IV dormant line.    Admin. Amount: 3 mL  Last Admin: 10/31/18 0441  Dispense Loc: [facility] Floor Stock  Volume: 3 mL   Current Line: Peripheral IV 10/26/18 Left     2154 (3 mL)-Given        0648 (3 mL)-Given       (1129)-Not Given       2204 (3 mL)-Given        0434 (3 mL)-Given       1143 (3 mL)-Given       1929 (3 mL)-Given        0437 (3 mL)-Given       1219 (3 mL)-Given       1940 (3 mL)-Given        0343 (3 mL)-Given       1141 (3 mL)-Given       2004 (3 mL)-Given        0441 (3 mL)-Given       (1246)-Not Given       [ ] 2000           sodium chloride (PF) 0.9% PF flush 3 mL  Dose: 3 mL  Freq:  EVERY 1 HOUR PRN Route: IK  PRN Reason: line flush  PRN Comment: for peripheral IV flush post IV meds  Start: 10/26/18 1915   Admin. Amount: 3 mL  Dispense Loc: [facility] Floor Stock  Volume: 3 mL               tamsulosin (FLOMAX) capsule 0.4 mg  Dose: 0.4 mg  Freq: DAILY Route: PO  Start: 10/28/18 1100   Admin Instructions: Administer 30 minutes after the same meal each day.  Capsules should be swallowed whole; do not crush chew or open.    Admin. Amount: 1 capsule (1 × 0.4 mg capsule)  Last Admin: 10/31/18 0920  Dispense Loc:  ADS 33A        1140 (0.4 mg)-Given        0837 (0.4 mg)-Given        0851 (0.4 mg)-Given        0920 (0.4 mg)-Given          Discontinued Medications  Medications 10/25/18 10/26/18 10/27/18 10/28/18 10/29/18 10/30/18 10/31/18         Dose: 650 mg  Freq: EVERY 4 HOURS PRN Route: RE  PRN Reason: mild pain  Start: 10/27/18 1059   End: 10/29/18 0846   Admin Instructions: Maximum acetaminophen dose from all sources = 75 mg/kg/day not to exceed 4 grams/day.    Admin. Amount: 1 suppository (1 × 650 mg suppository)  Last Admin: 10/27/18 1203  Dispense Loc:  ADS 33A       1203 (650 mg)-Given                                     0846-Med Discontinued           Freq: CONTINUOUS PRN Route: IV  PRN Comment: Give if on IV Insulin Infusion, and Parenteral or Enteral nutrition held or cycled off.   Start: 10/26/18 2043   End: 10/29/18 0846   Admin Instructions: Infuse IV D10W at TPN/TF rate whenever nutrition is held or cycled off.    Dispense Loc: [facility] Floor Stock  Volume: 1,000 mL   Mixture Administration Information:   Medication Type Amount   dextrose 10 % SOLN Base 1,000 mL                 0846-Med Discontinued           Dose:  mcg  Freq: EVERY 1 HOUR PRN Route: IV  PRN Reason: other  PRN Comment: pain control or improvement in physical function. Hold dose for analgesic side effects.  Start: 10/26/18 1915   End: 10/29/18 0846   Admin Instructions: Start at the lowest dose. May adjust dose by 5 mcg  every 1 hour as needed.  Notify provider to assess for uncontrolled pain or analgesic side effects.  Hold while on IV PCA or with regular IV opioid dosing.  For ordered IV doses 1-100 mcg give IV Push undiluted over a minimum of 3-5 minutes.    Admin. Amount:  mcg = 1-2 mL Conc: 50 mcg/mL  Last Admin: 10/27/18 1215  Dispense Loc:  ADS 33A  Volume: 2 mL      2317 (100 mcg)-Given        0221 (100 mcg)-Given       0648 (50 mcg)-Given       1215 (50 mcg)-Given         0846-Med Discontinued           Freq: 3 TIMES DAILY WITH MEALS Route: SC  Start: 10/26/18 1930   End: 10/29/18 0846   Admin Instructions: DOSE = 1 unit/CARBOHYDRATE UNIT.  1 Carb unit equals 15 grams of carbohydrate (CHO).  Only chart total amount of units given.  Administer ONLY IF patient on IV insulin infusion, either 5 minutes before meal or immediately after meal.    Do NOT give if pre-prandial glucose less than or equal to 60 mg/dL.  If given at mealtime, administer within 30 minutes of start of meal    Last Admin: 10/28/18 1437  Dispense Loc: Contact Rx for dose  Volume: 3 mL      (2058)-Not Given        (0817)-Not Given       (1129)-Not Given       (1730)-Not Given        0808 (3 Units)-Given [C]       1437 (2 Units)-Given       (1924)-Not Given [C]        (0837)-Not Given       0846-Med Discontinued           Dose: 5 Units  Freq: EVERY MORNING BEFORE BREAKFAST Route: SC  Start: 10/28/18 0900   End: 10/29/18 0836   Admin. Amount: 5 Units  Last Admin: 10/28/18 1009  Dispense Loc:  Main Pharmacy  Volume: 0.05 mL        1009 (5 Units)-Given        0836-Med Discontinued                 Dose: 25 mg  Freq: EVERY 12 HOURS Route: PER NG TUBE  Start: 10/27/18 0800   End: 10/29/18 0826   Admin Instructions: Hold if Heart Rate less than 65 beats per minute or Systolic Blood Pressure less than 95 mmHg, bronchospasm, on inotropic continuous infusions, or being paced.  Prescriber should consider discontinuing after 7 days if patient not on beta blocker  prior to surgery.    Admin. Amount: 25 mg = 2.5 mL Conc: 10 mg/mL  Dispense Loc: Contact Rx for dose  Volume: 2.5 mL                                     0826-Med Discontinued         0826-Med Discontinued                                      Rate: 10 mL/hr   Freq: CONTINUOUS Route: IV  Last Dose: Stopped (10/28/18 1000)  Start: 10/27/18 0400   End: 10/29/18 0846   Admin Instructions: TKO    Last Admin: 10/28/18 0029  Dispense Loc: [facility] Floor Stock  Volume: 1,000 mL      2000 ( )-New Bag        1800 ( )-Rate/Dose Change        0000 ( )-Rate/Dose Change       0029 ( )-Rate/Dose Verify       1000-Stopped        0846-Med Discontinued      Medications 10/25/18 10/26/18 10/27/18 10/28/18 10/29/18 10/30/18 10/31/18

## 2018-10-26 NOTE — PROGRESS NOTES
Pt arrived ~0645.  VSS, afebrile.  IV started, labs sent and EKG complete.  Denies any discomfort.  +CMS and pulses. Spouse at bedside and waiting on MD for consent.  Will cont to monitor.

## 2018-10-27 ENCOUNTER — APPOINTMENT (OUTPATIENT)
Dept: GENERAL RADIOLOGY | Facility: CLINIC | Age: 78
DRG: 233 | End: 2018-10-27
Attending: PHYSICIAN ASSISTANT
Payer: COMMERCIAL

## 2018-10-27 LAB
ANION GAP SERPL CALCULATED.3IONS-SCNC: 8 MMOL/L (ref 3–14)
BASE DEFICIT BLDA-SCNC: 3.2 MMOL/L
BASE DEFICIT BLDA-SCNC: 4.1 MMOL/L
BLD PROD TYP BPU: NORMAL
BLD PROD TYP BPU: NORMAL
BLD UNIT ID BPU: 0
BLOOD PRODUCT CODE: NORMAL
BPU ID: NORMAL
BUN SERPL-MCNC: 24 MG/DL (ref 7–30)
CA-I BLD-MCNC: 4.6 MG/DL (ref 4.4–5.2)
CALCIUM SERPL-MCNC: 7.2 MG/DL (ref 8.5–10.1)
CHLORIDE SERPL-SCNC: 113 MMOL/L (ref 94–109)
CO2 SERPL-SCNC: 22 MMOL/L (ref 20–32)
CREAT SERPL-MCNC: 1.55 MG/DL (ref 0.66–1.25)
ERYTHROCYTE [DISTWIDTH] IN BLOOD BY AUTOMATED COUNT: 13.5 % (ref 10–15)
GFR SERPL CREATININE-BSD FRML MDRD: 44 ML/MIN/1.7M2
GLUCOSE BLDC GLUCOMTR-MCNC: 131 MG/DL (ref 70–99)
GLUCOSE BLDC GLUCOMTR-MCNC: 134 MG/DL (ref 70–99)
GLUCOSE BLDC GLUCOMTR-MCNC: 138 MG/DL (ref 70–99)
GLUCOSE BLDC GLUCOMTR-MCNC: 144 MG/DL (ref 70–99)
GLUCOSE BLDC GLUCOMTR-MCNC: 146 MG/DL (ref 70–99)
GLUCOSE BLDC GLUCOMTR-MCNC: 146 MG/DL (ref 70–99)
GLUCOSE BLDC GLUCOMTR-MCNC: 148 MG/DL (ref 70–99)
GLUCOSE BLDC GLUCOMTR-MCNC: 150 MG/DL (ref 70–99)
GLUCOSE BLDC GLUCOMTR-MCNC: 152 MG/DL (ref 70–99)
GLUCOSE BLDC GLUCOMTR-MCNC: 154 MG/DL (ref 70–99)
GLUCOSE BLDC GLUCOMTR-MCNC: 157 MG/DL (ref 70–99)
GLUCOSE BLDC GLUCOMTR-MCNC: 158 MG/DL (ref 70–99)
GLUCOSE BLDC GLUCOMTR-MCNC: 161 MG/DL (ref 70–99)
GLUCOSE BLDC GLUCOMTR-MCNC: 161 MG/DL (ref 70–99)
GLUCOSE BLDC GLUCOMTR-MCNC: 165 MG/DL (ref 70–99)
GLUCOSE BLDC GLUCOMTR-MCNC: 167 MG/DL (ref 70–99)
GLUCOSE BLDC GLUCOMTR-MCNC: 189 MG/DL (ref 70–99)
GLUCOSE SERPL-MCNC: 168 MG/DL (ref 70–99)
HCO3 BLD-SCNC: 21 MMOL/L (ref 21–28)
HCO3 BLD-SCNC: 22 MMOL/L (ref 21–28)
HCT VFR BLD AUTO: 27.2 % (ref 40–53)
HGB BLD-MCNC: 9.4 G/DL (ref 13.3–17.7)
MAGNESIUM SERPL-MCNC: 2.4 MG/DL (ref 1.6–2.3)
MCH RBC QN AUTO: 31.1 PG (ref 26.5–33)
MCHC RBC AUTO-ENTMCNC: 34.6 G/DL (ref 31.5–36.5)
MCV RBC AUTO: 90 FL (ref 78–100)
MRSA DNA SPEC QL NAA+PROBE: NEGATIVE
NUM BPU REQUESTED: 1
O2/TOTAL GAS SETTING VFR VENT: NORMAL %
OXYHGB MFR BLD: 97 % (ref 92–100)
OXYHGB MFR BLD: 98 % (ref 92–100)
PCO2 BLD: 38 MM HG (ref 35–45)
PCO2 BLD: 39 MM HG (ref 35–45)
PH BLD: 7.35 PH (ref 7.35–7.45)
PH BLD: 7.36 PH (ref 7.35–7.45)
PHOSPHATE SERPL-MCNC: 2.1 MG/DL (ref 2.5–4.5)
PLATELET # BLD AUTO: 75 10E9/L (ref 150–450)
PO2 BLD: 132 MM HG (ref 80–105)
PO2 BLD: 95 MM HG (ref 80–105)
POTASSIUM SERPL-SCNC: 4.6 MMOL/L (ref 3.4–5.3)
RBC # BLD AUTO: 3.02 10E12/L (ref 4.4–5.9)
SODIUM SERPL-SCNC: 143 MMOL/L (ref 133–144)
SPECIMEN SOURCE: NORMAL
TRANSFUSION STATUS PATIENT QL: NORMAL
TRANSFUSION STATUS PATIENT QL: NORMAL
WBC # BLD AUTO: 4.9 10E9/L (ref 4–11)

## 2018-10-27 PROCEDURE — 20000003 ZZH R&B ICU

## 2018-10-27 PROCEDURE — 94003 VENT MGMT INPAT SUBQ DAY: CPT

## 2018-10-27 PROCEDURE — 82805 BLOOD GASES W/O2 SATURATION: CPT | Performed by: PHYSICIAN ASSISTANT

## 2018-10-27 PROCEDURE — 25000128 H RX IP 250 OP 636: Performed by: PHYSICIAN ASSISTANT

## 2018-10-27 PROCEDURE — 94799 UNLISTED PULMONARY SVC/PX: CPT

## 2018-10-27 PROCEDURE — 93010 ELECTROCARDIOGRAM REPORT: CPT | Performed by: INTERNAL MEDICINE

## 2018-10-27 PROCEDURE — 25000128 H RX IP 250 OP 636: Performed by: SURGERY

## 2018-10-27 PROCEDURE — 40000008 ZZH STATISTIC AIRWAY CARE

## 2018-10-27 PROCEDURE — 25000128 H RX IP 250 OP 636: Performed by: INTERNAL MEDICINE

## 2018-10-27 PROCEDURE — P9041 ALBUMIN (HUMAN),5%, 50ML: HCPCS | Performed by: SURGERY

## 2018-10-27 PROCEDURE — 71045 X-RAY EXAM CHEST 1 VIEW: CPT

## 2018-10-27 PROCEDURE — 82330 ASSAY OF CALCIUM: CPT | Performed by: PHYSICIAN ASSISTANT

## 2018-10-27 PROCEDURE — 93005 ELECTROCARDIOGRAM TRACING: CPT

## 2018-10-27 PROCEDURE — P9037 PLATE PHERES LEUKOREDU IRRAD: HCPCS | Performed by: STUDENT IN AN ORGANIZED HEALTH CARE EDUCATION/TRAINING PROGRAM

## 2018-10-27 PROCEDURE — 85027 COMPLETE CBC AUTOMATED: CPT | Performed by: PHYSICIAN ASSISTANT

## 2018-10-27 PROCEDURE — 25000132 ZZH RX MED GY IP 250 OP 250 PS 637: Performed by: INTERNAL MEDICINE

## 2018-10-27 PROCEDURE — 25000132 ZZH RX MED GY IP 250 OP 250 PS 637: Performed by: PHYSICIAN ASSISTANT

## 2018-10-27 PROCEDURE — 40000809 ZZH STATISTIC NO DOCUMENTATION TO SUPPORT CHARGE

## 2018-10-27 PROCEDURE — 25000131 ZZH RX MED GY IP 250 OP 636 PS 637: Performed by: SURGERY

## 2018-10-27 PROCEDURE — 80048 BASIC METABOLIC PNL TOTAL CA: CPT | Performed by: PHYSICIAN ASSISTANT

## 2018-10-27 PROCEDURE — 25000132 ZZH RX MED GY IP 250 OP 250 PS 637: Performed by: SURGERY

## 2018-10-27 PROCEDURE — 25000125 ZZHC RX 250: Performed by: PHYSICIAN ASSISTANT

## 2018-10-27 PROCEDURE — 99291 CRITICAL CARE FIRST HOUR: CPT | Performed by: NURSE PRACTITIONER

## 2018-10-27 PROCEDURE — 83735 ASSAY OF MAGNESIUM: CPT | Performed by: PHYSICIAN ASSISTANT

## 2018-10-27 PROCEDURE — C9113 INJ PANTOPRAZOLE SODIUM, VIA: HCPCS | Performed by: PHYSICIAN ASSISTANT

## 2018-10-27 PROCEDURE — 40000275 ZZH STATISTIC RCP TIME EA 10 MIN

## 2018-10-27 PROCEDURE — 84100 ASSAY OF PHOSPHORUS: CPT | Performed by: PHYSICIAN ASSISTANT

## 2018-10-27 PROCEDURE — 25000125 ZZHC RX 250: Performed by: SURGERY

## 2018-10-27 PROCEDURE — 00000146 ZZHCL STATISTIC GLUCOSE BY METER IP

## 2018-10-27 RX ORDER — SODIUM CHLORIDE 9 MG/ML
INJECTION, SOLUTION INTRAVENOUS CONTINUOUS
Status: DISCONTINUED | OUTPATIENT
Start: 2018-10-27 | End: 2018-10-29

## 2018-10-27 RX ORDER — ALBUMIN, HUMAN INJ 5% 5 %
250 SOLUTION INTRAVENOUS ONCE
Status: COMPLETED | OUTPATIENT
Start: 2018-10-27 | End: 2018-10-27

## 2018-10-27 RX ORDER — NITROGLYCERIN 20 MG/100ML
0.07-2 INJECTION INTRAVENOUS CONTINUOUS
Status: DISCONTINUED | OUTPATIENT
Start: 2018-10-27 | End: 2018-10-27

## 2018-10-27 RX ORDER — ACETAMINOPHEN 650 MG/1
650 SUPPOSITORY RECTAL EVERY 4 HOURS PRN
Status: DISCONTINUED | OUTPATIENT
Start: 2018-10-27 | End: 2018-10-29

## 2018-10-27 RX ORDER — ACETAMINOPHEN 325 MG/1
650 TABLET ORAL EVERY 4 HOURS PRN
Status: DISCONTINUED | OUTPATIENT
Start: 2018-10-27 | End: 2018-10-31 | Stop reason: HOSPADM

## 2018-10-27 RX ADMIN — HEPARIN SODIUM 5000 UNITS: 5000 INJECTION, SOLUTION INTRAVENOUS; SUBCUTANEOUS at 20:24

## 2018-10-27 RX ADMIN — OXYCODONE HYDROCHLORIDE 5 MG: 5 TABLET ORAL at 22:04

## 2018-10-27 RX ADMIN — CEFAZOLIN SODIUM 2 G: 2 INJECTION, SOLUTION INTRAVENOUS at 10:52

## 2018-10-27 RX ADMIN — CEFAZOLIN SODIUM 2 G: 2 INJECTION, SOLUTION INTRAVENOUS at 17:57

## 2018-10-27 RX ADMIN — FENTANYL CITRATE 100 MCG: 50 INJECTION INTRAMUSCULAR; INTRAVENOUS at 02:21

## 2018-10-27 RX ADMIN — CHLORHEXIDINE GLUCONATE 15 ML: 1.2 RINSE ORAL at 08:16

## 2018-10-27 RX ADMIN — SODIUM BICARBONATE 50 MEQ: 84 INJECTION, SOLUTION INTRAVENOUS at 00:04

## 2018-10-27 RX ADMIN — PANTOPRAZOLE SODIUM 40 MG: 40 INJECTION, POWDER, FOR SOLUTION INTRAVENOUS at 08:13

## 2018-10-27 RX ADMIN — CALCIUM GLUCONATE 1 G: 98 INJECTION, SOLUTION INTRAVENOUS at 08:44

## 2018-10-27 RX ADMIN — FENTANYL CITRATE 50 MCG: 50 INJECTION INTRAMUSCULAR; INTRAVENOUS at 06:48

## 2018-10-27 RX ADMIN — OXYCODONE HYDROCHLORIDE 5 MG: 5 TABLET ORAL at 18:07

## 2018-10-27 RX ADMIN — ACETAMINOPHEN 650 MG: 325 TABLET, FILM COATED ORAL at 20:24

## 2018-10-27 RX ADMIN — PROPOFOL 50 MCG/KG/MIN: 10 INJECTION, EMULSION INTRAVENOUS at 00:57

## 2018-10-27 RX ADMIN — FENTANYL CITRATE 50 MCG: 50 INJECTION INTRAMUSCULAR; INTRAVENOUS at 12:15

## 2018-10-27 RX ADMIN — ALBUMIN HUMAN 250 ML: 0.05 INJECTION, SOLUTION INTRAVENOUS at 08:13

## 2018-10-27 RX ADMIN — CEFAZOLIN SODIUM 2 G: 2 INJECTION, SOLUTION INTRAVENOUS at 02:20

## 2018-10-27 RX ADMIN — SODIUM CHLORIDE 3 UNITS/HR: 9 INJECTION, SOLUTION INTRAVENOUS at 11:44

## 2018-10-27 RX ADMIN — LIDOCAINE 1 PATCH: 560 PATCH PERCUTANEOUS; TOPICAL; TRANSDERMAL at 08:13

## 2018-10-27 RX ADMIN — POTASSIUM PHOSPHATE, MONOBASIC AND POTASSIUM PHOSPHATE, DIBASIC 15 MMOL: 224; 236 INJECTION, SOLUTION INTRAVENOUS at 05:20

## 2018-10-27 RX ADMIN — ACETAMINOPHEN 650 MG: 650 SUPPOSITORY RECTAL at 12:03

## 2018-10-27 RX ADMIN — PROPOFOL 20 MCG/KG/MIN: 10 INJECTION, EMULSION INTRAVENOUS at 04:53

## 2018-10-27 ASSESSMENT — ACTIVITIES OF DAILY LIVING (ADL)
ADLS_ACUITY_SCORE: 9
ADLS_ACUITY_SCORE: 12

## 2018-10-27 ASSESSMENT — PAIN DESCRIPTION - DESCRIPTORS
DESCRIPTORS: ACHING

## 2018-10-27 NOTE — PROGRESS NOTES
IABP dcd by fellow at the bedside, fem stop applied per protocol, on bedrest for 6 hours till 1500.

## 2018-10-27 NOTE — PROGRESS NOTES
Pt was extubated @1518 and placed on aerosol mask 40%, no complications post extubation. RT will continue to monitor the pt.     Yaya Fuentes RT.

## 2018-10-27 NOTE — PLAN OF CARE
Problem: Patient Care Overview  Goal: Plan of Care/Patient Progress Review  Outcome: Improving  N: Remains on propofol gtt for sedation. Prn fentanyl for pain. Able to follow some commands, moves all extremities. PERRL.  CV: Off vasoactive gtts since last evening. Tele shows SR with 1st degree AVB. 1+ pulses in LE, 2+ pulses in UE. IABP in placed; frequency changed from 1:2 at 0400 and 1:3 at 0600 per Dr. Kessler.  Pulm: Lung sounds clear. Tolerating full vent support. Scant amout of thick, creamy secretions per ETT.  GI/: Hypoactive BS. Adequate UO throughout the night but tapering off this am.   Endocrine: Insulin gtt infusing.    Pt's wife updated on plan of care last evening. Plan for possible removal of IABP today. Will continue to monitor.

## 2018-10-27 NOTE — PROGRESS NOTES
Ventilation Mode: CMV/AC  (Continuous Mandatory Ventilation/ Assist Control)  FiO2 (%): 40 %  Rate Set (breaths/minute): 18 breaths/min  Tidal Volume Set (mL): 500 mL  PEEP (cm H2O): 5 cmH2O  Oxygen Concentration (%): 40 %  Resp: 18      Recent Labs  Lab 10/27/18  0405 10/26/18  2330 10/26/18  2045 10/26/18  1309   PH 7.35 7.36 7.27* 7.38   PCO2 38 36 38 41   PO2 132* 136* 157* 481*   HCO3 21 20* 18* 24     Plan: continue with full vent support. Weaning trial during the day as tolerated.  Will continue to follow.  Domi Fontenot

## 2018-10-27 NOTE — ANESTHESIA POSTPROCEDURE EVALUATION
Patient: Merlin J Anderson    Procedure(s):  CORONARY ARTERY BYPASS GRAFTING X 4 WITH LEFT LEG ENDOSCOPIC VEIN HARVESTING LIMA - LAD  SV- RIGHT PDA, OM2, OM1 ON PUMP/BETH IAPB PLACED PER CATH LAB    Diagnosis:UNKNOWN  Diagnosis Additional Information: No value filed.    Anesthesia Type:  General, ETT    Note:  Anesthesia Post Evaluation    Patient location during evaluation: ICU  Pain management: adequate  Airway patency: patent  Cardiovascular status: acceptable  Respiratory status: acceptable  Hydration status: acceptable  PONV: none     Anesthetic complications: None    Comments: Initially a bit hypotensive on arrival to ICU.  Titrated meds and albumin infused.  Stabilized on low dose pressors.  Remainder of care per ICU team.          Last vitals:  Vitals:    10/26/18 1100 10/26/18 1951 10/26/18 2100   BP: 123/76     Pulse:      Resp: 16  16   Temp:      SpO2: 96% 100%          Electronically Signed By: Dale Cabezas MD  October 26, 2018  9:17 PM

## 2018-10-27 NOTE — PROVIDER NOTIFICATION
Dr ren notified  About MAp mid 50's, order received for 1gm kevin gluconate and 250ml of 5% ALBUMIN

## 2018-10-27 NOTE — PLAN OF CARE
Problem: Patient Care Overview  Goal: Plan of Care/Patient Progress Review  Outcome: Improving  Neuro: AXOX3, mild incisional pain, prn pain meds given, dangled at the side of the bed, unable to get upto the chair-pt very stiff and weak   CV: SA with 1AVB, BP within parameters, pacer wires capped  Pulm: on 2LNC, Ls clear dim at base, IS encouraged to 750ml, dry, weak non productive cough, CT output slowing.  GI/: tolerating ice chips and clears, no appetite. Garcia with marginal UOP  Skin: incisions ELÍAS, rt groin site CDI, left leg harvest site CDI  Lines: RIJ, PIV X2    Plan: Rest overnight, attempt UIC.   Up in the chair tomorrow TID for meals, anticipate sliding scale insulin once taking PO and transfer OO ICU, spouse- sun and son at the bedside updated about POC. Continue to monitor

## 2018-10-27 NOTE — BRIEF OP NOTE
[facility] Brief Operative Note    Pre-operative diagnosis: Coronary Artery Disease   Post-operative diagnosis SAME   Procedure: Procedure(s):  CORONARY ARTERY BYPASS GRAFTING X 4 WITH LEFT LEG ENDOSCOPIC VEIN HARVESTING LIMA - LAD  SV- RIGHT PDA, OM2, OM1 ON PUMP/BETH IAPB PLACED PER CATH LAB   Surgeon(s): Surgeon(s) and Role:     * Heath Kessler MD - Primary     * Chelsi Alexandra PA-C - Assisting   Estimated blood loss: * No values recorded between 10/26/2018  1:12 PM and 10/26/2018  6:57 PM *    Specimens: * No specimens in log *   Findings: Dictated

## 2018-10-27 NOTE — CONSULTS
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  Had balloon pump placed and went for cab4 today.  Now to ICU for post-op cares.      Assessment and plan :     Merlin J Anderson IS a 78 year old male admitted on 10/26/2018 for cardiogenic shock in setting of severe coronary artery disease.     My assessment and plan by system for this patient is as follows:    Neurology/Psychiatry:   1. Analgesia -- tylenol, prn narcotics  2.  Anxiety -- wean sedative drips to extubate.  No acute anxiety needs at this time.    Cardiovascular:   1. Shock, post-procedure, likely mixed vasoplegic and cardiogenic -  Likely some degree of residual cardiogenic shock given the degree of CAD.  bp stable/acceptable on balloon pump at 1:1.  Will keep at 1:1 tonight and wean in AM.  Most likely also an element of vasoplegia which would be expected after a procedure of this magnitude. Continue phenylephrine and epinephrine prn.  2.  S/p CAB -- ASA    Pulmonary/Ventilator Management:   1. Airway -- intubated post-op.  Weaning to extubate once balloon pump out.  2. No underlying pulmonary problems on medical history or cxr.     GI and Nutrition :   1. Diet when cleared by CV surgery   2. Acid suppression for PUD prophy    Renal/Fluids/Electrolytes:   1. Monitor UOP/creatinine post-op.  2. Replete electrolytes prn  3. IVF and albumin administration for volume prn per protocol    Infectious Disease:   1. Prophylactic abx post-op per CV surgery    Endocrine:   1. Monitor for stress induced hyperglycemia. ICU  insulin protocol, goal sugar <180     Hematology/Oncology:   1. Hgb/pltlts stable post op.  pRBC for goal hgb 8.0 or as indicated by CV surgery     IV/Access:   1. Venous access - Central access from OR  2. Arterial access - remove A-line when off vasoactives and extubated    ICU Prophylaxis:   1. DVT: Hep Subq/mechanical  2. VAP: HOB 30 degrees, chlorhexidine rinse  3. Stress Ulcer: PPI  4. Restraints: Nonviolent soft two point restraints required and necessary for patient safety and continued cares and good effect as patient continues to pull at necessary lines, tubes despite education and distraction. Will readdress daily.   5. IV Access - central access required and necessary for continued patient cares  6. Disposition - ICU          Medical History:     Past Medical History:   Diagnosis Date     Diabetes mellitus (H)     Pre diabetic     Essential hypertension, benign      Past Surgical History:   Procedure Laterality Date     C NONSPECIFIC PROCEDURE  04/97    Neg. colonoscopy.     C NONSPECIFIC PROCEDURE      S/P ing. hernia.     COLONOSCOPY       DECOMPRESSION, FUSION LUMBAR POSTERIOR ONE LEVEL, COMBINED  8/24/2012    Procedure: COMBINED DECOMPRESSION, FUSION LUMBAR POSTERIOR ONE LEVEL;  Posterior Fusion wtih Decompression L4-5;  Surgeon: Rod Carbajal MD;  Location: [facility] OR     ENDARTERECTOMY CAROTID Right 9/18/2017    Procedure: ENDARTERECTOMY CAROTID;  RIGHT CAROTID ENDARTERECTOMY, WITH PATCH ANGIOPLASTY, WITH ELECTOENCEPHALOGAM            (EEG);  Surgeon: Catalino Scott MD;  Location: [facility] OR     HERNIA REPAIR       Social History     Social History     Marital status:      Spouse name: N/A     Number of children: N/A     Years of education: N/A     Occupational History     Not on file.     Social History Main Topics     Smoking status: Never Smoker     Smokeless tobacco: Never Used     Alcohol use Yes      Comment: 1 monthly or less.     Drug use: No     Sexual activity: Yes     Partners:  Female     Other Topics Concern     Caffeine Concern No     Occupational Exposure No     Hobby Hazards No     Sleep Concern No     Stress Concern No     Weight Concern No     Special Diet No     Back Care No     Exercise Yes     bikes, treadmill      Seat Belt Yes     Social History Narrative      No Known Allergies           Key Medications:       acetaminophen  975 mg Oral Q8H     [START ON 10/27/2018] aspirin  325 mg Oral Daily     [START ON 10/27/2018] ceFAZolin  2 g Intravenous Q8H     [START ON 10/27/2018] heparin  5,000 Units Subcutaneous Q8H     insulin aspart   Subcutaneous TID w/meals     [START ON 10/27/2018] lidocaine  1 patch Transdermal Q24H     [START ON 10/27/2018] lidocaine   Transdermal Q8H     [START ON 10/27/2018] lidocaine   Transdermal Q24h     [START ON 10/27/2018] metoprolol tartrate  25 mg Oral Q12H    Or     [START ON 10/27/2018] metoprolol  25 mg Per NG tube Q12H     mupirocin  0.5 g Both Nostrils BID     pantoprazole (PROTONIX) IV  40 mg Intravenous Daily     sodium chloride (PF)  3 mL Intracatheter Q8H       aminocaproic acid (AMICAR) infusion ADULT 1 g/hr (10/26/18 1915)     dextrose 5% and 0.45% NaCl + KCl 20 mEq/L 30 mL/hr at 10/26/18 2003     EPINEPHrine IV infusion ADULT Stopped (10/26/18 1950)     norepinephrine 0.03 mcg/kg/min (10/26/18 2015)     phenylephrine IV infusion ADULT Stopped (10/26/18 2001)        Home Meds    No current facility-administered medications on file prior to encounter.   Current Outpatient Prescriptions on File Prior to Encounter:  ASPIRIN EC PO Take 81 mg by mouth every morning   atenolol (TENORMIN) 100 MG tablet Take 1 tablet (100 mg) by mouth every morning   atorvastatin (LIPITOR) 40 MG tablet Take 1 tablet (40 mg) by mouth daily   Fluticasone Propionate (FLONASE NA) Spray 1-2 sprays in nostril daily as needed   lisinopril (PRINIVIL) 20 MG tablet Take 1 tablet (20 mg) by mouth daily   metFORMIN (GLUCOPHAGE) 500 MG tablet Take 1 tablet (500 mg) by mouth 2  times daily (with meals)   multivitamin, therapeutic with minerals (MULTI-VITAMIN) TABS tablet Take 1 tablet by mouth every morning   tamsulosin (FLOMAX) 0.4 MG capsule Take 1 capsule (0.4 mg) by mouth daily   blood glucose calibration (ACCU-CHEK PRISCILLA) solution Use to calibrate blood glucose monitor as needed as directed.   blood glucose monitoring (ACCU-CHEK PRISCILLA) test strip Use to test blood sugars 1 times daily or as directed.   blood glucose monitoring (ACCU-CHEK MULTICLIX) lancets Use to test blood sugar 1 times daily or as directed.   nitroGLYcerin (NITROSTAT) 0.4 MG sublingual tablet For chest pain place 1 tablet under the tongue every 5 minutes for 3 doses. If symptoms persist 5 minutes after 1st dose call 911.              Physical Examination:   Temp:  [96  F (35.6  C)] 96  F (35.6  C)  Pulse:  [58] 58  Heart Rate:  [62] 62  Resp:  [16-18] 16  BP: (121-140)/() 123/76  FiO2 (%):  [40 %] 40 %  SpO2:  [93 %-100 %] 100 %    Intake/Output Summary (Last 24 hours) at 10/26/18 2026  Last data filed at 10/26/18 1912   Gross per 24 hour   Intake             5224 ml   Output              350 ml   Net             4874 ml     Wt Readings from Last 4 Encounters:   10/26/18 96.2 kg (212 lb)   10/02/18 96.2 kg (212 lb)   05/18/18 98 kg (216 lb)   04/04/18 96.9 kg (213 lb 11.2 oz)     BP - Mean:  [] 109  Ventilation Mode: CMV/AC  (Continuous Mandatory Ventilation/ Assist Control)  FiO2 (%): 40 %  Rate Set (breaths/minute): 16 breaths/min  Tidal Volume Set (mL): 650 mL  PEEP (cm H2O): 5 cmH2O  Oxygen Concentration (%): 40 %  Resp: 16    Recent Labs  Lab 10/26/18  1309   PH 7.38   PCO2 41   PO2 481*   HCO3 24       GEN: no acute distress, intubated/sedated  HEENT: head ncat, sclera anicteric, OP patent, trachea midline, PERRL  PULM: unlabored synchronous with vent, clear anteriorly    CV/COR: RRR S1S2 no gallop,  No rub, no murmur.  Midline incision dressed, c/d/i.  ABD: soft nontender, hypoactive bowel sounds,  no mass  EXT:   No Edema,   Warm x4  NEURO: sedated but no gross deficit apparent  SKIN: no obvious rash  LINES: clean, dry intact           Data:   All data and imaging reviewed     ROUTINE ICU LABS (Last four results)  CMP  Recent Labs  Lab 10/26/18  1755 10/26/18  1309 10/26/18  0725    140 141   POTASSIUM 4.8 4.4 4.5   CHLORIDE 112*  --  109   CO2 19*  --  25   ANIONGAP 12  --  7   *  --  142*   BUN 21  --  24   CR 1.25  --  1.46*   GFRESTIMATED 56*  --  47*   GFRESTBLACK 68  --  56*   VICKY 7.6*  --  9.1   MAG 2.2  --   --    PROTTOTAL 4.2*  --  6.5*   ALBUMIN 2.5*  --  3.6   BILITOTAL 0.8  --  0.7   ALKPHOS 48  --  79   AST 53*  --  22   ALT 21  --  31     CBC  Recent Labs  Lab 10/26/18  1755 10/26/18  1309 10/26/18  0725   WBC 9.3  --  5.9   RBC 2.72*  --  3.97*   HGB 8.4* 9.9* 12.2*   HCT 25.1*  --  35.8*   MCV 92  --  90   MCH 30.9  --  30.7   MCHC 33.5  --  34.1   RDW 13.2  --  12.8   PLT 91*  --  135*     INR  Recent Labs  Lab 10/26/18  1755 10/26/18  0725   INR 1.67* 1.08     Arterial Blood Gas  Recent Labs  Lab 10/26/18  1309   PH 7.38   PCO2 41   PO2 481*   HCO3 24       All cultures:  No results for input(s): CULT in the last 168 hours.  Recent Results (from the past 24 hour(s))   US Carotid Bilateral    Narrative    PROCEDURE:  Bilateral carotid doppler ultrasound    DATE OF PROCEDURE:   10/26/2018 11:01 AM    CLINICAL HISTORY/INDICATION:    78-year-old male preoperative CABG    COMPARISON:  8/17/2017    TECHNIQUE:  Grayscale, color-flow and spectral waveform analysis with velocities  were performed of the bilateral carotid arteries.     FINDINGS:    Right:   Plaque: No significant plaques    Right ICA Proximal PSV/EDV:  156/56  cm/sec.  Right ICA Mid PSV/EDV:  198/58 cm/sec.  Right ICA Dist PSV/EDV:  134/44 cm/sec.  Right ICA/CCA PSV Ratio:  2.1    These indicate  50 - 69% diameter stenosis of the right ICA.    Right Vertebral: antegrade flow  Right ECA: antegrade flow    Left:   Plaque:  There is scattered hyperechoic plaque about the bifurcation    Left ICA Proximal PSV/EDV: 307/93  cm/sec.  Left ICA Mid PSV/EDV:  256/88 cm/sec.  Left ICA Dist PSV/EDV: 172/43 cm/sec.  Left ICA/CCA PSV Ratio:  2.4    These indicate greater than 70% diameter stenosis of the left ICA.    Left Vertebral: antegrade flow  Left ECA: antegrade flow      Impression    IMPRESSION:    1. 50-69% stenosis of the right internal carotid artery relative to  the normal diameter internal carotid artery.  2. 70% or greater stenosis of the left internal carotid artery  relative to the normal diameter internal carotid artery. Worsened from  prior examination.    Degree of stenosis measured relative to the diameter of the normal  internal carotid artery per NASCET or NASCET type criteria    <50% stenosis  -ICA PSV <125 cm/sec and plaque or intimal thickening is visible  sonographically.   -ICA/CCA PSV ratio <2.0 and ICA EDV < 40 cm/sec    50-69% stenosis  -ICA -230 cm/sec and plaque visible sonographically  -ICA/CCA PSC ratio 2.0-4.0 and ICA EDV of  cm/sec    70% or greater stenosis  -ICA PSV is >230 cm/sec and visible plaque and luminal narrowing are  seen at grayscale and color doppler  -ICA/CCA PSV ratio >4 and ICA EDV >100 cm/sec    JEFF MCKEON MD   US Lower Extremity Venous Mapping Bilateral    Narrative    PROCEDURE:  Ultrasound mapping bilateral lower extremities    DATE OF PROCEDURE:  10/26/2018 11:02 AM     CLINICAL HISTORY/INDICATION:   Pre CABG;     FINDINGS/    Impression    IMPRESSION:   1. Right greater saphenous vein in the thigh ranges from 7.7 mm to 4.1  mm.  2. Right greater saphenous vein at and below the knee ranges from 4.2  mm to 2.9 mm.  3. Left greater saphenous vein in the thigh ranges from 6.8 mm to 2.9  mm.  4. Left greater saphenous vein at and below the knee ranges from 4.3  mm to 2.9 mm.    JEFF MCKEON MD   XR Chest Port 1 View    Narrative    CHEST ONE VIEW PORTABLE   10/26/2018 7:39  PM     HISTORY: Postoperative CVTS surgery.    COMPARISON: None.      Impression    IMPRESSION: The tip of the endotracheal tube is 4.4 cm above the  aravind. The tip of the right internal jugular pulmonary arterial  catheter is projected over the most proximal portion of the right  pulmonary artery. There are median sternotomy wires and clips related  to coronary artery bypass. A mediastinal drain is noted. No  pneumothorax or significant pleural effusion. Retrocardiac airspace  opacity may represent atelectasis.    DEVAN HYATT MD     Labs (personally reviewed) 17:55 :   Lytes ok.  creat downtrending 1.25.  hgb with expected drop 9.9->8.4 appropriate to surgery.  pltls acceptable 91.      CXR (personally reviewed/interpreted):  [place] and ETT in acceptable postion.  Lungs clear.    EKG (personally reviewed/interpreted):  Sinus 73.  Mild pr prolongation 278, other intervals ok.  Borderline R axis.  Tw inversions in lateral and inferior leads.    Billing: This patient is critically ill: Yes. Total critical care time today 45 min.

## 2018-10-27 NOTE — PLAN OF CARE
Problem: Patient Care Overview  Goal: Plan of Care/Patient Progress Review  CR: Orders received, chart reviewed. Pt remains intubated on full vent support with balloon pump. Not appropriate for CR at this time.

## 2018-10-27 NOTE — OP NOTE
DATE OF SERVICE: 10/26/2018.     PREOPERATIVE DIAGNOSES:  1.  Severe left main and multivessel coronary artery disease.  2.  Stable angina.     POSTOPERATIVE DIAGNOSES:  1.  Severe left main and multivessel coronary artery disease.  2.  Stable angina.     PROCEDURE PERFORMED:  1.  Coronary artery bypass grafting x 4 (reversed saphenous vein graft to the right posterior descending coronary artery, reversed saphenous vein graft to the left posterolateral branch of the left circumflex coronary artery, reversed saphenous vein graft to the obtuse marginal branch of the left circumflex coronary artery, and pedicled left internal mammary artery to left anterior descending coronary artery).  2.  Endoscopic vein harvest of the greater saphenous vein from the left lower extremity.     SURGEON:  Heath Kessler MD, PhD.     FIRST ASSISTANT:  Chelsi Ayers PA-C     ANESTHESIA:  General endotracheal anesthesia.     ANESTHESIOLOGIST:  Tino Louise MD     ESTIMATED BLOOD LOSS:  500 mL     SPECIMEN:  None.     INDICATIONS FOR PROCEDURE: Mr. Warner is a 78 year man who had a positive stress test.  Coronary angiography demonstrates severe left main and multivessel coronary artery disease. Echocardiography demonstrates preserved left ventricular function and no significant valvular abnormality. An intra-aortic balloon pump was placed preoperatively in the cardiac catheterization laboratory. The patient understands the risks and benefits of the procedure and wishes to undergo the operation.     OPERATIVE FINDINGS:  The left internal mammary artery was 2 mm in diameter and had excellent flow.  The greater saphenous vein from the left lower extremity was 4-5 mm in diameter and suitable for conduit.  The ascending aorta was free of calcified plaque.  The right posterior descending coronary artery was 1.75 mm in diameter and free of disease at the site of anastomosis. The left posterolateral branch of the left circumflex coronary  artery was 1.75 mm in diameter and free of disease at the site of anastomosis. The obtuse marginal branch of the left circumflex coronary artery was 1.75 mm in diameter and free of disease at the site of anastomosis. The left anterior descending coronary artery 2 mm in diameter and free of disease at the site of anastomosis.  After reperfusion, sinus rhythm resumed.  Left ventricular function was normal preoperatively and unchanged after bypass on low dose-dose inotropic support.     OPERATIVE DESCRIPTION IN DETAIL:  After obtaining informed consent, the patient was brought to the operating room and placed in the supine position on the operating room table.  Appropriate lines and devices for monitoring were placed by the  anesthesia team.  The patient underwent smooth induction of general anesthesia, and the trachea was intubated orally.  A right internal jugular Cordis introducer was placed, and a Radcliffe-Cira catheter was placed through this.  The patient was prepped  and draped, and a timeout was performed to confirm the correct patient identity, as well as the procedure to be performed.  A median sternotomy was performed and the left internal mammary artery was harvested.  The greater saphenous vein was  harvested from the left lower extremity using endoscopic vein harvesting by the physician assistant, Chelsi Alexandra PA-C.     The patient was given full dose heparin, and after cannulation of the distal ascending aorta and the right atrium, cardiopulmonary bypass was commenced.  Antegrade and retrograde cardioplegia cannulae were placed, and the heart was  arrested with 1 liter of cold antegrade blood cardioplegia.  Subsequent maintenance doses of 300 mL of cold retrograde blood cardioplegia were given approximately every 20 minutes or after each distal anastomosis.  Topical cold saline slush was applied  for additional protection.     The following grafts were constructed in end-to-side fashion using running 7-0  Prolene:  A reversed saphenous vein graft was sewn to the proximal right posterior descending coronary artery, a reversed saphenous vein graft was sewn to the proximal left posterolateral branch of the left circumflex coronary artery, and a reversed saphenous vein graft was sewn to the proximal obtuse marginal branch of the left circumflex coronary artery. The pedicled left internal mammary artery was sewn to the mid left anterior descending coronary artery in end-to-side fashion using running 8-0 Prolene.  The proximal anastomoses of the 3 vein grafts were constructed on the ascending aorta in end-to-side fashion using running 6-0 Prolene under a single crossclamp.  The crossclamp was released, and the heart was resuscitated. Bleeding that could not be repair was noted from the proximal anastomosis of the vein graft to the right coronary artery, so the partial aortic clamp was placed and this anastomosis was redone with running 6-0 Prolene.      All bleeding points were controlled. Two unipolar ventricular pacing leads were placed and brought out through a separate stab incision. Two unipolar right atrial pacing leads were placed and brought out through a separate stab incision.  The patient weaned from cardiopulmonary bypass, was given protamine and decannulated.  A 24-Belgian George drain was placed in the left pleural space and brought out through a separate stab incision.  A 28-Belgian chest tube was placed in the anterior mediastinum and brought out through a separate stab incision.  The sternal edges were reapposed with 3 interrupted #6 stainless steel sternal wires in the manubrium, 3 double wires in the body of the sternum and 1 additional #6 stainless steel sternal wire at the lower aspect of the sternum.  The muscle, fascia, and skin were closed in layers with absorbable suture.  Dermabond was applied.  All sponge, needle and instrument counts were correct at the end of the case.  Total cardiopulmonary  bypass time was 170 minutes.  Aortic crossclamp time was 104 minutes. The patient received 1 unit of packed red blood cells and one unit of platelets during the procedure.        JEREMY HANLEY MD

## 2018-10-27 NOTE — PROVIDER NOTIFICATION
Dr. Huddleston called with labs post bicarb and blood product administration. Hgb 8.7, CT output roughly 50 mL/hr; will continue to monitor. Bicarb still low (20) on ABG; will give 1 amp bicarb again.

## 2018-10-27 NOTE — CONSULTS
CARDIAC SURGERY NUTRITION CONSULT    Received standing order to assess and educate patient.    Will follow and complete assessment once patient is extubated and/or is transferred to medical unit.    Patient will receive nutrition education during the Outpatient Cardiac Rehab Program (nutrition classes/dietitian counseling).    Maria Ines Grossman RD, LD, [facility]   Clinical Dietitian - [facility]

## 2018-10-27 NOTE — PROVIDER NOTIFICATION
Dr. Kessler notified of hgb 6.9, plt 81, pH 7.27, and Bicarb 20. 2 units PRBCs and 1 amp bicarb ordered. Vent rate changed to 18 BPM. Order to recheck ABG and CBC one hour after vent change.

## 2018-10-27 NOTE — ANESTHESIA CARE TRANSFER NOTE
Patient: Merlin J Anderson    Procedure(s):  CORONARY ARTERY BYPASS GRAFTING X 4 WITH LEFT LEG ENDOSCOPIC VEIN HARVESTING LIMA - LAD  SV- RIGHT PDA, OM2, OM1 ON PUMP/BETH IAPB PLACED PER CATH LAB    Diagnosis: UNKNOWN  Diagnosis Additional Information: No value filed.    Anesthesia Type:   General, ETT     Note:  Airway :ETT and Ventilator  Patient transferred to:ICU  ICU Handoff: Call for PAUSE to initiate/utilize ICU HANDOFF, Identified Patient, Identified Responsible Provider, Reviewed the Pertinent Medical History, Discussed Surgical Course, Reviewed Intra-OP Anesthesia Management and Issues during Anesthesia, Set Expectations for Post Procedure Period and Allowed Opportunity for Questions and Acknowledgement of Understanding    Patient connected to SpO2, EKG, and arterial blood pressure transport monitors and accompanied by CRNA, anesthesiologist, circulating RN, specialty care technician, surgeon (attending) to ICU room. Patient ventilated by CRNA with ambu via ETT with O2 at 10 liters per minute during transport.     On arrival to ICU, endotracheal tube position unchanged, equal, bilateral breath sounds auscultated in ICU room, patient placed on ICU ventilator by RN, teeth and oral mucosa intact and unchanged at handoff of care. At anesthesia handoff of care, clinical monitors and alarms on and functioning, report on patient's clinical status given to ICU RN, ICU staff questions answered.    Electronically Signed By: DIRK Alexandre CRNA  October 26, 2018  7:12 PM

## 2018-10-27 NOTE — PROGRESS NOTES
[facility]  Critical Care Service  Progress Note  Date of Service (when I saw the patient): 10/27/2018  Main Plans for Today    1. [facility]S to remove IABP  2. Sedation vacation and SBT, hopeful to extubate 4-6 hours after IABP out, when pt can sit up  3. Continue with CVS pathway    Assessment & Plan   Merlin J Anderson is a 78 year old male who had an outpatient evaluation for dyspnea with exertion and ultimately was found to have severe L main stenosis and 3 vessel disease on cath 10/26/18.  Pt had IABP placed and went for CABG x 4 by Dr Kessler . transferred to ICU for post-op cares.    1. Acute respiratory failure  2. Severe left main and multivessel coronary artery disease   IABP   POD# 1 CABG x 4 by Dr Kessler  3. CKD   4. Cardiogenic [id number] coming out this am    Neuro  1. Analgesia -- tylenol, prn narcotics  2. Anxiety -- wean sedative drips to extubate.  No acute anxiety needs at this time.    CV  1. Shock, post-procedure, likely mixed vasoplegic and cardiogenic. IABP now 1:3 per CVS, they will remove this am  2.  S/p CABG X 4      Resp:  1. Airway -- intubated post-op.  Weaning to extubate once balloon pump out.  2. No underlying pulmonary problems on medical history or cxr.      GI/Nutrition   Diet when cleared by CV surgery               2. Acid suppression for PUD prophy     Renal  Monitor UOP/creatinine post-op.  2. Replete electrolytes prn  3. IVF and albumin administration for volume prn per protocol     ID  1.  Prophylactic abx post-op per CV surgery    Endocrine   1. Monitor for stress induced hyperglycemia. ICU insulin protocol, goal sugar <180     Heme:  1. Hgb/pltlts stable post op.  pRBC for goal hgb 8.0 or as indicated by CV surgery      IV/Access:   1. Venous access - Central access from OR  2. Arterial access - remove A-line when off vasoactives and extubated     General cares:  DVT Prophylaxis: Pneumatic Compression Devices  GI Prophylaxis: PPI  Restraints:  Restraints for medical healing needed: YES    Current lines are required for patient management    Jailene Williamson CNP  Time Spent on this Encounter   Billing:  I spent 45 minutes bedside and on the inpatient unit today managing the critical care of Merlin J Anderson in relation to the issues listed in this note.    Interval History   S/p CABG x 4, IABP  IABP now on 1:3    Physical Exam   Temp: 100.6  F (38.1  C) Temp src: Bladder Temp  Min: 91.6  F (33.1  C)  Max: 100.6  F (38.1  C) BP: 102/51   Heart Rate: 64 Resp: 18 SpO2: 99 % O2 Device: Mechanical Ventilator    Vitals:    10/26/18 0645   Weight: 96.2 kg (212 lb)     I/O last 3 completed shifts:  In: 7054.68 [I.V.:5030.68; Other:255]  Out: 1670 [Urine:950; Chest Tube:720]    GEN: in bed lightly sedatred  EYES: PERRL, Anicteric sclera.   HEENT:  Normocephalic, atraumatic, trachea midline, OETT secure  CV: RRR  PULM/CHEST: Clear breath sounds bilaterally, CTs w/o air leak  GI: normal bowel sounds, soft, non-tender  : baldwin catheter in place, urine yellow and clear  EXTREMITIES: trace peripheral edema, moving all extremities, peripheral pulses intact  NEURO: non focal    IABP: 1:3    Data     Recent Labs  Lab 10/27/18  0405 10/26/18  2330 10/26/18  2045 10/26/18  1755  10/26/18  0725   WBC 4.9 5.1 6.3 9.3  --  5.9   HGB 9.4* 8.7* 6.9* 8.4*  < > 12.2*   MCV 90 90 91 92  --  90   PLT 75* 72* 81* 91*  --  135*   INR  --   --  1.76* 1.67*  --  1.08     --  143 143  < > 141   POTASSIUM 4.6  --  4.6 4.8  < > 4.5   CHLORIDE 113*  --  113* 112*  --  109   CO2 22  --  19* 19*  --  25   BUN 24  --  21 21  --  24   CR 1.55*  --  1.40* 1.25  --  1.46*   ANIONGAP 8  --  11 12  --  7   VICKY 7.2*  --  7.2* 7.6*  --  9.1   *  --  159* 213*  --  142*   ALBUMIN  --   --   --  2.5*  --  3.6   PROTTOTAL  --   --   --  4.2*  --  6.5*   BILITOTAL  --   --   --  0.8  --  0.7   ALKPHOS  --   --   --  48  --  79   ALT  --   --   --  21  --  31   AST  --   --   --  53*  --  22    < > = values in this interval not displayed.

## 2018-10-27 NOTE — PROGRESS NOTES
Cardiothoracic Surgery Progress Note - 10/27/18  Pt: Merlin J Anderson  MRN: [id number]     No acute events overnight.  Weaned off pressors, balloon pump DCed this am after 1U plt transfused for plt count 75. Continues to be intubated, requiring minimal vent support.     Temp:  [91.6  F (33.1  C)-100.6  F (38.1  C)] 100.6  F (38.1  C)  Heart Rate:  [58-78] 65  Resp:  [16-21] 18  BP: (102-147)/() 102/51  MAP:  [54 mmHg-234 mmHg] 67 mmHg  Arterial Line BP: ()/(28-57) 129/44  FiO2 (%):  [40 %] 40 %  SpO2:  [93 %-100 %] 99 %    I/O last 3 completed shifts:  In: 7054.68 [I.V.:5030.68; Other:255]  Out: 1670 [Urine:950; Chest Tube:720]    Ventilation Mode: CMV/AC  (Continuous Mandatory Ventilation/ Assist Control)  FiO2 (%): 40 %  Rate Set (breaths/minute): 18 breaths/min  Tidal Volume Set (mL): 500 mL  PEEP (cm H2O): 5 cmH2O  Oxygen Concentration (%): 40 %  Resp: 18    Physical Exam:  Gen:  78 male intubated, sedated.  Pulm: Pt is on vent support, minimal vent settings  CV: Regular rate and rhythm. Groin with no hematoma or bleeding  Chest: Pleural and Mediastinal CT - output appears serosanguinous.   - Sternal Incision: C/D/I.  Abd: Abdomen soft, non-tender, non-distended  Extremities: Good pulses distally    Labs reviewed in full; available in Epic.  LABS    BMP  Recent Labs  Lab 10/27/18  0405 10/26/18  2045    143   POTASSIUM 4.6 4.6   CHLORIDE 113* 113*   CO2 22 19*   ANIONGAP 8 11   * 159*   BUN 24 21   CR 1.55* 1.40*   VICKY 7.2* 7.2*   MAG 2.4* 1.9   PHOS 2.1* 2.7       LFT  Recent Labs  Lab 10/26/18  1755 10/26/18  0725   ALBUMIN 2.5* 3.6   BILITOTAL 0.8 0.7   ALKPHOS 48 79   AST 53* 22   ALT 21 31   PROTTOTAL 4.2* 6.5*       CBC  Recent Labs  Lab 10/27/18  0405 10/26/18  2330   WBC 4.9 5.1   HGB 9.4* 8.7*   PLT 75* 72*   HCT 27.2* 24.7*       INR  Recent Labs  Lab 10/26/18  2045   INR 1.76*       Arterial Blood Gas  Recent Labs  Lab 10/27/18  0405 10/26/18  2330 10/26/18  2045  10/26/18  1309   PH 7.35 7.36 7.27* 7.38   PCO2 38 36 38 41   PO2 132* 136* 157* 481*   HCO3 21 20* 18* 24       Imaging reviewed:  CXR 10/27/2018 - reviewed, postop changes    A/P  Merlin J Anderson is a 78 year old male with a history of severe left main disease who is POD# 1 s/p CABG x 4, [facility].    NEURO: Intubated, sedated. Pain: multimodal pain management strategy  CV:  Stable, off pressors, balloon pump removed.   - ASA, Start BB when able  PULM:  Intubated, sedated.   - Wean to extubated  FEN/GI: on electrolyte replacement protocol    - Diet: Advance as tolerated once extubated   - Bowel regimen: Senna/Docusate, Miralax   Renal: Borderline Uop. Received albumin, Garcia to remain in for close monitoring of I/O.    - Start lasix tomorrow.  HEME: Postop anemia and thrombocytopenia, received 1U plt before balloon pump was removed.  ID: Stable, afebrile. Abx: on standard postop prophylaxis  ENDO: On Insulin gtt.  LINES: Cordis, stanislav clau. Vascular access: Central and peripheral venous and A line   - Drains/tubes: chest tubes to suction  PPx: DVT: SCDs, start HSQ once platelets > 80, GI: protonix,   DISPO: ICU, wean to extubated    Pt seen and examined with Dr. Britton.    Sea Gates, PGY7  CT Surgery Fellow  Pager: [phone]

## 2018-10-27 NOTE — PROGRESS NOTES
Non billing PN    Patient intubated and currently being followed by critical care service and CV surgery.    Will resume care when extubated and ready for transfer out of the ICU.      Rodríguez Barber MD

## 2018-10-28 ENCOUNTER — APPOINTMENT (OUTPATIENT)
Dept: PHYSICAL THERAPY | Facility: CLINIC | Age: 78
DRG: 233 | End: 2018-10-28
Attending: STUDENT IN AN ORGANIZED HEALTH CARE EDUCATION/TRAINING PROGRAM
Payer: COMMERCIAL

## 2018-10-28 ENCOUNTER — APPOINTMENT (OUTPATIENT)
Dept: OCCUPATIONAL THERAPY | Facility: CLINIC | Age: 78
DRG: 233 | End: 2018-10-28
Attending: PHYSICIAN ASSISTANT
Payer: COMMERCIAL

## 2018-10-28 ENCOUNTER — APPOINTMENT (OUTPATIENT)
Dept: GENERAL RADIOLOGY | Facility: CLINIC | Age: 78
DRG: 233 | End: 2018-10-28
Attending: STUDENT IN AN ORGANIZED HEALTH CARE EDUCATION/TRAINING PROGRAM
Payer: COMMERCIAL

## 2018-10-28 LAB
ANION GAP SERPL CALCULATED.3IONS-SCNC: 7 MMOL/L (ref 3–14)
ANION GAP SERPL CALCULATED.3IONS-SCNC: 7 MMOL/L (ref 3–14)
BUN SERPL-MCNC: 29 MG/DL (ref 7–30)
BUN SERPL-MCNC: 30 MG/DL (ref 7–30)
CALCIUM SERPL-MCNC: 7.4 MG/DL (ref 8.5–10.1)
CALCIUM SERPL-MCNC: 7.9 MG/DL (ref 8.5–10.1)
CHLORIDE SERPL-SCNC: 111 MMOL/L (ref 94–109)
CHLORIDE SERPL-SCNC: 111 MMOL/L (ref 94–109)
CO2 SERPL-SCNC: 23 MMOL/L (ref 20–32)
CO2 SERPL-SCNC: 23 MMOL/L (ref 20–32)
CREAT SERPL-MCNC: 1.84 MG/DL (ref 0.66–1.25)
CREAT SERPL-MCNC: 1.9 MG/DL (ref 0.66–1.25)
ERYTHROCYTE [DISTWIDTH] IN BLOOD BY AUTOMATED COUNT: 14 % (ref 10–15)
GFR SERPL CREATININE-BSD FRML MDRD: 34 ML/MIN/1.7M2
GFR SERPL CREATININE-BSD FRML MDRD: 36 ML/MIN/1.7M2
GLUCOSE BLDC GLUCOMTR-MCNC: 121 MG/DL (ref 70–99)
GLUCOSE BLDC GLUCOMTR-MCNC: 132 MG/DL (ref 70–99)
GLUCOSE BLDC GLUCOMTR-MCNC: 138 MG/DL (ref 70–99)
GLUCOSE BLDC GLUCOMTR-MCNC: 139 MG/DL (ref 70–99)
GLUCOSE BLDC GLUCOMTR-MCNC: 139 MG/DL (ref 70–99)
GLUCOSE BLDC GLUCOMTR-MCNC: 155 MG/DL (ref 70–99)
GLUCOSE BLDC GLUCOMTR-MCNC: 156 MG/DL (ref 70–99)
GLUCOSE BLDC GLUCOMTR-MCNC: 169 MG/DL (ref 70–99)
GLUCOSE BLDC GLUCOMTR-MCNC: 93 MG/DL (ref 70–99)
GLUCOSE SERPL-MCNC: 139 MG/DL (ref 70–99)
GLUCOSE SERPL-MCNC: 166 MG/DL (ref 70–99)
HBA1C MFR BLD: 6.1 % (ref 0–5.6)
HCT VFR BLD AUTO: 25 % (ref 40–53)
HGB BLD-MCNC: 8.4 G/DL (ref 13.3–17.7)
MAGNESIUM SERPL-MCNC: 2.3 MG/DL (ref 1.6–2.3)
MAGNESIUM SERPL-MCNC: 2.4 MG/DL (ref 1.6–2.3)
MCH RBC QN AUTO: 30.5 PG (ref 26.5–33)
MCHC RBC AUTO-ENTMCNC: 33.6 G/DL (ref 31.5–36.5)
MCV RBC AUTO: 91 FL (ref 78–100)
PHOSPHATE SERPL-MCNC: 3.4 MG/DL (ref 2.5–4.5)
PHOSPHATE SERPL-MCNC: 4.2 MG/DL (ref 2.5–4.5)
PLATELET # BLD AUTO: 96 10E9/L (ref 150–450)
POTASSIUM SERPL-SCNC: 4.5 MMOL/L (ref 3.4–5.3)
POTASSIUM SERPL-SCNC: 4.6 MMOL/L (ref 3.4–5.3)
RBC # BLD AUTO: 2.75 10E12/L (ref 4.4–5.9)
SODIUM SERPL-SCNC: 141 MMOL/L (ref 133–144)
SODIUM SERPL-SCNC: 141 MMOL/L (ref 133–144)
WBC # BLD AUTO: 6.7 10E9/L (ref 4–11)

## 2018-10-28 PROCEDURE — 83735 ASSAY OF MAGNESIUM: CPT | Performed by: STUDENT IN AN ORGANIZED HEALTH CARE EDUCATION/TRAINING PROGRAM

## 2018-10-28 PROCEDURE — 25000132 ZZH RX MED GY IP 250 OP 250 PS 637: Performed by: SURGERY

## 2018-10-28 PROCEDURE — 25000128 H RX IP 250 OP 636: Performed by: SURGERY

## 2018-10-28 PROCEDURE — 40000193 ZZH STATISTIC PT WARD VISIT: Performed by: PHYSICAL THERAPIST

## 2018-10-28 PROCEDURE — 25000128 H RX IP 250 OP 636: Performed by: PHYSICIAN ASSISTANT

## 2018-10-28 PROCEDURE — 71046 X-RAY EXAM CHEST 2 VIEWS: CPT

## 2018-10-28 PROCEDURE — 97110 THERAPEUTIC EXERCISES: CPT | Mod: GO

## 2018-10-28 PROCEDURE — 80048 BASIC METABOLIC PNL TOTAL CA: CPT | Performed by: STUDENT IN AN ORGANIZED HEALTH CARE EDUCATION/TRAINING PROGRAM

## 2018-10-28 PROCEDURE — 83036 HEMOGLOBIN GLYCOSYLATED A1C: CPT | Performed by: STUDENT IN AN ORGANIZED HEALTH CARE EDUCATION/TRAINING PROGRAM

## 2018-10-28 PROCEDURE — 36415 COLL VENOUS BLD VENIPUNCTURE: CPT | Performed by: STUDENT IN AN ORGANIZED HEALTH CARE EDUCATION/TRAINING PROGRAM

## 2018-10-28 PROCEDURE — 84100 ASSAY OF PHOSPHORUS: CPT | Performed by: STUDENT IN AN ORGANIZED HEALTH CARE EDUCATION/TRAINING PROGRAM

## 2018-10-28 PROCEDURE — 25000131 ZZH RX MED GY IP 250 OP 636 PS 637: Performed by: INTERNAL MEDICINE

## 2018-10-28 PROCEDURE — 25000131 ZZH RX MED GY IP 250 OP 636 PS 637: Performed by: PHYSICIAN ASSISTANT

## 2018-10-28 PROCEDURE — 97535 SELF CARE MNGMENT TRAINING: CPT | Mod: GO

## 2018-10-28 PROCEDURE — C9113 INJ PANTOPRAZOLE SODIUM, VIA: HCPCS | Performed by: PHYSICIAN ASSISTANT

## 2018-10-28 PROCEDURE — 97530 THERAPEUTIC ACTIVITIES: CPT | Mod: GP | Performed by: PHYSICAL THERAPIST

## 2018-10-28 PROCEDURE — 40000133 ZZH STATISTIC OT WARD VISIT

## 2018-10-28 PROCEDURE — 97166 OT EVAL MOD COMPLEX 45 MIN: CPT | Mod: GO

## 2018-10-28 PROCEDURE — 25000132 ZZH RX MED GY IP 250 OP 250 PS 637: Performed by: PHYSICIAN ASSISTANT

## 2018-10-28 PROCEDURE — 97161 PT EVAL LOW COMPLEX 20 MIN: CPT | Mod: GP | Performed by: PHYSICAL THERAPIST

## 2018-10-28 PROCEDURE — 99232 SBSQ HOSP IP/OBS MODERATE 35: CPT | Performed by: INTERNAL MEDICINE

## 2018-10-28 PROCEDURE — 85027 COMPLETE CBC AUTOMATED: CPT | Performed by: STUDENT IN AN ORGANIZED HEALTH CARE EDUCATION/TRAINING PROGRAM

## 2018-10-28 PROCEDURE — 00000146 ZZHCL STATISTIC GLUCOSE BY METER IP

## 2018-10-28 PROCEDURE — 12000007 ZZH R&B INTERMEDIATE

## 2018-10-28 PROCEDURE — 25000131 ZZH RX MED GY IP 250 OP 636 PS 637: Performed by: SURGERY

## 2018-10-28 PROCEDURE — 25000132 ZZH RX MED GY IP 250 OP 250 PS 637: Performed by: STUDENT IN AN ORGANIZED HEALTH CARE EDUCATION/TRAINING PROGRAM

## 2018-10-28 RX ORDER — DEXTROSE MONOHYDRATE 25 G/50ML
25-50 INJECTION, SOLUTION INTRAVENOUS
Status: DISCONTINUED | OUTPATIENT
Start: 2018-10-28 | End: 2018-10-28

## 2018-10-28 RX ORDER — NICOTINE POLACRILEX 4 MG
15-30 LOZENGE BUCCAL
Status: DISCONTINUED | OUTPATIENT
Start: 2018-10-28 | End: 2018-10-28

## 2018-10-28 RX ORDER — PANTOPRAZOLE SODIUM 40 MG/1
40 TABLET, DELAYED RELEASE ORAL
Status: DISCONTINUED | OUTPATIENT
Start: 2018-10-29 | End: 2018-10-31 | Stop reason: HOSPADM

## 2018-10-28 RX ORDER — ATORVASTATIN CALCIUM 40 MG/1
40 TABLET, FILM COATED ORAL DAILY
Status: DISCONTINUED | OUTPATIENT
Start: 2018-10-28 | End: 2018-10-31 | Stop reason: HOSPADM

## 2018-10-28 RX ORDER — TAMSULOSIN HYDROCHLORIDE 0.4 MG/1
0.4 CAPSULE ORAL DAILY
Status: DISCONTINUED | OUTPATIENT
Start: 2018-10-28 | End: 2018-10-31 | Stop reason: HOSPADM

## 2018-10-28 RX ORDER — FLUTICASONE PROPIONATE 50 MCG
1 SPRAY, SUSPENSION (ML) NASAL DAILY PRN
Status: DISCONTINUED | OUTPATIENT
Start: 2018-10-28 | End: 2018-10-31 | Stop reason: HOSPADM

## 2018-10-28 RX ADMIN — INSULIN GLARGINE 5 UNITS: 100 INJECTION, SOLUTION SUBCUTANEOUS at 10:09

## 2018-10-28 RX ADMIN — ATORVASTATIN CALCIUM 40 MG: 40 TABLET, FILM COATED ORAL at 11:40

## 2018-10-28 RX ADMIN — ASPIRIN 325 MG: 325 TABLET, DELAYED RELEASE ORAL at 08:07

## 2018-10-28 RX ADMIN — OXYCODONE HYDROCHLORIDE 5 MG: 5 TABLET ORAL at 04:07

## 2018-10-28 RX ADMIN — METOPROLOL TARTRATE 25 MG: 25 TABLET ORAL at 08:07

## 2018-10-28 RX ADMIN — HEPARIN SODIUM 5000 UNITS: 5000 INJECTION, SOLUTION INTRAVENOUS; SUBCUTANEOUS at 11:42

## 2018-10-28 RX ADMIN — HEPARIN SODIUM 5000 UNITS: 5000 INJECTION, SOLUTION INTRAVENOUS; SUBCUTANEOUS at 19:22

## 2018-10-28 RX ADMIN — PANTOPRAZOLE SODIUM 40 MG: 40 INJECTION, POWDER, FOR SOLUTION INTRAVENOUS at 08:08

## 2018-10-28 RX ADMIN — SODIUM CHLORIDE 2 UNITS/HR: 9 INJECTION, SOLUTION INTRAVENOUS at 04:04

## 2018-10-28 RX ADMIN — INSULIN ASPART 3 UNITS: 100 INJECTION, SOLUTION INTRAVENOUS; SUBCUTANEOUS at 08:08

## 2018-10-28 RX ADMIN — TAMSULOSIN HYDROCHLORIDE 0.4 MG: 0.4 CAPSULE ORAL at 11:40

## 2018-10-28 RX ADMIN — ACETAMINOPHEN 650 MG: 325 TABLET, FILM COATED ORAL at 19:23

## 2018-10-28 RX ADMIN — LIDOCAINE 1 PATCH: 560 PATCH PERCUTANEOUS; TOPICAL; TRANSDERMAL at 08:07

## 2018-10-28 RX ADMIN — METOPROLOL TARTRATE 25 MG: 25 TABLET ORAL at 19:23

## 2018-10-28 RX ADMIN — HEPARIN SODIUM 5000 UNITS: 5000 INJECTION, SOLUTION INTRAVENOUS; SUBCUTANEOUS at 04:06

## 2018-10-28 RX ADMIN — INSULIN ASPART 2 UNITS: 100 INJECTION, SOLUTION INTRAVENOUS; SUBCUTANEOUS at 14:37

## 2018-10-28 RX ADMIN — METHOCARBAMOL 500 MG: 500 TABLET ORAL at 08:07

## 2018-10-28 RX ADMIN — ACETAMINOPHEN 650 MG: 325 TABLET, FILM COATED ORAL at 11:40

## 2018-10-28 ASSESSMENT — ACTIVITIES OF DAILY LIVING (ADL)
ADLS_ACUITY_SCORE: 11
ADLS_ACUITY_SCORE: 12
ADLS_ACUITY_SCORE: 12
ADLS_ACUITY_SCORE: 11
ADLS_ACUITY_SCORE: 14
ADLS_ACUITY_SCORE: 11

## 2018-10-28 ASSESSMENT — PAIN DESCRIPTION - DESCRIPTORS
DESCRIPTORS: SORE
DESCRIPTORS: ACHING

## 2018-10-28 NOTE — PLAN OF CARE
Problem: Patient Care Overview  Goal: Plan of Care/Patient Progress Review  Outcome: Improving  N: Alert, intermittently disoriented to time and place. Prn oxycodone given for incisional pain.  CV: Tele SR. BP within parameters.  Pulm: Lungs dim, resting on EZ-Pap overnight. O2 sats > 95% on 2L nasal cannula.  GI/: Hypoactive BS. Tolerating clear liquids. Garcia patent with borderline UO.  Skin: Incisions WDL.   Endocrine: Remains on insulin gtt to maintain BG < 150.    Pt updated on plan of care, verbalizes understanding. Likely transfer to surgical floor today. Will continue to monitor.

## 2018-10-28 NOTE — PROGRESS NOTES
Cardiothoracic Surgery Progress Note - 10/27/18  Pt: Merlin J Anderson  MRN: [id number]     No acute events overnight.  Balloon pump DCed, extubated, doing well. Pain controlled. Denies complaints.     Temp:  [98.4  F (36.9  C)-100.6  F (38.1  C)] 99.3  F (37.4  C)  Heart Rate:  [57-90] 80  Resp:  [11-29] 28  BP: ()/(43-65) 119/61  MAP:  [56 mmHg-84 mmHg] 66 mmHg  Arterial Line BP: ()/(37-69) 123/41  FiO2 (%):  [40 %] 40 %  SpO2:  [95 %-100 %] 98 %    I/O last 3 completed shifts:  In: 2258.8 [P.O.:250; I.V.:1758.8]  Out: 1635 [Urine:675; Chest Tube:960]    Ventilation Mode: CPAP/PS  (Continuous positive airway pressure with Pressure Support)  FiO2 (%): 40 %  Rate Set (breaths/minute): 18 breaths/min  Tidal Volume Set (mL): 500 mL  PEEP (cm H2O): 5 cmH2O  Pressure Support (cm H2O): 5 cmH2O  Oxygen Concentration (%): 40 %  Resp: 28    Physical Exam:  Gen:  78 male awake, alert, oriented  Pulm: Pt is on nasal cannula, poor IS effort  CV: Regular rate and rhythm. Groin with no hematoma or bleeding  Chest: Pleural and Mediastinal CT - output appears serosanguinous.   - Sternal Incision: C/D/I.  Abd: Abdomen soft, non-tender, non-distended  Extremities: Good pulses distally    Labs reviewed in full; available in Epic.  LABS    BMP    Recent Labs  Lab 10/28/18  0405 10/27/18  0405    143   POTASSIUM 4.6 4.6   CHLORIDE 111* 113*   CO2 23 22   ANIONGAP 7 8   * 168*   BUN 30 24   CR 1.90* 1.55*   VICKY 7.4* 7.2*   MAG 2.4* 2.4*   PHOS 4.2 2.1*       LFT    Recent Labs  Lab 10/26/18  1755 10/26/18  0725   ALBUMIN 2.5* 3.6   BILITOTAL 0.8 0.7   ALKPHOS 48 79   AST 53* 22   ALT 21 31   PROTTOTAL 4.2* 6.5*       CBC    Recent Labs  Lab 10/28/18  0405 10/27/18  0405   WBC 6.7 4.9   HGB 8.4* 9.4*   PLT 96* 75*   HCT 25.0* 27.2*       INR    Recent Labs  Lab 10/26/18  2045   INR 1.76*       Arterial Blood Gas    Recent Labs  Lab 10/27/18  1412 10/27/18  0405 10/26/18  2330 10/26/18  2045   PH 7.36 7.35 7.36  7.27*   PCO2 39 38 36 38   PO2 95 132* 136* 157*   HCO3 22 21 20* 18*   O2PER 40%  --   --   --        Imaging reviewed:  CXR 10/27/2018 - reviewed, postop changes    A/P  Merlin J Anderson is a 78 year old male with a history of severe left main disease who is POD# 2 s/p CABG x 4, EVH.    NEURO: Awake, alert, oriented. Pain: multimodal pain management strategy  CV:  Stable, off pressors, balloon pump removed.   - ASA, BB  PULM: Maintaining saturation on nasal cannula.   - IS, OOB, ambulate  FEN/GI: on electrolyte replacement protocol    - Diet: Advance as tolerated   - Bowel regimen: Senna/Docusate, Miralax   Renal: Borderline Uop., Cr increased from baseline 1.2 to 1.9 today, Garcia to remain in for close monitoring of I/O.    - Hold lasix.  HEME: Postop anemia and thrombocytopenia, received 1U plt before balloon pump was removed.  ID: Stable, afebrile. Abx: on standard postop prophylaxis  ENDO: On Insulin gtt, transition to ISS today  LINES: Cordis Vascular access: Central and peripheral venous   - Drains/tubes: chest tubes to waterseal today  PPx: DVT: SCDs, HSQ GI: protonix  DISPO: Station 33, initiate therapies    Pt seen and examined with Dr. Britton.    Sea Gates, PGY7  CT Surgery Fellow  Pager: [phone]

## 2018-10-28 NOTE — PLAN OF CARE
Problem: Patient Care Overview  Goal: Plan of Care/Patient Progress Review  Outcome: Improving  VSS. Disoriented x2. Forgetful. CT intact, water seal, no leaks. Wire capped. Up-lift. LE edema. Palpable pulses. Garcia good UOP. 2 L O2. Denies pain.

## 2018-10-28 NOTE — PLAN OF CARE
Problem: Patient Care Overview  Goal: Plan of Care/Patient Progress Review  Outcome: No Change  Neuro: alert, disoriented X1-2 at times, forgetful, c/o mild incisional pain, prn oxy and robaxin given, attempted UIC with card rehab pt very stiff and weak. Dangled at the edge of the bed. PT consult ordered.  CV: SR with 1 AVB, BP within parameters, pulses palpable  Pulm: RA, LS clear with dim bases, productive cough swallowed, IS upto 750, needs pul toilet and hygeine  GI/: tolerating PO. Insulin drip dcd per order and sliding scale insulin with lantus ordered by hospitalistdenny with good UOP  Skin: incisions ELÍAS.  Lines: PIV X2. CT with moderate serosanguineous drainage.    Plan: SBAR given to station 33 RN. Pt in route to CXR and then station 33, all belongings sent with pt. Wife- sun called and updated about the transfer.

## 2018-10-28 NOTE — PROGRESS NOTES
[facility]    Hospitalist Progress Note :     Cumulative Summary: Merlin J Anderson is a 78 year old male with past medical history significant for type 2 diabetes, essential hypertension, hyperlipidemia, chronic kidney disease and carotid artery disease, status post right carotid endarterectomy who was evaluated in cardiology clinic for evaluation of progressive dyspnea on exertion.  He underwent stress test which was abnormal.  He was subsequently referred for coronary angiogram as an outpatient.  Patient underwent coronary angiogram on October 26 which demonstrated severe three-vessel coronary artery disease including severe distal left main stenosis.  Patient was admitted for further evaluation and management and was placed on balloon pump.  Patient underwent coronary artery bypass grafting x4 with left leg endoscopic vein harvesting  On 10/26/18.  Patient was extubated yesterday afternoon.  Cardiovascular surgery is following closely.  Internal medicine is consulted for medical comanagement.    Assessment & Plan     Active Problems:  Coronary artery disease, status post CABG postop day 2: Patient was found to have severe three-vessel coronary artery disease, including severe distal left main stenosis.  Cardiogenic shock: Patient required pressors Madhu-Synephrine and epinephrine initially but now is off pressors.  Essential hypertension.  Hyperlipidemia.  History of carotid artery disease status post right carotid endarterectomy.  Chronic kidney disease is stage III: Creatinine is elevated at 1.90 this morning, patient is off the fluids    --Continue to monitor patient in ICU, postoperative management as per cardiovascular surgery and cardiology.  --Currently patient is receiving aspirin 325 mg p.o. daily,Metoprolol tartrate 25 mg p.o. daily.  --Will let cardiology and cardiovascular surgery start patient on other medications including a statin.  --His home atenolol and Lipitor are on hold at  this point.  --Patient is currently on Protonix 40 mg IV daily.  --Considering his creatinine, will hold his lisinopril at this point.  -- Will let cardiology decide if patient can be given gentle some more fluids for acute renal failure on top of chronic kidney disease.  His baseline creatinine is around 1.4-1.5.    Type 2 diabetes mellitus: Patient takes metformin 500 mg p.o. twice daily at home.  Initially patient was on insulin infusion post CABG.  Patient is a starting to order diet today.  Already has carb coverage ordered.    --Will try to take patient off the insulin infusion, will order small dose of Lantus 5 units at this point.  --Will also order medium dose sliding scale insulin.  --Continue carb coverage 1 unit of NovoLog for 15 g of carb.  --We will continue to hold metformin at this point at least observe blood sugars for next 24 hours as patient is off the IV fluids can be started later tomorrow.    History of benign prostate hyperplasia.  --Continue patient on Flomax 0.4 mg p.o. daily.    Allergic rhinitis  --We will go ahead and order his home Flonase.    DVT Prophylaxis: Heparin SQ  Code Status: Full Code    Disposition: Final disposition as per cardiovascular surgery, internal medicine will continue to follow along with you.    Joselyn Mackey MD, FACP  Text Page (7am - 6pm)      Interval History   Patient care was assumed this morning, patient was seen and examined.  Patient is currently sitting in bed, alert awake and oriented, complaining of some mild discomfort at the incision site.  Denying any shortness of breath he is currently being down to 2 L of oxygen.  He is just on bring the breakfast discussed with bedside RN regarding weaning him off insulin infusion and starting him on Lantus and sliding scale insulin.  Patient takes metformin at home.    -Data reviewed today: I reviewed all new labs and imaging results over the last 24 hours.    I personally reviewed no images or EKG's  today.    Physical Exam   Temp: 99.3  F (37.4  C) Temp src: Bladder BP: 134/60   Heart Rate: 90 Resp: 28 SpO2: 96 % O2 Device: Nasal cannula Oxygen Delivery: 2 LPM  Vitals:    10/26/18 0645   Weight: 96.2 kg (212 lb)     Vital Signs with Ranges  Temp:  [98.4  F (36.9  C)-100.6  F (38.1  C)] 99.3  F (37.4  C)  Heart Rate:  [57-90] 90  Resp:  [11-29] 28  BP: ()/(43-65) 134/60  MAP:  [56 mmHg-84 mmHg] 66 mmHg  Arterial Line BP: ()/(33-69) 123/41  FiO2 (%):  [40 %] 40 %  SpO2:  [95 %-100 %] 96 %  I/O last 3 completed shifts:  In: 2258.8 [P.O.:250; I.V.:1758.8]  Out: 1635 [Urine:675; Chest Tube:960]    GENERAL: Alert , awake and oriented. NAD. Conversational, appropriate.   HEENT: Normocephalic. EOMI. No icterus or injection. Nares normal.   LUNGS: Clear to auscultation. No dyspnea at rest.   HEART: Regular rate. Extremities perfused. Surgical scar looks good  ABDOMEN: Soft, nontender, and nondistended. Positive but hypoactive bowel sounds.   EXTREMITIES: No LE edema noted.   NEUROLOGIC: Moves extremities x4 on command. No acute focal neurologic abnormalities noted.     Medications     IV fluid REPLACEMENT ONLY       insulin (regular) 2 Units/hr (10/28/18 0800)     sodium chloride 10 mL/hr at 10/28/18 0029       aspirin  325 mg Oral Daily     heparin  5,000 Units Subcutaneous Q8H     insulin aspart  1-7 Units Subcutaneous TID AC     insulin aspart  1-5 Units Subcutaneous At Bedtime     insulin aspart   Subcutaneous TID w/meals     insulin glargine  5 Units Subcutaneous QAM AC     lidocaine  1 patch Transdermal Q24H     lidocaine   Transdermal Q8H     lidocaine   Transdermal Q24h     metoprolol tartrate  25 mg Oral Q12H    Or     metoprolol  25 mg Per NG tube Q12H     pantoprazole (PROTONIX) IV  40 mg Intravenous Daily     sodium chloride (PF)  3 mL Intracatheter Q8H       Data     Recent Labs  Lab 10/28/18  0405 10/27/18  0405 10/26/18  2330 10/26/18  2045 10/26/18  1755  10/26/18  0725   WBC 6.7 4.9 5.1 6.3  9.3  --  5.9   HGB 8.4* 9.4* 8.7* 6.9* 8.4*  < > 12.2*   MCV 91 90 90 91 92  --  90   PLT 96* 75* 72* 81* 91*  --  135*   INR  --   --   --  1.76* 1.67*  --  1.08    143  --  143 143  < > 141   POTASSIUM 4.6 4.6  --  4.6 4.8  < > 4.5   CHLORIDE 111* 113*  --  113* 112*  --  109   CO2 23 22  --  19* 19*  --  25   BUN 30 24  --  21 21  --  24   CR 1.90* 1.55*  --  1.40* 1.25  --  1.46*   ANIONGAP 7 8  --  11 12  --  7   VICKY 7.4* 7.2*  --  7.2* 7.6*  --  9.1   * 168*  --  159* 213*  --  142*   ALBUMIN  --   --   --   --  2.5*  --  3.6   PROTTOTAL  --   --   --   --  4.2*  --  6.5*   BILITOTAL  --   --   --   --  0.8  --  0.7   ALKPHOS  --   --   --   --  48  --  79   ALT  --   --   --   --  21  --  31   AST  --   --   --   --  53*  --  22   < > = values in this interval not displayed.    Imaging:   No results found for this or any previous visit (from the past 24 hour(s)).

## 2018-10-28 NOTE — PROGRESS NOTES
10/28/18 1415   Quick Adds   Type of Visit Initial PT Evaluation       Present no   Living Environment   Lives With spouse   Living Arrangements house   Home Accessibility bed and bath on same level   Number of Stairs to Enter Home 7   Number of Stairs Within Home 12   Stair Railings at Home inside, present at both sides   Transportation Available car  (family or friend will provide)   Living Environment Comment wife is available to assist as needed   Self-Care   Dominant Hand right   Usual Activity Tolerance good   Current Activity Tolerance fair   Regular Exercise yes   Activity/Exercise Type biking;walking   Exercise Amount/Frequency 5-7 times/wk   Equipment Currently Used at Home none   Functional Level Prior   Ambulation 0-->independent   Transferring 0-->independent   Toileting 0-->independent   Bathing 0-->independent   Dressing 0-->independent   Eating 0-->independent   Communication 0-->understands/communicates without difficulty   Fall history within last six months yes   Number of times patient has fallen within last six months 1   Which of the above functional risks had a recent onset or change? ambulation;transferring;toileting;bathing;dressing   General Information   Onset of Illness/Injury or Date of Surgery - Date 10/26/18   Referring Physician Sea Gates   Patient/Family Goals Statement Patient would like to go home   Pertinent History of Current Problem (include personal factors and/or comorbidities that impact the POC) status post CABG X4   Precautions/Limitations fall precautions;sternal precautions   Cognitive Status Examination   Orientation person   Level of Consciousness alert   Follows Commands and Answers Questions 50% of the time   Personal Safety and Judgment impaired   Pain Assessment   Patient Currently in Pain Yes, see Vital Sign flowsheet  (Patient in pain at incision site and shoulders)   Integumentary/Edema   Integumentary/Edema Comments bilateral LE edema  "  Posture    Posture Forward head position   Range of Motion (ROM)   ROM Comment ROM within functional limits and sternal precautions.    Strength   Strength Comments based on observation pt has global strength of  2+/5. functional activity tolerance is impaired.   Bed Mobility   Bed Mobility Comments sup>sit max assist of 2. bridging, rolling and scooting on bed total assist.    Transfer Skills   Transfer Comments bed to chair transfer via lift   Balance   Balance Comments sitting balance mod assist. Pt needed verbal cues to maintain midline and not anterior lean   Coordination   Coordination Comments slow to process commands   General Therapy Interventions   Planned Therapy Interventions balance training;bed mobility training;gait training;neuromuscular re-education;ROM;strengthening;stretching;transfer training;progressive activity/exercise   Clinical Impression   Criteria for Skilled Therapeutic Intervention yes, treatment indicated   PT Diagnosis Decreased functional activity tolerance    Influenced by the following impairments weakness, decreased balance, slow processing, sternal precautions and difficulty recalling them   Functional limitations due to impairments limited in transfers, ambulation and bed mobility   Clinical Presentation Stable/Uncomplicated   Clinical Presentation Rationale Pt has multiple comorbidities. Pt has a supportive spouse at home to assist, not at baseline with mobility, stairs in home   Clinical Decision Making (Complexity) Low complexity   Therapy Frequency` daily   Predicted Duration of Therapy Intervention (days/wks) 5 days   Anticipated Discharge Disposition Transitional Care Facility   Risk & Benefits of therapy have been explained Yes   Patient, Family & other staff in agreement with plan of care Yes   [facility] AM-PAC  \"6 Clicks\" V.2 Basic Mobility Inpatient Short Form   1. Turning from your back to your side while in a flat bed without using bedrails? 2 - A Lot   2. " Moving from lying on your back to sitting on the side of a flat bed without using bedrails? 2 - A Lot   3. Moving to and from a bed to a chair (including a wheelchair)? 1 - Total   4. Standing up from a chair using your arms (e.g., wheelchair, or bedside chair)? 1 - Total   5. To walk in hospital room? 1 - Total   6. Climbing 3-5 steps with a railing? 1 - Total   Basic Mobility Raw Score (Score out of 24.Lower scores equate to lower levels of function) 8   Total Evaluation Time   Total Evaluation Time (Minutes) 10

## 2018-10-28 NOTE — PLAN OF CARE
Problem: Patient Care Overview  Goal: Plan of Care/Patient Progress Review  Discharge Planner PT   Patient plan for discharge: home  Current status: Pt eval completed and treatment initiated. Pt presents status post CABG X4. Past medical history significant for type 2 diabetes, essential hypertension, hyperlipidemia, chronic kidney disease and carotid artery disease. Prior to admission patient was independent in ADL's and exercised 5-7 days per week on a bicycle. Currently, pt max assist of 2 for bed mobility and attempt at stand. Tolerated EOB dangle 8 min with mod assist for balance. Bed to chair transfer total assist using lift.  Barriers to return to prior living situation: weakness, dependent in transfers and bed mobility, patient is not at baseline of being independent in walking, sternal precautions, fall risk  Recommendations for discharge: TCU  Rationale for recommendations: Patient would benefit from further physical therapy to strengthen and help increase his balance in sitting as well as standing and becoming independent again in walking.       Entered by: Blanca De Leon 10/28/2018 3:39 PM

## 2018-10-28 NOTE — PLAN OF CARE
Problem: Patient Care Overview  Goal: Plan of Care/Patient Progress Review  OT/CR: Evaluation and treatment initiated. Pt lives in a split level home with his spouse. Prior pt independent in all ADLs and most IADLs (driving, medication management). Pt is POD# 2 s/p CABG x 4.  Discharge Planner OT    Patient plan for discharge: Home  Current status: Pt went from supine to sit EOB with maximum assist of 2. Pt required maximum assist to scoot self forward to EOB. Pt sat EOB with varying level of assist CGA-maximum assist for seated upright core balance, and often leaned to the right.  Pt returned to supine with maximum assist of 2 and total assist to boost higher in bed. RN present during session. Pt demonstrated confusion in thought process during session (pt reporting that he uses a walker at baseline however RN confirmed pt was independent; pt reporting he has a rambler home and then later a split level).     Barriers to return to prior living situation: Current level of assist; level of assist for I/ADLs; sternal precautions  Recommendations for discharge: At this time recommend ARU  Rationale for recommendations: At baseline pt independent in all ADLs. Pt is below baseline and would benefit from intensive skilled OT to increase functional independence in all I/ADLs, prior to return to PLOF. Consider Physical Therapy orders while IP.        Entered by: Robert Higgins 10/28/2018 10:09 AM

## 2018-10-28 NOTE — PROGRESS NOTES
10/28/18 0920   Quick Adds   Type of Visit Initial Occupational Therapy Evaluation   Living Environment   Lives With spouse   Living Arrangements house  (split level home style home )   Home Accessibility bed and bath on same level   Number of Stairs to Enter Home 7   Number of Stairs Within Home 7   Transportation Available car;family or friend will provide   Living Environment Comment Per pt, spouse is available to assist as needed    Self-Care   Usual Activity Tolerance good   Current Activity Tolerance fair   Regular Exercise yes   Activity/Exercise Type (sationary bike)   Exercise Amount/Frequency daily   Equipment Currently Used at Home none   Functional Level Prior   Ambulation 0-->independent   Transferring 0-->independent   Toileting 0-->independent   Bathing 0-->independent   Dressing 0-->independent   Fall history within last six months yes  (per pt )   Number of times patient has fallen within last six months 1   General Information   Onset of Illness/Injury or Date of Surgery - Date 10/26/18   Referring Physician Chelsi Alexandra PA-C   Patient/Family Goals Statement Home   Additional Occupational Profile Info/Pertinent History of Current Problem Per chart: Merlin J Anderson is a 78 year old male with past medical history significant for type 2 diabetes, essential hypertension, hyperlipidemia, chronic kidney disease and carotid artery disease, status post right carotid endarterectomy who was evaluated in cardiology clinic for evaluation of progressive dyspnea on exertion.  He underwent stress test which was abnormal.  He was subsequently referred for coronary angiogram as an outpatient.  Patient underwent coronary angiogram on October 26 which demonstrated severe three-vessel coronary artery disease including severe distal left main stenosis.  Patient was admitted for further evaluation and management and was placed on balloon pump.  Patient underwent coronary artery bypass grafting x4 with left leg  endoscopic vein harvesting  On 10/26/18.  Patient was extubated yesterday afternoon.  Cardiovascular surgery is following closely.  Internal medicine is consulted for medical comanagement.   Precautions/Limitations fall precautions;sternal precautions   Heart Disease Risk Factors Race;Age;Gender;Medical history   Cognitive Status Examination   Orientation (oriented to month and place  )   Level of Consciousness alert   Visual Perception   Visual Perception No deficits were identified   Pain Assessment   Patient Currently in Pain Yes, see Vital Sign flowsheet   Mobility   Bed Mobility Bed mobility skill: Sit to supine;Bed mobility skill: Supine to sit   Bed Mobility Skill: Sit to Supine   Level of [place]: Sit/Supine maximum assist (25% patients effort)   Physical Assist/Nonphysical Assist: Sit/Supine 2 persons   Bed Mobility Skill: Supine to Sit   Level of [place]: Supine/Sit maximum assist (25% patients effort)   Physical Assist/Nonphysical Assist: Supine/Sit 2 persons   Transfer Skills   Transfer Transfer Safety Analysis Bed/Chair;Transfer Skill: Stand to Sit;Transfer Safety Analysis Sit/Stand   Transfer Skill: Sit to Stand   Level of [place]: Sit/Stand (unable to assess )   Instrumental Activities of Daily Living (IADL)   Previous Responsibilities driving;meal prep;yardwork;medication management;finances   General Therapy Interventions   Planned Therapy Interventions ADL retraining;IADL retraining;cognition;risk factor education;progressive activity/exercise;home program guidelines;transfer training;strengthening   Clinical Impression   Criteria for Skilled Therapeutic Interventions Met yes, treatment indicated   OT Diagnosis decreased endurance for I/ADls    Influenced by the following impairments decreased endurance for I/ADls    Assessment of Occupational Performance 3-5 Performance Deficits   Identified Performance Deficits decreased endurance for I/ADls  (dressing, bathing, toileting, driving)  "  Clinical Decision Making (Complexity) Moderate complexity   Therapy Frequency daily   Predicted Duration of Therapy Intervention (days/wks) 7 days   Anticipated Discharge Disposition Acute Rehabilitation Facility   Risks and Benefits of Treatment have been explained. Yes   Patient, Family & other staff in agreement with plan of care Yes   [employer] TM \"6 Clicks\"   2016, Trustees of [facility], under license to [employer].  All rights reserved.   6 Clicks Short Forms Daily Activity Inpatient Short Form   [facility]  \"6 Clicks\" Daily Activity Inpatient Short Form   1. Putting on and taking off regular lower body clothing? 2 - A Lot   2. Bathing (including washing, rinsing, drying)? 2 - A Lot   3. Toileting, which includes using toilet, bedpan or urinal? 2 - A Lot   4. Putting on and taking off regular upper body clothing? 3 - A Little   5. Taking care of personal grooming such as brushing teeth? 2 - A Lot   6. Eating meals? 4 - None   Daily Activity Raw Score (Score out of 24.Lower scores equate to lower levels of function) 15   Total Evaluation Time   Total Evaluation Time (Minutes) 8     "

## 2018-10-28 NOTE — PROGRESS NOTES
Brief ICU Note    Discussed with BLAINE.  Merlin will transfer out today.  We will sign off.    Bobby Diaz MD  ICU

## 2018-10-28 NOTE — PROGRESS NOTES
10/28/18 0920   Quick Adds   Type of Visit Initial Occupational Therapy Evaluation   Living Environment   Lives With spouse   Living Arrangements house  (split level home style home )   Home Accessibility bed and bath on same level   Number of Stairs to Enter Home 7   Number of Stairs Within Home 7   Transportation Available car;family or friend will provide   Living Environment Comment Per pt, spouse is available to assist as needed    Self-Care   Usual Activity Tolerance good   Current Activity Tolerance fair   Regular Exercise yes   Activity/Exercise Type (sationary bike)   Exercise Amount/Frequency daily   Equipment Currently Used at Home none   Functional Level Prior   Ambulation 0-->independent   Transferring 0-->independent   Toileting 0-->independent   Bathing 0-->independent   Dressing 0-->independent   Fall history within last six months yes  (per pt )   Number of times patient has fallen within last six months 1   General Information   Onset of Illness/Injury or Date of Surgery - Date 10/26/18   Referring Physician Chelsi Alexandra PA-C   Patient/Family Goals Statement Home   Additional Occupational Profile Info/Pertinent History of Current Problem Per chart: Merlin J Anderson is a 78 year old male with past medical history significant for type 2 diabetes, essential hypertension, hyperlipidemia, chronic kidney disease and carotid artery disease, status post right carotid endarterectomy who was evaluated in cardiology clinic for evaluation of progressive dyspnea on exertion.  He underwent stress test which was abnormal.  He was subsequently referred for coronary angiogram as an outpatient.  Patient underwent coronary angiogram on October 26 which demonstrated severe three-vessel coronary artery disease including severe distal left main stenosis.  Patient was admitted for further evaluation and management and was placed on balloon pump.  Patient underwent coronary artery bypass grafting x4 with left leg  endoscopic vein harvesting  On 10/26/18.  Patient was extubated yesterday afternoon.  Cardiovascular surgery is following closely.  Internal medicine is consulted for medical comanagement.   Precautions/Limitations fall precautions;sternal precautions   Heart Disease Risk Factors Race;Age;Gender;Medical history   Cognitive Status Examination   Orientation (oriented to month and place  )   Level of Consciousness alert   Visual Perception   Visual Perception No deficits were identified   Pain Assessment   Patient Currently in Pain Yes, see Vital Sign flowsheet   Mobility   Bed Mobility Bed mobility skill: Sit to supine;Bed mobility skill: Supine to sit   Bed Mobility Skill: Sit to Supine   Level of [place]: Sit/Supine maximum assist (25% patients effort)   Physical Assist/Nonphysical Assist: Sit/Supine 2 persons   Bed Mobility Skill: Supine to Sit   Level of [place]: Supine/Sit maximum assist (25% patients effort)   Physical Assist/Nonphysical Assist: Supine/Sit 2 persons   Transfer Skills   Transfer Transfer Safety Analysis Bed/Chair;Transfer Skill: Stand to Sit;Transfer Safety Analysis Sit/Stand   Transfer Skill: Sit to Stand   Level of [place]: Sit/Stand (unable to assess )   Instrumental Activities of Daily Living (IADL)   Previous Responsibilities driving;meal prep;yardwork;medication management;finances   General Therapy Interventions   Planned Therapy Interventions ADL retraining;IADL retraining;cognition;risk factor education;progressive activity/exercise;home program guidelines;transfer training;strengthening   Clinical Impression   Criteria for Skilled Therapeutic Interventions Met yes, treatment indicated   OT Diagnosis decreased endurance for I/ADls    Influenced by the following impairments decreased endurance for I/ADls    Assessment of Occupational Performance 3-5 Performance Deficits   Identified Performance Deficits decreased endurance for I/ADls  (dressing, bathing, toileting, driving)  "  Clinical Decision Making (Complexity) Moderate complexity   Therapy Frequency daily   Predicted Duration of Therapy Intervention (days/wks) 7 days   Anticipated Discharge Disposition Transitional Care Facility   Risks and Benefits of Treatment have been explained. Yes   Patient, Family & other staff in agreement with plan of care Yes   [facility] TM \"6 Clicks\"   2016, Trustees of [facility], under license to [employer].  All rights reserved.   6 Clicks Short Forms Daily Activity Inpatient Short Form   [facility]  \"6 Clicks\" Daily Activity Inpatient Short Form   1. Putting on and taking off regular lower body clothing? 2 - A Lot   2. Bathing (including washing, rinsing, drying)? 2 - A Lot   3. Toileting, which includes using toilet, bedpan or urinal? 2 - A Lot   4. Putting on and taking off regular upper body clothing? 3 - A Little   5. Taking care of personal grooming such as brushing teeth? 2 - A Lot   6. Eating meals? 4 - None   Daily Activity Raw Score (Score out of 24.Lower scores equate to lower levels of function) 15   Total Evaluation Time   Total Evaluation Time (Minutes) 8     "

## 2018-10-29 ENCOUNTER — APPOINTMENT (OUTPATIENT)
Dept: PHYSICAL THERAPY | Facility: CLINIC | Age: 78
DRG: 233 | End: 2018-10-29
Attending: STUDENT IN AN ORGANIZED HEALTH CARE EDUCATION/TRAINING PROGRAM
Payer: COMMERCIAL

## 2018-10-29 ENCOUNTER — APPOINTMENT (OUTPATIENT)
Dept: OCCUPATIONAL THERAPY | Facility: CLINIC | Age: 78
DRG: 233 | End: 2018-10-29
Attending: INTERNAL MEDICINE
Payer: COMMERCIAL

## 2018-10-29 LAB
ANION GAP SERPL CALCULATED.3IONS-SCNC: 5 MMOL/L (ref 3–14)
BUN SERPL-MCNC: 32 MG/DL (ref 7–30)
CA-I BLD-SCNC: 4.2 MG/DL (ref 4.4–5.2)
CA-I BLD-SCNC: 4.3 MG/DL (ref 4.4–5.2)
CA-I BLD-SCNC: 4.3 MG/DL (ref 4.4–5.2)
CA-I BLD-SCNC: 4.7 MG/DL (ref 4.4–5.2)
CALCIUM SERPL-MCNC: 8.4 MG/DL (ref 8.5–10.1)
CHLORIDE SERPL-SCNC: 110 MMOL/L (ref 94–109)
CO2 BLD-SCNC: 20 MMOL/L (ref 21–28)
CO2 BLD-SCNC: 22 MMOL/L (ref 21–28)
CO2 BLD-SCNC: 24 MMOL/L (ref 21–28)
CO2 BLD-SCNC: 26 MMOL/L (ref 21–28)
CO2 BLD-SCNC: 26 MMOL/L (ref 21–28)
CO2 BLDCOV-SCNC: 23 MMOL/L (ref 21–28)
CO2 SERPL-SCNC: 26 MMOL/L (ref 20–32)
CPB APPLIED: ABNORMAL
CREAT SERPL-MCNC: 1.65 MG/DL (ref 0.66–1.25)
ERYTHROCYTE [DISTWIDTH] IN BLOOD BY AUTOMATED COUNT: 13.9 % (ref 10–15)
GFR SERPL CREATININE-BSD FRML MDRD: 41 ML/MIN/1.7M2
GLUCOSE BLDC GLUCOMTR-MCNC: 142 MG/DL (ref 70–99)
GLUCOSE BLDC GLUCOMTR-MCNC: 151 MG/DL (ref 70–99)
GLUCOSE BLDC GLUCOMTR-MCNC: 154 MG/DL (ref 70–99)
GLUCOSE BLDC GLUCOMTR-MCNC: 167 MG/DL (ref 70–99)
GLUCOSE BLDC GLUCOMTR-MCNC: 189 MG/DL (ref 70–99)
GLUCOSE SERPL-MCNC: 149 MG/DL (ref 70–99)
HCT VFR BLD AUTO: 26.5 % (ref 40–53)
HCT VFR BLD CALC: 20 %PCV (ref 40–53)
HCT VFR BLD CALC: 23 %PCV (ref 40–53)
HCT VFR BLD CALC: 23 %PCV (ref 40–53)
HCT VFR BLD CALC: 25 %PCV (ref 40–53)
HCT VFR BLD CALC: 26 %PCV (ref 40–53)
HGB BLD CALC-MCNC: 6.8 G/DL (ref 13.3–17.7)
HGB BLD CALC-MCNC: 7.8 G/DL (ref 13.3–17.7)
HGB BLD CALC-MCNC: 7.8 G/DL (ref 13.3–17.7)
HGB BLD CALC-MCNC: 8.5 G/DL (ref 13.3–17.7)
HGB BLD CALC-MCNC: 8.8 G/DL (ref 13.3–17.7)
HGB BLD-MCNC: 8.9 G/DL (ref 13.3–17.7)
INTERPRETATION ECG - MUSE: NORMAL
KCT BLD-ACNC: 119 SEC (ref 75–150)
KCT BLD-ACNC: 521 SEC (ref 75–150)
KCT BLD-ACNC: 578 SEC (ref 75–150)
LACTATE BLD-SCNC: 1.1 MMOL/L (ref 0.7–2.1)
LACTATE BLD-SCNC: 1.6 MMOL/L (ref 0.7–2.1)
MAGNESIUM SERPL-MCNC: 2.6 MG/DL (ref 1.6–2.3)
MCH RBC QN AUTO: 31 PG (ref 26.5–33)
MCHC RBC AUTO-ENTMCNC: 33.6 G/DL (ref 31.5–36.5)
MCV RBC AUTO: 92 FL (ref 78–100)
PCO2 BLD: 42 MM HG (ref 35–45)
PCO2 BLD: 42 MM HG (ref 35–45)
PCO2 BLD: 44 MM HG (ref 35–45)
PCO2 BLD: 47 MM HG (ref 35–45)
PCO2 BLD: 48 MM HG (ref 35–45)
PCO2 BLDV: 41 MM HG (ref 40–50)
PH BLD: 7.27 PH (ref 7.35–7.45)
PH BLD: 7.32 PH (ref 7.35–7.45)
PH BLD: 7.33 PH (ref 7.35–7.45)
PH BLD: 7.35 PH (ref 7.35–7.45)
PH BLD: 7.36 PH (ref 7.35–7.45)
PH BLDV: 7.35 PH (ref 7.32–7.43)
PHOSPHATE SERPL-MCNC: 2.9 MG/DL (ref 2.5–4.5)
PLATELET # BLD AUTO: 96 10E9/L (ref 150–450)
PO2 BLD: 241 MM HG (ref 80–105)
PO2 BLD: 329 MM HG (ref 80–105)
PO2 BLD: 339 MM HG (ref 80–105)
PO2 BLD: 369 MM HG (ref 80–105)
PO2 BLD: 442 MM HG (ref 80–105)
PO2 BLDV: 47 MM HG (ref 25–47)
POTASSIUM BLD-SCNC: 4.7 MMOL/L (ref 3.4–5.3)
POTASSIUM BLD-SCNC: 4.9 MMOL/L (ref 3.4–5.3)
POTASSIUM BLD-SCNC: 5.5 MMOL/L (ref 3.4–5.3)
POTASSIUM BLD-SCNC: 5.7 MMOL/L (ref 3.4–5.3)
POTASSIUM BLD-SCNC: 5.7 MMOL/L (ref 3.4–5.3)
POTASSIUM BLD-SCNC: 5.8 MMOL/L (ref 3.4–5.3)
POTASSIUM BLD-SCNC: 5.9 MMOL/L (ref 3.4–5.3)
POTASSIUM SERPL-SCNC: 4.2 MMOL/L (ref 3.4–5.3)
RBC # BLD AUTO: 2.87 10E12/L (ref 4.4–5.9)
SAO2 % BLDA FROM PO2: 100 % (ref 92–100)
SAO2 % BLDV FROM PO2: 80 %
SODIUM BLD-SCNC: 137 MMOL/L (ref 133–144)
SODIUM BLD-SCNC: 138 MMOL/L (ref 133–144)
SODIUM BLD-SCNC: 139 MMOL/L (ref 133–144)
SODIUM BLD-SCNC: 139 MMOL/L (ref 133–144)
SODIUM BLD-SCNC: 140 MMOL/L (ref 133–144)
SODIUM SERPL-SCNC: 141 MMOL/L (ref 133–144)
WBC # BLD AUTO: 6.1 10E9/L (ref 4–11)

## 2018-10-29 PROCEDURE — 84100 ASSAY OF PHOSPHORUS: CPT | Performed by: STUDENT IN AN ORGANIZED HEALTH CARE EDUCATION/TRAINING PROGRAM

## 2018-10-29 PROCEDURE — 40000193 ZZH STATISTIC PT WARD VISIT: Performed by: PHYSICAL THERAPIST

## 2018-10-29 PROCEDURE — 97530 THERAPEUTIC ACTIVITIES: CPT | Mod: GO

## 2018-10-29 PROCEDURE — 36415 COLL VENOUS BLD VENIPUNCTURE: CPT | Performed by: STUDENT IN AN ORGANIZED HEALTH CARE EDUCATION/TRAINING PROGRAM

## 2018-10-29 PROCEDURE — 83735 ASSAY OF MAGNESIUM: CPT | Performed by: STUDENT IN AN ORGANIZED HEALTH CARE EDUCATION/TRAINING PROGRAM

## 2018-10-29 PROCEDURE — 25000132 ZZH RX MED GY IP 250 OP 250 PS 637: Performed by: SURGERY

## 2018-10-29 PROCEDURE — 25000128 H RX IP 250 OP 636: Performed by: PHYSICIAN ASSISTANT

## 2018-10-29 PROCEDURE — 00000146 ZZHCL STATISTIC GLUCOSE BY METER IP

## 2018-10-29 PROCEDURE — 99232 SBSQ HOSP IP/OBS MODERATE 35: CPT | Performed by: INTERNAL MEDICINE

## 2018-10-29 PROCEDURE — 80048 BASIC METABOLIC PNL TOTAL CA: CPT | Performed by: STUDENT IN AN ORGANIZED HEALTH CARE EDUCATION/TRAINING PROGRAM

## 2018-10-29 PROCEDURE — 93005 ELECTROCARDIOGRAM TRACING: CPT

## 2018-10-29 PROCEDURE — 40000133 ZZH STATISTIC OT WARD VISIT

## 2018-10-29 PROCEDURE — 85027 COMPLETE CBC AUTOMATED: CPT | Performed by: STUDENT IN AN ORGANIZED HEALTH CARE EDUCATION/TRAINING PROGRAM

## 2018-10-29 PROCEDURE — 97530 THERAPEUTIC ACTIVITIES: CPT | Mod: GP | Performed by: PHYSICAL THERAPIST

## 2018-10-29 PROCEDURE — 25000132 ZZH RX MED GY IP 250 OP 250 PS 637: Performed by: PHYSICIAN ASSISTANT

## 2018-10-29 PROCEDURE — 93010 ELECTROCARDIOGRAM REPORT: CPT | Performed by: INTERNAL MEDICINE

## 2018-10-29 PROCEDURE — 25000132 ZZH RX MED GY IP 250 OP 250 PS 637: Performed by: STUDENT IN AN ORGANIZED HEALTH CARE EDUCATION/TRAINING PROGRAM

## 2018-10-29 PROCEDURE — 12000007 ZZH R&B INTERMEDIATE

## 2018-10-29 RX ORDER — LISINOPRIL 2.5 MG/1
2.5 TABLET ORAL DAILY
Status: DISCONTINUED | OUTPATIENT
Start: 2018-10-29 | End: 2018-10-31 | Stop reason: HOSPADM

## 2018-10-29 RX ADMIN — METFORMIN HYDROCHLORIDE 500 MG: 500 TABLET ORAL at 17:29

## 2018-10-29 RX ADMIN — HEPARIN SODIUM 5000 UNITS: 5000 INJECTION, SOLUTION INTRAVENOUS; SUBCUTANEOUS at 04:36

## 2018-10-29 RX ADMIN — LISINOPRIL 2.5 MG: 2.5 TABLET ORAL at 10:03

## 2018-10-29 RX ADMIN — METFORMIN HYDROCHLORIDE 500 MG: 500 TABLET ORAL at 10:03

## 2018-10-29 RX ADMIN — METOPROLOL TARTRATE 25 MG: 25 TABLET ORAL at 08:36

## 2018-10-29 RX ADMIN — ACETAMINOPHEN 650 MG: 325 TABLET, FILM COATED ORAL at 00:49

## 2018-10-29 RX ADMIN — HEPARIN SODIUM 5000 UNITS: 5000 INJECTION, SOLUTION INTRAVENOUS; SUBCUTANEOUS at 19:32

## 2018-10-29 RX ADMIN — TAMSULOSIN HYDROCHLORIDE 0.4 MG: 0.4 CAPSULE ORAL at 08:37

## 2018-10-29 RX ADMIN — LIDOCAINE 1 PATCH: 560 PATCH PERCUTANEOUS; TOPICAL; TRANSDERMAL at 08:37

## 2018-10-29 RX ADMIN — ASPIRIN 325 MG: 325 TABLET, DELAYED RELEASE ORAL at 08:36

## 2018-10-29 RX ADMIN — PANTOPRAZOLE SODIUM 40 MG: 40 TABLET, DELAYED RELEASE ORAL at 06:31

## 2018-10-29 RX ADMIN — HEPARIN SODIUM 5000 UNITS: 5000 INJECTION, SOLUTION INTRAVENOUS; SUBCUTANEOUS at 12:15

## 2018-10-29 RX ADMIN — ATORVASTATIN CALCIUM 40 MG: 40 TABLET, FILM COATED ORAL at 08:36

## 2018-10-29 RX ADMIN — METOPROLOL TARTRATE 25 MG: 25 TABLET ORAL at 19:30

## 2018-10-29 ASSESSMENT — ACTIVITIES OF DAILY LIVING (ADL)
ADLS_ACUITY_SCORE: 11
ADLS_ACUITY_SCORE: 9
ADLS_ACUITY_SCORE: 11
ADLS_ACUITY_SCORE: 11
ADLS_ACUITY_SCORE: 9
ADLS_ACUITY_SCORE: 11

## 2018-10-29 NOTE — PROGRESS NOTES
Cardiothoracic Surgery Progress Note - 10/27/18  Pt: Merlin J Anderson  MRN: [id number]     No acute events overnight.  Chest tubes removed, tolerating diet, pain controlled. Last BM 2 days ago. Voiding freely. Planned for discharge to rehab.     Temp:  [98.3  F (36.8  C)-101.3  F (38.5  C)] 98.6  F (37  C)  Pulse:  [85] 85  Heart Rate:  [75-90] 80  Resp:  [16-28] 16  BP: (114-145)/(56-70) 116/56  SpO2:  [93 %-98 %] 98 %    I/O last 3 completed shifts:  In: 805.17 [P.O.:640; I.V.:165.17]  Out: 1527 [Urine:1115; Chest Tube:412]    Resp: 16    Physical Exam:  Gen:  78 male awake, alert, oriented  Pulm: Pt is on room air,   CV: Regular rate and rhythm.  Chest: Pleural and Mediastinal CT - removed   - Sternal Incision: C/D/I.  Abd: Abdomen soft, non-tender, non-distended  Extremities: Good pulses distally    Labs reviewed in full; available in Epic.  LABS    BMP    Recent Labs  Lab 10/28/18  1242 10/28/18  0405    141   POTASSIUM 4.5 4.6   CHLORIDE 111* 111*   CO2 23 23   ANIONGAP 7 7   * 139*   BUN 29 30   CR 1.84* 1.90*   VICKY 7.9* 7.4*   MAG 2.3 2.4*   PHOS 3.4 4.2       LFT    Recent Labs  Lab 10/26/18  1755 10/26/18  0725   ALBUMIN 2.5* 3.6   BILITOTAL 0.8 0.7   ALKPHOS 48 79   AST 53* 22   ALT 21 31   PROTTOTAL 4.2* 6.5*       CBC    Recent Labs  Lab 10/28/18  0405 10/27/18  0405   WBC 6.7 4.9   HGB 8.4* 9.4*   PLT 96* 75*   HCT 25.0* 27.2*       INR    Recent Labs  Lab 10/26/18  2045   INR 1.76*       Arterial Blood Gas    Recent Labs  Lab 10/27/18  1412 10/27/18  0405 10/26/18  2330 10/26/18 2045   PH 7.36 7.35 7.36 7.27*   PCO2 39 38 36 38   PO2 95 132* 136* 157*   HCO3 22 21 20* 18*   O2PER 40%  --   --   --        Imaging reviewed:  CXR 10/27/2018 - reviewed, postop changes    A/P  Merlin J Anderson is a 78 year old male with a history of severe left main disease who is POD# 5 s/p CABG x 4, EVH.    NEURO: Awake, alert, oriented. Pain: multimodal pain management strategy  CV:  Stable, off  pressors, balloon pump removed.   - ASA, BB  PULM: Maintaining saturation on room air   - IS, OOB, ambulate  FEN/GI: on electrolyte replacement protocol    - Diet: Tolerating regular diet   - Bowel regimen: Senna/Docusate,    Renal: Adequate urine output, Cr back to baseline around 1.4, weight still 4kg up from admission   - Continue lasix.  HEME: Postop anemia and thrombocytopenia, received 1U plt before balloon pump was removed.  ID: Stable, afebrile Abx: completed standard postop prophylaxis  ENDO: BGM controlled with ISS  LINES: Vascular access: peripheral venous   - Drains/tubes: chest tubes removed  PPx: DVT: SCDs, HSQ GI: protonix  DISPO: Continue therapies, Plan for discharge to rehab facility    Pt seen and examined with Dr. Britton.    [place], PGY7  CT Surgery Fellow  Pager: [phone]

## 2018-10-29 NOTE — PROGRESS NOTES
"CT Surgery Progress Note    Severe L main, multivessel coronary artery disease, and stable angina s/p CABG x 4 POD #3    Subjective:  States that pain is being controlled. Denies any hallucinations. Breathing is ok. Working with IS and flutter. Appetite is ok. Denies any nausea. -BM, denies any popping/clicking in his breast bone, needs to work with rehab, was able to get some sleep, lives with wife    Objective:  Up in chair, comfortable, -O2  /69 (BP Location: Left arm)  Pulse 85  Temp 99  F (37.2  C) (Oral)  Resp 16  Ht 1.829 m (6' 0.01\")  Wt 99.9 kg (220 lb 3.2 oz)  SpO2 94%  BMI 29.86 kg/m2  CT - decreased serous output, -leak  CV - RRR, telemetry SR 90s, +edema LE  Pulm - decreased breath sounds throughout, +insp/exp wheezes,  ml  Abd - BS+, NT/ND  Derm - sternal incision D/I   L LE incisions D/I  Lab - WBC 6.1   Hgb/Hct 8.9/26.5   Plt 96   Na 141   K 4.2   Cr/BUN 1.65/32   Mg 2.6   Glucose 149-169  +baldwin    A/P:  1. S/p CABG x 4 POD #3  2. HTN - lopressor 25 mg bid, start lisinopril 2.5 mg daily  3. Hyperlipidemia - atorvastatin 40 mg   4. DM type II - restart metformin 500 mg bid, sliding scale insulin  5. CKD stage 3 - baseline Cr 1.4-1.5, improving, monitor  6. Deconditioned - therapies recommending TCU  7. BPH - flomax 0.4 mg  Encouraged IS/TCDB/amb. Sternal precautions. Will remove baldwin today if able to move well with rehab. Will remove chest tubes. Check labs in am tomorrow. Continue to monitor.    Seth Olivarez PA-C  [phone]  "

## 2018-10-29 NOTE — PROGRESS NOTES
"NUTRITION ASSESSMENT      REASON FOR ASSESSMENT:  Cardiac Surgery Nutrition Consult    CURRENT DIET / INTAKE:  Diet: Low CHO    Visited with pt this morning  Was wanting to eat breakfast - took his order (oatmeal, milk, eggs, tomato juice)  Wasn't able to remember if her ate any dinner last night  Reports feeling hungry        ANTHROPOMETRICS:   Ht: 6'0\"  Wt: (10/26) 96.2 kg  BMI: 28.7  IBW: 80.9  Weight Status: Overweight BMI 25-29.9  %IBW: 119%    MALNUTRITION:  Patient does not meet two of the following criteria necessary for diagnosing malnutrition:  significant weight loss, reduced intake, subcutaneous fat loss, muscle loss or fluid retention    NUTRITION DIAGNOSIS:   Increased nutrient needs (protein) R/t higher demand for healing AEB pt is s/p CABG    INTERVENTIONS:    Nutrition Prescription:  Low CHO    Implementation:  Nutrition Education (Content):  Discussed the importance of adequate nutrition post-op for healing and energy, emphasizing protein foods, and encouraged patient to order a protein food at each meal.    Made pt Room Service with Assist - he will be seen daily for meal ordering assistance    Goals:  Patient to consume ~75% at meals in the next 3 - 5 days    Follow Up/Monitoring (InPatient):  Food and Fluid intake -  Monitor for adequacy    Follow Up/Monitoring (OutPatient):  Patient will participate in out-patient cardiac rehab and attend nutrition classes during the program    "

## 2018-10-29 NOTE — PLAN OF CARE
Problem: Patient Care Overview  Goal: Plan of Care/Patient Progress Review  Discharge Planner OT   Patient plan for discharge: None stated.   Current status: MOD to MAX A sit<>stand but able to ambulate to bathroom and back with walker and MIN A.  Barriers to return to prior living situation: Level of A with ADL and mobility.  Recommendations for discharge: TCU.  Rationale for recommendations: Not at baseline. Slow gait pattern, shuffles. Significant A needed for transfers.       Entered by: Susanne Birmingham 10/29/2018 9:28 AM

## 2018-10-29 NOTE — PLAN OF CARE
Problem: Patient Care Overview  Goal: Plan of Care/Patient Progress Review  VSS, tele SR, with 1* block. Breathing unlabored, shallow. Encouraged to cough, productive in nature. No c/o pain.  Garcia patent draining theo urine, Incisions CDI. Pedal pulses present, no numbness/tingling lower extremeties. CT water seal, no leak.

## 2018-10-29 NOTE — PROGRESS NOTES
[facility]    Hospitalist Progress Note    Assessment & Plan   Merlin J Anderson is a 78 year old male with past medical history significant for DM type II, essential hypertension, hyperlipidemia, chronic kidney disease and carotid artery disease, status post right carotid endarterectomy who was evaluated in cardiology clinic and sent in for CABG evaluation.  Patient subsequently went straight to the OR and we were consulted for medical management     Coronary artery disease, status post CABG POD #3  HTN/HLP   H/o Carotid artery disease s/p R Carotid endarterectomy  Patient was evaluated in cardiology clinic for evaluation of progressive dyspnea on exertion.  He underwent stress test which was abnormal.  He was subsequently referred for coronary angiogram as an outpatient.  Patient underwent coronary angiogram on October 26 which demonstrated severe three-vessel coronary artery disease including severe distal left main stenosis.  Patient was admitted for further evaluation and management and was placed on balloon pump.  Patient underwent coronary artery bypass grafting x4 with left leg endoscopic vein harvesting  On 10/26/18.  Patient was extubated on 10/27/18.   Cardiovascular surgery is following closely.  Internal medicine is consulted for medical comanagement.  - Defer post-op management to surgery   - Cardiology also following   -  mg  - Lipitor 40 mg   - Lisinopril 2.5 mg   - Lopressor 25 mg BID     KOLTON on CKD Stage III. Resolved   Baseline Cr appears to be 1.4-1.6.  Had increase to 1.9 on 10/28/18 but improved to 1.65 today   - Continue to monitor      DM type II   Patient takes metformin 500 mg p.o. twice daily at home.  Initially patient was on insulin infusion post CABG.  Last HgbA1c from 10/28 was 6.1.  - Metformin restarted  - SSI   - Continue carb coverage 1 unit of NovoLog for 15 g of carb.     History of benign prostate hyperplasia  - Continue patient on Flomax 0.4 mg p.o.  daily     Allergic rhinitis  - We will go ahead and order his home Flonase      DVT Prophylaxis: Heparin SQ  Code Status: Full Code    Disposition: Expected discharge per surgical service.    Unruly Rizo,   Text Page (7am to 6pm)    Interval History   Patient seen and examined.  Pain well controlled currently.  No difficulty breathing.  No fevers or chills.  Not really starting to eat yet.     -Data reviewed today: I reviewed all new labs and imaging results over the last 24 hours. I personally reviewed no images or EKG's today.    Physical Exam   Temp: 99  F (37.2  C) Temp src: Oral BP: 130/69 Pulse: 85 Heart Rate: 85 Resp: 16 SpO2: 94 % O2 Device: None (Room air) Oxygen Delivery: 2 LPM  Vitals:    10/26/18 0645 10/28/18 0800 10/29/18 0600   Weight: 96.2 kg (212 lb) 100.8 kg (222 lb 3.6 oz) 99.9 kg (220 lb 3.2 oz)     Vital Signs with Ranges  Temp:  [98.3  F (36.8  C)-101.3  F (38.5  C)] 99  F (37.2  C)  Pulse:  [85] 85  Heart Rate:  [75-90] 85  Resp:  [16] 16  BP: (114-145)/(56-69) 130/69  SpO2:  [93 %-98 %] 94 %  I/O last 3 completed shifts:  In: 805.17 [P.O.:640; I.V.:165.17]  Out: 1527 [Urine:1115; Chest Tube:412]    Constitutional: Awake, alert, cooperative, no apparent distress  Respiratory: Clear to auscultation bilaterally, no crackles or wheezing  Cardiovascular: Regular rate and rhythm, faint friction rub noted.  No murmur noted  GI: Normal bowel sounds, soft, non-distended, non-tender  Skin/Integumen: No rashes, no cyanosis  MSK: 1+ pitting edema to bilateral lower extremities     Medications       aspirin  325 mg Oral Daily     atorvastatin  40 mg Oral Daily     heparin  5,000 Units Subcutaneous Q8H     insulin aspart  1-7 Units Subcutaneous TID AC     insulin aspart  1-5 Units Subcutaneous At Bedtime     lidocaine  1 patch Transdermal Q24H     lidocaine   Transdermal Q8H     lidocaine   Transdermal Q24h     lisinopril  2.5 mg Oral Daily     metFORMIN  500 mg Oral BID w/meals     metoprolol  tartrate  25 mg Oral Q12H     pantoprazole  40 mg Oral QAM AC     sodium chloride (PF)  3 mL Intracatheter Q8H     tamsulosin  0.4 mg Oral Daily       Data     Recent Labs  Lab 10/29/18  0738 10/28/18  1242 10/28/18  0405 10/27/18  0405  10/26/18  2045  10/26/18  1755  10/26/18  0725   WBC 6.1  --  6.7 4.9  < > 6.3  --  9.3  --  5.9   HGB 8.9*  --  8.4* 9.4*  < > 6.9*  < > 8.4*  < > 12.2*   MCV 92  --  91 90  < > 91  --  92  --  90   PLT 96*  --  96* 75*  < > 81*  --  91*  --  135*   INR  --   --   --   --   --  1.76*  --  1.67*  --  1.08    141 141 143  --  143  < > 143  < > 141   POTASSIUM 4.2 4.5 4.6 4.6  --  4.6  < > 4.8  < > 4.5   CHLORIDE 110* 111* 111* 113*  --  113*  --  112*  --  109   CO2 26 23 23 22  --  19*  --  19*  --  25   BUN 32* 29 30 24  --  21  --  21  --  24   CR 1.65* 1.84* 1.90* 1.55*  --  1.40*  --  1.25  --  1.46*   ANIONGAP 5 7 7 8  --  11  --  12  --  7   VICKY 8.4* 7.9* 7.4* 7.2*  --  7.2*  --  7.6*  --  9.1   * 166* 139* 168*  --  159*  --  213*  --  142*   ALBUMIN  --   --   --   --   --   --   --  2.5*  --  3.6   PROTTOTAL  --   --   --   --   --   --   --  4.2*  --  6.5*   BILITOTAL  --   --   --   --   --   --   --  0.8  --  0.7   ALKPHOS  --   --   --   --   --   --   --  48  --  79   ALT  --   --   --   --   --   --   --  21  --  31   AST  --   --   --   --   --   --   --  53*  --  22   < > = values in this interval not displayed.    Imaging:   Recent Results (from the past 24 hour(s))   XR Chest 2 Views    Narrative    CHEST TWO VIEWS  10/28/2018 10:32 AM     HISTORY: Status post CABG.    COMPARISON: 10/28/2018      Impression    IMPRESSION: Interval extubation and removal of [place] Cira  catheter. Left-sided chest tube remains in place. There is no  pneumothorax. Mediastinal drain also unchanged. Lungs are relatively  clear, although, there may be a trace amount atelectasis in the left  base. Heart size is stable.    OSMEL JIN MD

## 2018-10-29 NOTE — PLAN OF CARE
Problem: Patient Care Overview  Goal: Plan of Care/Patient Progress Review  Discharge Planner PT   Patient plan for discharge: not stated  Current status: Sup> sit max assist of 2. Sitting balance min assist of 1. Sit to stand min A of 2 from elevated bed surface. Bed to chair transfer Min A of 2. Cues for sternal precautions throughout.  Barriers to return to prior living situation: patient is not at baseline of independence with walking, transfers and bed mobility. Pt can recall sternal precautions however, during movement needs reminders of them.   Recommendations for discharge: TCU  Rationale for recommendations: pt would benefit from further PT to increase his functional activity tolerance and independence with transfers and ambulation.       Entered by: Blanca De Leon 10/29/2018 11:54 AM

## 2018-10-29 NOTE — PLAN OF CARE
Problem: Patient Care Overview  Goal: Plan of Care/Patient Progress Review  Outcome: Improving  VSS, A&O x3 with forgetfulness, scheduled Tylenol for pain management, is on RA @ 92%. Is on tele monitoring and is NSR. CT to water seal, no air leaks noted, 40cc of serosanguinous output, pacer wires capped and pacer unit on stand-by at bedside. IS and acapella done with encouragement, BS+ active and audible in all quadrants, able to void without difficulty, able to pass gas. Surgical incision is CDI, liquid bandage, no drainage noted,  LLE harvest site CDI, liquid bandage, no drainage noted. Garcia in place, is patent and intact.     Preceptor: ROXIE Ireland RN

## 2018-10-29 NOTE — PLAN OF CARE
Problem: Patient Care Overview  Goal: Plan of Care/Patient Progress Review  Outcome: No Change  Pt alert  And oriented but forgetful. Metformin and BG monitoring per orders. CT removed this AM. Pt needs PT consult. Pt requires lift. Pt up in chair for breakfast. Incisions ELÍAS WDL. Pluses intact. BS hypoactive + flatus no BM since surgery. Garcia with good UO- plan for removal if able to ambulate with PT. Tele SR 1st degree AV block

## 2018-10-30 ENCOUNTER — APPOINTMENT (OUTPATIENT)
Dept: PHYSICAL THERAPY | Facility: CLINIC | Age: 78
DRG: 233 | End: 2018-10-30
Attending: INTERNAL MEDICINE
Payer: COMMERCIAL

## 2018-10-30 ENCOUNTER — APPOINTMENT (OUTPATIENT)
Dept: OCCUPATIONAL THERAPY | Facility: CLINIC | Age: 78
DRG: 233 | End: 2018-10-30
Attending: INTERNAL MEDICINE
Payer: COMMERCIAL

## 2018-10-30 ENCOUNTER — APPOINTMENT (OUTPATIENT)
Dept: GENERAL RADIOLOGY | Facility: CLINIC | Age: 78
DRG: 233 | End: 2018-10-30
Attending: PHYSICIAN ASSISTANT
Payer: COMMERCIAL

## 2018-10-30 LAB
ANION GAP SERPL CALCULATED.3IONS-SCNC: 5 MMOL/L (ref 3–14)
BUN SERPL-MCNC: 35 MG/DL (ref 7–30)
CALCIUM SERPL-MCNC: 8 MG/DL (ref 8.5–10.1)
CHLORIDE SERPL-SCNC: 109 MMOL/L (ref 94–109)
CO2 SERPL-SCNC: 25 MMOL/L (ref 20–32)
CREAT SERPL-MCNC: 1.44 MG/DL (ref 0.66–1.25)
ERYTHROCYTE [DISTWIDTH] IN BLOOD BY AUTOMATED COUNT: 13.7 % (ref 10–15)
GFR SERPL CREATININE-BSD FRML MDRD: 47 ML/MIN/1.7M2
GLUCOSE BLDC GLUCOMTR-MCNC: 155 MG/DL (ref 70–99)
GLUCOSE BLDC GLUCOMTR-MCNC: 157 MG/DL (ref 70–99)
GLUCOSE BLDC GLUCOMTR-MCNC: 160 MG/DL (ref 70–99)
GLUCOSE BLDC GLUCOMTR-MCNC: 181 MG/DL (ref 70–99)
GLUCOSE SERPL-MCNC: 152 MG/DL (ref 70–99)
HCT VFR BLD AUTO: 22.5 % (ref 40–53)
HGB BLD-MCNC: 7.7 G/DL (ref 13.3–17.7)
INTERPRETATION ECG - MUSE: NORMAL
MCH RBC QN AUTO: 30.8 PG (ref 26.5–33)
MCHC RBC AUTO-ENTMCNC: 34.2 G/DL (ref 31.5–36.5)
MCV RBC AUTO: 90 FL (ref 78–100)
PLATELET # BLD AUTO: 106 10E9/L (ref 150–450)
POTASSIUM SERPL-SCNC: 3.9 MMOL/L (ref 3.4–5.3)
RBC # BLD AUTO: 2.5 10E12/L (ref 4.4–5.9)
SODIUM SERPL-SCNC: 139 MMOL/L (ref 133–144)
WBC # BLD AUTO: 5.3 10E9/L (ref 4–11)

## 2018-10-30 PROCEDURE — 80048 BASIC METABOLIC PNL TOTAL CA: CPT | Performed by: PHYSICIAN ASSISTANT

## 2018-10-30 PROCEDURE — 97530 THERAPEUTIC ACTIVITIES: CPT | Mod: GO

## 2018-10-30 PROCEDURE — 25000132 ZZH RX MED GY IP 250 OP 250 PS 637: Performed by: PHYSICIAN ASSISTANT

## 2018-10-30 PROCEDURE — 12000007 ZZH R&B INTERMEDIATE

## 2018-10-30 PROCEDURE — 99207 ZZC CDG-MDM COMPONENT: MEETS MODERATE - UP CODED: CPT | Performed by: INTERNAL MEDICINE

## 2018-10-30 PROCEDURE — 97535 SELF CARE MNGMENT TRAINING: CPT | Mod: GO

## 2018-10-30 PROCEDURE — 71046 X-RAY EXAM CHEST 2 VIEWS: CPT

## 2018-10-30 PROCEDURE — 00000146 ZZHCL STATISTIC GLUCOSE BY METER IP

## 2018-10-30 PROCEDURE — 36415 COLL VENOUS BLD VENIPUNCTURE: CPT | Performed by: PHYSICIAN ASSISTANT

## 2018-10-30 PROCEDURE — 99232 SBSQ HOSP IP/OBS MODERATE 35: CPT | Performed by: INTERNAL MEDICINE

## 2018-10-30 PROCEDURE — 40000133 ZZH STATISTIC OT WARD VISIT

## 2018-10-30 PROCEDURE — 97116 GAIT TRAINING THERAPY: CPT | Mod: GP | Performed by: PHYSICAL THERAPIST

## 2018-10-30 PROCEDURE — 40000193 ZZH STATISTIC PT WARD VISIT: Performed by: PHYSICAL THERAPIST

## 2018-10-30 PROCEDURE — 25000132 ZZH RX MED GY IP 250 OP 250 PS 637: Performed by: SURGERY

## 2018-10-30 PROCEDURE — 25000132 ZZH RX MED GY IP 250 OP 250 PS 637: Performed by: STUDENT IN AN ORGANIZED HEALTH CARE EDUCATION/TRAINING PROGRAM

## 2018-10-30 PROCEDURE — 97530 THERAPEUTIC ACTIVITIES: CPT | Mod: GP | Performed by: PHYSICAL THERAPIST

## 2018-10-30 PROCEDURE — 25000128 H RX IP 250 OP 636: Performed by: PHYSICIAN ASSISTANT

## 2018-10-30 PROCEDURE — 85027 COMPLETE CBC AUTOMATED: CPT | Performed by: PHYSICIAN ASSISTANT

## 2018-10-30 RX ORDER — FUROSEMIDE 20 MG
20 TABLET ORAL DAILY
Status: DISCONTINUED | OUTPATIENT
Start: 2018-10-30 | End: 2018-10-31 | Stop reason: HOSPADM

## 2018-10-30 RX ADMIN — HEPARIN SODIUM 5000 UNITS: 5000 INJECTION, SOLUTION INTRAVENOUS; SUBCUTANEOUS at 20:01

## 2018-10-30 RX ADMIN — HEPARIN SODIUM 5000 UNITS: 5000 INJECTION, SOLUTION INTRAVENOUS; SUBCUTANEOUS at 11:41

## 2018-10-30 RX ADMIN — PANTOPRAZOLE SODIUM 40 MG: 40 TABLET, DELAYED RELEASE ORAL at 06:09

## 2018-10-30 RX ADMIN — LIDOCAINE 1 PATCH: 560 PATCH PERCUTANEOUS; TOPICAL; TRANSDERMAL at 08:51

## 2018-10-30 RX ADMIN — METFORMIN HYDROCHLORIDE 500 MG: 500 TABLET ORAL at 17:05

## 2018-10-30 RX ADMIN — TAMSULOSIN HYDROCHLORIDE 0.4 MG: 0.4 CAPSULE ORAL at 08:51

## 2018-10-30 RX ADMIN — LISINOPRIL 2.5 MG: 2.5 TABLET ORAL at 08:51

## 2018-10-30 RX ADMIN — FUROSEMIDE 20 MG: 20 TABLET ORAL at 11:41

## 2018-10-30 RX ADMIN — ASPIRIN 325 MG: 325 TABLET, DELAYED RELEASE ORAL at 08:52

## 2018-10-30 RX ADMIN — METOPROLOL TARTRATE 25 MG: 25 TABLET ORAL at 20:00

## 2018-10-30 RX ADMIN — ATORVASTATIN CALCIUM 40 MG: 40 TABLET, FILM COATED ORAL at 08:51

## 2018-10-30 RX ADMIN — POTASSIUM CHLORIDE 20 MEQ: 1500 TABLET, EXTENDED RELEASE ORAL at 08:51

## 2018-10-30 RX ADMIN — METOPROLOL TARTRATE 25 MG: 25 TABLET ORAL at 08:51

## 2018-10-30 RX ADMIN — METFORMIN HYDROCHLORIDE 500 MG: 500 TABLET ORAL at 08:51

## 2018-10-30 RX ADMIN — HEPARIN SODIUM 5000 UNITS: 5000 INJECTION, SOLUTION INTRAVENOUS; SUBCUTANEOUS at 03:40

## 2018-10-30 ASSESSMENT — ACTIVITIES OF DAILY LIVING (ADL)
ADLS_ACUITY_SCORE: 9

## 2018-10-30 NOTE — PROGRESS NOTES
"CT Surgery Progress Note    Severe L main, multivessel coronary artery disease, and stable angina s/p CABG x 4 POD #4    Subjective:  States that pain is being controlled. Denies any hallucinations. Breathing feels shallow. Working with IS and flutter. Appetite is ok. Denies any nausea. +BM, denies any popping/clicking in his breast bone, needs to work with rehab, was able to get some sleep, lives with wife, staff still using lift to get pt out of bed    Objective:  Lying in bed, comfortable, -O2  /66 (BP Location: Right arm)  Pulse 93  Temp 99.3  F (37.4  C) (Oral)  Resp 16  Ht 1.829 m (6' 0.01\")  Wt 100.3 kg (221 lb 1.9 oz)  SpO2 92%  BMI 29.98 kg/m2  CT removed  CV - RRR, telemetry SR 90s, +edema LE  Pulm - decreased breath sounds throughout, -1000 ml  Abd - BS+, NT/ND  Derm - sternal incision D/I   L LE incisions D/I  Lab - WBC 5.3   Hgb/Hct 7.7/22.5 (8.9/26.5)   Plt 106   Na 139   K 3.9   Cr/BUN 1.44/35 (1.65/32)   Glucose 142-189  +baldwin    A/P:  1. S/p CABG x 4 POD #4  2. HTN - lopressor 25 mg bid, lisinopril 2.5 mg daily, add lasix 20 mg daily  3. Hyperlipidemia - atorvastatin 40 mg   4. DM type II - metformin 500 mg bid, sliding scale insulin  5. CKD stage 3 - baseline Cr 1.4-1.5, monitor, back to baseline  6. Deconditioned - therapies recommending TCU  7. BPH - flomax 0.4 mg  8. Acute blood loss anemia - will d/w Dr Britton, likely plan to give 1 unit of PRBC  Encouraged IS/TCDB/amb. Sternal precautions. Remove baldwin. Looking at possible discharge to TCU 1-2 days. Continue to monitor.    Seth Olivarez PA-C  [phone]    Addendum: Seen with CT surgery team. Will hold on PRBC. Will diurese and recheck labs in am tomorrow. Continue to monitor.   "

## 2018-10-30 NOTE — PLAN OF CARE
Problem: Patient Care Overview  Goal: Plan of Care/Patient Progress Review  Discharge Planner OT   Patient plan for discharge: did not state  Current status: pt seen for AM cardiac rehab/OT session. Performed sit-stand from BS chair with Mod-Max A of 2, amb with FWW Min A of 2 however using shuffling gait and did not improve despite verbal and tactile cueing. Tolerated standing at sink for toothbrushing task and able to sequence steps indep, did require assist to manipulate cap on toothpaste (??). Noted knee beginning to buckle in standing therefore placed commode behind him to rest and he performed sit to stand from commode with Mod A of 1. Ambulated back to chair at completion with Min A of 2. Continues to need cues to follow sternal prec. VSS on RA. BP 11/63, HR 99, 02 95% on RA.  Barriers to return to prior living situation: current level of assist, impaired balance, fall risk  Recommendations for discharge: TCU  Rationale for recommendations: will benefit from daily therapies to progress ADLs and mobilities prior to return home       Entered by: Enrique Rosado 10/30/2018 12:08 PM

## 2018-10-30 NOTE — PROGRESS NOTES
[facility]    Hospitalist Progress Note    Assessment & Plan   Merlin J Anderson is a 78 year old male with past medical history significant for DM type II, essential hypertension, hyperlipidemia, chronic kidney disease and carotid artery disease, status post right carotid endarterectomy who was evaluated in cardiology clinic and sent in for CABG evaluation.  Patient subsequently went straight to the OR and we were consulted for medical management      Coronary artery disease, status post CABG POD #3  HTN/HLP   H/o Carotid artery disease s/p R Carotid endarterectomy  Patient was evaluated in cardiology clinic for evaluation of progressive dyspnea on exertion.  He underwent stress test which was abnormal.  He was subsequently referred for coronary angiogram as an outpatient.  Patient underwent coronary angiogram on October 26 which demonstrated severe three-vessel coronary artery disease including severe distal left main stenosis.  Patient was admitted for further evaluation and management and was placed on balloon pump.  Patient underwent coronary artery bypass grafting x4 with left leg endoscopic vein harvesting  On 10/26/18.  Patient was extubated on 10/27/18.   Cardiovascular surgery is following closely.  Internal medicine is consulted for medical comanagement.  - Defer post-op management to surgery   - Cardiology also following   -  mg  - Lipitor 40 mg   - Lisinopril 2.5 mg   - Lopressor 25 mg BID      KOLTON on CKD Stage III. Resolved   Baseline Cr appears to be 1.4-1.6.  Had increase to 1.9 on 10/28/18 but improved to 1.65 today   - Continue to monitor       DM type II   Patient takes metformin 500 mg p.o. twice daily at home.  Initially patient was on insulin infusion post CABG.  Last HgbA1c from 10/28 was 6.1.  - PTA Metformin   - SSI   - Continue carb coverage 1 unit of NovoLog for 15 g of carb.      History of benign prostate hyperplasia  - Continue patient on Flomax 0.4 mg p.o.  daily      Allergic rhinitis  - PTA Flonase    DVT Prophylaxis: Heparin SQ  Code Status: Full Code    Disposition: Expected discharge per surgical service.  Possibly TCU    nUruly Rizo DO  Text Page (7am to 6pm)    Interval History   Patient seen and examined.  Patient's pain is currently well controlled.  No difficulty breathing currently.  Tolerating diet.     -Data reviewed today: I reviewed all new labs and imaging results over the last 24 hours. I personally reviewed no images or EKG's today.    Physical Exam   Temp: 99.3  F (37.4  C) Temp src: Oral BP: 132/66 Pulse: 93 Heart Rate: 94 Resp: 16 SpO2: 92 % O2 Device: None (Room air)    Vitals:    10/28/18 0800 10/29/18 0600 10/30/18 0659   Weight: 100.8 kg (222 lb 3.6 oz) 99.9 kg (220 lb 3.2 oz) 100.3 kg (221 lb 1.9 oz)     Vital Signs with Ranges  Temp:  [98.4  F (36.9  C)-99.9  F (37.7  C)] 99.3  F (37.4  C)  Pulse:  [78-96] 93  Heart Rate:  [82-94] 94  Resp:  [16-20] 16  BP: (107-154)/(55-76) 132/66  SpO2:  [92 %-96 %] 92 %  I/O last 3 completed shifts:  In: 1250 [P.O.:1250]  Out: 1025 [Urine:1025]    Constitutional: Awake, alert, cooperative, no apparent distress  Respiratory: Clear to auscultation bilaterally, no crackles or wheezing  Cardiovascular: Regular rate and rhythm, normal S1 and S2, and no murmur noted  GI: Normal bowel sounds, soft, non-distended, non-tender  Skin/Integumen: Midline sternotomy incision healing well with no surrounding erythema.  No rashes, no cyanosis  MSK: No edema     Medications       aspirin  325 mg Oral Daily     atorvastatin  40 mg Oral Daily     furosemide  20 mg Oral Daily     heparin  5,000 Units Subcutaneous Q8H     insulin aspart  1-7 Units Subcutaneous TID AC     insulin aspart  1-5 Units Subcutaneous At Bedtime     lidocaine  1 patch Transdermal Q24H     lidocaine   Transdermal Q8H     lidocaine   Transdermal Q24h     lisinopril  2.5 mg Oral Daily     metFORMIN  500 mg Oral BID w/meals     metoprolol tartrate   25 mg Oral Q12H     pantoprazole  40 mg Oral QAM AC     sodium chloride (PF)  3 mL Intracatheter Q8H     tamsulosin  0.4 mg Oral Daily       Data     Recent Labs  Lab 10/30/18  0735 10/29/18  0738 10/28/18  1242 10/28/18  0405  10/26/18  2045  10/26/18  1755  10/26/18  0725   WBC 5.3 6.1  --  6.7  < > 6.3  --  9.3  --  5.9   HGB 7.7* 8.9*  --  8.4*  < > 6.9*  < > 8.4*  < > 12.2*   MCV 90 92  --  91  < > 91  --  92  --  90   * 96*  --  96*  < > 81*  --  91*  --  135*   INR  --   --   --   --   --  1.76*  --  1.67*  --  1.08    141 141 141  < > 143  < > 143  < > 141   POTASSIUM 3.9 4.2 4.5 4.6  < > 4.6  < > 4.8  < > 4.5   CHLORIDE 109 110* 111* 111*  < > 113*  --  112*  --  109   CO2 25 26 23 23  < > 19*  --  19*  --  25   BUN 35* 32* 29 30  < > 21  --  21  --  24   CR 1.44* 1.65* 1.84* 1.90*  < > 1.40*  --  1.25  --  1.46*   ANIONGAP 5 5 7 7  < > 11  --  12  --  7   VICKY 8.0* 8.4* 7.9* 7.4*  < > 7.2*  --  7.6*  --  9.1   * 149* 166* 139*  < > 159*  --  213*  --  142*   ALBUMIN  --   --   --   --   --   --   --  2.5*  --  3.6   PROTTOTAL  --   --   --   --   --   --   --  4.2*  --  6.5*   BILITOTAL  --   --   --   --   --   --   --  0.8  --  0.7   ALKPHOS  --   --   --   --   --   --   --  48  --  79   ALT  --   --   --   --   --   --   --  21  --  31   AST  --   --   --   --   --   --   --  53*  --  22   < > = values in this interval not displayed.    Imaging:   No results found for this or any previous visit (from the past 24 hour(s)).

## 2018-10-30 NOTE — PLAN OF CARE
Problem: Patient Care Overview  Goal: Plan of Care/Patient Progress Review  Discharge Planner PT   Patient plan for discharge: TCU  Current status: Sup> sit max assist of 2. Bed mobility: rolling mod assist of 2. Sitting balance min assist of 1. Sit to stand min A of 2 from elevated bed surface. Standing balance min assist. Cues for sternal precautions throughout.  Barriers to return to prior living situation: patient is not at baseline of independence with walking, transfers and bed mobility. Pt can recall sternal precautions however, during movement needs reminders of them.   Recommendations for discharge: TCU  Rationale for recommendations: pt would benefit from further PT to increase his functional activity tolerance and independence with transfers and ambulation.       Entered by: Blanca De Leon 10/30/2018 2:44 PM

## 2018-10-30 NOTE — CONSULTS
Care Transition Initial Assessment - JUNE  Reason For Consult: discharge planning  Met with: Patient    Active Problems:    S/P CABG (coronary artery bypass graft)       DATA  Lives With: spouse  Living Arrangements: house  Description of Support System: Supportive, Involved  Who is your support system?: Wife, Children  Support Assessment: Adequate family and caregiver support, Adequate social supports.  Quality Of Family Relationships: supportive, involved  Transportation Available: car (family or friend will provide)    ASSESSMENT  Cognitive Status:  awake, alert and oriented    SW has reviewed pt records. Pt is Merlin, a 77yo gentleman who was admitted on 10/26/18 and underwent a CABGx4. It is recommended at this time that pt discharge to a TCU. JUNE met with pt this morning to introduce self/role, perform assessment, and discuss discharge planning. Pt shared that he resides in a house (split level) in [place] with his wife, Fatou. Pt was independent prior to admission with all ADLs and did not require any assistive equipment for mobility. JUNE discussed the recommendation for TCU stay with pt and he verbalized understanding and is agreeable. JUNE inquired about any familiarity with facilities and he requests that referral be sent to [facility]. SW will also send to two facilities near pt home, [facility] and [facility], though [facility] first preference. JUNE sent referrals via [facility]. SW to update pt as able, anticipate discharge in 1-2 days per CV surgery.     PLAN  Financial costs for the patient includes: None known at this time.  Patient given options and choices for discharge: Yes.  Patient/family is agreeable to the plan?  Yes  Patient Goals and Preferences: TCU.  Patient anticipates discharging to:  TCU.      ADDENDUM: SW returned to room later today when wife present to discuss discharge planning. Pt and wife confirmed preference for [facility] with [facility] as a second preference. Pt wife will provide  transport. JUNE discussed insurance coverage and general expectations for TCU. SW to check in tomorrow 10/31, wife usually at [facility] around noon and therefore if pt ready for discharge will plan for discharge around this time.    ESE Silver, [facility] HospitalSW  Daytime (8:00am-4:30pm): [phone]  After-Hours  Pager (4:30pm-11:30pm): [phone]

## 2018-10-30 NOTE — PLAN OF CARE
Problem: Patient Care Overview  Goal: Plan of Care/Patient Progress Review  Outcome: Improving  Dx: Sever left main & multivessel CAD. Hx: T2 DM, HTN, chronic kidney disease and CAD. Surg: CABGx4 w/ LLE vein harvest. VSS. Tele: NS w/ 1st degree AVB. A/Ox3, reported forgetful at times. Blood sugars: 142/189, no coverage needed this shift. Sternal incision is approximated, no drainage, ned; LLE incisions are approximated, no drainage, ned. Pt denies pain this shift. Up with A2+lift, LE weakness. Tolerating mod carb diet. +gas, +bm. Voiding to baldwin due to lack of mobility. LS clear. IS: 750.

## 2018-10-30 NOTE — PLAN OF CARE
Problem: Patient Care Overview  Goal: Plan of Care/Patient Progress Review  Outcome: No Change  Vital signs stable on room air. Alert and oriented. Lung sounds clear but diminished. Bowel sounds active and audible, flatus present. Sternal incision/ graft sites and Chest tube dressing clean dry and intact. No signs of hematoma in groin sites. Patient denies pain. Up assist of one. Tolerating diet. CMS intact. TELE 1st degree AVB.

## 2018-10-30 NOTE — PROGRESS NOTES
SPIRITUAL HEALTH SERVICES Progress Note  [facility]     visited due to length of stay.  Patient was dozing but open to a visit.  Patient's wife had been visiting but just left the room for a while and didn't return during the visit.  Patient not completely satisfied with his rate of recovery from surgery, or that he will go to transitional care next, but glad to have the surgery without too many problems beforehand.  Patient mentioned that his 50 year old son  suddenly from a seizure about a month ago, which has been hard.  Patient and his wife are longtime members of [employer] in [place] but haven't attended much for a while since the [employer] grew significantly in size.  Their son's [facility] service was held at a different [employer], originally planted by [employer] [facility] [place], and another was held at a [facility] in [place] where the son and his wife lived.  Patient still feels he has a good relationship to God and said that he doesn't know how he and his wife would get through hard times without that.     listened supportively and provided prayer.    Patient receiving good support from his wife and seems determined but grieving his son and finding his recovery challenging.    No plan to follow up.      Naresh Kimball   Intern

## 2018-10-30 NOTE — PLAN OF CARE
Problem: Patient Care Overview  Goal: Plan of Care/Patient Progress Review  Outcome: Improving  Pt up in room with assist of two with gait belt and walker, denies pain, tolerates diet, poor appetite, baldwin out voiding well, chest tubes out yesterday will need to shower this PM, + BM, forgetful at times, K replaced, tele SR with first degree AV block, blood sugars covered per orders, family instructed on videos

## 2018-10-31 ENCOUNTER — PATIENT OUTREACH (OUTPATIENT)
Dept: CARE COORDINATION | Facility: CLINIC | Age: 78
End: 2018-10-31

## 2018-10-31 VITALS
WEIGHT: 221.56 LBS | SYSTOLIC BLOOD PRESSURE: 113 MMHG | HEART RATE: 79 BPM | OXYGEN SATURATION: 98 % | DIASTOLIC BLOOD PRESSURE: 59 MMHG | RESPIRATION RATE: 18 BRPM | HEIGHT: 72 IN | TEMPERATURE: 98.2 F | BODY MASS INDEX: 30.01 KG/M2

## 2018-10-31 VITALS
RESPIRATION RATE: 18 BRPM | SYSTOLIC BLOOD PRESSURE: 118 MMHG | BODY MASS INDEX: 30.74 KG/M2 | HEART RATE: 91 BPM | DIASTOLIC BLOOD PRESSURE: 62 MMHG | OXYGEN SATURATION: 94 % | TEMPERATURE: 97.2 F | WEIGHT: 226.7 LBS

## 2018-10-31 LAB
ANION GAP SERPL CALCULATED.3IONS-SCNC: 8 MMOL/L (ref 3–14)
BUN SERPL-MCNC: 42 MG/DL (ref 7–30)
CALCIUM SERPL-MCNC: 8.4 MG/DL (ref 8.5–10.1)
CHLORIDE SERPL-SCNC: 111 MMOL/L (ref 94–109)
CO2 SERPL-SCNC: 22 MMOL/L (ref 20–32)
CREAT SERPL-MCNC: 1.43 MG/DL (ref 0.66–1.25)
ERYTHROCYTE [DISTWIDTH] IN BLOOD BY AUTOMATED COUNT: 13.5 % (ref 10–15)
GFR SERPL CREATININE-BSD FRML MDRD: 48 ML/MIN/1.7M2
GLUCOSE BLDC GLUCOMTR-MCNC: 142 MG/DL (ref 70–99)
GLUCOSE BLDC GLUCOMTR-MCNC: 144 MG/DL (ref 70–99)
GLUCOSE BLDC GLUCOMTR-MCNC: 197 MG/DL (ref 70–99)
GLUCOSE SERPL-MCNC: 149 MG/DL (ref 70–99)
HCT VFR BLD AUTO: 24.7 % (ref 40–53)
HGB BLD-MCNC: 8.1 G/DL (ref 13.3–17.7)
INTERPRETATION ECG - MUSE: NORMAL
MCH RBC QN AUTO: 30 PG (ref 26.5–33)
MCHC RBC AUTO-ENTMCNC: 32.8 G/DL (ref 31.5–36.5)
MCV RBC AUTO: 92 FL (ref 78–100)
PLATELET # BLD AUTO: 177 10E9/L (ref 150–450)
POTASSIUM SERPL-SCNC: 4 MMOL/L (ref 3.4–5.3)
RBC # BLD AUTO: 2.7 10E12/L (ref 4.4–5.9)
SODIUM SERPL-SCNC: 141 MMOL/L (ref 133–144)
WBC # BLD AUTO: 6.5 10E9/L (ref 4–11)

## 2018-10-31 PROCEDURE — 25000128 H RX IP 250 OP 636: Performed by: PHYSICIAN ASSISTANT

## 2018-10-31 PROCEDURE — 36415 COLL VENOUS BLD VENIPUNCTURE: CPT | Performed by: PHYSICIAN ASSISTANT

## 2018-10-31 PROCEDURE — 85027 COMPLETE CBC AUTOMATED: CPT | Performed by: PHYSICIAN ASSISTANT

## 2018-10-31 PROCEDURE — 25000132 ZZH RX MED GY IP 250 OP 250 PS 637: Performed by: PHYSICIAN ASSISTANT

## 2018-10-31 PROCEDURE — 99232 SBSQ HOSP IP/OBS MODERATE 35: CPT | Performed by: INTERNAL MEDICINE

## 2018-10-31 PROCEDURE — 25000132 ZZH RX MED GY IP 250 OP 250 PS 637: Performed by: STUDENT IN AN ORGANIZED HEALTH CARE EDUCATION/TRAINING PROGRAM

## 2018-10-31 PROCEDURE — 25000132 ZZH RX MED GY IP 250 OP 250 PS 637: Performed by: SURGERY

## 2018-10-31 PROCEDURE — 00000146 ZZHCL STATISTIC GLUCOSE BY METER IP

## 2018-10-31 PROCEDURE — 99207 ZZC CDG-MDM COMPONENT: MEETS MODERATE - UP CODED: CPT | Performed by: INTERNAL MEDICINE

## 2018-10-31 PROCEDURE — 80048 BASIC METABOLIC PNL TOTAL CA: CPT | Performed by: PHYSICIAN ASSISTANT

## 2018-10-31 RX ORDER — FUROSEMIDE 20 MG
20 TABLET ORAL DAILY
Qty: 5 TABLET | Refills: 0 | DISCHARGE
Start: 2018-11-01 | End: 2018-11-14

## 2018-10-31 RX ORDER — LISINOPRIL 2.5 MG/1
2.5 TABLET ORAL DAILY
Qty: 30 TABLET | DISCHARGE
Start: 2018-11-01 | End: 2018-12-10

## 2018-10-31 RX ORDER — OXYCODONE HYDROCHLORIDE 5 MG/1
5 TABLET ORAL EVERY 6 HOURS PRN
Qty: 10 TABLET | Refills: 0 | Status: SHIPPED | DISCHARGE
Start: 2018-10-31 | End: 2018-11-19

## 2018-10-31 RX ORDER — POTASSIUM CHLORIDE 750 MG/1
10 TABLET, EXTENDED RELEASE ORAL DAILY
Qty: 5 TABLET | Refills: 0 | DISCHARGE
Start: 2018-10-31 | End: 2018-11-05

## 2018-10-31 RX ORDER — ACETAMINOPHEN 325 MG/1
650 TABLET ORAL EVERY 4 HOURS PRN
Qty: 100 TABLET | DISCHARGE
Start: 2018-10-31

## 2018-10-31 RX ORDER — ASPIRIN 325 MG
325 TABLET, DELAYED RELEASE (ENTERIC COATED) ORAL DAILY
Qty: 40 TABLET | DISCHARGE
Start: 2018-11-01

## 2018-10-31 RX ORDER — METOPROLOL TARTRATE 25 MG/1
25 TABLET, FILM COATED ORAL EVERY 12 HOURS
Qty: 60 TABLET | DISCHARGE
Start: 2018-10-31 | End: 2019-01-15

## 2018-10-31 RX ADMIN — TAMSULOSIN HYDROCHLORIDE 0.4 MG: 0.4 CAPSULE ORAL at 09:20

## 2018-10-31 RX ADMIN — LISINOPRIL 2.5 MG: 2.5 TABLET ORAL at 09:20

## 2018-10-31 RX ADMIN — HEPARIN SODIUM 5000 UNITS: 5000 INJECTION, SOLUTION INTRAVENOUS; SUBCUTANEOUS at 04:41

## 2018-10-31 RX ADMIN — METFORMIN HYDROCHLORIDE 500 MG: 500 TABLET ORAL at 09:20

## 2018-10-31 RX ADMIN — ASPIRIN 325 MG: 325 TABLET, DELAYED RELEASE ORAL at 09:20

## 2018-10-31 RX ADMIN — LIDOCAINE 1 PATCH: 560 PATCH PERCUTANEOUS; TOPICAL; TRANSDERMAL at 09:19

## 2018-10-31 RX ADMIN — FUROSEMIDE 20 MG: 20 TABLET ORAL at 09:20

## 2018-10-31 RX ADMIN — METOPROLOL TARTRATE 25 MG: 25 TABLET ORAL at 09:20

## 2018-10-31 RX ADMIN — ATORVASTATIN CALCIUM 40 MG: 40 TABLET, FILM COATED ORAL at 09:19

## 2018-10-31 RX ADMIN — PANTOPRAZOLE SODIUM 40 MG: 40 TABLET, DELAYED RELEASE ORAL at 09:19

## 2018-10-31 ASSESSMENT — ACTIVITIES OF DAILY LIVING (ADL)
ADLS_ACUITY_SCORE: 11

## 2018-10-31 NOTE — PROGRESS NOTES
SW:    I: SW following for discharge planning. Pt will discharge to [place] today, orders to be faxed to [phone] when available. JUNE discussed with pt and wife yesterday and wife plans to transport. Pt wife was updated by phone by CV surgery PA and aware that pt is discharges, will be at [facility] around noon.     P: Pt to discharge to [facility] TCU via family transport at ~1300.    ESE Silver, [facility] CenterSW  Daytime (8:00am-4:30pm): [phone]  After-Hours JUNE Pager (4:30pm-11:30pm): [phone]

## 2018-10-31 NOTE — PLAN OF CARE
Problem: Patient Care Overview  Goal: Plan of Care/Patient Progress Review  Outcome: No Change  Vital Signs stable. Telemetry normal sinus rhythm. Alert and Oriented. Lung sounds diminished with crackles in the bases. Bowel sounds audible and active. Passing flatus. Mod Carb diet. Denies nausea. Adequate urinary output via urinal at bedside. CMS to baseline. CT incision had small amount of leaking, gauze applied. Sternal incision is ecchymotic with liquid bandage closure, no drainage. Nathrop sites also ecchymotic with liquid bandage closure, no drainage. Up with assist of 2. Pt has denied pain. Took shower this evening.

## 2018-10-31 NOTE — PROGRESS NOTES
Met with patient and his wife. Discharge instructions reviewed. Plan discontinue to [facility] TCU today. Will follow along as out patient.

## 2018-10-31 NOTE — PROGRESS NOTES
[facility]    Hospitalist Progress Note    Assessment & Plan   Merlin J Anderson is a 78 year old male with past medical history significant for DM type II, essential hypertension, hyperlipidemia, chronic kidney disease and carotid artery disease, status post right carotid endarterectomy who was evaluated in cardiology clinic and sent in for CABG evaluation.  Patient subsequently went straight to the OR and we were consulted for medical management       Coronary artery disease, status post CABG POD #3  HTN/HLP   H/o Carotid artery disease s/p R Carotid endarterectomy  Patient was evaluated in cardiology clinic for evaluation of progressive dyspnea on exertion.  He underwent stress test which was abnormal.  He was subsequently referred for coronary angiogram as an outpatient.  Patient underwent coronary angiogram on October 26 which demonstrated severe three-vessel coronary artery disease including severe distal left main stenosis.  Patient was admitted for further evaluation and management and was placed on balloon pump.  Patient underwent coronary artery bypass grafting x4 with left leg endoscopic vein harvesting  On 10/26/18.  Patient was extubated on 10/27/18.   Cardiovascular surgery is following closely.  Internal medicine is consulted for medical comanagement.  - Defer post-op management to surgery   - Cardiology also following   -  mg  - Lipitor 40 mg   - Lisinopril 2.5 mg   - Lopressor 25 mg BID   - Lasix 20 mg daily       KOLTON on CKD Stage III. Resolved   Baseline Cr appears to be 1.4-1.6.  Had increase to 1.9 on 10/28/18 but improved to 1.65 today   - Continue to monitor       DM type II   Patient takes metformin 500 mg p.o. twice daily at home.  Initially patient was on insulin infusion post CABG.  Last HgbA1c from 10/28 was 6.1.  - PTA Metformin   - SSI   - Continue carb coverage 1 unit of NovoLog for 15 g of carb.      History of benign prostate hyperplasia  - Continue patient on  Flomax 0.4 mg p.o. daily      Allergic rhinitis  - PTA Flonase    DVT Prophylaxis: Defer to surgery   Code Status: Prior    Disposition: Expected discharge per surgery.      Unruly Rizo DO  Text Page (7am to 6pm)    Interval History   Patient seen and examined.  No pain currently.  No difficulty breathing.  No fevers or chills.     -Data reviewed today: I reviewed all new labs and imaging results over the last 24 hours. I personally reviewed no images or EKG's today.    Physical Exam   Temp: 98.6  F (37  C) Temp src: Oral BP: 117/66 Pulse: 96 Heart Rate: 98 Resp: 18 SpO2: 94 % O2 Device: None (Room air)    Vitals:    10/29/18 0600 10/30/18 0659 10/31/18 0600   Weight: 99.9 kg (220 lb 3.2 oz) 100.3 kg (221 lb 1.9 oz) 100.5 kg (221 lb 9 oz)     Vital Signs with Ranges  Temp:  [98.1  F (36.7  C)-99  F (37.2  C)] 98.6  F (37  C)  Pulse:  [81-96] 96  Heart Rate:  [83-98] 98  Resp:  [16-18] 18  BP: (109-121)/(60-66) 117/66  SpO2:  [92 %-94 %] 94 %  I/O last 3 completed shifts:  In: 960 [P.O.:960]  Out: 650 [Urine:650]    Constitutional: Awake, alert, cooperative, no apparent distress  Respiratory: Clear to auscultation bilaterally, no crackles or wheezing  Cardiovascular: Regular rate and rhythm, normal S1 and S2, and no murmur noted  GI: Normal bowel sounds, soft, non-distended, non-tender  Skin/Integumen: Well healing sternotomy incision with no surrounding erythema. No rashes, no cyanosis  MSK: No edema     Medications       aspirin  325 mg Oral Daily     atorvastatin  40 mg Oral Daily     furosemide  20 mg Oral Daily     heparin  5,000 Units Subcutaneous Q8H     insulin aspart  1-7 Units Subcutaneous TID AC     insulin aspart  1-5 Units Subcutaneous At Bedtime     lidocaine  1 patch Transdermal Q24H     lidocaine   Transdermal Q8H     lidocaine   Transdermal Q24h     lisinopril  2.5 mg Oral Daily     metFORMIN  500 mg Oral BID w/meals     metoprolol tartrate  25 mg Oral Q12H     pantoprazole  40 mg Oral QAM AC      sodium chloride (PF)  3 mL Intracatheter Q8H     tamsulosin  0.4 mg Oral Daily       Data     Recent Labs  Lab 10/31/18  0720 10/30/18  0735 10/29/18  0738  10/26/18  2045  10/26/18  1755  10/26/18  0725   WBC 6.5 5.3 6.1  < > 6.3  --  9.3  --  5.9   HGB 8.1* 7.7* 8.9*  < > 6.9*  < > 8.4*  < > 12.2*   MCV 92 90 92  < > 91  --  92  --  90    106* 96*  < > 81*  --  91*  --  135*   INR  --   --   --   --  1.76*  --  1.67*  --  1.08    139 141  < > 143  < > 143  < > 141   POTASSIUM 4.0 3.9 4.2  < > 4.6  < > 4.8  < > 4.5   CHLORIDE 111* 109 110*  < > 113*  --  112*  --  109   CO2 22 25 26  < > 19*  --  19*  --  25   BUN 42* 35* 32*  < > 21  --  21  --  24   CR 1.43* 1.44* 1.65*  < > 1.40*  --  1.25  --  1.46*   ANIONGAP 8 5 5  < > 11  --  12  --  7   VICKY 8.4* 8.0* 8.4*  < > 7.2*  --  7.6*  --  9.1   * 152* 149*  < > 159*  --  213*  --  142*   ALBUMIN  --   --   --   --   --   --  2.5*  --  3.6   PROTTOTAL  --   --   --   --   --   --  4.2*  --  6.5*   BILITOTAL  --   --   --   --   --   --  0.8  --  0.7   ALKPHOS  --   --   --   --   --   --  48  --  79   ALT  --   --   --   --   --   --  21  --  31   AST  --   --   --   --   --   --  53*  --  22   < > = values in this interval not displayed.    Imaging:   Recent Results (from the past 24 hour(s))   XR Chest 2 Views    Narrative    CHEST TWO VIEWS  10/30/2018 1:46 PM     HISTORY:  Removal of chest tubes, status post CABG.     COMPARISON: October 28, 2018       Impression    IMPRESSION: Chest tubes removed, no pneumothorax. Left lower lobe  probable atelectasis again evident. Question minimal left pleural  effusion.    RON MONZON MD

## 2018-10-31 NOTE — PROGRESS NOTES
"CT Surgery Progress Note    Severe L main, multivessel coronary artery disease, and stable angina s/p CABG x 4 POD #5    Subjective:  States that pain is being controlled. Denies any hallucinations. Breathing is improving. Working with IS and flutter. Appetite is ok. Denies any nausea. +BM, denies any popping/clicking in his breast bone, was able to get some sleep, lives with wife, plans for TCU today    Objective:  Up in chair, comfortable, -O2  /66 (BP Location: Right arm)  Pulse 96  Temp 98.6  F (37  C) (Oral)  Resp 18  Ht 1.829 m (6' 0.01\")  Wt 100.5 kg (221 lb 9 oz)  SpO2 94%  BMI 30.04 kg/m2  CT removed  CV - RRR, telemetry SR 90s, +edema LE  Pulm - CTA, IS 1000 ml  Abd - BS+, NT/ND  Derm - sternal incision D/I   L LE incisions D/I  Lab - WBC 6.5   Hgb/Hct 8.1/24.7 (7.7/22.5) (8.9/26.5)   Plt 177   Na 141   K 4.0   Cr/BUN 1.43/42   Glucose 142-160    A/P:  1. S/p CABG x 4 POD #5  2. HTN - lopressor 25 mg bid, lisinopril 2.5 mg daily, continue lasix 20 mg daily x 5 days with KCl  3. Hyperlipidemia - atorvastatin 40 mg   4. DM type II - metformin 500 mg bid, sliding scale insulin  5. CKD stage 3 - baseline Cr 1.4-1.5, monitor, back to baseline  6. Deconditioned - therapies recommending TCU  7. BPH - flomax 0.4 mg  8. Acute blood loss anemia - improving  Encouraged IS/TCDB/amb. Sternal precautions. Plans for TCU today. Continue to monitor.    Seth Olivarez PA-C  [phone]  "

## 2018-10-31 NOTE — PLAN OF CARE
Problem: Patient Care Overview  Goal: Plan of Care/Patient Progress Review  Outcome: No Change  Vital Signs stable. Telemetry normal sinus rhythm. Alert and Oriented. Lung sounds diminished throughout and fine crackles in the bases. Bowel sounds active and audible. Passing flatus. Mod Card diet. Denies nausea. Adequate urinary output via urinal at bedside. CMS intact, mild edema in lower extremities, no numbness or tingling, pulses plapable. Sternal incision clean dry and intact with liquid bandage closure. Antler sites ecchymotic with liquid bandage closure, no drainage. Up with assist of 1-2. Pt has denied pain. Slept well during the night in between cares.

## 2018-10-31 NOTE — PROGRESS NOTES
Pt up in halls with assist of one and walker with gait belt, denies pain, tolerates diet, good urine output, passing flatus, did have BM this shift, incisions clean dry and intact, showered this shift, will discharge to [facility] home, all information given to pt and wife, packet given to pt's wife for TCU center, pt and wife know that follow up care is needed and who to call should any questions or concerns arise. All questions answered and pt is ready for discharge

## 2018-10-31 NOTE — LETTER
[facility]   To:             Please give to facility    From:   Rigoberto Pop  [facility]  Care Coordinator   [facility]     Patient Name:  Merlin J Anderson YOB: 1940   Admit date: 10.31.2018      *Information Needed:  Please contact me when the patient will discharge (or if they will move to long term care)- include the discharge date, disposition, and main diagnosis   - If the patient is discharged with home care services, please provide the name of the agency    Also- Please inform me if a care conference is being held.   Phone, Fax or Email with information       Rigoberto Pop, [facility]  Clinic Care Coordinator  Ph. [phone]  [email or web address]

## 2018-10-31 NOTE — PROGRESS NOTES
Clinic Care Coordination Contact  Care Coordination Transition Communication    Referral Source: IP Handoff    Clinical Data: Patient was hospitalized at [facility] from 10.26.2018 to 10.31.2018 with diagnosis of Coronary artery disease, hypertension, and dyslipidemia.     Transition to Facility:              Facility Name: [facility].              Contact name and phone number/fax:    Ph. [phone], Fax. [phone].    Plan: SW Care Coordinator will await notification from facility staff informing SW Care Coordinator of patient's discharge plans/needs. SW Care Coordinator will review chart and outreach to facility staff every 4 weeks and as needed.     Rigoberto Pop [facility]  Clinic Care Coordinator  Ph. [phone]  [email or web address]

## 2018-10-31 NOTE — PLAN OF CARE
Problem: Patient Care Overview  Goal: Plan of Care/Patient Progress Review  OT/cardiac rehab: pt unavailable in AM for session as showering with nurse assist.

## 2018-11-01 ENCOUNTER — NURSING HOME VISIT (OUTPATIENT)
Dept: GERIATRICS | Facility: CLINIC | Age: 78
End: 2018-11-01
Payer: COMMERCIAL

## 2018-11-01 DIAGNOSIS — R53.81 PHYSICAL DECONDITIONING: ICD-10-CM

## 2018-11-01 DIAGNOSIS — N18.30 CKD (CHRONIC KIDNEY DISEASE) STAGE 3, GFR 30-59 ML/MIN (H): ICD-10-CM

## 2018-11-01 DIAGNOSIS — I10 ESSENTIAL HYPERTENSION: ICD-10-CM

## 2018-11-01 DIAGNOSIS — Z95.1 S/P CABG (CORONARY ARTERY BYPASS GRAFT): ICD-10-CM

## 2018-11-01 DIAGNOSIS — E11.59 TYPE 2 DIABETES MELLITUS WITH OTHER CIRCULATORY COMPLICATIONS (H): ICD-10-CM

## 2018-11-01 DIAGNOSIS — I25.10 CORONARY ARTERY DISEASE INVOLVING NATIVE CORONARY ARTERY OF NATIVE HEART WITHOUT ANGINA PECTORIS: Primary | ICD-10-CM

## 2018-11-01 DIAGNOSIS — D62 ANEMIA DUE TO BLOOD LOSS, ACUTE: ICD-10-CM

## 2018-11-01 LAB — INTERPRETATION ECG - MUSE: NORMAL

## 2018-11-01 PROCEDURE — 99310 SBSQ NF CARE HIGH MDM 45: CPT | Performed by: NURSE PRACTITIONER

## 2018-11-01 NOTE — PROGRESS NOTES
[facility]  PRIMARY CARE PROVIDER AND CLINIC:  Nikolov, Nikolay G [address] [facility] [address]  Chief Complaint   Patient presents with     Hospital F/U     [place] [facility] Record Number:  [id number]  Place of Service where encounter took place:  [facility] [[id number]]    HPI:    Merlin J Anderson is a 78 year old  (1940), past medical history significant for DM type II, essential hypertension, hyperlipidemia, chronic kidney disease and carotid artery disease, status post right carotid endarterectomy who presented to cardiology clinic for consult and sent directly to OR for CABG admitted to the above facility from  [facility].  Hospital stay 10/26/18 through 10/31/18.   CAD s/p CABG X 4:  Dr. Britton, did require a balloon pump, and went into cardiogenic shock post op  Anemia: Hgb 8.1 at discharge, does not appear patient was transfused post op  KOLTON/CKD: baseline creat 1.4-1.6 increased to 1.9 during hospitalization back to baseline by DC.   DMII: PTA metformin 500mg BID, patient on insulin drip post op, last HgbA1C was 6.1    Admitted to this facility for  rehab, medical management and nursing care.  HPI information obtained from: facility chart records, facility staff, patient report and [facility] chart review.  Current issues are: On exam today patient is sitting up in WC, alert, pleasant, states he is feeling well, denies incisional pain at time of exam, states he has very little pain, working with therapy, patient denies CP, palpitations, states he has LUNA, no SOB at rest, states he has a cough that is worse in AM, non productive, denies fever or chills, denies N/V/D or constipation, states he slept fairly well last night, no other concerns.      Last BPs: 118/62, 121/66 mmHg  HR Ranges: 91 bpm  Admission Weight: 10/31/18: 226.7 lbs    CODE STATUS/ADVANCE DIRECTIVES DISCUSSION:   CPR/Full code   Patient's living condition: lives with  spouse    ALLERGIES:Review of patient's allergies indicates no known allergies.  PAST MEDICAL HISTORY:  has a past medical history of Diabetes mellitus (H) and Essential hypertension, benign. He also has no past medical history of Malignant hyperthermia or Sleep apnea.  PAST SURGICAL HISTORY:  has a past surgical history that includes NONSPECIFIC PROCEDURE (04/97); NONSPECIFIC PROCEDURE; hernia repair; Decompression, fusion lumbar posterior one level, combined (8/24/2012); Endarterectomy carotid (Right, 9/18/2017); colonoscopy; and Bypass graft artery coronary (N/A, 10/26/2018).  FAMILY HISTORY: family history includes Diabetes in his father; Unknown/Adopted in his mother.  SOCIAL HISTORY:  reports that he has never smoked. He has never used smokeless tobacco. He reports that he drinks alcohol. He reports that he does not use illicit drugs.    Post Discharge Medication Reconciliation Status: discharge medications reconciled, continue medications without change.  Current Outpatient Prescriptions   Medication Sig Dispense Refill     acetaminophen (TYLENOL) 325 MG tablet Take 2 tablets (650 mg) by mouth every 4 hours as needed for mild pain 100 tablet      aspirin 325 MG EC tablet Take 1 tablet (325 mg) by mouth daily 40 tablet      atorvastatin (LIPITOR) 40 MG tablet Take 1 tablet (40 mg) by mouth daily 90 tablet 3     blood glucose calibration (ACCU-CHEK PRISCILLA) solution Use to calibrate blood glucose monitor as needed as directed. 1 Bottle 0     blood glucose monitoring (ACCU-CHEK PRISCILLA) test strip Use to test blood sugars 1 times daily or as directed. 100 each 1     blood glucose monitoring (ACCU-CHEK MULTICLIX) lancets Use to test blood sugar 1 times daily or as directed. 1 Box 1     furosemide (LASIX) 20 MG tablet Take 1 tablet (20 mg) by mouth daily for 5 days 5 tablet 0     lisinopril (PRINIVIL/ZESTRIL) 2.5 MG tablet Take 1 tablet (2.5 mg) by mouth daily 30 tablet      metFORMIN (GLUCOPHAGE) 500 MG tablet Take 1  tablet (500 mg) by mouth 2 times daily (with meals) 180 tablet 1     metoprolol tartrate (LOPRESSOR) 25 MG tablet Take 1 tablet (25 mg) by mouth every 12 hours 60 tablet      multivitamin, therapeutic with minerals (MULTI-VITAMIN) TABS tablet Take 1 tablet by mouth every morning       nitroGLYcerin (NITROSTAT) 0.4 MG sublingual tablet For chest pain place 1 tablet under the tongue every 5 minutes for 3 doses. If symptoms persist 5 minutes after 1st dose call 911. 25 tablet 3     oxyCODONE IR (ROXICODONE) 5 MG tablet Take 1 tablet (5 mg) by mouth every 6 hours as needed for moderate to severe pain 10 tablet 0     potassium chloride SA (K-DUR/KLOR-CON M) 10 MEQ CR tablet Take 1 tablet (10 mEq) by mouth daily for 5 days 5 tablet 0     tamsulosin (FLOMAX) 0.4 MG capsule Take 1 capsule (0.4 mg) by mouth daily 90 capsule 3       ROS:  10 point ROS of systems including Constitutional, Eyes, Respiratory, Cardiovascular, Gastroenterology, Genitourinary, Integumentary, Musculoskeletal, Psychiatric were all negative except for pertinent positives noted in my HPI.    Exam:  /62  Pulse 91  Temp 97.2  F (36.2  C)  Resp 18  Wt 226 lb 11.2 oz (102.8 kg)  SpO2 94%  BMI 30.74 kg/m2  GENERAL APPEARANCE:  Alert, in no distress, morbidly obese  ENT:  Mouth and posterior oropharynx normal, moist mucous membranes, [place]  EYES:  EOM, conjunctivae, lids, pupils and irises normal, PERRL  RESP:  respiratory effort and palpation of chest normal, no respiratory distress, diminished breath sounds bases bilaterally with fine crackles left base  CV:  Palpation and auscultation of heart done , regular rate and rhythm, no murmur, rub, or gallop, peripheral edema 1+ in LE bilaterally  ABDOMEN:  normal bowel sounds, soft, nontender, no hepatosplenomegaly or other masses  M/S:   Examination of:   right upper extremity, left upper extremity, right lower extremity and left lower extremity  Inspection, ROM, stability and muscle strength  normal  SKIN:  Inspection of skin and subcutaneous tissue baseline, did not visualize midsternal incision  NEURO:   Cranial nerves 2-12 are normal tested and grossly at patient's baseline, speech WNL  PSYCH:  affect and mood normal    Lab/Diagnostic data:  CBC RESULTS:   Recent Labs   Lab Test  10/31/18   0720  10/30/18   0735   WBC  6.5  5.3   RBC  2.70*  2.50*   HGB  8.1*  7.7*   HCT  24.7*  22.5*   MCV  92  90   MCH  30.0  30.8   MCHC  32.8  34.2   RDW  13.5  13.7   PLT  177  106*       Last Basic Metabolic Panel:  Recent Labs   Lab Test  10/31/18   0720  10/30/18   0735   NA  141  139   POTASSIUM  4.0  3.9   CHLORIDE  111*  109   VICKY  8.4*  8.0*   CO2  22  25   BUN  42*  35*   CR  1.43*  1.44*   GLC  149*  152*       Liver Function Studies -   Recent Labs   Lab Test  10/26/18   1755  10/26/18   0725   PROTTOTAL  4.2*  6.5*   ALBUMIN  2.5*  3.6   BILITOTAL  0.8  0.7   ALKPHOS  48  79   AST  53*  22   ALT  21  31       TSH   Date Value Ref Range Status   10/02/2018 2.05 0.40 - 4.00 mU/L Final   08/08/2017 2.51 0.40 - 4.00 mU/L Final       Lab Results   Component Value Date    A1C 6.1 10/28/2018    A1C 6.7 10/02/2018       ASSESSMENT/PLAN:  Coronary artery disease involving native coronary artery of native heart without angina pectoris  S/P CABG (coronary artery bypass graft)  Physical deconditioning  Acute: Dr. Britton surgeon, surgery date 10/26/18  PT and OT for strengthening,   Continue tylenol 650mg q 4 hours prn, continue oxycodone 5mg q 6 hours prn  Continue metoprolol 25mg BID, lisinopril 2.5mg QD, ASA 325mg QD, lipitor 40mg QD, lasix 20mg QD with KCL 10meq QD for 5 days    Anemia due to blood loss, acute  Acute: Hgb twice weekly while in TCU    Essential hypertension  CKD (chronic kidney disease) stage 3, GFR 30-59 ml/min (H)  Ongoing: vitals daily and prn, BMP follow twice weekly, meds as above, baseline creat 1.4-1.6    Type 2 diabetes mellitus with other circulatory complications (H)  Ongoing: blood  sugar monitoring BID, continue metformin 500mg BID       Orders:  BMP and Hgb twice weekly    Total time with patient visit 45 minutes including discussions about the POC and care coordination with the patient, review chart, update orders        Electronically signed by:  Tonya Lynn Haase, APRN CNP

## 2018-11-01 NOTE — LETTER
11/1/2018        RE: Merlin J Anderson  [address]  [facility] [address]        [facility]  PRIMARY CARE PROVIDER AND CLINIC:  Nikolov, Nikolay G [address] [facility] / [facility] [id number]  Chief Complaint   Patient presents with     Hospital F/U     [facility] Record Number:  [id number]  Place of Service where encounter took place:  [facility] (S) [[id number]]    HPI:    Merlin J Anderson is a 78 year old  (1940), past medical history significant for DM type II, essential hypertension, hyperlipidemia, chronic kidney disease and carotid artery disease, status post right carotid endarterectomy who presented to cardiology clinic for consult and sent directly to OR for CABG admitted to the above facility from  [facility].  Hospital stay 10/26/18 through 10/31/18.   CAD s/p CABG X 4:  Dr. Britton, did require a balloon pump, and went into cardiogenic shock post op  Anemia: Hgb 8.1 at discharge, does not appear patient was transfused post op  KOLTON/CKD: baseline creat 1.4-1.6 increased to 1.9 during hospitalization back to baseline by DC.   DMII: PTA metformin 500mg BID, patient on insulin drip post op, last HgbA1C was 6.1    Admitted to this facility for  rehab, medical management and nursing care.  HPI information obtained from: facility chart records, facility staff, patient report and [facility] chart review.  Current issues are: On exam today patient is sitting up in WC, alert, pleasant, states he is feeling well, denies incisional pain at time of exam, states he has very little pain, working with therapy, patient denies CP, palpitations, states he has LUNA, no SOB at rest, states he has a cough that is worse in AM, non productive, denies fever or chills, denies N/V/D or constipation, states he slept fairly well last night, no other concerns.      Last BPs: 118/62, 121/66 mmHg  HR Ranges: 91 bpm  Admission Weight: 10/31/18: 226.7 lbs    CODE  STATUS/ADVANCE DIRECTIVES DISCUSSION:   CPR/Full code   Patient's living condition: lives with spouse    ALLERGIES:Review of patient's allergies indicates no known allergies.  PAST MEDICAL HISTORY:  has a past medical history of Diabetes mellitus (H) and Essential hypertension, benign. He also has no past medical history of Malignant hyperthermia or Sleep apnea.  PAST SURGICAL HISTORY:  has a past surgical history that includes NONSPECIFIC PROCEDURE (04/97); NONSPECIFIC PROCEDURE; hernia repair; Decompression, fusion lumbar posterior one level, combined (8/24/2012); Endarterectomy carotid (Right, 9/18/2017); colonoscopy; and Bypass graft artery coronary (N/A, 10/26/2018).  FAMILY HISTORY: family history includes Diabetes in his father; Unknown/Adopted in his mother.  SOCIAL HISTORY:  reports that he has never smoked. He has never used smokeless tobacco. He reports that he drinks alcohol. He reports that he does not use illicit drugs.    Post Discharge Medication Reconciliation Status: discharge medications reconciled, continue medications without change.  Current Outpatient Prescriptions   Medication Sig Dispense Refill     acetaminophen (TYLENOL) 325 MG tablet Take 2 tablets (650 mg) by mouth every 4 hours as needed for mild pain 100 tablet      aspirin 325 MG EC tablet Take 1 tablet (325 mg) by mouth daily 40 tablet      atorvastatin (LIPITOR) 40 MG tablet Take 1 tablet (40 mg) by mouth daily 90 tablet 3     blood glucose calibration (ACCU-CHEK PRISCILLA) solution Use to calibrate blood glucose monitor as needed as directed. 1 Bottle 0     blood glucose monitoring (ACCU-CHEK PRISCILLA) test strip Use to test blood sugars 1 times daily or as directed. 100 each 1     blood glucose monitoring (ACCU-CHEK MULTICLIX) lancets Use to test blood sugar 1 times daily or as directed. 1 Box 1     furosemide (LASIX) 20 MG tablet Take 1 tablet (20 mg) by mouth daily for 5 days 5 tablet 0     lisinopril (PRINIVIL/ZESTRIL) 2.5 MG tablet  Take 1 tablet (2.5 mg) by mouth daily 30 tablet      metFORMIN (GLUCOPHAGE) 500 MG tablet Take 1 tablet (500 mg) by mouth 2 times daily (with meals) 180 tablet 1     metoprolol tartrate (LOPRESSOR) 25 MG tablet Take 1 tablet (25 mg) by mouth every 12 hours 60 tablet      multivitamin, therapeutic with minerals (MULTI-VITAMIN) TABS tablet Take 1 tablet by mouth every morning       nitroGLYcerin (NITROSTAT) 0.4 MG sublingual tablet For chest pain place 1 tablet under the tongue every 5 minutes for 3 doses. If symptoms persist 5 minutes after 1st dose call 911. 25 tablet 3     oxyCODONE IR (ROXICODONE) 5 MG tablet Take 1 tablet (5 mg) by mouth every 6 hours as needed for moderate to severe pain 10 tablet 0     potassium chloride SA (K-DUR/KLOR-CON M) 10 MEQ CR tablet Take 1 tablet (10 mEq) by mouth daily for 5 days 5 tablet 0     tamsulosin (FLOMAX) 0.4 MG capsule Take 1 capsule (0.4 mg) by mouth daily 90 capsule 3       ROS:  10 point ROS of systems including Constitutional, Eyes, Respiratory, Cardiovascular, Gastroenterology, Genitourinary, Integumentary, Musculoskeletal, Psychiatric were all negative except for pertinent positives noted in my HPI.    Exam:  /62  Pulse 91  Temp 97.2  F (36.2  C)  Resp 18  Wt 226 lb 11.2 oz (102.8 kg)  SpO2 94%  BMI 30.74 kg/m2  GENERAL APPEARANCE:  Alert, in no distress, morbidly obese  ENT:  Mouth and posterior oropharynx normal, moist mucous membranes, [place]  EYES:  EOM, conjunctivae, lids, pupils and irises normal, PERRL  RESP:  respiratory effort and palpation of chest normal, no respiratory distress, diminished breath sounds bases bilaterally with fine crackles left base  CV:  Palpation and auscultation of heart done , regular rate and rhythm, no murmur, rub, or gallop, peripheral edema 1+ in LE bilaterally  ABDOMEN:  normal bowel sounds, soft, nontender, no hepatosplenomegaly or other masses  M/S:   Examination of:   right upper extremity, left upper extremity, right  lower extremity and left lower extremity  Inspection, ROM, stability and muscle strength normal  SKIN:  Inspection of skin and subcutaneous tissue baseline, did not visualize midsternal incision  NEURO:   Cranial nerves 2-12 are normal tested and grossly at patient's baseline, speech WNL  PSYCH:  affect and mood normal    Lab/Diagnostic data:  CBC RESULTS:   Recent Labs   Lab Test  10/31/18   0720  10/30/18   0735   WBC  6.5  5.3   RBC  2.70*  2.50*   HGB  8.1*  7.7*   HCT  24.7*  22.5*   MCV  92  90   MCH  30.0  30.8   MCHC  32.8  34.2   RDW  13.5  13.7   PLT  177  106*       Last Basic Metabolic Panel:  Recent Labs   Lab Test  10/31/18   0720  10/30/18   0735   NA  141  139   POTASSIUM  4.0  3.9   CHLORIDE  111*  109   VICKY  8.4*  8.0*   CO2  22  25   BUN  42*  35*   CR  1.43*  1.44*   GLC  149*  152*       Liver Function Studies -   Recent Labs   Lab Test  10/26/18   1755  10/26/18   0725   PROTTOTAL  4.2*  6.5*   ALBUMIN  2.5*  3.6   BILITOTAL  0.8  0.7   ALKPHOS  48  79   AST  53*  22   ALT  21  31       TSH   Date Value Ref Range Status   10/02/2018 2.05 0.40 - 4.00 mU/L Final   08/08/2017 2.51 0.40 - 4.00 mU/L Final       Lab Results   Component Value Date    A1C 6.1 10/28/2018    A1C 6.7 10/02/2018       ASSESSMENT/PLAN:  Coronary artery disease involving native coronary artery of native heart without angina pectoris  S/P CABG (coronary artery bypass graft)  Physical deconditioning  Acute: Dr. Britton surgeon, surgery date 10/26/18  PT and OT for strengthening,   Continue tylenol 650mg q 4 hours prn, continue oxycodone 5mg q 6 hours prn  Continue metoprolol 25mg BID, lisinopril 2.5mg QD, ASA 325mg QD, lipitor 40mg QD, lasix 20mg QD with KCL 10meq QD for 5 days    Anemia due to blood loss, acute  Acute: Hgb twice weekly while in TCU    Essential hypertension  CKD (chronic kidney disease) stage 3, GFR 30-59 ml/min (H)  Ongoing: vitals daily and prn, BMP follow twice weekly, meds as above, baseline creat  1.4-1.6    Type 2 diabetes mellitus with other circulatory complications (H)  Ongoing: blood sugar monitoring BID, continue metformin 500mg BID       Orders:  BMP and Hgb twice weekly    Total time with patient visit 45 minutes including discussions about the POC and care coordination with the patient, review chart, update orders        Electronically signed by:  Tonya Lynn Haase, APRN CNP                    Sincerely,        Tonya Lynn Haase, APRN CNP

## 2018-11-02 ENCOUNTER — TRANSFERRED RECORDS (OUTPATIENT)
Dept: HEALTH INFORMATION MANAGEMENT | Facility: CLINIC | Age: 78
End: 2018-11-02

## 2018-11-02 LAB
BUN SERPL-MCNC: 40 MG/DL (ref 9–26)
CALCIUM SERPL-MCNC: 9 MG/DL (ref 8.4–10.4)
CHLORIDE SERPLBLD-SCNC: 107 MMOL/L (ref 98–109)
CO2 SERPL-SCNC: 19 MMOL/L (ref 22–31)
CREAT SERPL-MCNC: 1.55 MG/DL (ref 0.73–1.18)
GFR SERPL CREATININE-BSD FRML MDRD: 42 ML/MIN/1.73M2
GLUCOSE SERPL-MCNC: 162 MG/DL (ref 70–100)
POTASSIUM SERPL-SCNC: 4.1 MMOL/L (ref 3.5–5.2)
SODIUM SERPL-SCNC: 139 MMOL/L (ref 136–145)

## 2018-11-04 ENCOUNTER — TELEPHONE (OUTPATIENT)
Dept: GERIATRICS | Facility: CLINIC | Age: 78
End: 2018-11-04

## 2018-11-05 ENCOUNTER — TELEPHONE (OUTPATIENT)
Dept: OTHER | Facility: CLINIC | Age: 78
End: 2018-11-05

## 2018-11-05 ENCOUNTER — TRANSFERRED RECORDS (OUTPATIENT)
Dept: HEALTH INFORMATION MANAGEMENT | Facility: CLINIC | Age: 78
End: 2018-11-05

## 2018-11-05 ENCOUNTER — HOSPITAL ENCOUNTER (EMERGENCY)
Facility: CLINIC | Age: 78
Discharge: HOME | End: 2018-11-05
Attending: NURSE PRACTITIONER | Admitting: NURSE PRACTITIONER
Payer: COMMERCIAL

## 2018-11-05 VITALS
SYSTOLIC BLOOD PRESSURE: 112 MMHG | BODY MASS INDEX: 28.49 KG/M2 | HEART RATE: 89 BPM | OXYGEN SATURATION: 97 % | RESPIRATION RATE: 20 BRPM | HEIGHT: 73 IN | WEIGHT: 215 LBS | DIASTOLIC BLOOD PRESSURE: 62 MMHG | TEMPERATURE: 98 F

## 2018-11-05 DIAGNOSIS — T81.41XA INFECTION OF SUPERFICIAL INCISIONAL SURGICAL SITE AFTER PROCEDURE, INITIAL ENCOUNTER: ICD-10-CM

## 2018-11-05 DIAGNOSIS — L03.116 LEFT LEG CELLULITIS: ICD-10-CM

## 2018-11-05 LAB
ANION GAP SERPL CALCULATED.3IONS-SCNC: 9 MMOL/L (ref 3–14)
BASOPHILS # BLD AUTO: 0 10E9/L (ref 0–0.2)
BASOPHILS NFR BLD AUTO: 0.4 %
BUN SERPL-MCNC: 31 MG/DL (ref 9–25)
BUN SERPL-MCNC: 33 MG/DL (ref 7–30)
CALCIUM SERPL-MCNC: 8.7 MG/DL (ref 8.5–10.1)
CALCIUM SERPL-MCNC: 8.8 MG/DL (ref 8.4–10.4)
CHLORIDE SERPL-SCNC: 104 MMOL/L (ref 94–109)
CHLORIDE SERPLBLD-SCNC: 105 MMOL/L (ref 98–109)
CO2 SERPL-SCNC: 22 MMOL/L (ref 22–31)
CO2 SERPL-SCNC: 24 MMOL/L (ref 20–32)
CREAT SERPL-MCNC: 1.42 MG/DL (ref 0.73–1.18)
CREAT SERPL-MCNC: 1.47 MG/DL (ref 0.66–1.25)
DIFFERENTIAL METHOD BLD: ABNORMAL
DIFFERENTIAL: ABNORMAL
EOSINOPHIL # BLD AUTO: 0.1 10E9/L (ref 0–0.7)
EOSINOPHIL NFR BLD AUTO: 1.4 %
ERYTHROCYTE [DISTWIDTH] IN BLOOD BY AUTOMATED COUNT: 13.7 % (ref 11–15)
ERYTHROCYTE [DISTWIDTH] IN BLOOD BY AUTOMATED COUNT: 14 % (ref 10–15)
GFR SERPL CREATININE-BSD FRML MDRD: 46 ML/MIN/1.7M2
GFR SERPL CREATININE-BSD FRML MDRD: 47 ML/MIN/1.73M2
GLUCOSE SERPL-MCNC: 167 MG/DL (ref 70–100)
GLUCOSE SERPL-MCNC: 202 MG/DL (ref 70–99)
HCT VFR BLD AUTO: 25.8 % (ref 39–51)
HCT VFR BLD AUTO: 27.9 % (ref 40–53)
HEMOGLOBIN: 8.4 G/DL (ref 13.4–17.5)
HGB BLD-MCNC: 9.2 G/DL (ref 13.3–17.7)
IMM GRANULOCYTES # BLD: 0.1 10E9/L (ref 0–0.4)
IMM GRANULOCYTES NFR BLD: 0.7 %
LYMPHOCYTES # BLD AUTO: 1 10E9/L (ref 0.8–5.3)
LYMPHOCYTES NFR BLD AUTO: 10 %
MCH RBC QN AUTO: 30.3 PG (ref 26.5–33)
MCHC RBC AUTO-ENTMCNC: 33 G/DL (ref 31.5–36.5)
MCV RBC AUTO: 92 FL (ref 78–100)
MCV RBC AUTO: 93.1 FL (ref 80–100)
MONOCYTES # BLD AUTO: 0.8 10E9/L (ref 0–1.3)
MONOCYTES NFR BLD AUTO: 7.7 %
NEUTROPHILS # BLD AUTO: 7.8 10E9/L (ref 1.6–8.3)
NEUTROPHILS NFR BLD AUTO: 79.8 %
NRBC # BLD AUTO: 0 10*3/UL
NRBC BLD AUTO-RTO: 0 /100
PLATELET # BLD AUTO: 279 K/CMM (ref 140–450)
PLATELET # BLD AUTO: 283 10E9/L (ref 150–450)
POTASSIUM SERPL-SCNC: 4.2 MMOL/L (ref 3.5–5.2)
POTASSIUM SERPL-SCNC: 4.4 MMOL/L (ref 3.4–5.3)
RBC # BLD AUTO: 2.77 M/CMM (ref 4.2–5.9)
RBC # BLD AUTO: 3.04 10E12/L (ref 4.4–5.9)
SODIUM SERPL-SCNC: 136 MMOL/L (ref 136–145)
SODIUM SERPL-SCNC: 137 MMOL/L (ref 133–144)
WBC # BLD AUTO: 7.4 K/CMM (ref 3.8–11)
WBC # BLD AUTO: 9.7 10E9/L (ref 4–11)

## 2018-11-05 PROCEDURE — 99283 EMERGENCY DEPT VISIT LOW MDM: CPT

## 2018-11-05 PROCEDURE — 25000132 ZZH RX MED GY IP 250 OP 250 PS 637: Performed by: NURSE PRACTITIONER

## 2018-11-05 PROCEDURE — 80048 BASIC METABOLIC PNL TOTAL CA: CPT | Performed by: NURSE PRACTITIONER

## 2018-11-05 PROCEDURE — 85025 COMPLETE CBC W/AUTO DIFF WBC: CPT | Performed by: NURSE PRACTITIONER

## 2018-11-05 RX ADMIN — CEPHALEXIN 250 MG: 250 CAPSULE ORAL at 14:36

## 2018-11-05 ASSESSMENT — ENCOUNTER SYMPTOMS
MYALGIAS: 1
WOUND: 1
COUGH: 0
FEVER: 0
SHORTNESS OF BREATH: 0
CHILLS: 0

## 2018-11-05 NOTE — TELEPHONE ENCOUNTER
[facility] TELEPHONE ENCOUNTER    Chief Complaint   Patient presents with     Cellulitis       Merlin J Anderson is a 78 year old  (1940),Nurse called today to report: Cellulitis    ASSESSMENT/PLAN  Patient with warmth, redness and swelling of left foot that has advanced beyond initial outlining    Keflex 250mg PO qDay x5d    Noreen Lennon, APRN CNP

## 2018-11-05 NOTE — ED AVS SNAPSHOT
Emergency Department    [address] [place] [address]    Phone:  [phone]    Fax:  [phone]                                       Merlin J Anderson   MRN: [id number]    Department:   Emergency Department   Date of Visit:  11/5/2018           After Visit Summary Signature Page     I have received my discharge instructions, and my questions have been answered. I have discussed any challenges I see with this plan with the nurse or doctor.    ..........................................................................................................................................  Patient/Patient Representative Signature      ..........................................................................................................................................  Patient Representative Print Name and Relationship to Patient    ..................................................               ................................................  Date                                   Time    ..........................................................................................................................................  Reviewed by Signature/Title    ...................................................              ..............................................  Date                                               Time          22EPIC Rev 08/18

## 2018-11-05 NOTE — DISCHARGE INSTRUCTIONS
Continue the antibiotic Keflex 250mg four times a day. If you develop a fever, have extending redness or increasing pain please let the staff know at [facility].         Discharge Instructions for Cellulitis  You have been diagnosed with cellulitis. This is an infection in the deepest layer of the skin. In some cases, the infection also affects the muscle. Cellulitis is caused by bacteria. The bacteria can enter the body through broken skin. This can happen with a cut, scratch, animal bite, or an insect bite that has been scratched. You may have been treated in the hospital with antibiotics and fluids. You will likely be given a prescription for antibiotics to take at home. This sheet will help you take care of yourself at home.  Home care  When you are home:    Take the prescribed antibiotic medicine you are given as directed until it is gone. Take it even if you feel better. It treats the infection and stops it from returning. Not taking all the medicine can make future infections hard to treat.    Keep the infected area clean.    When possible, raise the infected area above the level of your heart. This helps keep swelling down.    Talk with your healthcare provider if you are in pain. Ask what kind of over-the-counter medicine you can take for pain.    Apply clean bandages as advised.    Take your temperature once a day for a week.    Wash your hands often to prevent spreading the infection.  In the future, wash your hands before and after you touch cuts, scratches, or bandages. This will help prevent infection.   When to call your healthcare provider  Call your healthcare provider immediately if you have any of the following:    Difficulty or pain when moving the joints above or below the infected area    Discharge or pus draining from the area    Fever of 100.4 F (38 C) or higher, or as directed by your healthcare provider    Pain that gets worse in or around the infected     Redness that gets worse in or  around the infected area, particularly if the area of redness expands to a wider area    Shaking chills    Swelling of the infected area    Vomiting   Date Last Reviewed: 8/1/2016 2000-2017 [employer]. [address] [place] [address], [place] [address]. All rights reserved. This information is not intended as a substitute for professional medical care. Always follow your healthcare professional's instructions.

## 2018-11-05 NOTE — TELEPHONE ENCOUNTER
I spoke with RN at [facility]. States he is progressing well. Using good sternal precautions.   Sternal incision and CT sites look good but has redness of his knee, vein harvest site,. Antibiotics started. Good pain control. She did not have his weight available. No discharge plan in place currently.

## 2018-11-05 NOTE — DISCHARGE SUMMARY
"[facility]  Cardiothoracic Surgery Hospital Discharge Summary     Merlin J Anderson MRN# [id number]   Age: 78 year old YOB: 1940     Admitting Physician:  Heath Kessler MD  Discharge Physician:  Seth Olivarez PA-C  Primary Care Physician:        Sujit Duke     DATE OF ADMISSION: 10/26/2018     DATE OF DISCHARGE: October 31, 2018          Discharge Diagnosis:   Multivessel coronary artery disease and stable angina s/p CABG x 4          Secondary Diagnoses:   Hypertension  Hyperlipidemia  DM type II  CKD stage 3  BPH  Acute blood loss anemia - improving  Deconditioned - plans for [facility] TCU at discharge  Gout  PVD  Carotid artery disease    PROCEDURES PERFORMED:   Date: 10/26/2018.  Surgeon: Dr. Kessler  Coronary artery bypass grafting x 4 (LIMA to LAD, SVG to right PDA, SVG to L posterolateral branch of the L circumflex, SVG to OM)      Patient discharged on aspirin:  Yes 325 mg  Patient discharged on beta blocker: yes lopressor 25 mg bid   Patient discharged on ACE Inhibitor/ARB: yes  Lisinopril 2.5 mg daily           Discharge Disposition:   Discharged to short-term care facility            Condition on Discharge:   Discharge condition: Good   Discharge vitals: Blood pressure 113/59, pulse 79, temperature 98.2  F (36.8  C), temperature source Oral, resp. rate 18, height 1.829 m (6' 0.01\"), weight 100.5 kg (221 lb 9 oz), SpO2 98 %.     Code status on discharge: Full Code       # Discharge Pain Plan:   - During his hospitalization, Merlin experienced pain due to s/p CABG x 4.  The pain plan for discharge was discussed with Merlin and the plan was created in a collaborative fashion.        DAY OF DISCHARGE PHYSICAL EXAM:  Vitals:    10/31/18 0600 10/31/18 0630 10/31/18 0752 10/31/18 1201   BP:   117/66 113/59   BP Location:   Right arm Right arm   Pulse:   96 79   Resp:  17 18 18   Temp:   98.6  F (37  C) 98.2  F (36.8  C)   TempSrc:   Oral Oral   SpO2:   94% " 98%   Weight: 100.5 kg (221 lb 9 oz)      Height:         Vitals:    10/29/18 0600 10/30/18 0659 10/31/18 0600   Weight: 99.9 kg (220 lb 3.2 oz) 100.3 kg (221 lb 1.9 oz) 100.5 kg (221 lb 9 oz)     Please see progress note from 10/31/18.    LABS    Recent Labs  Lab 11/05/18  1339 11/02/18 10/31/18  1206 10/31/18  0751 10/31/18  0720 10/31/18  0229 10/30/18  2159 10/30/18  1648 10/30/18  1157  10/30/18  0735   * 162*  --   --  149*  --   --   --   --   --  152*   BGM  --   --  197* 144*  --  142* 160* 155* 181*  < >  --    < > = values in this interval not displayed.   WBC 6.5   Hgb/Hct 8.1/24.7   Plt 177   Na 141   K 4.0   Cr/BUN 1.43/42    HOSPITAL COURSE: Merlin J Anderson is a 78 year old male who had a positive stress test. Coronary angiography demonstrated severe left main and multivessel coronary artery disease. Echocardiography demonstrated preserved left ventricular function and no significant valvular abnormality. An intra-aortic balloon pump was placed preoperatively in the cardiac catheterization laboratory. After discussing pt's symptoms and angiogram findings surgery was indicated. After discussing the risks/benefits of surgery pt decided to proceed with surgery.     Pt was taken to the OR urgently on 10/26/18 by Dr Kessler for a CABG x 4. The findings of surgery included the left internal mammary artery was 2 mm in diameter and had excellent flow. The greater saphenous vein from the left lower extremity was 4-5 mm in diameter and suitable for conduit. The ascending aorta was free of calcified plaque. The right posterior descending coronary artery was 1.75 mm in diameter and free of disease at the site of anastomosis. The left posterolateral branch of the left circumflex coronary artery was 1.75 mm in diameter and free of disease at the site of anastomosis. The obtuse marginal branch of the left circumflex coronary artery was 1.75 mm in diameter and free of disease at the site of  anastomosis. The left anterior descending coronary artery 2 mm in diameter and free of disease at the site of anastomosis. After reperfusion, sinus rhythm resumed. Left ventricular function was normal preoperatively and unchanged after bypass on low dose inotropic support. There were no complications due to surgery. Prior to surgery being finished chest tubes and TPWs were left in place. Wounds were cleaned and dressings were applied. Pt was transferred out of the OR hemodynamically stable.    While in the ICU pt's BP and CI was maintained using inotropes and pressors. These were continuously weaned until they were no longer needed. IABP was removed on POD #1. Following removal of IABP pt was weaned and extubated on POD #1. Initially pt's glucose was maintained with an insulin gtt. This was changed to sliding scale insulin and at the time of discharge pt was going home on his metformin 500 mg bid. Pt was transferred to the step down unit on POD #2.    While on the step down unit pt continued to progress well. Chest tubes and TPWs were removed when deemed necessary. Pt did have some mild elevation of his Cr postop but this did return back to his baseline of 1.4-1.5 by the time of discharge. Throughout hospitalization pt continued to work with therapies. At the time of discharge pt remained deconditioned. Due to this pt was [facility]. The remainder of pt's hospitalization was unremarkable. Prior to discharger all questions/concerns were addressed. Prior to discharge pt's pain was controlled, he had had a BM, and was participating in rehab. Pt is agreeable to plan of care to discharge to TCU. Pt is being discharged on a short course of lasix/KCl due to volume overload with a recheck on his renal function on 11/2/18.    DISCHARGE INSTRUCTIONS:  No lifting over 10 lbs x 8 weeks after surgery.  No driving for 4 weeks after surgery.  Participate in cardiac rehab when discharged [facility].    FOLLOW  UP APPOINTMENTS:   Follow up with cardiothoracic surgery on 11/14/18 at 2:00 pm.     PRE-ADMISSION MEDICATIONS:    No current facility-administered medications on file prior to encounter.   Current Outpatient Prescriptions on File Prior to Encounter:  atorvastatin (LIPITOR) 40 MG tablet Take 1 tablet (40 mg) by mouth daily   metFORMIN (GLUCOPHAGE) 500 MG tablet Take 1 tablet (500 mg) by mouth 2 times daily (with meals)   multivitamin, therapeutic with minerals (MULTI-VITAMIN) TABS tablet Take 1 tablet by mouth every morning   tamsulosin (FLOMAX) 0.4 MG capsule Take 1 capsule (0.4 mg) by mouth daily   blood glucose calibration (ACCU-CHEK PRISCILLA) solution Use to calibrate blood glucose monitor as needed as directed.   blood glucose monitoring (ACCU-CHEK PRISCILLA) test strip Use to test blood sugars 1 times daily or as directed.   blood glucose monitoring (ACCU-CHEK MULTICLIX) lancets Use to test blood sugar 1 times daily or as directed.   nitroGLYcerin (NITROSTAT) 0.4 MG sublingual tablet For chest pain place 1 tablet under the tongue every 5 minutes for 3 doses. If symptoms persist 5 minutes after 1st dose call 911.        DISCHARGE MEDICATIONS:    Anderson, Merlin J   Home Medication Instructions DIAMOND:[id number]    Printed on:11/05/18 2797   Medication Information                      acetaminophen (TYLENOL) 325 MG tablet  Take 2 tablets (650 mg) by mouth every 4 hours as needed for mild pain             aspirin 325 MG EC tablet  Take 1 tablet (325 mg) by mouth daily             atorvastatin (LIPITOR) 40 MG tablet  Take 1 tablet (40 mg) by mouth daily             blood glucose calibration (ACCU-CHEK PRISCILLA) solution  Use to calibrate blood glucose monitor as needed as directed.             blood glucose monitoring (ACCU-CHEK PRISCILLA) test strip  Use to test blood sugars 1 times daily or as directed.             blood glucose monitoring (ACCU-CHEK MULTICLIX) lancets  Use to test blood sugar 1 times daily or as directed.              furosemide (LASIX) 20 MG tablet  Take 1 tablet (20 mg) by mouth daily for 5 days             lisinopril (PRINIVIL/ZESTRIL) 2.5 MG tablet  Take 1 tablet (2.5 mg) by mouth daily             metFORMIN (GLUCOPHAGE) 500 MG tablet  Take 1 tablet (500 mg) by mouth 2 times daily (with meals)             metoprolol tartrate (LOPRESSOR) 25 MG tablet  Take 1 tablet (25 mg) by mouth every 12 hours             multivitamin, therapeutic with minerals (MULTI-VITAMIN) TABS tablet  Take 1 tablet by mouth every morning             nitroGLYcerin (NITROSTAT) 0.4 MG sublingual tablet  For chest pain place 1 tablet under the tongue every 5 minutes for 3 doses. If symptoms persist 5 minutes after 1st dose call 911.             oxyCODONE IR (ROXICODONE) 5 MG tablet  Take 1 tablet (5 mg) by mouth every 6 hours as needed for moderate to severe pain             potassium chloride SA (K-DUR/KLOR-CON M) 10 MEQ CR tablet  Take 1 tablet (10 mEq) by mouth daily for 5 days             tamsulosin (FLOMAX) 0.4 MG capsule  Take 1 capsule (0.4 mg) by mouth daily               CC:kassandra Duke MD, primary care provider  Davi Rebolledo MD, cardiology  [facility]  Office phone: [phone]

## 2018-11-05 NOTE — ED PROVIDER NOTES
"  History     Chief Complaint:  Wound Check    HPI   Merlin J Anderson is a 78 year old male, with a history of type 2 diabetes and CKD, who presents with his wife to the ED for evaluation of left lower extremity wound check. On review of patient s record, he had a CABG x4 with vein harvesting from the left leg on 10/26/18. He was prescribed a 5 day course of Keflex 250mg yesterday for cellulitis. Upon evaluation, the patient's wife reports he took 3 tablets of his antibiotic yestereday. His leg redness was demarcated by nursing staff at [facility]. His redness has improved since taking the antibiotic. The patient reports his leg pain has worsened since being active with PT. The leg swelling has remained the same. The patient denies any fever, chills, cough, shortness of breath, or chest pain.      Allergies:  No known drug allergies    Medications:    Nitrostat  Potassium chloride  Flomax   Lasix  Metformin  Lipitor  Lisinopril  Aspirin 325mg  Tylenol  Metoprolol   Multivitamin    Past Medical History:   CKD  Type 2 DM  Peripheral vascular disease  Carotid artery disease  HTN  HLD  Gout  Allergic rhinitis  Lumbar radiculopathy    Past Surgical History:    CABGx4  Inguinal herniorrhaphy  Lumbar fusion decompression, 1 level  Right carotid Endarterectomy     Family History:    Diabetes     Social History:  Smoking status: Never smoker    Alcohol use: Yes  Presents to ED with wife    Residing at [facility]  Marital Status:   [2]     Review of Systems   Constitutional: Negative for chills and fever.   Respiratory: Negative for cough and shortness of breath.    Cardiovascular: Negative for chest pain.   Musculoskeletal: Positive for myalgias.   Skin: Positive for rash and wound.   All other systems reviewed and are negative.    Physical Exam     Patient Vitals for the past 24 hrs:   BP Temp Temp src Pulse Resp SpO2 Height Weight   11/05/18 1313 112/62 98  F (36.7  C) Oral 89 20 97 % 1.854 m (6' 1\") 97.5 kg (215 " lb)     Physical Exam  Physical Exam   Constitutional:  Laying in bed comfortably, non-toxic appearing.   Head: Head moves freely with normal range of motion.   ENT: Oropharynx is clear and moist.   Eyes: Conjunctivae pink. EOMs intact.   Neck: Normal range of motion.   Cardiovascular: Regular rate and rhythm. Normal heart sounds. No concerning murmur. Intact distal pulses: radial and pedal pulses 2+ on the right, 2+ on the left.  Pulmonary/Chest: No respiratory distress. No decreased breath sounds. No wheezes. No rhonchi. No rales. Lungs clear throughout.   Abdominal: Soft. Non-tender. No rebound, no guarding.   Musculoskeletal: Distal capillary refill and sensation intact. Left leg with 1+ pitting edema lower calf and ankle.   Neurological: Oriented to person, place, and time. No focal deficits.   Skin:  Midline sternotomy incision with wound edges well approximated and no surrounding erythema or heat to touch, no drainage from the incision. 2 small endovascular vein harvest incisions at left inner leg appear well approximated, with no drainage at the sites. Left leg with erythema and warmth to touch in a well demarcated area, please see picture.       Emergency Department Course     Laboratory:  CBC: HGB 9.2(L), o/w WNL (WBC 9.7, )  BMP: Glucose 202(H), BUN 33(H), GFR estimate 46(L), Creatinine 1.47(H), o/w WNL     Interventions:  1436: Keflex 250mg PO    Emergency Department Course:  Past medical records, nursing notes, and vitals reviewed.  1327: I performed an exam of the patient and obtained history, as documented above.    1338: [facility]     Blood drawn.    1343: I rechecked the patient.     1413: I rechecked the patient. Explained findings to patient and wife.    Findings and plan explained to the Patient and spouse. Patient discharged home with instructions regarding supportive care, medications, and reasons to return. The importance of close follow-up was reviewed.      Impression & Plan      Medical Decision Making:  Merlin J Anderson is a 78 year old male who presents for evaluation of skin redness at the left leg where he had an endovascular vein harvest for CABG x 4 on 10/26/18.  The history, physical exam, and supporting data are consistent with cellulitis. He was started on Keflex 250mg qid (renal dosing based on creat clearance) yesterday and is already noting improvement today in the redness.   There does not appear at this time to be any complication of cellulitis including necrotizing fascitis, lymphangitis, lymphadenitis, abscess, osteomyelitis, sepsis, or shock.  The patient is not immunosuppressed.  Patient will continue his prescribed Keflex, we gave him his afternoon dose here. We discussed reasons to return to the ER. He is returning to the TCU and we [facility]. We discussed follow up with surgeon for any further surgical wound concerns. Patient and wife amenable to plan.     Diagnosis:    ICD-10-CM   1. Left leg cellulitis L03.116   2. Infection of superficial incisional surgical site after procedure, initial encounter T81.41XA     Disposition: [facility]      Ninoska Cheng  11/5/2018    EMERGENCY DEPARTMENT    Scribe Disclosure:  I, Ninoska Cheng, am serving as a scribe at 1:27 PM on 11/5/2018 to document services personally performed by Therese Peralta APRN CNP based on my observations and the provider's statements to me.          Therese Peralta APRN CNP  11/05/18 1947

## 2018-11-05 NOTE — ED AVS SNAPSHOT
Emergency Department    [address] [facility] [address]    Phone:  [phone]    Fax:  [phone]                                       Merlin J Anderson   MRN: [id number]    Department:   Emergency Department   Date of Visit:  11/5/2018           Patient Information     Date Of Birth          1940        Your diagnoses for this visit were:     Left leg cellulitis     Infection of superficial incisional surgical site after procedure, initial encounter        You were seen by Therese Peralta APRN CNP.      Follow-up Information     Follow up with Heath Kessler MD.    Specialty:  Thoracic Diseases    Why:  As needed for wound check    Contact information:    [address]  [facility] [address] [phone]          Discharge Instructions       Continue the antibiotic Keflex 250mg four times a day. If you develop a fever, have extending redness or increasing pain please let the staff know at [facility] TCU.         Discharge Instructions for Cellulitis  You have been diagnosed with cellulitis. This is an infection in the deepest layer of the skin. In some cases, the infection also affects the muscle. Cellulitis is caused by bacteria. The bacteria can enter the body through broken skin. This can happen with a cut, scratch, animal bite, or an insect bite that has been scratched. You may have been treated in the hospital with antibiotics and fluids. You will likely be given a prescription for antibiotics to take at home. This sheet will help you take care of yourself at home.  Home care  When you are home:    Take the prescribed antibiotic medicine you are given as directed until it is gone. Take it even if you feel better. It treats the infection and stops it from returning. Not taking all the medicine can make future infections hard to treat.    Keep the infected area clean.    When possible, raise the infected area above the level of your heart.  This helps keep swelling down.    Talk with your healthcare provider if you are in pain. Ask what kind of over-the-counter medicine you can take for pain.    Apply clean bandages as advised.    Take your temperature once a day for a week.    Wash your hands often to prevent spreading the infection.  In the future, wash your hands before and after you touch cuts, scratches, or bandages. This will help prevent infection.   When to call your healthcare provider  Call your healthcare provider immediately if you have any of the following:    Difficulty or pain when moving the joints above or below the infected area    Discharge or pus draining from the area    Fever of 100.4 F (38 C) or higher, or as directed by your healthcare provider    Pain that gets worse in or around the infected     Redness that gets worse in or around the infected area, particularly if the area of redness expands to a wider area    Shaking chills    Swelling of the infected area    Vomiting   Date Last Reviewed: 8/1/2016 2000-2017 [employer]. [address] [place] [address]. All rights reserved. This information is not intended as a substitute for professional medical care. Always follow your healthcare professional's instructions.           Your next 10 appointments already scheduled     Nov 14, 2018  2:00 PM CST   Post-Op with [facility]   [facility] ([facility])    [address] [place] [id number] [phone]            Jan 17, 2019  2:45 PM CST   Return Visit with Davi Rebolledo MD   [facility] ([facility])    [address]  [facility] [id number] [phone]              24 Hour Appointment Hotline       To make an appointment at any [facility], call [phone] ([phone]). If you don't have a family doctor or clinic, we will help you find one. [facility] are conveniently  located to serve the needs of you and your family.             Review of your medicines      Our records show that you are taking the medicines listed below. If these are incorrect, please call your family doctor or clinic.        Dose / Directions Last dose taken    acetaminophen 325 MG tablet   Commonly known as:  TYLENOL   Dose:  650 mg   Quantity:  100 tablet        Take 2 tablets (650 mg) by mouth every 4 hours as needed for mild pain   Refills:  0        aspirin 325 MG EC tablet   Dose:  325 mg   Quantity:  40 tablet        Take 1 tablet (325 mg) by mouth daily   Refills:  0        atorvastatin 40 MG tablet   Commonly known as:  LIPITOR   Dose:  40 mg   Quantity:  90 tablet        Take 1 tablet (40 mg) by mouth daily   Refills:  3        blood glucose calibration solution   Quantity:  1 Bottle        Use to calibrate blood glucose monitor as needed as directed.   Refills:  0        blood glucose monitoring lancets   Quantity:  1 Box        Use to test blood sugar 1 times daily or as directed.   Refills:  1        blood glucose monitoring test strip   Commonly known as:  ACCU-CHEK PRISCILLA   Quantity:  100 each        Use to test blood sugars 1 times daily or as directed.   Refills:  1        furosemide 20 MG tablet   Commonly known as:  LASIX   Dose:  20 mg   Quantity:  5 tablet        Take 1 tablet (20 mg) by mouth daily for 5 days   Refills:  0        lisinopril 2.5 MG tablet   Commonly known as:  PRINIVIL/Zestril   Dose:  2.5 mg   Quantity:  30 tablet        Take 1 tablet (2.5 mg) by mouth daily   Refills:  0        metFORMIN 500 MG tablet   Commonly known as:  GLUCOPHAGE   Dose:  500 mg   Quantity:  180 tablet        Take 1 tablet (500 mg) by mouth 2 times daily (with meals)   Refills:  1        metoprolol tartrate 25 MG tablet   Commonly known as:  LOPRESSOR   Dose:  25 mg   Quantity:  60 tablet        Take 1 tablet (25 mg) by mouth every 12 hours   Refills:  0        Multi-vitamin Tabs tablet   Dose:  1  tablet        Take 1 tablet by mouth every morning   Refills:  0        nitroGLYcerin 0.4 MG sublingual tablet   Commonly known as:  NITROSTAT   Quantity:  25 tablet        For chest pain place 1 tablet under the tongue every 5 minutes for 3 doses. If symptoms persist 5 minutes after 1st dose call 911.   Refills:  3        oxyCODONE IR 5 MG tablet   Commonly known as:  ROXICODONE   Dose:  5 mg   Quantity:  10 tablet        Take 1 tablet (5 mg) by mouth every 6 hours as needed for moderate to severe pain   Refills:  0        potassium chloride SA 10 MEQ CR tablet   Commonly known as:  K-DUR/KLOR-CON M   Dose:  10 mEq   Quantity:  5 tablet        Take 1 tablet (10 mEq) by mouth daily for 5 days   Refills:  0        tamsulosin 0.4 MG capsule   Commonly known as:  FLOMAX   Dose:  0.4 mg   Quantity:  90 capsule        Take 1 capsule (0.4 mg) by mouth daily   Refills:  3                Procedures and tests performed during your visit     Basic metabolic panel    CBC with platelets + differential      Orders Needing Specimen Collection     None      Pending Results     No orders found from 11/3/2018 to 11/6/2018.            Pending Culture Results     No orders found from 11/3/2018 to 11/6/2018.            Pending Results Instructions     If you had any lab results that were not finalized at the time of your Discharge, you can call the ED Lab Result RN at [phone]. You will be contacted by this team for any positive Lab results or changes in treatment. The nurses are available 7 days a week from 10A to 6:30P.  You can leave a message 24 hours per day and they will return your call.        Test Results From Your Hospital Stay        11/5/2018  1:52 PM      Component Results     Component Value Ref Range & Units Status    WBC 9.7 4.0 - 11.0 10e9/L Final    RBC Count 3.04 (L) 4.4 - 5.9 10e12/L Final    Hemoglobin 9.2 (L) 13.3 - 17.7 g/dL Final    Hematocrit 27.9 (L) 40.0 - 53.0 % Final    MCV 92 78 - 100 fl Final    MCH  30.3 26.5 - 33.0 pg Final    MCHC 33.0 31.5 - 36.5 g/dL Final    RDW 14.0 10.0 - 15.0 % Final    Platelet Count 283 150 - 450 10e9/L Final    Diff Method Automated Method  Final    % Neutrophils 79.8 % Final    % Lymphocytes 10.0 % Final    % Monocytes 7.7 % Final    % Eosinophils 1.4 % Final    % Basophils 0.4 % Final    % Immature Granulocytes 0.7 % Final    Nucleated RBCs 0 0 /100 Final    Absolute Neutrophil 7.8 1.6 - 8.3 10e9/L Final    Absolute Lymphocytes 1.0 0.8 - 5.3 10e9/L Final    Absolute Monocytes 0.8 0.0 - 1.3 10e9/L Final    Absolute Eosinophils 0.1 0.0 - 0.7 10e9/L Final    Absolute Basophils 0.0 0.0 - 0.2 10e9/L Final    Abs Immature Granulocytes 0.1 0 - 0.4 10e9/L Final    Absolute Nucleated RBC 0.0  Final         11/5/2018  2:11 PM      Component Results     Component Value Ref Range & Units Status    Sodium 137 133 - 144 mmol/L Final    Potassium 4.4 3.4 - 5.3 mmol/L Final    Chloride 104 94 - 109 mmol/L Final    Carbon Dioxide 24 20 - 32 mmol/L Final    Anion Gap 9 3 - 14 mmol/L Final    Glucose 202 (H) 70 - 99 mg/dL Final    Urea Nitrogen 33 (H) 7 - 30 mg/dL Final    Creatinine 1.47 (H) 0.66 - 1.25 mg/dL Final    GFR Estimate 46 (L) >60 mL/min/1.7m2 Final    Non  GFR Calc    GFR Estimate If Black 56 (L) >60 mL/min/1.7m2 Final    African American GFR Calc    Calcium 8.7 8.5 - 10.1 mg/dL Final                Clinical Quality Measure: Blood Pressure Screening     Your blood pressure was checked while you were in the emergency department today. The last reading we obtained was  BP: 112/62 . Please read the guidelines below about what these numbers mean and what you should do about them.  If your systolic blood pressure (the top number) is less than 120 and your diastolic blood pressure (the bottom number) is less than 80, then your blood pressure is normal. There is nothing more that you need to do about it.  If your systolic blood pressure (the top number) is 120-139 or your  diastolic blood pressure (the bottom number) is 80-89, your blood pressure may be higher than it should be. You should have your blood pressure rechecked within a year by a primary care provider.  If your systolic blood pressure (the top number) is 140 or greater or your diastolic blood pressure (the bottom number) is 90 or greater, you may have high blood pressure. High blood pressure is treatable, but if left untreated over time it can put you at risk for heart attack, stroke, or kidney failure. You should have your blood pressure rechecked by a primary care provider within the next 4 weeks.  If your provider in the emergency department today gave you specific instructions to follow-up with your doctor or provider even sooner than that, you should follow that instruction and not wait for up to 4 weeks for your follow-up visit.        Thank you for choosing [facility]       Thank you for choosing [facility] for your care. Our goal is always to provide you with excellent care. Hearing back from our patients is one way we can continue to improve our services. Please take a few minutes to complete the written survey that you may receive in the mail after you visit with us. Thank you!        [employer] Information     [employer] gives you secure access to your electronic health record. If you see a primary care provider, you can also send messages to your care team and make appointments. If you have questions, please call your primary care clinic.  If you do not have a primary care provider, please call [phone] and they will assist you.        Care EveryWhere ID     This is your Care EveryWhere ID. This could be used by other organizations to access your [facility] medical records  [id number]        Equal Access to Services     RAUDEL ESTRELLA : Hadii jovita hansono Soeligio, waaxda luqadaha, qaybta kaalmada joseph, kaye hawley. So [facility] [phone].    ATENCIÓN: nayely Dewitt  disposición servicios gratuitos de asistencia lingüística. Sandra al [phone].    We comply with applicable federal civil rights laws and [place] laws. We do not discriminate on the basis of race, color, national origin, age, disability, sex, sexual orientation, or gender identity.            After Visit Summary       This is your record. Keep this with you and show to your community pharmacist(s) and doctor(s) at your next visit.

## 2018-11-07 VITALS
OXYGEN SATURATION: 94 % | SYSTOLIC BLOOD PRESSURE: 108 MMHG | TEMPERATURE: 97.9 F | RESPIRATION RATE: 20 BRPM | WEIGHT: 205.7 LBS | DIASTOLIC BLOOD PRESSURE: 57 MMHG | BODY MASS INDEX: 27.14 KG/M2 | HEART RATE: 99 BPM

## 2018-11-07 RX ORDER — FLUTICASONE PROPIONATE 50 MCG
1-2 SPRAY, SUSPENSION (ML) NASAL DAILY PRN
COMMUNITY
End: 2018-11-19

## 2018-11-08 ENCOUNTER — TRANSFERRED RECORDS (OUTPATIENT)
Dept: HEALTH INFORMATION MANAGEMENT | Facility: CLINIC | Age: 78
End: 2018-11-08

## 2018-11-08 ENCOUNTER — NURSING HOME VISIT (OUTPATIENT)
Dept: GERIATRICS | Facility: CLINIC | Age: 78
End: 2018-11-08
Payer: COMMERCIAL

## 2018-11-08 DIAGNOSIS — Z95.1 S/P CABG (CORONARY ARTERY BYPASS GRAFT): ICD-10-CM

## 2018-11-08 DIAGNOSIS — R53.81 PHYSICAL DECONDITIONING: ICD-10-CM

## 2018-11-08 DIAGNOSIS — D62 ANEMIA DUE TO BLOOD LOSS, ACUTE: ICD-10-CM

## 2018-11-08 DIAGNOSIS — N18.30 CKD (CHRONIC KIDNEY DISEASE) STAGE 3, GFR 30-59 ML/MIN (H): ICD-10-CM

## 2018-11-08 DIAGNOSIS — E11.59 TYPE 2 DIABETES MELLITUS WITH OTHER CIRCULATORY COMPLICATIONS (H): ICD-10-CM

## 2018-11-08 DIAGNOSIS — I10 ESSENTIAL HYPERTENSION: ICD-10-CM

## 2018-11-08 DIAGNOSIS — I25.10 CORONARY ARTERY DISEASE INVOLVING NATIVE CORONARY ARTERY OF NATIVE HEART WITHOUT ANGINA PECTORIS: Primary | ICD-10-CM

## 2018-11-08 LAB
BUN SERPL-MCNC: 32 MG/DL (ref 9–26)
CALCIUM SERPL-MCNC: 8.9 MG/DL (ref 8.4–10.4)
CHLORIDE SERPLBLD-SCNC: 106 MMOL/L (ref 98–109)
CO2 SERPL-SCNC: 23 MMOL/L (ref 22–31)
CREAT SERPL-MCNC: 1.58 MG/DL (ref 0.73–1.18)
DIFFERENTIAL: ABNORMAL
ERYTHROCYTE [DISTWIDTH] IN BLOOD BY AUTOMATED COUNT: 13.8 % (ref 11–15)
GFR SERPL CREATININE-BSD FRML MDRD: 41 ML/MIN/1.73M2
GLUCOSE SERPL-MCNC: 159 MG/DL (ref 70–100)
HCT VFR BLD AUTO: 26.5 % (ref 39–51)
HEMOGLOBIN: 8.5 G/DL (ref 13.4–17.5)
MCV RBC AUTO: 94 FL (ref 80–100)
PLATELET # BLD AUTO: 280 K/CMM (ref 140–450)
POTASSIUM SERPL-SCNC: 4.5 MMOL/L (ref 3.5–5.2)
RBC # BLD AUTO: 2.82 M/CMM (ref 4.2–5.9)
SODIUM SERPL-SCNC: 138 MMOL/L (ref 136–145)
WBC # BLD AUTO: 9.3 K/CMM (ref 3.8–11)

## 2018-11-08 PROCEDURE — 99309 SBSQ NF CARE MODERATE MDM 30: CPT | Performed by: NURSE PRACTITIONER

## 2018-11-08 NOTE — LETTER
11/8/2018        RE: Merlin J Anderson  [address] [facility] [address]        [facility]    Chief Complaint   Patient presents with     group home Acute       [facility] Medical Record Number:  [id number]  Place of Service where encounter took place:  [facility] ([facility] [[id number]]    HPI:    Merlin J Anderson is a 78 year old  (1940), who is being seen today for an episodic care visit.  HPI information obtained from: facility chart records, facility staff, patient report and [facility] chart review.    Today's concern is:  Coronary artery disease involving native coronary artery of native heart without angina pectoris  S/P CABG (coronary artery bypass graft)  Physical deconditioning  Patient in rehab s/p CABG. Doing well from cardiac standpoint. Did visit ED earlier this week per wife's request due to concerns over left leg redness, ?cellulitis at incision site. ED provider indicated leg improving and asked to complete previously ordered Keflex course. Today no fevers, redness resolved and incisions healing well without drainage. Continues lasix, Toprol, lisinopril, ASA, statin and PRN nitroglycerin. Patient up with walker, making gain in therapy.   Lab Results   Component Value Date    WBC 9.3 11/08/2018    WBC 9.7 11/05/2018        Anemia due to blood loss, acute  On most recent check   Lab Results   Component Value Date    HGB 8.5 11/08/2018    HGB 9.2 11/05/2018        Essential hypertension  CKD (chronic kidney disease) stage 3, GFR 30-59 ml/min (H)  Recent BP: 108-138/57-75 mmHg. Admission Weight: 11/1/8: 220.4 lbs and Current Weight: 11/5/18: 209.8 lbs - 11/7/18: 205.7 lbs. Meds as noted above.   Lab Results   Component Value Date    CR 1.58 11/08/2018    CR 1.47 11/05/2018     Lab Results   Component Value Date    POTASSIUM 4.5 11/08/2018    POTASSIUM 4.4 11/05/2018       Type 2 diabetes mellitus with other circulatory complications (H)  Managed with  Glucophage. Recent BG  AM: 184-204 mg/dL  Lab Results   Component Value Date    A1C 6.1 10/28/2018        ALLERGIES: Review of patient's allergies indicates no known allergies.  Past Medical, Surgical, Family and Social History reviewed and updated in [facility].    Current Outpatient Prescriptions   Medication Sig Dispense Refill     acetaminophen (TYLENOL) 325 MG tablet Take 2 tablets (650 mg) by mouth every 4 hours as needed for mild pain 100 tablet      aspirin 325 MG EC tablet Take 1 tablet (325 mg) by mouth daily 40 tablet      atorvastatin (LIPITOR) 40 MG tablet Take 1 tablet (40 mg) by mouth daily 90 tablet 3     blood glucose calibration (ACCU-CHEK PRISCILLA) solution Use to calibrate blood glucose monitor as needed as directed. 1 Bottle 0     blood glucose monitoring (ACCU-CHEK PRISCILLA) test strip Use to test blood sugars 1 times daily or as directed. 100 each 1     blood glucose monitoring (ACCU-CHEK MULTICLIX) lancets Use to test blood sugar 1 times daily or as directed. 1 Box 1     CEPHALEXIN PO Take 250 mg by mouth every 6 hours       fluticasone (FLONASE) 50 MCG/ACT spray Spray 1-2 sprays into both nostrils daily as needed for rhinitis or allergies       lisinopril (PRINIVIL/ZESTRIL) 2.5 MG tablet Take 1 tablet (2.5 mg) by mouth daily 30 tablet      metFORMIN (GLUCOPHAGE) 500 MG tablet Take 1 tablet (500 mg) by mouth 2 times daily (with meals) 180 tablet 1     metoprolol tartrate (LOPRESSOR) 25 MG tablet Take 1 tablet (25 mg) by mouth every 12 hours 60 tablet      multivitamin, therapeutic with minerals (MULTI-VITAMIN) TABS tablet Take 1 tablet by mouth every morning       nitroGLYcerin (NITROSTAT) 0.4 MG sublingual tablet For chest pain place 1 tablet under the tongue every 5 minutes for 3 doses. If symptoms persist 5 minutes after 1st dose call 911. 25 tablet 3     oxyCODONE IR (ROXICODONE) 5 MG tablet Take 1 tablet (5 mg) by mouth every 6 hours as needed for moderate to severe pain 10 tablet 0     tamsulosin  (FLOMAX) 0.4 MG capsule Take 1 capsule (0.4 mg) by mouth daily 90 capsule 3     furosemide (LASIX) 20 MG tablet Take 1 tablet (20 mg) by mouth daily for 5 days 5 tablet 0     Medications reviewed:  Medications reconciled to facility chart and changes were made to reflect current medications as identified as above med list. Below are the changes that were made:   Medications stopped since last EPIC medication reconciliation:   There are no discontinued medications.    Medications started since last [facility] medication reconciliation:  Orders Placed This Encounter   Medications     CEPHALEXIN PO     Sig: Take 250 mg by mouth every 6 hours     fluticasone (FLONASE) 50 MCG/ACT spray     Sig: Spray 1-2 sprays into both nostrils daily as needed for rhinitis or allergies       REVIEW OF SYSTEMS:  10 point ROS of systems including Constitutional, Eyes, Respiratory, Cardiovascular, Gastroenterology, Genitourinary, Integumentary, Musculoskeletal, Psychiatric were all negative except for pertinent positives noted in my HPI.    Physical Exam:  /57  Pulse 99  Temp 97.9  F (36.6  C)  Resp 20  Wt 205 lb 11.2 oz (93.3 kg)  SpO2 94%  BMI 27.14 kg/m2  GENERAL APPEARANCE:  Alert, in no distress, pleasant, cooperative, oriented x 4  EYES:  EOM, lids, pupils and irises normal, sclera clear and conjunctiva normal, no discharge or mattering on lids or lashes noted  ENT:  Mouth normal, moist mucous membranes, nose normal without drainage or crusting, external ears without lesions, hearing acuity intact  RESP:  respiratory effort and palpation of chest normal, no chest wall tenderness, no respiratory distress, Lung sounds clear, patient is on room air  CV:  Palpation and auscultation of heart done, rate and rhythm controlled and regular, no murmur, no rub or gallop. Edema trace bilateral lower extremities.   ABDOMEN:  normal bowel sounds, soft, nontender.  M/S:   Gait and station ambulates independently with walker, no tenderness or  swelling of the joints; able to move all extremities   SKIN:  Inspection and palpation of skin and subcutaneous tissue: chest incision and left leg incision intact, no redness or drainage.   NEURO: cranial nerves 2-12 grossly intact, no facial asymmetry, no speech deficits and able to follow directions, moves all extremities symmetrically  PSYCH:  insight and judgement intact, memory intact, affect and mood normal     Recent Labs:  CBC RESULTS:   Recent Labs   Lab Test  11/05/18   1339 11/05/18  10/31/18   0720   WBC  9.7  7.4  6.5   RBC  3.04*  2.77*  2.70*   HGB  9.2*  8.4*  8.1*   HCT  27.9*  25.8*  24.7*   MCV  92  93.1  92   MCH  30.3   --   30.0   MCHC  33.0   --   32.8   RDW  14.0  13.7  13.5   PLT  283  279  177       Last Basic Metabolic Panel:  Recent Labs   Lab Test  11/05/18   1339 11/05/18   NA  137  136   POTASSIUM  4.4  4.2   CHLORIDE  104  105   VICKY  8.7  8.8   CO2  24  22   BUN  33*  31*   CR  1.47*  1.42*   GLC  202*  167*       Liver Function Studies -   Recent Labs   Lab Test  10/26/18   1755  10/26/18   0725   PROTTOTAL  4.2*  6.5*   ALBUMIN  2.5*  3.6   BILITOTAL  0.8  0.7   ALKPHOS  48  79   AST  53*  22   ALT  21  31       TSH   Date Value Ref Range Status   10/02/2018 2.05 0.40 - 4.00 mU/L Final   08/08/2017 2.51 0.40 - 4.00 mU/L Final       Lab Results   Component Value Date    A1C 6.1 10/28/2018    A1C 6.7 10/02/2018       Liver Function Studies -   Recent Labs   Lab Test  10/26/18   1755  10/26/18   0725   PROTTOTAL  4.2*  6.5*   ALBUMIN  2.5*  3.6   BILITOTAL  0.8  0.7   ALKPHOS  48  79   AST  53*  22   ALT  21  31       TSH   Date Value Ref Range Status   10/02/2018 2.05 0.40 - 4.00 mU/L Final   08/08/2017 2.51 0.40 - 4.00 mU/L Final       Lab Results   Component Value Date    A1C 6.1 10/28/2018    A1C 6.7 10/02/2018         Assessment/Plan:  Coronary artery disease involving native coronary artery of native heart without angina pectoris  S/P CABG (coronary artery bypass  graft)  Physical deconditioning  Acute on chronic; therapies - f/u with progress next week. Meds as above, complete antibiotic course. F/U cardiology as recommended.     Anemia due to blood loss, acute  Acute - stable, f/u next week.     Essential hypertension  CKD (chronic kidney disease) stage 3, GFR 30-59 ml/min (H)  Chronic, stable. Meds as above and vs, wt as ordered. BMP next week.     Type 2 diabetes mellitus with other circulatory complications (H)  Chronic, meds as PTA. BG checks as ordered.     Orders:  No changes today    Electronically signed by  DIRK Christian CNP    ADDENDUM  Notified on 11/9 that patient is ready for discharge on 11/11 to home without home services.   He will have outpatient cardiac rehab.  He will follow up with PCP in 7-10 days and cardiology as directed. At discharge will send script for PRN Oxycodone 5 mg tabs #20, no refills.     Electronically signed by DIRK Christian, MARISOL                   Sincerely,        DIRK Christian CNP

## 2018-11-08 NOTE — MR AVS SNAPSHOT
After Visit Summary   11/8/2018    Merlin J Anderson    MRN: [id number]           Patient Information     Date Of Birth          1940        Visit Information        Provider Department      11/8/2018 8:00 AM Radha Fried APRN CNP Geriatrics Transitional Care        Today's Diagnoses     Coronary artery disease involving native coronary artery of native heart without angina pectoris    -  1    S/P CABG (coronary artery bypass graft)        Physical deconditioning        Anemia due to blood loss, acute        Essential hypertension        CKD (chronic kidney disease) stage 3, GFR 30-59 ml/min (H)        Type 2 diabetes mellitus with other circulatory complications (H)           Follow-ups after your visit        Your next 10 appointments already scheduled     Nov 14, 2018  2:00 PM CST   Post-Op with [facility]   [facility] ([facility])    [address] [place] [id number] [phone]            Nov 19, 2018  1:40 PM CST   [facility]    [address] [place] [address] [phone]            Jan 17, 2019  2:45 PM CST   Return Visit with Davi Rebolledo MD   [facility] ([facility])    [address] [facility] [address]  [facility] [address] [phone]              Who to contact     If you have questions or need follow up information about today's clinic visit or your schedule please contact [facility] directly at [phone].  Normal or non-critical lab and imaging results will be communicated to you by MyChart, letter or phone within 4 business days after the clinic has received the results. If you do not hear from us within 7 days, please contact the clinic through MyChart or phone. If you have a critical or abnormal lab result, we will notify you by phone as soon as  possible.  Submit refill requests through [employer] or call your pharmacy and they will forward the refill request to us. Please allow 3 business days for your refill to be completed.          Additional Information About Your Visit        [employer] System[employer]art Information     [employer] gives you secure access to your electronic health record. If you see a primary care provider, you can also send messages to your care team and make appointments. If you have questions, please call your primary care clinic.  If you do not have a primary care provider, please call [phone] and they will assist you.        Care EveryWhere ID     This is your Care EveryWhere ID. This could be used by other organizations to access your [facility] medical records  [id number]        Your Vitals Were     Pulse Temperature Respirations Pulse Oximetry BMI (Body Mass Index)       99 97.9  F (36.6  C) 20 94% 27.14 kg/m2        Blood Pressure from Last 3 Encounters:   11/07/18 108/57   11/05/18 112/62   10/31/18 118/62    Weight from Last 3 Encounters:   11/07/18 205 lb 11.2 oz (93.3 kg)   11/05/18 215 lb (97.5 kg)   10/31/18 226 lb 11.2 oz (102.8 kg)              Today, you had the following     No orders found for display       Primary Care Provider Office Phone # Fax #    Sujit Duke [phone]       [address] [facility] [address]        Equal Access to Services     [facility]: Hadii aad ku hadasho Soshahzadali, waaxda luqadaha, qaybta kaalmada aderadhayada, kaye shah . So [facility] [phone].    ATENCIÓN: Si habla español, tiene a pa disposición servicios gratuitos de asistencia lingüística. Sandra al [phone].    We comply with applicable federal civil rights laws and [place] laws. We do not discriminate on the basis of race, color, national origin, age, disability, sex, sexual orientation, or gender identity.            Thank you!     Thank you for choosing [facility]  for  your care. Our goal is always to provide you with excellent care. Hearing back from our patients is one way we can continue to improve our services. Please take a few minutes to complete the written survey that you may receive in the mail after your visit with us. Thank you!             Your Updated Medication List - Protect others around you: Learn how to safely use, store and throw away your medicines at www.disposemymeds.org.          This list is accurate as of 11/8/18  4:21 PM.  Always use your most recent med list.                   Brand Name Dispense Instructions for use Diagnosis    acetaminophen 325 MG tablet    TYLENOL    100 tablet    Take 2 tablets (650 mg) by mouth every 4 hours as needed for mild pain    S/P CABG (coronary artery bypass graft)       aspirin 325 MG EC tablet     40 tablet    Take 1 tablet (325 mg) by mouth daily    S/P CABG (coronary artery bypass graft)       atorvastatin 40 MG tablet    LIPITOR    90 tablet    Take 1 tablet (40 mg) by mouth daily    Right-sided carotid artery disease, unspecified type (H), Hyperlipidemia LDL goal <100       blood glucose calibration solution     1 Bottle    Use to calibrate blood glucose monitor as needed as directed.    Type 2 diabetes mellitus without complication (H)       blood glucose monitoring lancets     1 Box    Use to test blood sugar 1 times daily or as directed.    Type 2 diabetes mellitus without complication, without long-term current use of insulin (H)       blood glucose monitoring test strip    ACCU-CHEK PRISCILLA    100 each    Use to test blood sugars 1 times daily or as directed.    Type 2 diabetes mellitus without complication, without long-term current use of insulin (H)       CEPHALEXIN PO      Take 250 mg by mouth every 6 hours        fluticasone 50 MCG/ACT spray    FLONASE     Spray 1-2 sprays into both nostrils daily as needed for rhinitis or allergies        furosemide 20 MG tablet    LASIX    5 tablet    Take 1 tablet (20 mg)  by mouth daily for 5 days    S/P CABG (coronary artery bypass graft)       lisinopril 2.5 MG tablet    PRINIVIL/Zestril    30 tablet    Take 1 tablet (2.5 mg) by mouth daily    S/P CABG (coronary artery bypass graft)       metFORMIN 500 MG tablet    GLUCOPHAGE    180 tablet    Take 1 tablet (500 mg) by mouth 2 times daily (with meals)    Type 2 diabetes mellitus without complication, without long-term current use of insulin (H)       metoprolol tartrate 25 MG tablet    LOPRESSOR    60 tablet    Take 1 tablet (25 mg) by mouth every 12 hours    S/P CABG (coronary artery bypass graft)       Multi-vitamin Tabs tablet      Take 1 tablet by mouth every morning        nitroGLYcerin 0.4 MG sublingual tablet    NITROSTAT    25 tablet    For chest pain place 1 tablet under the tongue every 5 minutes for 3 doses. If symptoms persist 5 minutes after 1st dose call 911.    Abnormal cardiovascular stress test       oxyCODONE IR 5 MG tablet    ROXICODONE    10 tablet    Take 1 tablet (5 mg) by mouth every 6 hours as needed for moderate to severe pain    S/P CABG (coronary artery bypass graft)       tamsulosin 0.4 MG capsule    FLOMAX    90 capsule    Take 1 capsule (0.4 mg) by mouth daily    Urinary retention

## 2018-11-08 NOTE — LETTER
11/8/2018        RE: Merlin J Anderson  [address] [address]        [facility]    Chief Complaint   Patient presents with     longterm Acute       [facility] Record Number:  [id number]  Place of Service where encounter took place:  [facility] ([facility]) [[id number]]    HPI:    Merlin J Anderson is a 78 year old  (1940), who is being seen today for an episodic care visit.  HPI information obtained from: facility chart records, facility staff, patient report and [facility] chart review.    Today's concern is:  Coronary artery disease involving native coronary artery of native heart without angina pectoris  S/P CABG (coronary artery bypass graft)  Physical deconditioning  Patient in rehab s/p CABG. Doing well from cardiac standpoint. Did visit ED earlier this week per wife's request due to concerns over left leg redness, ?cellulitis at incision site. ED provider indicated leg improving and asked to complete previously ordered Keflex course. Today no fevers, redness resolved and incisions healing well without drainage. Continues lasix, Toprol, lisinopril, ASA, statin and PRN nitroglycerin. Patient up with walker, making gain in therapy.   Lab Results   Component Value Date    WBC 9.3 11/08/2018    WBC 9.7 11/05/2018        Anemia due to blood loss, acute  On most recent check   Lab Results   Component Value Date    HGB 8.5 11/08/2018    HGB 9.2 11/05/2018        Essential hypertension  CKD (chronic kidney disease) stage 3, GFR 30-59 ml/min (H)  Recent BP: 108-138/57-75 mmHg. Admission Weight: 11/1/8: 220.4 lbs and Current Weight: 11/5/18: 209.8 lbs - 11/7/18: 205.7 lbs. Meds as noted above.   Lab Results   Component Value Date    CR 1.58 11/08/2018    CR 1.47 11/05/2018     Lab Results   Component Value Date    POTASSIUM 4.5 11/08/2018    POTASSIUM 4.4 11/05/2018       Type 2 diabetes mellitus with other circulatory complications (H)  Managed with  Glucophage. Recent BG  AM: 184-204 mg/dL  Lab Results   Component Value Date    A1C 6.1 10/28/2018        ALLERGIES: Review of patient's allergies indicates no known allergies.  Past Medical, Surgical, Family and Social History reviewed and updated in [facility].    Current Outpatient Prescriptions   Medication Sig Dispense Refill     acetaminophen (TYLENOL) 325 MG tablet Take 2 tablets (650 mg) by mouth every 4 hours as needed for mild pain 100 tablet      aspirin 325 MG EC tablet Take 1 tablet (325 mg) by mouth daily 40 tablet      atorvastatin (LIPITOR) 40 MG tablet Take 1 tablet (40 mg) by mouth daily 90 tablet 3     blood glucose calibration (ACCU-CHEK PRISCILLA) solution Use to calibrate blood glucose monitor as needed as directed. 1 Bottle 0     blood glucose monitoring (ACCU-CHEK PRISCILLA) test strip Use to test blood sugars 1 times daily or as directed. 100 each 1     blood glucose monitoring (ACCU-CHEK MULTICLIX) lancets Use to test blood sugar 1 times daily or as directed. 1 Box 1     CEPHALEXIN PO Take 250 mg by mouth every 6 hours       fluticasone (FLONASE) 50 MCG/ACT spray Spray 1-2 sprays into both nostrils daily as needed for rhinitis or allergies       lisinopril (PRINIVIL/ZESTRIL) 2.5 MG tablet Take 1 tablet (2.5 mg) by mouth daily 30 tablet      metFORMIN (GLUCOPHAGE) 500 MG tablet Take 1 tablet (500 mg) by mouth 2 times daily (with meals) 180 tablet 1     metoprolol tartrate (LOPRESSOR) 25 MG tablet Take 1 tablet (25 mg) by mouth every 12 hours 60 tablet      multivitamin, therapeutic with minerals (MULTI-VITAMIN) TABS tablet Take 1 tablet by mouth every morning       nitroGLYcerin (NITROSTAT) 0.4 MG sublingual tablet For chest pain place 1 tablet under the tongue every 5 minutes for 3 doses. If symptoms persist 5 minutes after 1st dose call 911. 25 tablet 3     oxyCODONE IR (ROXICODONE) 5 MG tablet Take 1 tablet (5 mg) by mouth every 6 hours as needed for moderate to severe pain 10 tablet 0     tamsulosin  (FLOMAX) 0.4 MG capsule Take 1 capsule (0.4 mg) by mouth daily 90 capsule 3     furosemide (LASIX) 20 MG tablet Take 1 tablet (20 mg) by mouth daily for 5 days 5 tablet 0     Medications reviewed:  Medications reconciled to facility chart and changes were made to reflect current medications as identified as above med list. Below are the changes that were made:   Medications stopped since last EPIC medication reconciliation:   There are no discontinued medications.    Medications started since last [facility] medication reconciliation:  Orders Placed This Encounter   Medications     CEPHALEXIN PO     Sig: Take 250 mg by mouth every 6 hours     fluticasone (FLONASE) 50 MCG/ACT spray     Sig: Spray 1-2 sprays into both nostrils daily as needed for rhinitis or allergies       REVIEW OF SYSTEMS:  10 point ROS of systems including Constitutional, Eyes, Respiratory, Cardiovascular, Gastroenterology, Genitourinary, Integumentary, Musculoskeletal, Psychiatric were all negative except for pertinent positives noted in my HPI.    Physical Exam:  /57  Pulse 99  Temp 97.9  F (36.6  C)  Resp 20  Wt 205 lb 11.2 oz (93.3 kg)  SpO2 94%  BMI 27.14 kg/m2  GENERAL APPEARANCE:  Alert, in no distress, pleasant, cooperative, oriented x 4  EYES:  EOM, lids, pupils and irises normal, sclera clear and conjunctiva normal, no discharge or mattering on lids or lashes noted  ENT:  Mouth normal, moist mucous membranes, nose normal without drainage or crusting, external ears without lesions, hearing acuity intact  RESP:  respiratory effort and palpation of chest normal, no chest wall tenderness, no respiratory distress, Lung sounds clear, patient is on room air  CV:  Palpation and auscultation of heart done, rate and rhythm controlled and regular, no murmur, no rub or gallop. Edema trace bilateral lower extremities.   ABDOMEN:  normal bowel sounds, soft, nontender.  M/S:   Gait and station ambulates independently with walker, no tenderness or  swelling of the joints; able to move all extremities   SKIN:  Inspection and palpation of skin and subcutaneous tissue: chest incision and left leg incision intact, no redness or drainage.   NEURO: cranial nerves 2-12 grossly intact, no facial asymmetry, no speech deficits and able to follow directions, moves all extremities symmetrically  PSYCH:  insight and judgement intact, memory intact, affect and mood normal     Recent Labs:  CBC RESULTS:   Recent Labs   Lab Test  11/05/18   1339 11/05/18  10/31/18   0720   WBC  9.7  7.4  6.5   RBC  3.04*  2.77*  2.70*   HGB  9.2*  8.4*  8.1*   HCT  27.9*  25.8*  24.7*   MCV  92  93.1  92   MCH  30.3   --   30.0   MCHC  33.0   --   32.8   RDW  14.0  13.7  13.5   PLT  283  279  177       Last Basic Metabolic Panel:  Recent Labs   Lab Test  11/05/18   1339 11/05/18   NA  137  136   POTASSIUM  4.4  4.2   CHLORIDE  104  105   VICKY  8.7  8.8   CO2  24  22   BUN  33*  31*   CR  1.47*  1.42*   GLC  202*  167*       Liver Function Studies -   Recent Labs   Lab Test  10/26/18   1755  10/26/18   0725   PROTTOTAL  4.2*  6.5*   ALBUMIN  2.5*  3.6   BILITOTAL  0.8  0.7   ALKPHOS  48  79   AST  53*  22   ALT  21  31       TSH   Date Value Ref Range Status   10/02/2018 2.05 0.40 - 4.00 mU/L Final   08/08/2017 2.51 0.40 - 4.00 mU/L Final       Lab Results   Component Value Date    A1C 6.1 10/28/2018    A1C 6.7 10/02/2018       Liver Function Studies -   Recent Labs   Lab Test  10/26/18   1755  10/26/18   0725   PROTTOTAL  4.2*  6.5*   ALBUMIN  2.5*  3.6   BILITOTAL  0.8  0.7   ALKPHOS  48  79   AST  53*  22   ALT  21  31       TSH   Date Value Ref Range Status   10/02/2018 2.05 0.40 - 4.00 mU/L Final   08/08/2017 2.51 0.40 - 4.00 mU/L Final       Lab Results   Component Value Date    A1C 6.1 10/28/2018    A1C 6.7 10/02/2018         Assessment/Plan:  Coronary artery disease involving native coronary artery of native heart without angina pectoris  S/P CABG (coronary artery bypass  graft)  Physical deconditioning  Acute on chronic; therapies - f/u with progress next week. Meds as above, complete antibiotic course. F/U cardiology as recommended.     Anemia due to blood loss, acute  Acute - stable, f/u next week.     Essential hypertension  CKD (chronic kidney disease) stage 3, GFR 30-59 ml/min (H)  Chronic, stable. Meds as above and vs, wt as ordered. BMP next week.     Type 2 diabetes mellitus with other circulatory complications (H)  Chronic, meds as PTA. BG checks as ordered.     Orders:  No changes today    Electronically signed by  DIRK Christian CNP                      Sincerely,        DIRK Christian CNP

## 2018-11-08 NOTE — PROGRESS NOTES
[facility]    Chief Complaint   Patient presents with     custodial Acute       [place] Medical Record Number:  [id number]  Place of Service where encounter took place:  [facility] ([facility] [[id number]]    HPI:    Merlin J Anderson is a 78 year old  (1940), who is being seen today for an episodic care visit.  HPI information obtained from: facility chart records, facility staff, patient report and [facility] chart review.    Today's concern is:  Coronary artery disease involving native coronary artery of native heart without angina pectoris  S/P CABG (coronary artery bypass graft)  Physical deconditioning  Patient in rehab s/p CABG. Doing well from cardiac standpoint. Did visit ED earlier this week per wife's request due to concerns over left leg redness, ?cellulitis at incision site. ED provider indicated leg improving and asked to complete previously ordered Keflex course. Today no fevers, redness resolved and incisions healing well without drainage. Continues lasix, Toprol, lisinopril, ASA, statin and PRN nitroglycerin. Patient up with walker, making gain in therapy.   Lab Results   Component Value Date    WBC 9.3 11/08/2018    WBC 9.7 11/05/2018        Anemia due to blood loss, acute  On most recent check   Lab Results   Component Value Date    HGB 8.5 11/08/2018    HGB 9.2 11/05/2018        Essential hypertension  CKD (chronic kidney disease) stage 3, GFR 30-59 ml/min (H)  Recent BP: 108-138/57-75 mmHg. Admission Weight: 11/1/8: 220.4 lbs and Current Weight: 11/5/18: 209.8 lbs - 11/7/18: 205.7 lbs. Meds as noted above.   Lab Results   Component Value Date    CR 1.58 11/08/2018    CR 1.47 11/05/2018     Lab Results   Component Value Date    POTASSIUM 4.5 11/08/2018    POTASSIUM 4.4 11/05/2018       Type 2 diabetes mellitus with other circulatory complications (H)  Managed with Glucophage. Recent BG  AM: 184-204 mg/dL  Lab Results   Component Value Date    A1C 6.1 10/28/2018         ALLERGIES: Review of patient's allergies indicates no known allergies.  Past Medical, Surgical, Family and Social History reviewed and updated in [facility].    Current Outpatient Prescriptions   Medication Sig Dispense Refill     acetaminophen (TYLENOL) 325 MG tablet Take 2 tablets (650 mg) by mouth every 4 hours as needed for mild pain 100 tablet      aspirin 325 MG EC tablet Take 1 tablet (325 mg) by mouth daily 40 tablet      atorvastatin (LIPITOR) 40 MG tablet Take 1 tablet (40 mg) by mouth daily 90 tablet 3     blood glucose calibration (ACCU-CHEK PRISCILLA) solution Use to calibrate blood glucose monitor as needed as directed. 1 Bottle 0     blood glucose monitoring (ACCU-CHEK PRISCILLA) test strip Use to test blood sugars 1 times daily or as directed. 100 each 1     blood glucose monitoring (ACCU-CHEK MULTICLIX) lancets Use to test blood sugar 1 times daily or as directed. 1 Box 1     CEPHALEXIN PO Take 250 mg by mouth every 6 hours       fluticasone (FLONASE) 50 MCG/ACT spray Spray 1-2 sprays into both nostrils daily as needed for rhinitis or allergies       lisinopril (PRINIVIL/ZESTRIL) 2.5 MG tablet Take 1 tablet (2.5 mg) by mouth daily 30 tablet      metFORMIN (GLUCOPHAGE) 500 MG tablet Take 1 tablet (500 mg) by mouth 2 times daily (with meals) 180 tablet 1     metoprolol tartrate (LOPRESSOR) 25 MG tablet Take 1 tablet (25 mg) by mouth every 12 hours 60 tablet      multivitamin, therapeutic with minerals (MULTI-VITAMIN) TABS tablet Take 1 tablet by mouth every morning       nitroGLYcerin (NITROSTAT) 0.4 MG sublingual tablet For chest pain place 1 tablet under the tongue every 5 minutes for 3 doses. If symptoms persist 5 minutes after 1st dose call 911. 25 tablet 3     oxyCODONE IR (ROXICODONE) 5 MG tablet Take 1 tablet (5 mg) by mouth every 6 hours as needed for moderate to severe pain 10 tablet 0     tamsulosin (FLOMAX) 0.4 MG capsule Take 1 capsule (0.4 mg) by mouth daily 90 capsule 3     furosemide (LASIX) 20 MG  tablet Take 1 tablet (20 mg) by mouth daily for 5 days 5 tablet 0     Medications reviewed:  Medications reconciled to facility chart and changes were made to reflect current medications as identified as above med list. Below are the changes that were made:   Medications stopped since last EPIC medication reconciliation:   There are no discontinued medications.    Medications started since last [facility] medication reconciliation:  Orders Placed This Encounter   Medications     CEPHALEXIN PO     Sig: Take 250 mg by mouth every 6 hours     fluticasone (FLONASE) 50 MCG/ACT spray     Sig: Spray 1-2 sprays into both nostrils daily as needed for rhinitis or allergies       REVIEW OF SYSTEMS:  10 point ROS of systems including Constitutional, Eyes, Respiratory, Cardiovascular, Gastroenterology, Genitourinary, Integumentary, Musculoskeletal, Psychiatric were all negative except for pertinent positives noted in my HPI.    Physical Exam:  /57  Pulse 99  Temp 97.9  F (36.6  C)  Resp 20  Wt 205 lb 11.2 oz (93.3 kg)  SpO2 94%  BMI 27.14 kg/m2  GENERAL APPEARANCE:  Alert, in no distress, pleasant, cooperative, oriented x 4  EYES:  EOM, lids, pupils and irises normal, sclera clear and conjunctiva normal, no discharge or mattering on lids or lashes noted  ENT:  Mouth normal, moist mucous membranes, nose normal without drainage or crusting, external ears without lesions, hearing acuity intact  RESP:  respiratory effort and palpation of chest normal, no chest wall tenderness, no respiratory distress, Lung sounds clear, patient is on room air  CV:  Palpation and auscultation of heart done, rate and rhythm controlled and regular, no murmur, no rub or gallop. Edema trace bilateral lower extremities.   ABDOMEN:  normal bowel sounds, soft, nontender.  M/S:   Gait and station ambulates independently with walker, no tenderness or swelling of the joints; able to move all extremities   SKIN:  Inspection and palpation of skin and  subcutaneous tissue: chest incision and left leg incision intact, no redness or drainage.   NEURO: cranial nerves 2-12 grossly intact, no facial asymmetry, no speech deficits and able to follow directions, moves all extremities symmetrically  PSYCH:  insight and judgement intact, memory intact, affect and mood normal     Recent Labs:  CBC RESULTS:   Recent Labs   Lab Test  11/05/18   1339 11/05/18  10/31/18   0720   WBC  9.7  7.4  6.5   RBC  3.04*  2.77*  2.70*   HGB  9.2*  8.4*  8.1*   HCT  27.9*  25.8*  24.7*   MCV  92  93.1  92   MCH  30.3   --   30.0   MCHC  33.0   --   32.8   RDW  14.0  13.7  13.5   PLT  283  279  177       Last Basic Metabolic Panel:  Recent Labs   Lab Test  11/05/18   1339 11/05/18   NA  137  136   POTASSIUM  4.4  4.2   CHLORIDE  104  105   VICKY  8.7  8.8   CO2  24  22   BUN  33*  31*   CR  1.47*  1.42*   GLC  202*  167*       Liver Function Studies -   Recent Labs   Lab Test  10/26/18   1755  10/26/18   0725   PROTTOTAL  4.2*  6.5*   ALBUMIN  2.5*  3.6   BILITOTAL  0.8  0.7   ALKPHOS  48  79   AST  53*  22   ALT  21  31       TSH   Date Value Ref Range Status   10/02/2018 2.05 0.40 - 4.00 mU/L Final   08/08/2017 2.51 0.40 - 4.00 mU/L Final       Lab Results   Component Value Date    A1C 6.1 10/28/2018    A1C 6.7 10/02/2018       Liver Function Studies -   Recent Labs   Lab Test  10/26/18   1755  10/26/18   0725   PROTTOTAL  4.2*  6.5*   ALBUMIN  2.5*  3.6   BILITOTAL  0.8  0.7   ALKPHOS  48  79   AST  53*  22   ALT  21  31       TSH   Date Value Ref Range Status   10/02/2018 2.05 0.40 - 4.00 mU/L Final   08/08/2017 2.51 0.40 - 4.00 mU/L Final       Lab Results   Component Value Date    A1C 6.1 10/28/2018    A1C 6.7 10/02/2018         Assessment/Plan:  Coronary artery disease involving native coronary artery of native heart without angina pectoris  S/P CABG (coronary artery bypass graft)  Physical deconditioning  Acute on chronic; therapies - f/u with progress next week. Meds as above,  complete antibiotic course. F/U cardiology as recommended.     Anemia due to blood loss, acute  Acute - stable, f/u next week.     Essential hypertension  CKD (chronic kidney disease) stage 3, GFR 30-59 ml/min (H)  Chronic, stable. Meds as above and vs, wt as ordered. BMP next week.     Type 2 diabetes mellitus with other circulatory complications (H)  Chronic, meds as PTA. BG checks as ordered.     Orders:  No changes today    Electronically signed by  DIRK Christian CNP    ADDENDUM  Notified on 11/9 that patient is ready for discharge on 11/11 to home without home services.   He will have outpatient cardiac rehab.  He will follow up with PCP in 7-10 days and cardiology as directed. At discharge will send script for PRN Oxycodone 5 mg tabs #20, no refills.     Electronically signed by DIRK Christian GNP

## 2018-11-12 ENCOUNTER — TELEPHONE (OUTPATIENT)
Dept: INTERNAL MEDICINE | Facility: CLINIC | Age: 78
End: 2018-11-12

## 2018-11-12 NOTE — TELEPHONE ENCOUNTER
Fax received from [facility] for review and signature.  Put in Dr. Duke's in basket.

## 2018-11-14 ENCOUNTER — OFFICE VISIT (OUTPATIENT)
Dept: CARDIOLOGY | Facility: CLINIC | Age: 78
End: 2018-11-14
Payer: COMMERCIAL

## 2018-11-14 VITALS — DIASTOLIC BLOOD PRESSURE: 58 MMHG | HEART RATE: 86 BPM | SYSTOLIC BLOOD PRESSURE: 90 MMHG

## 2018-11-14 DIAGNOSIS — Z95.1 S/P CABG (CORONARY ARTERY BYPASS GRAFT): ICD-10-CM

## 2018-11-14 RX ORDER — FUROSEMIDE 20 MG
20 TABLET ORAL DAILY
Qty: 7 TABLET | Refills: 0 | Status: SHIPPED | OUTPATIENT
Start: 2018-11-14 | End: 2019-07-19

## 2018-11-14 RX ORDER — POTASSIUM CHLORIDE 750 MG/1
20 TABLET, EXTENDED RELEASE ORAL DAILY
Qty: 14 TABLET | Refills: 0 | Status: SHIPPED | OUTPATIENT
Start: 2018-11-14 | End: 2018-11-21

## 2018-11-14 NOTE — PATIENT INSTRUCTIONS
No lifting over 10 lbs x 8 weeks after surgery. After the 8 weeks gradually increase what you are lifting.  No driving for 4 weeks after surgery or until you are off of all narcotics (stronger pain medications).   Participate in cardiac rehab.  Will give you 1 week of lasix 20 mg daily and potassium. This is to help with the fluid retention.  Try senokot S for constipation. This can be bought over the counter.  Call with any questions/concerns for you surgeon at [phone].  Follow up with your primary care provider for your left knee.  Ice your left knee 2-3 times a day.  Weigh yourself daily and keep record of it.  Monitor blood sugar daily and keep record in a notebook. Bring this notebook with you to your follow up appointments.

## 2018-11-14 NOTE — MR AVS SNAPSHOT
After Visit Summary   11/14/2018    Merlin J Anderson    MRN: [id number]           Patient Information     Date Of Birth          1940        Visit Information        Provider Department      11/14/2018 2:00 PM [facility] CARDIOTHORACIC SURGERY, [place] [facility]        Today's Diagnoses     S/P CABG (coronary artery bypass graft)          Care Instructions    No lifting over 10 lbs x 8 weeks after surgery. After the 8 weeks gradually increase what you are lifting.  No driving for 4 weeks after surgery or until you are off of all narcotics (stronger pain medications).   Participate in cardiac rehab.  Will give you 1 week of lasix 20 mg daily and potassium. This is to help with the fluid retention.  Try senokot S for constipation. This can be bought over the counter.  Call with any questions/concerns for you surgeon at [phone].  Follow up with your primary care provider for your left knee.  Ice your left knee 2-3 times a day.  Weigh yourself daily and keep record of it.  Monitor blood sugar daily and keep record in a notebook. Bring this notebook with you to your follow up appointments.           Follow-ups after your visit        Your next 10 appointments already scheduled     Nov 19, 2018  1:40 PM CST   SHORT with Sujit Duke MD   [facility] ([facility]    [address] [facility] [address] [phone]            Jan 17, 2019  2:45 PM CST   Return Visit with Davi Rebolledo MD   [facility] ([facility])    [address]  [facility] [address] [phone]              Who to contact     Please call your clinic at [phone] to:    Ask questions about your health    Make or cancel appointments    Discuss your medicines    Learn about your test results    Speak to your doctor            Additional Information About Your Visit        MyChart Information     MyChart  gives you secure access to your electronic health record. If you see a primary care provider, you can also send messages to your care team and make appointments. If you have questions, please call your primary care clinic.  If you do not have a primary care provider, please call [phone] and they will assist you.      Mevio is an electronic gateway that provides easy, online access to your medical records. With Mevio, you can request a clinic appointment, read your test results, renew a prescription or communicate with your care team.     To access your existing account, please contact your [facility] or call [phone] for assistance.        Care EveryWhere ID     This is your Care EveryWhere ID. This could be used by other organizations to access your [place] medical records  [id number]        Your Vitals Were     Pulse                   86            Blood Pressure from Last 3 Encounters:   11/14/18 90/58   11/07/18 108/57   11/05/18 112/62    Weight from Last 3 Encounters:   11/07/18 93.3 kg (205 lb 11.2 oz)   11/05/18 97.5 kg (215 lb)   10/31/18 102.8 kg (226 lb 11.2 oz)              Today, you had the following     No orders found for display         Today's Medication Changes          These changes are accurate as of 11/14/18  2:42 PM.  If you have any questions, ask your nurse or doctor.               Start taking these medicines.        Dose/Directions    potassium chloride SA 10 MEQ CR tablet   Commonly known as:  K-DUR/KLOR-CON M   Used for:  S/P CABG (coronary artery bypass graft)        Dose:  20 mEq   Take 2 tablets (20 mEq) by mouth daily for 7 days   Quantity:  14 tablet   Refills:  0            Where to get your medicines      These medications were sent to [id number] - [place], [place] - [address]  [address], [place] [address]     Phone:  [phone]     furosemide 20 MG tablet    potassium chloride SA 10 MEQ CR tablet                 Primary Care Provider Office Phone # Fax #    Sujit Duke [phone]       [address] [facility] [address]        Equal Access to Services     RAUDEL WILKINSONESEQUIEL : Hadii aad ku hadhetalo Soshahzadali, waaxda luqadaha, qaybta kaalmada adealonda, kaye elizabethna marleen. So [facility] [phone].    ATENCIÓN: Si habla español, tiene a pa disposición servicios gratuitos de asistencia lingüística. Llame al [phone].    We comply with applicable federal civil rights laws and [place] laws. We do not discriminate on the basis of race, color, national origin, age, disability, sex, sexual orientation, or gender identity.            Thank you!     Thank you for choosing [facility] CARDIOTHORACIC  for your care. Our goal is always to provide you with excellent care. Hearing back from our patients is one way we can continue to improve our services. Please take a few minutes to complete the written survey that you may receive in the mail after your visit with us. Thank you!             Your Updated Medication List - Protect others around you: Learn how to safely use, store and throw away your medicines at www.disposemymeds.org.          This list is accurate as of 11/14/18  2:42 PM.  Always use your most recent med list.                   Brand Name Dispense Instructions for use Diagnosis    acetaminophen 325 MG tablet    TYLENOL    100 tablet    Take 2 tablets (650 mg) by mouth every 4 hours as needed for mild pain    S/P CABG (coronary artery bypass graft)       aspirin 325 MG EC tablet     40 tablet    Take 1 tablet (325 mg) by mouth daily    S/P CABG (coronary artery bypass graft)       atorvastatin 40 MG tablet    LIPITOR    90 tablet    Take 1 tablet (40 mg) by mouth daily    Right-sided carotid artery disease, unspecified type (H), Hyperlipidemia LDL goal <100       blood glucose calibration solution     1 Bottle    Use to calibrate blood glucose monitor as needed as directed.     Type 2 diabetes mellitus without complication (H)       blood glucose monitoring lancets     1 Box    Use to test blood sugar 1 times daily or as directed.    Type 2 diabetes mellitus without complication, without long-term current use of insulin (H)       blood glucose monitoring test strip    ACCU-CHEK PRISCILLA    100 each    Use to test blood sugars 1 times daily or as directed.    Type 2 diabetes mellitus without complication, without long-term current use of insulin (H)       fluticasone 50 MCG/ACT spray    FLONASE     Spray 1-2 sprays into both nostrils daily as needed for rhinitis or allergies        furosemide 20 MG tablet    LASIX    7 tablet    Take 1 tablet (20 mg) by mouth daily    S/P CABG (coronary artery bypass graft)       lisinopril 2.5 MG tablet    PRINIVIL/Zestril    30 tablet    Take 1 tablet (2.5 mg) by mouth daily    S/P CABG (coronary artery bypass graft)       metFORMIN 500 MG tablet    GLUCOPHAGE    180 tablet    Take 1 tablet (500 mg) by mouth 2 times daily (with meals)    Type 2 diabetes mellitus without complication, without long-term current use of insulin (H)       metoprolol tartrate 25 MG tablet    LOPRESSOR    60 tablet    Take 1 tablet (25 mg) by mouth every 12 hours    S/P CABG (coronary artery bypass graft)       Multi-vitamin Tabs tablet      Take 1 tablet by mouth every morning        nitroGLYcerin 0.4 MG sublingual tablet    NITROSTAT    25 tablet    For chest pain place 1 tablet under the tongue every 5 minutes for 3 doses. If symptoms persist 5 minutes after 1st dose call 911.    Abnormal cardiovascular stress test       oxyCODONE IR 5 MG tablet    ROXICODONE    10 tablet    Take 1 tablet (5 mg) by mouth every 6 hours as needed for moderate to severe pain    S/P CABG (coronary artery bypass graft)       potassium chloride SA 10 MEQ CR tablet    K-DUR/KLOR-CON M    14 tablet    Take 2 tablets (20 mEq) by mouth daily for 7 days    S/P CABG (coronary artery bypass graft)        tamsulosin 0.4 MG capsule    FLOMAX    90 capsule    Take 1 capsule (0.4 mg) by mouth daily    Urinary retention

## 2018-11-15 NOTE — PROGRESS NOTES
Pt comes to the clinic for routine f/u of s/p CABG x 4 by Dr Kessler on 10/26/18. He was brought to the OR urgently multivessel CAD and stable angina. He was initially admitted to the hospital on 10/26 and was discharged to TCU on 10/31. He is diabetic and resumed his PTA metformin prior to discharge. Other than being deconditioned pt's hospitalization was unremarkable. He was seen in the ED and started on keflex x 5 days for L LE incision (surgical site leg).    Pt states that he does have some pain. States that he is taking percocet about 4 times a day. Discussed with pt about decreasing pain medications. Breathing is fine. Continues to work with IS/flutter valve. Appetite is better and denies any nausea. BMs are back to almost normal. Denies any popping/clicking in her breast bone. Denies any F/C/NS. He is sleeping well. Per pt he was doing very well at rehab and was discharged to home on [facility] Day. States that since being home his L knee is not functioning as well. Per pt and wife he is not able to walk as well. Does not remember an injury. Does appear slightly swollen. Pt does have a history of gout.    Up in chair, comfortable, -O2  BP 90/58  Pulse 86  CV - reg rate, +edema L LE (up to below knee)  Pulm - CTA  Derm - sternal incision D/I   L LE incisions D/I, no erythema or warmth    A/P:  Reviewed surgical restrictions. All questions/concerns addressed. Will give script for lasix and KCl x 1 week. Discussed elevating legs throughout the day. Pt does have CKD stage 3. Last Cr was at baseline (1.4-1.5). Also wrote down senokot S to prevent constipation. Pt will also see his PCP about his L knee. No warmth or erythema of his L knee. Some swelling in his L knee. Will need to start cardiac rehab when able to walk better with his knee. No need for further f/u with surgery unless questions/concerns arise. Pt/wife agreeable to plan of care. Continue to monitor.

## 2018-11-19 ENCOUNTER — OFFICE VISIT (OUTPATIENT)
Dept: INTERNAL MEDICINE | Facility: CLINIC | Age: 78
End: 2018-11-19
Payer: COMMERCIAL

## 2018-11-19 ENCOUNTER — RADIANT APPOINTMENT (OUTPATIENT)
Dept: GENERAL RADIOLOGY | Facility: CLINIC | Age: 78
End: 2018-11-19
Attending: INTERNAL MEDICINE
Payer: COMMERCIAL

## 2018-11-19 VITALS
HEART RATE: 93 BPM | SYSTOLIC BLOOD PRESSURE: 90 MMHG | TEMPERATURE: 98.2 F | RESPIRATION RATE: 14 BRPM | HEIGHT: 73 IN | OXYGEN SATURATION: 100 % | WEIGHT: 199.7 LBS | BODY MASS INDEX: 26.47 KG/M2 | DIASTOLIC BLOOD PRESSURE: 52 MMHG

## 2018-11-19 DIAGNOSIS — N18.30 CKD (CHRONIC KIDNEY DISEASE) STAGE 3, GFR 30-59 ML/MIN (H): ICD-10-CM

## 2018-11-19 DIAGNOSIS — M25.562 ACUTE PAIN OF LEFT KNEE: ICD-10-CM

## 2018-11-19 DIAGNOSIS — I10 ESSENTIAL HYPERTENSION, BENIGN: ICD-10-CM

## 2018-11-19 DIAGNOSIS — D62 ACUTE POSTHEMORRHAGIC ANEMIA: ICD-10-CM

## 2018-11-19 DIAGNOSIS — Z95.1 S/P CABG (CORONARY ARTERY BYPASS GRAFT): ICD-10-CM

## 2018-11-19 DIAGNOSIS — L03.116 CELLULITIS OF LEFT LOWER EXTREMITY: ICD-10-CM

## 2018-11-19 DIAGNOSIS — E11.9 TYPE 2 DIABETES MELLITUS WITHOUT COMPLICATION, WITHOUT LONG-TERM CURRENT USE OF INSULIN (H): Primary | ICD-10-CM

## 2018-11-19 DIAGNOSIS — E78.2 MIXED HYPERLIPIDEMIA: ICD-10-CM

## 2018-11-19 LAB
ERYTHROCYTE [DISTWIDTH] IN BLOOD BY AUTOMATED COUNT: 14 % (ref 10–15)
HCT VFR BLD AUTO: 33.5 % (ref 40–53)
HGB BLD-MCNC: 10.6 G/DL (ref 13.3–17.7)
MCH RBC QN AUTO: 29.4 PG (ref 26.5–33)
MCHC RBC AUTO-ENTMCNC: 31.6 G/DL (ref 31.5–36.5)
MCV RBC AUTO: 93 FL (ref 78–100)
PLATELET # BLD AUTO: 279 10E9/L (ref 150–450)
RBC # BLD AUTO: 3.6 10E12/L (ref 4.4–5.9)
WBC # BLD AUTO: 7.2 10E9/L (ref 4–11)

## 2018-11-19 PROCEDURE — 73560 X-RAY EXAM OF KNEE 1 OR 2: CPT | Mod: LT

## 2018-11-19 PROCEDURE — 80048 BASIC METABOLIC PNL TOTAL CA: CPT | Performed by: INTERNAL MEDICINE

## 2018-11-19 PROCEDURE — 36415 COLL VENOUS BLD VENIPUNCTURE: CPT | Performed by: INTERNAL MEDICINE

## 2018-11-19 PROCEDURE — 99214 OFFICE O/P EST MOD 30 MIN: CPT | Performed by: INTERNAL MEDICINE

## 2018-11-19 PROCEDURE — 85027 COMPLETE CBC AUTOMATED: CPT | Performed by: INTERNAL MEDICINE

## 2018-11-19 NOTE — PROGRESS NOTES
SUBJECTIVE:   Merlin J Anderson is a 78 year old male who presents to clinic today for the following health issues:      He is here for a surgical follow up. He had an open heart surgery in October of 2018.    Patient is seen for a follow up visit.  Here with his wife.   Pt had positive stress test, resulting in need for Angiogram and eventually CABD- 4 vessels.   Has been in rehab post surgery. Recovering well.   Has developed left leg swelling, pain and redness in the area of the vein harvesting. Diagnosed with cellulitis and treated with antibiotic. Symptoms have improved.   Now is discharged to home for the past week. Feels better. Has poor appetite. Weight is down. No fever, CP, SOB, n/v/diarrhea.   Has H/O DM. On diet , exercise and PO Metformin . Blood sugars are controlled. No parestesias. No hypoglycemias.  Has h/o HTN. on medical treatment. BP has been controlled. No side effects from medications. No CP, HA, dizziness. good compliance with medications and low salt diet.  Concern for left knee pain, with ambulation. Has had knee lock  once, resolved.   Has had post surgery anemia, no clinical bleeding reported.   Has h/o CRF. Symptoms include fatigue, h/o anemia, mild edema. Monitoring BP, BG, medications, avoiding OTC NSAIDs. Needs periodic recheck of kidney function.      Problem list and histories reviewed & adjusted, as indicated.  Additional history: as documented    Patient Active Problem List   Diagnosis     Mixed hyperlipidemia     Essential hypertension, benign     Allergic rhinitis due to pollen     Gout     HTN (hypertension)     HYPERLIPIDEMIA LDL GOAL <100     CKD (chronic kidney disease) stage 3, GFR 30-59 ml/min (H)     Advanced directives, counseling/discussion     Lumbar radiculopathy     Lumbago     Arthrodesis status     Seasonal allergic rhinitis     Type 2 diabetes mellitus without complication (H)     PVD (peripheral vascular disease) (H)     Carotid arterial disease (H)     Carotid  artery disease (H)     S/P CABG (coronary artery bypass graft)     Past Surgical History:   Procedure Laterality Date     BYPASS GRAFT ARTERY CORONARY N/A 10/26/2018    Procedure: CORONARY ARTERY BYPASS GRAFTING X 4 WITH LEFT LEG ENDOSCOPIC VEIN HARVESTING LIMA - LAD  SV- RIGHT PDA, OM2, OM1 ON PUMP/BETH IAPB PLACED PER CATH LAB;  Surgeon: Heath Kessler MD;  Location: [facility] OR     C NONSPECIFIC PROCEDURE  04/97    Neg. colonoscopy.     C NONSPECIFIC PROCEDURE      S/P ing. hernia.     COLONOSCOPY       DECOMPRESSION, FUSION LUMBAR POSTERIOR ONE LEVEL, COMBINED  8/24/2012    Procedure: COMBINED DECOMPRESSION, FUSION LUMBAR POSTERIOR ONE LEVEL;  Posterior Fusion wtih Decompression L4-5;  Surgeon: Rod Carbajal MD;  Location: [facility] OR     ENDARTERECTOMY CAROTID Right 9/18/2017    Procedure: ENDARTERECTOMY CAROTID;  RIGHT CAROTID ENDARTERECTOMY, WITH PATCH ANGIOPLASTY, WITH ELECTOENCEPHALOGAM            (EEG);  Surgeon: Catalino Scott MD;  Location: [facility] OR     HERNIA REPAIR         Social History   Substance Use Topics     Smoking status: Never Smoker     Smokeless tobacco: Never Used     Alcohol use Yes      Comment: 1 monthly or less.     Family History   Problem Relation Age of Onset     Diabetes Father      Unknown/Adopted Mother          Current Outpatient Prescriptions   Medication Sig Dispense Refill     acetaminophen (TYLENOL) 325 MG tablet Take 2 tablets (650 mg) by mouth every 4 hours as needed for mild pain 100 tablet      aspirin 325 MG EC tablet Take 1 tablet (325 mg) by mouth daily 40 tablet      atorvastatin (LIPITOR) 40 MG tablet Take 1 tablet (40 mg) by mouth daily 90 tablet 3     blood glucose calibration (ACCU-CHEK PRISCILLA) solution Use to calibrate blood glucose monitor as needed as directed. 1 Bottle 0     blood glucose monitoring (ACCU-CHEK PRISCILLA) test strip Use to test blood sugars 1 times daily or as directed. 100 each 1     blood glucose monitoring (ACCU-CHEK MULTICLIX) lancets  "Use to test blood sugar 1 times daily or as directed. 1 Box 1     blood glucose monitoring (NO BRAND SPECIFIED) meter device kit Use to test blood sugar 1 times daily or as directed. 1 kit 0     furosemide (LASIX) 20 MG tablet Take 1 tablet (20 mg) by mouth daily 7 tablet 0     lisinopril (PRINIVIL/ZESTRIL) 2.5 MG tablet Take 1 tablet (2.5 mg) by mouth daily 30 tablet      metFORMIN (GLUCOPHAGE) 500 MG tablet Take 1 tablet (500 mg) by mouth 2 times daily (with meals) 180 tablet 1     metoprolol tartrate (LOPRESSOR) 25 MG tablet Take 1 tablet (25 mg) by mouth every 12 hours 60 tablet      multivitamin, therapeutic with minerals (MULTI-VITAMIN) TABS tablet Take 1 tablet by mouth every morning       nitroGLYcerin (NITROSTAT) 0.4 MG sublingual tablet For chest pain place 1 tablet under the tongue every 5 minutes for 3 doses. If symptoms persist 5 minutes after 1st dose call 911. 25 tablet 3     potassium chloride SA (K-DUR/KLOR-CON M) 10 MEQ CR tablet Take 2 tablets (20 mEq) by mouth daily for 7 days 14 tablet 0     tamsulosin (FLOMAX) 0.4 MG capsule Take 1 capsule (0.4 mg) by mouth daily 90 capsule 3       Reviewed and updated as needed this visit by clinical staff  Tobacco  Allergies  Meds  Med Hx  Surg Hx  Fam Hx  Soc Hx      Reviewed and updated as needed this visit by Provider         ROS:  Constitutional, HEENT, cardiovascular, pulmonary, GI, , musculoskeletal, neuro, skin, endocrine and psych systems are negative, except as otherwise noted.    OBJECTIVE:     BP 90/52 (BP Location: Left arm, Patient Position: Sitting, Cuff Size: Adult Regular)  Pulse 93  Temp 98.2  F (36.8  C) (Oral)  Resp 14  Ht 6' 1\" (1.854 m)  Wt 199 lb 11.2 oz (90.6 kg)  SpO2 100%  BMI 26.35 kg/m2  Body mass index is 26.35 kg/(m^2).   GENERAL: weak, pale, alert and no distress  EYES: Eyes: h grossly normal to inspection, PERRL and conjunctivae and sclerae normal  HENT: ear canals and TM's normal, nose and mouth without ulcers or " lesions  NECK: no adenopathy, no asymmetry, masses, or scars and thyroid normal to palpation  RESP: lungs clear to auscultation - no rales, rhonchi or wheezes  CV: regular rate and rhythm, normal S1 S2, no S3 or S4, no murmur, click or rub, no peripheral edema and peripheral pulses strong, post surgical sternotomy scar is healing well   ABDOMEN: soft, nontender, no hepatosplenomegaly, no masses and bowel sounds normal  MS: no gross musculoskeletal defects noted,tracel  Edema, post op left lower leg incisions healing, mild edema   SKIN: no suspicious lesions or rashes  NEURO: Normal strength and tone, mentation intact and speech normal    Diagnostic Test Results:  none     ASSESSMENT/PLAN:     Problem List Items Addressed This Visit     Mixed hyperlipidemia    Essential hypertension, benign    CKD (chronic kidney disease) stage 3, GFR 30-59 ml/min (H)    Type 2 diabetes mellitus without complication (H) - Primary    Relevant Medications    blood glucose monitoring (NO BRAND SPECIFIED) meter device kit    Other Relevant Orders    CBC with platelets (Completed)    Basic metabolic panel (Completed)    S/P CABG (coronary artery bypass graft)      Other Visit Diagnoses     Acute pain of left knee        Cellulitis of left lower extremity        Acute posthemorrhagic anemia               Assess lab work for anemia and CKD  Cont treatment   Cont rehab  Monitor glucose   Improved/ resolved cellulitis   Assess knee X rays     Follow-Up:in 4 weeks     Sujit Duke MD  [facility] [address] [facility]

## 2018-11-19 NOTE — MR AVS SNAPSHOT
After Visit Summary   11/19/2018    Merlin J Anderson    MRN: [id number]           Patient Information     Date Of Birth          1940        Visit Information        Provider Department      11/19/2018 1:40 PM Sujit Duke MD [facility]        Today's Diagnoses     Type 2 diabetes mellitus without complication, without long-term current use of insulin (H)    -  1    Acute pain of left knee           Follow-ups after your visit        Follow-up notes from your care team     Return in about 4 weeks (around 12/17/2018).      Your next 10 appointments already scheduled     Jan 17, 2019  2:45 PM CST   Return Visit with Davi Rebolledo MD   [facility] ([facility])    [address]  [facility] [address] [phone]              Future tests that were ordered for you today     Open Future Orders        Priority Expected Expires Ordered    XR Knee Left 3 Views Routine 11/19/2018 11/19/2019 11/19/2018            Who to contact     If you have questions or need follow up information about today's clinic visit or your schedule please contact [facility] directly at [phone].  Normal or non-critical lab and imaging results will be communicated to you by MyChart, letter or phone within 4 business days after the clinic has received the results. If you do not hear from us within 7 days, please contact the clinic through MyChart or phone. If you have a critical or abnormal lab result, we will notify you by phone as soon as possible.  Submit refill requests through [employer] or call your pharmacy and they will forward the refill request to us. Please allow 3 business days for your refill to be completed.          Additional Information About Your Visit        MyChart Information     [employer]ive gives you secure access to your electronic health record. If you see a primary care provider, you can also send  "messages to your care team and make appointments. If you have questions, please call your primary care clinic.  If you do not have a primary care provider, please call [phone] and they will assist you.        Care EveryWhere ID     This is your Care EveryWhere ID. This could be used by other organizations to access your [place] medical records  [id number]        Your Vitals Were     Pulse Temperature Respirations Height Pulse Oximetry BMI (Body Mass Index)    93 98.2  F (36.8  C) (Oral) 14 6' 1\" (1.854 m) 100% 26.35 kg/m2       Blood Pressure from Last 3 Encounters:   11/19/18 90/52   11/14/18 90/58   11/07/18 108/57    Weight from Last 3 Encounters:   11/19/18 199 lb 11.2 oz (90.6 kg)   11/07/18 205 lb 11.2 oz (93.3 kg)   11/05/18 215 lb (97.5 kg)              We Performed the Following     Basic metabolic panel     CBC with platelets          Today's Medication Changes          These changes are accurate as of 11/19/18  2:42 PM.  If you have any questions, ask your nurse or doctor.               Start taking these medicines.        Dose/Directions    blood glucose monitoring meter device kit   Commonly known as:  no brand specified   Used for:  Type 2 diabetes mellitus without complication, without long-term current use of insulin (H)   Started by:  Sujit Duke MD        Use to test blood sugar 1 times daily or as directed.   Quantity:  1 kit   Refills:  0            Where to get your medicines      These medications were sent to [id number] - [place], [place] - [address]  [address], [facility] [address]     Phone:  [phone]     blood glucose monitoring meter device kit                Primary Care Provider Office Phone # Fax #    Sujit Duke [phone]       [address]  [facility] [address]        Equal Access to Services     RAUDEL ESTRELLA AH: Hadii jovita henning hadasho Soomaali, waaxda luqadaha, qaybta kaalmada adeegyada, kaye thornton " sushil gordnobonnystephon pablo'aan ah. So [facility] [phone].    ATENCIÓN: Si diola dejuan, tiene a pa disposición servicios gratuitos de asistencia lingüística. Sandra al [phone].    We comply with applicable federal civil rights laws and [place] laws. We do not discriminate on the basis of race, color, national origin, age, disability, sex, sexual orientation, or gender identity.            Thank you!     Thank you for choosing [facility]  for your care. Our goal is always to provide you with excellent care. Hearing back from our patients is one way we can continue to improve our services. Please take a few minutes to complete the written survey that you may receive in the mail after your visit with us. Thank you!             Your Updated Medication List - Protect others around you: Learn how to safely use, store and throw away your medicines at www.disposemymeds.org.          This list is accurate as of 11/19/18  2:42 PM.  Always use your most recent med list.                   Brand Name Dispense Instructions for use Diagnosis    acetaminophen 325 MG tablet    TYLENOL    100 tablet    Take 2 tablets (650 mg) by mouth every 4 hours as needed for mild pain    S/P CABG (coronary artery bypass graft)       aspirin 325 MG EC tablet     40 tablet    Take 1 tablet (325 mg) by mouth daily    S/P CABG (coronary artery bypass graft)       atorvastatin 40 MG tablet    LIPITOR    90 tablet    Take 1 tablet (40 mg) by mouth daily    Right-sided carotid artery disease, unspecified type (H), Hyperlipidemia LDL goal <100       blood glucose calibration solution     1 Bottle    Use to calibrate blood glucose monitor as needed as directed.    Type 2 diabetes mellitus without complication (H)       blood glucose monitoring lancets     1 Box    Use to test blood sugar 1 times daily or as directed.    Type 2 diabetes mellitus without complication, without long-term current use of insulin (H)       blood glucose monitoring  meter device kit    no brand specified    1 kit    Use to test blood sugar 1 times daily or as directed.    Type 2 diabetes mellitus without complication, without long-term current use of insulin (H)       blood glucose monitoring test strip    ACCU-CHEK PRISCILLA    100 each    Use to test blood sugars 1 times daily or as directed.    Type 2 diabetes mellitus without complication, without long-term current use of insulin (H)       furosemide 20 MG tablet    LASIX    7 tablet    Take 1 tablet (20 mg) by mouth daily    S/P CABG (coronary artery bypass graft)       lisinopril 2.5 MG tablet    PRINIVIL/Zestril    30 tablet    Take 1 tablet (2.5 mg) by mouth daily    S/P CABG (coronary artery bypass graft)       metFORMIN 500 MG tablet    GLUCOPHAGE    180 tablet    Take 1 tablet (500 mg) by mouth 2 times daily (with meals)    Type 2 diabetes mellitus without complication, without long-term current use of insulin (H)       metoprolol tartrate 25 MG tablet    LOPRESSOR    60 tablet    Take 1 tablet (25 mg) by mouth every 12 hours    S/P CABG (coronary artery bypass graft)       Multi-vitamin Tabs tablet      Take 1 tablet by mouth every morning        nitroGLYcerin 0.4 MG sublingual tablet    NITROSTAT    25 tablet    For chest pain place 1 tablet under the tongue every 5 minutes for 3 doses. If symptoms persist 5 minutes after 1st dose call 911.    Abnormal cardiovascular stress test       potassium chloride SA 10 MEQ CR tablet    K-DUR/KLOR-CON M    14 tablet    Take 2 tablets (20 mEq) by mouth daily for 7 days    S/P CABG (coronary artery bypass graft)       tamsulosin 0.4 MG capsule    FLOMAX    90 capsule    Take 1 capsule (0.4 mg) by mouth daily    Urinary retention

## 2018-11-19 NOTE — NURSING NOTE
"Vital signs:  Temp: 98.2  F (36.8  C) Temp src: Oral BP: 90/52 Pulse: 93   Resp: 14 SpO2: 100 %     Height: 6' 1\" (185.4 cm) Weight: 199 lb 11.2 oz (90.6 kg)  Estimated body mass index is 26.35 kg/(m^2) as calculated from the following:    Height as of this encounter: 6' 1\" (1.854 m).    Weight as of this encounter: 199 lb 11.2 oz (90.6 kg).          "

## 2018-11-20 LAB
ANION GAP SERPL CALCULATED.3IONS-SCNC: 9 MMOL/L (ref 3–14)
BUN SERPL-MCNC: 31 MG/DL (ref 7–30)
CALCIUM SERPL-MCNC: 9.3 MG/DL (ref 8.5–10.1)
CHLORIDE SERPL-SCNC: 101 MMOL/L (ref 94–109)
CO2 SERPL-SCNC: 24 MMOL/L (ref 20–32)
CREAT SERPL-MCNC: 1.69 MG/DL (ref 0.66–1.25)
GFR SERPL CREATININE-BSD FRML MDRD: 39 ML/MIN/1.7M2
GLUCOSE SERPL-MCNC: 171 MG/DL (ref 70–99)
POTASSIUM SERPL-SCNC: 4.7 MMOL/L (ref 3.4–5.3)
SODIUM SERPL-SCNC: 134 MMOL/L (ref 133–144)

## 2018-11-27 DIAGNOSIS — I65.29 CAROTID STENOSIS: Primary | ICD-10-CM

## 2018-11-29 ENCOUNTER — TELEPHONE (OUTPATIENT)
Dept: INTERNAL MEDICINE | Facility: CLINIC | Age: 78
End: 2018-11-29

## 2018-11-29 NOTE — TELEPHONE ENCOUNTER
Pt is calling to get a referral for [facility].   Phone [phone] is the facility where the rehab will be. Please call pt at [phone] when done

## 2018-12-03 DIAGNOSIS — Z95.1 S/P CABG (CORONARY ARTERY BYPASS GRAFT): Primary | ICD-10-CM

## 2018-12-05 ENCOUNTER — HOSPITAL ENCOUNTER (OUTPATIENT)
Dept: CARDIAC REHAB | Facility: CLINIC | Age: 78
End: 2018-12-05
Attending: PHYSICIAN ASSISTANT
Payer: COMMERCIAL

## 2018-12-05 DIAGNOSIS — Z95.1 S/P CABG (CORONARY ARTERY BYPASS GRAFT): ICD-10-CM

## 2018-12-05 PROCEDURE — 93797 PHYS/QHP OP CAR RHAB WO ECG: CPT | Performed by: OCCUPATIONAL THERAPIST

## 2018-12-05 PROCEDURE — 40000116 ZZH STATISTIC OP CR VISIT: Performed by: OCCUPATIONAL THERAPIST

## 2018-12-05 PROCEDURE — 40000575 ZZH STATISTIC OP CARDIAC VISIT #2: Performed by: OCCUPATIONAL THERAPIST

## 2018-12-05 PROCEDURE — 93798 PHYS/QHP OP CAR RHAB W/ECG: CPT | Performed by: OCCUPATIONAL THERAPIST

## 2018-12-07 ENCOUNTER — HOSPITAL ENCOUNTER (OUTPATIENT)
Dept: CARDIAC REHAB | Facility: CLINIC | Age: 78
End: 2018-12-07
Attending: SURGERY
Payer: COMMERCIAL

## 2018-12-07 PROCEDURE — 93798 PHYS/QHP OP CAR RHAB W/ECG: CPT | Performed by: OCCUPATIONAL THERAPIST

## 2018-12-07 PROCEDURE — 40000116 ZZH STATISTIC OP CR VISIT: Performed by: OCCUPATIONAL THERAPIST

## 2018-12-10 DIAGNOSIS — Z95.1 S/P CABG (CORONARY ARTERY BYPASS GRAFT): ICD-10-CM

## 2018-12-10 RX ORDER — LISINOPRIL 2.5 MG/1
2.5 TABLET ORAL DAILY
Qty: 30 TABLET | Refills: 0 | Status: SHIPPED | OUTPATIENT
Start: 2018-12-10 | End: 2019-01-06

## 2018-12-10 NOTE — TELEPHONE ENCOUNTER
Patient is out of medication and has been calling Pharmacy for over a week.  Please fill as soon as possible.

## 2018-12-10 NOTE — TELEPHONE ENCOUNTER
"Requested Prescriptions   Pending Prescriptions Disp Refills     lisinopril (PRINIVIL/ZESTRIL) 2.5 MG tablet  Last Written Prescription Date:  11/01/18  Last Fill Quantity: 30,  # refills: 0   Last office visit: 11/19/2018 with prescribing provider:  11/19/18   Future Office Visit:   Next 5 appointments (look out 90 days)    Dec 19, 2018  4:20 PM CST  SHORT with Sujit Duke MD  [facility] [address]  [facility] [id number] [phone]   Jan 17, 2019  2:45 PM CST  Return Visit with Davi Rebolledo MD  [facility] ([facility]) [address]  [facility] [id number] [phone]          30 tablet      Sig: Take 1 tablet (2.5 mg) by mouth daily    ACE Inhibitors (Including Combos) Protocol Failed - 12/10/2018 11:12 AM       Failed - Normal serum creatinine on file in past 12 months    Recent Labs   Lab Test 11/19/18  1450  08/30/17  0902   CR 1.69*   < >  --    CREAT  --   --  1.6*    < > = values in this interval not displayed.            Passed - Blood pressure under 140/90 in past 12 months    BP Readings from Last 3 Encounters:   11/19/18 90/52   11/14/18 90/58   11/07/18 108/57                Passed - Recent (12 mo) or future (30 days) visit within the authorizing provider's specialty    Patient had office visit in the last 12 months or has a visit in the next 30 days with authorizing provider or within the authorizing provider's specialty.  See \"Patient Info\" tab in inbasket, or \"Choose Columns\" in Meds & Orders section of the refill encounter.             Passed - Patient is age 18 or older       Passed - Normal serum potassium on file in past 12 months    Recent Labs   Lab Test 11/19/18  1450   POTASSIUM 4.7               "

## 2018-12-11 ENCOUNTER — PATIENT OUTREACH (OUTPATIENT)
Dept: CARE COORDINATION | Facility: CLINIC | Age: 78
End: 2018-12-11

## 2018-12-11 NOTE — PROGRESS NOTES
Clinic Care Coordination Contact    Situation: Patient chart reviewed by care coordinator.    Background: [facility] was previously following for transitions of care to the TCU. Per chart review, Patient discharged from the TCU in mid-November and has been successful at home with the support of his wife. Chart review of Pt's Office Visit dated 11.19.2018 indicates no barriers in care or resource navigation in the community. Pt is currently participating in Cardiac Rehab.     Assessment: Patient appears to have no Clinic Care Coordination needs at this time.     Plan/Recommendations: No further outreaches will be made at this time unless a new referral is made or a change in the pt's status occurs.     Rigoberto Pop [facility]  Clinic Care Coordinator  Ph. [phone]  [email or web address]

## 2018-12-12 ENCOUNTER — HOSPITAL ENCOUNTER (OUTPATIENT)
Dept: CARDIAC REHAB | Facility: CLINIC | Age: 78
End: 2018-12-12
Attending: SURGERY
Payer: COMMERCIAL

## 2018-12-12 PROCEDURE — 40000116 ZZH STATISTIC OP CR VISIT: Performed by: OCCUPATIONAL THERAPIST

## 2018-12-12 PROCEDURE — 93797 PHYS/QHP OP CAR RHAB WO ECG: CPT | Performed by: OCCUPATIONAL THERAPIST

## 2018-12-12 PROCEDURE — 93798 PHYS/QHP OP CAR RHAB W/ECG: CPT | Performed by: OCCUPATIONAL THERAPIST

## 2018-12-12 PROCEDURE — 40000575 ZZH STATISTIC OP CARDIAC VISIT #2: Performed by: OCCUPATIONAL THERAPIST

## 2018-12-19 ENCOUNTER — OFFICE VISIT (OUTPATIENT)
Dept: INTERNAL MEDICINE | Facility: CLINIC | Age: 78
End: 2018-12-19
Payer: COMMERCIAL

## 2018-12-19 VITALS
WEIGHT: 202 LBS | DIASTOLIC BLOOD PRESSURE: 60 MMHG | HEART RATE: 70 BPM | SYSTOLIC BLOOD PRESSURE: 138 MMHG | BODY MASS INDEX: 26.77 KG/M2 | OXYGEN SATURATION: 96 % | TEMPERATURE: 98 F | HEIGHT: 73 IN

## 2018-12-19 DIAGNOSIS — E11.9 TYPE 2 DIABETES MELLITUS WITHOUT COMPLICATION, WITHOUT LONG-TERM CURRENT USE OF INSULIN (H): ICD-10-CM

## 2018-12-19 DIAGNOSIS — Z95.1 S/P CABG (CORONARY ARTERY BYPASS GRAFT): Primary | ICD-10-CM

## 2018-12-19 DIAGNOSIS — L03.116 CELLULITIS OF LEFT LOWER EXTREMITY: ICD-10-CM

## 2018-12-19 DIAGNOSIS — I10 ESSENTIAL HYPERTENSION: ICD-10-CM

## 2018-12-19 DIAGNOSIS — R60.0 EDEMA LEG: ICD-10-CM

## 2018-12-19 LAB
BASOPHILS # BLD AUTO: 0 10E9/L (ref 0–0.2)
BASOPHILS NFR BLD AUTO: 0.2 %
DIFFERENTIAL METHOD BLD: ABNORMAL
EOSINOPHIL # BLD AUTO: 0.3 10E9/L (ref 0–0.7)
EOSINOPHIL NFR BLD AUTO: 6.3 %
ERYTHROCYTE [DISTWIDTH] IN BLOOD BY AUTOMATED COUNT: 14.4 % (ref 10–15)
ERYTHROCYTE [SEDIMENTATION RATE] IN BLOOD BY WESTERGREN METHOD: 14 MM/H (ref 0–20)
HCT VFR BLD AUTO: 32.8 % (ref 40–53)
HGB BLD-MCNC: 10.2 G/DL (ref 13.3–17.7)
LYMPHOCYTES # BLD AUTO: 1.3 10E9/L (ref 0.8–5.3)
LYMPHOCYTES NFR BLD AUTO: 23.7 %
MCH RBC QN AUTO: 28.9 PG (ref 26.5–33)
MCHC RBC AUTO-ENTMCNC: 31.1 G/DL (ref 31.5–36.5)
MCV RBC AUTO: 93 FL (ref 78–100)
MONOCYTES # BLD AUTO: 0.5 10E9/L (ref 0–1.3)
MONOCYTES NFR BLD AUTO: 9.3 %
NEUTROPHILS # BLD AUTO: 3.2 10E9/L (ref 1.6–8.3)
NEUTROPHILS NFR BLD AUTO: 60.5 %
PLATELET # BLD AUTO: 156 10E9/L (ref 150–450)
RBC # BLD AUTO: 3.53 10E12/L (ref 4.4–5.9)
WBC # BLD AUTO: 5.4 10E9/L (ref 4–11)

## 2018-12-19 PROCEDURE — 86140 C-REACTIVE PROTEIN: CPT | Performed by: INTERNAL MEDICINE

## 2018-12-19 PROCEDURE — 85652 RBC SED RATE AUTOMATED: CPT | Performed by: INTERNAL MEDICINE

## 2018-12-19 PROCEDURE — 80053 COMPREHEN METABOLIC PANEL: CPT | Performed by: INTERNAL MEDICINE

## 2018-12-19 PROCEDURE — 99214 OFFICE O/P EST MOD 30 MIN: CPT | Performed by: INTERNAL MEDICINE

## 2018-12-19 PROCEDURE — 36415 COLL VENOUS BLD VENIPUNCTURE: CPT | Performed by: INTERNAL MEDICINE

## 2018-12-19 PROCEDURE — 85025 COMPLETE CBC W/AUTO DIFF WBC: CPT | Performed by: INTERNAL MEDICINE

## 2018-12-19 ASSESSMENT — MIFFLIN-ST. JEOR: SCORE: 1690.15

## 2018-12-19 NOTE — PROGRESS NOTES
SUBJECTIVE:   Merlin J Anderson is a 78 year old male who presents to clinic today for the following health issues:      1 month F/U:    Patient is seen for a follow up visit.  Has concern for left lower leg swelling, redness. Had cellulitis, treated with antibiotic, now with recurrent symptoms, mild. No fever, no pain. No drainage.   Has h/o HTN. on medical treatment. BP has been controlled. No side effects from medications. No CP, HA, dizziness. good compliance with medications and low salt diet.  Has h/o ischemic heart disease, asymptomatic regarding chest pains, SOB,palpitations. Has good compliance with treatment, diet and exercise.  Has H/O DM. On diet , exercise and PO Metformin. Blood sugars are controlled. No parestesias. No hypoglycemias.    Has had mild dizziness. Has fallen once, lost his balance.     Problem list and histories reviewed & adjusted, as indicated.  Additional history: as documented    Patient Active Problem List   Diagnosis     Mixed hyperlipidemia     Essential hypertension, benign     Allergic rhinitis due to pollen     Gout     HTN (hypertension)     HYPERLIPIDEMIA LDL GOAL <100     CKD (chronic kidney disease) stage 3, GFR 30-59 ml/min (H)     Advanced directives, counseling/discussion     Lumbar radiculopathy     Lumbago     Arthrodesis status     Seasonal allergic rhinitis     Type 2 diabetes mellitus without complication (H)     PVD (peripheral vascular disease) (H)     Carotid arterial disease (H)     Carotid artery disease (H)     S/P CABG (coronary artery bypass graft)     Past Surgical History:   Procedure Laterality Date     BYPASS GRAFT ARTERY CORONARY N/A 10/26/2018    Procedure: CORONARY ARTERY BYPASS GRAFTING X 4 WITH LEFT LEG ENDOSCOPIC VEIN HARVESTING LIMA - LAD  SV- RIGHT PDA, OM2, OM1 ON PUMP/BETH IAPB PLACED PER CATH LAB;  Surgeon: Heath Kessler MD;  Location: [facility] OR     C NONSPECIFIC PROCEDURE  04/97    Neg. colonoscopy.     C NONSPECIFIC PROCEDURE       S/P ing. hernia.     COLONOSCOPY       DECOMPRESSION, FUSION LUMBAR POSTERIOR ONE LEVEL, COMBINED  8/24/2012    Procedure: COMBINED DECOMPRESSION, FUSION LUMBAR POSTERIOR ONE LEVEL;  Posterior Fusion wtih Decompression L4-5;  Surgeon: Rod Carbajal MD;  Location: [facility] OR     ENDARTERECTOMY CAROTID Right 9/18/2017    Procedure: ENDARTERECTOMY CAROTID;  RIGHT CAROTID ENDARTERECTOMY, WITH PATCH ANGIOPLASTY, WITH ELECTOENCEPHALOGAM            (EEG);  Surgeon: Catalino Scott MD;  Location: [facility] OR     HERNIA REPAIR         Social History     Tobacco Use     Smoking status: Never Smoker     Smokeless tobacco: Never Used   Substance Use Topics     Alcohol use: Yes     Comment: 1 monthly or less.     Family History   Problem Relation Age of Onset     Diabetes Father      Unknown/Adopted Mother          Current Outpatient Medications   Medication Sig Dispense Refill     acetaminophen (TYLENOL) 325 MG tablet Take 2 tablets (650 mg) by mouth every 4 hours as needed for mild pain 100 tablet      aspirin 325 MG EC tablet Take 1 tablet (325 mg) by mouth daily 40 tablet      atorvastatin (LIPITOR) 40 MG tablet Take 1 tablet (40 mg) by mouth daily 90 tablet 3     furosemide (LASIX) 20 MG tablet Take 1 tablet (20 mg) by mouth daily 7 tablet 0     lisinopril (PRINIVIL/ZESTRIL) 2.5 MG tablet Take 1 tablet (2.5 mg) by mouth daily 30 tablet 0     metFORMIN (GLUCOPHAGE) 500 MG tablet Take 1 tablet (500 mg) by mouth 2 times daily (with meals) 180 tablet 1     metoprolol tartrate (LOPRESSOR) 25 MG tablet Take 1 tablet (25 mg) by mouth every 12 hours 60 tablet      multivitamin, therapeutic with minerals (MULTI-VITAMIN) TABS tablet Take 1 tablet by mouth every morning       nitroGLYcerin (NITROSTAT) 0.4 MG sublingual tablet For chest pain place 1 tablet under the tongue every 5 minutes for 3 doses. If symptoms persist 5 minutes after 1st dose call 911. 25 tablet 3     order for DME Equipment being ordered: knee high ARI stockings  "10-15 H2O pressure 2 Package 1     order for DME Equipment being ordered: blood pressure monitor 1 Device 0     tamsulosin (FLOMAX) 0.4 MG capsule Take 1 capsule (0.4 mg) by mouth daily 90 capsule 3     blood glucose calibration (ACCU-CHEK PRISCILLA) solution Use to calibrate blood glucose monitor as needed as directed. 1 Bottle 0     blood glucose monitoring (ACCU-CHEK PRISCILLA) test strip Use to test blood sugars 1 times daily or as directed. 100 each 1     blood glucose monitoring (ACCU-CHEK MULTICLIX) lancets Use to test blood sugar 1 times daily or as directed. 1 Box 1     blood glucose monitoring (NO BRAND SPECIFIED) meter device kit Use to test blood sugar 1 times daily or as directed. 1 kit 0       Reviewed and updated as needed this visit by clinical staff  Tobacco       Reviewed and updated as needed this visit by Provider         ROS:  Constitutional, HEENT, cardiovascular, pulmonary, gi and gu systems are negative, except as otherwise noted.    OBJECTIVE:     /60   Pulse 70   Temp 98  F (36.7  C) (Oral)   Ht 1.854 m (6' 1\")   Wt 91.6 kg (202 lb)   SpO2 96%   BMI 26.65 kg/m    Body mass index is 26.65 kg/m .   GENERAL: healthy, alert and no distress  NECK: no adenopathy, no asymmetry, masses, or scars and thyroid normal to palpation  RESP: lungs clear to auscultation - no rales, rhonchi or wheezes  CV: regular rate and rhythm, normal S1 S2, no S3 or S4, no murmur, click or rub, no peripheral edema and peripheral pulses strong  ABDOMEN: soft, nontender, no hepatosplenomegaly, no masses and bowel sounds normal  MS: no gross musculoskeletal defects noted, trace LLE edema, mild erythema in the shin area, bumpy appearance     Diagnostic Test Results:  No results found for this or any previous visit (from the past 24 hour(s)).    ASSESSMENT/PLAN:     Problem List Items Addressed This Visit     HTN (hypertension)    Relevant Medications    order for DME    Type 2 diabetes mellitus without complication (H) "    S/P CABG (coronary artery bypass graft) - Primary      Other Visit Diagnoses     Cellulitis of left lower extremity        Relevant Orders    CBC with platelets differential (Completed)    Comprehensive metabolic panel (Completed)    CRP inflammation (Completed)    Erythrocyte sedimentation rate auto (Completed)    Edema leg        Relevant Medications    order for DME           Assess lab work for cellulitis, inflammation   Consider antibiotic if elevated markers for inflammation   Compression stockings advised   Monitor BP, reassess treatment if low and dizzy spells   Cont rest of treatment     Follow-Up:in 3 months     Sujit Duke MD  [facility]

## 2018-12-19 NOTE — LETTER
December 24, 2018      Merlin J Anderson  [address] [address]        Dear ,    We are writing to inform you of your test results.    Mild anemia and decreased kidney function. Normal tests for inflammation. No need for antibiotic, unless leg redness and swelling increases.   Follow up in 1 month for recheck    Resulted Orders   CBC with platelets differential   Result Value Ref Range    WBC 5.4 4.0 - 11.0 10e9/L    RBC Count 3.53 (L) 4.4 - 5.9 10e12/L    Hemoglobin 10.2 (L) 13.3 - 17.7 g/dL    Hematocrit 32.8 (L) 40.0 - 53.0 %    MCV 93 78 - 100 fl    MCH 28.9 26.5 - 33.0 pg    MCHC 31.1 (L) 31.5 - 36.5 g/dL    RDW 14.4 10.0 - 15.0 %    Platelet Count 156 150 - 450 10e9/L    % Neutrophils 60.5 %    % Lymphocytes 23.7 %    % Monocytes 9.3 %    % Eosinophils 6.3 %    % Basophils 0.2 %    Absolute Neutrophil 3.2 1.6 - 8.3 10e9/L    Absolute Lymphocytes 1.3 0.8 - 5.3 10e9/L    Absolute Monocytes 0.5 0.0 - 1.3 10e9/L    Absolute Eosinophils 0.3 0.0 - 0.7 10e9/L    Absolute Basophils 0.0 0.0 - 0.2 10e9/L    Diff Method Automated Method    Comprehensive metabolic panel   Result Value Ref Range    Sodium 140 133 - 144 mmol/L    Potassium 4.5 3.4 - 5.3 mmol/L    Chloride 108 94 - 109 mmol/L    Carbon Dioxide 25 20 - 32 mmol/L    Anion Gap 7 3 - 14 mmol/L    Glucose 113 (H) 70 - 99 mg/dL    Urea Nitrogen 17 7 - 30 mg/dL    Creatinine 1.40 (H) 0.66 - 1.25 mg/dL    GFR Estimate 48 (L) >60 mL/min/[1.73_m2]      Comment:      Non  GFR Calc  Starting 12/18/2018, serum creatinine based estimated GFR (eGFR) will be   calculated using the Chronic Kidney Disease Epidemiology Collaboration   (CKD-EPI) equation.      GFR Estimate If Black 55 (L) >60 mL/min/[1.73_m2]      Comment:       GFR Calc  Starting 12/18/2018, serum creatinine based estimated GFR (eGFR) will be   calculated using the Chronic Kidney Disease Epidemiology Collaboration   (CKD-EPI) equation.       Calcium 9.5 8.5 - 10.1 mg/dL    Bilirubin Total 0.4 0.2 - 1.3 mg/dL    Albumin 3.4 3.4 - 5.0 g/dL    Protein Total 6.2 (L) 6.8 - 8.8 g/dL    Alkaline Phosphatase 92 40 - 150 U/L    ALT 19 0 - 70 U/L    AST 15 0 - 45 U/L   CRP inflammation   Result Value Ref Range    CRP Inflammation <2.9 0.0 - 8.0 mg/L   Erythrocyte sedimentation rate auto   Result Value Ref Range    Sed Rate 14 0 - 20 mm/h       If you have any questions or concerns, please call the clinic at the number listed above.       Sincerely,        Sujit Duke MD

## 2018-12-19 NOTE — NURSING NOTE
"Vital signs:  Temp: 98  F (36.7  C) Temp src: Oral BP: 138/60 Pulse: 70     SpO2: 96 %     Height: 185.4 cm (6' 1\") Weight: 91.6 kg (202 lb)  Estimated body mass index is 26.65 kg/m  as calculated from the following:    Height as of this encounter: 1.854 m (6' 1\").    Weight as of this encounter: 91.6 kg (202 lb).          "

## 2018-12-20 LAB
ALBUMIN SERPL-MCNC: 3.4 G/DL (ref 3.4–5)
ALP SERPL-CCNC: 92 U/L (ref 40–150)
ALT SERPL W P-5'-P-CCNC: 19 U/L (ref 0–70)
ANION GAP SERPL CALCULATED.3IONS-SCNC: 7 MMOL/L (ref 3–14)
AST SERPL W P-5'-P-CCNC: 15 U/L (ref 0–45)
BILIRUB SERPL-MCNC: 0.4 MG/DL (ref 0.2–1.3)
BUN SERPL-MCNC: 17 MG/DL (ref 7–30)
CALCIUM SERPL-MCNC: 9.5 MG/DL (ref 8.5–10.1)
CHLORIDE SERPL-SCNC: 108 MMOL/L (ref 94–109)
CO2 SERPL-SCNC: 25 MMOL/L (ref 20–32)
CREAT SERPL-MCNC: 1.4 MG/DL (ref 0.66–1.25)
CRP SERPL-MCNC: <2.9 MG/L (ref 0–8)
GFR SERPL CREATININE-BSD FRML MDRD: 48 ML/MIN/{1.73_M2}
GLUCOSE SERPL-MCNC: 113 MG/DL (ref 70–99)
POTASSIUM SERPL-SCNC: 4.5 MMOL/L (ref 3.4–5.3)
PROT SERPL-MCNC: 6.2 G/DL (ref 6.8–8.8)
SODIUM SERPL-SCNC: 140 MMOL/L (ref 133–144)

## 2019-01-06 DIAGNOSIS — Z95.1 S/P CABG (CORONARY ARTERY BYPASS GRAFT): ICD-10-CM

## 2019-01-08 RX ORDER — LISINOPRIL 2.5 MG/1
TABLET ORAL
Qty: 30 TABLET | Refills: 0 | Status: SHIPPED | OUTPATIENT
Start: 2019-01-08 | End: 2019-02-05

## 2019-01-08 RX ORDER — METOPROLOL SUCCINATE 200 MG/1
TABLET, EXTENDED RELEASE ORAL
Qty: 90 TABLET | Refills: 2 | Status: SHIPPED | OUTPATIENT
Start: 2019-01-08 | End: 2019-01-15

## 2019-01-08 NOTE — TELEPHONE ENCOUNTER
"Requested Prescriptions   Pending Prescriptions Disp Refills     lisinopril (PRINIVIL/ZESTRIL) 2.5 MG tablet [Pharmacy Med Name: Lisinopril Oral Tablet 2.5 MG] 30 tablet 0     Sig: TAKE ONE TABLET BY MOUTH ONE TIME DAILY    ACE Inhibitors (Including Combos) Protocol Failed - 1/8/2019  8:37 AM       Failed - Normal serum creatinine on file in past 12 months    Recent Labs   Lab Test 12/19/18  1633  08/30/17  0902   CR 1.40*   < >  --    CREAT  --   --  1.6*    < > = values in this interval not displayed.     Result note advises decreased kidney function - follow up in 1 month for recheck         Passed - Blood pressure under 140/90 in past 12 months    BP Readings from Last 3 Encounters:   12/19/18 138/60   11/19/18 90/52   11/14/18 90/58                Passed - Recent (12 mo) or future (30 days) visit within the authorizing provider's specialty    Patient had office visit in the last 12 months or has a visit in the next 30 days with authorizing provider or within the authorizing provider's specialty.  See \"Patient Info\" tab in inbasket, or \"Choose Columns\" in Meds & Orders section of the refill encounter.             Passed - Medication is active on med list       Passed - Patient is age 18 or older       Passed - Normal serum potassium on file in past 12 months    Recent Labs   Lab Test 12/19/18  1633   POTASSIUM 4.5             metoprolol succinate ER (TOPROL-XL) 200 MG 24 hr tablet [Pharmacy Med Name: Metoprolol Succinate ER Oral Tablet Extended Release 24 Hour 200 MG] 90 tablet 2          Last Written Prescription Date:  historical  Last Fill Quantity: historical,   # refills: historical  Last Office Visit: 12/19/18  Future Office visit:    Next 5 appointments (look out 90 days)    Jan 17, 2019  2:45 PM CST  Return Visit with Davi Rebolledo MD  [facility] ([facility]) [address] [address] [address] [phone]           Routing refill " "request to provider for review/approval because:  Drug not active on patient's medication list   Sig: TAKE 1 TABLET (200 MG) BY MOUTH DAILY    Beta-Blockers Protocol Failed - 1/8/2019  8:37 AM       Failed - Medication is active on med list       Passed - Blood pressure under 140/90 in past 12 months    BP Readings from Last 3 Encounters:   12/19/18 138/60   11/19/18 90/52   11/14/18 90/58                Passed - Patient is age 6 or older       Passed - Recent (12 mo) or future (30 days) visit within the authorizing provider's specialty    Patient had office visit in the last 12 months or has a visit in the next 30 days with authorizing provider or within the authorizing provider's specialty.  See \"Patient Info\" tab in inbasket, or \"Choose Columns\" in Meds & Orders section of the refill encounter.                    "

## 2019-01-08 NOTE — TELEPHONE ENCOUNTER
"Requested Prescriptions   Pending Prescriptions Disp Refills     lisinopril (PRINIVIL/ZESTRIL) 2.5 MG tablet [Pharmacy Med Name: Lisinopril Oral Tablet 2.5 MG]  Last Written Prescription Date:  12/10/2018  Last Fill Quantity: 30,  # refills: 0   Last office visit: 12/19/2018 with prescribing provider:     Future Office Visit:   Next 5 appointments (look out 90 days)    Jan 17, 2019  2:45 PM CST  Return Visit with Davi Rebolledo MD  [facility] ([facility]) [address] [facility] [address]  [facility] [place] [address] [phone]        30 tablet 0     Sig: TAKE ONE TABLET BY MOUTH ONE TIME DAILY    ACE Inhibitors (Including Combos) Protocol Failed - 1/6/2019 12:04 PM       Failed - Normal serum creatinine on file in past 12 months    Recent Labs   Lab Test 12/19/18  1633  08/30/17  0902   CR 1.40*   < >  --    CREAT  --   --  1.6*    < > = values in this interval not displayed.            Passed - Blood pressure under 140/90 in past 12 months    BP Readings from Last 3 Encounters:   12/19/18 138/60   11/19/18 90/52   11/14/18 90/58                Passed - Recent (12 mo) or future (30 days) visit within the authorizing provider's specialty    Patient had office visit in the last 12 months or has a visit in the next 30 days with authorizing provider or within the authorizing provider's specialty.  See \"Patient Info\" tab in inbasket, or \"Choose Columns\" in Meds & Orders section of the refill encounter.             Passed - Medication is active on med list       Passed - Patient is age 18 or older       Passed - Normal serum potassium on file in past 12 months    Recent Labs   Lab Test 12/19/18  1633   POTASSIUM 4.5             lisinopril (PRINIVIL/ZESTRIL) 2.5 MG  24 hr tablet [Pharmacy Med Name: Metoprolol Succinate ER Oral Tablet Extended Release 24 Hour 200 MG]  Last Written Prescription Date:  12/10/2018  Last Fill Quantity: 30,  # refills: 0   Last office visit: " "12/19/2018 with prescribing provider:     Future Office Visit:   Next 5 appointments (look out 90 days)    Jan 17, 2019  2:45 PM CST  Return Visit with Davi Rebolledo MD  [facility] ([facility]) [address] [address] [address] [phone]        90 tablet 2     Sig: TAKE 1 TABLET (200 MG) BY MOUTH DAILY    Beta-Blockers Protocol Failed - 1/6/2019 12:04 PM       Failed - Medication is active on med list       Passed - Blood pressure under 140/90 in past 12 months    BP Readings from Last 3 Encounters:   12/19/18 138/60   11/19/18 90/52   11/14/18 90/58                Passed - Patient is age 6 or older       Passed - Recent (12 mo) or future (30 days) visit within the authorizing provider's specialty    Patient had office visit in the last 12 months or has a visit in the next 30 days with authorizing provider or within the authorizing provider's specialty.  See \"Patient Info\" tab in inbasket, or \"Choose Columns\" in Meds & Orders section of the refill encounter.              "

## 2019-01-15 ENCOUNTER — TELEPHONE (OUTPATIENT)
Dept: INTERNAL MEDICINE | Facility: CLINIC | Age: 79
End: 2019-01-15

## 2019-01-15 DIAGNOSIS — Z95.1 S/P CABG (CORONARY ARTERY BYPASS GRAFT): ICD-10-CM

## 2019-01-15 RX ORDER — METOPROLOL TARTRATE 25 MG/1
25 TABLET, FILM COATED ORAL EVERY 12 HOURS
Qty: 180 TABLET | Refills: 3 | Status: SHIPPED | OUTPATIENT
Start: 2019-01-15 | End: 2019-07-15

## 2019-01-15 NOTE — TELEPHONE ENCOUNTER
I have not changed the dose, if he was taking 25 mg bid, should keep the same.   The 200 mg is likely to be old.

## 2019-01-15 NOTE — TELEPHONE ENCOUNTER
Leonora from [facility] calling : [phone]    Pharmacy calling stating patient was unaware of the Metoprolol dosage change and wondering reasoning?    New Dosage:  metoprolol succinate ER (TOPROL-XL) 200 MG 24 hr tablet 90 tablet 2 1/8/2019  No   Sig: TAKE 1 TABLET (200 MG) BY MOUTH DAILY   Sent to pharmacy as: metoprolol succinate ER (TOPROL-XL) 200 MG 24 hr tablet   Class: E-Prescribe   Order: [id number]       Previous:   metoprolol tartrate (LOPRESSOR) 25 MG tablet 60 tablet  10/31/2018  No   Sig - Route: Take 1 tablet (25 mg) by mouth every 12 hours - Oral

## 2019-01-17 ENCOUNTER — OFFICE VISIT (OUTPATIENT)
Dept: CARDIOLOGY | Facility: CLINIC | Age: 79
End: 2019-01-17
Payer: COMMERCIAL

## 2019-01-17 VITALS
WEIGHT: 203.5 LBS | HEIGHT: 73 IN | SYSTOLIC BLOOD PRESSURE: 138 MMHG | DIASTOLIC BLOOD PRESSURE: 66 MMHG | HEART RATE: 72 BPM | BODY MASS INDEX: 26.97 KG/M2

## 2019-01-17 DIAGNOSIS — I25.10 CORONARY ARTERY DISEASE INVOLVING NATIVE CORONARY ARTERY OF NATIVE HEART WITHOUT ANGINA PECTORIS: Primary | ICD-10-CM

## 2019-01-17 PROCEDURE — 99214 OFFICE O/P EST MOD 30 MIN: CPT | Performed by: INTERNAL MEDICINE

## 2019-01-17 ASSESSMENT — MIFFLIN-ST. JEOR: SCORE: 1696.82

## 2019-01-17 NOTE — LETTER
1/17/2019    Sujit Duke MD  [address] [facility] [address]    RE: Merlin J Anderson       Dear Colleague,    I had the pleasure of seeing Merlin J Anderson in the [facility].    HPI and Plan:   See dictation(#[id number])    Orders Placed This Encounter   Procedures     Follow-Up with Cardiologist       No orders of the defined types were placed in this encounter.      There are no discontinued medications.      Encounter Diagnosis   Name Primary?     Coronary artery disease involving native coronary artery of native heart without angina pectoris Yes       CURRENT MEDICATIONS:  Current Outpatient Medications   Medication Sig Dispense Refill     acetaminophen (TYLENOL) 325 MG tablet Take 2 tablets (650 mg) by mouth every 4 hours as needed for mild pain 100 tablet      aspirin 325 MG EC tablet Take 1 tablet (325 mg) by mouth daily 40 tablet      atorvastatin (LIPITOR) 40 MG tablet Take 1 tablet (40 mg) by mouth daily 90 tablet 3     blood glucose calibration (ACCU-CHEK PRISCILLA) solution Use to calibrate blood glucose monitor as needed as directed. 1 Bottle 0     blood glucose monitoring (ACCU-CHEK PRISCILLA) test strip Use to test blood sugars 1 times daily or as directed. 100 each 1     blood glucose monitoring (ACCU-CHEK MULTICLIX) lancets Use to test blood sugar 1 times daily or as directed. 1 Box 1     blood glucose monitoring (NO BRAND SPECIFIED) meter device kit Use to test blood sugar 1 times daily or as directed. 1 kit 0     lisinopril (PRINIVIL/ZESTRIL) 2.5 MG tablet TAKE ONE TABLET BY MOUTH ONE TIME DAILY  30 tablet 0     metFORMIN (GLUCOPHAGE) 500 MG tablet Take 1 tablet (500 mg) by mouth 2 times daily (with meals) 180 tablet 1     metoprolol tartrate (LOPRESSOR) 25 MG tablet Take 1 tablet (25 mg) by mouth every 12 hours 180 tablet 3     multivitamin, therapeutic with minerals (MULTI-VITAMIN) TABS tablet Take 1 tablet by mouth every morning       nitroGLYcerin  (NITROSTAT) 0.4 MG sublingual tablet For chest pain place 1 tablet under the tongue every 5 minutes for 3 doses. If symptoms persist 5 minutes after 1st dose call 911. 25 tablet 3     order for DME Equipment being ordered: knee high ARI stockings 10-15 H2O pressure 2 Package 1     order for DME Equipment being ordered: blood pressure monitor 1 Device 0     tamsulosin (FLOMAX) 0.4 MG capsule Take 1 capsule (0.4 mg) by mouth daily 90 capsule 3     furosemide (LASIX) 20 MG tablet Take 1 tablet (20 mg) by mouth daily (Patient not taking: Reported on 1/17/2019) 7 tablet 0       ALLERGIES   No Known Allergies    PAST MEDICAL HISTORY:  Past Medical History:   Diagnosis Date     Abnormal stress test      Carotid arterial disease (H) 9/18/2017     CKD (chronic kidney disease) stage 3, GFR 30-59 ml/min (H) 12/27/2011     DM2 (diabetes mellitus, type 2) (H)     Pre diabetic     LUNA (dyspnea on exertion)      Essential hypertension, benign      Gout 11/14/2002     Problem list name updated by automated process. Provider to review     HTN (hypertension) 6/29/2009     (Problem list name updated by automated process. Provider to review and confirm.)     Hyperlipidemia LDL goal <100 10/31/2010     Mixed hyperlipidemia 11/14/2002     PVD (peripheral vascular disease) (H) 9/8/2017     S/P CABG (coronary artery bypass graft) 10/26/2018     Type 2 diabetes mellitus without complication (H) 11/30/2015       PAST SURGICAL HISTORY:  Past Surgical History:   Procedure Laterality Date     BYPASS GRAFT ARTERY CORONARY N/A 10/26/2018    Procedure: CORONARY ARTERY BYPASS GRAFTING X 4 WITH LEFT LEG ENDOSCOPIC VEIN HARVESTING LIMA - LAD  SV- RIGHT PDA, OM2, OM1 ON PUMP/BETH IAPB PLACED PER CATH LAB;  Surgeon: Heath Kessler MD;  Location: [facility] OR     C NONSPECIFIC PROCEDURE  04/97    Neg. colonoscopy.     C NONSPECIFIC PROCEDURE      S/P ing. hernia.     COLONOSCOPY       DECOMPRESSION, FUSION LUMBAR POSTERIOR ONE LEVEL, COMBINED   8/24/2012    Procedure: COMBINED DECOMPRESSION, FUSION LUMBAR POSTERIOR ONE LEVEL;  Posterior Fusion wtih Decompression L4-5;  Surgeon: Rod Carbajal MD;  Location: [facility] OR     ENDARTERECTOMY CAROTID Right 9/18/2017    Procedure: ENDARTERECTOMY CAROTID;  RIGHT CAROTID ENDARTERECTOMY, WITH PATCH ANGIOPLASTY, WITH ELECTOENCEPHALOGAM            (EEG);  Surgeon: Catalino Scott MD;  Location: [facility] OR     HERNIA REPAIR         FAMILY HISTORY:  Family History   Problem Relation Age of Onset     Diabetes Father      Unknown/Adopted Mother        SOCIAL HISTORY:  Social History     Socioeconomic History     Marital status:      Spouse name: None     Number of children: None     Years of education: None     Highest education level: None   Social Needs     Financial resource strain: None     Food insecurity - worry: None     Food insecurity - inability: None     Transportation needs - medical: None     Transportation needs - non-medical: None   Occupational History     None   Tobacco Use     Smoking status: Never Smoker     Smokeless tobacco: Never Used   Substance and Sexual Activity     Alcohol use: Yes     Comment: 1 monthly or less.     Drug use: No     Sexual activity: Yes     Partners: Female   Other Topics Concern     Parent/sibling w/ CABG, MI or angioplasty before 65F 55M? Not Asked      Service Not Asked     Blood Transfusions Not Asked     Caffeine Concern No     Occupational Exposure No     Hobby Hazards No     Sleep Concern No     Stress Concern No     Weight Concern No     Special Diet No     Back Care No     Exercise Yes     Comment: bikes, treadmill      Bike Helmet Not Asked     Seat Belt Yes     Self-Exams Not Asked   Social History Narrative     None       Review of Systems:  Skin:    rash on left leg since operation    Eyes:    glasses    ENT:  Negative      Respiratory:  Negative       Cardiovascular:  Negative for;palpitations;chest pain;edema;lightheadedness;fatigue  "dizziness;Positive for i month ago had a episode of dizzness and fell   Gastroenterology: Negative      Genitourinary:         Musculoskeletal:  Negative      Neurologic:         Psychiatric:  Negative      Heme/Lymph/Imm:  Positive for easy bruising    Endocrine:  Positive for diabetes      Physical Exam:  Vitals: /66 (BP Location: Right arm, Patient Position: Sitting, Cuff Size: Adult Regular)   Pulse 72   Ht 1.854 m (6' 0.99\")   Wt 92.3 kg (203 lb 8 oz)   BMI 26.85 kg/m       Constitutional:           Skin:             Head:           Eyes:           Lymph:      ENT:           Neck:           Respiratory:            Cardiac:                                                           GI:           Extremities and Muscular Skeletal:                 Neurological:           Psych:             CC  Sujit Duke MD  [facility] [address]  [place] [phone]                    Thank you for allowing me to participate in the care of your patient.      Sincerely,     Davi Reoblledo MD     [facility]    cc:   Sujit Duke MD  [address]  [place] [phone]        "

## 2019-01-17 NOTE — PROGRESS NOTES
HPI and Plan:   See dictation(#[id number])    Orders Placed This Encounter   Procedures     Follow-Up with Cardiologist       No orders of the defined types were placed in this encounter.      There are no discontinued medications.      Encounter Diagnosis   Name Primary?     Coronary artery disease involving native coronary artery of native heart without angina pectoris Yes       CURRENT MEDICATIONS:  Current Outpatient Medications   Medication Sig Dispense Refill     acetaminophen (TYLENOL) 325 MG tablet Take 2 tablets (650 mg) by mouth every 4 hours as needed for mild pain 100 tablet      aspirin 325 MG EC tablet Take 1 tablet (325 mg) by mouth daily 40 tablet      atorvastatin (LIPITOR) 40 MG tablet Take 1 tablet (40 mg) by mouth daily 90 tablet 3     blood glucose calibration (ACCU-CHEK PRISCILLA) solution Use to calibrate blood glucose monitor as needed as directed. 1 Bottle 0     blood glucose monitoring (ACCU-CHEK PRISCILLA) test strip Use to test blood sugars 1 times daily or as directed. 100 each 1     blood glucose monitoring (ACCU-CHEK MULTICLIX) lancets Use to test blood sugar 1 times daily or as directed. 1 Box 1     blood glucose monitoring (NO BRAND SPECIFIED) meter device kit Use to test blood sugar 1 times daily or as directed. 1 kit 0     lisinopril (PRINIVIL/ZESTRIL) 2.5 MG tablet TAKE ONE TABLET BY MOUTH ONE TIME DAILY  30 tablet 0     metFORMIN (GLUCOPHAGE) 500 MG tablet Take 1 tablet (500 mg) by mouth 2 times daily (with meals) 180 tablet 1     metoprolol tartrate (LOPRESSOR) 25 MG tablet Take 1 tablet (25 mg) by mouth every 12 hours 180 tablet 3     multivitamin, therapeutic with minerals (MULTI-VITAMIN) TABS tablet Take 1 tablet by mouth every morning       nitroGLYcerin (NITROSTAT) 0.4 MG sublingual tablet For chest pain place 1 tablet under the tongue every 5 minutes for 3 doses. If symptoms persist 5 minutes after 1st dose call 911. 25 tablet 3     order for DME Equipment being ordered: knee high ARI  stockings 10-15 H2O pressure 2 Package 1     order for DME Equipment being ordered: blood pressure monitor 1 Device 0     tamsulosin (FLOMAX) 0.4 MG capsule Take 1 capsule (0.4 mg) by mouth daily 90 capsule 3     furosemide (LASIX) 20 MG tablet Take 1 tablet (20 mg) by mouth daily (Patient not taking: Reported on 1/17/2019) 7 tablet 0       ALLERGIES   No Known Allergies    PAST MEDICAL HISTORY:  Past Medical History:   Diagnosis Date     Abnormal stress test      Carotid arterial disease (H) 9/18/2017     CKD (chronic kidney disease) stage 3, GFR 30-59 ml/min (H) 12/27/2011     DM2 (diabetes mellitus, type 2) (H)     Pre diabetic     LUNA (dyspnea on exertion)      Essential hypertension, benign      Gout 11/14/2002     Problem list name updated by automated process. Provider to review     HTN (hypertension) 6/29/2009     (Problem list name updated by automated process. Provider to review and confirm.)     Hyperlipidemia LDL goal <100 10/31/2010     Mixed hyperlipidemia 11/14/2002     PVD (peripheral vascular disease) (H) 9/8/2017     S/P CABG (coronary artery bypass graft) 10/26/2018     Type 2 diabetes mellitus without complication (H) 11/30/2015       PAST SURGICAL HISTORY:  Past Surgical History:   Procedure Laterality Date     BYPASS GRAFT ARTERY CORONARY N/A 10/26/2018    Procedure: CORONARY ARTERY BYPASS GRAFTING X 4 WITH LEFT LEG ENDOSCOPIC VEIN HARVESTING LIMA - LAD  SV- RIGHT PDA, OM2, OM1 ON PUMP/BETH IAPB PLACED PER CATH LAB;  Surgeon: Heath Kessler MD;  Location: [facility] OR     C NONSPECIFIC PROCEDURE  04/97    Neg. colonoscopy.     C NONSPECIFIC PROCEDURE      S/P ing. hernia.     COLONOSCOPY       DECOMPRESSION, FUSION LUMBAR POSTERIOR ONE LEVEL, COMBINED  8/24/2012    Procedure: COMBINED DECOMPRESSION, FUSION LUMBAR POSTERIOR ONE LEVEL;  Posterior Fusion wtih Decompression L4-5;  Surgeon: Rod Carbajal MD;  Location: [facility] OR     ENDARTERECTOMY CAROTID Right 9/18/2017    Procedure:  ENDARTERECTOMY CAROTID;  RIGHT CAROTID ENDARTERECTOMY, WITH PATCH ANGIOPLASTY, WITH ELECTOENCEPHALOGAM            (EEG);  Surgeon: Catalino Scott MD;  Location: [facility] OR     HERNIA REPAIR         FAMILY HISTORY:  Family History   Problem Relation Age of Onset     Diabetes Father      Unknown/Adopted Mother        SOCIAL HISTORY:  Social History     Socioeconomic History     Marital status:      Spouse name: None     Number of children: None     Years of education: None     Highest education level: None   Social Needs     Financial resource strain: None     Food insecurity - worry: None     Food insecurity - inability: None     Transportation needs - medical: None     Transportation needs - non-medical: None   Occupational History     None   Tobacco Use     Smoking status: Never Smoker     Smokeless tobacco: Never Used   Substance and Sexual Activity     Alcohol use: Yes     Comment: 1 monthly or less.     Drug use: No     Sexual activity: Yes     Partners: Female   Other Topics Concern     Parent/sibling w/ CABG, MI or angioplasty before 65F 55M? Not Asked      Service Not Asked     Blood Transfusions Not Asked     Caffeine Concern No     Occupational Exposure No     Hobby Hazards No     Sleep Concern No     Stress Concern No     Weight Concern No     Special Diet No     Back Care No     Exercise Yes     Comment: bikes, treadmill      Bike Helmet Not Asked     Seat Belt Yes     Self-Exams Not Asked   Social History Narrative     None       Review of Systems:  Skin:    rash on left leg since operation    Eyes:    glasses    ENT:  Negative      Respiratory:  Negative       Cardiovascular:  Negative for;palpitations;chest pain;edema;lightheadedness;fatigue dizziness;Positive for i month ago had a episode of dizzness and fell   Gastroenterology: Negative      Genitourinary:         Musculoskeletal:  Negative      Neurologic:         Psychiatric:  Negative      Heme/Lymph/Imm:  Positive for easy bruising  "   Endocrine:  Positive for diabetes      Physical Exam:  Vitals: /66 (BP Location: Right arm, Patient Position: Sitting, Cuff Size: Adult Regular)   Pulse 72   Ht 1.854 m (6' 0.99\")   Wt 92.3 kg (203 lb 8 oz)   BMI 26.85 kg/m      Constitutional:           Skin:             Head:           Eyes:           Lymph:      ENT:           Neck:           Respiratory:            Cardiac:                                                           GI:           Extremities and Muscular Skeletal:                 Neurological:           Psych:             CC  Sujit Duke MD  [address] [place] [address]                  "

## 2019-01-18 ENCOUNTER — TELEPHONE (OUTPATIENT)
Dept: OTHER | Facility: CLINIC | Age: 79
End: 2019-01-18

## 2019-01-18 DIAGNOSIS — I77.9 CAROTID ARTERY DISEASE (H): Primary | ICD-10-CM

## 2019-01-18 NOTE — TELEPHONE ENCOUNTER
"RN reviewed.      10/26/18 bilateral carotid US:  \"IMPRESSION:    1. 50-69% stenosis of the right internal carotid artery relative to  the normal diameter internal carotid artery.  2. 70% or greater stenosis of the left internal carotid artery  relative to the normal diameter internal carotid artery. Worsened from prior examination.\"    8/30/17 CTA Head/Neck:  \"IMPRESSION:  1. Atheromatous disease of both carotid bifurcations with  approximately 70% stenosis of the proximal right internal carotid  artery and 50-55% stenosis left internal carotid artery origin.  2. Normal [place].\"    Will need to discuss with Dr. Scott if a repeat CTA Head/Neck is needed.      Beverly Lynne, DAYLINN, RN      "

## 2019-01-18 NOTE — TELEPHONE ENCOUNTER
Merlin called, we sent him a follow up letter to come in for: Bilateral Carotid US & Offc visit, History of right CEA 9/18/17; 1 year follow up to 1/18/18 appointment with .    He had a CABG 10/26/18 and also had a tiara carotid US at that time. Routing to Dr Scott's RN to see if Dr Scott needs offc visit only or should we change f/u to 10/26/19.  Ret to me to call patient back, regardless. Iliana Tipton -  at [facility]

## 2019-01-18 NOTE — PROGRESS NOTES
Service Date: 01/17/2019      REASON FOR CLINIC VISIT:  Followup CABG.      HISTORY OF PRESENT ILLNESS:  Mr. Warner is a very pleasant 78-year-old gentleman who I saw on an urgent basis in 10/2018 for an abnormal stress test.  This was done for exertional dyspnea.  The stress test was highly abnormal, indicative of multivessel coronary artery disease.  He did eventually underwent coronary angiogram that showed distal left main disease, 95% stenosis involving the LAD and circumflex and 75% proximal RCA stenosis.  He underwent urgent CABG 4-vessel (LIMA to LAD, SVG to RPDA, SVG to OM2, SVG to left posterolateral branch of circumflex).  It looks like he did well in the postop period.  Today's he is coming for routine followup.  He has done a few sessions of rehab and is now staying active at home, walking on treadmill 15 minutes.  No chest discomfort or shortness of breath at rest or with physical activity.  He had an echocardiogram done at the time of CABG that showed normal LV function, mild mitral regurgitation.  To be noted, patient also has history of right CEA and he had an ultrasound done at the time of CABG that showed 70% or greater stenosis in the left internal carotid artery.  To be noted, the patient follows with Dr. Scott and tells me that in short period of time he is going to follow up with Dr. Scott, who did a right CEA.      The patient does not use any tobacco.      PHYSICAL EXAMINATION:    VITAL SIGNS:  Blood pressure 138/76, heart rate 72 regular, weight 203 pounds, BMI 26.91.   GENERAL:  The patient appears pleasant and comfortable.   NECK:  Normal JVP, no bruit.   CARDIOVASCULAR SYSTEM:  S1, S2 normal, no murmur, rub or gallop.  Well-healed midline sternotomy scar.     RESPIRATORY SYSTEM:  Clear to auscultation bilaterally.   GASTROINTESTINAL SYSTEM:  Abdomen soft, nontender.   EXTREMITIES:  No pitting pedal edema.     NEUROLOGIC:  Alert conscious 3.   PSYCH:  Normal affect.   SKIN:  There is  some macular rash over the left lower extremity which according to the patient has been longstanding.      IMPRESSION AND PLAN:  A pleasant 78-year-old gentleman status post 4-vessel CABG with other comorbidities of carotid artery disease, follows Dr. Scott for that, hypertension, dyslipidemia and diabetes.  Overall, cardiac status-wise he is doing quite well.  No symptoms of angina or congestive heart failure.  Sternotomy scar is well-healed.  He is on appropriate CAD medical therapy of aspirin, high-intensity statin, beta blocker and ACE inhibitor.  LDL is well controlled at 33.  Pre-CABG ultrasound showed 70% or greater stenosis in the left internal carotid artery.  I recommended the patient see Dr. Scott for that.  He has some mildly itching rash of the left lower extremity.  I am not sure about the etiology of the same.  I recommended that patient can follow up with Dr. Duke regarding that.  Cardiac-wise he is doing well.  We can see him back in 6 months, sooner if he notes any change in clinical status, especially if any exertion-related symptoms.      cc:   Sujit Duke MD    [facility]    [address], [address]    [place]  [address]       Catalino Scott MD    [facility], [address], [address]    [place]  [address]         VICTORIANO NUNES MD             D: 2019   T: 2019   MT: BOBO      Name:     ANDERSON, MERLIN   MRN:      [id number]        Account:      [id number]   :      1940           Service Date: 2019      Document: [id number]

## 2019-01-30 ENCOUNTER — OFFICE VISIT (OUTPATIENT)
Dept: SURGERY | Facility: CLINIC | Age: 79
End: 2019-01-30
Attending: SURGERY
Payer: COMMERCIAL

## 2019-01-30 ENCOUNTER — HOSPITAL ENCOUNTER (OUTPATIENT)
Dept: ULTRASOUND IMAGING | Facility: CLINIC | Age: 79
Discharge: HOME | End: 2019-01-30
Attending: SURGERY | Admitting: SURGERY
Payer: COMMERCIAL

## 2019-01-30 VITALS
DIASTOLIC BLOOD PRESSURE: 80 MMHG | OXYGEN SATURATION: 97 % | SYSTOLIC BLOOD PRESSURE: 138 MMHG | BODY MASS INDEX: 26.9 KG/M2 | RESPIRATION RATE: 16 BRPM | WEIGHT: 203 LBS | HEART RATE: 61 BPM | HEIGHT: 73 IN

## 2019-01-30 DIAGNOSIS — Z98.890 HISTORY OF RIGHT-SIDED CAROTID ENDARTERECTOMY: Primary | ICD-10-CM

## 2019-01-30 DIAGNOSIS — I65.22 LEFT CAROTID STENOSIS: ICD-10-CM

## 2019-01-30 DIAGNOSIS — I65.29 CAROTID STENOSIS: ICD-10-CM

## 2019-01-30 PROCEDURE — 93880 EXTRACRANIAL BILAT STUDY: CPT

## 2019-01-30 PROCEDURE — 99213 OFFICE O/P EST LOW 20 MIN: CPT | Performed by: SURGERY

## 2019-01-30 ASSESSMENT — MIFFLIN-ST. JEOR: SCORE: 1694.68

## 2019-01-30 NOTE — ADDENDUM NOTE
Encounter addended by: Willow Ga EP on: 1/30/2019 4:08 PM   Actions taken: Sign clinical note, Episode resolved

## 2019-01-30 NOTE — LETTER
[facility] [address]  [place] [address]  Phone: [phone]  Fax: [phone]    2019    RE: Merlin J Anderson, : 1940      Progress Notes        Merlin J Anderson is a 78-year-old gentleman with metabolic syndrome who is status post an asymptomatic right carotid endarterectomy performed by me on 2017.  He presents today for annual ultrasound follow-up.  He had a challenging fall requiring coronary artery bypass grafting x4.  He is still recovering from this.  Over the past year he denies stroke, symptoms of TIA, or amaurosis.     Exam:  Well-developed male in no acute distress.  Blood pressure 130/80 with a pulse 61.  Well-healed right carotid endarterectomy incision.  Neurologic exam nonfocal.  2+ radial pulses bilaterally.     Imaging:  US CAROTID BILATERAL 2019 1:21 PM     HISTORY: 78-year-old male status post right carotid endarterectomy on  2017     COMPARISON: Carotid ultrasound dated 10/26/2018     FINDINGS:      Right side:   On the grayscale images, no significant plaque identified.  Spectral waveform analysis was performed. Peak systolic and diastolic  velocities in the right internal carotid artery are 119 and 30 cm/s  versus 156 and 56 cm/s on the prior exam. Per sonographic criteria,  degree of stenosis in the right internal carotid artery is less than  50%.  Flow in the right vertebral artery is antegrade.      Left side:   On the grayscale images, there is a moderate amount of mixed plaque at  the carotid bifurcation extending into the internal carotid artery.  Spectral waveform analysis was performed. Peak systolic and diastolic   velocities in the left internal carotid artery are 283 and 49 cm/s  versus 307 and 93 cm/s on the prior exam. Per sonographic criteria,  degree of stenosis in the left internal carotid artery is greater than  or equal to 70%.  Flow in the left vertebral artery is antegrade.          IMPRESSION: Per sonographic criteria, there is a less than 50%  stenosis in the right internal carotid artery and a greater than or  equal to 70% stenosis in the left internal carotid artery.     Degree of stenosis measured relative to the diameter of the normal  internal carotid artery per NASCET or NASCET type criteria     TANGELA BONILLA MD     On carotid CTA from 8/30/2017 there was a focal 50-55% left carotid stenosis.     ASSESSMENT:  1.  16 months status post asymptomatic right carotid endarterectomy with widely patent right carotid artery.     2.  Moderate left carotid stenosis likely in the 50-60% range and not yet in need of repair.     RECOMMENDATION:  I reviewed all the above with Merlin.  I have no carotid concerns.  He will continue his medical regimen including aspirin 325 mg p.o. daily.  Vascular surgical follow-up will be with me in 1 year for bilateral carotid ultrasonography.     Sincerely,      Gabriel Scott MD

## 2019-01-30 NOTE — PROGRESS NOTES
Cardiac Rehab Discharge Summary    Reason for therapy discharge:    PT stopped attending    Progress towards therapy goal(s). See goals on Care Plan in [facility] electronic health record for goal details.  Goals partially met.  Barriers to achieving goals:   limited tolerance for therapy.    Therapy recommendation(s):    Continue home exercise program.

## 2019-01-31 NOTE — LETTER
1/17/2019      Sujit Duke MD  [address] [facility] [address]      RE: Merlin J Anderson       Dear Colleague,    I had the pleasure of seeing Merlin J Anderson in the [facility].    Service Date: 01/17/2019      REASON FOR CLINIC VISIT:  Followup CABG.      HISTORY OF PRESENT ILLNESS:  Mr. Warner is a very pleasant 78-year-old gentleman who I saw on an urgent basis in 10/2018 for an abnormal stress test.  This was done for exertional dyspnea.  The stress test was highly abnormal, indicative of multivessel coronary artery disease.  He did eventually underwent coronary angiogram that showed distal left main disease, 95% stenosis involving the LAD and circumflex and 75% proximal RCA stenosis.  He underwent urgent CABG 4-vessel (LIMA to LAD, SVG to RPDA, SVG to OM2, SVG to left posterolateral branch of circumflex).  It looks like he did well in the postop period.  Today's he is coming for routine followup.  He has done a few sessions of rehab and is now staying active at home, walking on treadmill 15 minutes.  No chest discomfort or shortness of breath at rest or with physical activity.  He had an echocardiogram done at the time of CABG that showed normal LV function, mild mitral regurgitation.  To be noted, patient also has history of right CEA and he had an ultrasound done at the time of CABG that showed 70% or greater stenosis in the left internal carotid artery.  To be noted, the patient follows with Dr. Scott and tells me that in short period of time he is going to follow up with Dr. Scott, who did a right CEA.      The patient does not use any tobacco.      PHYSICAL EXAMINATION:    VITAL SIGNS:  Blood pressure 138/76, heart rate 72 regular, weight 203 pounds, BMI 26.91.   GENERAL:  The patient appears pleasant and comfortable.   NECK:  Normal JVP, no bruit.   CARDIOVASCULAR SYSTEM:  S1, S2 normal, no murmur, rub or gallop.  Well-healed midline sternotomy scar.    Patient is returning your call. Please call patient back.   RESPIRATORY SYSTEM:  Clear to auscultation bilaterally.   GASTROINTESTINAL SYSTEM:  Abdomen soft, nontender.   EXTREMITIES:  No pitting pedal edema.     NEUROLOGIC:  Alert conscious 3.   PSYCH:  Normal affect.   SKIN:  There is some macular rash over the left lower extremity which according to the patient has been longstanding.      IMPRESSION AND PLAN:  A pleasant 78-year-old gentleman status post 4-vessel CABG with other comorbidities of carotid artery disease, follows Dr. Scott for that, hypertension, dyslipidemia and diabetes.  Overall, cardiac status-wise he is doing quite well.  No symptoms of angina or congestive heart failure.  Sternotomy scar is well-healed.  He is on appropriate CAD medical therapy of aspirin, high-intensity statin, beta blocker and ACE inhibitor.  LDL is well controlled at 33.  Pre-CABG ultrasound showed 70% or greater stenosis in the left internal carotid artery.  I recommended the patient see Dr. Scott for that.  He has some mildly itching rash of the left lower extremity.  I am not sure about the etiology of the same.  I recommended that patient can follow up with Dr. Duke regarding that.  Cardiac-wise he is doing well.  We can see him back in 6 months, sooner if he notes any change in clinical status, especially if any exertion-related symptoms.      cc:   Sujit Duke MD    [facility]    [address], [address]    [place]  [address]       Catalino Scott MD    [facility], [address], [address]    [place]  [address]         VICTORIANO NUNES MD             D: 2019   T: 2019   MT: BOBO      Name:     ANDERSON, MERLIN   MRN:      [id number]        Account:      [id number]   :      1940           Service Date: 2019      Document: [id number]         Outpatient Encounter Medications as of 2019   Medication Sig Dispense Refill     acetaminophen (TYLENOL) 325 MG tablet Take 2 tablets (650 mg) by  mouth every 4 hours as needed for mild pain 100 tablet      aspirin 325 MG EC tablet Take 1 tablet (325 mg) by mouth daily 40 tablet      atorvastatin (LIPITOR) 40 MG tablet Take 1 tablet (40 mg) by mouth daily 90 tablet 3     blood glucose calibration (ACCU-CHEK PRISCILLA) solution Use to calibrate blood glucose monitor as needed as directed. 1 Bottle 0     blood glucose monitoring (ACCU-CHEK PRISCILLA) test strip Use to test blood sugars 1 times daily or as directed. 100 each 1     blood glucose monitoring (ACCU-CHEK MULTICLIX) lancets Use to test blood sugar 1 times daily or as directed. 1 Box 1     blood glucose monitoring (NO BRAND SPECIFIED) meter device kit Use to test blood sugar 1 times daily or as directed. 1 kit 0     lisinopril (PRINIVIL/ZESTRIL) 2.5 MG tablet TAKE ONE TABLET BY MOUTH ONE TIME DAILY  30 tablet 0     metFORMIN (GLUCOPHAGE) 500 MG tablet Take 1 tablet (500 mg) by mouth 2 times daily (with meals) 180 tablet 1     metoprolol tartrate (LOPRESSOR) 25 MG tablet Take 1 tablet (25 mg) by mouth every 12 hours 180 tablet 3     multivitamin, therapeutic with minerals (MULTI-VITAMIN) TABS tablet Take 1 tablet by mouth every morning       nitroGLYcerin (NITROSTAT) 0.4 MG sublingual tablet For chest pain place 1 tablet under the tongue every 5 minutes for 3 doses. If symptoms persist 5 minutes after 1st dose call 911. 25 tablet 3     order for DME Equipment being ordered: knee high ARI stockings 10-15 H2O pressure 2 Package 1     order for DME Equipment being ordered: blood pressure monitor 1 Device 0     tamsulosin (FLOMAX) 0.4 MG capsule Take 1 capsule (0.4 mg) by mouth daily 90 capsule 3     furosemide (LASIX) 20 MG tablet Take 1 tablet (20 mg) by mouth daily (Patient not taking: Reported on 1/17/2019) 7 tablet 0     No facility-administered encounter medications on file as of 1/17/2019.            Again, thank you for allowing me to participate in the care of your patient.      Sincerely,    Davi Rebolledo,  MD     [facility]    cc:   Sujit Duke MD  [address] [place] [address]

## 2019-02-05 DIAGNOSIS — E11.9 TYPE 2 DIABETES MELLITUS WITHOUT COMPLICATION, WITHOUT LONG-TERM CURRENT USE OF INSULIN (H): ICD-10-CM

## 2019-02-05 DIAGNOSIS — Z95.1 S/P CABG (CORONARY ARTERY BYPASS GRAFT): ICD-10-CM

## 2019-02-05 NOTE — TELEPHONE ENCOUNTER
"Requested Prescriptions   Pending Prescriptions Disp Refills     lisinopril (PRINIVIL/ZESTRIL) 2.5 MG tablet [Pharmacy Med Name: Lisinopril Oral Tablet 2.5 MG]  Last Written Prescription Date:  1/8/19  Last Fill Quantity: 30,  # refills: 0   Last office visit: 12/19/2018 with prescribing provider:  Srikanth   Future Office Visit:     30 tablet 0     Sig: TAKE ONE TABLET BY MOUTH ONE TIME DAILY    ACE Inhibitors (Including Combos) Protocol Failed - 2/5/2019 11:27 AM       Failed - Normal serum creatinine on file in past 12 months    Recent Labs   Lab Test 12/19/18  1633  08/30/17  0902   CR 1.40*   < >  --    CREAT  --   --  1.6*    < > = values in this interval not displayed.            Passed - Blood pressure under 140/90 in past 12 months    BP Readings from Last 3 Encounters:   01/30/19 138/80   01/17/19 138/66   12/19/18 138/60                Passed - Recent (12 mo) or future (30 days) visit within the authorizing provider's specialty    Patient had office visit in the last 12 months or has a visit in the next 30 days with authorizing provider or within the authorizing provider's specialty.  See \"Patient Info\" tab in inbasket, or \"Choose Columns\" in Meds & Orders section of the refill encounter.             Passed - Medication is active on med list       Passed - Patient is age 18 or older       Passed - Normal serum potassium on file in past 12 months    Recent Labs   Lab Test 12/19/18  1633   POTASSIUM 4.5             metFORMIN (GLUCOPHAGE) 500 MG tablet [Pharmacy Med Name: MetFORMIN HCl Oral Tablet 500 MG]  Last Written Prescription Date:  10/2/18  Last Fill Quantity: 180,  # refills: 1   Last office visit: 12/19/2018 with prescribing provider:  Srikanth   Future Office Visit:     180 tablet 0     Sig: take 1 tab by mouth twice daily with meals    Biguanide Agents Passed - 2/5/2019 11:27 AM       Passed - Blood pressure less than 140/90 in past 6 months    BP Readings from Last 3 Encounters:   01/30/19 138/80 " "  01/17/19 138/66   12/19/18 138/60                Passed - Patient has documented LDL within the past 12 mos.    Recent Labs   Lab Test 10/02/18  1012   LDL 33            Passed - Patient has had a Microalbumin in the past 15 mos.    Recent Labs   Lab Test 10/02/18  1013   MICROL 261   UMALCR 294.92*            Passed - Patient is age 10 or older       Passed - Patient has documented A1c within the specified period of time.    If HgbA1C is 8 or greater, it needs to be on file within the past 3 months.  If less than 8, must be on file within the past 6 months.     Recent Labs   Lab Test 10/28/18  1242   A1C 6.1*            Passed - Patient's CR is NOT>1.4 OR Patient's EGFR is NOT<45 within past 12 mos.    Recent Labs   Lab Test 12/19/18  1633   GFRESTIMATED 48*   GFRESTBLACK 55*       Recent Labs   Lab Test 12/19/18  1633   CR 1.40*            Passed - Patient does NOT have a diagnosis of CHF.       Passed - Medication is active on med list       Passed - Recent (6 mo) or future (30 days) visit within the authorizing provider's specialty    Patient had office visit in the last 6 months or has a visit in the next 30 days with authorizing provider or within the authorizing provider's specialty.  See \"Patient Info\" tab in inbasket, or \"Choose Columns\" in Meds & Orders section of the refill encounter.              "

## 2019-02-08 RX ORDER — LISINOPRIL 2.5 MG/1
TABLET ORAL
Qty: 90 TABLET | Refills: 0 | Status: SHIPPED | OUTPATIENT
Start: 2019-02-08 | End: 2019-05-14

## 2019-02-08 NOTE — TELEPHONE ENCOUNTER
Routing refill request to provider for review/approval because:  Labs out of range:  Creatinine     Lab Results   Component Value Date    A1C 6.1 10/28/2018    A1C 6.7 10/02/2018    A1C 6.7 04/04/2018    A1C 6.3 09/18/2017    A1C 6.1 09/05/2017

## 2019-02-16 NOTE — PROGRESS NOTES
Merlin J Anderson is a 78-year-old gentleman with metabolic syndrome who is status post an asymptomatic right carotid endarterectomy performed by me on 9/18/2017.  He presents today for annual ultrasound follow-up.  He had a challenging fall requiring coronary artery bypass grafting x4.  He is still recovering from this.  Over the past year he denies stroke, symptoms of TIA, or amaurosis.    Exam:  Well-developed male in no acute distress.  Blood pressure 130/80 with a pulse 61.  Well-healed right carotid endarterectomy incision.  Neurologic exam nonfocal.  2+ radial pulses bilaterally.    Imaging:  US CAROTID BILATERAL 1/30/2019 1:21 PM     HISTORY: 78-year-old male status post right carotid endarterectomy on  9/18/2017     COMPARISON: Carotid ultrasound dated 10/26/2018     FINDINGS:      Right side:   On the grayscale images, no significant plaque identified.  Spectral waveform analysis was performed. Peak systolic and diastolic  velocities in the right internal carotid artery are 119 and 30 cm/s  versus 156 and 56 cm/s on the prior exam. Per sonographic criteria,  degree of stenosis in the right internal carotid artery is less than  50%.  Flow in the right vertebral artery is antegrade.      Left side:   On the grayscale images, there is a moderate amount of mixed plaque at  the carotid bifurcation extending into the internal carotid artery.  Spectral waveform analysis was performed. Peak systolic and diastolic   velocities in the left internal carotid artery are 283 and 49 cm/s  versus 307 and 93 cm/s on the prior exam. Per sonographic criteria,  degree of stenosis in the left internal carotid artery is greater than  or equal to 70%.  Flow in the left vertebral artery is antegrade.                                                                       IMPRESSION: Per sonographic criteria, there is a less than 50%  stenosis in the right internal carotid artery and a greater than or  equal to 70% stenosis in the  left internal carotid artery.     Degree of stenosis measured relative to the diameter of the normal  internal carotid artery per NASCET or NASCET type criteria     TANGELA BONILLA MD    On carotid CTA from 8/30/2017 there was a focal 50-55% left carotid stenosis.    ASSESSMENT:  1.  16 months status post asymptomatic right carotid endarterectomy with widely patent right carotid artery.    2.  Moderate left carotid stenosis likely in the 50-60% range and not yet in need of repair.    RECOMMENDATION:  I reviewed all the above with Merlin.  I have no carotid concerns.  He will continue his medical regimen including aspirin 325 mg p.o. daily.  Vascular surgical follow-up will be with me in 1 year for bilateral carotid ultrasonography.    Total face-to-face time was 15 minutes, greater than 50% spent providing counseling and education.    Gabriel Scott MD

## 2019-05-13 DIAGNOSIS — E11.9 TYPE 2 DIABETES MELLITUS WITHOUT COMPLICATION, WITHOUT LONG-TERM CURRENT USE OF INSULIN (H): ICD-10-CM

## 2019-05-14 DIAGNOSIS — Z95.1 S/P CABG (CORONARY ARTERY BYPASS GRAFT): ICD-10-CM

## 2019-05-14 RX ORDER — LISINOPRIL 2.5 MG/1
2.5 TABLET ORAL DAILY
Qty: 90 TABLET | Refills: 0 | Status: SHIPPED | OUTPATIENT
Start: 2019-05-14 | End: 2019-08-19

## 2019-05-14 NOTE — TELEPHONE ENCOUNTER
"Requested Prescriptions   Pending Prescriptions Disp Refills     lisinopril (PRINIVIL/ZESTRIL) 2.5 MG tablet  Last Written Prescription Date:  02/08/19  Last Fill Quantity: 90,  # refills: 0   Last office visit: 12/19/2018 with prescribing provider:  02/19/18   Future Office Visit:     90 tablet 0     Sig: Take 1 tablet (2.5 mg) by mouth daily       ACE Inhibitors (Including Combos) Protocol Failed - 5/14/2019  1:01 PM        Failed - Normal serum creatinine on file in past 12 months     Recent Labs   Lab Test 12/19/18  1633  08/30/17  0902   CR 1.40*   < >  --    CREAT  --   --  1.6*    < > = values in this interval not displayed.             Passed - Blood pressure under 140/90 in past 12 months     BP Readings from Last 3 Encounters:   01/30/19 138/80   01/17/19 138/66   12/19/18 138/60                 Passed - Recent (12 mo) or future (30 days) visit within the authorizing provider's specialty     Patient had office visit in the last 12 months or has a visit in the next 30 days with authorizing provider or within the authorizing provider's specialty.  See \"Patient Info\" tab in inbasket, or \"Choose Columns\" in Meds & Orders section of the refill encounter.              Passed - Medication is active on med list        Passed - Patient is age 18 or older        Passed - Normal serum potassium on file in past 12 months     Recent Labs   Lab Test 12/19/18  1633   POTASSIUM 4.5               "

## 2019-05-14 NOTE — TELEPHONE ENCOUNTER
Due for appointment and labs.  Attempted to contact patient. Left voice message to call back.     *also see 5/13/19 refill request for Metformin

## 2019-05-14 NOTE — TELEPHONE ENCOUNTER
"Requested Prescriptions   Pending Prescriptions Disp Refills     metFORMIN (GLUCOPHAGE) 500 MG tablet [Pharmacy Med Name: metFORMIN   HCl Oral Tablet 500 MG]    Last Written Prescription Date:  2/8/19  Last Fill Quantity: 180,  # refills: 0   Last office visit: 12/19/2018 with prescribing provider:  Srikanth   Future Office Visit:     180 tablet 0     Sig: TAKE ONE TABLET BY MOUTH TWICE A DAY WITH MEALS       Biguanide Agents Failed - 5/13/2019 10:01 AM        Failed - Patient has documented A1c within the specified period of time.     If HgbA1C is 8 or greater, it needs to be on file within the past 3 months.  If less than 8, must be on file within the past 6 months.     Recent Labs   Lab Test 10/28/18  1242   A1C 6.1*             Passed - Blood pressure less than 140/90 in past 6 months     BP Readings from Last 3 Encounters:   01/30/19 138/80   01/17/19 138/66   12/19/18 138/60                 Passed - Patient has documented LDL within the past 12 mos.     Recent Labs   Lab Test 10/02/18  1012   LDL 33             Passed - Patient has had a Microalbumin in the past 15 mos.     Recent Labs   Lab Test 10/02/18  1013   MICROL 261   UMALCR 294.92*             Passed - Patient is age 10 or older        Passed - Patient's CR is NOT>1.4 OR Patient's EGFR is NOT<45 within past 12 mos.     Recent Labs   Lab Test 12/19/18  1633   GFRESTIMATED 48*   GFRESTBLACK 55*       Recent Labs   Lab Test 12/19/18  1633   CR 1.40*             Passed - Patient does NOT have a diagnosis of CHF.        Passed - Medication is active on med list        Passed - Recent (6 mo) or future (30 days) visit within the authorizing provider's specialty     Patient had office visit in the last 6 months or has a visit in the next 30 days with authorizing provider or within the authorizing provider's specialty.  See \"Patient Info\" tab in inbasket, or \"Choose Columns\" in Meds & Orders section of the refill encounter.              "

## 2019-05-14 NOTE — TELEPHONE ENCOUNTER
Patient returned call to clinic. Called patient back. Informed him he is due for follow-up appointment and labs. Future appointment scheduled.   Next 5 appointments (look out 90 days)    Jun 05, 2019  8:40 AM CDT  SHORT with Sujit [facility] MD  [facility] [address]  [facility] [address]  [phone]         Medication is being filled for 1 time refill only due to:  future appointment scheduled

## 2019-05-14 NOTE — TELEPHONE ENCOUNTER
Patient returned call to clinic. Called patient back. Informed him he is due for follow-up appointment and labs. Future appointment scheduled.   Next 5 appointments (look out 90 days)    Jun 05, 2019  8:40 AM CDT  SHORT with Sujit Duke MD  [facility] [address]  [facility] [address]  [phone]         Medication is being filled for 1 time refill only due to:  future appointment scheduled

## 2019-05-14 NOTE — TELEPHONE ENCOUNTER
Due for follow-up appointment and labs.   Attempted to contact patient. Left voice message to call back.     *also see 5/14/19 refill request for lisinopril.

## 2019-05-20 DIAGNOSIS — R33.9 URINARY RETENTION: ICD-10-CM

## 2019-05-20 RX ORDER — TAMSULOSIN HYDROCHLORIDE 0.4 MG/1
CAPSULE ORAL
Qty: 90 CAPSULE | Refills: 0 | Status: SHIPPED | OUTPATIENT
Start: 2019-05-20 | End: 2019-08-19

## 2019-05-20 NOTE — TELEPHONE ENCOUNTER
"Requested Prescriptions   Pending Prescriptions Disp Refills     tamsulosin (FLOMAX) 0.4 MG capsule [Pharmacy Med Name: Tamsulosin HCl Oral Capsule 0.4 MG] 90 capsule 2     Sig: TAKE ONE CAPSULE BY MOUTH ONE TIME DAILY   Last Written Prescription Date:  04/04/2018  Last Fill Quantity: 90,  # refills: 3   Last office visit: 12/19/2018 with prescribing provider:     Future Office Visit:   Next 5 appointments (look out 90 days)    Jun 05, 2019  8:40 AM CDT  SHORT [facility]it [facility] [address]  [facility] [address]  [phone]           Alpha Blockers Passed - 5/20/2019  9:28 AM        Passed - Blood pressure under 140/90 in past 12 months     BP Readings from Last 3 Encounters:   01/30/19 138/80   01/17/19 138/66   12/19/18 138/60                 Passed - Recent (12 mo) or future (30 days) visit within the authorizing provider's specialty     Patient had office visit in the last 12 months or has a visit in the next 30 days with authorizing provider or within the authorizing provider's specialty.  See \"Patient Info\" tab in inbasket, or \"Choose Columns\" in Meds & Orders section of the refill encounter.              Passed - Patient does not have Tadalafil, Vardenafil, or Sildenafil on their medication list        Passed - Medication is active on med list        Passed - Patient is 18 years of age or older        "

## 2019-05-20 NOTE — TELEPHONE ENCOUNTER
Prescription approved per [facility].    Next 5 appointments (look out 90 days)    Jun 05, 2019  8:40 AM CDT  SHORT with Sujit Duke MD  [facility]) [address]  [facility] [id number] [phone]

## 2019-06-03 DIAGNOSIS — I10 ESSENTIAL HYPERTENSION: Primary | ICD-10-CM

## 2019-06-03 RX ORDER — ATENOLOL 100 MG/1
100 TABLET ORAL DAILY
Qty: 90 TABLET | Refills: 3 | Status: SHIPPED | OUTPATIENT
Start: 2019-06-03 | End: 2020-06-02

## 2019-06-03 NOTE — TELEPHONE ENCOUNTER
Atenolol 100 mg  Last Written Prescription Date:  na  Last Fill Quantity: na,  # refills: na   Last office visit: 12/19/2018 with prescribing provider:     Future Office Visit:   Next 5 appointments (look out 90 days)    Jun 05, 2019  8:40 AM CDT  SHORT with Sujit Duke MD  [facility] [address]  [facility] [address] [phone]

## 2019-06-03 NOTE — TELEPHONE ENCOUNTER
Routing refill request to provider for review/approval because:  Drug not active on patient's medication list    Medication was discontinued on 9/18/17 after discharging from hospital and reason was medication reconciliation clean up.  It was stated on discharge AVS:       CONTINUE these medications which have NOT CHANGED     Details   ASPIRIN EC PO Take 81 mg by mouth every morning       Atenolol (TENORMIN PO) Take 100 mg by mouth every morning

## 2019-06-05 ENCOUNTER — OFFICE VISIT (OUTPATIENT)
Dept: INTERNAL MEDICINE | Facility: CLINIC | Age: 79
End: 2019-06-05
Payer: COMMERCIAL

## 2019-06-05 VITALS
TEMPERATURE: 98 F | BODY MASS INDEX: 26.51 KG/M2 | RESPIRATION RATE: 16 BRPM | WEIGHT: 200 LBS | HEART RATE: 62 BPM | HEIGHT: 73 IN | SYSTOLIC BLOOD PRESSURE: 136 MMHG | DIASTOLIC BLOOD PRESSURE: 62 MMHG | OXYGEN SATURATION: 97 %

## 2019-06-05 DIAGNOSIS — Z12.5 SCREENING FOR PROSTATE CANCER: ICD-10-CM

## 2019-06-05 DIAGNOSIS — I65.23 BILATERAL CAROTID ARTERY STENOSIS: ICD-10-CM

## 2019-06-05 DIAGNOSIS — E11.9 TYPE 2 DIABETES MELLITUS WITHOUT COMPLICATION, WITHOUT LONG-TERM CURRENT USE OF INSULIN (H): ICD-10-CM

## 2019-06-05 DIAGNOSIS — Z95.1 S/P CABG (CORONARY ARTERY BYPASS GRAFT): ICD-10-CM

## 2019-06-05 DIAGNOSIS — I10 ESSENTIAL HYPERTENSION, BENIGN: Primary | ICD-10-CM

## 2019-06-05 DIAGNOSIS — E78.5 HYPERLIPIDEMIA LDL GOAL <100: ICD-10-CM

## 2019-06-05 LAB
ALBUMIN UR-MCNC: 100 MG/DL
APPEARANCE UR: CLEAR
BACTERIA #/AREA URNS HPF: ABNORMAL /HPF
BILIRUB UR QL STRIP: NEGATIVE
COLOR UR AUTO: YELLOW
CREAT UR-MCNC: 95 MG/DL
ERYTHROCYTE [DISTWIDTH] IN BLOOD BY AUTOMATED COUNT: 13.2 % (ref 10–15)
GLUCOSE UR STRIP-MCNC: NEGATIVE MG/DL
HBA1C MFR BLD: 5.8 % (ref 0–5.6)
HCT VFR BLD AUTO: 38.9 % (ref 40–53)
HGB BLD-MCNC: 12.8 G/DL (ref 13.3–17.7)
HGB UR QL STRIP: NEGATIVE
KETONES UR STRIP-MCNC: NEGATIVE MG/DL
LEUKOCYTE ESTERASE UR QL STRIP: ABNORMAL
MCH RBC QN AUTO: 30.7 PG (ref 26.5–33)
MCHC RBC AUTO-ENTMCNC: 32.9 G/DL (ref 31.5–36.5)
MCV RBC AUTO: 93 FL (ref 78–100)
MICROALBUMIN UR-MCNC: 310 MG/L
MICROALBUMIN/CREAT UR: 325.29 MG/G CR (ref 0–17)
NITRATE UR QL: NEGATIVE
NON-SQ EPI CELLS #/AREA URNS LPF: ABNORMAL /LPF
PH UR STRIP: 5.5 PH (ref 5–7)
PLATELET # BLD AUTO: 143 10E9/L (ref 150–450)
RBC # BLD AUTO: 4.17 10E12/L (ref 4.4–5.9)
RBC #/AREA URNS AUTO: ABNORMAL /HPF
SOURCE: ABNORMAL
SP GR UR STRIP: 1.01 (ref 1–1.03)
UROBILINOGEN UR STRIP-ACNC: 0.2 EU/DL (ref 0.2–1)
WBC # BLD AUTO: 6.3 10E9/L (ref 4–11)
WBC #/AREA URNS AUTO: ABNORMAL /HPF

## 2019-06-05 PROCEDURE — 99214 OFFICE O/P EST MOD 30 MIN: CPT | Performed by: INTERNAL MEDICINE

## 2019-06-05 PROCEDURE — 81001 URINALYSIS AUTO W/SCOPE: CPT | Performed by: INTERNAL MEDICINE

## 2019-06-05 PROCEDURE — 80053 COMPREHEN METABOLIC PANEL: CPT | Performed by: INTERNAL MEDICINE

## 2019-06-05 PROCEDURE — 84443 ASSAY THYROID STIM HORMONE: CPT | Performed by: INTERNAL MEDICINE

## 2019-06-05 PROCEDURE — G0103 PSA SCREENING: HCPCS | Performed by: INTERNAL MEDICINE

## 2019-06-05 PROCEDURE — 36415 COLL VENOUS BLD VENIPUNCTURE: CPT | Performed by: INTERNAL MEDICINE

## 2019-06-05 PROCEDURE — 80061 LIPID PANEL: CPT | Performed by: INTERNAL MEDICINE

## 2019-06-05 PROCEDURE — 82043 UR ALBUMIN QUANTITATIVE: CPT | Performed by: INTERNAL MEDICINE

## 2019-06-05 PROCEDURE — 83036 HEMOGLOBIN GLYCOSYLATED A1C: CPT | Performed by: INTERNAL MEDICINE

## 2019-06-05 PROCEDURE — 85027 COMPLETE CBC AUTOMATED: CPT | Performed by: INTERNAL MEDICINE

## 2019-06-05 ASSESSMENT — MIFFLIN-ST. JEOR: SCORE: 1681.07

## 2019-06-05 NOTE — NURSING NOTE
"Vital signs:  Temp: 98  F (36.7  C) Temp src: Oral BP: 136/62 Pulse: 62   Resp: 16 SpO2: 97 %     Height: 185.4 cm (6' 1\") Weight: 90.7 kg (200 lb)  Estimated body mass index is 26.39 kg/m  as calculated from the following:    Height as of this encounter: 1.854 m (6' 1\").    Weight as of this encounter: 90.7 kg (200 lb).          "

## 2019-06-05 NOTE — PROGRESS NOTES
Subjective     Merlin J Anderson is a 78 year old male who presents to clinic today for the following health issues:    HPI   Diabetes Follow-up  Has H/O DM. On diet , exercise and PO treatment. Does not check his blood sugars. No parestesias. No hypoglycemias.      How often are you checking your blood sugar? Not at all    What time of day are you checking your blood sugars (select all that apply)?  N/A    Have you had any blood sugars above 200?  N/A    Have you had any blood sugars below 70?  N/A    What symptoms do you notice when your blood sugar is low?  None    What concerns do you have today about your diabetes? None     Do you have any of these symptoms? (Select all that apply)  No numbness or tingling in feet.  No redness, sores or blisters on feet.  No complaints of excessive thirst.  No reports of blurry vision.  No significant changes to weight.     Have you had a diabetic eye exam in the last 12 months? Yes- Date of last eye exam: 06/2018    Diabetes Management Resources    Hyperlipidemia Follow-Up  Has H/O hyperlipidemia. On medical treatment and diet. No side effects. No muscle weakness, myalgias or upset stomach.       Are you having any of the following symptoms? (Select all that apply)  No complaints of shortness of breath, chest pain or pressure.  No increased sweating or nausea with activity.  No left-sided neck or arm pain.  No complaints of pain in calves when walking 1-2 blocks.    Are you regularly taking any medication or supplement to lower your cholesterol?   Yes- Atorvastatin    Are you having muscle aches or other side effects that you think could be caused by your cholesterol lowering medication?  No    Hypertension Follow-up  Has h/o HTN. on medical treatment. BP has been controlled. No side effects from medications. No CP, HA, dizziness. good compliance with medications and low salt diet.      Do you check your blood pressure regularly outside of the clinic? No     Are you following  a low salt diet? Yes    Are your blood pressures ever more than 140 on the top number (systolic) OR more   than 90 on the bottom number (diastolic), for example 140/90? N/A    BP Readings from Last 2 Encounters:   19 138/80   19 138/66     Hemoglobin A1C (%)   Date Value   10/28/2018 6.1 (H)   10/02/2018 6.7 (H)     LDL Cholesterol Calculated (mg/dL)   Date Value   10/02/2018 33   2017 61       Amount of exercise or physical activity: 5 days a week    Problems taking medications regularly: No    Medication side effects: none    Diet: low carb and low salt      PROBLEMS TO ADD ON...  Has h/o ischemic heart disease, asymptomatic regarding chest pains, SOB,palpitations. Has good compliance with treatment, diet and exercise. Had CABG 7 months ago.     Has h/o  on the right, has moderate right CA stenosis, follows with vascular.     Patient Active Problem List   Diagnosis     Mixed hyperlipidemia     Essential hypertension, benign     Allergic rhinitis due to pollen     Gout     HTN (hypertension)     HYPERLIPIDEMIA LDL GOAL <100     CKD (chronic kidney disease) stage 3, GFR 30-59 ml/min (H)     Advanced directives, counseling/discussion     Lumbar radiculopathy     Lumbago     Arthrodesis status     Seasonal allergic rhinitis     Type 2 diabetes mellitus without complication (H)     PVD (peripheral vascular disease) (H)     Carotid arterial disease (H)     Carotid artery disease (H)     S/P CABG (coronary artery bypass graft)     Past Surgical History:   Procedure Laterality Date     BYPASS GRAFT ARTERY CORONARY N/A 10/26/2018    Procedure: CORONARY ARTERY BYPASS GRAFTING X 4 WITH LEFT LEG ENDOSCOPIC VEIN HARVESTING LIMA - LAD  SV- RIGHT PDA, OM2, OM1 ON PUMP/BEHT IAPB PLACED PER CATH LAB;  Surgeon: Heath Kessler MD;  Location: [facility] OR     C NONSPECIFIC PROCEDURE      Neg. colonoscopy.     C NONSPECIFIC PROCEDURE      S/P ing. hernia.     COLONOSCOPY       DECOMPRESSION, FUSION LUMBAR  POSTERIOR ONE LEVEL, COMBINED  8/24/2012    Procedure: COMBINED DECOMPRESSION, FUSION LUMBAR POSTERIOR ONE LEVEL;  Posterior Fusion wtih Decompression L4-5;  Surgeon: Rod Carbajal MD;  Location: [facility] OR     ENDARTERECTOMY CAROTID Right 9/18/2017    Procedure: ENDARTERECTOMY CAROTID;  RIGHT CAROTID ENDARTERECTOMY, WITH PATCH ANGIOPLASTY, WITH ELECTOENCEPHALOGAM            (EEG);  Surgeon: Catalino Scott MD;  Location: [facility] OR     HERNIA REPAIR         Social History     Tobacco Use     Smoking status: Never Smoker     Smokeless tobacco: Never Used   Substance Use Topics     Alcohol use: Yes     Comment: 1 monthly or less.     Family History   Problem Relation Age of Onset     Diabetes Father      Unknown/Adopted Mother          Current Outpatient Medications   Medication Sig Dispense Refill     acetaminophen (TYLENOL) 325 MG tablet Take 2 tablets (650 mg) by mouth every 4 hours as needed for mild pain 100 tablet      aspirin 325 MG EC tablet Take 1 tablet (325 mg) by mouth daily 40 tablet      atenolol (TENORMIN) 100 MG tablet Take 1 tablet (100 mg) by mouth daily 90 tablet 3     atorvastatin (LIPITOR) 40 MG tablet Take 1 tablet (40 mg) by mouth daily 90 tablet 3     furosemide (LASIX) 20 MG tablet Take 1 tablet (20 mg) by mouth daily 7 tablet 0     lisinopril (PRINIVIL/ZESTRIL) 2.5 MG tablet Take 1 tablet (2.5 mg) by mouth daily 90 tablet 0     metFORMIN (GLUCOPHAGE) 500 MG tablet TAKE ONE TABLET BY MOUTH TWICE A DAY WITH MEALS  180 tablet 0     metoprolol tartrate (LOPRESSOR) 25 MG tablet Take 1 tablet (25 mg) by mouth every 12 hours 180 tablet 3     multivitamin, therapeutic with minerals (MULTI-VITAMIN) TABS tablet Take 1 tablet by mouth every morning       nitroGLYcerin (NITROSTAT) 0.4 MG sublingual tablet For chest pain place 1 tablet under the tongue every 5 minutes for 3 doses. If symptoms persist 5 minutes after 1st dose call 911. 25 tablet 3     tamsulosin (FLOMAX) 0.4 MG capsule TAKE ONE CAPSULE  "BY MOUTH ONE TIME DAILY  90 capsule 0     blood glucose calibration (ACCU-CHEK PRISCILLA) solution Use to calibrate blood glucose monitor as needed as directed. 1 Bottle 0     blood glucose monitoring (ACCU-CHEK PRISCILLA) test strip Use to test blood sugars 1 times daily or as directed. 100 each 1     blood glucose monitoring (ACCU-CHEK MULTICLIX) lancets Use to test blood sugar 1 times daily or as directed. 1 Box 1     blood glucose monitoring (NO BRAND SPECIFIED) meter device kit Use to test blood sugar 1 times daily or as directed. 1 kit 0     order for DME Equipment being ordered: knee high ARI stockings 10-15 H2O pressure 2 Package 1     order for DME Equipment being ordered: blood pressure monitor 1 Device 0         Reviewed and updated as needed this visit by Provider         Review of Systems   ROS COMP: Constitutional, HEENT, cardiovascular, pulmonary, GI, , musculoskeletal, neuro, skin, endocrine and psych systems are negative, except as otherwise noted.      Objective    Pulse 62   Temp 98  F (36.7  C) (Oral)   Resp 16   Ht 1.854 m (6' 1\")   Wt 90.7 kg (200 lb)   SpO2 97%   BMI 26.39 kg/m    Body mass index is 26.39 kg/m .  Physical Exam   GENERAL: healthy, alert and no distress  EYES: Eyes grossly normal to inspection, PERRL and conjunctivae and sclerae normal  HENT: ear canals and TM's normal, nose and mouth without ulcers or lesions  NECK: no adenopathy, no asymmetry, masses, or scars and thyroid normal to palpation  RESP: lungs clear to auscultation - no rales, rhonchi or wheezes  CV: regular rate and rhythm, normal S1 S2, no S3 or S4, no murmur, click or rub, no peripheral edema and peripheral pulses strong  ABDOMEN: soft, nontender, no hepatosplenomegaly, no masses and bowel sounds normal  MS: no gross musculoskeletal defects noted, no edema  SKIN: no suspicious lesions or rashes  NEURO: Normal strength and tone, mentation intact and speech normal    Diagnostic Test Results:  Labs reviewed in " "Epic        Assessment & Plan   Problem List Items Addressed This Visit     Essential hypertension, benign - Primary    Relevant Orders    CBC with platelets    Comprehensive metabolic panel    TSH with free T4 reflex    HYPERLIPIDEMIA LDL GOAL <100    Relevant Orders    Lipid panel reflex to direct LDL Fasting    Type 2 diabetes mellitus without complication (H)    Relevant Orders    Hemoglobin A1c    *UA reflex to Microscopic    Albumin Random Urine Quantitative with Creat Ratio    Carotid arterial disease (H)    S/P CABG (coronary artery bypass graft)      Other Visit Diagnoses     Screening for prostate cancer        Relevant Orders    Prostate spec antigen screen             BMI:   Estimated body mass index is 26.39 kg/m  as calculated from the following:    Height as of this encounter: 1.854 m (6' 1\").    Weight as of this encounter: 90.7 kg (200 lb).           See Patient Instructions  Return in about 6 months (around 12/5/2019).    Sujit Duke MD  [facility]        "

## 2019-06-05 NOTE — LETTER
June 7, 2019      Merlin J Anderson  [address] [place] [facility] [place] [address]        Dear ,    Your lab results came back with slightly decreased kidney function and mild anemia. Your diabetes is under control. Follow up with me in 6 months.     Resulted Orders   CBC with platelets   Result Value Ref Range    WBC 6.3 4.0 - 11.0 10e9/L    RBC Count 4.17 (L) 4.4 - 5.9 10e12/L    Hemoglobin 12.8 (L) 13.3 - 17.7 g/dL    Hematocrit 38.9 (L) 40.0 - 53.0 %    MCV 93 78 - 100 fl    MCH 30.7 26.5 - 33.0 pg    MCHC 32.9 31.5 - 36.5 g/dL    RDW 13.2 10.0 - 15.0 %    Platelet Count 143 (L) 150 - 450 10e9/L   Comprehensive metabolic panel   Result Value Ref Range    Sodium 141 133 - 144 mmol/L    Potassium 4.7 3.4 - 5.3 mmol/L    Chloride 108 94 - 109 mmol/L    Carbon Dioxide 25 20 - 32 mmol/L    Anion Gap 8 3 - 14 mmol/L    Glucose 120 (H) 70 - 99 mg/dL      Comment:      Fasting specimen    Urea Nitrogen 26 7 - 30 mg/dL    Creatinine 1.65 (H) 0.66 - 1.25 mg/dL    GFR Estimate 39 (L) >60 mL/min/[1.73_m2]      Comment:      Non  GFR Calc  Starting 12/18/2018, serum creatinine based estimated GFR (eGFR) will be   calculated using the Chronic Kidney Disease Epidemiology Collaboration   (CKD-EPI) equation.      GFR Estimate If Black 45 (L) >60 mL/min/[1.73_m2]      Comment:       GFR Calc  Starting 12/18/2018, serum creatinine based estimated GFR (eGFR) will be   calculated using the Chronic Kidney Disease Epidemiology Collaboration   (CKD-EPI) equation.      Calcium 9.2 8.5 - 10.1 mg/dL    Bilirubin Total 0.7 0.2 - 1.3 mg/dL    Albumin 3.8 3.4 - 5.0 g/dL    Protein Total 6.8 6.8 - 8.8 g/dL    Alkaline Phosphatase 75 40 - 150 U/L    ALT 36 0 - 70 U/L    AST 26 0 - 45 U/L   Lipid panel reflex to direct LDL Fasting   Result Value Ref Range    Cholesterol 110 <200 mg/dL    Triglycerides 131 <150 mg/dL      Comment:      Fasting specimen    HDL Cholesterol 42 >39 mg/dL    LDL Cholesterol  Calculated 42 <100 mg/dL      Comment:      Desirable:       <100 mg/dl    Non HDL Cholesterol 68 <130 mg/dL   TSH with free T4 reflex   Result Value Ref Range    TSH 2.07 0.40 - 4.00 mU/L   Prostate spec antigen screen   Result Value Ref Range    PSA 3.23 0 - 4 ug/L      Comment:      Assay Method:  Chemiluminescence using Siemens Vista analyzer   Hemoglobin A1c   Result Value Ref Range    Hemoglobin A1C 5.8 (H) 0 - 5.6 %      Comment:      Normal <5.7% Prediabetes 5.7-6.4%  Diabetes 6.5% or higher - adopted from ADA   consensus guidelines.     *UA reflex to Microscopic   Result Value Ref Range    Color Urine Yellow     Appearance Urine Clear     Glucose Urine Negative NEG^Negative mg/dL    Bilirubin Urine Negative NEG^Negative    Ketones Urine Negative NEG^Negative mg/dL    Specific Gravity Urine 1.010 1.003 - 1.035    Blood Urine Negative NEG^Negative    pH Urine 5.5 5.0 - 7.0 pH    Protein Albumin Urine 100 (A) NEG^Negative mg/dL    Urobilinogen Urine 0.2 0.2 - 1.0 EU/dL    Nitrite Urine Negative NEG^Negative    Leukocyte Esterase Urine Trace (A) NEG^Negative    Source Midstream Urine    Albumin Random Urine Quantitative with Creat Ratio   Result Value Ref Range    Creatinine Urine 95 mg/dL    Albumin Urine mg/L 310 mg/L    Albumin Urine mg/g Cr 325.29 (H) 0 - 17 mg/g Cr   Urine Microscopic   Result Value Ref Range    WBC Urine 0 - 5 OTO5^0 - 5 /HPF    RBC Urine O - 2 OTO2^O - 2 /HPF    Squamous Epithelial /LPF Urine Few FEW^Few /LPF    Bacteria Urine Few (A) NEG^Negative /HPF       If you have any questions or concerns, please call the clinic at the number listed above.       Sincerely,        Sujit Duke MD

## 2019-06-06 LAB
ALBUMIN SERPL-MCNC: 3.8 G/DL (ref 3.4–5)
ALP SERPL-CCNC: 75 U/L (ref 40–150)
ALT SERPL W P-5'-P-CCNC: 36 U/L (ref 0–70)
ANION GAP SERPL CALCULATED.3IONS-SCNC: 8 MMOL/L (ref 3–14)
AST SERPL W P-5'-P-CCNC: 26 U/L (ref 0–45)
BILIRUB SERPL-MCNC: 0.7 MG/DL (ref 0.2–1.3)
BUN SERPL-MCNC: 26 MG/DL (ref 7–30)
CALCIUM SERPL-MCNC: 9.2 MG/DL (ref 8.5–10.1)
CHLORIDE SERPL-SCNC: 108 MMOL/L (ref 94–109)
CHOLEST SERPL-MCNC: 110 MG/DL
CO2 SERPL-SCNC: 25 MMOL/L (ref 20–32)
CREAT SERPL-MCNC: 1.65 MG/DL (ref 0.66–1.25)
GFR SERPL CREATININE-BSD FRML MDRD: 39 ML/MIN/{1.73_M2}
GLUCOSE SERPL-MCNC: 120 MG/DL (ref 70–99)
HDLC SERPL-MCNC: 42 MG/DL
LDLC SERPL CALC-MCNC: 42 MG/DL
NONHDLC SERPL-MCNC: 68 MG/DL
POTASSIUM SERPL-SCNC: 4.7 MMOL/L (ref 3.4–5.3)
PROT SERPL-MCNC: 6.8 G/DL (ref 6.8–8.8)
PSA SERPL-ACNC: 3.23 UG/L (ref 0–4)
SODIUM SERPL-SCNC: 141 MMOL/L (ref 133–144)
TRIGL SERPL-MCNC: 131 MG/DL
TSH SERPL DL<=0.005 MIU/L-ACNC: 2.07 MU/L (ref 0.4–4)

## 2019-07-15 ENCOUNTER — OFFICE VISIT (OUTPATIENT)
Dept: CARDIOLOGY | Facility: CLINIC | Age: 79
End: 2019-07-15
Attending: INTERNAL MEDICINE
Payer: COMMERCIAL

## 2019-07-15 VITALS
WEIGHT: 200.6 LBS | HEIGHT: 73 IN | DIASTOLIC BLOOD PRESSURE: 74 MMHG | HEART RATE: 54 BPM | SYSTOLIC BLOOD PRESSURE: 122 MMHG | OXYGEN SATURATION: 98 % | BODY MASS INDEX: 26.59 KG/M2

## 2019-07-15 DIAGNOSIS — I25.10 CORONARY ARTERY DISEASE INVOLVING NATIVE CORONARY ARTERY OF NATIVE HEART WITHOUT ANGINA PECTORIS: ICD-10-CM

## 2019-07-15 PROCEDURE — 99214 OFFICE O/P EST MOD 30 MIN: CPT | Performed by: NURSE PRACTITIONER

## 2019-07-15 ASSESSMENT — MIFFLIN-ST. JEOR: SCORE: 1678.67

## 2019-07-15 NOTE — LETTER
7/15/2019      Sujit Duke MD  [address] [facility] [address]      RE: Merlin J Anderson       Dear Colleague,    I had the pleasure of seeing Merlin J Anderson in the [facility].    Service Date: 07/15/2019      HISTORY OF PRESENT ILLNESS:  This 79-year-old male presents to [facility] today for a followup visit.  He is a patient of Dr. Rebloledo seen in our clinic for coronary artery disease, hypertension, hyperlipidemia, peripheral artery disease, diabetes.      Merlin has a history of right carotid endarterectomy years ago.  In 10/2018, he was experiencing exertional dyspnea and was found to have an abnormal stress test.  Coronary angiography revealed significant left main disease.  He urgently underwent 4-vessel bypass grafting in which he received a LIMA to his LAD and separate saphenous vein graft to the PDA, OM2 and CHARISMA.  Echocardiogram showed a left ventricular ejection fraction of 60%-65%, normal right ventricular function, grade II diastolic dysfunction and 1+ mitral regurgitation.  He has done quite well in followup.      He is followed closely with Dr. Scott.  A repeat evaluation of his carotid showed 50%-70% disease in his left internal carotid artery.  He is a diabetic and his recent hemoglobin A1c level is 6.7.  He returns today for a 6-month reassessment.      Merlin comes in with his wife today.  He is active and tries to ride his bike and walk daily.  In fact, he is going jet-skiing later today.  Merlin denies any chest pain or significant shortness of breath with these activities.  He denies palpitations, lightheadedness, dizziness, orthopnea or peripheral edema.      Reviewing his medications, it appears that he has been taking atenolol and metoprolol.  Merlin today assures me that he has been taking atenolol for quite some time, although I did not see this on his discharge list from the hospital after his bypass.       PHYSICAL EXAMINATION:  His blood pressure is 122/74, heart rate of 54 beats per minute and is regular.  His lungs are clear.  There is no significant peripheral edema.  His sternotomy has healed quite nicely.      IMPRESSION AND PLAN:   1.  Coronary artery disease.  Status post urgent 4-vessel bypass grafting due to left main disease in 10/2018.  He has done quite well in followup without any angina symptoms.  We will continue beta blocker, aspirin and statin.   2.  Hypertension.  Blood pressure well controlled.  However, he is on 2 beta blockers.  Merlin assures me he has been taking atenolol 100 mg.  At this time, I have asked him to then stop his metoprolol 25 mg twice daily.  I have asked him to verify his medications at home and call the nurse back.   3.  Treated hyperlipidemia.  Recent LDL level 42.   4.  Peripheral artery disease.  History of right carotid endarterectomy and he is being followed for moderate left internal carotid artery disease by Dr. Scott.      Thank you for allowing me to participate in this patient's care.  We will have him follow up as planned with Dr. Rebolledo in 6 months.         DIRK MCKENZIE, CNP             D: 07/15/2019   T: 07/15/2019   MT: BOBO      Name:     ANDERSON, MERLIN   MRN:      [id number]        Account:      [id number]   :      1940           Service Date: 07/15/2019      Document: [id number]         Outpatient Encounter Medications as of 7/15/2019   Medication Sig Dispense Refill     acetaminophen (TYLENOL) 325 MG tablet Take 2 tablets (650 mg) by mouth every 4 hours as needed for mild pain 100 tablet      aspirin 325 MG EC tablet Take 1 tablet (325 mg) by mouth daily 40 tablet      atenolol (TENORMIN) 100 MG tablet Take 1 tablet (100 mg) by mouth daily 90 tablet 3     atorvastatin (LIPITOR) 40 MG tablet Take 1 tablet (40 mg) by mouth daily 90 tablet 3     blood glucose calibration (ACCU-CHEK PRISCILLA) solution Use to calibrate blood glucose monitor as  needed as directed. 1 Bottle 0     blood glucose monitoring (ACCU-CHEK PRISCILLA) test strip Use to test blood sugars 1 times daily or as directed. 100 each 1     blood glucose monitoring (ACCU-CHEK MULTICLIX) lancets Use to test blood sugar 1 times daily or as directed. 1 Box 1     blood glucose monitoring (NO BRAND SPECIFIED) meter device kit Use to test blood sugar 1 times daily or as directed. 1 kit 0     furosemide (LASIX) 20 MG tablet Take 1 tablet (20 mg) by mouth daily 7 tablet 0     lisinopril (PRINIVIL/ZESTRIL) 2.5 MG tablet Take 1 tablet (2.5 mg) by mouth daily 90 tablet 0     metFORMIN (GLUCOPHAGE) 500 MG tablet TAKE ONE TABLET BY MOUTH TWICE A DAY WITH MEALS  180 tablet 0     multivitamin, therapeutic with minerals (MULTI-VITAMIN) TABS tablet Take 1 tablet by mouth every morning       nitroGLYcerin (NITROSTAT) 0.4 MG sublingual tablet For chest pain place 1 tablet under the tongue every 5 minutes for 3 doses. If symptoms persist 5 minutes after 1st dose call 911. 25 tablet 3     order for DME Equipment being ordered: knee high ARI stockings 10-15 H2O pressure 2 Package 1     order for DME Equipment being ordered: blood pressure monitor 1 Device 0     tamsulosin (FLOMAX) 0.4 MG capsule TAKE ONE CAPSULE BY MOUTH ONE TIME DAILY  90 capsule 0     [DISCONTINUED] metoprolol tartrate (LOPRESSOR) 25 MG tablet Take 1 tablet (25 mg) by mouth every 12 hours 180 tablet 3     No facility-administered encounter medications on file as of 7/15/2019.        Again, thank you for allowing me to participate in the care of your patient.      Sincerely,    DIRK Cash [facility]

## 2019-07-15 NOTE — PROGRESS NOTES
Service Date: 07/15/2019      HISTORY OF PRESENT ILLNESS:  This 79-year-old male presents to [facility] today for a followup visit.  He is a patient of Dr. Rebolledo seen in our clinic for coronary artery disease, hypertension, hyperlipidemia, peripheral artery disease, diabetes.      Merlin has a history of right carotid endarterectomy years ago.  In 10/2018, he was experiencing exertional dyspnea and was found to have an abnormal stress test.  Coronary angiography revealed significant left main disease.  He urgently underwent 4-vessel bypass grafting in which he received a LIMA to his LAD and separate saphenous vein graft to the PDA, OM2 and CHARISMA.  Echocardiogram showed a left ventricular ejection fraction of 60%-65%, normal right ventricular function, grade II diastolic dysfunction and 1+ mitral regurgitation.  He has done quite well in followup.      He is followed closely with Dr. Scott.  A repeat evaluation of his carotid showed 50%-70% disease in his left internal carotid artery.  He is a diabetic and his recent hemoglobin A1c level is 6.7.  He returns today for a 6-month reassessment.      Merlin comes in with his wife today.  He is active and tries to ride his bike and walk daily.  In fact, he is going jet-skiing later today.  Merlin denies any chest pain or significant shortness of breath with these activities.  He denies palpitations, lightheadedness, dizziness, orthopnea or peripheral edema.      Reviewing his medications, it appears that he has been taking both atenolol and metoprolol.  Merlin today assures me that he has been taking atenolol for quite some time, although I did not see this on his discharge list from the hospital after his bypass.      PHYSICAL EXAMINATION:  His blood pressure is 122/74, heart rate of 54 beats per minute and is regular.  His lungs are clear.  There is no significant peripheral edema.  His sternotomy has healed quite nicely.      IMPRESSION  AND PLAN:   1.  Coronary artery disease.  Status post urgent 4-vessel bypass grafting due to left main disease in 10/2018.  He has done quite well in followup without any angina symptoms.  We will continue beta blocker, aspirin and statin.   2.  Hypertension.  Blood pressure well controlled.  However, he is on 2 beta blockers.  Merlin assures me he has been taking atenolol 100 mg.  At this time, I have asked him to then stop his metoprolol 25 mg twice daily.  I have asked him to verify his medications at home and call the nurse back.   3.  Treated hyperlipidemia.  Recent LDL level 42.   4.  Peripheral artery disease.  History of right carotid endarterectomy and he is being followed for moderate left internal carotid artery disease by Dr. Scott.      Thank you for allowing me to participate in this patient's care.  We will have him follow up as planned with Dr. Rebolledo in 6 months.         DIRK MCKENZIE, CNP             D: 07/15/2019   T: 07/15/2019   MT: BOBO      Name:     ANDERSON, MERLIN   MRN:      [id number]        Account:      [id number]   :      1940           Service Date: 07/15/2019      Document: [id number]

## 2019-07-15 NOTE — LETTER
7/15/2019    Sujit Duke MD  [address] [facility] [address]    RE: Merlin J Anderson       Dear Colleague,    I had the pleasure of seeing Merlin J Anderson in the [facility].    HPI and Plan:   See dictation  #[id number]    Orders Placed This Encounter   Procedures     Follow-Up with Cardiologist       No orders of the defined types were placed in this encounter.      Medications Discontinued During This Encounter   Medication Reason     metoprolol tartrate (LOPRESSOR) 25 MG tablet          Encounter Diagnosis   Name Primary?     Coronary artery disease involving native coronary artery of native heart without angina pectoris        CURRENT MEDICATIONS:  Current Outpatient Medications   Medication Sig Dispense Refill     acetaminophen (TYLENOL) 325 MG tablet Take 2 tablets (650 mg) by mouth every 4 hours as needed for mild pain 100 tablet      aspirin 325 MG EC tablet Take 1 tablet (325 mg) by mouth daily 40 tablet      atenolol (TENORMIN) 100 MG tablet Take 1 tablet (100 mg) by mouth daily 90 tablet 3     atorvastatin (LIPITOR) 40 MG tablet Take 1 tablet (40 mg) by mouth daily 90 tablet 3     blood glucose calibration (ACCU-CHEK PRISCILLA) solution Use to calibrate blood glucose monitor as needed as directed. 1 Bottle 0     blood glucose monitoring (ACCU-CHEK PRISCILLA) test strip Use to test blood sugars 1 times daily or as directed. 100 each 1     blood glucose monitoring (ACCU-CHEK MULTICLIX) lancets Use to test blood sugar 1 times daily or as directed. 1 Box 1     blood glucose monitoring (NO BRAND SPECIFIED) meter device kit Use to test blood sugar 1 times daily or as directed. 1 kit 0     furosemide (LASIX) 20 MG tablet Take 1 tablet (20 mg) by mouth daily 7 tablet 0     lisinopril (PRINIVIL/ZESTRIL) 2.5 MG tablet Take 1 tablet (2.5 mg) by mouth daily 90 tablet 0     metFORMIN (GLUCOPHAGE) 500 MG tablet TAKE ONE TABLET BY MOUTH TWICE A DAY WITH MEALS  180 tablet 0      multivitamin, therapeutic with minerals (MULTI-VITAMIN) TABS tablet Take 1 tablet by mouth every morning       nitroGLYcerin (NITROSTAT) 0.4 MG sublingual tablet For chest pain place 1 tablet under the tongue every 5 minutes for 3 doses. If symptoms persist 5 minutes after 1st dose call 911. 25 tablet 3     order for DME Equipment being ordered: knee high ARI stockings 10-15 H2O pressure 2 Package 1     order for DME Equipment being ordered: blood pressure monitor 1 Device 0     tamsulosin (FLOMAX) 0.4 MG capsule TAKE ONE CAPSULE BY MOUTH ONE TIME DAILY  90 capsule 0       ALLERGIES   No Known Allergies    PAST MEDICAL HISTORY:  Past Medical History:   Diagnosis Date     Abnormal stress test      Carotid arterial disease (H) 9/18/2017     CKD (chronic kidney disease) stage 3, GFR 30-59 ml/min (H) 12/27/2011     DM2 (diabetes mellitus, type 2) (H)     Pre diabetic     LUNA (dyspnea on exertion)      Essential hypertension, benign      Gout 11/14/2002     Problem list name updated by automated process. Provider to review     HTN (hypertension) 6/29/2009     (Problem list name updated by automated process. Provider to review and confirm.)     Hyperlipidemia LDL goal <100 10/31/2010     Mixed hyperlipidemia 11/14/2002     PVD (peripheral vascular disease) (H) 9/8/2017     S/P CABG (coronary artery bypass graft) 10/26/2018     Type 2 diabetes mellitus without complication (H) 11/30/2015       PAST SURGICAL HISTORY:  Past Surgical History:   Procedure Laterality Date     BYPASS GRAFT ARTERY CORONARY N/A 10/26/2018    Procedure: CORONARY ARTERY BYPASS GRAFTING X 4 WITH LEFT LEG ENDOSCOPIC VEIN HARVESTING LIMA - LAD  SV- RIGHT PDA, OM2, OM1 ON PUMP/BETH IAPB PLACED PER CATH LAB;  Surgeon: Heath Kessler MD;  Location: [facility]     C NONSPECIFIC PROCEDURE  04/97    Neg. colonoscopy.     C NONSPECIFIC PROCEDURE      S/P ing. hernia.     COLONOSCOPY       DECOMPRESSION, FUSION LUMBAR POSTERIOR ONE LEVEL, COMBINED   8/24/2012    Procedure: COMBINED DECOMPRESSION, FUSION LUMBAR POSTERIOR ONE LEVEL;  Posterior Fusion wtih Decompression L4-5;  Surgeon: Rod Carbajal MD;  Location: [facility] OR     ENDARTERECTOMY CAROTID Right 9/18/2017    Procedure: ENDARTERECTOMY CAROTID;  RIGHT CAROTID ENDARTERECTOMY, WITH PATCH ANGIOPLASTY, WITH ELECTOENCEPHALOGAM            (EEG);  Surgeon: Catailno Scott MD;  Location: [facility] OR     HERNIA REPAIR         FAMILY HISTORY:  Family History   Problem Relation Age of Onset     Diabetes Father      Unknown/Adopted Mother        SOCIAL HISTORY:  Social History     Socioeconomic History     Marital status:      Spouse name: None     Number of children: None     Years of education: None     Highest education level: None   Occupational History     None   Social Needs     Financial resource strain: None     Food insecurity:     Worry: None     Inability: None     Transportation needs:     Medical: None     Non-medical: None   Tobacco Use     Smoking status: Never Smoker     Smokeless tobacco: Never Used   Substance and Sexual Activity     Alcohol use: Yes     Comment: 1 monthly or less.     Drug use: No     Sexual activity: Yes     Partners: Female   Lifestyle     Physical activity:     Days per week: None     Minutes per session: None     Stress: None   Relationships     Social connections:     Talks on phone: None     Gets together: None     Attends Congregation service: None     Active member of club or organization: None     Attends meetings of clubs or organizations: None     Relationship status: None     Intimate partner violence:     Fear of current or ex partner: None     Emotionally abused: None     Physically abused: None     Forced sexual activity: None   Other Topics Concern     Parent/sibling w/ CABG, MI or angioplasty before 65F 55M? Not Asked      Service Not Asked     Blood Transfusions Not Asked     Caffeine Concern No     Occupational Exposure No     Hobby Hazards No      "Sleep Concern No     Stress Concern No     Weight Concern No     Special Diet No     Back Care No     Exercise Yes     Comment: bikes, treadmill      Bike Helmet Not Asked     Seat Belt Yes     Self-Exams Not Asked   Social History Narrative     None       Review of Systems:  Skin:  Negative       Eyes:  Positive for glasses    ENT:  Negative      Respiratory:  Negative       Cardiovascular:  Negative for;palpitations;chest pain;edema;fatigue;lightheadedness;dizziness      Gastroenterology: Negative      Genitourinary:  Negative      Musculoskeletal:  Negative      Neurologic:  Negative      Psychiatric:  Negative      Heme/Lymph/Imm:  Positive for easy bruising    Endocrine:  Positive for night sweats      Physical Exam:  Vitals: /74 (BP Location: Right arm, Patient Position: Sitting, Cuff Size: Adult Regular)   Pulse 54   Ht 1.854 m (6' 0.99\")   Wt 91 kg (200 lb 9.6 oz)   SpO2 98%   BMI 26.47 kg/m       Constitutional:  cooperative;in no acute distress        Skin:  warm and dry to the touch          Head:  normocephalic        Eyes:  pupils equal and round        Lymph:      ENT:  no pallor or cyanosis        Neck:  JVP normal left carotid bruit      Respiratory:  clear to auscultation;normal respiratory excursion;healed median sternotomy scar         Cardiac: regular rhythm;normal S1 and S2     no presence of murmur          pulses full and equal                                        GI:  abdomen soft;BS normoactive        Extremities and Muscular Skeletal:  no edema              Neurological:  affect appropriate        Psych:  Alert and Oriented x 3          CC  Davi Rebolledo MD  [address]  [place] [address]                    Thank you for allowing me to participate in the care of your patient.      Sincerely,     DIRK Cash [facility]    cc:   Davi Rebolledo MD  [address]  [place] [id number]        "

## 2019-07-15 NOTE — PROGRESS NOTES
HPI and Plan:   See dictation  #[id number]    Orders Placed This Encounter   Procedures     Follow-Up with Cardiologist       No orders of the defined types were placed in this encounter.      Medications Discontinued During This Encounter   Medication Reason     metoprolol tartrate (LOPRESSOR) 25 MG tablet          Encounter Diagnosis   Name Primary?     Coronary artery disease involving native coronary artery of native heart without angina pectoris        CURRENT MEDICATIONS:  Current Outpatient Medications   Medication Sig Dispense Refill     acetaminophen (TYLENOL) 325 MG tablet Take 2 tablets (650 mg) by mouth every 4 hours as needed for mild pain 100 tablet      aspirin 325 MG EC tablet Take 1 tablet (325 mg) by mouth daily 40 tablet      atenolol (TENORMIN) 100 MG tablet Take 1 tablet (100 mg) by mouth daily 90 tablet 3     atorvastatin (LIPITOR) 40 MG tablet Take 1 tablet (40 mg) by mouth daily 90 tablet 3     blood glucose calibration (ACCU-CHEK PRISCILLA) solution Use to calibrate blood glucose monitor as needed as directed. 1 Bottle 0     blood glucose monitoring (ACCU-CHEK PRISCILLA) test strip Use to test blood sugars 1 times daily or as directed. 100 each 1     blood glucose monitoring (ACCU-CHEK MULTICLIX) lancets Use to test blood sugar 1 times daily or as directed. 1 Box 1     blood glucose monitoring (NO BRAND SPECIFIED) meter device kit Use to test blood sugar 1 times daily or as directed. 1 kit 0     furosemide (LASIX) 20 MG tablet Take 1 tablet (20 mg) by mouth daily 7 tablet 0     lisinopril (PRINIVIL/ZESTRIL) 2.5 MG tablet Take 1 tablet (2.5 mg) by mouth daily 90 tablet 0     metFORMIN (GLUCOPHAGE) 500 MG tablet TAKE ONE TABLET BY MOUTH TWICE A DAY WITH MEALS  180 tablet 0     multivitamin, therapeutic with minerals (MULTI-VITAMIN) TABS tablet Take 1 tablet by mouth every morning       nitroGLYcerin (NITROSTAT) 0.4 MG sublingual tablet For chest pain place 1 tablet under the tongue every 5 minutes for 3  doses. If symptoms persist 5 minutes after 1st dose call 911. 25 tablet 3     order for DME Equipment being ordered: knee high ARI stockings 10-15 H2O pressure 2 Package 1     order for DME Equipment being ordered: blood pressure monitor 1 Device 0     tamsulosin (FLOMAX) 0.4 MG capsule TAKE ONE CAPSULE BY MOUTH ONE TIME DAILY  90 capsule 0       ALLERGIES   No Known Allergies    PAST MEDICAL HISTORY:  Past Medical History:   Diagnosis Date     Abnormal stress test      Carotid arterial disease (H) 9/18/2017     CKD (chronic kidney disease) stage 3, GFR 30-59 ml/min (H) 12/27/2011     DM2 (diabetes mellitus, type 2) (H)     Pre diabetic     LUNA (dyspnea on exertion)      Essential hypertension, benign      Gout 11/14/2002     Problem list name updated by automated process. Provider to review     HTN (hypertension) 6/29/2009     (Problem list name updated by automated process. Provider to review and confirm.)     Hyperlipidemia LDL goal <100 10/31/2010     Mixed hyperlipidemia 11/14/2002     PVD (peripheral vascular disease) (H) 9/8/2017     S/P CABG (coronary artery bypass graft) 10/26/2018     Type 2 diabetes mellitus without complication (H) 11/30/2015       PAST SURGICAL HISTORY:  Past Surgical History:   Procedure Laterality Date     BYPASS GRAFT ARTERY CORONARY N/A 10/26/2018    Procedure: CORONARY ARTERY BYPASS GRAFTING X 4 WITH LEFT LEG ENDOSCOPIC VEIN HARVESTING LIMA - LAD  SV- RIGHT PDA, OM2, OM1 ON PUMP/BETH IAPB PLACED PER CATH LAB;  Surgeon: Heath Kessler MD;  Location:  OR     C NONSPECIFIC PROCEDURE  04/97    Neg. colonoscopy.     C NONSPECIFIC PROCEDURE      S/P ing. hernia.     COLONOSCOPY       DECOMPRESSION, FUSION LUMBAR POSTERIOR ONE LEVEL, COMBINED  8/24/2012    Procedure: COMBINED DECOMPRESSION, FUSION LUMBAR POSTERIOR ONE LEVEL;  Posterior Fusion wtih Decompression L4-5;  Surgeon: Rod Carbajal MD;  Location: [facility] OR     ENDARTERECTOMY CAROTID Right 9/18/2017    Procedure:  ENDARTERECTOMY CAROTID;  RIGHT CAROTID ENDARTERECTOMY, WITH PATCH ANGIOPLASTY, WITH ELECTOENCEPHALOGAM            (EEG);  Surgeon: Catalino Scott MD;  Location: [facility] OR     HERNIA REPAIR         FAMILY HISTORY:  Family History   Problem Relation Age of Onset     Diabetes Father      Unknown/Adopted Mother        SOCIAL HISTORY:  Social History     Socioeconomic History     Marital status:      Spouse name: None     Number of children: None     Years of education: None     Highest education level: None   Occupational History     None   Social Needs     Financial resource strain: None     Food insecurity:     Worry: None     Inability: None     Transportation needs:     Medical: None     Non-medical: None   Tobacco Use     Smoking status: Never Smoker     Smokeless tobacco: Never Used   Substance and Sexual Activity     Alcohol use: Yes     Comment: 1 monthly or less.     Drug use: No     Sexual activity: Yes     Partners: Female   Lifestyle     Physical activity:     Days per week: None     Minutes per session: None     Stress: None   Relationships     Social connections:     Talks on phone: None     Gets together: None     Attends Tenriism service: None     Active member of club or organization: None     Attends meetings of clubs or organizations: None     Relationship status: None     Intimate partner violence:     Fear of current or ex partner: None     Emotionally abused: None     Physically abused: None     Forced sexual activity: None   Other Topics Concern     Parent/sibling w/ CABG, MI or angioplasty before 65F 55M? Not Asked      Service Not Asked     Blood Transfusions Not Asked     Caffeine Concern No     Occupational Exposure No     Hobby Hazards No     Sleep Concern No     Stress Concern No     Weight Concern No     Special Diet No     Back Care No     Exercise Yes     Comment: bikes, treadmill      Bike Helmet Not Asked     Seat Belt Yes     Self-Exams Not Asked   Social History  "Narrative     None       Review of Systems:  Skin:  Negative       Eyes:  Positive for glasses    ENT:  Negative      Respiratory:  Negative       Cardiovascular:  Negative for;palpitations;chest pain;edema;fatigue;lightheadedness;dizziness      Gastroenterology: Negative      Genitourinary:  Negative      Musculoskeletal:  Negative      Neurologic:  Negative      Psychiatric:  Negative      Heme/Lymph/Imm:  Positive for easy bruising    Endocrine:  Positive for night sweats      Physical Exam:  Vitals: /74 (BP Location: Right arm, Patient Position: Sitting, Cuff Size: Adult Regular)   Pulse 54   Ht 1.854 m (6' 0.99\")   Wt 91 kg (200 lb 9.6 oz)   SpO2 98%   BMI 26.47 kg/m      Constitutional:  cooperative;in no acute distress        Skin:  warm and dry to the touch          Head:  normocephalic        Eyes:  pupils equal and round        Lymph:      ENT:  no pallor or cyanosis        Neck:  JVP normal left carotid bruit      Respiratory:  clear to auscultation;normal respiratory excursion;healed median sternotomy scar         Cardiac: regular rhythm;normal S1 and S2     no presence of murmur          pulses full and equal                                        GI:  abdomen soft;BS normoactive        Extremities and Muscular Skeletal:  no edema              Neurological:  affect appropriate        Psych:  Alert and Oriented x 3          CC  Davi Rebolledo MD  [address]  [place] [id number]                  "

## 2019-07-16 ENCOUNTER — TELEPHONE (OUTPATIENT)
Dept: CARDIOLOGY | Facility: CLINIC | Age: 79
End: 2019-07-16

## 2019-07-16 NOTE — TELEPHONE ENCOUNTER
Patient had an OV w/Loreto Pop NP on 07-15-19. Atenolol and Metoprolol were on patient's med list. Patient was certain that he was taking Atenolol 100 mg/d. Loreto Pop NP discontinued Metoprolol 25 mg twice daily (Metoprolol was removed from patient's med list). Patient was asked to call back to verify his medications at home.     Received a message yesterday from patient. Patient states that he is taking Atenolol 100 mg/d.     Called patient to discuss, left a message, awaiting call back.     Joe PARTIDA  -------------------------------------------------------------------------------    Addendum 07-19-19 (09:51 am)    Spoke with patient, reviewed and updated med list. Patient is taking Atenolol 100 mg/d. Patient is not taking Metoprolol. However, he does have a bottle at home. Patient was advised to put it aside and to not take it. Messaged Loreto Pop NP w/update. Joe PARTIDA

## 2019-08-19 DIAGNOSIS — Z95.1 S/P CABG (CORONARY ARTERY BYPASS GRAFT): ICD-10-CM

## 2019-08-19 DIAGNOSIS — R33.9 URINARY RETENTION: ICD-10-CM

## 2019-08-19 NOTE — TELEPHONE ENCOUNTER
"Requested Prescriptions   Pending Prescriptions Disp Refills     tamsulosin (FLOMAX) 0.4 MG capsule [Pharmacy Med Name: Tamsulosin HCl Oral  Last Written Prescription Date:  5/20/2019  Last Fill Quantity: 90,  # refills: 0   Last office visit: 6/5/2019 with prescribing provider:     Future Office Visit:   Capsule 0.4 MG] 90 capsule 0     Sig: TAKE ONE CAPSULE BY MOUTH ONE TIME DAILY       Alpha Blockers Passed - 8/19/2019  9:27 AM        Passed - Blood pressure under 140/90 in past 12 months     BP Readings from Last 3 Encounters:   07/15/19 122/74   06/05/19 136/62   01/30/19 138/80                 Passed - Recent (12 mo) or future (30 days) visit within the authorizing provider's specialty     Patient had office visit in the last 12 months or has a visit in the next 30 days with authorizing provider or within the authorizing provider's specialty.  See \"Patient Info\" tab in inbasket, or \"Choose Columns\" in Meds & Orders section of the refill encounter.              Passed - Patient does not have Tadalafil, Vardenafil, or Sildenafil on their medication list        Passed - Medication is active on med list        Passed - Patient is 18 years of age or older        lisinopril (PRINIVIL/ZESTRIL) 2.5 MG tablet [Pharmacy Med Name: Lisinopril Oral  Last Written Prescription Date:  5/14/2019  Last Fill Quantity: 90,  # refills: 0   Last office visit: 6/5/2019 with prescribing provider:     Future Office Visit:   Tablet 2.5 MG] 90 tablet 0     Sig: TAKE ONE TABLET BY MOUTH ONE TIME DAILY       ACE Inhibitors (Including Combos) Protocol Failed - 8/19/2019  9:27 AM        Failed - Normal serum creatinine on file in past 12 months     Recent Labs   Lab Test 06/05/19  0934  08/30/17  0902   CR 1.65*   < >  --    CREAT  --   --  1.6*    < > = values in this interval not displayed.             Passed - Blood pressure under 140/90 in past 12 months     BP Readings from Last 3 Encounters:   07/15/19 122/74   06/05/19 136/62 " "  01/30/19 138/80                 Passed - Recent (12 mo) or future (30 days) visit within the authorizing provider's specialty     Patient had office visit in the last 12 months or has a visit in the next 30 days with authorizing provider or within the authorizing provider's specialty.  See \"Patient Info\" tab in inbasket, or \"Choose Columns\" in Meds & Orders section of the refill encounter.              Passed - Medication is active on med list        Passed - Patient is age 18 or older        Passed - Normal serum potassium on file in past 12 months     Recent Labs   Lab Test 06/05/19  0934   POTASSIUM 4.7             "

## 2019-08-20 RX ORDER — LISINOPRIL 2.5 MG/1
TABLET ORAL
Qty: 90 TABLET | Refills: 0 | Status: SHIPPED | OUTPATIENT
Start: 2019-08-20 | End: 2019-11-15

## 2019-08-20 RX ORDER — TAMSULOSIN HYDROCHLORIDE 0.4 MG/1
CAPSULE ORAL
Qty: 90 CAPSULE | Refills: 0 | Status: SHIPPED | OUTPATIENT
Start: 2019-08-20 | End: 2019-11-11

## 2019-08-20 NOTE — TELEPHONE ENCOUNTER
Per result note 6/5/19:  Notes recorded by Sujit Duke MD on 6/7/2019 at 9:39 AM CDT  Slightly decreased kidney function and mild anemia. Controlled diabetes. No change.   Follow up in 6 months     Prescription approved per [facility] Refill Protocol.

## 2019-09-02 DIAGNOSIS — E11.9 TYPE 2 DIABETES MELLITUS WITHOUT COMPLICATION, WITHOUT LONG-TERM CURRENT USE OF INSULIN (H): ICD-10-CM

## 2019-09-04 NOTE — TELEPHONE ENCOUNTER
"Requested Prescriptions   Pending Prescriptions Disp Refills     metFORMIN (GLUCOPHAGE) 500 MG tablet [Pharmacy Med Name: metFORMIN  Last Written Prescription Date:  5/14/2019  Last Fill Quantity: 180,  # refills: 0   Last office visit: 6/5/2019 with prescribing provider:     Future Office Visit:   HCl Oral Tablet 500 MG] 180 tablet 0     Sig: TAKE ONE TABLET BY MOUTH TWICE A DAY WITH MEALS       Biguanide Agents Failed - 9/2/2019  9:52 AM        Failed - Patient's CR is NOT>1.4 OR Patient's EGFR is NOT<45 within past 12 mos.     Recent Labs   Lab Test 06/05/19  0934   GFRESTIMATED 39*   GFRESTBLACK 45*       Recent Labs   Lab Test 06/05/19  0934   CR 1.65*             Passed - Blood pressure less than 140/90 in past 6 months     BP Readings from Last 3 Encounters:   07/15/19 122/74   06/05/19 136/62   01/30/19 138/80                 Passed - Patient has documented LDL within the past 12 mos.     Recent Labs   Lab Test 06/05/19  0934   LDL 42             Passed - Patient has had a Microalbumin in the past 15 mos.     Recent Labs   Lab Test 06/05/19  0935   MICROL 310   UMALCR 325.29*             Passed - Patient is age 10 or older        Passed - Patient has documented A1c within the specified period of time.     If HgbA1C is 8 or greater, it needs to be on file within the past 3 months.  If less than 8, must be on file within the past 6 months.     Recent Labs   Lab Test 06/05/19  0934   A1C 5.8*             Passed - Patient does NOT have a diagnosis of CHF.        Passed - Medication is active on med list        Passed - Recent (6 mo) or future (30 days) visit within the authorizing provider's specialty     Patient had office visit in the last 6 months or has a visit in the next 30 days with authorizing provider or within the authorizing provider's specialty.  See \"Patient Info\" tab in inbasket, or \"Choose Columns\" in Meds & Orders section of the refill encounter.            "

## 2019-09-13 DIAGNOSIS — I77.9 CAROTID ARTERY DISEASE (H): Primary | ICD-10-CM

## 2019-09-13 DIAGNOSIS — I65.29 CAROTID STENOSIS: ICD-10-CM

## 2019-09-16 DIAGNOSIS — E78.5 HYPERLIPIDEMIA LDL GOAL <100: ICD-10-CM

## 2019-09-16 DIAGNOSIS — I77.9 RIGHT-SIDED CAROTID ARTERY DISEASE, UNSPECIFIED TYPE (H): ICD-10-CM

## 2019-09-16 NOTE — TELEPHONE ENCOUNTER
"Requested Prescriptions   Pending Prescriptions Disp Refills     atorvastatin (LIPITOR) 40 MG tablet [Pharmacy Med Name: Atorvastatin Calcium Oral Tablet 40 MG]  Last Written Prescription Date:  10/2/2018  Last Fill Quantity: 90,  # refills: 3   Last office visit: 6/5/2019 with prescribing provider:     Future Office Visit:   90 tablet 2     Sig: TAKE ONE TABLET BY MOUTH ONE TIME DAILY       Statins Protocol Passed - 9/16/2019  9:40 AM        Passed - LDL on file in past 12 months     Recent Labs   Lab Test 06/05/19  0934   LDL 42             Passed - No abnormal creatine kinase in past 12 months     No lab results found.             Passed - Recent (12 mo) or future (30 days) visit within the authorizing provider's specialty     Patient had office visit in the last 12 months or has a visit in the next 30 days with authorizing provider or within the authorizing provider's specialty.  See \"Patient Info\" tab in inbasket, or \"Choose Columns\" in Meds & Orders section of the refill encounter.              Passed - Medication is active on med list        Passed - Patient is age 18 or older        "

## 2019-09-17 RX ORDER — ATORVASTATIN CALCIUM 40 MG/1
TABLET, FILM COATED ORAL
Qty: 90 TABLET | Refills: 0 | Status: SHIPPED | OUTPATIENT
Start: 2019-09-17 | End: 2019-12-16

## 2019-09-26 ENCOUNTER — TRANSFERRED RECORDS (OUTPATIENT)
Dept: HEALTH INFORMATION MANAGEMENT | Facility: CLINIC | Age: 79
End: 2019-09-26

## 2019-11-11 DIAGNOSIS — R33.9 URINARY RETENTION: ICD-10-CM

## 2019-11-11 NOTE — TELEPHONE ENCOUNTER
"Requested Prescriptions   Pending Prescriptions Disp Refills     tamsulosin (FLOMAX) 0.4 MG capsule [Pharmacy Med Name: Tamsulosin HCl Oral Capsule 0.4 MG] 90 capsule 0     Sig: TAKE ONE CAPSULE BY MOUTH ONE TIME DAILY   Last Written Prescription Date:  08/20/2019  Last Fill Quantity: 90,  # refills: 0   Last office visit: 6/5/2019 with prescribing provider:     Future Office Visit:   Next 5 appointments (look out 90 days)    Jan 27, 2020 10:45 AM CST  Return Visit with Davi Rebolledo MD  [facility] ([facility]) [address] [place] [id number]  [phone] OPT 2           Alpha Blockers Passed - 11/11/2019 10:01 AM        Passed - Blood pressure under 140/90 in past 12 months     BP Readings from Last 3 Encounters:   07/15/19 122/74   06/05/19 136/62   01/30/19 138/80                 Passed - Recent (12 mo) or future (30 days) visit within the authorizing provider's specialty     Patient has had an office visit with the authorizing provider or a provider within the authorizing providers department within the previous 12 mos or has a future within next 30 days. See \"Patient Info\" tab in inbasket, or \"Choose Columns\" in Meds & Orders section of the refill encounter.              Passed - Patient does not have Tadalafil, Vardenafil, or Sildenafil on their medication list        Passed - Medication is active on med list        Passed - Patient is 18 years of age or older        "

## 2019-11-12 RX ORDER — TAMSULOSIN HYDROCHLORIDE 0.4 MG/1
CAPSULE ORAL
Qty: 90 CAPSULE | Refills: 1 | Status: SHIPPED | OUTPATIENT
Start: 2019-11-12 | End: 2020-05-05

## 2019-11-12 NOTE — TELEPHONE ENCOUNTER
Prescription approved per [facility], [facility] or [facility] refill protocol.  Abbie MEIER - Registered Nurse  [facility]  Acute and Diagnostic Services

## 2019-11-15 DIAGNOSIS — Z95.1 S/P CABG (CORONARY ARTERY BYPASS GRAFT): ICD-10-CM

## 2019-11-15 NOTE — TELEPHONE ENCOUNTER
"Requested Prescriptions   Pending Prescriptions Disp Refills     lisinopril (PRINIVIL/ZESTRIL) 2.5 MG tablet [Pharmacy Med Name: Lisinopril Oral Tablet 2.5 MG] 90 tablet 0     Sig: TAKE ONE TABLET BY MOUTH ONE TIME DAILY   Last Written Prescription Date:  08/20/2019  Last Fill Quantity: 90,  # refills: 0   Last office visit: 6/5/2019 with prescribing provider:     Future Office Visit:   Next 5 appointments (look out 90 days)    Jan 27, 2020 10:45 AM CST  Return Visit with Davi Rebolledo MD  [facility] ([facility]) [address] [place], [place] [id number]  [facility] [id number] [phone] OPT 2           ACE Inhibitors (Including Combos) Protocol Failed - 11/15/2019  1:54 PM        Failed - Normal serum creatinine on file in past 12 months     Recent Labs   Lab Test 06/05/19  0934  08/30/17  0902   CR 1.65*   < >  --    CREAT  --   --  1.6*    < > = values in this interval not displayed.             Passed - Blood pressure under 140/90 in past 12 months     BP Readings from Last 3 Encounters:   07/15/19 122/74   06/05/19 136/62   01/30/19 138/80                 Passed - Recent (12 mo) or future (30 days) visit within the authorizing provider's specialty     Patient has had an office visit with the authorizing provider or a provider within the authorizing providers department within the previous 12 mos or has a future within next 30 days. See \"Patient Info\" tab in inbasket, or \"Choose Columns\" in Meds & Orders section of the refill encounter.              Passed - Medication is active on med list        Passed - Patient is age 18 or older        Passed - Normal serum potassium on file in past 12 months     Recent Labs   Lab Test 06/05/19  0934   POTASSIUM 4.7             "

## 2019-11-15 NOTE — LETTER
[facility]  [address]  [facility] [address]  Phone: [phone]        November 18, 2019      Merlin J Anderson                                                                                                                                [address] [facility] [address]            Dear Mr. Warner,    We are concerned about your health care.  We recently provided you with a medication refill.  Many medications require routine follow-up with your Doctor.      At this time we ask that: You schedule a routine office visit with your physician to follow your diabetes.    Your prescription: Has been refilled once so you may have time for the above noted follow-up.      Thank you,      [facility]

## 2019-11-18 RX ORDER — LISINOPRIL 2.5 MG/1
TABLET ORAL
Qty: 90 TABLET | Refills: 0 | Status: SHIPPED | OUTPATIENT
Start: 2019-11-18 | End: 2019-12-12

## 2019-11-18 NOTE — TELEPHONE ENCOUNTER
Routing refill request to provider for review/approval because:  Labs out of range:  CR    Patient due for an appointment in December per 6/5/19 office visit note. No future appointments scheduled. Letter mailed.

## 2019-12-03 DIAGNOSIS — E11.9 TYPE 2 DIABETES MELLITUS WITHOUT COMPLICATION, WITHOUT LONG-TERM CURRENT USE OF INSULIN (H): ICD-10-CM

## 2019-12-03 NOTE — TELEPHONE ENCOUNTER
Last Written Prescription Date:  9/04/19  Last Fill Quantity: 180,  # refills: 1   Last office visit: 6/5/2019 with prescribing provider:  Dr. Duke   Future Office Visit:   Next 5 appointments (look out 90 days)    Dec 09, 2019  7:00 AM CST  PHYSICAL with Sujit Duke MD  [facility] [address]  [facility] [id number] [phone]   Jan 27, 2020 10:45 AM CST  Return Visit with Davi Rebolledo MD  [facility] ([facility]) [address]  [facility] [id number] [phone] OPT 2         Requested Prescriptions   Pending Prescriptions Disp Refills     metFORMIN (GLUCOPHAGE) 500 MG tablet 180 tablet 0     Sig: Take 1 tablet (500 mg) by mouth 2 times daily (with meals)       Biguanide Agents Failed - 12/3/2019  5:23 PM        Failed - Patient has documented A1c within the specified period of time.     If HgbA1C is 8 or greater, it needs to be on file within the past 3 months.  If less than 8, must be on file within the past 6 months.     Recent Labs   Lab Test 06/05/19  0934   A1C 5.8*             Failed - Patient's CR is NOT>1.4 OR Patient's EGFR is NOT<45 within past 12 mos.     Recent Labs   Lab Test 06/05/19  0934   GFRESTIMATED 39*   GFRESTBLACK 45*       Recent Labs   Lab Test 06/05/19  0934   CR 1.65*             Passed - Blood pressure less than 140/90 in past 6 months     BP Readings from Last 3 Encounters:   07/15/19 122/74   06/05/19 136/62   01/30/19 138/80                 Passed - Patient has documented LDL within the past 12 mos.     Recent Labs   Lab Test 06/05/19  0934   LDL 42             Passed - Patient has had a Microalbumin in the past 15 mos.     Recent Labs   Lab Test 06/05/19  0935   MICROL 310   UMALCR 325.29*             Passed - Patient is age 10 or older        Passed - Patient does NOT have a diagnosis of CHF.        Passed - Medication is active on med list     "    Passed - Recent (6 mo) or future (30 days) visit within the authorizing provider's specialty     Patient had office visit in the last 6 months or has a visit in the next 30 days with authorizing provider or within the authorizing provider's specialty.  See \"Patient Info\" tab in inbasket, or \"Choose Columns\" in Meds & Orders section of the refill encounter.              "

## 2019-12-04 DIAGNOSIS — E11.9 TYPE 2 DIABETES MELLITUS WITHOUT COMPLICATION, WITHOUT LONG-TERM CURRENT USE OF INSULIN (H): ICD-10-CM

## 2019-12-04 NOTE — TELEPHONE ENCOUNTER
Requested Prescriptions   Pending Prescriptions Disp Refills     metFORMIN (GLUCOPHAGE) 500 MG tablet [Pharmacy Med Name: metFORMIN HCl Oral Tablet 500 MG] 180 tablet 0     Sig: TAKE ONE TABLET BY MOUTH TWICE DAILY WITH MEALS   Last Written Prescription Date:  12/04/2019  Last Fill Quantity: 180,  # refills: 01   Last office visit: 6/5/2019 with prescribing provider:     Future Office Visit:   Next 5 appointments (look out 90 days)    Dec 09, 2019  7:00 AM CST  PHYSICAL with Sujit Duke MD  [facility] ([facility]) [address]  [facility] [address]  [phone]   Jan 27, 2020 10:45 AM CST  Return Visit with Davi Rebolledo MD  [facility] ([facility]) [address] [place], [place] [id number]  [facility] [id number] [phone] OPT 2           Biguanide Agents Failed - 12/4/2019  1:07 PM        Failed - Patient has documented A1c within the specified period of time.     If HgbA1C is 8 or greater, it needs to be on file within the past 3 months.  If less than 8, must be on file within the past 6 months.     Recent Labs   Lab Test 06/05/19  0934   A1C 5.8*             Failed - Patient's CR is NOT>1.4 OR Patient's EGFR is NOT<45 within past 12 mos.     Recent Labs   Lab Test 06/05/19  0934   GFRESTIMATED 39*   GFRESTBLACK 45*       Recent Labs   Lab Test 06/05/19  0934   CR 1.65*             Passed - Blood pressure less than 140/90 in past 6 months     BP Readings from Last 3 Encounters:   07/15/19 122/74   06/05/19 136/62   01/30/19 138/80                 Passed - Patient has documented LDL within the past 12 mos.     Recent Labs   Lab Test 06/05/19  0934   LDL 42             Passed - Patient has had a Microalbumin in the past 15 mos.     Recent Labs   Lab Test 06/05/19  0935   MICROL 310   UMALCR 325.29*             Passed - Patient is age 10 or older        Passed - Patient does NOT have a diagnosis of CHF.        Passed -  "Medication is active on med list        Passed - Recent (6 mo) or future (30 days) visit within the authorizing provider's specialty     Patient had office visit in the last 6 months or has a visit in the next 30 days with authorizing provider or within the authorizing provider's specialty.  See \"Patient Info\" tab in inbasket, or \"Choose Columns\" in Meds & Orders section of the refill encounter.            "

## 2019-12-05 NOTE — TELEPHONE ENCOUNTER
Pt has appt scheduled Monday.     Creatinine   Date Value Ref Range Status   06/05/2019 1.65 (H) 0.66 - 1.25 mg/dL Final

## 2019-12-09 ENCOUNTER — OFFICE VISIT (OUTPATIENT)
Dept: INTERNAL MEDICINE | Facility: CLINIC | Age: 79
End: 2019-12-09
Payer: COMMERCIAL

## 2019-12-09 VITALS
SYSTOLIC BLOOD PRESSURE: 138 MMHG | BODY MASS INDEX: 26.37 KG/M2 | OXYGEN SATURATION: 94 % | RESPIRATION RATE: 12 BRPM | WEIGHT: 199 LBS | DIASTOLIC BLOOD PRESSURE: 60 MMHG | HEART RATE: 83 BPM | HEIGHT: 73 IN | TEMPERATURE: 99.5 F

## 2019-12-09 DIAGNOSIS — M25.512 ACUTE PAIN OF BOTH SHOULDERS: ICD-10-CM

## 2019-12-09 DIAGNOSIS — M25.511 ACUTE PAIN OF BOTH SHOULDERS: ICD-10-CM

## 2019-12-09 DIAGNOSIS — Z95.1 S/P CABG (CORONARY ARTERY BYPASS GRAFT): ICD-10-CM

## 2019-12-09 DIAGNOSIS — J20.9 ACUTE BRONCHITIS, UNSPECIFIED ORGANISM: ICD-10-CM

## 2019-12-09 DIAGNOSIS — I10 ESSENTIAL HYPERTENSION: ICD-10-CM

## 2019-12-09 DIAGNOSIS — Z00.00 ENCOUNTER FOR MEDICARE ANNUAL WELLNESS EXAM: Primary | ICD-10-CM

## 2019-12-09 DIAGNOSIS — Z12.5 SCREENING FOR PROSTATE CANCER: ICD-10-CM

## 2019-12-09 DIAGNOSIS — E11.9 TYPE 2 DIABETES MELLITUS WITHOUT COMPLICATION, WITHOUT LONG-TERM CURRENT USE OF INSULIN (H): ICD-10-CM

## 2019-12-09 DIAGNOSIS — Z12.11 SPECIAL SCREENING FOR MALIGNANT NEOPLASMS, COLON: ICD-10-CM

## 2019-12-09 LAB
ALBUMIN SERPL-MCNC: 3.2 G/DL (ref 3.4–5)
ALBUMIN UR-MCNC: >=300 MG/DL
ALP SERPL-CCNC: 69 U/L (ref 40–150)
ALT SERPL W P-5'-P-CCNC: 34 U/L (ref 0–70)
ANION GAP SERPL CALCULATED.3IONS-SCNC: 8 MMOL/L (ref 3–14)
APPEARANCE UR: CLEAR
AST SERPL W P-5'-P-CCNC: 23 U/L (ref 0–45)
BILIRUB SERPL-MCNC: 1.1 MG/DL (ref 0.2–1.3)
BILIRUB UR QL STRIP: NEGATIVE
BUN SERPL-MCNC: 26 MG/DL (ref 7–30)
CALCIUM SERPL-MCNC: 9.3 MG/DL (ref 8.5–10.1)
CHLORIDE SERPL-SCNC: 105 MMOL/L (ref 94–109)
CHOLEST SERPL-MCNC: 96 MG/DL
CO2 SERPL-SCNC: 24 MMOL/L (ref 20–32)
COLOR UR AUTO: YELLOW
CREAT SERPL-MCNC: 1.63 MG/DL (ref 0.66–1.25)
CREAT UR-MCNC: 171 MG/DL
ERYTHROCYTE [DISTWIDTH] IN BLOOD BY AUTOMATED COUNT: 12.5 % (ref 10–15)
GFR SERPL CREATININE-BSD FRML MDRD: 39 ML/MIN/{1.73_M2}
GLUCOSE SERPL-MCNC: 174 MG/DL (ref 70–99)
GLUCOSE UR STRIP-MCNC: NEGATIVE MG/DL
HBA1C MFR BLD: 5.5 % (ref 0–5.6)
HCT VFR BLD AUTO: 37.9 % (ref 40–53)
HDLC SERPL-MCNC: 42 MG/DL
HGB BLD-MCNC: 12.5 G/DL (ref 13.3–17.7)
HGB UR QL STRIP: NEGATIVE
KETONES UR STRIP-MCNC: NEGATIVE MG/DL
LDLC SERPL CALC-MCNC: 29 MG/DL
LEUKOCYTE ESTERASE UR QL STRIP: NEGATIVE
MCH RBC QN AUTO: 30.9 PG (ref 26.5–33)
MCHC RBC AUTO-ENTMCNC: 33 G/DL (ref 31.5–36.5)
MCV RBC AUTO: 94 FL (ref 78–100)
MICROALBUMIN UR-MCNC: 1380 MG/L
MICROALBUMIN/CREAT UR: 807.02 MG/G CR (ref 0–17)
MUCOUS THREADS #/AREA URNS LPF: PRESENT /LPF
NITRATE UR QL: NEGATIVE
NONHDLC SERPL-MCNC: 54 MG/DL
PH UR STRIP: 5.5 PH (ref 5–7)
PLATELET # BLD AUTO: 179 10E9/L (ref 150–450)
POTASSIUM SERPL-SCNC: 4.9 MMOL/L (ref 3.4–5.3)
PROT SERPL-MCNC: 6.9 G/DL (ref 6.8–8.8)
PSA SERPL-ACNC: 3.67 UG/L (ref 0–4)
RBC # BLD AUTO: 4.05 10E12/L (ref 4.4–5.9)
RBC #/AREA URNS AUTO: ABNORMAL /HPF
SODIUM SERPL-SCNC: 137 MMOL/L (ref 133–144)
SOURCE: ABNORMAL
SP GR UR STRIP: 1.02 (ref 1–1.03)
TRIGL SERPL-MCNC: 125 MG/DL
TSH SERPL DL<=0.005 MIU/L-ACNC: 1.46 MU/L (ref 0.4–4)
UROBILINOGEN UR STRIP-ACNC: 2 EU/DL (ref 0.2–1)
WBC # BLD AUTO: 7.4 10E9/L (ref 4–11)
WBC #/AREA URNS AUTO: ABNORMAL /HPF

## 2019-12-09 PROCEDURE — G0103 PSA SCREENING: HCPCS | Performed by: INTERNAL MEDICINE

## 2019-12-09 PROCEDURE — 99397 PER PM REEVAL EST PAT 65+ YR: CPT | Performed by: INTERNAL MEDICINE

## 2019-12-09 PROCEDURE — 83036 HEMOGLOBIN GLYCOSYLATED A1C: CPT | Performed by: INTERNAL MEDICINE

## 2019-12-09 PROCEDURE — 80053 COMPREHEN METABOLIC PANEL: CPT | Performed by: INTERNAL MEDICINE

## 2019-12-09 PROCEDURE — 82043 UR ALBUMIN QUANTITATIVE: CPT | Performed by: INTERNAL MEDICINE

## 2019-12-09 PROCEDURE — 80061 LIPID PANEL: CPT | Performed by: INTERNAL MEDICINE

## 2019-12-09 PROCEDURE — 85027 COMPLETE CBC AUTOMATED: CPT | Performed by: INTERNAL MEDICINE

## 2019-12-09 PROCEDURE — 99214 OFFICE O/P EST MOD 30 MIN: CPT | Mod: 25 | Performed by: INTERNAL MEDICINE

## 2019-12-09 PROCEDURE — 84443 ASSAY THYROID STIM HORMONE: CPT | Performed by: INTERNAL MEDICINE

## 2019-12-09 PROCEDURE — 81001 URINALYSIS AUTO W/SCOPE: CPT | Performed by: INTERNAL MEDICINE

## 2019-12-09 PROCEDURE — 36415 COLL VENOUS BLD VENIPUNCTURE: CPT | Performed by: INTERNAL MEDICINE

## 2019-12-09 RX ORDER — DOXYCYCLINE 100 MG/1
100 CAPSULE ORAL 2 TIMES DAILY
Qty: 20 CAPSULE | Refills: 0 | Status: SHIPPED | OUTPATIENT
Start: 2019-12-09 | End: 2020-12-11

## 2019-12-09 ASSESSMENT — MIFFLIN-ST. JEOR: SCORE: 1671.54

## 2019-12-09 NOTE — PROGRESS NOTES
"  SUBJECTIVE:   Merlin J Anderson is a 79 year old male who presents for Preventive Visit.      Are you in the first 12 months of your Medicare Part B coverage?  No    Physical Health:    In general, how would you rate your overall physical health? good    Outside of work, how many days during the week do you exercise? 4-5 days/week    Outside of work, approximately how many minutes a day do you exercise?30-45 minutes    If you drink alcohol do you typically have >3 drinks per day or >7 drinks per week? No    Do you usually eat at least 4 servings of fruit and vegetables a day, include whole grains & fiber and avoid regularly eating high fat or \"junk\" foods? 2 servings    Do you have any problems taking medications regularly?  No    Do you have any side effects from medications? none    Needs assistance for the following daily activities: no assistance needed    Which of the following safety concerns are present in your home?  none identified     Hearing impairment: Yes, Difficulty following a conversation in a noisy restaurant or crowded room.    Find that men's voices are easier to understand than women's.    In the past 6 months, have you been bothered by leaking of urine? no    Mental Health:    In general, how would you rate your overall mental or emotional health? good  PHQ-2 Score:      Do you feel safe in your environment? Yes    Have you ever done Advance Care Planning? (For example, a Health Directive, POLST, or a discussion with a medical provider or your loved ones about your wishes): Yes, patient states has an Advance Care Planning document and will bring a copy to the clinic.    Additional concerns to address?  YES, sinus HA, pain and pressure    Fall risk:  Fallen 2 or more times in the past year?: No  Any fall with injury in the past year?: No    Cognitive Screening: Pt declined    Do you have sleep apnea, excessive snoring or daytime drowsiness?: no        PROBLEMS TO ADD ON...  Has H/O DM. On diet " , exercise and PO treatment . Blood sugars are controlled. No parestesias. No hypoglycemias.  Has h/o HTN. on medical treatment. BP has been controlled. No side effects from medications. No CP, HA, dizziness. good compliance with medications and low salt diet.    Concern for cough, sinus pressure, nasal secretions, low grade fever for 2 weeks. Feels tired. No chest pain. No SOB.   Has had shoulders pain for several months. No injury.  More on the right. Has restricted ROM , abduction.     Reviewed and updated as needed this visit by clinical staff  Tobacco         Reviewed and updated as needed this visit by Provider        Social History     Tobacco Use     Smoking status: Never Smoker     Smokeless tobacco: Never Used   Substance Use Topics     Alcohol use: Yes     Comment: 1 monthly or less.                           Current providers sharing in care for this patient include:   Patient Care Team:  Sujit Duke MD as PCP - General (Internal Medicine)  Sujit Duke MD as Assigned PCP    The following health maintenance items are reviewed in Epic and correct as of today:  Health Maintenance   Topic Date Due     ZOSTER IMMUNIZATION (2 of 3) 10/19/2011     DIABETIC FOOT EXAM  02/18/2014     EYE EXAM  02/18/2014     MEDICARE ANNUAL WELLNESS VISIT  08/08/2018     COLONOSCOPY  10/17/2018     PHQ-2  01/01/2019     FALL RISK ASSESSMENT  11/19/2019     A1C  12/05/2019     BMP  06/05/2020     LIPID  06/05/2020     MICROALBUMIN  06/05/2020     TSH W/FREE T4 REFLEX  06/05/2021     ADVANCE CARE PLANNING  08/28/2022     DTAP/TDAP/TD IMMUNIZATION (3 - Td) 12/16/2025     INFLUENZA VACCINE  Completed     PNEUMOCOCCAL IMMUNIZATION 65+ LOW/MEDIUM RISK  Completed     IPV IMMUNIZATION  Aged Out     MENINGITIS IMMUNIZATION  Aged Out     Lab work is in process  Labs reviewed in EPIC      ROS:  CONSTITUTIONAL: NEGATIVE for fever, chills, change in weight  INTEGUMENTARY/SKIN: NEGATIVE for worrisome rashes, moles or  "lesions  EYES: NEGATIVE for vision changes or irritation  ENT/MOUTH: NEGATIVE for ear, mouth and throat problems  RESP: + for significant cough , mild SOB  BREAST: NEGATIVE for masses, tenderness or discharge  CV: NEGATIVE for chest pain, palpitations or peripheral edema  GI: NEGATIVE for nausea, abdominal pain, heartburn, or change in bowel habits  : NEGATIVE for frequency, dysuria, or hematuria  MUSCULOSKELETAL: NEGATIVE for significant arthralgias or myalgia, + shoulder pains  NEURO: NEGATIVE for weakness, dizziness or paresthesias  ENDOCRINE: NEGATIVE for temperature intolerance, skin/hair changes  HEME: NEGATIVE for bleeding problems  PSYCHIATRIC: NEGATIVE for changes in mood or affect    OBJECTIVE:   Ht 1.854 m (6' 1\")   BMI 26.47 kg/m   Estimated body mass index is 26.47 kg/m  as calculated from the following:    Height as of this encounter: 1.854 m (6' 1\").    Weight as of 7/15/19: 91 kg (200 lb 9.6 oz).  EXAM:   GENERAL: healthy, alert and no distress  EYES: Eyes grossly normal to inspection, PERRL and conjunctivae and sclerae normal  HENT: ear canals and TM's normal, nose and mouth without ulcers or lesions  NECK: no adenopathy, no asymmetry, masses, or scars and thyroid normal to palpation  RESP: lungs clear to auscultation - no rales, + bilateral rhonchi , no wheezes  CV: regular rate and rhythm, normal S1 S2, no S3 or S4, no murmur, click or rub, no peripheral edema and peripheral pulses strong  ABDOMEN: soft, nontender, no hepatosplenomegaly, no masses and bowel sounds normal  MS: no gross musculoskeletal defects noted, no edema, bilateral shoulders pain with abduction and restricted ROM to 90 degrees abduction   SKIN: no suspicious lesions or rashes  NEURO: Normal strength and tone, mentation intact and speech normal  PSYCH: mentation appears normal, affect normal/bright    Diagnostic Test Results:  Labs reviewed in Epic    ASSESSMENT / PLAN:       ICD-10-CM    1. Encounter for Medicare annual " "wellness exam Z00.00 CBC with platelets     Comprehensive metabolic panel     Lipid panel reflex to direct LDL Fasting     TSH with free T4 reflex     Prostate spec antigen screen     Hemoglobin A1c   2. Acute bronchitis, unspecified organism J20.9 doxycycline hyclate (VIBRAMYCIN) 100 MG capsule     OFFICE/OUTPT VISIT,EST,LEVL IV   3. Acute pain of both shoulders M25.511 ORTHO  REFERRAL    M25.512 OFFICE/OUTPT VISIT,EST,LEVL IV   4. Essential hypertension I10 CBC with platelets     Comprehensive metabolic panel     Lipid panel reflex to direct LDL Fasting     TSH with free T4 reflex     Prostate spec antigen screen     *UA reflex to Microscopic     Albumin Random Urine Quantitative with Creat Ratio     OFFICE/OUTPT VISIT,EST,LEVL IV   5. Type 2 diabetes mellitus without complication, without long-term current use of insulin (H) E11.9 Hemoglobin A1c     OFFICE/OUTPT VISIT,EST,LEVL IV   6. Screening for prostate cancer Z12.5 Prostate spec antigen screen   7. Special screening for malignant neoplasms, colon Z12.11 Fecal colorectal cancer screen (FIT)     Assess lab work   Refer to ortho   Start on antibiotic - sinusitis, bronchitis for over 10 days   Advised for side effects     COUNSELING:  Reviewed preventive health counseling, as reflected in patient instructions       Regular exercise       Healthy diet/nutrition       Vision screening       Hearing screening       Colon cancer screening       Prostate cancer screening    Estimated body mass index is 26.47 kg/m  as calculated from the following:    Height as of this encounter: 1.854 m (6' 1\").    Weight as of 7/15/19: 91 kg (200 lb 9.6 oz).         reports that he has never smoked. He has never used smokeless tobacco.      Appropriate preventive services were discussed with this patient, including applicable screening as appropriate for cardiovascular disease, diabetes, osteopenia/osteoporosis, and glaucoma.  As appropriate for age/gender, discussed " screening for colorectal cancer, prostate cancer, breast cancer, and cervical cancer. Checklist reviewing preventive services available has been given to the patient.    Reviewed patients plan of care and provided an AVS. The Intermediate Care Plan ( asthma action plan, low back pain action plan, and migraine action plan) for Merlin meets the Care Plan requirement. This Care Plan has been established and reviewed with the Patient.    Counseling Resources:  ATP IV Guidelines  Pooled Cohorts Equation Calculator  Breast Cancer Risk Calculator  FRAX Risk Assessment  ICSI Preventive Guidelines  Dietary Guidelines for Americans, 2010  USDA's MyPlate  ASA Prophylaxis  Lung CA Screening    Sujit Duke MD  [facility]

## 2019-12-09 NOTE — PATIENT INSTRUCTIONS
Patient Education   Personalized Prevention Plan  You are due for the preventive services outlined below.  Your care team is available to assist you in scheduling these services.  If you have already completed any of these items, please share that information with your care team to update in your medical record.  Health Maintenance Due   Topic Date Due     Zoster (Shingles) Vaccine (2 of 3) 10/19/2011     Diabetic Foot Exam  02/18/2014     Eye Exam  02/18/2014     Annual Wellness Visit  08/08/2018     Colonoscopy  10/17/2018     PHQ-2  01/01/2019     FALL RISK ASSESSMENT  11/19/2019     A1C Lab  12/05/2019        Patient Education   Personalized Prevention Plan  You are due for the preventive services outlined below.  Your care team is available to assist you in scheduling these services.  If you have already completed any of these items, please share that information with your care team to update in your medical record.  Health Maintenance Due   Topic Date Due     Zoster (Shingles) Vaccine (2 of 3) 10/19/2011     Diabetic Foot Exam  02/18/2014     Eye Exam  02/18/2014     Annual Wellness Visit  08/08/2018     Colonoscopy  10/17/2018     PHQ-2  01/01/2019     FALL RISK ASSESSMENT  11/19/2019     A1C Lab  12/05/2019

## 2019-12-12 RX ORDER — LISINOPRIL 5 MG/1
5 TABLET ORAL DAILY
Qty: 90 TABLET | Refills: 3 | Status: SHIPPED | OUTPATIENT
Start: 2019-12-12 | End: 2020-12-11

## 2019-12-16 DIAGNOSIS — E78.5 HYPERLIPIDEMIA LDL GOAL <100: ICD-10-CM

## 2019-12-16 DIAGNOSIS — I77.9 RIGHT-SIDED CAROTID ARTERY DISEASE, UNSPECIFIED TYPE (H): ICD-10-CM

## 2019-12-16 NOTE — TELEPHONE ENCOUNTER
"Requested Prescriptions   Pending Prescriptions Disp Refills     atorvastatin (LIPITOR) 40 MG tablet [Pharmacy Med Name: Atorvastatin Calcium  Last Written Prescription Date:  9/17/2019  Last Fill Quantity: 90,  # refills: 0   Last office visit: 12/9/2019 with prescribing provider:     Future Office Visit:   Next 5 appointments (look out 90 days)    Jan 27, 2020 10:45 AM CST  Return Visit with Davi Rebolledo MD  [facility] ([facility]) [address] [place] [id number] [phone] OPT 2        Oral Tablet 40 MG] 90 tablet 0     Sig: TAKE ONE TABLET BY MOUTH ONE TIME DAILY       Statins Protocol Passed - 12/16/2019  8:42 AM        Passed - LDL on file in past 12 months     Recent Labs   Lab Test 12/09/19  0740   LDL 29             Passed - No abnormal creatine kinase in past 12 months     No lab results found.             Passed - Recent (12 mo) or future (30 days) visit within the authorizing provider's specialty     Patient has had an office visit with the authorizing provider or a provider within the authorizing providers department within the previous 12 mos or has a future within next 30 days. See \"Patient Info\" tab in inbasket, or \"Choose Columns\" in Meds & Orders section of the refill encounter.              Passed - Medication is active on med list        Passed - Patient is age 18 or older        "

## 2019-12-17 RX ORDER — ATORVASTATIN CALCIUM 40 MG/1
TABLET, FILM COATED ORAL
Qty: 90 TABLET | Refills: 3 | Status: SHIPPED | OUTPATIENT
Start: 2019-12-17 | End: 2020-12-11

## 2020-01-02 ENCOUNTER — TELEPHONE (OUTPATIENT)
Dept: OTHER | Facility: CLINIC | Age: 80
End: 2020-01-02

## 2020-01-27 ENCOUNTER — OFFICE VISIT (OUTPATIENT)
Dept: CARDIOLOGY | Facility: CLINIC | Age: 80
End: 2020-01-27
Attending: NURSE PRACTITIONER
Payer: COMMERCIAL

## 2020-01-27 VITALS
SYSTOLIC BLOOD PRESSURE: 138 MMHG | WEIGHT: 202 LBS | HEIGHT: 73 IN | BODY MASS INDEX: 26.77 KG/M2 | HEART RATE: 52 BPM | DIASTOLIC BLOOD PRESSURE: 76 MMHG

## 2020-01-27 DIAGNOSIS — I25.10 CORONARY ARTERY DISEASE INVOLVING NATIVE CORONARY ARTERY OF NATIVE HEART WITHOUT ANGINA PECTORIS: ICD-10-CM

## 2020-01-27 PROCEDURE — 99214 OFFICE O/P EST MOD 30 MIN: CPT | Performed by: INTERNAL MEDICINE

## 2020-01-27 ASSESSMENT — MIFFLIN-ST. JEOR: SCORE: 1685.15

## 2020-01-27 NOTE — LETTER
1/27/2020      Sujit Duke MD  [address] [facility] [address]      RE: Merlin J Anderson       Dear Colleague,    I had the pleasure of seeing Merlin J Anderson in the [facility].    Service Date: 01/27/2020      REASON FOR CLINIC VISIT:  Followup CAD.      HISTORY OF PRESENT ILLNESS:  Mr. Warner is a very pleasant 79-year-old gentleman who I saw on an urgent basis in 10/2018 for an abnormal stress test, which was done for exertional dyspnea.  This was a highly abnormal stress test indicative of multivessel coronary artery disease, which was confirmed on subsequent coronary angiogram that showed distal left main disease, 90% stenosis involving the LAD, circumflex and 75% stenosis involving the RCA.  He underwent urgent CABG with LIMA to LAD, SVG to RPDA, SVG to OM2 and SVG to left posterolateral branch of circumflex.  He did quite well in the postop.  Today, he is coming for routine followup.  To be noted, the patient also has history of carotid artery atherosclerotic disease with history of right CEA and about 70% disease in the left carotid artery, being followed by Dr. Scott.  The patient has no specific health complaints or cardiac complaints.  He was fairly active during the summer months, doing regular biking.  No exertional chest discomfort or shortness of breath.        He is on aspirin 325 mg daily, he is on atenolol 100 mg daily, Lipitor 40 mg daily, lisinopril 5 mg daily.        Lipid panel done last month showed LDL well controlled at 29, HDL 42, triglycerides 125.        He does not use any tobacco.        Echocardiogram at the time of his cardiac surgery showed normal LV function.      PHYSICAL EXAMINATION:   VITAL SIGNS:  Blood pressure 138/76, heart rate 52 and regular, weight 202 pounds, BMI 26.65.   GENERAL:  The patient appears pleasant, comfortable.   NECK:  Normal JVP, no bruit.   CARDIOVASCULAR SYSTEM:  S1, S2 normal.  No murmur, rub or gallop.    RESPIRATORY SYSTEM:  Clear to auscultation bilaterally.   GASTROINTESTINAL SYSTEM:  Abdomen soft, nontender.   EXTREMITIES:  No pitting pedal edema.   NEUROLOGICAL:  Alert, oriented x3.   PSYCHIATRIC:  Normal affect.   SKIN:  No obvious rash.   HEENT:  No pallor or icterus.      IMPRESSION AND PLAN:  A pleasant 79-year-old gentleman with history of coronary artery disease, status post coronary artery bypass graft, other comorbidities of carotid artery atherosclerotic disease, being followed by Dr. Scott, dyslipidemia, hypertension, diabetes.  Cardiac status-wise, patient is doing well without any symptoms of angina or congestive heart failure.  He is on appropriate CAD medication therapy of aspirin, high-intensity statin, beta blocker and ACE inhibitor.  LDL and blood pressure are both well controlled.  If he continues to feel stable cardiac status-wise, we can see him back in 1 year, sooner if he notes any change in clinical status, especially if any exertional-related symptoms.         VICTORIANO NUNES MD             D: 2020   T: 2020   MT: JARED      Name:     ANDERSON, MERLIN   MRN:      [id number]        Account:      [id number]   :      1940           Service Date: 2020      Document: [id number]         Outpatient Encounter Medications as of 2020   Medication Sig Dispense Refill     acetaminophen (TYLENOL) 325 MG tablet Take 2 tablets (650 mg) by mouth every 4 hours as needed for mild pain 100 tablet      aspirin 325 MG EC tablet Take 1 tablet (325 mg) by mouth daily 40 tablet      atenolol (TENORMIN) 100 MG tablet Take 1 tablet (100 mg) by mouth daily 90 tablet 3     atorvastatin (LIPITOR) 40 MG tablet TAKE ONE TABLET BY MOUTH ONE TIME DAILY  90 tablet 3     blood glucose calibration (ACCU-CHEK PRISCILLA) solution Use to calibrate blood glucose monitor as needed as directed. 1 Bottle 0     blood glucose monitoring (ACCU-CHEK PRISCILLA) test strip Use to test blood sugars 1 times daily  or as directed. 100 each 1     blood glucose monitoring (ACCU-CHEK MULTICLIX) lancets Use to test blood sugar 1 times daily or as directed. 1 Box 1     blood glucose monitoring (NO BRAND SPECIFIED) meter device kit Use to test blood sugar 1 times daily or as directed. 1 kit 0     lisinopril (PRINIVIL/ZESTRIL) 5 MG tablet Take 1 tablet (5 mg) by mouth daily 90 tablet 3     metFORMIN (GLUCOPHAGE) 500 MG tablet TAKE ONE TABLET BY MOUTH TWICE DAILY WITH MEALS 180 tablet 0     multivitamin, therapeutic with minerals (MULTI-VITAMIN) TABS tablet Take 1 tablet by mouth every morning       tamsulosin (FLOMAX) 0.4 MG capsule TAKE ONE CAPSULE BY MOUTH ONE TIME DAILY  90 capsule 1     doxycycline hyclate (VIBRAMYCIN) 100 MG capsule Take 1 capsule (100 mg) by mouth 2 times daily 20 capsule 0     nitroGLYcerin (NITROSTAT) 0.4 MG sublingual tablet For chest pain place 1 tablet under the tongue every 5 minutes for 3 doses. If symptoms persist 5 minutes after 1st dose call 911. (Patient not taking: Reported on 2020) 25 tablet 3     [] order for DME Equipment being ordered: knee high ARI stockings 10-15 H2O pressure 2 Package 1     [] order for DME Equipment being ordered: blood pressure monitor 1 Device 0     No facility-administered encounter medications on file as of 2020.        Again, thank you for allowing me to participate in the care of your patient.      Sincerely,    Davi Reboleldo MD     [facility]

## 2020-01-27 NOTE — PROGRESS NOTES
HPI and Plan:   See dictation(#[id number])    Orders Placed This Encounter   Procedures     Follow-Up with Cardiologist       No orders of the defined types were placed in this encounter.      There are no discontinued medications.      Encounter Diagnosis   Name Primary?     Coronary artery disease involving native coronary artery of native heart without angina pectoris        CURRENT MEDICATIONS:  Current Outpatient Medications   Medication Sig Dispense Refill     acetaminophen (TYLENOL) 325 MG tablet Take 2 tablets (650 mg) by mouth every 4 hours as needed for mild pain 100 tablet      aspirin 325 MG EC tablet Take 1 tablet (325 mg) by mouth daily 40 tablet      atenolol (TENORMIN) 100 MG tablet Take 1 tablet (100 mg) by mouth daily 90 tablet 3     atorvastatin (LIPITOR) 40 MG tablet TAKE ONE TABLET BY MOUTH ONE TIME DAILY  90 tablet 3     blood glucose calibration (ACCU-CHEK PRISCILLA) solution Use to calibrate blood glucose monitor as needed as directed. 1 Bottle 0     blood glucose monitoring (ACCU-CHEK PRISCILLA) test strip Use to test blood sugars 1 times daily or as directed. 100 each 1     blood glucose monitoring (ACCU-CHEK MULTICLIX) lancets Use to test blood sugar 1 times daily or as directed. 1 Box 1     blood glucose monitoring (NO BRAND SPECIFIED) meter device kit Use to test blood sugar 1 times daily or as directed. 1 kit 0     lisinopril (PRINIVIL/ZESTRIL) 5 MG tablet Take 1 tablet (5 mg) by mouth daily 90 tablet 3     metFORMIN (GLUCOPHAGE) 500 MG tablet TAKE ONE TABLET BY MOUTH TWICE DAILY WITH MEALS 180 tablet 0     multivitamin, therapeutic with minerals (MULTI-VITAMIN) TABS tablet Take 1 tablet by mouth every morning       tamsulosin (FLOMAX) 0.4 MG capsule TAKE ONE CAPSULE BY MOUTH ONE TIME DAILY  90 capsule 1     doxycycline hyclate (VIBRAMYCIN) 100 MG capsule Take 1 capsule (100 mg) by mouth 2 times daily 20 capsule 0     nitroGLYcerin (NITROSTAT) 0.4 MG sublingual tablet For chest pain place 1 tablet  under the tongue every 5 minutes for 3 doses. If symptoms persist 5 minutes after 1st dose call 911. (Patient not taking: Reported on 1/27/2020) 25 tablet 3       ALLERGIES   No Known Allergies    PAST MEDICAL HISTORY:  Past Medical History:   Diagnosis Date     Abnormal stress test      Carotid arterial disease (H) 9/18/2017     CKD (chronic kidney disease) stage 3, GFR 30-59 ml/min (H) 12/27/2011     DM2 (diabetes mellitus, type 2) (H)     Pre diabetic     LUNA (dyspnea on exertion)      Essential hypertension, benign      Gout 11/14/2002     Problem list name updated by automated process. Provider to review     HTN (hypertension) 6/29/2009     (Problem list name updated by automated process. Provider to review and confirm.)     Hyperlipidemia LDL goal <100 10/31/2010     Mixed hyperlipidemia 11/14/2002     PVD (peripheral vascular disease) (H) 9/8/2017     S/P CABG (coronary artery bypass graft) 10/26/2018     Type 2 diabetes mellitus without complication (H) 11/30/2015       PAST SURGICAL HISTORY:  Past Surgical History:   Procedure Laterality Date     BYPASS GRAFT ARTERY CORONARY N/A 10/26/2018    Procedure: CORONARY ARTERY BYPASS GRAFTING X 4 WITH LEFT LEG ENDOSCOPIC VEIN HARVESTING LIMA - LAD  SV- RIGHT PDA, OM2, OM1 ON PUMP/BETH IAPB PLACED PER CATH LAB;  Surgeon: Heath Kessler MD;  Location:  OR     C NONSPECIFIC PROCEDURE  04/97    Neg. colonoscopy.     C NONSPECIFIC PROCEDURE      S/P ing. hernia.     COLONOSCOPY       DECOMPRESSION, FUSION LUMBAR POSTERIOR ONE LEVEL, COMBINED  8/24/2012    Procedure: COMBINED DECOMPRESSION, FUSION LUMBAR POSTERIOR ONE LEVEL;  Posterior Fusion wtih Decompression L4-5;  Surgeon: Rod Carbajal MD;  Location: [facility] OR     ENDARTERECTOMY CAROTID Right 9/18/2017    Procedure: ENDARTERECTOMY CAROTID;  RIGHT CAROTID ENDARTERECTOMY, WITH PATCH ANGIOPLASTY, WITH ELECTOENCEPHALOGAM            (EEG);  Surgeon: Catalino Scott MD;  Location: [facility] OR     HERNIA  REPAIR         FAMILY HISTORY:  Family History   Problem Relation Age of Onset     Diabetes Father      Unknown/Adopted Mother        SOCIAL HISTORY:  Social History     Socioeconomic History     Marital status:      Spouse name: None     Number of children: None     Years of education: None     Highest education level: None   Occupational History     None   Social Needs     Financial resource strain: None     Food insecurity:     Worry: None     Inability: None     Transportation needs:     Medical: None     Non-medical: None   Tobacco Use     Smoking status: Never Smoker     Smokeless tobacco: Never Used   Substance and Sexual Activity     Alcohol use: Yes     Comment: 1 monthly or less.     Drug use: No     Sexual activity: Yes     Partners: Female   Lifestyle     Physical activity:     Days per week: None     Minutes per session: None     Stress: None   Relationships     Social connections:     Talks on phone: None     Gets together: None     Attends Yarsani service: None     Active member of club or organization: None     Attends meetings of clubs or organizations: None     Relationship status: None     Intimate partner violence:     Fear of current or ex partner: None     Emotionally abused: None     Physically abused: None     Forced sexual activity: None   Other Topics Concern     Parent/sibling w/ CABG, MI or angioplasty before 65F 55M? Not Asked      Service Not Asked     Blood Transfusions Not Asked     Caffeine Concern No     Occupational Exposure No     Hobby Hazards No     Sleep Concern No     Stress Concern No     Weight Concern No     Special Diet No     Back Care No     Exercise Yes     Comment: bikes, treadmill      Bike Helmet Not Asked     Seat Belt Yes     Self-Exams Not Asked   Social History Narrative     None       Review of Systems:  Skin:  Negative rash on left leg since operation    Eyes:  Positive for glasses    ENT:  Negative      Respiratory:  Negative      "  Cardiovascular:  Negative      Gastroenterology: Negative      Genitourinary:  Negative      Musculoskeletal:  Negative      Neurologic:  Negative      Psychiatric:  Negative      Heme/Lymph/Imm:  Negative      Endocrine:  Positive for diabetes Type II    Physical Exam:  Vitals: /76   Pulse 52   Ht 1.854 m (6' 1\")   Wt 91.6 kg (202 lb)   BMI 26.65 kg/m      Constitutional:           Skin:             Head:           Eyes:           Lymph:      ENT:           Neck:           Respiratory:            Cardiac:                                                           GI:           Extremities and Muscular Skeletal:                 Neurological:           Psych:             CC  Loreto Pop, APRN CNP  [address]  [place] [address]                  "

## 2020-01-27 NOTE — LETTER
1/27/2020    Sujit Duke MD  [address] [facility] [address]    RE: Merlin J Anderson       Dear Colleague,    I had the pleasure of seeing Merlin J Anderson in the [facility].    HPI and Plan:   See dictation(#[id number])    Orders Placed This Encounter   Procedures     Follow-Up with Cardiologist       No orders of the defined types were placed in this encounter.      There are no discontinued medications.      Encounter Diagnosis   Name Primary?     Coronary artery disease involving native coronary artery of native heart without angina pectoris        CURRENT MEDICATIONS:  Current Outpatient Medications   Medication Sig Dispense Refill     acetaminophen (TYLENOL) 325 MG tablet Take 2 tablets (650 mg) by mouth every 4 hours as needed for mild pain 100 tablet      aspirin 325 MG EC tablet Take 1 tablet (325 mg) by mouth daily 40 tablet      atenolol (TENORMIN) 100 MG tablet Take 1 tablet (100 mg) by mouth daily 90 tablet 3     atorvastatin (LIPITOR) 40 MG tablet TAKE ONE TABLET BY MOUTH ONE TIME DAILY  90 tablet 3     blood glucose calibration (ACCU-CHEK PRISCILLA) solution Use to calibrate blood glucose monitor as needed as directed. 1 Bottle 0     blood glucose monitoring (ACCU-CHEK PRISCILLA) test strip Use to test blood sugars 1 times daily or as directed. 100 each 1     blood glucose monitoring (ACCU-CHEK MULTICLIX) lancets Use to test blood sugar 1 times daily or as directed. 1 Box 1     blood glucose monitoring (NO BRAND SPECIFIED) meter device kit Use to test blood sugar 1 times daily or as directed. 1 kit 0     lisinopril (PRINIVIL/ZESTRIL) 5 MG tablet Take 1 tablet (5 mg) by mouth daily 90 tablet 3     metFORMIN (GLUCOPHAGE) 500 MG tablet TAKE ONE TABLET BY MOUTH TWICE DAILY WITH MEALS 180 tablet 0     multivitamin, therapeutic with minerals (MULTI-VITAMIN) TABS tablet Take 1 tablet by mouth every morning       tamsulosin (FLOMAX) 0.4 MG capsule TAKE ONE CAPSULE BY  MOUTH ONE TIME DAILY  90 capsule 1     doxycycline hyclate (VIBRAMYCIN) 100 MG capsule Take 1 capsule (100 mg) by mouth 2 times daily 20 capsule 0     nitroGLYcerin (NITROSTAT) 0.4 MG sublingual tablet For chest pain place 1 tablet under the tongue every 5 minutes for 3 doses. If symptoms persist 5 minutes after 1st dose call 911. (Patient not taking: Reported on 1/27/2020) 25 tablet 3       ALLERGIES   No Known Allergies    PAST MEDICAL HISTORY:  Past Medical History:   Diagnosis Date     Abnormal stress test      Carotid arterial disease (H) 9/18/2017     CKD (chronic kidney disease) stage 3, GFR 30-59 ml/min (H) 12/27/2011     DM2 (diabetes mellitus, type 2) (H)     Pre diabetic     LUNA (dyspnea on exertion)      Essential hypertension, benign      Gout 11/14/2002     Problem list name updated by automated process. Provider to review     HTN (hypertension) 6/29/2009     (Problem list name updated by automated process. Provider to review and confirm.)     Hyperlipidemia LDL goal <100 10/31/2010     Mixed hyperlipidemia 11/14/2002     PVD (peripheral vascular disease) (H) 9/8/2017     S/P CABG (coronary artery bypass graft) 10/26/2018     Type 2 diabetes mellitus without complication (H) 11/30/2015       PAST SURGICAL HISTORY:  Past Surgical History:   Procedure Laterality Date     BYPASS GRAFT ARTERY CORONARY N/A 10/26/2018    Procedure: CORONARY ARTERY BYPASS GRAFTING X 4 WITH LEFT LEG ENDOSCOPIC VEIN HARVESTING LIMA - LAD  SV- RIGHT PDA, OM2, OM1 ON PUMP/BETH IAPB PLACED PER CATH LAB;  Surgeon: Heath Kessler MD;  Location:  OR      NONSPECIFIC PROCEDURE  04/97    Neg. colonoscopy.     C NONSPECIFIC PROCEDURE      S/P ing. hernia.     COLONOSCOPY       DECOMPRESSION, FUSION LUMBAR POSTERIOR ONE LEVEL, COMBINED  8/24/2012    Procedure: COMBINED DECOMPRESSION, FUSION LUMBAR POSTERIOR ONE LEVEL;  Posterior Fusion wtih Decompression L4-5;  Surgeon: Rod Carbajal MD;  Location:  OR      ENDARTERECTOMY CAROTID Right 9/18/2017    Procedure: ENDARTERECTOMY CAROTID;  RIGHT CAROTID ENDARTERECTOMY, WITH PATCH ANGIOPLASTY, WITH ELECTOENCEPHALOGAM            (EEG);  Surgeon: Catalino Scott MD;  Location: [facility] OR     HERNIA REPAIR         FAMILY HISTORY:  Family History   Problem Relation Age of Onset     Diabetes Father      Unknown/Adopted Mother        SOCIAL HISTORY:  Social History     Socioeconomic History     Marital status:      Spouse name: None     Number of children: None     Years of education: None     Highest education level: None   Occupational History     None   Social Needs     Financial resource strain: None     Food insecurity:     Worry: None     Inability: None     Transportation needs:     Medical: None     Non-medical: None   Tobacco Use     Smoking status: Never Smoker     Smokeless tobacco: Never Used   Substance and Sexual Activity     Alcohol use: Yes     Comment: 1 monthly or less.     Drug use: No     Sexual activity: Yes     Partners: Female   Lifestyle     Physical activity:     Days per week: None     Minutes per session: None     Stress: None   Relationships     Social connections:     Talks on phone: None     Gets together: None     Attends Mosque service: None     Active member of club or organization: None     Attends meetings of clubs or organizations: None     Relationship status: None     Intimate partner violence:     Fear of current or ex partner: None     Emotionally abused: None     Physically abused: None     Forced sexual activity: None   Other Topics Concern     Parent/sibling w/ CABG, MI or angioplasty before 65F 55M? Not Asked      Service Not Asked     Blood Transfusions Not Asked     Caffeine Concern No     Occupational Exposure No     Hobby Hazards No     Sleep Concern No     Stress Concern No     Weight Concern No     Special Diet No     Back Care No     Exercise Yes     Comment: bikes, treadmill      Bike Helmet Not Asked     Seat  "Belt Yes     Self-Exams Not Asked   Social History Narrative     None       Review of Systems:  Skin:  Negative rash on left leg since operation    Eyes:  Positive for glasses    ENT:  Negative      Respiratory:  Negative       Cardiovascular:  Negative      Gastroenterology: Negative      Genitourinary:  Negative      Musculoskeletal:  Negative      Neurologic:  Negative      Psychiatric:  Negative      Heme/Lymph/Imm:  Negative      Endocrine:  Positive for diabetes Type II    Physical Exam:  Vitals: /76   Pulse 52   Ht 1.854 m (6' 1\")   Wt 91.6 kg (202 lb)   BMI 26.65 kg/m       Constitutional:           Skin:             Head:           Eyes:           Lymph:      ENT:           Neck:           Respiratory:            Cardiac:                                                           GI:           Extremities and Muscular Skeletal:                 Neurological:           Psych:             CC  DIRK Smith CNP  [address]  [place] [address]                    Thank you for allowing me to participate in the care of your patient.      Sincerely,     Davi Rebolledo MD     [facility]    cc:   DIRK Smith CNP  [address]  [place] [address]        "

## 2020-01-28 NOTE — PROGRESS NOTES
Service Date: 01/27/2020      REASON FOR CLINIC VISIT:  Followup CAD.      HISTORY OF PRESENT ILLNESS:  Mr. Warner is a very pleasant 79-year-old gentleman who I saw on an urgent basis in 10/2018 for an abnormal stress test, which was done for exertional dyspnea.  This was a highly abnormal stress test indicative of multivessel coronary artery disease, which was confirmed on subsequent coronary angiogram that showed distal left main disease, 90% stenosis involving the LAD, circumflex and 75% stenosis involving the RCA.  He underwent urgent CABG with LIMA to LAD, SVG to RPDA, SVG to OM2 and SVG to left posterolateral branch of circumflex.  He did quite well in the postop.  Today, he is coming for routine followup.  To be noted, the patient also has history of carotid artery atherosclerotic disease with history of right CEA and about 70% disease in the left carotid artery, being followed by Dr. Scott.  The patient has no specific health complaints or cardiac complaints.  He was fairly active during the summer months, doing regular biking.  No exertional chest discomfort or shortness of breath.        He is on aspirin 325 mg daily, he is on atenolol 100 mg daily, Lipitor 40 mg daily, lisinopril 5 mg daily.        Lipid panel done last month showed LDL well controlled at 29, HDL 42, triglycerides 125.        He does not use any tobacco.        Echocardiogram at the time of his cardiac surgery showed normal LV function.      PHYSICAL EXAMINATION:   VITAL SIGNS:  Blood pressure 138/76, heart rate 52 and regular, weight 202 pounds, BMI 26.65.   GENERAL:  The patient appears pleasant, comfortable.   NECK:  Normal JVP, no bruit.   CARDIOVASCULAR SYSTEM:  S1, S2 normal.  No murmur, rub or gallop.   RESPIRATORY SYSTEM:  Clear to auscultation bilaterally.   GASTROINTESTINAL SYSTEM:  Abdomen soft, nontender.   EXTREMITIES:  No pitting pedal edema.   NEUROLOGICAL:  Alert, oriented x3.   PSYCHIATRIC:  Normal affect.   SKIN:  No  obvious rash.   HEENT:  No pallor or icterus.      IMPRESSION AND PLAN:  A pleasant 79-year-old gentleman with history of coronary artery disease, status post coronary artery bypass graft, other comorbidities of carotid artery atherosclerotic disease, being followed by Dr. Scott, dyslipidemia, hypertension, diabetes.  Cardiac status-wise, patient is doing well without any symptoms of angina or congestive heart failure.  He is on appropriate CAD medication therapy of aspirin, high-intensity statin, beta blocker and ACE inhibitor.  LDL and blood pressure are both well controlled.  If he continues to feel stable cardiac status-wise, we can see him back in 1 year, sooner if he notes any change in clinical status, especially if any exertional-related symptoms.         VICTORIANO NUNES MD             D: 2020   T: 2020   MT: JARED      Name:     ANDERSON, MERLIN   MRN:      [id number]        Account:      [id number]   :      1940           Service Date: 2020      Document: [id number]

## 2020-01-29 ENCOUNTER — OFFICE VISIT (OUTPATIENT)
Dept: SURGERY | Facility: CLINIC | Age: 80
End: 2020-01-29
Attending: SURGERY
Payer: COMMERCIAL

## 2020-01-29 ENCOUNTER — HOSPITAL ENCOUNTER (OUTPATIENT)
Dept: ULTRASOUND IMAGING | Facility: CLINIC | Age: 80
Discharge: HOME | End: 2020-01-29
Attending: SURGERY | Admitting: SURGERY
Payer: COMMERCIAL

## 2020-01-29 VITALS
WEIGHT: 202 LBS | OXYGEN SATURATION: 97 % | BODY MASS INDEX: 26.77 KG/M2 | DIASTOLIC BLOOD PRESSURE: 88 MMHG | HEART RATE: 53 BPM | HEIGHT: 73 IN | RESPIRATION RATE: 16 BRPM | SYSTOLIC BLOOD PRESSURE: 136 MMHG

## 2020-01-29 DIAGNOSIS — I77.9 CAROTID ARTERY DISEASE (H): ICD-10-CM

## 2020-01-29 DIAGNOSIS — Z98.890 HISTORY OF RIGHT-SIDED CAROTID ENDARTERECTOMY: Primary | ICD-10-CM

## 2020-01-29 DIAGNOSIS — I65.22 ASYMPTOMATIC STENOSIS OF LEFT CAROTID ARTERY: ICD-10-CM

## 2020-01-29 DIAGNOSIS — I65.29 CAROTID STENOSIS: ICD-10-CM

## 2020-01-29 PROCEDURE — 99213 OFFICE O/P EST LOW 20 MIN: CPT | Performed by: SURGERY

## 2020-01-29 PROCEDURE — 93880 EXTRACRANIAL BILAT STUDY: CPT

## 2020-01-29 ASSESSMENT — MIFFLIN-ST. JEOR: SCORE: 1685.15

## 2020-01-30 NOTE — PROGRESS NOTES
Merlin J Anderson is a 79-year-old gentleman with metabolic syndrome who is status post an asymptomatic right carotid endarterectomy performed by me on 9/18/2017.  He is status post CABG x4 in October 2018.  He presents today for annual carotid ultrasound follow-up.  Over the past year he denies stroke, symptoms of TIA, or amaurosis.    Exam:  Well-developed male in no acute distress.  Blood pressure 136/88 with a pulse of 53.  Well-healed right carotid endarterectomy scar.  Neurologic exam nonfocal.  2+ radial pulses bilaterally.    Imaging:  BILATERAL CAROTID ULTRASOUND January 29, 2020 1:38 PM      HISTORY: History of right carotid endarterectomy on 9/18/17. Carotid  artery disease.     COMPARISON: 1/30/2019.      RIGHT CAROTID FINDINGS: Sequela of right carotid endarterectomy.   Right ICA PSV: 120 cm/sec.   Right ICA EDV: 29 cm/sec.   Right ICA/CCA PSV Ratio: 1.3.   These indicate Less than 50% diameter stenosis of the right ICA.   Right Vertebral: Antegrade flow.    Right ECA: Antegrade flow.      LEFT CAROTID FINDINGS: There is calcified and noncalcified  atherosclerotic plaque in the carotid bifurcation.   Left ICA PSV: 243 cm/sec.  Left ICA EDV: 57 cm/sec.  Left ICA/CCA PSV Ratio: 2.4.    These indicate 50 - 69% diameter stenosis of the left ICA.    Left Vertebral: Antegrade flow.    Left ECA: Antegrade flow.      Causes of Decreased Accuracy: None.                                                                       IMPRESSION:    1. Less than 50% diameter stenosis of the right internal carotid  artery relative to the distal internal carotid artery diameter.   2. 50 - 69% diameter stenosis of the left internal carotid artery  relative to the distal internal carotid artery diameter. This appears  slightly improved compared to previous ultrasound 1/30/2019, however  the difference is probably due to scanning technique.      TAMEKA ZENDEJAS MD    ASSESSMENT:  1.  2 years status post asymptomatic right carotid  endarterectomy overall doing well.    2.  Asymptomatic 50 to 69% left carotid stenosis stable and not yet in need of repair.    RECOMMENDATION:  I reviewed all the above with Merlin and his wife.  I have no carotid concerns.  He will continue his medical regimen including daily aspirin.  Vascular surgical follow-up will be with me in 1 year for repeat bilateral carotid ultrasonography.    Total face-to-face time was 15 minutes, greater than 50% spent providing counseling and education.    Gabriel Scott MD

## 2020-03-17 DIAGNOSIS — J30.89 SEASONAL ALLERGIC RHINITIS DUE TO OTHER ALLERGIC TRIGGER: Primary | ICD-10-CM

## 2020-03-17 NOTE — TELEPHONE ENCOUNTER
Flonase      Last Written Prescription Date:  08/08/17  Last Fill Quantity: 16,   # refills: 0  Last Office Visit: 12/09/19  Future Office visit:       Routing refill request to provider for review/approval because:  Medication is reported/historical

## 2020-03-18 RX ORDER — FLUTICASONE PROPIONATE 50 MCG
1-2 SPRAY, SUSPENSION (ML) NASAL DAILY PRN
Qty: 16 G | Refills: 4 | Status: SHIPPED | OUTPATIENT
Start: 2020-03-18

## 2020-03-18 NOTE — TELEPHONE ENCOUNTER
Pharmacy asking for FLonase prescription.     This is discontinued. Last refilled in 2018.  Provider approval needed.     Last OV on 12/9/19

## 2020-05-05 DIAGNOSIS — R33.9 URINARY RETENTION: ICD-10-CM

## 2020-05-06 RX ORDER — TAMSULOSIN HYDROCHLORIDE 0.4 MG/1
0.4 CAPSULE ORAL DAILY
Qty: 90 CAPSULE | Refills: 0 | Status: SHIPPED | OUTPATIENT
Start: 2020-05-06 | End: 2020-08-11

## 2020-05-06 NOTE — TELEPHONE ENCOUNTER
"Tamsulosin refill request  Pres[place] [facility] [place]      Last OV on 12/9/19    Requested Prescriptions   Pending Prescriptions Disp Refills     tamsulosin (FLOMAX) 0.4 MG capsule 90 capsule 1     Sig: Take 1 capsule (0.4 mg) by mouth daily       Alpha Blockers Passed - 5/5/2020  3:38 PM        Passed - Blood pressure under 140/90 in past 12 months     BP Readings from Last 3 Encounters:   01/29/20 136/88   01/27/20 138/76   12/09/19 138/60                 Passed - Recent (12 mo) or future (30 days) visit within the authorizing provider's specialty     Patient has had an office visit with the authorizing provider or a provider within the authorizing providers department within the previous 12 mos or has a future within next 30 days. See \"Patient Info\" tab in inbasket, or \"Choose Columns\" in Meds & Orders section of the refill encounter.              Passed - Patient does not have Tadalafil, Vardenafil, or Sildenafil on their medication list        Passed - Medication is active on med list        Passed - Patient is 18 years of age or older             "

## 2020-08-10 DIAGNOSIS — E11.9 TYPE 2 DIABETES MELLITUS WITHOUT COMPLICATION, WITHOUT LONG-TERM CURRENT USE OF INSULIN (H): ICD-10-CM

## 2020-08-10 DIAGNOSIS — R33.9 URINARY RETENTION: ICD-10-CM

## 2020-08-11 RX ORDER — TAMSULOSIN HYDROCHLORIDE 0.4 MG/1
CAPSULE ORAL
Qty: 90 CAPSULE | Refills: 0 | Status: SHIPPED | OUTPATIENT
Start: 2020-08-11 | End: 2020-11-11

## 2020-08-11 NOTE — TELEPHONE ENCOUNTER
"Flomax refill and Metformin refills.   Last OV on 12/9/19   Due for appt. Call to pts wife, as he was napping.     They are concerned about him coming to the clinic and being exposed to COVID. Reassured her about precautions taken. Also informed her that he can schedule a VIRTUAL visit instead, if this makes him more comfortable, but will need lab work.   She states she will let him know and have him call to schedule.     Provider approval needed.   Creatinine   Date Value Ref Range Status   12/09/2019 1.63 (H) 0.66 - 1.25 mg/dL Final         Requested Prescriptions   Pending Prescriptions Disp Refills     tamsulosin (FLOMAX) 0.4 MG capsule [Pharmacy Med Name: Tamsulosin HCl Oral Capsule 0.4 MG] 90 capsule 0     Sig: TAKE ONE CAPSULE (0.4MG) BY MOUTH ONE TIME DAILY       Alpha Blockers Passed - 8/10/2020  9:49 AM        Passed - Blood pressure under 140/90 in past 12 months     BP Readings from Last 3 Encounters:   01/29/20 136/88   01/27/20 138/76   12/09/19 138/60                 Passed - Recent (12 mo) or future (30 days) visit within the authorizing provider's specialty     Patient has had an office visit with the authorizing provider or a provider within the authorizing providers department within the previous 12 mos or has a future within next 30 days. See \"Patient Info\" tab in inbasket, or \"Choose Columns\" in Meds & Orders section of the refill encounter.              Passed - Patient does not have Tadalafil, Vardenafil, or Sildenafil on their medication list        Passed - Medication is active on med list        Passed - Patient is 18 years of age or older           metFORMIN (GLUCOPHAGE) 500 MG tablet [Pharmacy Med Name: metFORMIN HCl Oral Tablet 500 MG] 180 tablet 0     Sig: TAKE ONE TABLET BY MOUTH TWICE A DAY WITH MEALS       Biguanide Agents Failed - 8/10/2020  9:49 AM        Failed - Patient has documented A1c within the specified period of time.     If HgbA1C is 8 or greater, it needs to be on file " "within the past 3 months.  If less than 8, must be on file within the past 6 months.     Recent Labs   Lab Test 12/09/19  0740   A1C 5.5             Failed - Patient's CR is NOT>1.4 OR Patient's EGFR is NOT<45 within past 12 mos.     Recent Labs   Lab Test 12/09/19  0740   GFRESTIMATED 39*   GFRESTBLACK 46*       Recent Labs   Lab Test 12/09/19  0740   CR 1.63*             Failed - Recent (6 mo) or future (30 days) visit within the authorizing provider's specialty     Patient had office visit in the last 6 months or has a visit in the next 30 days with authorizing provider or within the authorizing provider's specialty.  See \"Patient Info\" tab in inbasket, or \"Choose Columns\" in Meds & Orders section of the refill encounter.            Passed - Patient is age 10 or older        Passed - Patient does NOT have a diagnosis of CHF.        Passed - Medication is active on med list             "

## 2020-10-16 ENCOUNTER — TELEPHONE (OUTPATIENT)
Dept: INTERNAL MEDICINE | Facility: CLINIC | Age: 80
End: 2020-10-16

## 2020-10-16 NOTE — TELEPHONE ENCOUNTER
10/16/2020    Call Regarding Preventive Health Screening annual wellness visit     Attempt 1    Comments:

## 2020-11-09 DIAGNOSIS — E11.9 TYPE 2 DIABETES MELLITUS WITHOUT COMPLICATION, WITHOUT LONG-TERM CURRENT USE OF INSULIN (H): ICD-10-CM

## 2020-11-09 DIAGNOSIS — R33.9 URINARY RETENTION: ICD-10-CM

## 2020-11-11 RX ORDER — TAMSULOSIN HYDROCHLORIDE 0.4 MG/1
CAPSULE ORAL
Qty: 90 CAPSULE | Refills: 0 | Status: SHIPPED | OUTPATIENT
Start: 2020-11-11 | End: 2021-02-09

## 2020-11-11 NOTE — TELEPHONE ENCOUNTER
Routing refill request to provider for review/approval because:  Labs out of range:  cr  Labs not current:  a1c  Rosalba Tyson RN, BSN

## 2020-12-08 ASSESSMENT — ENCOUNTER SYMPTOMS
JOINT SWELLING: 0
DIARRHEA: 0
HEMATOCHEZIA: 0
DYSURIA: 0
HEADACHES: 0
ABDOMINAL PAIN: 0
PALPITATIONS: 0
NAUSEA: 0
NERVOUS/ANXIOUS: 0
EYE PAIN: 0
COUGH: 0
MYALGIAS: 0
CHILLS: 0
FREQUENCY: 1
CONSTIPATION: 1
SHORTNESS OF BREATH: 0
PARESTHESIAS: 0
HEARTBURN: 0
WEAKNESS: 0
DIZZINESS: 0
HEMATURIA: 0
FEVER: 0
SORE THROAT: 0
ARTHRALGIAS: 1

## 2020-12-08 ASSESSMENT — ACTIVITIES OF DAILY LIVING (ADL): CURRENT_FUNCTION: NO ASSISTANCE NEEDED

## 2020-12-11 ENCOUNTER — OFFICE VISIT (OUTPATIENT)
Dept: INTERNAL MEDICINE | Facility: CLINIC | Age: 80
End: 2020-12-11
Payer: COMMERCIAL

## 2020-12-11 VITALS
HEART RATE: 60 BPM | OXYGEN SATURATION: 98 % | DIASTOLIC BLOOD PRESSURE: 70 MMHG | SYSTOLIC BLOOD PRESSURE: 160 MMHG | BODY MASS INDEX: 26.77 KG/M2 | WEIGHT: 202 LBS | TEMPERATURE: 98.1 F | HEIGHT: 73 IN

## 2020-12-11 DIAGNOSIS — E78.5 HYPERLIPIDEMIA LDL GOAL <100: ICD-10-CM

## 2020-12-11 DIAGNOSIS — I10 ESSENTIAL HYPERTENSION: ICD-10-CM

## 2020-12-11 DIAGNOSIS — Z00.00 ENCOUNTER FOR PREVENTATIVE ADULT HEALTH CARE EXAMINATION: Primary | ICD-10-CM

## 2020-12-11 DIAGNOSIS — Z12.5 SCREENING FOR PROSTATE CANCER: ICD-10-CM

## 2020-12-11 DIAGNOSIS — E11.9 TYPE 2 DIABETES MELLITUS WITHOUT COMPLICATION, WITHOUT LONG-TERM CURRENT USE OF INSULIN (H): ICD-10-CM

## 2020-12-11 DIAGNOSIS — I77.9 RIGHT-SIDED CAROTID ARTERY DISEASE, UNSPECIFIED TYPE (H): ICD-10-CM

## 2020-12-11 LAB
ALBUMIN UR-MCNC: >=300 MG/DL
APPEARANCE UR: CLEAR
BACTERIA #/AREA URNS HPF: ABNORMAL /HPF
BILIRUB UR QL STRIP: NEGATIVE
COLOR UR AUTO: YELLOW
ERYTHROCYTE [DISTWIDTH] IN BLOOD BY AUTOMATED COUNT: 12.8 % (ref 10–15)
GLUCOSE UR STRIP-MCNC: NEGATIVE MG/DL
HBA1C MFR BLD: 5.9 % (ref 0–5.6)
HCT VFR BLD AUTO: 37.1 % (ref 40–53)
HGB BLD-MCNC: 12.2 G/DL (ref 13.3–17.7)
HGB UR QL STRIP: NEGATIVE
HYALINE CASTS #/AREA URNS LPF: ABNORMAL /LPF
KETONES UR STRIP-MCNC: NEGATIVE MG/DL
LEUKOCYTE ESTERASE UR QL STRIP: ABNORMAL
MCH RBC QN AUTO: 31.2 PG (ref 26.5–33)
MCHC RBC AUTO-ENTMCNC: 32.9 G/DL (ref 31.5–36.5)
MCV RBC AUTO: 95 FL (ref 78–100)
NITRATE UR QL: NEGATIVE
NON-SQ EPI CELLS #/AREA URNS LPF: ABNORMAL /LPF
PH UR STRIP: 6 PH (ref 5–7)
PLATELET # BLD AUTO: 109 10E9/L (ref 150–450)
RBC # BLD AUTO: 3.91 10E12/L (ref 4.4–5.9)
RBC #/AREA URNS AUTO: ABNORMAL /HPF
SOURCE: ABNORMAL
SP GR UR STRIP: 1.02 (ref 1–1.03)
UROBILINOGEN UR STRIP-ACNC: 0.2 EU/DL (ref 0.2–1)
WBC # BLD AUTO: 6.2 10E9/L (ref 4–11)
WBC #/AREA URNS AUTO: ABNORMAL /HPF

## 2020-12-11 PROCEDURE — 81001 URINALYSIS AUTO W/SCOPE: CPT | Performed by: INTERNAL MEDICINE

## 2020-12-11 PROCEDURE — 85027 COMPLETE CBC AUTOMATED: CPT | Performed by: INTERNAL MEDICINE

## 2020-12-11 PROCEDURE — 99213 OFFICE O/P EST LOW 20 MIN: CPT | Mod: 25 | Performed by: INTERNAL MEDICINE

## 2020-12-11 PROCEDURE — 82043 UR ALBUMIN QUANTITATIVE: CPT | Performed by: INTERNAL MEDICINE

## 2020-12-11 PROCEDURE — 80061 LIPID PANEL: CPT | Performed by: INTERNAL MEDICINE

## 2020-12-11 PROCEDURE — 99397 PER PM REEVAL EST PAT 65+ YR: CPT | Performed by: INTERNAL MEDICINE

## 2020-12-11 PROCEDURE — 83036 HEMOGLOBIN GLYCOSYLATED A1C: CPT | Performed by: INTERNAL MEDICINE

## 2020-12-11 PROCEDURE — 80053 COMPREHEN METABOLIC PANEL: CPT | Performed by: INTERNAL MEDICINE

## 2020-12-11 PROCEDURE — 84443 ASSAY THYROID STIM HORMONE: CPT | Performed by: INTERNAL MEDICINE

## 2020-12-11 PROCEDURE — 36415 COLL VENOUS BLD VENIPUNCTURE: CPT | Performed by: INTERNAL MEDICINE

## 2020-12-11 PROCEDURE — G0103 PSA SCREENING: HCPCS | Performed by: INTERNAL MEDICINE

## 2020-12-11 RX ORDER — LISINOPRIL 5 MG/1
5 TABLET ORAL DAILY
Qty: 90 TABLET | Refills: 3 | Status: SHIPPED | OUTPATIENT
Start: 2020-12-11 | End: 2021-01-13

## 2020-12-11 RX ORDER — ATORVASTATIN CALCIUM 40 MG/1
40 TABLET, FILM COATED ORAL DAILY
Qty: 90 TABLET | Refills: 3 | Status: SHIPPED | OUTPATIENT
Start: 2020-12-11 | End: 2022-02-02

## 2020-12-11 ASSESSMENT — MIFFLIN-ST. JEOR: SCORE: 1680.15

## 2020-12-11 ASSESSMENT — ENCOUNTER SYMPTOMS
FREQUENCY: 1
NERVOUS/ANXIOUS: 0
SORE THROAT: 0
DYSURIA: 0
HEMATURIA: 0
WEAKNESS: 0
ARTHRALGIAS: 1
NAUSEA: 0
PALPITATIONS: 0
HEMATOCHEZIA: 0
JOINT SWELLING: 0
CHILLS: 0
PARESTHESIAS: 0
CONSTIPATION: 1
EYE PAIN: 0
ABDOMINAL PAIN: 0
SHORTNESS OF BREATH: 0
HEADACHES: 0
DIZZINESS: 0
MYALGIAS: 0
DIARRHEA: 0
COUGH: 0
HEARTBURN: 0
FEVER: 0

## 2020-12-11 ASSESSMENT — ACTIVITIES OF DAILY LIVING (ADL): CURRENT_FUNCTION: NO ASSISTANCE NEEDED

## 2020-12-11 NOTE — PROGRESS NOTES
"SUBJECTIVE:   Merlin J Anderson is a 80 year old male who presents for Preventive Visit.      Patient has been advised of split billing requirements and indicates understanding: Yes   Are you in the first 12 months of your Medicare coverage?  No    Healthy Habits:     In general, how would you rate your overall health?  Good    Frequency of exercise:  4-5 days/week    Duration of exercise:  15-30 minutes    Do you usually eat at least 4 servings of fruit and vegetables a day, include whole grains    & fiber and avoid regularly eating high fat or \"junk\" foods?  No    Taking medications regularly:  Yes    Medication side effects:  None    Ability to successfully perform activities of daily living:  No assistance needed    Home Safety:  No safety concerns identified    Hearing Impairment:  Difficulty following a conversation in a noisy restaurant or crowded room, feel that people are mumbling or not speaking clearly, need to ask people to speak up or repeat themselves and difficulty understanding soft or whispered speech    In the past 6 months, have you been bothered by leaking of urine?  No    In general, how would you rate your overall mental or emotional health?  Good      PHQ-2 Total Score: 0    Additional concerns today:  No    Do you feel safe in your environment? Yes    Have you ever done Advance Care Planning? (For example, a Health Directive, POLST, or a discussion with a medical provider or your loved ones about your wishes): Yes, patient states has an Advance Care Planning document and will bring a copy to the clinic.      Fall risk  Fallen 2 or more times in the past year?: No  Any fall with injury in the past year?: No    Cognitive Screening   1) Repeat 3 items (Leader, Season, Table)    2) Clock draw: NORMAL  3) 3 item recall: Recalls 3 objects  Results: 3 items recalled: COGNITIVE IMPAIRMENT LESS LIKELY    Mini-CogTM Copyright S Wally. Licensed by the author for use in [facility]; " reprinted with permission (sojennie@.[email or web address]). All rights reserved.      Do you have sleep apnea, excessive snoring or daytime drowsiness?: no    Reviewed and updated as needed this visit by clinical staff  Tobacco  Allergies  Meds   Med Hx  Surg Hx  Fam Hx  Soc Hx        Reviewed and updated as needed this visit by Provider                Social History     Tobacco Use     Smoking status: Never Smoker     Smokeless tobacco: Never Used   Substance Use Topics     Alcohol use: Yes     Comment: 1 monthly or less.     If you drink alcohol do you typically have >3 drinks per day or >7 drinks per week? No    Alcohol Use 12/8/2020   Prescreen: >3 drinks/day or >7 drinks/week? No   Prescreen: >3 drinks/day or >7 drinks/week? -           PROBLEMS TO ADD ON...  Has h/o HTN. on medical treatment. BP has been controlled. No side effects from medications. No CP, HA, dizziness. good compliance with medications and low salt diet.  Has H/O hyperlipidemia. On medical treatment and diet. No side effects. No muscle weakness, myalgias or upset stomach.   Has H/O DM. On diet , exercise and PO Metformin. Blood sugars are controlled. No parestesias. No hypoglycemias.      Current providers sharing in care for this patient include:   Patient Care Team:  Sujit Duke MD as PCP - General (Internal Medicine)  Sujit Duke MD as Assigned PCP  Catalino Scott MD as Assigned Heart and Vascular Provider    The following health maintenance items are reviewed in Epic and correct as of today:  Health Maintenance   Topic Date Due     ZOSTER IMMUNIZATION (2 of 3) 10/19/2011     DIABETIC FOOT EXAM  02/18/2014     EYE EXAM  02/18/2014     COLORECTAL CANCER SCREENING  10/17/2018     A1C  06/09/2020     INFLUENZA VACCINE (1) 09/01/2020     BMP  12/09/2020     LIPID  12/09/2020     MICROALBUMIN  12/09/2020     FALL RISK ASSESSMENT  12/09/2020     MEDICARE ANNUAL WELLNESS VISIT  12/11/2021     ADVANCE CARE PLANNING  12/09/2024      "DTAP/TDAP/TD IMMUNIZATION (3 - Td) 12/16/2025     PHQ-2  Completed     Pneumococcal Vaccine: 65+ Years  Completed     Pneumococcal Vaccine: Pediatrics (0 to 5 Years) and At-Risk Patients (6 to 64 Years)  Aged Out     IPV IMMUNIZATION  Aged Out     MENINGITIS IMMUNIZATION  Aged Out     Lab work is in process  Labs reviewed in EPIC      Review of Systems   Constitutional: Negative for chills and fever.   HENT: Negative for congestion, ear pain, hearing loss and sore throat.    Eyes: Negative for pain and visual disturbance.   Respiratory: Negative for cough and shortness of breath.    Cardiovascular: Negative for chest pain, palpitations and peripheral edema.   Gastrointestinal: Positive for constipation. Negative for abdominal pain, diarrhea, heartburn, hematochezia and nausea.   Genitourinary: Positive for frequency. Negative for discharge, dysuria, genital sores, hematuria, impotence and urgency.   Musculoskeletal: Positive for arthralgias. Negative for joint swelling and myalgias.   Skin: Negative for rash.   Neurological: Negative for dizziness, weakness, headaches and paresthesias.   Psychiatric/Behavioral: Negative for mood changes. The patient is not nervous/anxious.          OBJECTIVE:   BP (!) 172/75 (BP Location: Left arm, Patient Position: Sitting, Cuff Size: Adult Regular)   Pulse 60   Temp 98.1  F (36.7  C) (Oral)   Ht 1.854 m (6' 1\")   Wt 91.6 kg (202 lb)   SpO2 98%   BMI 26.65 kg/m   Estimated body mass index is 26.65 kg/m  as calculated from the following:    Height as of this encounter: 1.854 m (6' 1\").    Weight as of this encounter: 91.6 kg (202 lb).  Physical Exam  GENERAL:elderly, frail , alert and no distress  EYES: Eyes grossly normal to inspection, PERRL and conjunctivae and sclerae normal  HENT: ear canals and TM's normal, nose and mouth without ulcers or lesions, impaired hearing  NECK: no adenopathy, no asymmetry, masses, or scars and thyroid normal to palpation  RESP: lungs clear to " auscultation - no rales, rhonchi or wheezes  CV: regular rate and rhythm, normal S1 S2, no S3 or S4, no murmur, click or rub, no peripheral edema and peripheral pulses strong  ABDOMEN: soft, nontender, no hepatosplenomegaly, no masses and bowel sounds normal  MS: no gross musculoskeletal defects noted, no edema  SKIN: no suspicious lesions or rashes  NEURO: Normal strength and tone, mentation intact and speech normal, unstable gait   PSYCH: mentation appears normal, affect normal/bright    Diagnostic Test Results:  Labs reviewed in Epic    ASSESSMENT / PLAN:       ICD-10-CM    1. Encounter for preventative adult health care examination  Z00.00 CBC with platelets     Comprehensive metabolic panel     Lipid panel reflex to direct LDL Fasting     TSH with free T4 reflex     Prostate spec antigen screen     Hemoglobin A1c     *UA reflex to Microscopic     Albumin Random Urine Quantitative with Creat Ratio   2. Right-sided carotid artery disease, unspecified type (H)  I77.9 atorvastatin (LIPITOR) 40 MG tablet   3. Hyperlipidemia LDL goal <100  E78.5 atorvastatin (LIPITOR) 40 MG tablet     OFFICE/OUTPT VISIT,EST,LEVL III   4. Essential hypertension  I10 lisinopril (ZESTRIL) 5 MG tablet     CBC with platelets     Comprehensive metabolic panel     Lipid panel reflex to direct LDL Fasting     TSH with free T4 reflex     *UA reflex to Microscopic     OFFICE/OUTPT VISIT,EST,LEVL III   5. Type 2 diabetes mellitus without complication, without long-term current use of insulin (H)  E11.9 Hemoglobin A1c     Albumin Random Urine Quantitative with Creat Ratio     OFFICE/OUTPT VISIT,EST,LEVL III   6. Screening for prostate cancer  Z12.5 Prostate spec antigen screen     Assess lab work   Cont treatment   Monitor BP, reassess if elevated       Patient has been advised of split billing requirements and indicates understanding: Yes  COUNSELING:  Reviewed preventive health counseling, as reflected in patient instructions       Regular  "exercise       Healthy diet/nutrition       Vision screening       Hearing screening       Colon cancer screening       Prostate cancer screening    Estimated body mass index is 26.65 kg/m  as calculated from the following:    Height as of this encounter: 1.854 m (6' 1\").    Weight as of this encounter: 91.6 kg (202 lb).        He reports that he has never smoked. He has never used smokeless tobacco.      Appropriate preventive services were discussed with this patient, including applicable screening as appropriate for cardiovascular disease, diabetes, osteopenia/osteoporosis, and glaucoma.  As appropriate for age/gender, discussed screening for colorectal cancer, prostate cancer, breast cancer, and cervical cancer. Checklist reviewing preventive services available has been given to the patient.    Reviewed patients plan of care and provided an AVS. The Intermediate Care Plan ( asthma action plan, low back pain action plan, and migraine action plan) for Merlin meets the Care Plan requirement. This Care Plan has been established and reviewed with the Patient.    Counseling Resources:  ATP IV Guidelines  Pooled Cohorts Equation Calculator  Breast Cancer Risk Calculator  Breast Cancer: Medication to Reduce Risk  FRAX Risk Assessment  ICSI Preventive Guidelines  Dietary Guidelines for Americans, 2010  USDA's MyPlate  ASA Prophylaxis  Lung CA Screening    Sujit Duke MD  [facility]    Identified Health Risks:  "

## 2020-12-12 LAB
ALBUMIN SERPL-MCNC: 3.7 G/DL (ref 3.4–5)
ALP SERPL-CCNC: 91 U/L (ref 40–150)
ALT SERPL W P-5'-P-CCNC: 46 U/L (ref 0–70)
ANION GAP SERPL CALCULATED.3IONS-SCNC: 1 MMOL/L (ref 3–14)
AST SERPL W P-5'-P-CCNC: 30 U/L (ref 0–45)
BILIRUB SERPL-MCNC: 0.5 MG/DL (ref 0.2–1.3)
BUN SERPL-MCNC: 29 MG/DL (ref 7–30)
CALCIUM SERPL-MCNC: 8.8 MG/DL (ref 8.5–10.1)
CHLORIDE SERPL-SCNC: 110 MMOL/L (ref 94–109)
CHOLEST SERPL-MCNC: 115 MG/DL
CO2 SERPL-SCNC: 28 MMOL/L (ref 20–32)
CREAT SERPL-MCNC: 1.65 MG/DL (ref 0.66–1.25)
CREAT UR-MCNC: 109 MG/DL
GFR SERPL CREATININE-BSD FRML MDRD: 38 ML/MIN/{1.73_M2}
GLUCOSE SERPL-MCNC: 125 MG/DL (ref 70–99)
HDLC SERPL-MCNC: 48 MG/DL
LDLC SERPL CALC-MCNC: 43 MG/DL
MICROALBUMIN UR-MCNC: 1230 MG/L
MICROALBUMIN/CREAT UR: 1128.44 MG/G CR (ref 0–17)
NONHDLC SERPL-MCNC: 67 MG/DL
POTASSIUM SERPL-SCNC: 5.2 MMOL/L (ref 3.4–5.3)
PROT SERPL-MCNC: 6.3 G/DL (ref 6.8–8.8)
PSA SERPL-ACNC: 4.3 UG/L (ref 0–4)
SODIUM SERPL-SCNC: 139 MMOL/L (ref 133–144)
TRIGL SERPL-MCNC: 120 MG/DL
TSH SERPL DL<=0.005 MIU/L-ACNC: 3.01 MU/L (ref 0.4–4)

## 2020-12-30 DIAGNOSIS — I65.23 BILATERAL CAROTID ARTERY STENOSIS: Primary | ICD-10-CM

## 2021-01-13 ENCOUNTER — MYC MEDICAL ADVICE (OUTPATIENT)
Dept: INTERNAL MEDICINE | Facility: CLINIC | Age: 81
End: 2021-01-13

## 2021-01-13 DIAGNOSIS — I10 ESSENTIAL HYPERTENSION: ICD-10-CM

## 2021-01-13 RX ORDER — LISINOPRIL 20 MG/1
20 TABLET ORAL DAILY
Qty: 30 TABLET | Refills: 3 | Status: SHIPPED | OUTPATIENT
Start: 2021-01-13 | End: 2021-04-05

## 2021-01-15 ENCOUNTER — HEALTH MAINTENANCE LETTER (OUTPATIENT)
Age: 81
End: 2021-01-15

## 2021-01-31 ENCOUNTER — MYC MEDICAL ADVICE (OUTPATIENT)
Dept: CARDIOLOGY | Facility: CLINIC | Age: 81
End: 2021-01-31

## 2021-02-08 DIAGNOSIS — R33.9 URINARY RETENTION: ICD-10-CM

## 2021-02-08 DIAGNOSIS — E11.9 TYPE 2 DIABETES MELLITUS WITHOUT COMPLICATION, WITHOUT LONG-TERM CURRENT USE OF INSULIN (H): ICD-10-CM

## 2021-02-09 NOTE — TELEPHONE ENCOUNTER
Routing refill request to provider for review/approval because:  Labs out of range:    Lab Test 12/11/20 0829   GFRESTIMATED 38*   GFRESTBLACK 45*          Recent Labs   Lab Test 12/11/20 0829   CR 1.65*

## 2021-02-09 NOTE — TELEPHONE ENCOUNTER
Routing refill request to provider for review/approval because:  Per protocol- elevated BP   12/11/20 (!) 160/70

## 2021-02-10 RX ORDER — TAMSULOSIN HYDROCHLORIDE 0.4 MG/1
CAPSULE ORAL
Qty: 90 CAPSULE | Refills: 3 | Status: SHIPPED | OUTPATIENT
Start: 2021-02-10 | End: 2022-02-02

## 2021-02-15 ENCOUNTER — IMMUNIZATION (OUTPATIENT)
Dept: PEDIATRICS | Facility: CLINIC | Age: 81
End: 2021-02-15
Payer: COMMERCIAL

## 2021-02-15 PROCEDURE — 91300 PR COVID VAC PFIZER DIL RECON 30 MCG/0.3 ML IM: CPT

## 2021-02-15 PROCEDURE — 0001A PR COVID VAC PFIZER DIL RECON 30 MCG/0.3 ML IM: CPT

## 2021-02-22 DIAGNOSIS — I10 ESSENTIAL HYPERTENSION: ICD-10-CM

## 2021-02-23 RX ORDER — ATENOLOL 100 MG/1
TABLET ORAL
Qty: 90 TABLET | Refills: 0 | Status: SHIPPED | OUTPATIENT
Start: 2021-02-23 | End: 2021-05-25

## 2021-02-23 NOTE — TELEPHONE ENCOUNTER
Routing refill request to provider for review/approval because:  BP Readings from Last 3 Encounters:   12/11/20 (!) 160/70   01/29/20 136/88   01/27/20 138/76

## 2021-03-08 ENCOUNTER — IMMUNIZATION (OUTPATIENT)
Dept: PEDIATRICS | Facility: CLINIC | Age: 81
End: 2021-03-08
Attending: INTERNAL MEDICINE
Payer: COMMERCIAL

## 2021-03-08 PROCEDURE — 91300 PR COVID VAC PFIZER DIL RECON 30 MCG/0.3 ML IM: CPT

## 2021-03-08 PROCEDURE — 0002A PR COVID VAC PFIZER DIL RECON 30 MCG/0.3 ML IM: CPT

## 2021-04-05 ENCOUNTER — MYC MEDICAL ADVICE (OUTPATIENT)
Dept: INTERNAL MEDICINE | Facility: CLINIC | Age: 81
End: 2021-04-05

## 2021-04-05 DIAGNOSIS — I10 ESSENTIAL HYPERTENSION: ICD-10-CM

## 2021-04-05 RX ORDER — LISINOPRIL 20 MG/1
20 TABLET ORAL DAILY
Qty: 90 TABLET | Refills: 2 | Status: SHIPPED | OUTPATIENT
Start: 2021-04-05 | End: 2021-07-07

## 2021-04-05 NOTE — TELEPHONE ENCOUNTER
"Requested Prescriptions   Pending Prescriptions Disp Refills     lisinopril (ZESTRIL) 20 MG tablet 30 tablet 3     Sig: Take 1 tablet (20 mg) by mouth daily       ACE Inhibitors (Including Combos) Protocol Failed - 4/5/2021 12:24 PM        Failed - Blood pressure under 140/90 in past 12 months     BP Readings from Last 3 Encounters:   12/11/20 (!) 160/70   01/29/20 136/88   01/27/20 138/76                 Failed - Normal serum creatinine on file in past 12 months     Recent Labs   Lab Test 12/11/20  0829 08/30/17  0902 08/30/17  0902   CR 1.65*   < >  --    CREAT  --   --  1.6*    < > = values in this interval not displayed.       Ok to refill medication if creatinine is low          Passed - Recent (12 mo) or future (30 days) visit within the authorizing provider's specialty     Patient has had an office visit with the authorizing provider or a provider within the authorizing providers department within the previous 12 mos or has a future within next 30 days. See \"Patient Info\" tab in inbasket, or \"Choose Columns\" in Meds & Orders section of the refill encounter.              Passed - Medication is active on med list        Passed - Patient is age 18 or older        Passed - Normal serum potassium on file in past 12 months     Recent Labs   Lab Test 12/11/20  0829   POTASSIUM 5.2                  "

## 2021-05-24 DIAGNOSIS — I10 ESSENTIAL HYPERTENSION: ICD-10-CM

## 2021-05-25 RX ORDER — ATENOLOL 100 MG/1
TABLET ORAL
Qty: 90 TABLET | Refills: 0 | Status: SHIPPED | OUTPATIENT
Start: 2021-05-25 | End: 2021-08-03

## 2021-05-25 NOTE — TELEPHONE ENCOUNTER
Routing refill request to provider for review/approval because:  Labs out of range:    BP Readings from Last 3 Encounters:   12/11/20 (!) 160/70   01/29/20 136/88   01/27/20 138/76     Rosalba Tyson RN, BSN

## 2021-07-07 ENCOUNTER — OFFICE VISIT (OUTPATIENT)
Dept: INTERNAL MEDICINE | Facility: CLINIC | Age: 81
End: 2021-07-07
Payer: COMMERCIAL

## 2021-07-07 VITALS
BODY MASS INDEX: 26.9 KG/M2 | TEMPERATURE: 98.2 F | HEIGHT: 73 IN | DIASTOLIC BLOOD PRESSURE: 68 MMHG | HEART RATE: 61 BPM | SYSTOLIC BLOOD PRESSURE: 167 MMHG | WEIGHT: 203 LBS | OXYGEN SATURATION: 97 %

## 2021-07-07 DIAGNOSIS — I65.23 BILATERAL CAROTID ARTERY STENOSIS: ICD-10-CM

## 2021-07-07 DIAGNOSIS — I10 ESSENTIAL HYPERTENSION: ICD-10-CM

## 2021-07-07 DIAGNOSIS — E11.59 TYPE 2 DIABETES MELLITUS WITH OTHER CIRCULATORY COMPLICATIONS (H): Primary | ICD-10-CM

## 2021-07-07 DIAGNOSIS — Z12.5 SCREENING FOR PROSTATE CANCER: ICD-10-CM

## 2021-07-07 DIAGNOSIS — N18.31 STAGE 3A CHRONIC KIDNEY DISEASE (H): ICD-10-CM

## 2021-07-07 DIAGNOSIS — I77.9 RIGHT-SIDED CAROTID ARTERY DISEASE, UNSPECIFIED TYPE (H): ICD-10-CM

## 2021-07-07 LAB
ALBUMIN SERPL-MCNC: 3.4 G/DL (ref 3.4–5)
ALP SERPL-CCNC: 76 U/L (ref 40–150)
ALT SERPL W P-5'-P-CCNC: 42 U/L (ref 0–70)
ANION GAP SERPL CALCULATED.3IONS-SCNC: 4 MMOL/L (ref 3–14)
AST SERPL W P-5'-P-CCNC: 25 U/L (ref 0–45)
BILIRUB SERPL-MCNC: 0.4 MG/DL (ref 0.2–1.3)
BUN SERPL-MCNC: 27 MG/DL (ref 7–30)
CALCIUM SERPL-MCNC: 9.1 MG/DL (ref 8.5–10.1)
CHLORIDE SERPL-SCNC: 108 MMOL/L (ref 94–109)
CO2 SERPL-SCNC: 29 MMOL/L (ref 20–32)
CREAT SERPL-MCNC: 1.81 MG/DL (ref 0.66–1.25)
GFR SERPL CREATININE-BSD FRML MDRD: 34 ML/MIN/{1.73_M2}
GLUCOSE SERPL-MCNC: 132 MG/DL (ref 70–99)
HBA1C MFR BLD: 6 % (ref 0–5.6)
POTASSIUM SERPL-SCNC: 4.8 MMOL/L (ref 3.4–5.3)
PROT SERPL-MCNC: 6.3 G/DL (ref 6.8–8.8)
SODIUM SERPL-SCNC: 141 MMOL/L (ref 133–144)

## 2021-07-07 PROCEDURE — 36415 COLL VENOUS BLD VENIPUNCTURE: CPT | Performed by: INTERNAL MEDICINE

## 2021-07-07 PROCEDURE — G0103 PSA SCREENING: HCPCS | Performed by: INTERNAL MEDICINE

## 2021-07-07 PROCEDURE — 80053 COMPREHEN METABOLIC PANEL: CPT | Performed by: INTERNAL MEDICINE

## 2021-07-07 PROCEDURE — 83036 HEMOGLOBIN GLYCOSYLATED A1C: CPT | Performed by: INTERNAL MEDICINE

## 2021-07-07 PROCEDURE — 99214 OFFICE O/P EST MOD 30 MIN: CPT | Performed by: INTERNAL MEDICINE

## 2021-07-07 RX ORDER — LISINOPRIL 30 MG/1
30 TABLET ORAL DAILY
Qty: 90 TABLET | Refills: 3 | Status: SHIPPED | OUTPATIENT
Start: 2021-07-07 | End: 2022-08-17

## 2021-07-07 ASSESSMENT — MIFFLIN-ST. JEOR: SCORE: 1679.68

## 2021-07-07 NOTE — PROGRESS NOTES
"    Assessment & Plan     Type 2 diabetes mellitus with other circulatory complications (H)  Assess diabetic control   - Hemoglobin A1c    Right-sided carotid artery disease, unspecified type (H)  Assess US     Stage 3a chronic kidney disease  Monitor renal function   - Comprehensive metabolic panel    Essential hypertension  Not controlled, increase Lisinopril from 20 to 30 mg   - lisinopril (ZESTRIL) 30 MG tablet; Take 1 tablet (30 mg) by mouth daily  - Comprehensive metabolic panel    Bilateral carotid artery stenosis    - US Carotid Bilateral; Future    Screening for prostate cancer  Slightly elevated PSA last check, repeat   - Prostate spec antigen screen             BMI:   Estimated body mass index is 26.78 kg/m  as calculated from the following:    Height as of this encounter: 1.854 m (6' 1\").    Weight as of this encounter: 92.1 kg (203 lb).       See Patient Instructions    Return in about 6 months (around 1/7/2022) for Physical Exam.    Sujit Duke MD  [facility] [place] Subjective Merlin is a 81 year old who presents for the following health issues     HPI   Patient is seen for a follow up visit.  No acute complaints, no medication change or new medical conditions.    Diabetes Follow-up  Has H/O DM. On diet , exercise and oral treatment . Blood sugars are controlled. No parestesias. No hypoglycemias.        How often are you checking your blood sugar? Not at all    What concerns do you have today about your diabetes? None     Do you have any of these symptoms? (Select all that apply)  No numbness or tingling in feet.  No redness, sores or blisters on feet.  No complaints of excessive thirst.  No reports of blurry vision.  No significant changes to weight.    Have you had a diabetic eye exam in the last 12 months? No          Hyperlipidemia Follow-Up  Has H/O hyperlipidemia. On medical treatment and diet. No side effects. No muscle weakness, myalgias or upset stomach. " "      Are you regularly taking any medication or supplement to lower your cholesterol?   Yes- Atorvastatin    Are you having muscle aches or other side effects that you think could be caused by your cholesterol lowering medication?  No    Hypertension Follow-up  Has h/o HTN. on medical treatment. BP has not been controlled. No side effects from medications. No CP, HA, dizziness. good compliance with medications and low salt diet.      Do you check your blood pressure regularly outside of the clinic? Yes     Are you following a low salt diet? Yes    Are your blood pressures ever more than 140 on the top number (systolic) OR more   than 90 on the bottom number (diastolic), for example 140/90? Yes,higher than usual    BP Readings from Last 2 Encounters:   07/07/21 (!) 167/68   12/11/20 (!) 160/70     Hemoglobin A1C (%)   Date Value   12/11/2020 5.9 (H)   12/09/2019 5.5     LDL Cholesterol Calculated (mg/dL)   Date Value   12/11/2020 43   12/09/2019 29         Has h/o CRF. Symptoms include none. Monitoring BP, BG, medications, avoiding OTC NSAIDs. Needs periodic recheck of kidney function.    Has h/o ALBA. No symptoms of dizziness, fainting, neurologic deficits.     Review of Systems   Constitutional, HEENT, cardiovascular, pulmonary, gi and gu systems are negative, except as otherwise noted.      Objective    BP (!) 167/68 (BP Location: Left arm, Patient Position: Sitting, Cuff Size: Adult Regular)   Pulse 61   Temp 98.2  F (36.8  C) (Oral)   Ht 1.854 m (6' 1\")   Wt 92.1 kg (203 lb)   SpO2 97%   BMI 26.78 kg/m    Body mass index is 26.78 kg/m .  Physical Exam   GENERAL: healthy, alert and no distress  NECK: no adenopathy, no asymmetry, masses, or scars and thyroid normal to palpation  RESP: lungs clear to auscultation - no rales, rhonchi or wheezes  CV: regular rate and rhythm, normal S1 S2, no S3 or S4, no murmur, click or rub, no peripheral edema and peripheral pulses strong  ABDOMEN: soft, nontender, no " hepatosplenomegaly, no masses and bowel sounds normal  MS: no gross musculoskeletal defects noted, no edema  SKIN: no suspicious lesions or rashes  NEURO: Normal strength and tone, mentation intact and speech normal  Diabetic foot exam: normal DP and PT pulses, no trophic changes or ulcerative lesions and normal sensory exam    Office Visit on 12/11/2020   Component Date Value Ref Range Status     WBC 12/11/2020 6.2  4.0 - 11.0 10e9/L Final     RBC Count 12/11/2020 3.91* 4.4 - 5.9 10e12/L Final     Hemoglobin 12/11/2020 12.2* 13.3 - 17.7 g/dL Final     Hematocrit 12/11/2020 37.1* 40.0 - 53.0 % Final     MCV 12/11/2020 95  78 - 100 fl Final     MCH 12/11/2020 31.2  26.5 - 33.0 pg Final     MCHC 12/11/2020 32.9  31.5 - 36.5 g/dL Final     RDW 12/11/2020 12.8  10.0 - 15.0 % Final     Platelet Count 12/11/2020 109* 150 - 450 10e9/L Final     Sodium 12/11/2020 139  133 - 144 mmol/L Final     Potassium 12/11/2020 5.2  3.4 - 5.3 mmol/L Final     Chloride 12/11/2020 110* 94 - 109 mmol/L Final     Carbon Dioxide 12/11/2020 28  20 - 32 mmol/L Final     Anion Gap 12/11/2020 1* 3 - 14 mmol/L Final     Glucose 12/11/2020 125* 70 - 99 mg/dL Final    Fasting specimen     Urea Nitrogen 12/11/2020 29  7 - 30 mg/dL Final     Creatinine 12/11/2020 1.65* 0.66 - 1.25 mg/dL Final     GFR Estimate 12/11/2020 38* >60 mL/min/[1.73_m2] Final    Comment: Non  GFR Calc  Starting 12/18/2018, serum creatinine based estimated GFR (eGFR) will be   calculated using the Chronic Kidney Disease Epidemiology Collaboration   (CKD-EPI) equation.       GFR Estimate If Black 12/11/2020 45* >60 mL/min/[1.73_m2] Final    Comment:  GFR Calc  Starting 12/18/2018, serum creatinine based estimated GFR (eGFR) will be   calculated using the Chronic Kidney Disease Epidemiology Collaboration   (CKD-EPI) equation.       Calcium 12/11/2020 8.8  8.5 - 10.1 mg/dL Final     Bilirubin Total 12/11/2020 0.5  0.2 - 1.3 mg/dL Final     Albumin  12/11/2020 3.7  3.4 - 5.0 g/dL Final     Protein Total 12/11/2020 6.3* 6.8 - 8.8 g/dL Final     Alkaline Phosphatase 12/11/2020 91  40 - 150 U/L Final     ALT 12/11/2020 46  0 - 70 U/L Final     AST 12/11/2020 30  0 - 45 U/L Final     Cholesterol 12/11/2020 115  <200 mg/dL Final     Triglycerides 12/11/2020 120  <150 mg/dL Final    Fasting specimen     HDL Cholesterol 12/11/2020 48  >39 mg/dL Final     LDL Cholesterol Calculated 12/11/2020 43  <100 mg/dL Final    Desirable:       <100 mg/dl     Non HDL Cholesterol 12/11/2020 67  <130 mg/dL Final     TSH 12/11/2020 3.01  0.40 - 4.00 mU/L Final     PSA 12/11/2020 4.30* 0 - 4 ug/L Final    Assay Method:  Chemiluminescence using Siemens Vista analyzer     Hemoglobin A1C 12/11/2020 5.9* 0 - 5.6 % Final    Comment: Normal <5.7% Prediabetes 5.7-6.4%  Diabetes 6.5% or higher - adopted from ADA   consensus guidelines.       Color Urine 12/11/2020 Yellow   Final     Appearance Urine 12/11/2020 Clear   Final     Glucose Urine 12/11/2020 Negative  NEG^Negative mg/dL Final     Bilirubin Urine 12/11/2020 Negative  NEG^Negative Final     Ketones Urine 12/11/2020 Negative  NEG^Negative mg/dL Final     Specific Gravity Urine 12/11/2020 1.020  1.003 - 1.035 Final     Blood Urine 12/11/2020 Negative  NEG^Negative Final     pH Urine 12/11/2020 6.0  5.0 - 7.0 pH Final     Protein Albumin Urine 12/11/2020 >=300* NEG^Negative mg/dL Final     Urobilinogen Urine 12/11/2020 0.2  0.2 - 1.0 EU/dL Final     Nitrite Urine 12/11/2020 Negative  NEG^Negative Final     Leukocyte Esterase Urine 12/11/2020 Trace* NEG^Negative Final     Source 12/11/2020 Midstream Urine   Final     Creatinine Urine 12/11/2020 109  mg/dL Final     Albumin Urine mg/L 12/11/2020 1,230  mg/L Final     Albumin Urine mg/g Cr 12/11/2020 1,128.44* 0 - 17 mg/g Cr Final     WBC Urine 12/11/2020 10-25* OTO5^0 - 5 /HPF Final     RBC Urine 12/11/2020 O - 2  OTO2^O - 2 /HPF Final     Hyaline Casts 12/11/2020 O - 2  OTO2^O - 2 /LPF  Final     Squamous Epithelial /LPF Urine 12/11/2020 Few  FEW^Few /LPF Final     Bacteria Urine 12/11/2020 Moderate* NEG^Negative /HPF Final

## 2021-07-08 LAB — PSA SERPL-ACNC: 3.92 UG/L (ref 0–4)

## 2021-07-13 ENCOUNTER — HOSPITAL ENCOUNTER (OUTPATIENT)
Dept: ULTRASOUND IMAGING | Facility: CLINIC | Age: 81
Discharge: HOME | End: 2021-07-13
Attending: INTERNAL MEDICINE | Admitting: INTERNAL MEDICINE
Payer: COMMERCIAL

## 2021-07-13 DIAGNOSIS — I65.23 BILATERAL CAROTID ARTERY STENOSIS: ICD-10-CM

## 2021-07-13 PROCEDURE — 93880 EXTRACRANIAL BILAT STUDY: CPT

## 2021-08-02 DIAGNOSIS — I10 ESSENTIAL HYPERTENSION: ICD-10-CM

## 2021-08-02 DIAGNOSIS — E11.9 TYPE 2 DIABETES MELLITUS WITHOUT COMPLICATION, WITHOUT LONG-TERM CURRENT USE OF INSULIN (H): ICD-10-CM

## 2021-08-03 RX ORDER — ATENOLOL 100 MG/1
TABLET ORAL
Qty: 90 TABLET | Refills: 0 | Status: SHIPPED | OUTPATIENT
Start: 2021-08-03 | End: 2021-11-23

## 2021-08-03 NOTE — TELEPHONE ENCOUNTER
Routing refill request to provider for review/approval because:  BP Readings from Last 3 Encounters:   07/07/21 (!) 167/68   12/11/20 (!) 160/70   01/29/20 136/88

## 2021-08-03 NOTE — TELEPHONE ENCOUNTER
CM was informed by [facility] admission liaison that Pili Haddad was approved for STR for pt  Transportation was set up with [facility] at 6 pm today  CM informed Dr Michael Mcgee, pt, pt's son, flr RN and Ting of the above  Transportation auth with [facility] submitted  CMN done and copy placed in MR bin      IMM reviewed with patient, patient agrees with discharge determination Reviewed imaging with Dr. Scott.  Pt can be scheduled for a CTA head/neck and follow up with Dr. Scott.  Please cancel appointment scheduled for bilateral carotid US.  Order entered for CTA head/neck.  Will route to scheduling to coordinate appointments.  Beverly Lynne, DAYLINN, RN

## 2021-08-10 ENCOUNTER — TELEPHONE (OUTPATIENT)
Dept: OTHER | Facility: CLINIC | Age: 81
End: 2021-08-10

## 2021-08-10 NOTE — TELEPHONE ENCOUNTER
[facility]     Who is the name of the provider? Dr Scott     What is the location you see this provider at?  [place]     Reason for call:  Pt is called about the letter he received from us. Pt had his Ultrasound done on 07/13/21 and is following up with Sujit Duke MD    :  Merlin    Phone number to call: [phone]    Additional Notes:  If Pt feels he needs to see Dr Scott again he will call us to schedule

## 2021-09-04 ENCOUNTER — HEALTH MAINTENANCE LETTER (OUTPATIENT)
Age: 81
End: 2021-09-04

## 2021-10-26 ENCOUNTER — TELEPHONE (OUTPATIENT)
Dept: OTHER | Facility: CLINIC | Age: 81
End: 2021-10-26

## 2021-10-26 NOTE — TELEPHONE ENCOUNTER
2+ attempts have been made to schedule follow up for Dr. Scott. No further attempts to be made.     Attempt:  8/4/2021  1st LS   10/18/2021  2nd LS     Stephanie ROSEN

## 2021-11-22 DIAGNOSIS — I10 ESSENTIAL HYPERTENSION: ICD-10-CM

## 2021-11-23 RX ORDER — ATENOLOL 100 MG/1
TABLET ORAL
Qty: 90 TABLET | Refills: 0 | Status: SHIPPED | OUTPATIENT
Start: 2021-11-23 | End: 2022-02-22

## 2021-11-23 NOTE — TELEPHONE ENCOUNTER
Routing refill request to provider for review/approval because:  BP not in parameters for [facility] protocol for RN to refill

## 2021-12-13 DIAGNOSIS — E11.9 TYPE 2 DIABETES MELLITUS WITHOUT COMPLICATION, WITHOUT LONG-TERM CURRENT USE OF INSULIN (H): ICD-10-CM

## 2021-12-15 NOTE — TELEPHONE ENCOUNTER
Routing refill request to provider for review/approval because:  Labs out of range:      Pending Prescriptions:                       Disp   Refills    metFORMIN (GLUCOPHAGE) 500 MG tablet [Phar*180 ta*0        Sig: TAKE ONE TABLET BY MOUTH TWICE A DAY WITH MEALS

## 2022-01-31 DIAGNOSIS — I77.9 RIGHT-SIDED CAROTID ARTERY DISEASE, UNSPECIFIED TYPE (H): ICD-10-CM

## 2022-01-31 DIAGNOSIS — E78.5 HYPERLIPIDEMIA LDL GOAL <100: ICD-10-CM

## 2022-01-31 DIAGNOSIS — R33.9 URINARY RETENTION: ICD-10-CM

## 2022-02-02 RX ORDER — TAMSULOSIN HYDROCHLORIDE 0.4 MG/1
CAPSULE ORAL
Qty: 90 CAPSULE | Refills: 0 | Status: SHIPPED | OUTPATIENT
Start: 2022-02-02 | End: 2022-05-04

## 2022-02-02 RX ORDER — ATORVASTATIN CALCIUM 40 MG/1
TABLET, FILM COATED ORAL
Qty: 90 TABLET | Refills: 0 | Status: SHIPPED | OUTPATIENT
Start: 2022-02-02 | End: 2022-04-12

## 2022-02-02 NOTE — TELEPHONE ENCOUNTER
Routing refill request to provider for review/approval because:  Labs not current:  LDL  Blood Pressure out of range for [facility] refill protocol.    BP Readings from Last 3 Encounters:   07/07/21 (!) 167/68   12/11/20 (!) 160/70   01/29/20 136/88

## 2022-02-19 ENCOUNTER — HEALTH MAINTENANCE LETTER (OUTPATIENT)
Age: 82
End: 2022-02-19

## 2022-02-21 DIAGNOSIS — I77.9 RIGHT-SIDED CAROTID ARTERY DISEASE, UNSPECIFIED TYPE (H): ICD-10-CM

## 2022-02-21 DIAGNOSIS — I10 ESSENTIAL HYPERTENSION: ICD-10-CM

## 2022-02-21 DIAGNOSIS — E78.5 HYPERLIPIDEMIA LDL GOAL <100: ICD-10-CM

## 2022-02-22 RX ORDER — ATORVASTATIN CALCIUM 40 MG/1
TABLET, FILM COATED ORAL
Qty: 90 TABLET | Refills: 0 | OUTPATIENT
Start: 2022-02-22

## 2022-02-22 RX ORDER — ATENOLOL 100 MG/1
TABLET ORAL
Qty: 90 TABLET | Refills: 0 | Status: SHIPPED | OUTPATIENT
Start: 2022-02-22 | End: 2022-05-18

## 2022-02-22 NOTE — TELEPHONE ENCOUNTER
Routing refill request to provider for review/approval because:  Labs out of range:    BP Readings from Last 3 Encounters:   07/07/21 (!) 167/68   12/11/20 (!) 160/70   01/29/20 136/88     Rosalba GALAN RN, BSN          
negative...

## 2022-03-07 DIAGNOSIS — I77.9 RIGHT-SIDED CAROTID ARTERY DISEASE, UNSPECIFIED TYPE (H): ICD-10-CM

## 2022-03-07 DIAGNOSIS — E78.5 HYPERLIPIDEMIA LDL GOAL <100: ICD-10-CM

## 2022-03-08 RX ORDER — ATORVASTATIN CALCIUM 40 MG/1
TABLET, FILM COATED ORAL
Qty: 90 TABLET | Refills: 0 | OUTPATIENT
Start: 2022-03-08

## 2022-03-08 NOTE — TELEPHONE ENCOUNTER
Refused Prescriptions:                       Disp   Refills    atorvastatin (LIPITOR) 40 MG tablet [Pharm*90 tab*0        Sig: TAKE ONE TABLET BY MOUTH ONE TIME DAILY      Patient was given 90 tablets with 0 refills on 2/2/2022.     Nina GALAN RN   [facility]

## 2022-04-11 DIAGNOSIS — E78.5 HYPERLIPIDEMIA LDL GOAL <100: ICD-10-CM

## 2022-04-11 DIAGNOSIS — E11.9 TYPE 2 DIABETES MELLITUS WITHOUT COMPLICATION, WITHOUT LONG-TERM CURRENT USE OF INSULIN (H): ICD-10-CM

## 2022-04-11 DIAGNOSIS — I77.9 RIGHT-SIDED CAROTID ARTERY DISEASE, UNSPECIFIED TYPE (H): ICD-10-CM

## 2022-04-12 RX ORDER — ATORVASTATIN CALCIUM 40 MG/1
TABLET, FILM COATED ORAL
Qty: 90 TABLET | Refills: 0 | Status: SHIPPED | OUTPATIENT
Start: 2022-04-12 | End: 2022-05-04

## 2022-04-12 NOTE — TELEPHONE ENCOUNTER
Routing refill request to provider for review/approval because:  Evonne given x1 and patient did not follow up, please advise    Team please call to schedule  DM visit with provider    Fiona Cordova RN

## 2022-05-02 DIAGNOSIS — E78.5 HYPERLIPIDEMIA LDL GOAL <100: ICD-10-CM

## 2022-05-02 DIAGNOSIS — R33.9 URINARY RETENTION: ICD-10-CM

## 2022-05-02 DIAGNOSIS — I77.9 RIGHT-SIDED CAROTID ARTERY DISEASE, UNSPECIFIED TYPE (H): ICD-10-CM

## 2022-05-04 RX ORDER — ATORVASTATIN CALCIUM 40 MG/1
TABLET, FILM COATED ORAL
Qty: 90 TABLET | Refills: 0 | Status: SHIPPED | OUTPATIENT
Start: 2022-05-04 | End: 2022-11-21

## 2022-05-04 RX ORDER — TAMSULOSIN HYDROCHLORIDE 0.4 MG/1
CAPSULE ORAL
Qty: 90 CAPSULE | Refills: 0 | Status: SHIPPED | OUTPATIENT
Start: 2022-05-04 | End: 2022-08-02

## 2022-05-04 NOTE — TELEPHONE ENCOUNTER
Pending Prescriptions:                       Disp   Refills    tamsulosin (FLOMAX) 0.4 MG capsule [Pharma*90 cap*0        Sig: TAKE ONE CAPSULE BY MOUTH ONE TIME DAILY    atorvastatin (LIPITOR) 40 MG tablet [Pharm*90 tab*0        Sig: TAKE ONE TABLET BY MOUTH ONE TIME DAILY    Routing refill request to provider for review/approval because:  BP Readings from Last 3 Encounters:   07/07/21 (!) 167/68   12/11/20 (!) 160/70   01/29/20 136/88     LDL Cholesterol Calculated   Date Value Ref Range Status   12/11/2020 43 <100 mg/dL Final     Comment:     Desirable:       <100 mg/dl

## 2022-05-16 DIAGNOSIS — I10 ESSENTIAL HYPERTENSION: ICD-10-CM

## 2022-05-18 RX ORDER — ATENOLOL 100 MG/1
TABLET ORAL
Qty: 90 TABLET | Refills: 0 | Status: SHIPPED | OUTPATIENT
Start: 2022-05-18 | End: 2022-08-02

## 2022-05-18 ASSESSMENT — ENCOUNTER SYMPTOMS
DYSURIA: 0
FREQUENCY: 1
HEARTBURN: 0
SHORTNESS OF BREATH: 0
EYE PAIN: 0
HEMATURIA: 0
DIARRHEA: 0
PALPITATIONS: 0
MYALGIAS: 1
ARTHRALGIAS: 0
DIZZINESS: 0
PARESTHESIAS: 0
SORE THROAT: 0
FEVER: 0
HEMATOCHEZIA: 0
NAUSEA: 0
ABDOMINAL PAIN: 0
HEADACHES: 0
COUGH: 0
WEAKNESS: 1
CHILLS: 0
NERVOUS/ANXIOUS: 0
JOINT SWELLING: 0
CONSTIPATION: 0

## 2022-05-18 ASSESSMENT — ACTIVITIES OF DAILY LIVING (ADL): CURRENT_FUNCTION: NO ASSISTANCE NEEDED

## 2022-05-18 NOTE — TELEPHONE ENCOUNTER
Routing refill request to provider for review/approval because:  Has appointment in 2 days  Do you want to wait to refill medication?    Next 5 appointments (look out 90 days)      May 20, 2022  8:30 AM  (Arrive by 8:10 AM)  Annual Wellness Visit with Sujit Duke MD  [facility] ([facility] [place] [facility] [address] [facility] [place] [id number] [phone]

## 2022-05-20 ENCOUNTER — OFFICE VISIT (OUTPATIENT)
Dept: INTERNAL MEDICINE | Facility: CLINIC | Age: 82
End: 2022-05-20
Payer: COMMERCIAL

## 2022-05-20 VITALS
HEIGHT: 73 IN | DIASTOLIC BLOOD PRESSURE: 75 MMHG | HEART RATE: 66 BPM | SYSTOLIC BLOOD PRESSURE: 181 MMHG | TEMPERATURE: 98.2 F | WEIGHT: 202 LBS | BODY MASS INDEX: 26.77 KG/M2 | OXYGEN SATURATION: 98 % | RESPIRATION RATE: 18 BRPM

## 2022-05-20 DIAGNOSIS — E11.59 TYPE 2 DIABETES MELLITUS WITH OTHER CIRCULATORY COMPLICATIONS (H): ICD-10-CM

## 2022-05-20 DIAGNOSIS — Z12.11 SCREEN FOR COLON CANCER: ICD-10-CM

## 2022-05-20 DIAGNOSIS — I10 PRIMARY HYPERTENSION: ICD-10-CM

## 2022-05-20 DIAGNOSIS — Z13.220 SCREENING FOR HYPERLIPIDEMIA: ICD-10-CM

## 2022-05-20 DIAGNOSIS — Z00.00 ENCOUNTER FOR PREVENTATIVE ADULT HEALTH CARE EXAMINATION: Primary | ICD-10-CM

## 2022-05-20 DIAGNOSIS — I77.9 RIGHT-SIDED CAROTID ARTERY DISEASE, UNSPECIFIED TYPE (H): ICD-10-CM

## 2022-05-20 DIAGNOSIS — N18.31 STAGE 3A CHRONIC KIDNEY DISEASE (H): ICD-10-CM

## 2022-05-20 LAB
ERYTHROCYTE [DISTWIDTH] IN BLOOD BY AUTOMATED COUNT: 12.9 % (ref 10–15)
HBA1C MFR BLD: 6 % (ref 0–5.6)
HCT VFR BLD AUTO: 35.7 % (ref 40–53)
HGB BLD-MCNC: 12.4 G/DL (ref 13.3–17.7)
MCH RBC QN AUTO: 31.9 PG (ref 26.5–33)
MCHC RBC AUTO-ENTMCNC: 34.7 G/DL (ref 31.5–36.5)
MCV RBC AUTO: 92 FL (ref 78–100)
PLATELET # BLD AUTO: 121 10E3/UL (ref 150–450)
RBC # BLD AUTO: 3.89 10E6/UL (ref 4.4–5.9)
WBC # BLD AUTO: 5.9 10E3/UL (ref 4–11)

## 2022-05-20 PROCEDURE — 99397 PER PM REEVAL EST PAT 65+ YR: CPT | Performed by: INTERNAL MEDICINE

## 2022-05-20 PROCEDURE — 85027 COMPLETE CBC AUTOMATED: CPT | Performed by: INTERNAL MEDICINE

## 2022-05-20 PROCEDURE — 36415 COLL VENOUS BLD VENIPUNCTURE: CPT | Performed by: INTERNAL MEDICINE

## 2022-05-20 PROCEDURE — 80061 LIPID PANEL: CPT | Performed by: INTERNAL MEDICINE

## 2022-05-20 PROCEDURE — 82043 UR ALBUMIN QUANTITATIVE: CPT | Performed by: INTERNAL MEDICINE

## 2022-05-20 PROCEDURE — 99214 OFFICE O/P EST MOD 30 MIN: CPT | Mod: 25 | Performed by: INTERNAL MEDICINE

## 2022-05-20 PROCEDURE — 80053 COMPREHEN METABOLIC PANEL: CPT | Performed by: INTERNAL MEDICINE

## 2022-05-20 PROCEDURE — 83036 HEMOGLOBIN GLYCOSYLATED A1C: CPT | Performed by: INTERNAL MEDICINE

## 2022-05-20 RX ORDER — AMLODIPINE BESYLATE 5 MG/1
5 TABLET ORAL DAILY
Qty: 90 TABLET | Refills: 3 | Status: SHIPPED | OUTPATIENT
Start: 2022-05-20 | End: 2022-08-10

## 2022-05-20 ASSESSMENT — ENCOUNTER SYMPTOMS
HEARTBURN: 0
DIZZINESS: 0
CHILLS: 0
ARTHRALGIAS: 0
WEAKNESS: 1
HEADACHES: 0
PALPITATIONS: 0
FEVER: 0
PARESTHESIAS: 0
COUGH: 0
FREQUENCY: 1
DYSURIA: 0
SORE THROAT: 0
HEMATOCHEZIA: 0
NAUSEA: 0
EYE PAIN: 0
SHORTNESS OF BREATH: 0
NERVOUS/ANXIOUS: 0
DIARRHEA: 0
CONSTIPATION: 0
HEMATURIA: 0
MYALGIAS: 1
JOINT SWELLING: 0
ABDOMINAL PAIN: 0

## 2022-05-20 ASSESSMENT — ACTIVITIES OF DAILY LIVING (ADL): CURRENT_FUNCTION: NO ASSISTANCE NEEDED

## 2022-05-20 NOTE — PROGRESS NOTES
"SUBJECTIVE:   Merlin J Anderson is a 81 year old male who presents for Preventive Visit.      Patient has been advised of split billing requirements and indicates understanding: Yes  Are you in the first 12 months of your Medicare coverage?  No    Healthy Habits:     In general, how would you rate your overall health?  Good    Frequency of exercise:  2-3 days/week    Duration of exercise:  15-30 minutes    Do you usually eat at least 4 servings of fruit and vegetables a day, include whole grains    & fiber and avoid regularly eating high fat or \"junk\" foods?  No    Taking medications regularly:  Yes    Medication side effects:  Muscle aches    Ability to successfully perform activities of daily living:  No assistance needed    Home Safety:  No safety concerns identified    Hearing Impairment:  Difficulty following a conversation in a noisy restaurant or crowded room, feel that people are mumbling or not speaking clearly, difficult to understand a speaker at a public meeting or Islam service, need to ask people to speak up or repeat themselves, find that men's voices are easier to understand than woman's and difficulty understanding soft or whispered speech    In the past 6 months, have you been bothered by leaking of urine? Yes    In general, how would you rate your overall mental or emotional health?  Good      PHQ-2 Total Score: 0    Additional concerns today:  Yes    Do you feel safe in your environment? Yes    Have you ever done Advance Care Planning? (For example, a Health Directive, POLST, or a discussion with a medical provider or your loved ones about your wishes): No, advance care planning information given to patient to review.  Advanced care planning was discussed at today's visit.       Fall risk  Fallen 2 or more times in the past year?: Yes  Any fall with injury in the past year?: No    Cognitive Screening   1) Repeat 3 items (Leader, Season, Table)      2) Clock draw:   NORMAL  3) 3 item recall: "   Recalls 2 objects   Results: NORMAL clock, 1-2 items recalled: COGNITIVE IMPAIRMENT LESS LIKELY    Mini-CogTM Copyright S Wally. Licensed by the author for use in [facility]; reprinted with permission (naomi@.[facility]). All rights reserved.      Do you have sleep apnea, excessive snoring or daytime drowsiness?: no    Reviewed and updated as needed this visit by clinical staff   Tobacco  Allergies  Meds  Problems  Med Hx  Surg Hx  Fam Hx  Soc   Hx          Reviewed and updated as needed this visit by Provider   Tobacco  Allergies  Meds  Problems  Med Hx  Surg Hx  Fam Hx           Social History     Tobacco Use     Smoking status: Never Smoker     Smokeless tobacco: Never Used   Substance Use Topics     Alcohol use: Yes     Comment: 1 monthly or less.         Alcohol Use 5/18/2022   Prescreen: >3 drinks/day or >7 drinks/week? No   Prescreen: >3 drinks/day or >7 drinks/week? -           Diabetes Follow-up  Has H/O DM. On diet , exercise and oral Metformin. Blood sugars are controlled. No parestesias. No hypoglycemias.    How often are you checking your blood sugar? A few times a month  What time of day are you checking your blood sugars (select all that apply)?  Before meals  Have you had any blood sugars above 200?  No  Have you had any blood sugars below 70?  No    What symptoms do you notice when your blood sugar is low?  None    What concerns do you have today about your diabetes? None     Do you have any of these symptoms? (Select all that apply)  No numbness or tingling in feet.  No redness, sores or blisters on feet.  No complaints of excessive thirst.  No reports of blurry vision.  No significant changes to weight.    Have you had a diabetic eye exam in the last 12 months? No          Hyperlipidemia Follow-Up  Has H/O hyperlipidemia. On medical treatment and diet. No side effects. No muscle weakness, myalgias or upset stomach.       Are you regularly taking any medication or  supplement to lower your cholesterol?   Yes- lipitor    Are you having muscle aches or other side effects that you think could be caused by your cholesterol lowering medication?  No    Hypertension Follow-up  Has h/o HTN. on medical treatment. BP has not been controlled. No side effects from medications. No CP, HA, dizziness. good compliance with medications and low salt diet.      Do you check your blood pressure regularly outside of the clinic? No     Are you following a low salt diet? Yes    Are your blood pressures ever more than 140 on the top number (systolic) OR more   than 90 on the bottom number (diastolic), for example 140/90? No    BP Readings from Last 2 Encounters:   05/20/22 (!) 181/75   07/07/21 (!) 167/68     Hemoglobin A1C POCT (%)   Date Value   07/07/2021 6.0 (H)   12/11/2020 5.9 (H)     LDL Cholesterol Calculated (mg/dL)   Date Value   12/11/2020 43   12/09/2019 29         Current providers sharing in care for this patient include:   Patient Care Team:  Sujit Duke MD as PCP - General (Internal Medicine)  Sujit Duke MD as Assigned PCP    The following health maintenance items are reviewed in Epic and correct as of today:  Health Maintenance Due   Topic Date Due     ZOSTER IMMUNIZATION (2 of 3) 10/19/2011     EYE EXAM  02/18/2014     COLORECTAL CANCER SCREENING  10/17/2018     LIPID  12/11/2021     MICROALBUMIN  12/11/2021     A1C  01/07/2022     HEMOGLOBIN  12/11/2021     Lab work is in process  Labs reviewed in EPIC    Has h/o carotid stenosis, follows with vascular surgery.   Has h/o CRF. Monitoring BP, BG, medications, avoiding OTC NSAIDs. Needs periodic recheck of kidney function.        Review of Systems   Constitutional: Negative for chills and fever.   HENT: Positive for hearing loss. Negative for congestion, ear pain and sore throat.    Eyes: Negative for pain and visual disturbance.   Respiratory: Negative for cough and shortness of breath.    Cardiovascular: Negative  "for chest pain, palpitations and peripheral edema.   Gastrointestinal: Negative for abdominal pain, constipation, diarrhea, heartburn, hematochezia and nausea.   Genitourinary: Positive for frequency. Negative for dysuria, genital sores, hematuria, impotence, penile discharge and urgency.   Musculoskeletal: Positive for myalgias. Negative for arthralgias and joint swelling.   Skin: Negative for rash.   Neurological: Positive for weakness. Negative for dizziness, headaches and paresthesias.   Psychiatric/Behavioral: Negative for mood changes. The patient is not nervous/anxious.          OBJECTIVE:   BP (!) 181/75   Pulse 66   Temp 98.2  F (36.8  C)   Resp 18   Ht 1.854 m (6' 1\")   Wt 91.6 kg (202 lb)   SpO2 98%   BMI 26.65 kg/m   Estimated body mass index is 26.65 kg/m  as calculated from the following:    Height as of this encounter: 1.854 m (6' 1\").    Weight as of this encounter: 91.6 kg (202 lb).  Physical Exam  GENERAL: healthy, alert and no distress  EYES: Eyes grossly normal to inspection, PERRL and conjunctivae and sclerae normal  HENT: ear canals and TM's normal,decreased hearing, nose and mouth without ulcers or lesions  NECK: no adenopathy, no asymmetry, masses, or scars and thyroid normal to palpation, no carotid bruit  RESP: lungs clear to auscultation - no rales, rhonchi or wheezes  CV: regular rate and rhythm, normal S1 S2, no S3 or S4, no murmur, click or rub, no peripheral edema and peripheral pulses strong  ABDOMEN: soft, nontender, no hepatosplenomegaly, no masses and bowel sounds normal  MS: no gross musculoskeletal defects noted, no edema  SKIN: no suspicious lesions or rashes  NEURO: Normal strength and tone, mentation intact and speech normal  PSYCH: mentation appears normal, affect normal/bright    Diagnostic Test Results:  Labs reviewed in Epic    ASSESSMENT / PLAN:       ICD-10-CM    1. Encounter for preventative adult health care examination  Z00.00 Fecal colorectal cancer screen " "(FIT)   2. Screen for colon cancer  Z12.11 Fecal colorectal cancer screen (FIT)   3. Screening for hyperlipidemia  Z13.220 Lipid panel reflex to direct LDL Fasting   4. Type 2 diabetes mellitus with other circulatory complications (H)  E11.59 Albumin Random Urine Quantitative with Creat Ratio     HEMOGLOBIN A1C     OFFICE/OUTPT VISIT,EST,LEVL III   5. Right-sided carotid artery disease, unspecified type (H)  I77.9    6. Stage 3a chronic kidney disease (H)  N18.31 CBC with platelets     Comprehensive metabolic panel (BMP + Alb, Alk Phos, ALT, AST, Total. Bili, TP)     OFFICE/OUTPT VISIT,EST,LEVL III   7. Primary hypertension  I10 amLODIPine (NORVASC) 5 MG tablet     OFFICE/OUTPT VISIT,EST,LEVL III       Patient has been advised of split billing requirements and indicates understanding: Yes    COUNSELING:  Reviewed preventive health counseling, as reflected in patient instructions       Regular exercise       Healthy diet/nutrition       Vision screening       Hearing screening       Colon cancer screening       Prostate cancer screening    Estimated body mass index is 26.65 kg/m  as calculated from the following:    Height as of this encounter: 1.854 m (6' 1\").    Weight as of this encounter: 91.6 kg (202 lb).        He reports that he has never smoked. He has never used smokeless tobacco.      Appropriate preventive services were discussed with this patient, including applicable screening as appropriate for cardiovascular disease, diabetes, osteopenia/osteoporosis, and glaucoma.  As appropriate for age/gender, discussed screening for colorectal cancer, prostate cancer, breast cancer, and cervical cancer. Checklist reviewing preventive services available has been given to the patient.    Reviewed patients plan of care and provided an AVS. The Intermediate Care Plan ( asthma action plan, low back pain action plan, and migraine action plan) for Merlin meets the Care Plan requirement. This Care Plan has been established " and reviewed with the Patient.    Counseling Resources:  ATP IV Guidelines  Pooled Cohorts Equation Calculator  Breast Cancer Risk Calculator  Breast Cancer: Medication to Reduce Risk  FRAX Risk Assessment  ICSI Preventive Guidelines  Dietary Guidelines for Americans, 2010  USDA's MyPlate  ASA Prophylaxis  Lung CA Screening    Sujit Duke MD  [facility]    Identified Health Risks:

## 2022-05-21 LAB
ALBUMIN SERPL-MCNC: 3.5 G/DL (ref 3.4–5)
ALP SERPL-CCNC: 69 U/L (ref 40–150)
ALT SERPL W P-5'-P-CCNC: 42 U/L (ref 0–70)
ANION GAP SERPL CALCULATED.3IONS-SCNC: 3 MMOL/L (ref 3–14)
AST SERPL W P-5'-P-CCNC: 32 U/L (ref 0–45)
BILIRUB SERPL-MCNC: 0.5 MG/DL (ref 0.2–1.3)
BUN SERPL-MCNC: 36 MG/DL (ref 7–30)
CALCIUM SERPL-MCNC: 9.4 MG/DL (ref 8.5–10.1)
CHLORIDE BLD-SCNC: 108 MMOL/L (ref 94–109)
CHOLEST SERPL-MCNC: 117 MG/DL
CO2 SERPL-SCNC: 27 MMOL/L (ref 20–32)
CREAT SERPL-MCNC: 2.03 MG/DL (ref 0.66–1.25)
CREAT UR-MCNC: 105 MG/DL
FASTING STATUS PATIENT QL REPORTED: YES
GFR SERPL CREATININE-BSD FRML MDRD: 32 ML/MIN/1.73M2
GLUCOSE BLD-MCNC: 121 MG/DL (ref 70–99)
HDLC SERPL-MCNC: 46 MG/DL
LDLC SERPL CALC-MCNC: 46 MG/DL
MICROALBUMIN UR-MCNC: 2670 MG/L
MICROALBUMIN/CREAT UR: 2542.86 MG/G CR (ref 0–17)
NONHDLC SERPL-MCNC: 71 MG/DL
POTASSIUM BLD-SCNC: 4.9 MMOL/L (ref 3.4–5.3)
PROT SERPL-MCNC: 6.6 G/DL (ref 6.8–8.8)
SODIUM SERPL-SCNC: 138 MMOL/L (ref 133–144)
TRIGL SERPL-MCNC: 126 MG/DL

## 2022-05-29 DIAGNOSIS — I10 ESSENTIAL HYPERTENSION: ICD-10-CM

## 2022-06-01 ENCOUNTER — TRANSFERRED RECORDS (OUTPATIENT)
Dept: HEALTH INFORMATION MANAGEMENT | Facility: CLINIC | Age: 82
End: 2022-06-01

## 2022-06-01 LAB — RETINOPATHY: NORMAL

## 2022-06-01 RX ORDER — ATENOLOL 100 MG/1
TABLET ORAL
Qty: 90 TABLET | Refills: 0 | OUTPATIENT
Start: 2022-06-01

## 2022-07-08 ENCOUNTER — NURSE TRIAGE (OUTPATIENT)
Dept: INTERNAL MEDICINE | Facility: CLINIC | Age: 82
End: 2022-07-08

## 2022-07-08 NOTE — TELEPHONE ENCOUNTER
Nurse Triage SBAR    Is this a 2nd Level Triage? NO    Situation: Patient's spouse calls regarding foot and ankle swelling - she is on consent to communicate. Patient is resting right now, but she states he asked her to call regarding the swelling to see if he can get a sooner appointment with Dr. Duke - currently scheduled for appointment on 8/10/22.     Background: Patient developed swelling in his ankles about 2 weeks ago, since then it has spread down to his feet and worsening. She thinks this may be related to a new BP medication he was started on - Amlodipine.     Assessment: She is not with the patient right now, he is having swelling in both ankles and feet, but thinks the left ankle/foot is more swollen. She did not notice any pitting when she pushed her finger into the area that was most swollen. Swelling improves overnight and then worsens during the day. Patient is walking regularly and swelling is not interfering with daily activities. He is not complaining of any other symptoms. He is able to put his shoes on without difficulty. He only elevates his legs when they are sitting on their porch.     Protocol Recommended Disposition:   See Within 3 Days In Office     Recommendation: Informed patient's wife that swelling could be side effect of Amlodipine, but would recommend sooner appointment. Patient should elevate his legs when seated during the day to help with swelling. Will forward to Dr. Duke to review when he returns on Monday if he can work patient in for sooner appointment and if he has any other recommendations. Please call patient or his wife back with update.    Routed to provider    Does the patient meet one of the following criteria for ADS visit consideration? 16+ years old, with an [facility] PCP     TIP  Providers, please consider if this condition is appropriate for management at one of our Acute and Diagnostic Services sites.     If patient is a good candidate, please use dotphrase  <dot>triageresponse and select Refer to ADS to document.    Annabel Callahan RN  [facility]     Reason for Disposition    MILD swelling of both ankles (i.e., pedal edema) AND new onset or worsening    Additional Information    Negative: Difficulty breathing at rest    Negative: Entire foot is cool or blue in comparison to other side    Negative: Chest pain    Negative: Small area of swelling and followed an insect bite to the area    Negative: Followed a knee injury    Negative: Ankle or foot injury    Negative: Pregnant with leg swelling or edema    Negative: MODERATE swelling of both ankles (e.g., swelling extends up to the knees) AND new onset or worsening    Negative: Difficulty breathing with exertion AND worsening or new onset    Negative: Looks like a boil, infected sore, deep ulcer, or other infected rash (spreading redness, pus)    Negative: Patient wants to be seen    Negative: Swelling of face, arm or hands (Exception: slight puffiness of fingers during hot weather)    Negative: Pregnant > 20 weeks and sudden weight gain (i.e., > 2 lbs, 1 kg in one week)    Negative: SEVERE swelling (e.g., swelling extends above knee, entire leg is swollen, weeping fluid)    Negative: Thigh or calf pain and only 1 side and present > 1 hour    Negative: Thigh, calf, or ankle swelling in only one leg    Negative: Thigh, calf, or ankle swelling in both legs, but one side is definitely more swollen (Exception: longstanding difference between legs)    Negative: Cast on leg or ankle and has increasing pain    Negative: Can't walk or can barely stand (new onset)    Negative: Fever and red area (or area very tender to touch)    Negative: Patient sounds very sick or weak to the triager    Protocols used: LEG SWELLING AND EDEMA-A-OH

## 2022-07-10 NOTE — TELEPHONE ENCOUNTER
The leg edema is likely related to Amlodipine.   It was started , because his BP was not controlled.   If his swelling is mild , recommend to try to keep low salt diet, elevated his legs , when able, use compression stockings. If it is bothering him we can change to a different BP medication. How is his blood pressure now?

## 2022-07-11 NOTE — TELEPHONE ENCOUNTER
Call to Fatou and pt. The legs and feet do not bother him at all.     The BP is going down steadily. About 140/60. This is good BP, for him.     He agrees with staying on the medication for now. He will call back if needed to change.

## 2022-08-01 DIAGNOSIS — R33.9 URINARY RETENTION: ICD-10-CM

## 2022-08-01 DIAGNOSIS — I10 ESSENTIAL HYPERTENSION: ICD-10-CM

## 2022-08-02 RX ORDER — ATENOLOL 100 MG/1
TABLET ORAL
Qty: 90 TABLET | Refills: 0 | Status: SHIPPED | OUTPATIENT
Start: 2022-08-02 | End: 2022-11-03

## 2022-08-02 RX ORDER — TAMSULOSIN HYDROCHLORIDE 0.4 MG/1
CAPSULE ORAL
Qty: 90 CAPSULE | Refills: 0 | Status: SHIPPED | OUTPATIENT
Start: 2022-08-02 | End: 2022-10-20

## 2022-08-02 NOTE — TELEPHONE ENCOUNTER
Tamsulosin Hcl Oral Capsule 0.4 mg    Atenolol Oral Tablet 100 mg    Routing refill request to provider for review/approval because:  Blood Pressure out of range for [facility] refill protocol.    BP Readings from Last 3 Encounters:   05/20/22 (!) 181/75   07/07/21 (!) 167/68   12/11/20 (!) 160/70       LOV 05/2022    Next 5 appointments (look out 90 days)      Aug 10, 2022  3:00 PM  (Arrive by 2:40 PM)  Provider Visit with Sujit Duke MD  [facility] ([facility] ) [address] [facility] [place] [address] [phone]

## 2022-08-10 ENCOUNTER — OFFICE VISIT (OUTPATIENT)
Dept: INTERNAL MEDICINE | Facility: CLINIC | Age: 82
End: 2022-08-10
Payer: COMMERCIAL

## 2022-08-10 VITALS
WEIGHT: 195.9 LBS | HEIGHT: 73 IN | TEMPERATURE: 97.4 F | BODY MASS INDEX: 25.96 KG/M2 | OXYGEN SATURATION: 98 % | HEART RATE: 55 BPM | SYSTOLIC BLOOD PRESSURE: 150 MMHG | RESPIRATION RATE: 16 BRPM | DIASTOLIC BLOOD PRESSURE: 62 MMHG

## 2022-08-10 DIAGNOSIS — D63.1 ANEMIA DUE TO STAGE 3B CHRONIC KIDNEY DISEASE (H): ICD-10-CM

## 2022-08-10 DIAGNOSIS — N18.32 STAGE 3B CHRONIC KIDNEY DISEASE (H): ICD-10-CM

## 2022-08-10 DIAGNOSIS — E11.59 TYPE 2 DIABETES MELLITUS WITH OTHER CIRCULATORY COMPLICATIONS (H): ICD-10-CM

## 2022-08-10 DIAGNOSIS — I10 ESSENTIAL HYPERTENSION, BENIGN: ICD-10-CM

## 2022-08-10 DIAGNOSIS — R60.0 BILATERAL LEG EDEMA: Primary | ICD-10-CM

## 2022-08-10 DIAGNOSIS — N18.32 ANEMIA DUE TO STAGE 3B CHRONIC KIDNEY DISEASE (H): ICD-10-CM

## 2022-08-10 LAB
ERYTHROCYTE [DISTWIDTH] IN BLOOD BY AUTOMATED COUNT: 12.5 % (ref 10–15)
FOLATE SERPL-MCNC: 18.1 NG/ML (ref 4.6–34.8)
HCT VFR BLD AUTO: 36.5 % (ref 40–53)
HGB BLD-MCNC: 12.2 G/DL (ref 13.3–17.7)
LDH SERPL L TO P-CCNC: 89 U/L (ref 85–227)
MCH RBC QN AUTO: 31.3 PG (ref 26.5–33)
MCHC RBC AUTO-ENTMCNC: 33.4 G/DL (ref 31.5–36.5)
MCV RBC AUTO: 94 FL (ref 78–100)
PLATELET # BLD AUTO: 137 10E3/UL (ref 150–450)
RBC # BLD AUTO: 3.9 10E6/UL (ref 4.4–5.9)
VIT B12 SERPL-MCNC: 619 PG/ML (ref 232–1245)
WBC # BLD AUTO: 6.4 10E3/UL (ref 4–11)

## 2022-08-10 PROCEDURE — 82607 VITAMIN B-12: CPT | Performed by: INTERNAL MEDICINE

## 2022-08-10 PROCEDURE — 82043 UR ALBUMIN QUANTITATIVE: CPT | Performed by: INTERNAL MEDICINE

## 2022-08-10 PROCEDURE — 85027 COMPLETE CBC AUTOMATED: CPT | Performed by: INTERNAL MEDICINE

## 2022-08-10 PROCEDURE — 36415 COLL VENOUS BLD VENIPUNCTURE: CPT | Performed by: INTERNAL MEDICINE

## 2022-08-10 PROCEDURE — 99214 OFFICE O/P EST MOD 30 MIN: CPT | Performed by: INTERNAL MEDICINE

## 2022-08-10 PROCEDURE — 82746 ASSAY OF FOLIC ACID SERUM: CPT | Performed by: INTERNAL MEDICINE

## 2022-08-10 PROCEDURE — 83550 IRON BINDING TEST: CPT | Performed by: INTERNAL MEDICINE

## 2022-08-10 PROCEDURE — 84443 ASSAY THYROID STIM HORMONE: CPT | Performed by: INTERNAL MEDICINE

## 2022-08-10 PROCEDURE — 85379 FIBRIN DEGRADATION QUANT: CPT | Performed by: INTERNAL MEDICINE

## 2022-08-10 PROCEDURE — 80048 BASIC METABOLIC PNL TOTAL CA: CPT | Performed by: INTERNAL MEDICINE

## 2022-08-10 PROCEDURE — 83615 LACTATE (LD) (LDH) ENZYME: CPT | Performed by: INTERNAL MEDICINE

## 2022-08-10 RX ORDER — HYDROCHLOROTHIAZIDE 12.5 MG/1
12.5 TABLET ORAL DAILY
Qty: 90 TABLET | Refills: 3 | Status: SHIPPED | OUTPATIENT
Start: 2022-08-10 | End: 2023-08-01

## 2022-08-10 NOTE — Clinical Note
Please abstract the following data from this visit with this patient into the appropriate field in Epic:  Tests that can be patient reported without a hard copy:  Eye exam with ophthalmology on this date: 6/2022 @[facility]    Note to Abstraction: If this section is blank, no results were found via Chart Review/Care Everywhere.

## 2022-08-10 NOTE — PROGRESS NOTES
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    Sujit Duke MD  [facility] Subjective Merlin is a 82 year old accompanied by his spouse, presenting for the following health issues:  Edema (Both feet since starting the most recent bp med/) and RECHECK (HTN, Kidney function)      History of Present Illness       Reason for visit:  Feet    He eats 0-1 servings of fruits and vegetables daily.He consumes 0 sweetened beverage(s) daily.He exercises with enough effort to increase his heart rate 20 to 29 minutes per day.  He exercises with enough effort to increase his heart rate 5 days per week.   He is taking medications regularly.       Please abstract the following data from this visit with this patient into the appropriate field in Epic:    Tests that can be patient reported without a hard copy:    Eye exam with ophthalmology on this date: 6/2022 @[facility]       Note to Abstraction: If this section is blank, no results were  "found via Chart Review/Care Everywhere.    Patient is seen for a follow up visit.  Concern for edema of the lower legs and ankles. Started after being put on Amlodipine.   No pain, no SOB, no weight change. In the mornings usually has no edema.   Has H/O DM. On diet , exercise and Metformin. Blood sugars are controlled. No parestesias. No hypoglycemias.  Has h/o HTN. on medical treatment. BP has been controlled. No side effects from medications. No CP, HA, dizziness. good compliance with medications and low salt diet.  Has h/o CRF. Symptoms include LE edema. Monitoring BP, BG, medications, avoiding OTC NSAIDs. Needs periodic recheck of kidney function.  Has anemia of renal disease, no h/o GI bleed             Review of Systems   Constitutional, HEENT, cardiovascular, pulmonary, gi and gu systems are negative, except as otherwise noted.      Objective    BP (!) 150/62 (BP Location: Left arm, Cuff Size: Adult Regular)   Pulse 55   Temp 97.4  F (36.3  C) (Tympanic)   Resp 16   Ht 1.848 m (6' 0.75\")   Wt 88.9 kg (195 lb 14.4 oz)   SpO2 98%   BMI 26.02 kg/m    Body mass index is 26.02 kg/m .  Physical Exam   GENERAL: healthy, alert and no distress  NECK: no adenopathy, no asymmetry, masses, or scars and thyroid normal to palpation  RESP: lungs clear to auscultation - no rales, rhonchi or wheezes  CV: regular rate and rhythm, normal S1 S2, no S3 or S4, no murmur, click or rub, peripheral pulses strong  ABDOMEN: soft, nontender, no hepatosplenomegaly, no masses and bowel sounds normal  MS: no gross musculoskeletal defects noted, 1+ ankles and feet edema  SKIN: no suspicious lesions or rashes    Office Visit on 05/20/2022   Component Date Value Ref Range Status     Cholesterol 05/20/2022 117  <200 mg/dL Final     Triglycerides 05/20/2022 126  <150 mg/dL Final     Direct Measure HDL 05/20/2022 46  >=40 mg/dL Final     LDL Cholesterol Calculated 05/20/2022 46  <=100 mg/dL Final     Non HDL Cholesterol 05/20/2022 71  " <130 mg/dL Final     Patient Fasting > 8hrs? 05/20/2022 Yes   Final     Creatinine Urine mg/dL 05/20/2022 105  mg/dL Final     Albumin Urine mg/L 05/20/2022 2,670  mg/L Final     Albumin Urine mg/g Cr 05/20/2022 2,542.86 (A) 0.00 - 17.00 mg/g Cr Final     Hemoglobin A1C 05/20/2022 6.0 (A) 0.0 - 5.6 % Final    Normal <5.7%   Prediabetes 5.7-6.4%    Diabetes 6.5% or higher     Note: Adopted from ADA consensus guidelines.     WBC Count 05/20/2022 5.9  4.0 - 11.0 10e3/uL Final     RBC Count 05/20/2022 3.89 (A) 4.40 - 5.90 10e6/uL Final     Hemoglobin 05/20/2022 12.4 (A) 13.3 - 17.7 g/dL Final     Hematocrit 05/20/2022 35.7 (A) 40.0 - 53.0 % Final     MCV 05/20/2022 92  78 - 100 fL Final     MCH 05/20/2022 31.9  26.5 - 33.0 pg Final     MCHC 05/20/2022 34.7  31.5 - 36.5 g/dL Final     RDW 05/20/2022 12.9  10.0 - 15.0 % Final     Platelet Count 05/20/2022 121 (A) 150 - 450 10e3/uL Final     Sodium 05/20/2022 138  133 - 144 mmol/L Final     Potassium 05/20/2022 4.9  3.4 - 5.3 mmol/L Final     Chloride 05/20/2022 108  94 - 109 mmol/L Final     Carbon Dioxide (CO2) 05/20/2022 27  20 - 32 mmol/L Final     Anion Gap 05/20/2022 3  3 - 14 mmol/L Final     Urea Nitrogen 05/20/2022 36 (A) 7 - 30 mg/dL Final     Creatinine 05/20/2022 2.03 (A) 0.66 - 1.25 mg/dL Final     Calcium 05/20/2022 9.4  8.5 - 10.1 mg/dL Final     Glucose 05/20/2022 121 (A) 70 - 99 mg/dL Final     Alkaline Phosphatase 05/20/2022 69  40 - 150 U/L Final     AST 05/20/2022 32  0 - 45 U/L Final     ALT 05/20/2022 42  0 - 70 U/L Final     Protein Total 05/20/2022 6.6 (A) 6.8 - 8.8 g/dL Final     Albumin 05/20/2022 3.5  3.4 - 5.0 g/dL Final     Bilirubin Total 05/20/2022 0.5  0.2 - 1.3 mg/dL Final     GFR Estimate 05/20/2022 32 (A) >60 mL/min/1.73m2 Final    Effective December 21, 2021 eGFRcr in adults is calculated using the 2021 CKD-EPI creatinine equation which includes age and gender (Gary et al., NEJM, DOI: 10.1056/IQEHtz2530782)                   .  ..

## 2022-08-10 NOTE — LETTER
[facility]  [address], [address]  [place] [address]  [phone]        August 15, 2022    Merlin J Anderson  [address] [facility] [address]            Dear Mr. Merlin J Anderson:    Your recent labs are all normal except for Decreased kidney function , mild anemia and elevated albumin in the urine, no change.   Follow up lab in 3 months recommended.     Please give us a call with any questions.        Sujit Duke MD     Results for orders placed or performed in visit on 08/10/22   Basic metabolic panel  (Ca, Cl, CO2, Creat, Gluc, K, Na, BUN)     Status: Abnormal   Result Value Ref Range    Sodium 139 133 - 144 mmol/L    Potassium 5.3 3.4 - 5.3 mmol/L    Chloride 107 94 - 109 mmol/L    Carbon Dioxide (CO2) 21 20 - 32 mmol/L    Anion Gap 11 3 - 14 mmol/L    Urea Nitrogen 32 (H) 7 - 30 mg/dL    Creatinine 2.07 (H) 0.66 - 1.25 mg/dL    Calcium 9.4 8.5 - 10.1 mg/dL    Glucose 137 (H) 70 - 99 mg/dL    GFR Estimate 31 (L) >60 mL/min/1.73m2   Albumin Random Urine Quantitative with Creat Ratio     Status: Abnormal   Result Value Ref Range    Creatinine Urine mg/dL 117 mg/dL    Albumin Urine mg/L 2,660 mg/L    Albumin Urine mg/g Cr 2,273.50 (H) 0.00 - 17.00 mg/g Cr   D dimer, quantitative     Status: Normal   Result Value Ref Range    D-Dimer Quantitative 0.48 0.00 - 0.50 ug/mL FEU    Narrative    This D-dimer assay is intended for use in conjunction with a clinical pretest probability assessment model to exclude pulmonary embolism (PE) and deep venous thrombosis (DVT) in outpatients suspected of PE or DVT. The cut-off value is 0.50 ug/mL FEU.   CBC with platelets     Status: Abnormal   Result Value Ref Range    WBC Count 6.4 4.0 - 11.0 10e3/uL    RBC Count 3.90 (L) 4.40 - 5.90 10e6/uL    Hemoglobin 12.2 (L) 13.3 - 17.7 g/dL    Hematocrit 36.5 (L) 40.0 - 53.0 %    MCV 94 78 - 100 fL    MCH 31.3 26.5 - 33.0 pg    MCHC 33.4 31.5 - 36.5 g/dL    RDW 12.5 10.0 - 15.0  %    Platelet Count 137 (L) 150 - 450 10e3/uL   TSH with free T4 reflex     Status: Normal   Result Value Ref Range    TSH 2.81 0.40 - 4.00 mU/L   Iron and iron binding capacity     Status: Normal   Result Value Ref Range    Iron 93 35 - 180 ug/dL    Iron Binding Capacity 321 240 - 430 ug/dL    Iron Sat Index 29 15 - 46 %   Vitamin B12     Status: Normal   Result Value Ref Range    Vitamin B12 619 232 - 1,245 pg/mL   Folate     Status: Normal   Result Value Ref Range    Folic Acid 18.1 4.6 - 34.8 ng/mL   Lactate Dehydrogenase     Status: Normal   Result Value Ref Range    Lactate Dehydrogenase 89 85 - 227 U/L

## 2022-08-11 LAB
ANION GAP SERPL CALCULATED.3IONS-SCNC: 11 MMOL/L (ref 3–14)
BUN SERPL-MCNC: 32 MG/DL (ref 7–30)
CALCIUM SERPL-MCNC: 9.4 MG/DL (ref 8.5–10.1)
CHLORIDE BLD-SCNC: 107 MMOL/L (ref 94–109)
CO2 SERPL-SCNC: 21 MMOL/L (ref 20–32)
CREAT SERPL-MCNC: 2.07 MG/DL (ref 0.66–1.25)
CREAT UR-MCNC: 117 MG/DL
D DIMER PPP FEU-MCNC: 0.48 UG/ML FEU (ref 0–0.5)
GFR SERPL CREATININE-BSD FRML MDRD: 31 ML/MIN/1.73M2
GLUCOSE BLD-MCNC: 137 MG/DL (ref 70–99)
IRON SATN MFR SERPL: 29 % (ref 15–46)
IRON SERPL-MCNC: 93 UG/DL (ref 35–180)
MICROALBUMIN UR-MCNC: 2660 MG/L
MICROALBUMIN/CREAT UR: 2273.5 MG/G CR (ref 0–17)
POTASSIUM BLD-SCNC: 5.3 MMOL/L (ref 3.4–5.3)
SODIUM SERPL-SCNC: 139 MMOL/L (ref 133–144)
TIBC SERPL-MCNC: 321 UG/DL (ref 240–430)
TSH SERPL DL<=0.005 MIU/L-ACNC: 2.81 MU/L (ref 0.4–4)

## 2022-08-15 DIAGNOSIS — I10 ESSENTIAL HYPERTENSION: ICD-10-CM

## 2022-08-17 RX ORDER — LISINOPRIL 30 MG/1
TABLET ORAL
Qty: 90 TABLET | Refills: 0 | Status: SHIPPED | OUTPATIENT
Start: 2022-08-17 | End: 2022-11-17

## 2022-08-17 NOTE — TELEPHONE ENCOUNTER
Pending Prescriptions:                       Disp   Refills    lisinopril (ZESTRIL) 30 MG tablet [Pharmac*90 tab*0        Sig: TAKE ONE TABLET BY MOUTH ONE TIME DAILY    Routing refill request to provider for review/approval because:  BP Readings from Last 3 Encounters:   08/10/22 (!) 150/62   05/20/22 (!) 181/75   07/07/21 (!) 167/68

## 2022-10-16 ENCOUNTER — HEALTH MAINTENANCE LETTER (OUTPATIENT)
Age: 82
End: 2022-10-16

## 2022-10-18 DIAGNOSIS — R33.9 URINARY RETENTION: ICD-10-CM

## 2022-10-18 DIAGNOSIS — E11.9 TYPE 2 DIABETES MELLITUS WITHOUT COMPLICATION, WITHOUT LONG-TERM CURRENT USE OF INSULIN (H): ICD-10-CM

## 2022-10-20 RX ORDER — TAMSULOSIN HYDROCHLORIDE 0.4 MG/1
CAPSULE ORAL
Qty: 90 CAPSULE | Refills: 0 | Status: SHIPPED | OUTPATIENT
Start: 2022-10-20 | End: 2023-01-18

## 2022-10-20 NOTE — TELEPHONE ENCOUNTER
Routing refill request to provider for review/approval because:  Labs out of range:    BP Readings from Last 3 Encounters:   08/10/22 (!) 150/62   05/20/22 (!) 181/75   07/07/21 (!) 167/68     Creatinine   Date Value Ref Range Status   08/10/2022 2.07 (H) 0.66 - 1.25 mg/dL Final   07/07/2021 1.81 (H) 0.66 - 1.25 mg/dL Trent MEIER RN

## 2022-10-31 DIAGNOSIS — I10 ESSENTIAL HYPERTENSION: ICD-10-CM

## 2022-11-03 RX ORDER — ATENOLOL 100 MG/1
TABLET ORAL
Qty: 90 TABLET | Refills: 0 | Status: SHIPPED | OUTPATIENT
Start: 2022-11-03 | End: 2023-02-01

## 2022-11-03 NOTE — TELEPHONE ENCOUNTER
Pending Prescriptions:                       Disp   Refills    atenolol (TENORMIN) 100 MG tablet [Pharmac*90 tab*0        Sig: TAKE ONE TABLET BY MOUTH ONE TIME DAILY  Routing refill request to provider for review/approval because:  Fails protocol  Last ov 8/10/2022

## 2022-11-15 DIAGNOSIS — I10 ESSENTIAL HYPERTENSION: ICD-10-CM

## 2022-11-17 RX ORDER — LISINOPRIL 30 MG/1
TABLET ORAL
Qty: 90 TABLET | Refills: 0 | Status: SHIPPED | OUTPATIENT
Start: 2022-11-17 | End: 2023-02-01

## 2022-11-17 NOTE — TELEPHONE ENCOUNTER
Routing refill request to provider for review/approval because:  Creatinine   Date Value Ref Range Status   08/10/2022 2.07 (H) 0.66 - 1.25 mg/dL Final   07/07/2021 1.81 (H) 0.66 - 1.25 mg/dL Final      BP Readings from Last 3 Encounters:   08/10/22 (!) 150/62   05/20/22 (!) 181/75   07/07/21 (!) 167/68      Fiona Cordova RN

## 2022-11-21 DIAGNOSIS — I77.9 RIGHT-SIDED CAROTID ARTERY DISEASE, UNSPECIFIED TYPE (H): ICD-10-CM

## 2022-11-21 DIAGNOSIS — E78.5 HYPERLIPIDEMIA LDL GOAL <100: ICD-10-CM

## 2022-11-21 RX ORDER — ATORVASTATIN CALCIUM 40 MG/1
TABLET, FILM COATED ORAL
Qty: 90 TABLET | Refills: 1 | Status: SHIPPED | OUTPATIENT
Start: 2022-11-21 | End: 2023-05-23

## 2022-11-22 NOTE — TELEPHONE ENCOUNTER
Pending Prescriptions:                       Disp   Refills    atorvastatin (LIPITOR) 40 MG tablet [Phar*90 tab*1            Sig: TAKE ONE TABLET BY MOUTH ONE TIME DAILY    Prescription approved per [facility] Refill Protocol.

## 2022-12-04 ENCOUNTER — HEALTH MAINTENANCE LETTER (OUTPATIENT)
Age: 82
End: 2022-12-04

## 2023-01-16 DIAGNOSIS — R33.9 URINARY RETENTION: ICD-10-CM

## 2023-01-16 DIAGNOSIS — E11.9 TYPE 2 DIABETES MELLITUS WITHOUT COMPLICATION, WITHOUT LONG-TERM CURRENT USE OF INSULIN (H): ICD-10-CM

## 2023-01-18 DIAGNOSIS — E11.9 TYPE 2 DIABETES MELLITUS WITHOUT COMPLICATION, WITHOUT LONG-TERM CURRENT USE OF INSULIN (H): ICD-10-CM

## 2023-01-18 DIAGNOSIS — R33.9 URINARY RETENTION: ICD-10-CM

## 2023-01-18 RX ORDER — TAMSULOSIN HYDROCHLORIDE 0.4 MG/1
CAPSULE ORAL
Qty: 90 CAPSULE | Refills: 0 | Status: SHIPPED | OUTPATIENT
Start: 2023-01-18 | End: 2023-04-26

## 2023-01-18 NOTE — TELEPHONE ENCOUNTER
Routing refill request to provider for review/approval because: BP and A1C out of range.  BP Readings from Last 3 Encounters:   08/10/22 (!) 150/62   05/20/22 (!) 181/75   07/07/21 (!) 167/68     Lab Results   Component Value Date    A1C 6.0 05/20/2022    A1C 6.0 07/07/2021    A1C 5.9 12/11/2020    A1C 5.5 12/09/2019    A1C 5.8 06/05/2019    A1C 6.1 10/28/2018     Catalino Mari RN

## 2023-01-19 RX ORDER — TAMSULOSIN HYDROCHLORIDE 0.4 MG/1
CAPSULE ORAL
Qty: 90 CAPSULE | Refills: 0 | OUTPATIENT
Start: 2023-01-19

## 2023-01-30 DIAGNOSIS — I10 ESSENTIAL HYPERTENSION: ICD-10-CM

## 2023-02-01 RX ORDER — ATENOLOL 100 MG/1
TABLET ORAL
Qty: 90 TABLET | Refills: 0 | Status: SHIPPED | OUTPATIENT
Start: 2023-02-01 | End: 2023-04-26

## 2023-02-01 RX ORDER — LISINOPRIL 30 MG/1
TABLET ORAL
Qty: 90 TABLET | Refills: 0 | Status: SHIPPED | OUTPATIENT
Start: 2023-02-01 | End: 2023-04-26

## 2023-02-01 NOTE — TELEPHONE ENCOUNTER
Atenolol Oral Tab 100 mg  Routing refill request to provider for review/approval because:  Blood Pressure out of range for [facility] refill protocol.    BP Readings from Last 3 Encounters:   08/10/22 (!) 150/62   05/20/22 (!) 181/75   07/07/21 (!) 167/68       Lisinopril Oral Tablet 30 mg  Routing refill request to provider for review/approval because:  Blood Pressure out of range for [facility] refill protocol.    BP Readings from Last 3 Encounters:   08/10/22 (!) 150/62   05/20/22 (!) 181/75   07/07/21 (!) 167/68     LOV 08/10/2022

## 2023-04-17 DIAGNOSIS — E11.9 TYPE 2 DIABETES MELLITUS WITHOUT COMPLICATION, WITHOUT LONG-TERM CURRENT USE OF INSULIN (H): ICD-10-CM

## 2023-04-17 NOTE — LETTER
[facility]  [address]  [address]  [facility] [address]  Phone: [phone]        April 25, 2023      Merlin J Anderson                                                                                                                                [address] [place] [address]            Dear Mr. Warner,    We are concerned about your health care.  We recently provided you with a medication refill.  Many medications require a routine follow-up..      At this time we ask that: You schedule a routine office visit Your prescription: Has been refilled for 1 time so you may have time for the above noted follow-up.      Thank you,      [facility]

## 2023-04-19 NOTE — TELEPHONE ENCOUNTER
Pt is due for OV. Please call.     Creatinine   Date Value Ref Range Status   08/10/2022 2.07 (H) 0.66 - 1.25 mg/dL Final   07/07/2021 1.81 (H) 0.66 - 1.25 mg/dL Final     Provider approval needed.

## 2023-04-20 ENCOUNTER — PATIENT OUTREACH (OUTPATIENT)
Dept: CARE COORDINATION | Facility: CLINIC | Age: 83
End: 2023-04-20
Payer: COMMERCIAL

## 2023-04-24 DIAGNOSIS — R33.9 URINARY RETENTION: ICD-10-CM

## 2023-04-24 DIAGNOSIS — I10 ESSENTIAL HYPERTENSION: ICD-10-CM

## 2023-04-26 RX ORDER — ATENOLOL 100 MG/1
TABLET ORAL
Qty: 90 TABLET | Refills: 0 | Status: SHIPPED | OUTPATIENT
Start: 2023-04-26 | End: 2023-08-01

## 2023-04-26 RX ORDER — LISINOPRIL 30 MG/1
TABLET ORAL
Qty: 90 TABLET | Refills: 0 | Status: SHIPPED | OUTPATIENT
Start: 2023-04-26 | End: 2023-08-03

## 2023-04-26 RX ORDER — TAMSULOSIN HYDROCHLORIDE 0.4 MG/1
CAPSULE ORAL
Qty: 90 CAPSULE | Refills: 0 | Status: SHIPPED | OUTPATIENT
Start: 2023-04-26 | End: 2023-08-01

## 2023-04-26 NOTE — TELEPHONE ENCOUNTER
Provider approval needed.     BP Readings from Last 3 Encounters:   08/10/22 (!) 150/62   05/20/22 (!) 181/75   07/07/21 (!) 167/68

## 2023-05-04 ENCOUNTER — PATIENT OUTREACH (OUTPATIENT)
Dept: CARE COORDINATION | Facility: CLINIC | Age: 83
End: 2023-05-04
Payer: COMMERCIAL

## 2023-06-06 ENCOUNTER — OFFICE VISIT (OUTPATIENT)
Dept: INTERNAL MEDICINE | Facility: CLINIC | Age: 83
End: 2023-06-06
Payer: COMMERCIAL

## 2023-06-06 VITALS
DIASTOLIC BLOOD PRESSURE: 60 MMHG | BODY MASS INDEX: 25.7 KG/M2 | HEART RATE: 54 BPM | WEIGHT: 193.9 LBS | OXYGEN SATURATION: 98 % | SYSTOLIC BLOOD PRESSURE: 146 MMHG | RESPIRATION RATE: 15 BRPM | TEMPERATURE: 96.6 F | HEIGHT: 73 IN

## 2023-06-06 DIAGNOSIS — M19.012 PRIMARY OSTEOARTHRITIS OF BOTH SHOULDERS: ICD-10-CM

## 2023-06-06 DIAGNOSIS — E11.59 TYPE 2 DIABETES MELLITUS WITH OTHER CIRCULATORY COMPLICATIONS (H): ICD-10-CM

## 2023-06-06 DIAGNOSIS — Z23 NEED FOR SHINGLES VACCINE: ICD-10-CM

## 2023-06-06 DIAGNOSIS — M19.011 PRIMARY OSTEOARTHRITIS OF BOTH SHOULDERS: ICD-10-CM

## 2023-06-06 DIAGNOSIS — Z12.11 SCREEN FOR COLON CANCER: ICD-10-CM

## 2023-06-06 DIAGNOSIS — Z00.00 ENCOUNTER FOR PREVENTATIVE ADULT HEALTH CARE EXAMINATION: Primary | ICD-10-CM

## 2023-06-06 DIAGNOSIS — N18.31 STAGE 3A CHRONIC KIDNEY DISEASE (H): ICD-10-CM

## 2023-06-06 DIAGNOSIS — I10 ESSENTIAL HYPERTENSION, BENIGN: ICD-10-CM

## 2023-06-06 LAB
ALBUMIN UR-MCNC: >=300 MG/DL
APPEARANCE UR: CLEAR
BACTERIA #/AREA URNS HPF: ABNORMAL /HPF
BILIRUB UR QL STRIP: NEGATIVE
COLOR UR AUTO: YELLOW
ERYTHROCYTE [DISTWIDTH] IN BLOOD BY AUTOMATED COUNT: 12.3 % (ref 10–15)
GLUCOSE UR STRIP-MCNC: NEGATIVE MG/DL
HBA1C MFR BLD: 6.3 % (ref 0–5.6)
HCT VFR BLD AUTO: 34.4 % (ref 40–53)
HGB BLD-MCNC: 11.7 G/DL (ref 13.3–17.7)
HGB UR QL STRIP: NEGATIVE
KETONES UR STRIP-MCNC: NEGATIVE MG/DL
LEUKOCYTE ESTERASE UR QL STRIP: NEGATIVE
MCH RBC QN AUTO: 31.4 PG (ref 26.5–33)
MCHC RBC AUTO-ENTMCNC: 34 G/DL (ref 31.5–36.5)
MCV RBC AUTO: 92 FL (ref 78–100)
NITRATE UR QL: NEGATIVE
PH UR STRIP: 5.5 [PH] (ref 5–7)
PLATELET # BLD AUTO: 132 10E3/UL (ref 150–450)
RBC # BLD AUTO: 3.73 10E6/UL (ref 4.4–5.9)
RBC #/AREA URNS AUTO: ABNORMAL /HPF
SP GR UR STRIP: 1.02 (ref 1–1.03)
UROBILINOGEN UR STRIP-ACNC: 0.2 E.U./DL
WBC # BLD AUTO: 6.8 10E3/UL (ref 4–11)
WBC #/AREA URNS AUTO: ABNORMAL /HPF

## 2023-06-06 PROCEDURE — 82570 ASSAY OF URINE CREATININE: CPT | Performed by: INTERNAL MEDICINE

## 2023-06-06 PROCEDURE — 20610 DRAIN/INJ JOINT/BURSA W/O US: CPT | Mod: RT | Performed by: INTERNAL MEDICINE

## 2023-06-06 PROCEDURE — 82043 UR ALBUMIN QUANTITATIVE: CPT | Performed by: INTERNAL MEDICINE

## 2023-06-06 PROCEDURE — 80053 COMPREHEN METABOLIC PANEL: CPT | Performed by: INTERNAL MEDICINE

## 2023-06-06 PROCEDURE — 85027 COMPLETE CBC AUTOMATED: CPT | Performed by: INTERNAL MEDICINE

## 2023-06-06 PROCEDURE — 81001 URINALYSIS AUTO W/SCOPE: CPT | Performed by: INTERNAL MEDICINE

## 2023-06-06 PROCEDURE — 99214 OFFICE O/P EST MOD 30 MIN: CPT | Mod: 25 | Performed by: INTERNAL MEDICINE

## 2023-06-06 PROCEDURE — 83036 HEMOGLOBIN GLYCOSYLATED A1C: CPT | Performed by: INTERNAL MEDICINE

## 2023-06-06 PROCEDURE — 84443 ASSAY THYROID STIM HORMONE: CPT | Performed by: INTERNAL MEDICINE

## 2023-06-06 PROCEDURE — G0438 PPPS, INITIAL VISIT: HCPCS | Performed by: INTERNAL MEDICINE

## 2023-06-06 PROCEDURE — 36415 COLL VENOUS BLD VENIPUNCTURE: CPT | Performed by: INTERNAL MEDICINE

## 2023-06-06 PROCEDURE — 80061 LIPID PANEL: CPT | Performed by: INTERNAL MEDICINE

## 2023-06-06 RX ORDER — TRIAMCINOLONE ACETONIDE 40 MG/ML
20 INJECTION, SUSPENSION INTRA-ARTICULAR; INTRAMUSCULAR ONCE
Status: COMPLETED | OUTPATIENT
Start: 2023-06-06 | End: 2023-06-06

## 2023-06-06 RX ADMIN — TRIAMCINOLONE ACETONIDE 20 MG: 40 INJECTION, SUSPENSION INTRA-ARTICULAR; INTRAMUSCULAR at 15:00

## 2023-06-06 ASSESSMENT — PAIN SCALES - GENERAL: PAINLEVEL: MODERATE PAIN (5)

## 2023-06-06 NOTE — PROGRESS NOTES
Assessment & Plan       Type 2 diabetes mellitus with other circulatory complications (H)  Assess lab work  Cont treatment   - HEMOGLOBIN A1C  - Lipid panel reflex to direct LDL Non-fasting    Stage 3a chronic kidney disease (H)  Monitor renal function     Encounter for preventative adult health care examination  advised regular aerobic activity, low cholesterol, low salt diet, wearing seat belt,  self examinations, sunscreen protection.Obtain screening cholesterol, immunizations reviewed.    - CBC with platelets  - Comprehensive metabolic panel (BMP + Alb, Alk Phos, ALT, AST, Total. Bili, TP)  - UA with Microscopic reflex to Culture - lab collect  - TSH with free T4 reflex  - Albumin Random Urine Quantitative with Creat Ratio    Essential hypertension, benign  Controlled on treatment , monitor   - CBC with platelets  - Comprehensive metabolic panel (BMP + Alb, Alk Phos, ALT, AST, Total. Bili, TP)  - UA with Microscopic reflex to Culture - lab collect  - TSH with free T4 reflex  - Albumin Random Urine Quantitative with Creat Ratio    Primary osteoarthritis of both shoulders  Refer to PT  Steroid injection in the right subacromial bursa given   - Physical Therapy Referral; Future  - DRAIN/INJECT LARGE JOINT/BURSA  - triamcinolone (KENALOG-40) injection 20 mg        PATIENT IS SEEN FOR PHYSICAL EXAMINATION    CHIEF COMPLAINT:   1. Encounter for preventative adult health care examination    2. Need for shingles vaccine    3. Screen for colon cancer    4. Type 2 diabetes mellitus with other circulatory complications (H)    5. Stage 3a chronic kidney disease (H)    6. Essential hypertension, benign    7. Primary osteoarthritis of both shoulders        PMH:   Past Medical History:   Diagnosis Date     Abnormal stress test      Carotid arterial disease (H) 9/18/2017     CKD (chronic kidney disease) stage 3, GFR 30-59 ml/min (H) 12/27/2011     DM2 (diabetes mellitus, type 2) (H)     Pre diabetic     LUNA (dyspnea on  exertion)      Essential hypertension, benign      Gout 11/14/2002     Problem list name updated by automated process. Provider to review     HTN (hypertension) 6/29/2009     (Problem list name updated by automated process. Provider to review and confirm.)     Hyperlipidemia LDL goal <100 10/31/2010     Mixed hyperlipidemia 11/14/2002     PVD (peripheral vascular disease) (H) 9/8/2017     S/P CABG (coronary artery bypass graft) 10/26/2018     Type 2 diabetes mellitus without complication (H) 11/30/2015       PSH:   Past Surgical History:   Procedure Laterality Date     BYPASS GRAFT ARTERY CORONARY N/A 10/26/2018    Procedure: CORONARY ARTERY BYPASS GRAFTING X 4 WITH LEFT LEG ENDOSCOPIC VEIN HARVESTING LIMA - LAD  SV- RIGHT PDA, OM2, OM1 ON PUMP/BETH IAPB PLACED PER CATH LAB;  Surgeon: Heath Kessler MD;  Location: [facility] OR     COLONOSCOPY       DECOMPRESSION, FUSION LUMBAR POSTERIOR ONE LEVEL, COMBINED  8/24/2012    Procedure: COMBINED DECOMPRESSION, FUSION LUMBAR POSTERIOR ONE LEVEL;  Posterior Fusion wtih Decompression L4-5;  Surgeon: Rod Carbajal MD;  Location: [facility] OR     ENDARTERECTOMY CAROTID Right 9/18/2017    Procedure: ENDARTERECTOMY CAROTID;  RIGHT CAROTID ENDARTERECTOMY, WITH PATCH ANGIOPLASTY, WITH ELECTOENCEPHALOGAM            (EEG);  Surgeon: Catalino Scott MD;  Location: [facility] OR     HERNIA REPAIR       [facility] NONSPECIFIC PROCEDURE  04/97    Neg. colonoscopy.     [facility] NONSPECIFIC PROCEDURE      S/P ing. hernia.       MEDICATIONS:   Current Outpatient Medications:      acetaminophen (TYLENOL) 325 MG tablet, Take 2 tablets (650 mg) by mouth every 4 hours as needed for mild pain, Disp: 100 tablet, Rfl:      aspirin 325 MG EC tablet, Take 1 tablet (325 mg) by mouth daily, Disp: 40 tablet, Rfl:      atenolol (TENORMIN) 100 MG tablet, TAKE ONE TABLET BY MOUTH ONE TIME DAILY, Disp: 90 tablet, Rfl: 0     atorvastatin (LIPITOR) 40 MG tablet, TAKE ONE TABLET BY MOUTH ONE TIME DAILY, Disp: 90  tablet, Rfl: 0     blood glucose calibration (ACCU-CHEK PRISCILLA) solution, Use to calibrate blood glucose monitor as needed as directed., Disp: 1 Bottle, Rfl: 0     blood glucose monitoring (ACCU-CHEK PRISCILLA) test strip, Use to test blood sugars 1 times daily or as directed., Disp: 100 each, Rfl: 1     blood glucose monitoring (ACCU-CHEK MULTICLIX) lancets, Use to test blood sugar 1 times daily or as directed., Disp: 1 Box, Rfl: 1     blood glucose monitoring (NO BRAND SPECIFIED) meter device kit, Use to test blood sugar 1 times daily or as directed., Disp: 1 kit, Rfl: 0     fluticasone (FLONASE) 50 MCG/ACT nasal spray, Spray 1-2 sprays into both nostrils daily as needed for rhinitis or allergies, Disp: 16 g, Rfl: 4     hydrochlorothiazide (HYDRODIURIL) 12.5 MG tablet, Take 1 tablet (12.5 mg) by mouth daily, Disp: 90 tablet, Rfl: 3     lisinopril (ZESTRIL) 30 MG tablet, TAKE ONE TABLET BY MOUTH ONE TIME DAILY, Disp: 90 tablet, Rfl: 0     metFORMIN (GLUCOPHAGE) 500 MG tablet, TAKE ONE TABLET BY MOUTH TWICE DAILY WITH FOOD, Disp: 180 tablet, Rfl: 0     multivitamin w/minerals (THERA-VIT-M) tablet, Take 1 tablet by mouth every morning, Disp: , Rfl:      nitroGLYcerin (NITROSTAT) 0.4 MG sublingual tablet, For chest pain place 1 tablet under the tongue every 5 minutes for 3 doses. If symptoms persist 5 minutes after 1st dose call 911., Disp: 25 tablet, Rfl: 3     tamsulosin (FLOMAX) 0.4 MG capsule, TAKE ONE CAPSULE BY MOUTH ONE TIME DAILY, Disp: 90 capsule, Rfl: 0  No current facility-administered medications for this visit.    SOCIAL HISTORY:   Social History     Socioeconomic History     Marital status:      Spouse name: Not on file     Number of children: Not on file     Years of education: Not on file     Highest education level: Not on file   Occupational History     Not on file   Tobacco Use     Smoking status: Never     Smokeless tobacco: Never   Vaping Use     Vaping status: Never Used   Substance and Sexual  Activity     Alcohol use: Yes     Comment: 1 monthly or less.     Drug use: No     Sexual activity: Yes     Partners: Female   Other Topics Concern     Parent/sibling w/ CABG, MI or angioplasty before 65F 55M? Not Asked      Service Not Asked     Blood Transfusions Not Asked     Caffeine Concern No     Occupational Exposure No     Hobby Hazards No     Sleep Concern No     Stress Concern No     Weight Concern No     Special Diet No     Back Care No     Exercise Yes     Comment: bikes, treadmill      Bike Helmet Not Asked     Seat Belt Yes     Self-Exams Not Asked   Social History Narrative     Not on file     Social Determinants of Health     Financial Resource Strain: Not on file   Food Insecurity: Not on file   Transportation Needs: Not on file   Physical Activity: Not on file   Stress: Not on file   Social Connections: Not on file   Intimate Partner Violence: Not on file   Housing Stability: Not on file       FAMILY HISTORY:   Family History   Problem Relation Age of Onset     Diabetes Father      Unknown/Adopted Mother        ALLERGIES:   Allergies as of 06/06/2023     (No Known Allergies)       PREVENTIVE:discussed:  -immunizations  -lipid profile  -colonoscopy  -hemoccults  -PSA  -diet    EXAM: SUBJECTIVE:  Merlin J Anderson, a 83 year old male scheduled an appointment to discuss the following issues:     Need for shingles vaccine  Screen for colon cancer  Type 2 diabetes mellitus with other circulatory complications (H)  Stage 3a chronic kidney disease (H)  Encounter for preventative adult health care examination  Essential hypertension, benign  Primary osteoarthritis of both shoulders    Medical, social, surgical, and family histories reviewed.       Subjective   Merlin is a 83 year old, presenting for the following health issues:  Recheck Medication        6/6/2023     2:26 PM   Additional Questions   Roomed by Augustina TRAN   Accompanied by n/a     HPI     Hyperlipidemia Follow-Up  Has H/O  hyperlipidemia. On medical treatment and diet. No side effects. No muscle weakness, myalgias or upset stomach.       Are you regularly taking any medication or supplement to lower your cholesterol?   Yes- atorvastatin    Are you having muscle aches or other side effects that you think could be caused by your cholesterol lowering medication?  No    Hypertension Follow-up  Has h/o HTN. on medical treatment. BP has been controlled. No side effects from medications. No CP, HA, dizziness. good compliance with medications and low salt diet.      Do you check your blood pressure regularly outside of the clinic? No     Are you following a low salt diet? Yes    Are your blood pressures ever more than 140 on the top number (systolic) OR more   than 90 on the bottom number (diastolic), for example 140/90? No      How many servings of fruits and vegetables do you eat daily?  0-1    On average, how many sweetened beverages do you drink each day (Examples: soda, juice, sweet tea, etc.  Do NOT count diet or artificially sweetened beverages)?   0    How many days per week do you exercise enough to make your heart beat faster? 3 or less    How many minutes a day do you exercise enough to make your heart beat faster? 20 - 29    How many days per week do you miss taking your medication? 0    Has H/O DM. On diet , exercise and oral Metformin. Blood sugars are controlled. No parestesias. No hypoglycemias.    Has H/O BPH. On treatment with Flomax  . Has mild symptoms of frequency, decreased urinary stream and nocturia. No side effects from medications.    Concern for right shoulder pain, restricted ROM, gradually worsening, for years. Left shoulder also has pain and restricted movements, but to a lesser extend.     No injury, no swelling. Has h/o OA on prior X rays.         Review of Systems   Constitutional, HEENT, cardiovascular, pulmonary, GI, , musculoskeletal, neuro, skin, endocrine and psych systems are negative, except as  "otherwise noted.      Objective    BP (!) 170/66 (BP Location: Left arm, Cuff Size: Adult Regular)   Pulse 54   Temp (!) 96.6  F (35.9  C) (Tympanic)   Resp 15   Ht 1.848 m (6' 0.75\")   Wt 88 kg (193 lb 14.4 oz)   SpO2 98%   BMI 25.76 kg/m    Body mass index is 25.76 kg/m .  Physical Exam   GENERAL: elderly, frail, alert and no distress  EYES: Eyes grossly normal to inspection, PERRL and conjunctivae and sclerae normal  HENT: ear canals and TM's normal, nose and mouth without ulcers or lesions  NECK: no adenopathy, no asymmetry, masses, or scars and thyroid normal to palpation  RESP: lungs clear to auscultation - no rales, rhonchi or wheezes  CV: regular rate and rhythm, normal S1 S2, no S3 or S4, no murmur, click or rub, no peripheral edema and peripheral pulses strong  ABDOMEN: soft, nontender, no hepatosplenomegaly, no masses and bowel sounds normal  MS: no gross musculoskeletal defects noted, no edema, bilateral shoulders restricted abduction to 70 degrees. Pain with movements and crepitus.   Right shoulder pain in the subacromial bursa area   SKIN: no suspicious lesions or rashes  NEURO: Normal strength and tone, mentation intact and speech normal, unstable gait.   PSYCH: mentation appears normal, affect normal/bright    Office Visit on 08/10/2022   Component Date Value Ref Range Status     Sodium 08/10/2022 139  133 - 144 mmol/L Final     Potassium 08/10/2022 5.3  3.4 - 5.3 mmol/L Final     Chloride 08/10/2022 107  94 - 109 mmol/L Final     Carbon Dioxide (CO2) 08/10/2022 21  20 - 32 mmol/L Final     Anion Gap 08/10/2022 11  3 - 14 mmol/L Final     Urea Nitrogen 08/10/2022 32 (H)  7 - 30 mg/dL Final     Creatinine 08/10/2022 2.07 (H)  0.66 - 1.25 mg/dL Final     Calcium 08/10/2022 9.4  8.5 - 10.1 mg/dL Final     Glucose 08/10/2022 137 (H)  70 - 99 mg/dL Final     GFR Estimate 08/10/2022 31 (L)  >60 mL/min/1.73m2 Final    Effective December 21, 2021 eGFRcr in adults is calculated using the 2021 CKD-EPI " creatinine equation which includes age and gender (Gary wilson al., [place], DOI: 10.1056/NPQLtm4411113)     Creatinine Urine mg/dL 08/10/2022 117  mg/dL Final     Albumin Urine mg/L 08/10/2022 2,660  mg/L Final     Albumin Urine mg/g Cr 08/10/2022 2,273.50 (H)  0.00 - 17.00 mg/g Cr Final     D-Dimer Quantitative 08/10/2022 0.48  0.00 - 0.50 ug/mL FEU Final     WBC Count 08/10/2022 6.4  4.0 - 11.0 10e3/uL Final     RBC Count 08/10/2022 3.90 (L)  4.40 - 5.90 10e6/uL Final     Hemoglobin 08/10/2022 12.2 (L)  13.3 - 17.7 g/dL Final     Hematocrit 08/10/2022 36.5 (L)  40.0 - 53.0 % Final     MCV 08/10/2022 94  78 - 100 fL Final     MCH 08/10/2022 31.3  26.5 - 33.0 pg Final     MCHC 08/10/2022 33.4  31.5 - 36.5 g/dL Final     RDW 08/10/2022 12.5  10.0 - 15.0 % Final     Platelet Count 08/10/2022 137 (L)  150 - 450 10e3/uL Final     TSH 08/10/2022 2.81  0.40 - 4.00 mU/L Final     Iron 08/10/2022 93  35 - 180 ug/dL Final     Iron Binding Capacity 08/10/2022 321  240 - 430 ug/dL Final     Iron Sat Index 08/10/2022 29  15 - 46 % Final     Vitamin B12 08/10/2022 619  232 - 1,245 pg/mL Final     Folic Acid 08/10/2022 18.1  4.6 - 34.8 ng/mL Final     Lactate Dehydrogenase 08/10/2022 89  85 - 227 U/L Final         Procedure: steroid injection right subacromial bursa  Indication : pain, bursitis, osteoarthritis   Discussed procedure, indications, risks, expected outcome.   Patient wants to proceed.   The right shoulder joint - subacromial bursa was cleansed with alcohol pads. Local anesthesia with Lidocaine 1% obtained. The joint space was entered with G20 needle  . Injected Triamcinolone  20  mg. Applied band aid. Tolerated the procedure well. Instructed for care post procedure.

## 2023-06-06 NOTE — LETTER
June 7, 2023      Merlin J Anderson  [address] [facility] [address]        Dear ,    We are writing to inform you of your test results.    Your labs show mild anemia and decreased kidney function with elevated protein in the urine.   Recommend to follow up in 6 months for recheck.     Resulted Orders   HEMOGLOBIN A1C   Result Value Ref Range    Hemoglobin A1C 6.3 (H) 0.0 - 5.6 %      Comment:      Normal <5.7%   Prediabetes 5.7-6.4%    Diabetes 6.5% or higher     Note: Adopted from ADA consensus guidelines.   Lipid panel reflex to direct LDL Non-fasting   Result Value Ref Range    Cholesterol 126 <200 mg/dL    Triglycerides 153 (H) <150 mg/dL    Direct Measure HDL 48 >=40 mg/dL    LDL Cholesterol Calculated 47 <=100 mg/dL    Non HDL Cholesterol 78 <130 mg/dL    Narrative    Cholesterol  Desirable:  <200 mg/dL    Triglycerides  Normal:  Less than 150 mg/dL  Borderline High:  150-199 mg/dL  High:  200-499 mg/dL  Very High:  Greater than or equal to 500 mg/dL    Direct Measure HDL  Female:  Greater than or equal to 50 mg/dL   Male:  Greater than or equal to 40 mg/dL    LDL Cholesterol  Desirable:  <100mg/dL  Above Desirable:  100-129 mg/dL   Borderline High:  130-159 mg/dL   High:  160-189 mg/dL   Very High:  >= 190 mg/dL    Non HDL Cholesterol  Desirable:  130 mg/dL  Above Desirable:  130-159 mg/dL  Borderline High:  160-189 mg/dL  High:  190-219 mg/dL  Very High:  Greater than or equal to 220 mg/dL   CBC with platelets   Result Value Ref Range    WBC Count 6.8 4.0 - 11.0 10e3/uL    RBC Count 3.73 (L) 4.40 - 5.90 10e6/uL    Hemoglobin 11.7 (L) 13.3 - 17.7 g/dL    Hematocrit 34.4 (L) 40.0 - 53.0 %    MCV 92 78 - 100 fL    MCH 31.4 26.5 - 33.0 pg    MCHC 34.0 31.5 - 36.5 g/dL    RDW 12.3 10.0 - 15.0 %    Platelet Count 132 (L) 150 - 450 10e3/uL   Comprehensive metabolic panel (BMP + Alb, Alk Phos, ALT, AST, Total. Bili, TP)   Result Value Ref Range    Sodium 139 136 - 145 mmol/L    Potassium 5.2  3.4 - 5.3 mmol/L    Chloride 106 98 - 107 mmol/L    Carbon Dioxide (CO2) 21 (L) 22 - 29 mmol/L    Anion Gap 12 7 - 15 mmol/L    Urea Nitrogen 43.5 (H) 8.0 - 23.0 mg/dL    Creatinine 2.63 (H) 0.67 - 1.17 mg/dL    Calcium 9.8 8.8 - 10.2 mg/dL    Glucose 117 (H) 70 - 99 mg/dL    Alkaline Phosphatase 80 40 - 129 U/L    AST 33 10 - 50 U/L    ALT 37 10 - 50 U/L    Protein Total 6.8 6.4 - 8.3 g/dL    Albumin 4.1 3.5 - 5.2 g/dL    Bilirubin Total 0.4 <=1.2 mg/dL    GFR Estimate 23 (L) >60 mL/min/1.73m2      Comment:      eGFR calculated using 2021 CKD-EPI equation.   UA with Microscopic reflex to Culture - lab collect   Result Value Ref Range    Color Urine Yellow Colorless, Straw, Light Yellow, Yellow    Appearance Urine Clear Clear    Glucose Urine Negative Negative mg/dL    Bilirubin Urine Negative Negative    Ketones Urine Negative Negative mg/dL    Specific Gravity Urine 1.020 1.003 - 1.035    Blood Urine Negative Negative    pH Urine 5.5 5.0 - 7.0    Protein Albumin Urine >=300 (A) Negative mg/dL    Urobilinogen Urine 0.2 0.2, 1.0 E.U./dL    Nitrite Urine Negative Negative    Leukocyte Esterase Urine Negative Negative   TSH with free T4 reflex   Result Value Ref Range    TSH 2.85 0.30 - 4.20 uIU/mL   Albumin Random Urine Quantitative with Creat Ratio   Result Value Ref Range    Creatinine Urine mg/dL 78.7 mg/dL      Comment:      The reference ranges have not been established in urine creatinine. The results should be integrated into the clinical context for interpretation.    Albumin Urine mg/L 1,156.0 mg/L      Comment:      The reference ranges have not been established in urine albumin. The results should be integrated into the clinical context for interpretation.    Albumin Urine mg/g Cr 1,468.87 (H) 0.00 - 17.00 mg/g Cr      Comment:      Microalbuminuria is defined as an albumin:creatinine ratio of 17 to 299 for males and 25 to 299 for females. A ratio of albumin:creatinine of 300 or higher is indicative of  overt proteinuria.  Due to biologic variability, positive results should be confirmed by a second, first-morning random or 24-hour timed urine specimen. If there is discrepancy, a third specimen is recommended. When 2 out of 3 results are in the microalbuminuria range, this is evidence for incipient nephropathy and warrants increased efforts at glucose control, blood pressure control, and institution of therapy with an angiotensin-converting-enzyme (ACE) inhibitor (if the patient can tolerate it).     UA Microscopic with Reflex to Culture   Result Value Ref Range    Bacteria Urine Few (A) None Seen /HPF    RBC Urine 2-5 (A) 0-2 /HPF /HPF    WBC Urine 0-5 0-5 /HPF /HPF    Narrative    Urine Culture not indicated       If you have any questions or concerns, please call the clinic at the number listed above.       Sincerely,      Sujit Duke MD

## 2023-06-07 LAB
ALBUMIN SERPL BCG-MCNC: 4.1 G/DL (ref 3.5–5.2)
ALP SERPL-CCNC: 80 U/L (ref 40–129)
ALT SERPL W P-5'-P-CCNC: 37 U/L (ref 10–50)
ANION GAP SERPL CALCULATED.3IONS-SCNC: 12 MMOL/L (ref 7–15)
AST SERPL W P-5'-P-CCNC: 33 U/L (ref 10–50)
BILIRUB SERPL-MCNC: 0.4 MG/DL
BUN SERPL-MCNC: 43.5 MG/DL (ref 8–23)
CALCIUM SERPL-MCNC: 9.8 MG/DL (ref 8.8–10.2)
CHLORIDE SERPL-SCNC: 106 MMOL/L (ref 98–107)
CHOLEST SERPL-MCNC: 126 MG/DL
CREAT SERPL-MCNC: 2.63 MG/DL (ref 0.67–1.17)
CREAT UR-MCNC: 78.7 MG/DL
DEPRECATED HCO3 PLAS-SCNC: 21 MMOL/L (ref 22–29)
GFR SERPL CREATININE-BSD FRML MDRD: 23 ML/MIN/1.73M2
GLUCOSE SERPL-MCNC: 117 MG/DL (ref 70–99)
HDLC SERPL-MCNC: 48 MG/DL
LDLC SERPL CALC-MCNC: 47 MG/DL
MICROALBUMIN UR-MCNC: 1156 MG/L
MICROALBUMIN/CREAT UR: 1468.87 MG/G CR (ref 0–17)
NONHDLC SERPL-MCNC: 78 MG/DL
POTASSIUM SERPL-SCNC: 5.2 MMOL/L (ref 3.4–5.3)
PROT SERPL-MCNC: 6.8 G/DL (ref 6.4–8.3)
SODIUM SERPL-SCNC: 139 MMOL/L (ref 136–145)
TRIGL SERPL-MCNC: 153 MG/DL
TSH SERPL DL<=0.005 MIU/L-ACNC: 2.85 UIU/ML (ref 0.3–4.2)

## 2023-07-10 DIAGNOSIS — E11.9 TYPE 2 DIABETES MELLITUS WITHOUT COMPLICATION, WITHOUT LONG-TERM CURRENT USE OF INSULIN (H): ICD-10-CM

## 2023-07-24 DIAGNOSIS — R60.0 BILATERAL LEG EDEMA: ICD-10-CM

## 2023-07-24 DIAGNOSIS — R33.9 URINARY RETENTION: ICD-10-CM

## 2023-07-24 DIAGNOSIS — I10 ESSENTIAL HYPERTENSION, BENIGN: ICD-10-CM

## 2023-07-24 DIAGNOSIS — I10 ESSENTIAL HYPERTENSION: ICD-10-CM

## 2023-07-25 NOTE — TELEPHONE ENCOUNTER
Routing refill request to provider for review/approval because:  Alpha Blockers Failed    Blood pressure under 140/90 in past 12 months        BP Readings from Last 3 Encounters:   06/06/23 (!) 146/60   08/10/22 (!) 150/62   05/20/22 (!) 181/75

## 2023-07-30 NOTE — TELEPHONE ENCOUNTER
Pt calls re pending med refills:  - tamsulosin  - atenolol  - hydrochlorothiazide    Meds have apparently been pending in chart for the past five days.  Apologized to patient.    Pt will be depleted by August 3rd.  Kindly expedite refills to [facility] in [place] (as attached to pending meds).    Thank you    Stephanie TRAN [facility] Nurse Advisor

## 2023-08-01 RX ORDER — HYDROCHLOROTHIAZIDE 12.5 MG/1
TABLET ORAL
Qty: 30 TABLET | Refills: 0 | Status: SHIPPED | OUTPATIENT
Start: 2023-08-01 | End: 2023-08-03

## 2023-08-01 RX ORDER — TAMSULOSIN HYDROCHLORIDE 0.4 MG/1
CAPSULE ORAL
Qty: 30 CAPSULE | Refills: 0 | Status: SHIPPED | OUTPATIENT
Start: 2023-08-01 | End: 2023-08-03

## 2023-08-01 RX ORDER — ATENOLOL 100 MG/1
TABLET ORAL
Qty: 30 TABLET | Refills: 0 | Status: SHIPPED | OUTPATIENT
Start: 2023-08-01 | End: 2023-08-03

## 2023-12-19 ENCOUNTER — APPOINTMENT (OUTPATIENT)
Dept: CT IMAGING | Facility: CLINIC | Age: 83
End: 2023-12-19
Attending: EMERGENCY MEDICINE
Payer: COMMERCIAL

## 2023-12-19 ENCOUNTER — HOSPITAL ENCOUNTER (EMERGENCY)
Facility: CLINIC | Age: 83
Discharge: HOME | End: 2023-12-20
Attending: EMERGENCY MEDICINE | Admitting: EMERGENCY MEDICINE
Payer: COMMERCIAL

## 2023-12-19 DIAGNOSIS — R53.1 WEAKNESS: ICD-10-CM

## 2023-12-19 LAB
ALBUMIN SERPL BCG-MCNC: 3.6 G/DL (ref 3.5–5.2)
ALP SERPL-CCNC: 67 U/L (ref 40–150)
ALT SERPL W P-5'-P-CCNC: 29 U/L (ref 0–70)
ANION GAP SERPL CALCULATED.3IONS-SCNC: 11 MMOL/L (ref 7–15)
AST SERPL W P-5'-P-CCNC: 26 U/L (ref 0–45)
BASOPHILS # BLD AUTO: 0.1 10E3/UL (ref 0–0.2)
BASOPHILS NFR BLD AUTO: 1 %
BILIRUB SERPL-MCNC: 0.3 MG/DL
BUN SERPL-MCNC: 40.1 MG/DL (ref 8–23)
CALCIUM SERPL-MCNC: 8.6 MG/DL (ref 8.8–10.2)
CHLORIDE SERPL-SCNC: 100 MMOL/L (ref 98–107)
CREAT SERPL-MCNC: 2.58 MG/DL (ref 0.67–1.17)
DEPRECATED HCO3 PLAS-SCNC: 22 MMOL/L (ref 22–29)
EGFRCR SERPLBLD CKD-EPI 2021: 24 ML/MIN/1.73M2
EOSINOPHIL # BLD AUTO: 0 10E3/UL (ref 0–0.7)
EOSINOPHIL NFR BLD AUTO: 0 %
ERYTHROCYTE [DISTWIDTH] IN BLOOD BY AUTOMATED COUNT: 12.9 % (ref 10–15)
GLUCOSE BLDC GLUCOMTR-MCNC: 163 MG/DL (ref 70–99)
GLUCOSE SERPL-MCNC: 180 MG/DL (ref 70–99)
HCT VFR BLD AUTO: 32.8 % (ref 40–53)
HGB BLD-MCNC: 11.2 G/DL (ref 13.3–17.7)
IMM GRANULOCYTES # BLD: 0 10E3/UL
IMM GRANULOCYTES NFR BLD: 1 %
LYMPHOCYTES # BLD AUTO: 0.5 10E3/UL (ref 0.8–5.3)
LYMPHOCYTES NFR BLD AUTO: 7 %
MCH RBC QN AUTO: 31.6 PG (ref 26.5–33)
MCHC RBC AUTO-ENTMCNC: 34.1 G/DL (ref 31.5–36.5)
MCV RBC AUTO: 93 FL (ref 78–100)
MONOCYTES # BLD AUTO: 0.7 10E3/UL (ref 0–1.3)
MONOCYTES NFR BLD AUTO: 10 %
NEUTROPHILS # BLD AUTO: 5.6 10E3/UL (ref 1.6–8.3)
NEUTROPHILS NFR BLD AUTO: 81 %
NRBC # BLD AUTO: 0 10E3/UL
NRBC BLD AUTO-RTO: 0 /100
PLATELET # BLD AUTO: 105 10E3/UL (ref 150–450)
POTASSIUM SERPL-SCNC: 4.5 MMOL/L (ref 3.4–5.3)
PROT SERPL-MCNC: 6.1 G/DL (ref 6.4–8.3)
RBC # BLD AUTO: 3.54 10E6/UL (ref 4.4–5.9)
SODIUM SERPL-SCNC: 133 MMOL/L (ref 135–145)
TROPONIN T SERPL HS-MCNC: 34 NG/L
WBC # BLD AUTO: 6.9 10E3/UL (ref 4–11)

## 2023-12-19 PROCEDURE — 99285 EMERGENCY DEPT VISIT HI MDM: CPT | Mod: 25

## 2023-12-19 PROCEDURE — 84484 ASSAY OF TROPONIN QUANT: CPT | Performed by: EMERGENCY MEDICINE

## 2023-12-19 PROCEDURE — 70450 CT HEAD/BRAIN W/O DYE: CPT

## 2023-12-19 PROCEDURE — 93005 ELECTROCARDIOGRAM TRACING: CPT

## 2023-12-19 PROCEDURE — 85014 HEMATOCRIT: CPT | Performed by: EMERGENCY MEDICINE

## 2023-12-19 PROCEDURE — 36415 COLL VENOUS BLD VENIPUNCTURE: CPT | Performed by: EMERGENCY MEDICINE

## 2023-12-19 PROCEDURE — 80053 COMPREHEN METABOLIC PANEL: CPT | Performed by: EMERGENCY MEDICINE

## 2023-12-19 PROCEDURE — 82962 GLUCOSE BLOOD TEST: CPT

## 2023-12-19 ASSESSMENT — ACTIVITIES OF DAILY LIVING (ADL): ADLS_ACUITY_SCORE: 35

## 2023-12-20 ENCOUNTER — APPOINTMENT (OUTPATIENT)
Dept: MRI IMAGING | Facility: CLINIC | Age: 83
End: 2023-12-20
Attending: EMERGENCY MEDICINE
Payer: COMMERCIAL

## 2023-12-20 VITALS
HEART RATE: 66 BPM | RESPIRATION RATE: 18 BRPM | OXYGEN SATURATION: 98 % | DIASTOLIC BLOOD PRESSURE: 79 MMHG | SYSTOLIC BLOOD PRESSURE: 189 MMHG | TEMPERATURE: 99.5 F

## 2023-12-20 LAB
ALBUMIN UR-MCNC: 300 MG/DL
AMORPH CRY #/AREA URNS HPF: ABNORMAL /HPF
APPEARANCE UR: CLEAR
ATRIAL RATE - MUSE: 60 BPM
BILIRUB UR QL STRIP: NEGATIVE
COLOR UR AUTO: ABNORMAL
DIASTOLIC BLOOD PRESSURE - MUSE: NORMAL MMHG
GLUCOSE UR STRIP-MCNC: 70 MG/DL
GRANULAR CAST: 1 /LPF
HGB UR QL STRIP: ABNORMAL
HOLD SPECIMEN: NORMAL
INTERPRETATION ECG - MUSE: NORMAL
KETONES UR STRIP-MCNC: NEGATIVE MG/DL
LEUKOCYTE ESTERASE UR QL STRIP: NEGATIVE
MUCOUS THREADS #/AREA URNS LPF: PRESENT /LPF
NITRATE UR QL: NEGATIVE
P AXIS - MUSE: 36 DEGREES
PH UR STRIP: 6 [PH] (ref 5–7)
PR INTERVAL - MUSE: 286 MS
QRS DURATION - MUSE: 106 MS
QT - MUSE: 418 MS
QTC - MUSE: 418 MS
R AXIS - MUSE: 15 DEGREES
RBC URINE: 1 /HPF
SP GR UR STRIP: 1.02 (ref 1–1.03)
SQUAMOUS EPITHELIAL: <1 /HPF
SYSTOLIC BLOOD PRESSURE - MUSE: NORMAL MMHG
T AXIS - MUSE: 64 DEGREES
TROPONIN T SERPL HS-MCNC: 35 NG/L
UROBILINOGEN UR STRIP-MCNC: NORMAL MG/DL
VENTRICULAR RATE- MUSE: 60 BPM
WBC URINE: 1 /HPF

## 2023-12-20 PROCEDURE — 36415 COLL VENOUS BLD VENIPUNCTURE: CPT | Performed by: EMERGENCY MEDICINE

## 2023-12-20 PROCEDURE — 84484 ASSAY OF TROPONIN QUANT: CPT | Performed by: EMERGENCY MEDICINE

## 2023-12-20 PROCEDURE — 70551 MRI BRAIN STEM W/O DYE: CPT

## 2023-12-20 PROCEDURE — 81001 URINALYSIS AUTO W/SCOPE: CPT | Performed by: EMERGENCY MEDICINE

## 2023-12-20 RX ORDER — ACETAMINOPHEN 325 MG/1
TABLET ORAL
Status: DISCONTINUED
Start: 2023-12-20 | End: 2023-12-20 | Stop reason: HOSPADM

## 2023-12-20 ASSESSMENT — ACTIVITIES OF DAILY LIVING (ADL)
ADLS_ACUITY_SCORE: 35
ADLS_ACUITY_SCORE: 35

## 2023-12-20 NOTE — ED TRIAGE NOTES
Pt BIBA for alter mental status. Family reported pt has been confused when getting up in the morning, then went away. Pt then went to have dental procedure having teeth removal, went back home and took a nap, family was then having a little hard time waking pt up. Pt had 2 soil cloth accidents today, which is not to his baseline, and was confused for a while. Was concern. Pt arrival with A&OX4, Coherent with speech,endorse 8/10 HA, denied change vision or numbness weakness. Pt has bl equal strength, able to get up from bed without 1 person assistance. Hypertensive at arrival. . ABC intact.      Triage Assessment (Adult)       Row Name 12/19/23 9942          Triage Assessment    Airway WDL WDL        Respiratory WDL    Respiratory WDL WDL        Skin Circulation/Temperature WDL    Skin Circulation/Temperature WDL WDL        Cardiac WDL    Cardiac WDL WDL        Peripheral/Neurovascular WDL    Peripheral Neurovascular WDL WDL        Cognitive/Neuro/Behavioral WDL    Cognitive/Neuro/Behavioral WDL WDL

## 2023-12-20 NOTE — ED PROVIDER NOTES
"  History     Chief Complaint:  Altered Mental Status     The history is provided by the patient, the spouse and a relative.      Merlin J Anderson is a 83 year old male with history of hyperlipidemia, hypertension, CKD stage 3, type 2 diabetes mellitus, PVD, and CAD who presents to the ED via EMS with his wife and two sons for evaluation of altered mental status. Merlin's son reports the patient awoke with new weakness this morning, requiring assistance getting out of bed and walking, which is not his baseline. Weakness improved throughout the day until this evening, when the patient became confused and \"unresponsive\". He was not oriented to time or president. His wife notes his legs have been tremulous. The patient had dental extractions done today and was prescribed amoxicillin 4 days ago. He endorses pain there but no other pain. No fevers, chills, cough, or abdominal pain.    Independent Historian:   Wife and two sons support the above history.    Review of External Notes:       Medications:    Aspirin 325 mg  Tenormin  Lipitor  Hydrodiuril  Zestril  Glucophage  Flomax    Past Medical History:    Mixed hyperlipidemia  Essential hypertension  Allergic rhinitis due to pollen  Gout  CKD stage 3  Lumbago  Lumbar radiculopathy  Arthrodesis status  Type 2 diabetes mellitus without other circulatory complications  PVD  CAD    Past Surgical History:    Coronary artery bypass graft  Lumbar fusion and decompression  Endarterectomy carotid  Hernia repair    Physical Exam   Patient Vitals for the past 24 hrs:   BP Temp Temp src Pulse Resp SpO2   12/20/23 0000 (!) 185/78 -- -- 68 17 95 %   12/19/23 2300 (!) 179/76 -- -- 61 16 95 %   12/19/23 2245 (!) 193/80 99.5  F (37.5  C) Oral 60 16 96 %      Physical Exam  Vitals reviewed.   HENT:      Head: Normocephalic.      Mouth/Throat:      Mouth: Mucous membranes are moist.   Eyes:      Extraocular Movements: Extraocular movements intact.   Cardiovascular:      Rate and Rhythm: " Normal rate.   Pulmonary:      Effort: Pulmonary effort is normal.   Abdominal:      Palpations: Abdomen is soft.   Musculoskeletal:         General: Normal range of motion.      Cervical back: Normal range of motion.   Skin:     General: Skin is warm.      Capillary Refill: Capillary refill takes less than 2 seconds.   Neurological:      Mental Status: He is alert.      GCS: GCS eye subscore is 4. GCS verbal subscore is 5. GCS motor subscore is 6.   Psychiatric:         Mood and Affect: Mood normal.         Speech: Speech normal.       Emergency Department Course   ECG  ECG taken at 2247, ECG read at 2248  Sinus rhythm with 1st degree AV block  Minimal voltage criteria for LVH, may be normal variant  Inferior infarct, age undetermined  Anterior infarct, age undetermined  Abnormal ECG   Rate 60 bpm. ME interval 286 ms. QRS duration 106 ms. QT/QTc 418/418 ms. P-R-T axes 36 15 64.     Imaging:  MR Brain w/o Contrast   Final Result   IMPRESSION:   1.  No acute intracranial process.   2.  Generalized brain atrophy and presumed microvascular ischemic changes as detailed above.      Head CT w/o contrast   Final Result   IMPRESSION:   1.  No CT evidence for acute intracranial process.   2.  Brain atrophy and presumed chronic microvascular ischemic changes as above.         Report per radiology    Laboratory:  Labs Ordered and Resulted from Time of ED Arrival to Time of ED Departure   GLUCOSE BY METER - Abnormal       Result Value    GLUCOSE BY METER POCT 163 (*)    COMPREHENSIVE METABOLIC PANEL - Abnormal    Sodium 133 (*)     Potassium 4.5      Carbon Dioxide (CO2) 22      Anion Gap 11      Urea Nitrogen 40.1 (*)     Creatinine 2.58 (*)     GFR Estimate 24 (*)     Calcium 8.6 (*)     Chloride 100      Glucose 180 (*)     Alkaline Phosphatase 67      AST 26      ALT 29      Protein Total 6.1 (*)     Albumin 3.6      Bilirubin Total 0.3     ROUTINE UA WITH MICROSCOPIC REFLEX TO CULTURE - Abnormal    Color Urine Light Yellow       Appearance Urine Clear      Glucose Urine 70 (*)     Bilirubin Urine Negative      Ketones Urine Negative      Specific Gravity Urine 1.021      Blood Urine Small (*)     pH Urine 6.0      Protein Albumin Urine 300 (*)     Urobilinogen Urine Normal      Nitrite Urine Negative      Leukocyte Esterase Urine Negative      Mucus Urine Present (*)     Amorphous Crystals Urine Few (*)     RBC Urine 1      WBC Urine 1      Squamous Epithelials Urine <1      Granular Casts Urine 1 (*)    TROPONIN T, HIGH SENSITIVITY - Abnormal    Troponin T, High Sensitivity 34 (*)    CBC WITH PLATELETS AND DIFFERENTIAL - Abnormal    WBC Count 6.9      RBC Count 3.54 (*)     Hemoglobin 11.2 (*)     Hematocrit 32.8 (*)     MCV 93      MCH 31.6      MCHC 34.1      RDW 12.9      Platelet Count 105 (*)     % Neutrophils 81      % Lymphocytes 7      % Monocytes 10      % Eosinophils 0      % Basophils 1      % Immature Granulocytes 1      NRBCs per 100 WBC 0      Absolute Neutrophils 5.6      Absolute Lymphocytes 0.5 (*)     Absolute Monocytes 0.7      Absolute Eosinophils 0.0      Absolute Basophils 0.1      Absolute Immature Granulocytes 0.0      Absolute NRBCs 0.0     TROPONIN T, HIGH SENSITIVITY - Abnormal    Troponin T, High Sensitivity 35 (*)      Procedures   None    Emergency Department Course & Assessments:    Interventions:  Medications   acetaminophen (TYLENOL) 325 MG tablet (has no administration in time range)     Assessments:  2309 I obtained history and examined the patient as noted above.  0223 I rechecked the patient and explained findings.     Independent Interpretation (X-rays, CTs, rhythm strip):  None    Consultations/Discussion of Management or Tests:  None    Social Determinants of Health affecting care:   None    Disposition:  The patient was discharged to home.     Impression & Plan      Medical Decision Making:  Patient presents with an episode of confusion and weakness.  Because of which is unclear.  Patient was  noted to be hypertensive.  This is a history of speech difficulties but not really aphasia on examination patient is well-appearing and a stroke scale of 0 we does have a history of carotid stenosis.  EKG shows no concerns or high degree AV block serial troponins are negative for MI family offered reassurance and discharged home.  Hard to rule out TIA hard to rule out arrhythmia patient courage to return with recurrent events to consider admission    Diagnosis:    ICD-10-CM    1. Weakness  R53.1           Discharge Medications:  Discharge Medication List as of 12/20/2023  3:11 AM           Scribe Disclosure:  Kevan LEBRON Hailie, am serving as a scribe at 12:31 AM on 12/20/2023 to document services personally performed by Raúl Grimes MD based on my observations and the provider's statements to me.   12/19/2023   Raúl Grimes MD Goodman, Brian Samuel, MD  12/20/23 [id number]

## 2023-12-20 NOTE — ED NOTES
Pt was assisted to the bathroom, ambulatory without assistance, steady gait. HA has improved, update family regarding plan of care

## 2023-12-20 NOTE — ED NOTES
Pt discharge per MD order. Pt is alert and oriented x 4 to his own ability, not appear in acute distress. VS. Stable, Cardiopulmonary status stable. Ambulatory with steady gait. Denied pain or discomfort upon discharge.  Discharge and follow-up instruction given to patient and family members, family members and patient has no learning barrier and demonstrates understanding. Pt stable to be discharge.

## 2023-12-20 NOTE — DISCHARGE INSTRUCTIONS
The cause of your spell of weakness is unclear based on her test we have ruled out stroke and heart attack.  There are times when people can have an irregular pulse that come and go.  If you have a spell again that is similar return to the emergency room to consider admission for telemetry monitoring.  Please follow-up with your regular doctor to consider outpatient telemetry monitoring for this spell.  Return to the emergency room with chest pain shortness of breath or fever greater than 100.4.  Thanks for your patience today.

## 2024-01-13 ENCOUNTER — HEALTH MAINTENANCE LETTER (OUTPATIENT)
Age: 84
End: 2024-01-13

## 2024-01-22 ENCOUNTER — OFFICE VISIT (OUTPATIENT)
Dept: INTERNAL MEDICINE | Facility: CLINIC | Age: 84
End: 2024-01-22
Payer: COMMERCIAL

## 2024-01-22 VITALS
SYSTOLIC BLOOD PRESSURE: 167 MMHG | OXYGEN SATURATION: 99 % | DIASTOLIC BLOOD PRESSURE: 74 MMHG | HEIGHT: 73 IN | TEMPERATURE: 97.1 F | BODY MASS INDEX: 25.18 KG/M2 | WEIGHT: 190 LBS | HEART RATE: 52 BPM | RESPIRATION RATE: 16 BRPM

## 2024-01-22 DIAGNOSIS — R60.0 BILATERAL LEG EDEMA: ICD-10-CM

## 2024-01-22 DIAGNOSIS — Z00.00 ENCOUNTER FOR PREVENTATIVE ADULT HEALTH CARE EXAMINATION: Primary | ICD-10-CM

## 2024-01-22 DIAGNOSIS — N18.32 STAGE 3B CHRONIC KIDNEY DISEASE (H): ICD-10-CM

## 2024-01-22 DIAGNOSIS — E11.9 TYPE 2 DIABETES MELLITUS WITHOUT COMPLICATION, WITHOUT LONG-TERM CURRENT USE OF INSULIN (H): ICD-10-CM

## 2024-01-22 DIAGNOSIS — I10 ESSENTIAL HYPERTENSION: ICD-10-CM

## 2024-01-22 DIAGNOSIS — Z12.11 SCREEN FOR COLON CANCER: ICD-10-CM

## 2024-01-22 DIAGNOSIS — I77.9 RIGHT-SIDED CAROTID ARTERY DISEASE, UNSPECIFIED TYPE (H): ICD-10-CM

## 2024-01-22 DIAGNOSIS — I10 ESSENTIAL HYPERTENSION, BENIGN: ICD-10-CM

## 2024-01-22 DIAGNOSIS — Z12.5 ENCOUNTER FOR SCREENING FOR MALIGNANT NEOPLASM OF PROSTATE: ICD-10-CM

## 2024-01-22 DIAGNOSIS — E11.59 TYPE 2 DIABETES MELLITUS WITH OTHER CIRCULATORY COMPLICATIONS (H): ICD-10-CM

## 2024-01-22 DIAGNOSIS — R33.9 URINARY RETENTION: ICD-10-CM

## 2024-01-22 LAB
ALBUMIN SERPL BCG-MCNC: 4.1 G/DL (ref 3.5–5.2)
ALBUMIN UR-MCNC: >=300 MG/DL
ALP SERPL-CCNC: 74 U/L (ref 40–150)
ALT SERPL W P-5'-P-CCNC: 32 U/L (ref 0–70)
ANION GAP SERPL CALCULATED.3IONS-SCNC: 9 MMOL/L (ref 7–15)
APPEARANCE UR: CLEAR
AST SERPL W P-5'-P-CCNC: 33 U/L (ref 0–45)
BACTERIA #/AREA URNS HPF: ABNORMAL /HPF
BILIRUB SERPL-MCNC: 0.4 MG/DL
BILIRUB UR QL STRIP: NEGATIVE
BUN SERPL-MCNC: 34.9 MG/DL (ref 8–23)
CALCIUM SERPL-MCNC: 9.5 MG/DL (ref 8.8–10.2)
CHLORIDE SERPL-SCNC: 105 MMOL/L (ref 98–107)
CHOLEST SERPL-MCNC: 125 MG/DL
COLOR UR AUTO: YELLOW
CREAT SERPL-MCNC: 2.2 MG/DL (ref 0.67–1.17)
CREAT UR-MCNC: 79.3 MG/DL
DEPRECATED HCO3 PLAS-SCNC: 25 MMOL/L (ref 22–29)
EGFRCR SERPLBLD CKD-EPI 2021: 29 ML/MIN/1.73M2
ERYTHROCYTE [DISTWIDTH] IN BLOOD BY AUTOMATED COUNT: 12.6 % (ref 10–15)
FASTING STATUS PATIENT QL REPORTED: YES
GLUCOSE SERPL-MCNC: 112 MG/DL (ref 70–99)
GLUCOSE UR STRIP-MCNC: NEGATIVE MG/DL
HBA1C MFR BLD: 6.1 % (ref 0–5.6)
HCT VFR BLD AUTO: 34.7 % (ref 40–53)
HDLC SERPL-MCNC: 50 MG/DL
HGB BLD-MCNC: 11.8 G/DL (ref 13.3–17.7)
HGB UR QL STRIP: NEGATIVE
KETONES UR STRIP-MCNC: NEGATIVE MG/DL
LDLC SERPL CALC-MCNC: 48 MG/DL
LEUKOCYTE ESTERASE UR QL STRIP: ABNORMAL
MCH RBC QN AUTO: 31.6 PG (ref 26.5–33)
MCHC RBC AUTO-ENTMCNC: 34 G/DL (ref 31.5–36.5)
MCV RBC AUTO: 93 FL (ref 78–100)
MICROALBUMIN UR-MCNC: 1497 MG/L
MICROALBUMIN/CREAT UR: 1887.77 MG/G CR (ref 0–17)
NITRATE UR QL: NEGATIVE
NONHDLC SERPL-MCNC: 75 MG/DL
PH UR STRIP: 5.5 [PH] (ref 5–7)
PLATELET # BLD AUTO: 118 10E3/UL (ref 150–450)
POTASSIUM SERPL-SCNC: 5.1 MMOL/L (ref 3.4–5.3)
PROT SERPL-MCNC: 6.6 G/DL (ref 6.4–8.3)
PSA SERPL DL<=0.01 NG/ML-MCNC: 5.31 NG/ML
RBC # BLD AUTO: 3.73 10E6/UL (ref 4.4–5.9)
RBC #/AREA URNS AUTO: ABNORMAL /HPF
SODIUM SERPL-SCNC: 139 MMOL/L (ref 135–145)
SP GR UR STRIP: 1.02 (ref 1–1.03)
TRIGL SERPL-MCNC: 134 MG/DL
TSH SERPL DL<=0.005 MIU/L-ACNC: 3.17 UIU/ML (ref 0.3–4.2)
UROBILINOGEN UR STRIP-ACNC: 0.2 E.U./DL
WBC # BLD AUTO: 6.1 10E3/UL (ref 4–11)
WBC #/AREA URNS AUTO: ABNORMAL /HPF

## 2024-01-22 PROCEDURE — 82043 UR ALBUMIN QUANTITATIVE: CPT | Performed by: INTERNAL MEDICINE

## 2024-01-22 PROCEDURE — 80053 COMPREHEN METABOLIC PANEL: CPT | Performed by: INTERNAL MEDICINE

## 2024-01-22 PROCEDURE — 80061 LIPID PANEL: CPT | Performed by: INTERNAL MEDICINE

## 2024-01-22 PROCEDURE — 84443 ASSAY THYROID STIM HORMONE: CPT | Performed by: INTERNAL MEDICINE

## 2024-01-22 PROCEDURE — 99214 OFFICE O/P EST MOD 30 MIN: CPT | Mod: 25 | Performed by: INTERNAL MEDICINE

## 2024-01-22 PROCEDURE — 99207 PR ANNUAL WELLNESS VISIT, PPS, SUBSEQUENT STAT: CPT | Performed by: INTERNAL MEDICINE

## 2024-01-22 PROCEDURE — 36415 COLL VENOUS BLD VENIPUNCTURE: CPT | Performed by: INTERNAL MEDICINE

## 2024-01-22 PROCEDURE — 81001 URINALYSIS AUTO W/SCOPE: CPT | Performed by: INTERNAL MEDICINE

## 2024-01-22 PROCEDURE — 83036 HEMOGLOBIN GLYCOSYLATED A1C: CPT | Performed by: INTERNAL MEDICINE

## 2024-01-22 PROCEDURE — G0103 PSA SCREENING: HCPCS | Performed by: INTERNAL MEDICINE

## 2024-01-22 PROCEDURE — 82570 ASSAY OF URINE CREATININE: CPT | Performed by: INTERNAL MEDICINE

## 2024-01-22 PROCEDURE — 85027 COMPLETE CBC AUTOMATED: CPT | Performed by: INTERNAL MEDICINE

## 2024-01-22 RX ORDER — HYDROCHLOROTHIAZIDE 25 MG/1
25 TABLET ORAL DAILY
Qty: 90 TABLET | Refills: 3 | Status: SHIPPED | OUTPATIENT
Start: 2024-01-22 | End: 2024-06-18

## 2024-01-22 RX ORDER — LISINOPRIL 30 MG/1
30 TABLET ORAL DAILY
Qty: 90 TABLET | Refills: 3 | Status: SHIPPED | OUTPATIENT
Start: 2024-01-22

## 2024-01-22 RX ORDER — TAMSULOSIN HYDROCHLORIDE 0.4 MG/1
0.4 CAPSULE ORAL DAILY
Qty: 90 CAPSULE | Refills: 3 | Status: SHIPPED | OUTPATIENT
Start: 2024-01-22

## 2024-01-22 RX ORDER — ATENOLOL 100 MG/1
100 TABLET ORAL DAILY
Qty: 90 TABLET | Refills: 3 | Status: SHIPPED | OUTPATIENT
Start: 2024-01-22 | End: 2024-06-21

## 2024-01-22 ASSESSMENT — ENCOUNTER SYMPTOMS
HEMATOCHEZIA: 0
JOINT SWELLING: 0
EYE PAIN: 0
SHORTNESS OF BREATH: 0
ARTHRALGIAS: 0
FEVER: 0
NERVOUS/ANXIOUS: 0
HEADACHES: 0
COUGH: 0
NAUSEA: 0
HEARTBURN: 0
WEAKNESS: 0
CONSTIPATION: 0
PARESTHESIAS: 0
ABDOMINAL PAIN: 0
SORE THROAT: 0
DYSURIA: 0
DIARRHEA: 0
PALPITATIONS: 0
HEMATURIA: 0
FREQUENCY: 0
DIZZINESS: 0
MYALGIAS: 1
CHILLS: 0

## 2024-01-22 ASSESSMENT — ACTIVITIES OF DAILY LIVING (ADL): CURRENT_FUNCTION: NO ASSISTANCE NEEDED

## 2024-01-22 NOTE — PROGRESS NOTES
"Preventive Care Visit  [facility]  Sujit Duke MD, Internal Medicine  Jan 22, 2024      SUBJECTIVE:   Merlin is a 83 year old, presenting for the following:  Medicare Visit        1/22/2024     1:52 PM   Additional Questions   Roomed by MARCELINO Lee LPN   Accompanied by wife         1/22/2024     1:52 PM   Patient Reported Additional Medications   Patient reports taking the following new medications none     Are you in the first 12 months of your Medicare coverage?  No    Healthy Habits:     In general, how would you rate your overall health?  Good    Frequency of exercise:  2-3 days/week    Duration of exercise:  Less than 15 minutes    Do you usually eat at least 4 servings of fruit and vegetables a day, include whole grains    & fiber and avoid regularly eating high fat or \"junk\" foods?  No    Taking medications regularly:  Yes    Medication side effects:  None    Ability to successfully perform activities of daily living:  No assistance needed    Home Safety:  No safety concerns identified    Hearing Impairment:  Feel that people are mumbling or not speaking clearly    In the past 6 months, have you been bothered by leaking of urine?  No    In general, how would you rate your overall mental or emotional health?  Good    Additional concerns today:  No      Today's PHQ-2 Score:       1/22/2024     1:38 PM   PHQ-2 ( 1999 [employer])   Q1: Little interest or pleasure in doing things 0   Q2: Feeling down, depressed or hopeless 0   PHQ-2 Score 0   Q1: Little interest or pleasure in doing things Not at all   Q2: Feeling down, depressed or hopeless Not at all   PHQ-2 Score 0           Have you ever done Advance Care Planning? (For example, a Health Directive, POLST, or a discussion with a medical provider or your loved ones about your wishes): No, advance care planning information given to patient to review.  Advanced care planning was discussed at today's visit.    Has HAs.     Fall " risk  Fallen 2 or more times in the past year?: No  Any fall with injury in the past year?: No    Cognitive Screening   1) Repeat 3 items (Leader, Season, Table)    2) Clock draw: NORMAL  3) 3 item recall: Recalls 3 objects  Results: 3 items recalled: COGNITIVE IMPAIRMENT LESS LIKELY    Mini-CogTM Copyright S Wally. Licensed by the author for use in [facility]; reprinted with permission (naomi@[email or web address]). All rights reserved.      Do you have sleep apnea, excessive snoring or daytime drowsiness? : no    Reviewed and updated as needed this visit by clinical staff   Tobacco  Allergies  Meds  Problems  Med Hx  Surg Hx  Fam Hx          Reviewed and updated as needed this visit by Provider                  Social History     Tobacco Use    Smoking status: Never    Smokeless tobacco: Never   Substance Use Topics    Alcohol use: Yes     Comment: 1 monthly or less.             1/22/2024     1:38 PM   Alcohol Use   Prescreen: >3 drinks/day or >7 drinks/week? No     Do you have a current opioid prescription? NoDo you use any other controlled substances or medications that are not prescribed by a provider? None          PROBLEMS TO ADD ON...  Has h/o HTN. on medical treatment. BP has been controlled. No side effects from medications. No CP, HA, dizziness. good compliance with medications and low salt diet.  Has h/o ischemic heart disease, asymptomatic regarding chest pains, SOB,palpitations. Has had CABG 4 years ago. Has good compliance with treatment, diet and exercise.  Has H/O hyperlipidemia. On medical treatment and diet. No side effects. No muscle weakness, myalgias or upset stomach.   Has H/O DM. On diet , exercise and oral treatment. Blood sugars are controlled. No parestesias. No hypoglycemias.  Has h/o CRF. Monitoring BP, BG, medications, avoiding OTC NSAIDs. Needs periodic recheck of kidney function.      Current providers sharing in care for this patient include:   Patient Care Team:  Srikanth  Sujit ROSEN MD as PCP - General (Internal Medicine)  Sujit Duke MD as Assigned PCP  Davi Rebolledo MD as MD (Cardiovascular Disease)    The following health maintenance items are reviewed in Epic and correct as of today:  Health Maintenance   Topic Date Due    RSV VACCINE (Pregnancy & 60+) (1 - 1-dose 60+ series) Never done    ZOSTER IMMUNIZATION (2 of 3) 10/19/2011    COLORECTAL CANCER SCREENING  10/17/2018    DIABETIC FOOT EXAM  07/07/2022    EYE EXAM  06/01/2023    INFLUENZA VACCINE (1) 09/01/2023    A1C  12/06/2023    MEDICARE ANNUAL WELLNESS VISIT  06/06/2024    LIPID  06/06/2024    MICROALBUMIN  06/06/2024    BMP  12/19/2024    HEMOGLOBIN  12/19/2024    FALL RISK ASSESSMENT  01/22/2025    DTAP/TDAP/TD IMMUNIZATION (2 - Td or Tdap) 12/16/2025    ADVANCE CARE PLANNING  05/20/2027    PHQ-2 (once per calendar year)  Completed    Pneumococcal Vaccine: 65+ Years  Completed    URINALYSIS  Completed    COVID-19 Vaccine  Completed    IPV IMMUNIZATION  Aged Out    HPV IMMUNIZATION  Aged Out    MENINGITIS IMMUNIZATION  Aged Out    RSV MONOCLONAL ANTIBODY  Aged Out     Lab work is in process  Labs reviewed in EPIC        Review of Systems   Constitutional:  Negative for chills and fever.   HENT:  Negative for congestion, ear pain, hearing loss and sore throat.    Eyes:  Negative for pain and visual disturbance.   Respiratory:  Negative for cough and shortness of breath.    Cardiovascular:  Negative for chest pain and palpitations.   Gastrointestinal:  Negative for abdominal pain, constipation, diarrhea and nausea.   Genitourinary:  Negative for dysuria, frequency, genital sores, hematuria and urgency.   Musculoskeletal:  Positive for myalgias. Negative for arthralgias and joint swelling.   Skin:  Negative for rash.   Neurological:  Negative for dizziness, weakness and headaches.   Psychiatric/Behavioral:  The patient is not nervous/anxious.           OBJECTIVE:   BP (!) 186/73   Pulse 52   Temp 97.1  F (36.2  " C) (Oral)   Resp 16   Ht 1.848 m (6' 0.75\")   Wt 86.2 kg (190 lb)   SpO2 99%   BMI 25.24 kg/m     Estimated body mass index is 25.24 kg/m  as calculated from the following:    Height as of this encounter: 1.848 m (6' 0.75\").    Weight as of this encounter: 86.2 kg (190 lb).  Physical Exam  GENERAL: elderly, frail, alert and no distress  EYES: Eyes grossly normal to inspection, PERRL and conjunctivae and sclerae normal  HENT: ear canals and TM's normal, nose and mouth without ulcers or lesions  NECK: no adenopathy, no asymmetry, masses, or scars  RESP: lungs clear to auscultation - no rales, rhonchi or wheezes  CV: regular rate and rhythm, normal S1 S2, no S3 or S4, no murmur, click or rub  ABDOMEN: soft, nontender, no hepatosplenomegaly, no masses and bowel sounds normal  MS: no gross musculoskeletal defects noted, trace LE edema  SKIN: no suspicious lesions or rashes  NEURO: Normal strength and tone, mentation intact and speech normal  PSYCH: mentation appears normal, affect normal/bright  LYMPH: no cervical, supraclavicular, axillary, or inguinal adenopathy    Diagnostic Test Results:  Labs reviewed in Epic    ASSESSMENT / PLAN:     Type 2 diabetes mellitus with other circulatory complications (H)  Assess lab   - Hemoglobin A1c  - Albumin Random Urine Quantitative with Creat Ratio  - OFFICE/OUTPT VISIT,EST,LEVL III    Bilateral leg edema  Increase hydrochlorothiazide to 25 mg   - hydrochlorothiazide (HYDRODIURIL) 25 MG tablet; Take 1 tablet (25 mg) by mouth daily    Essential hypertension, benign  Increase hydrochlorothiazide dose   - hydrochlorothiazide (HYDRODIURIL) 25 MG tablet; Take 1 tablet (25 mg) by mouth daily  - OFFICE/OUTPT VISIT,EST,LEVL III    Essential hypertension  - atenolol (TENORMIN) 100 MG tablet; Take 1 tablet (100 mg) by mouth daily  - lisinopril (ZESTRIL) 30 MG tablet; Take 1 tablet (30 mg) by mouth daily    Urinary retention  Continue Flomax   - tamsulosin (FLOMAX) 0.4 MG capsule; Take " "1 capsule (0.4 mg) by mouth daily    Type 2 diabetes mellitus without complication, without long-term current use of insulin (H)  Assess lab for DM   - metFORMIN (GLUCOPHAGE) 500 MG tablet; Take 1 tablet (500 mg) by mouth 2 times daily (with meals)  - OFFICE/OUTPT VISIT,TODD HOBBS III    Encounter for preventative adult health care examination    - Lipid panel reflex to direct LDL Fasting  - CBC with platelets  - Comprehensive metabolic panel (BMP + Alb, Alk Phos, ALT, AST, Total. Bili, TP)  - TSH with free T4 reflex  - PSA, screen  - UA with Microscopic reflex to Culture - lab collect    Encounter for screening for malignant neoplasm of prostate    - PSA, screen    Right-sided carotid artery disease, unspecified type (H24)      Stage 3b chronic kidney disease (H)      Patient has been advised of split billing requirements and indicates understanding: Yes      Counseling  Reviewed preventive health counseling, as reflected in patient instructions       Regular exercise       Healthy diet/nutrition       Vision screening       Hearing screening       Dental care       Colon cancer screening       Prostate cancer screening      BMI  Estimated body mass index is 25.24 kg/m  as calculated from the following:    Height as of this encounter: 1.848 m (6' 0.75\").    Weight as of this encounter: 86.2 kg (190 lb).         He reports that he has never smoked. He has never used smokeless tobacco.      Appropriate preventive services were discussed with this patient, including applicable screening as appropriate for fall prevention, nutrition, physical activity, Tobacco-use cessation, weight loss and cognition.  Checklist reviewing preventive services available has been given to the patient.    Reviewed patients plan of care and provided an AVS. The Intermediate Care Plan ( asthma action plan, low back pain action plan, and migraine action plan) for Merlin meets the Care Plan requirement. This Care Plan has been established and " reviewed with the Patient.          Signed Electronically by: Sujit Duke MD    Identified Health Risks  I have reviewed Opioid Use Disorder and Substance Use Disorder risk factors and made any needed referrals.   Answers submitted by the patient for this visit:  Annual Preventive Visit (Submitted on 1/22/2024)  Chief Complaint: Annual Exam:  Blood in stool: No  heartburn: No  peripheral edema: No  mood changes: No  Skin sensation changes: No

## 2024-02-20 ENCOUNTER — OFFICE VISIT (OUTPATIENT)
Dept: CARDIOLOGY | Facility: CLINIC | Age: 84
End: 2024-02-20
Payer: COMMERCIAL

## 2024-02-20 VITALS
WEIGHT: 194.4 LBS | OXYGEN SATURATION: 98 % | BODY MASS INDEX: 25.76 KG/M2 | DIASTOLIC BLOOD PRESSURE: 82 MMHG | HEIGHT: 73 IN | SYSTOLIC BLOOD PRESSURE: 168 MMHG | HEART RATE: 52 BPM

## 2024-02-20 DIAGNOSIS — Z13.6 SCREENING FOR CARDIOVASCULAR CONDITION: Primary | ICD-10-CM

## 2024-02-20 DIAGNOSIS — I10 BENIGN ESSENTIAL HYPERTENSION: ICD-10-CM

## 2024-02-20 PROCEDURE — 93000 ELECTROCARDIOGRAM COMPLETE: CPT | Performed by: INTERNAL MEDICINE

## 2024-02-20 PROCEDURE — 99205 OFFICE O/P NEW HI 60 MIN: CPT | Performed by: INTERNAL MEDICINE

## 2024-02-20 RX ORDER — AMLODIPINE BESYLATE 5 MG/1
5 TABLET ORAL DAILY
Qty: 90 TABLET | Refills: 3 | Status: SHIPPED | OUTPATIENT
Start: 2024-02-20 | End: 2024-04-30

## 2024-02-20 NOTE — LETTER
2/20/2024    Sujit Duke MD  [address] [facility] [address]    RE: Merlin J Anderson       Dear Colleague,     I had the pleasure of seeing Merlin J Anderson in the [facility].  HPI and Plan:   Mr. Warner is a very pleasant 83-year-old gentleman who I saw on an urgent basis in 10/2018 for an abnormal stress test, which was done for exertional dyspnea. This was a highly abnormal stress test indicative of multivessel coronary artery disease, which was confirmed on subsequent coronary angiogram that showed distal left main disease, 90% stenosis involving the LAD, circumflex and 75% stenosis involving the RCA. He underwent urgent CABG with LIMA to LAD, SVG to RPDA, SVG to OM2 and SVG to left posterolateral branch of circumflex. He did quite well in the postop. Today, he is coming for routine followup. To be noted, the patient also has history of carotid artery atherosclerotic disease with history of right CEA and about 70% disease in the left carotid artery, being followed by Dr. Scott.  I saw the patient last time slightly over 4 years ago.  He was supposed to follow-up in a year but for some reason was lost to follow-up.  Today he is reestablishing cardiology care with us.  He is accompanied by his wife.  Of late patient has struggled with the elevated blood pressure.  He had a visit to the ER in December 2023 because of some altered mental status after a dental procedure.  No particular Trelegy could be found.  He was noted to have first-degree block in the setting of being on atenolol.  No high-grade AV block was noted while he was in the ER.  Patient tells me he has not had any dizziness presyncope or syncope.  He actually feels quite well.  Blood pressure is significantly elevated even on repeat check.  He is on at 100 mg daily, hydrochlorothiazide 25 mg daily, last for 30 mg daily.  Additionally he is on aspirin, Lipitor 40 mg daily.  LDL is well-controlled at 48.  Recent  EKGs reviewed shows sinus bradycardia with first-degree AV block with inferior Q waves.  Inferior Q waves were also seen although not that prominently in prior EKG as well.  He denies any chest discomfort.  No tobacco exposure.  No alcohol.  He does acknowledge liberal salt intake.  No history of snoring or daytime and somnolence.  No regular use of ibuprofen.  He does have chronic kidney disease with baseline creatinine around 2.2-2.3 GFR in upper 20s.      Assessment and plan  History of hypertension.  Has been quite elevated for some time.  Recently hydrochlorothiazide dose was increased.  I recommend adding amlodipine 5 mg daily.  Common side effects were discussed with patient.  Also recommend patient to cut down on salt intake to no more than 2 g/day.  Recommend close follow-up either with us or with the PCP.  Recommending follow-up with SHAYLA in cardiology clinic in a month.  It is possible that he may need further antihypertensive medication adjustment depending upon blood pressure response.  History of CAD with history of CABG in 2018.  Clinically no anginal symptoms.  Recommend echocardiogram.  On aspirin, high intensity statin, beta-blocker, ACE inhibitor.  LDL well-controlled at 48.  Blood pressure not well-controlled.  Historically normal LV function on echocardiogram in the past.  History of right carotid endarterectomy with at least moderate disease involving the left internal carotid artery.  Has been followed by Dr. Scott from vascular surgery.  Recommend patient to reach out to vascular again for follow-up  Dyslipidemia LDL well-controlled high intensity statin  Diabetes on metformin, hemoglobin A1c of 6.1.  First-degree AV block on EKG.  No evidence of high degree AV block noted when patient was in the ER.  No concerning symptoms like syncope presyncope or dizziness.    Recommendations  Add amlodipine 5 mg daily  Continue aspirin, statin, atenolol, hydrochlorothiazide, lisinopril  Decreased oral  intake to less than 2 g/day  Echocardiogram  Follow-up with SHAYLA in cardiology clinic in a month.  Recommend follow-up with vascular for residual left internal carotid artery stenosis      60 minutes of total time spent today.          Orders Placed This Encounter   Procedures    Follow-Up with Cardiology SHAYLA    EKG 12-lead complete w/read - Clinics (performed today)    Echocardiogram Complete       Orders Placed This Encounter   Medications    amLODIPine (NORVASC) 5 MG tablet     Sig: Take 1 tablet (5 mg) by mouth daily     Dispense:  90 tablet     Refill:  3       There are no discontinued medications.      Encounter Diagnoses   Name Primary?    Screening for cardiovascular condition Yes    Benign essential hypertension        CURRENT MEDICATIONS:  Current Outpatient Medications   Medication Sig Dispense Refill    acetaminophen (TYLENOL) 325 MG tablet Take 2 tablets (650 mg) by mouth every 4 hours as needed for mild pain 100 tablet     amLODIPine (NORVASC) 5 MG tablet Take 1 tablet (5 mg) by mouth daily 90 tablet 3    aspirin 325 MG EC tablet Take 1 tablet (325 mg) by mouth daily 40 tablet     atenolol (TENORMIN) 100 MG tablet Take 1 tablet (100 mg) by mouth daily 90 tablet 3    atorvastatin (LIPITOR) 40 MG tablet Take 1 tablet (40 mg) by mouth daily 90 tablet 1    blood glucose calibration (ACCU-CHEK PRISCILLA) solution Use to calibrate blood glucose monitor as needed as directed. 1 Bottle 0    blood glucose monitoring (ACCU-CHEK PRISCILLA) test strip Use to test blood sugars 1 times daily or as directed. 100 each 1    blood glucose monitoring (ACCU-CHEK MULTICLIX) lancets Use to test blood sugar 1 times daily or as directed. 1 Box 1    blood glucose monitoring (NO BRAND SPECIFIED) meter device kit Use to test blood sugar 1 times daily or as directed. 1 kit 0    fluticasone (FLONASE) 50 MCG/ACT nasal spray Spray 1-2 sprays into both nostrils daily as needed for rhinitis or allergies 16 g 4    hydrochlorothiazide  (HYDRODIURIL) 25 MG tablet Take 1 tablet (25 mg) by mouth daily 90 tablet 3    lisinopril (ZESTRIL) 30 MG tablet Take 1 tablet (30 mg) by mouth daily 90 tablet 3    metFORMIN (GLUCOPHAGE) 500 MG tablet Take 1 tablet (500 mg) by mouth 2 times daily (with meals) 180 tablet 3    multivitamin w/minerals (THERA-VIT-M) tablet Take 1 tablet by mouth every morning      nitroGLYcerin (NITROSTAT) 0.4 MG sublingual tablet For chest pain place 1 tablet under the tongue every 5 minutes for 3 doses. If symptoms persist 5 minutes after 1st dose call 911. 25 tablet 3    tamsulosin (FLOMAX) 0.4 MG capsule Take 1 capsule (0.4 mg) by mouth daily 90 capsule 3       ALLERGIES   No Known Allergies    PAST MEDICAL HISTORY:  Past Medical History:   Diagnosis Date    Abnormal stress test     Carotid arterial disease (H24) 9/18/2017    CKD (chronic kidney disease) stage 3, GFR 30-59 ml/min (H) 12/27/2011    DM2 (diabetes mellitus, type 2) (H)     Pre diabetic    LUNA (dyspnea on exertion)     Essential hypertension, benign     Gout 11/14/2002     Problem list name updated by automated process. Provider to review    HTN (hypertension) 6/29/2009     (Problem list name updated by automated process. Provider to review and confirm.)    Hyperlipidemia LDL goal <100 10/31/2010    Mixed hyperlipidemia 11/14/2002    PVD (peripheral vascular disease) (H24) 9/8/2017    S/P CABG (coronary artery bypass graft) 10/26/2018    Type 2 diabetes mellitus without complication (H) 11/30/2015       PAST SURGICAL HISTORY:  Past Surgical History:   Procedure Laterality Date    BYPASS GRAFT ARTERY CORONARY N/A 10/26/2018    Procedure: CORONARY ARTERY BYPASS GRAFTING X 4 WITH LEFT LEG ENDOSCOPIC VEIN HARVESTING LIMA - LAD  SV- RIGHT PDA, OM2, OM1 ON PUMP/BETH IAPB PLACED PER CATH LAB;  Surgeon: Heath Kessler MD;  Location: [facility] OR    COLONOSCOPY      DECOMPRESSION, FUSION LUMBAR POSTERIOR ONE LEVEL, COMBINED  8/24/2012    Procedure: COMBINED DECOMPRESSION,  FUSION LUMBAR POSTERIOR ONE LEVEL;  Posterior Fusion wtih Decompression L4-5;  Surgeon: Rod Carbajal MD;  Location: [facility] OR    ENDARTERECTOMY CAROTID Right 9/18/2017    Procedure: ENDARTERECTOMY CAROTID;  RIGHT CAROTID ENDARTERECTOMY, WITH PATCH ANGIOPLASTY, WITH ELECTOENCEPHALOGAM            (EEG);  Surgeon: Catalino Scott MD;  Location: [facility] OR    HERNIA REPAIR      [facility] NONSPECIFIC PROCEDURE  04/97    Neg. colonoscopy.    [facility] NONSPECIFIC PROCEDURE      S/P ing. hernia.       FAMILY HISTORY:  Family History   Problem Relation Age of Onset    Diabetes Father     Unknown/Adopted Mother        SOCIAL HISTORY:  Social History     Socioeconomic History    Marital status:      Spouse name: None    Number of children: None    Years of education: None    Highest education level: None   Tobacco Use    Smoking status: Never    Smokeless tobacco: Never   Vaping Use    Vaping Use: Never used   Substance and Sexual Activity    Alcohol use: Yes     Comment: 1 monthly or less.    Drug use: No    Sexual activity: Yes     Partners: Female   Other Topics Concern    Caffeine Concern No    Occupational Exposure No    Hobby Hazards No    Sleep Concern No    Stress Concern No    Weight Concern No    Special Diet No    Back Care No    Exercise Yes     Comment: bikes, treadmill     Seat Belt Yes     Social Determinants of Health     Financial Resource Strain: Low Risk  (1/22/2024)    Financial Resource Strain     Within the past 12 months, have you or your family members you live with been unable to get utilities (heat, electricity) when it was really needed?: No   Food Insecurity: Low Risk  (1/22/2024)    Food Insecurity     Within the past 12 months, did you worry that your food would run out before you got money to buy more?: No     Within the past 12 months, did the food you bought just not last and you didn t have money to get more?: No   Transportation Needs: Low Risk  (1/22/2024)    Transportation Needs     Within  "the past 12 months, has lack of transportation kept you from medical appointments, getting your medicines, non-medical meetings or appointments, work, or from getting things that you need?: No   Housing Stability: Low Risk  (1/22/2024)    Housing Stability     Do you have housing? : Yes     Are you worried about losing your housing?: No       Review of Systems:  Skin:          Eyes:         ENT:         Respiratory:  Negative       Cardiovascular:  Negative      Gastroenterology:        Genitourinary:         Musculoskeletal:         Neurologic:         Psychiatric:         Heme/Lymph/Imm:         Endocrine:           Physical Exam:  Vitals: BP (!) 168/82 (BP Location: Right arm, Patient Position: Sitting, Cuff Size: Adult Regular)   Pulse 52   Ht 1.848 m (6' 0.75\")   Wt 88.2 kg (194 lb 6.4 oz)   SpO2 98%   BMI 25.82 kg/m      General patient appears comfortable  Neck normal JVP, left bruit  Cardiovascular system S1-S2 normal no murmur rub or gallop  Respiration clear to auscultation next extremities no edema  Neurological alert, oriented    CC  Davi Rebolledo MD  [address]  [place]  [place] [address]      Thank you for allowing me to participate in the care of your patient.      Sincerely,     Davi Rebolledo MD     [facility]  "

## 2024-02-20 NOTE — PROGRESS NOTES
HPI and Plan:   Mr. Warner is a very pleasant 83-year-old gentleman who I saw on an urgent basis in 10/2018 for an abnormal stress test, which was done for exertional dyspnea. This was a highly abnormal stress test indicative of multivessel coronary artery disease, which was confirmed on subsequent coronary angiogram that showed distal left main disease, 90% stenosis involving the LAD, circumflex and 75% stenosis involving the RCA. He underwent urgent CABG with LIMA to LAD, SVG to RPDA, SVG to OM2 and SVG to left posterolateral branch of circumflex. He did quite well in the postop. Today, he is coming for routine followup. To be noted, the patient also has history of carotid artery atherosclerotic disease with history of right CEA and about 70% disease in the left carotid artery, being followed by Dr. Scott.  I saw the patient last time slightly over 4 years ago.  He was supposed to follow-up in a year but for some reason was lost to follow-up.  Today he is reestablishing cardiology care with us.  He is accompanied by his wife.  Of late patient has struggled with the elevated blood pressure.  He had a visit to the ER in December 2023 because of some altered mental status after a dental procedure.  No particular Trelegy could be found.  He was noted to have first-degree block in the setting of being on atenolol.  No high-grade AV block was noted while he was in the ER.  Patient tells me he has not had any dizziness presyncope or syncope.  He actually feels quite well.  Blood pressure is significantly elevated even on repeat check.  He is on at 100 mg daily, hydrochlorothiazide 25 mg daily, last for 30 mg daily.  Additionally he is on aspirin, Lipitor 40 mg daily.  LDL is well-controlled at 48.  Recent EKGs reviewed shows sinus bradycardia with first-degree AV block with inferior Q waves.  Inferior Q waves were also seen although not that prominently in prior EKG as well.  He denies any chest discomfort.  No tobacco  exposure.  No alcohol.  He does acknowledge liberal salt intake.  No history of snoring or daytime and somnolence.  No regular use of ibuprofen.  He does have chronic kidney disease with baseline creatinine around 2.2-2.3 GFR in upper 20s.      Assessment and plan  History of hypertension.  Has been quite elevated for some time.  Recently hydrochlorothiazide dose was increased.  I recommend adding amlodipine 5 mg daily.  Common side effects were discussed with patient.  Also recommend patient to cut down on salt intake to no more than 2 g/day.  Recommend close follow-up either with us or with the PCP.  Recommending follow-up with SHAYLA in cardiology clinic in a month.  It is possible that he may need further antihypertensive medication adjustment depending upon blood pressure response.  History of CAD with history of CABG in 2018.  Clinically no anginal symptoms.  Recommend echocardiogram.  On aspirin, high intensity statin, beta-blocker, ACE inhibitor.  LDL well-controlled at 48.  Blood pressure not well-controlled.  Historically normal LV function on echocardiogram in the past.  History of right carotid endarterectomy with at least moderate disease involving the left internal carotid artery.  Has been followed by Dr. Scott from vascular surgery.  Recommend patient to reach out to vascular again for follow-up  Dyslipidemia LDL well-controlled high intensity statin  Diabetes on metformin, hemoglobin A1c of 6.1.  First-degree AV block on EKG.  No evidence of high degree AV block noted when patient was in the ER.  No concerning symptoms like syncope presyncope or dizziness.    Recommendations  Add amlodipine 5 mg daily  Continue aspirin, statin, atenolol, hydrochlorothiazide, lisinopril  Decreased oral intake to less than 2 g/day  Echocardiogram  Follow-up with SHAYLA in cardiology clinic in a month.  Recommend follow-up with vascular for residual left internal carotid artery stenosis      60 minutes of total time spent  today.          Orders Placed This Encounter   Procedures    Follow-Up with Cardiology SHAYLA    EKG 12-lead complete w/read - Clinics (performed today)    Echocardiogram Complete       Orders Placed This Encounter   Medications    amLODIPine (NORVASC) 5 MG tablet     Sig: Take 1 tablet (5 mg) by mouth daily     Dispense:  90 tablet     Refill:  3       There are no discontinued medications.      Encounter Diagnoses   Name Primary?    Screening for cardiovascular condition Yes    Benign essential hypertension        CURRENT MEDICATIONS:  Current Outpatient Medications   Medication Sig Dispense Refill    acetaminophen (TYLENOL) 325 MG tablet Take 2 tablets (650 mg) by mouth every 4 hours as needed for mild pain 100 tablet     amLODIPine (NORVASC) 5 MG tablet Take 1 tablet (5 mg) by mouth daily 90 tablet 3    aspirin 325 MG EC tablet Take 1 tablet (325 mg) by mouth daily 40 tablet     atenolol (TENORMIN) 100 MG tablet Take 1 tablet (100 mg) by mouth daily 90 tablet 3    atorvastatin (LIPITOR) 40 MG tablet Take 1 tablet (40 mg) by mouth daily 90 tablet 1    blood glucose calibration (ACCU-CHEK PRISCILLA) solution Use to calibrate blood glucose monitor as needed as directed. 1 Bottle 0    blood glucose monitoring (ACCU-CHEK PRISCILLA) test strip Use to test blood sugars 1 times daily or as directed. 100 each 1    blood glucose monitoring (ACCU-CHEK MULTICLIX) lancets Use to test blood sugar 1 times daily or as directed. 1 Box 1    blood glucose monitoring (NO BRAND SPECIFIED) meter device kit Use to test blood sugar 1 times daily or as directed. 1 kit 0    fluticasone (FLONASE) 50 MCG/ACT nasal spray Spray 1-2 sprays into both nostrils daily as needed for rhinitis or allergies 16 g 4    hydrochlorothiazide (HYDRODIURIL) 25 MG tablet Take 1 tablet (25 mg) by mouth daily 90 tablet 3    lisinopril (ZESTRIL) 30 MG tablet Take 1 tablet (30 mg) by mouth daily 90 tablet 3    metFORMIN (GLUCOPHAGE) 500 MG tablet Take 1 tablet (500 mg) by  mouth 2 times daily (with meals) 180 tablet 3    multivitamin w/minerals (THERA-VIT-M) tablet Take 1 tablet by mouth every morning      nitroGLYcerin (NITROSTAT) 0.4 MG sublingual tablet For chest pain place 1 tablet under the tongue every 5 minutes for 3 doses. If symptoms persist 5 minutes after 1st dose call 911. 25 tablet 3    tamsulosin (FLOMAX) 0.4 MG capsule Take 1 capsule (0.4 mg) by mouth daily 90 capsule 3       ALLERGIES   No Known Allergies    PAST MEDICAL HISTORY:  Past Medical History:   Diagnosis Date    Abnormal stress test     Carotid arterial disease (H24) 9/18/2017    CKD (chronic kidney disease) stage 3, GFR 30-59 ml/min (H) 12/27/2011    DM2 (diabetes mellitus, type 2) (H)     Pre diabetic    LUNA (dyspnea on exertion)     Essential hypertension, benign     Gout 11/14/2002     Problem list name updated by automated process. Provider to review    HTN (hypertension) 6/29/2009     (Problem list name updated by automated process. Provider to review and confirm.)    Hyperlipidemia LDL goal <100 10/31/2010    Mixed hyperlipidemia 11/14/2002    PVD (peripheral vascular disease) (H24) 9/8/2017    S/P CABG (coronary artery bypass graft) 10/26/2018    Type 2 diabetes mellitus without complication (H) 11/30/2015       PAST SURGICAL HISTORY:  Past Surgical History:   Procedure Laterality Date    BYPASS GRAFT ARTERY CORONARY N/A 10/26/2018    Procedure: CORONARY ARTERY BYPASS GRAFTING X 4 WITH LEFT LEG ENDOSCOPIC VEIN HARVESTING LIMA - LAD  SV- RIGHT PDA, OM2, OM1 ON PUMP/BETH IAPB PLACED PER CATH LAB;  Surgeon: Heath Kessler MD;  Location: [facility] OR    COLONOSCOPY      DECOMPRESSION, FUSION LUMBAR POSTERIOR ONE LEVEL, COMBINED  8/24/2012    Procedure: COMBINED DECOMPRESSION, FUSION LUMBAR POSTERIOR ONE LEVEL;  Posterior Fusion wtih Decompression L4-5;  Surgeon: Rod Carbajal MD;  Location: [facility] OR    ENDARTERECTOMY CAROTID Right 9/18/2017    Procedure: ENDARTERECTOMY CAROTID;  RIGHT CAROTID  ENDARTERECTOMY, WITH PATCH ANGIOPLASTY, WITH ELECTOENCEPHALOGAM            (EEG);  Surgeon: Catalino Scott MD;  Location: [facility] OR    HERNIA REPAIR      [facility] NONSPECIFIC PROCEDURE  04/97    Neg. colonoscopy.    [facility] NONSPECIFIC PROCEDURE      S/P ing. hernia.       FAMILY HISTORY:  Family History   Problem Relation Age of Onset    Diabetes Father     Unknown/Adopted Mother        SOCIAL HISTORY:  Social History     Socioeconomic History    Marital status:      Spouse name: None    Number of children: None    Years of education: None    Highest education level: None   Tobacco Use    Smoking status: Never    Smokeless tobacco: Never   Vaping Use    Vaping Use: Never used   Substance and Sexual Activity    Alcohol use: Yes     Comment: 1 monthly or less.    Drug use: No    Sexual activity: Yes     Partners: Female   Other Topics Concern    Caffeine Concern No    Occupational Exposure No    Hobby Hazards No    Sleep Concern No    Stress Concern No    Weight Concern No    Special Diet No    Back Care No    Exercise Yes     Comment: bikes, treadmill     Seat Belt Yes     Social Determinants of Health     Financial Resource Strain: Low Risk  (1/22/2024)    Financial Resource Strain     Within the past 12 months, have you or your family members you live with been unable to get utilities (heat, electricity) when it was really needed?: No   Food Insecurity: Low Risk  (1/22/2024)    Food Insecurity     Within the past 12 months, did you worry that your food would run out before you got money to buy more?: No     Within the past 12 months, did the food you bought just not last and you didn t have money to get more?: No   Transportation Needs: Low Risk  (1/22/2024)    Transportation Needs     Within the past 12 months, has lack of transportation kept you from medical appointments, getting your medicines, non-medical meetings or appointments, work, or from getting things that you need?: No   Housing Stability: Low Risk   "(1/22/2024)    Housing Stability     Do you have housing? : Yes     Are you worried about losing your housing?: No       Review of Systems:  Skin:          Eyes:         ENT:         Respiratory:  Negative       Cardiovascular:  Negative      Gastroenterology:        Genitourinary:         Musculoskeletal:         Neurologic:         Psychiatric:         Heme/Lymph/Imm:         Endocrine:           Physical Exam:  Vitals: BP (!) 168/82 (BP Location: Right arm, Patient Position: Sitting, Cuff Size: Adult Regular)   Pulse 52   Ht 1.848 m (6' 0.75\")   Wt 88.2 kg (194 lb 6.4 oz)   SpO2 98%   BMI 25.82 kg/m      General patient appears comfortable  Neck normal JVP, left bruit  Cardiovascular system S1-S2 normal no murmur rub or gallop  Respiration clear to auscultation next extremities no edema  Neurological alert, oriented    SHAGGY Rebolledo MD  [address]  [place] [id number]                "

## 2024-03-14 ENCOUNTER — TELEPHONE (OUTPATIENT)
Dept: OTHER | Facility: CLINIC | Age: 84
End: 2024-03-14
Payer: COMMERCIAL

## 2024-03-14 DIAGNOSIS — I65.23 BILATERAL CAROTID ARTERY STENOSIS: Primary | ICD-10-CM

## 2024-03-14 NOTE — TELEPHONE ENCOUNTER
[facility]    Who is the name of the provider?:  FERNANDO SCOTT   What is the location you see this provider at/preferred location?: [place]  Person calling / Facility: Fatou(Spouse)  Phone number:  [phone] (home)  Nurse call back needed:  n/a     Reason for call:  Patient spouse Fatou called to schedule patient for a visit with Dr Scott. LOV 01/29/2020. Patient cardiologist has recommended patient to be seen for concerns related to his left carotid.    Pharmacy location:  [id number] [place], [place] - [address]   Outside Imaging: n/a   Can we leave a detailed message on this number?  YES     3/14/2024, 1:38 PM

## 2024-03-14 NOTE — TELEPHONE ENCOUNTER
Please schedule patient for the following:  Bilateral carotid ultrasound  In clinic visit with Ashleigh  Schedule at next available    Routing to scheduling to contact patient to coordinate above.    Appt note in order comments.    DAYLIN RiveroN, RN, [facility] [place]

## 2024-03-25 ENCOUNTER — HOSPITAL ENCOUNTER (OUTPATIENT)
Dept: ULTRASOUND IMAGING | Facility: CLINIC | Age: 84
Discharge: HOME | End: 2024-03-25
Payer: COMMERCIAL

## 2024-03-25 ENCOUNTER — OFFICE VISIT (OUTPATIENT)
Dept: OTHER | Facility: CLINIC | Age: 84
End: 2024-03-25
Payer: COMMERCIAL

## 2024-03-25 VITALS — SYSTOLIC BLOOD PRESSURE: 174 MMHG | HEART RATE: 54 BPM | DIASTOLIC BLOOD PRESSURE: 64 MMHG

## 2024-03-25 DIAGNOSIS — I65.23 BILATERAL CAROTID ARTERY STENOSIS: ICD-10-CM

## 2024-03-25 DIAGNOSIS — I65.23 BILATERAL CAROTID ARTERY STENOSIS: Primary | ICD-10-CM

## 2024-03-25 PROCEDURE — 93880 EXTRACRANIAL BILAT STUDY: CPT

## 2024-03-25 PROCEDURE — G0463 HOSPITAL OUTPT CLINIC VISIT: HCPCS | Mod: 25

## 2024-03-25 PROCEDURE — 99204 OFFICE O/P NEW MOD 45 MIN: CPT

## 2024-03-25 NOTE — PROGRESS NOTES
[facility]        Patient is here for a  follow up.    Pt is currently taking Aspirin and Statin.    BP (!) 174/64 (BP Location: Right arm, Patient Position: Chair, Cuff Size: Adult Regular)   Pulse 54     The provider has been notified that the patient has no concerns.     Questions patient would like addressed today are: N/A.    Refills are needed: N/A    Has homecare services and agency name:  Jaimee Call MA

## 2024-03-25 NOTE — PROGRESS NOTES
VASCULAR SURGERY PROGRESS NOTE    [facility]    Merlin J Anderson  Medical Record #: [id number]  YOB: 1940  Age: 83 year old     Date of Service: 3/25/2024    PRIMARY CARE PROVIDER: Sujit Duke    Reason for visit:  Carotid Surveillance    Merlin Anderson is a 83 year old male who underwent a right carotid endarterectomy for asymptomatic right carotid stenosis in 2017 with Dr. Scott. Status post CABG x4 in October 2018. He has been lost to follow-up since 2020. He comes into clinic today on recommendation by his cardiologist. His last carotid duplex was done in 2021 showing 50-69% however an area based off velocities of greater than 70 percent stenosis for his left carotid.     He continues to have high blood pressure-typically in the 170s, CKD with baseline Cr of 2.07-2.20 with GFR at 30.     Merlin denies stroke, TIA symptoms, or amaurosis fugax since last seen in clinic by Dr. Scott.   Alert and oriented x4, neurologically intact  /64 P 54            RECOMMENDATION/RISKS: Discussed with Merlin and his wife that there is an area of concern of his left proximal ICA where there is focal stenosis that is worsening with velocities of 309/62 cm/s placing him greater than 70% per NASCET criteria and ultrasound review. This has worsened since his last duplex in 2021. Discussed with Dr. Burroughs as he is not yet medically optimized from a cardiac standpoint, about whether to proceed with CTA head/neck or 6 month bilateral carotid ultrasound and elected bilateral carotid duplex. He recently was reestablished with cardiology and placed on new antihypertensives, and has an ECHO to be done in early April.     Discussed with Merlin and his wife at length about when to come into the ED if signs and symptoms related to symptomatic carotid stenosis. Discussed importance of close surveillance and follow-up. Merlin and his wife are in agreement of plan.     REVIEW OF SYSTEMS:    A  12 point ROS was reviewed and is negative except for what is listed above in HPI.    PHH:    Past Medical History:   Diagnosis Date    Abnormal stress test     Carotid arterial disease (H24) 9/18/2017    CKD (chronic kidney disease) stage 3, GFR 30-59 ml/min (H) 12/27/2011    DM2 (diabetes mellitus, type 2) (H)     Pre diabetic    LUNA (dyspnea on exertion)     Essential hypertension, benign     Gout 11/14/2002     Problem list name updated by automated process. Provider to review    HTN (hypertension) 6/29/2009     (Problem list name updated by automated process. Provider to review and confirm.)    Hyperlipidemia LDL goal <100 10/31/2010    Mixed hyperlipidemia 11/14/2002    PVD (peripheral vascular disease) (H24) 9/8/2017    S/P CABG (coronary artery bypass graft) 10/26/2018    Type 2 diabetes mellitus without complication (H) 11/30/2015          Past Surgical History:   Procedure Laterality Date    BYPASS GRAFT ARTERY CORONARY N/A 10/26/2018    Procedure: CORONARY ARTERY BYPASS GRAFTING X 4 WITH LEFT LEG ENDOSCOPIC VEIN HARVESTING LIMA - LAD  SV- RIGHT PDA, OM2, OM1 ON PUMP/BETH IAPB PLACED PER CATH LAB;  Surgeon: Heath Kessler MD;  Location:  OR    COLONOSCOPY      DECOMPRESSION, FUSION LUMBAR POSTERIOR ONE LEVEL, COMBINED  8/24/2012    Procedure: COMBINED DECOMPRESSION, FUSION LUMBAR POSTERIOR ONE LEVEL;  Posterior Fusion wtih Decompression L4-5;  Surgeon: Rod Carbajal MD;  Location: [facility] OR    ENDARTERECTOMY CAROTID Right 9/18/2017    Procedure: ENDARTERECTOMY CAROTID;  RIGHT CAROTID ENDARTERECTOMY, WITH PATCH ANGIOPLASTY, WITH ELECTOENCEPHALOGAM            (EEG);  Surgeon: Catalino Scott MD;  Location: [facility] OR    HERNIA REPAIR      [facility] NONSPECIFIC PROCEDURE  04/97    Neg. colonoscopy.    [facility] NONSPECIFIC PROCEDURE      S/P ing. hernia.       ALLERGIES:  Patient has no known allergies.    MEDS:    Current Outpatient Medications:     acetaminophen (TYLENOL) 325 MG tablet, Take 2 tablets  (650 mg) by mouth every 4 hours as needed for mild pain, Disp: 100 tablet, Rfl:     amLODIPine (NORVASC) 5 MG tablet, Take 1 tablet (5 mg) by mouth daily, Disp: 90 tablet, Rfl: 3    aspirin 325 MG EC tablet, Take 1 tablet (325 mg) by mouth daily, Disp: 40 tablet, Rfl:     atenolol (TENORMIN) 100 MG tablet, Take 1 tablet (100 mg) by mouth daily, Disp: 90 tablet, Rfl: 3    atorvastatin (LIPITOR) 40 MG tablet, Take 1 tablet (40 mg) by mouth daily, Disp: 90 tablet, Rfl: 1    blood glucose calibration (ACCU-CHEK PRISCILLA) solution, Use to calibrate blood glucose monitor as needed as directed., Disp: 1 Bottle, Rfl: 0    blood glucose monitoring (ACCU-CHEK PRISCILLA) test strip, Use to test blood sugars 1 times daily or as directed., Disp: 100 each, Rfl: 1    blood glucose monitoring (ACCU-CHEK MULTICLIX) lancets, Use to test blood sugar 1 times daily or as directed., Disp: 1 Box, Rfl: 1    blood glucose monitoring (NO BRAND SPECIFIED) meter device kit, Use to test blood sugar 1 times daily or as directed., Disp: 1 kit, Rfl: 0    fluticasone (FLONASE) 50 MCG/ACT nasal spray, Spray 1-2 sprays into both nostrils daily as needed for rhinitis or allergies, Disp: 16 g, Rfl: 4    hydrochlorothiazide (HYDRODIURIL) 25 MG tablet, Take 1 tablet (25 mg) by mouth daily, Disp: 90 tablet, Rfl: 3    lisinopril (ZESTRIL) 30 MG tablet, Take 1 tablet (30 mg) by mouth daily, Disp: 90 tablet, Rfl: 3    metFORMIN (GLUCOPHAGE) 500 MG tablet, Take 1 tablet (500 mg) by mouth 2 times daily (with meals), Disp: 180 tablet, Rfl: 3    multivitamin w/minerals (THERA-VIT-M) tablet, Take 1 tablet by mouth every morning, Disp: , Rfl:     nitroGLYcerin (NITROSTAT) 0.4 MG sublingual tablet, For chest pain place 1 tablet under the tongue every 5 minutes for 3 doses. If symptoms persist 5 minutes after 1st dose call 911., Disp: 25 tablet, Rfl: 3    tamsulosin (FLOMAX) 0.4 MG capsule, Take 1 capsule (0.4 mg) by mouth daily, Disp: 90 capsule, Rfl: 3    SOCIAL HABITS:     History   Smoking Status    Never   Smokeless Tobacco    Never     Social History    Substance and Sexual Activity      Alcohol use: Yes        Comment: 1 monthly or less.      History   Drug Use No       FAMILY HISTORY:    Family History   Problem Relation Age of Onset    Diabetes Father     Unknown/Adopted Mother        PE:  BP (!) 174/64 (BP Location: Right arm, Patient Position: Chair, Cuff Size: Adult Regular)   Pulse 54   Wt Readings from Last 1 Encounters:   02/20/24 194 lb 6.4 oz (88.2 kg)     There is no height or weight on file to calculate BMI.    EXAM:  GENERAL: well-developed 83 year old male who appears his stated age  CARDIAC: normal   CHEST/LUNG: normal respiratory effort   MUSCULOSKELETAL: grossly normal and both lower extremities are intact, no lower extremity edema  NEUROLOGIC: focally intact, alert and oriented x 3  PSYCH: appropriate affect  VASCULAR:     DIAGNOSTIC STUDIES:     Images:  US Carotid Bilateral    Result Date: 3/25/2024  US CAROTID BILATERAL 3/25/2024 9:34 AM HISTORY: Status post right carotid endarterectomy. COMPARISON: 7/13/2021 FINDINGS: Right side: On the grayscale images, mild scattered soft plaque in the common carotid artery extending into the proximal internal carotid artery.. Spectral waveform analysis was performed. Peak systolic and diastolic velocities in the right internal carotid artery are 151 and 26 cm/s. Per sonographic criteria, degree of stenosis in the right internal carotid artery is 50-69%. Flow in the right vertebral artery is antegrade. Left side: On the grayscale images, there is calcified plaque at the carotid bifurcation extending into the internal carotid artery. There is soft plaque in the distal common carotid artery. Spectral waveform analysis was performed. Peak systolic and diastolic velocities in the left internal carotid artery are 320 and 32 cm/s versus 243 and 54 cm/s on the prior exam. The ratio of peak systolic velocity between the left  internal carotid artery and common carotid artery is 3.47. Per sonographic criteria, degree of stenosis in the left internal carotid artery is 50-69%. Flow in the left vertebral artery is antegrade.     IMPRESSION: Per sonographic criteria, there are 50-69% stenoses, left greater than right, in the internal carotid arteries bilaterally. Degree of stenosis on the left has increased when compared to the prior exam. Degree of stenosis measured relative to the diameter of the normal internal carotid artery per NASCET or NASCET type criteria TANGELA BONILLA MD   SYSTEM ID:  [id number]    LABS:      Sodium   Date Value Ref Range Status   01/22/2024 139 135 - 145 mmol/L Final     Comment:     Reference intervals for this test were updated on 09/26/2023 to more accurately reflect our healthy population. There may be differences in the flagging of prior results with similar values performed with this method. Interpretation of those prior results can be made in the context of the updated reference intervals.    12/19/2023 133 (L) 135 - 145 mmol/L Final     Comment:     Reference intervals for this test were updated on 09/26/2023 to more accurately reflect our healthy population. There may be differences in the flagging of prior results with similar values performed with this method. Interpretation of those prior results can be made in the context of the updated reference intervals.    06/06/2023 139 136 - 145 mmol/L Final   07/07/2021 141 133 - 144 mmol/L Final   12/11/2020 139 133 - 144 mmol/L Final   12/09/2019 137 133 - 144 mmol/L Final     Urea Nitrogen   Date Value Ref Range Status   01/22/2024 34.9 (H) 8.0 - 23.0 mg/dL Final   12/19/2023 40.1 (H) 8.0 - 23.0 mg/dL Final   06/06/2023 43.5 (H) 8.0 - 23.0 mg/dL Final   08/10/2022 32 (H) 7 - 30 mg/dL Final   05/20/2022 36 (H) 7 - 30 mg/dL Final   07/07/2021 27 7 - 30 mg/dL Final   12/11/2020 29 7 - 30 mg/dL Final   12/09/2019 26 7 - 30 mg/dL Final     Hemoglobin   Date Value  Ref Range Status   01/22/2024 11.8 (L) 13.3 - 17.7 g/dL Final   12/19/2023 11.2 (L) 13.3 - 17.7 g/dL Final   06/06/2023 11.7 (L) 13.3 - 17.7 g/dL Final   12/11/2020 12.2 (L) 13.3 - 17.7 g/dL Final   12/09/2019 12.5 (L) 13.3 - 17.7 g/dL Final   06/05/2019 12.8 (L) 13.3 - 17.7 g/dL Final     Platelet Count   Date Value Ref Range Status   01/22/2024 118 (L) 150 - 450 10e3/uL Final   12/19/2023 105 (L) 150 - 450 10e3/uL Final   06/06/2023 132 (L) 150 - 450 10e3/uL Final   12/11/2020 109 (L) 150 - 450 10e9/L Final   12/09/2019 179 150 - 450 10e9/L Final   06/05/2019 143 (L) 150 - 450 10e9/L Final     INR   Date Value Ref Range Status   10/26/2018 1.76 (H) 0.86 - 1.14 Final   10/26/2018 1.67 (H) 0.86 - 1.14 Final   10/26/2018 1.08 0.86 - 1.14 Final       45 minutes spent on the day of encounter doing chart review, history and exam, documentation, and further activities as noted.     Ashleigh Campbell NP  VASCULAR SURGERY

## 2024-04-01 ENCOUNTER — HOSPITAL ENCOUNTER (OUTPATIENT)
Dept: CARDIOLOGY | Facility: CLINIC | Age: 84
Discharge: HOME | End: 2024-04-01
Attending: INTERNAL MEDICINE | Admitting: INTERNAL MEDICINE
Payer: COMMERCIAL

## 2024-04-01 DIAGNOSIS — I10 BENIGN ESSENTIAL HYPERTENSION: ICD-10-CM

## 2024-04-01 LAB — LVEF ECHO: NORMAL

## 2024-04-01 PROCEDURE — 93306 TTE W/DOPPLER COMPLETE: CPT | Mod: 26 | Performed by: INTERNAL MEDICINE

## 2024-04-01 PROCEDURE — 93306 TTE W/DOPPLER COMPLETE: CPT

## 2024-04-08 ENCOUNTER — OFFICE VISIT (OUTPATIENT)
Dept: CARDIOLOGY | Facility: CLINIC | Age: 84
End: 2024-04-08
Attending: INTERNAL MEDICINE
Payer: COMMERCIAL

## 2024-04-08 VITALS
BODY MASS INDEX: 25.71 KG/M2 | OXYGEN SATURATION: 98 % | HEIGHT: 73 IN | HEART RATE: 53 BPM | SYSTOLIC BLOOD PRESSURE: 168 MMHG | DIASTOLIC BLOOD PRESSURE: 70 MMHG | WEIGHT: 194 LBS

## 2024-04-08 DIAGNOSIS — I25.708 CORONARY ARTERY DISEASE OF BYPASS GRAFT OF NATIVE HEART WITH STABLE ANGINA PECTORIS (H): ICD-10-CM

## 2024-04-08 DIAGNOSIS — E78.2 MIXED HYPERLIPIDEMIA: ICD-10-CM

## 2024-04-08 DIAGNOSIS — I65.23 BILATERAL CAROTID ARTERY STENOSIS: ICD-10-CM

## 2024-04-08 DIAGNOSIS — I73.9 PVD (PERIPHERAL VASCULAR DISEASE) (H): ICD-10-CM

## 2024-04-08 DIAGNOSIS — E11.59 TYPE 2 DIABETES MELLITUS WITH OTHER CIRCULATORY COMPLICATIONS (H): ICD-10-CM

## 2024-04-08 DIAGNOSIS — I10 BENIGN ESSENTIAL HYPERTENSION: Primary | ICD-10-CM

## 2024-04-08 PROBLEM — I25.810 CORONARY ARTERY DISEASE INVOLVING CORONARY BYPASS GRAFT OF NATIVE HEART: Status: ACTIVE | Noted: 2024-04-08

## 2024-04-08 PROCEDURE — 99214 OFFICE O/P EST MOD 30 MIN: CPT | Performed by: NURSE PRACTITIONER

## 2024-04-08 RX ORDER — HYDRALAZINE HYDROCHLORIDE 25 MG/1
25 TABLET, FILM COATED ORAL 3 TIMES DAILY
Qty: 270 TABLET | Refills: 3 | Status: SHIPPED | OUTPATIENT
Start: 2024-04-08 | End: 2024-04-26

## 2024-04-08 NOTE — PATIENT INSTRUCTIONS
Thanks for participating in a office visit with the [facility] today.    Blood pressure sub optimal, add hydralazine 25 mg 3x day.   Continue all other medications  Strict low salt diet.   Monitor blood pressures daily - Goal < 140/90  Reviewed results of echocardiogram- stable.     Follow up in 1 month with SHAYLA for blood pressure recheck.     Please call my nurse at  [phone] with any questions or concerns.    Scheduling phone number: [phone]  Reminder: Please bring in all current medications, over the counter supplements and vitamin bottles to your next appointment.

## 2024-04-08 NOTE — LETTER
4/8/2024    Sujit Duke MD  [address] [facility] [address]    RE: Merlin J Anderson       Dear Colleague,     I had the pleasure of seeing Merlin J Anderson in the [facility].  CARDIOLOGY CLINIC NOTE    PRIMARY CARDIOLOGIST  Dr. Rebolledo     PRIMARY CARE PHYSICIAN:  Sujit Duke    HISTORY OF PRESENT ILLNESS:  Merlin Anderson is a very pleasant 83-year-old male with a past medical history significant for coronary artery disease status post urgent CABG in 2018 using a LIMA/LAD,SVG/RPDA, SVG /OM 2 and SVG to PLB, carotid artery disease, hypertension, hyperlipidemia, and type 2 diabetes.    He was last seen on 2/20/2024 to reestablish care with Dr. Rebolledo.  He was noted to be hypertensive despite being on high-dose atenolol, hydrochlorothiazide, and lisinopril.  He was started on amlodipine 5 mg daily.  He underwent an echocardiogram that showed a normal ejection fraction estimated at 55 to 60%, moderate concentric LVH, mildly dilated RV with normal RV systolic function and no valvular disease.    Carotid ultrasound on 3/25/2024 showed 50 to 69% stenosis bilaterally.  He follows with Dr. Scott (vascular).    He returns to the office today accompanied by his wife for review of results and blood pressure recheck.  Unfortunately, he noticed no improvement in his blood pressure despite the addition of amlodipine.  Blood pressure today is elevated at 172/68 with repeat reading 168/70, similar to home readings.  He does not endorse any chest pain, shortness of breath, palpitations, PND, orthopnea, presyncope, syncope, or lower extremity edema.  Weight is stable at 194 pounds.    Last BMP showed a sodium of 139, potassium 5.1, BUN 34.9, creatinine 2.2, and GFR 29  Hemoglobin 11.8, hematocrit 34.7, and platelet 118  Lipid panel is excellent with a total cholesterol of 125, HDL 50, LDL 48, and triglycerides 134  A1c 6.1    He does not engage in any routine exercise  Diet is very high in  sodium  Compliant with all medications.      PAST MEDICAL HISTORY:  Past Medical History:   Diagnosis Date    Abnormal stress test     Carotid arterial disease (H24) 9/18/2017    CKD (chronic kidney disease) stage 3, GFR 30-59 ml/min (H) 12/27/2011    DM2 (diabetes mellitus, type 2) (H)     Pre diabetic    LUNA (dyspnea on exertion)     Essential hypertension, benign     Gout 11/14/2002     Problem list name updated by automated process. Provider to review    HTN (hypertension) 6/29/2009     (Problem list name updated by automated process. Provider to review and confirm.)    Hyperlipidemia LDL goal <100 10/31/2010    Mixed hyperlipidemia 11/14/2002    PVD (peripheral vascular disease) (H24) 9/8/2017    S/P CABG (coronary artery bypass graft) 10/26/2018    Type 2 diabetes mellitus without complication (H) 11/30/2015       MEDICATIONS:  Current Outpatient Medications   Medication Sig Dispense Refill    acetaminophen (TYLENOL) 325 MG tablet Take 2 tablets (650 mg) by mouth every 4 hours as needed for mild pain 100 tablet     amLODIPine (NORVASC) 5 MG tablet Take 1 tablet (5 mg) by mouth daily 90 tablet 3    aspirin 325 MG EC tablet Take 1 tablet (325 mg) by mouth daily 40 tablet     atenolol (TENORMIN) 100 MG tablet Take 1 tablet (100 mg) by mouth daily 90 tablet 3    atorvastatin (LIPITOR) 40 MG tablet Take 1 tablet (40 mg) by mouth daily 90 tablet 1    blood glucose calibration (ACCU-CHEK PRISCILLA) solution Use to calibrate blood glucose monitor as needed as directed. 1 Bottle 0    blood glucose monitoring (ACCU-CHEK PRISCILLA) test strip Use to test blood sugars 1 times daily or as directed. 100 each 1    blood glucose monitoring (ACCU-CHEK MULTICLIX) lancets Use to test blood sugar 1 times daily or as directed. 1 Box 1    blood glucose monitoring (NO BRAND SPECIFIED) meter device kit Use to test blood sugar 1 times daily or as directed. 1 kit 0    fluticasone (FLONASE) 50 MCG/ACT nasal spray Spray 1-2 sprays into both  nostrils daily as needed for rhinitis or allergies 16 g 4    hydrALAZINE (APRESOLINE) 25 MG tablet Take 1 tablet (25 mg) by mouth 3 times daily 270 tablet 3    hydrochlorothiazide (HYDRODIURIL) 25 MG tablet Take 1 tablet (25 mg) by mouth daily 90 tablet 3    lisinopril (ZESTRIL) 30 MG tablet Take 1 tablet (30 mg) by mouth daily 90 tablet 3    metFORMIN (GLUCOPHAGE) 500 MG tablet Take 1 tablet (500 mg) by mouth 2 times daily (with meals) 180 tablet 3    multivitamin w/minerals (THERA-VIT-M) tablet Take 1 tablet by mouth every morning      nitroGLYcerin (NITROSTAT) 0.4 MG sublingual tablet For chest pain place 1 tablet under the tongue every 5 minutes for 3 doses. If symptoms persist 5 minutes after 1st dose call 911. 25 tablet 3    tamsulosin (FLOMAX) 0.4 MG capsule Take 1 capsule (0.4 mg) by mouth daily 90 capsule 3     No current facility-administered medications for this visit.       SOCIAL HISTORY:  I have reviewed this patient's social history and updated it with pertinent information if needed. Merlin J Anderson  reports that he has never smoked. He has never used smokeless tobacco. He reports that he does not currently use alcohol. He reports that he does not use drugs.    PHYSICAL EXAM:  Pulse:  [53] 53  BP: (168-172)/(68-70) 168/70  SpO2:  [98 %] 98 %  194 lbs 0 oz    Constitutional: alert, no distress  Respiratory: Good bilateral air entry  Cardiovascular: Regular rate and rhythm  GI: nondistended  Neuropsychiatric: appropriate affact    ASSESSMENT/PLAN:  Pertinent issues addressed/ reviewed during this cardiology visit  Hypertension -uncontrolled.  Add hydralazine 25 mg 3 times per day.  Continue lisinopril, hydrochlorothiazide, atenolol and amlodipine.  Monitor blood pressures daily with a goal of less than 140/90.  Strict low-sodium diet  Coronary artery disease -status post CABG x 4 in 2018 using a LIMA/LAD, SVG/RPDA, SVG /OM 2 and SVG to PLB.  Echocardiogram showed a normal ejection fraction estimated  at 55 to 60%, moderate concentric LVH, mildly dilated RV with normal RV systolic function and no valvular disease.  He does not endorse any ischemic symptoms, continue current medical therapy.  Encourage exercise.  Hyperlipidemia -LDL at goal, continue atorvastatin  Bilateral carotid disease - Carotid ultrasound on 3/25/2024 showed 50 to 69% stenosis bilaterally.  Follows with Dr. Scott (vascular). Recommended for follow up carotid US in 6 months.  Continue aspirin and statin.  Type 2 diabetes -A1c 6.1, on metformin    Follow-up in 1 month with SHAYLA for blood pressure recheck.    It was a pleasure seeing this patient in clinic today. Please do not hesitate to contact me with any future questions.     DIRK Atwood, CNP  Cardiology - [facility]  04/08/2024    Review of the result(s) of each unique test -  echocardiogram     The level of medical decision making during this visit was of moderate complexity.    This note was completed in part using dictation via the Dragon voice recognition software. Some word and grammatical errors may occur and must be interpreted in the appropriate clinical context.  If there are any questions pertaining to this issue, please contact me for further clarification.      Thank you for allowing me to participate in the care of your patient.      Sincerely,     DIRK Atwood CNP     [facility]  cc:   Davi Rebolledo MD  [address]  [place] [id number]

## 2024-04-08 NOTE — PROGRESS NOTES
CARDIOLOGY CLINIC NOTE    PRIMARY CARDIOLOGIST  Dr. Rebolledo     PRIMARY CARE PHYSICIAN:  Sujit Duke    HISTORY OF PRESENT ILLNESS:  Merlin Anderson is a very pleasant 83-year-old male with a past medical history significant for coronary artery disease status post urgent CABG in 2018 using a LIMA/LAD,SVG/RPDA, SVG /OM 2 and SVG to PLB, carotid artery disease, hypertension, hyperlipidemia, and type 2 diabetes.    He was last seen on 2/20/2024 to reestablish care with Dr. Rebolledo.  He was noted to be hypertensive despite being on high-dose atenolol, hydrochlorothiazide, and lisinopril.  He was started on amlodipine 5 mg daily.  He underwent an echocardiogram that showed a normal ejection fraction estimated at 55 to 60%, moderate concentric LVH, mildly dilated RV with normal RV systolic function and no valvular disease.    Carotid ultrasound on 3/25/2024 showed 50 to 69% stenosis bilaterally.  He follows with Dr. Scott (vascular).    He returns to the office today accompanied by his wife for review of results and blood pressure recheck.  Unfortunately, he noticed no improvement in his blood pressure despite the addition of amlodipine.  Blood pressure today is elevated at 172/68 with repeat reading 168/70, similar to home readings.  He does not endorse any chest pain, shortness of breath, palpitations, PND, orthopnea, presyncope, syncope, or lower extremity edema.  Weight is stable at 194 pounds.    Last BMP showed a sodium of 139, potassium 5.1, BUN 34.9, creatinine 2.2, and GFR 29  Hemoglobin 11.8, hematocrit 34.7, and platelet 118  Lipid panel is excellent with a total cholesterol of 125, HDL 50, LDL 48, and triglycerides 134  A1c 6.1    He does not engage in any routine exercise  Diet is very high in sodium  Compliant with all medications.      PAST MEDICAL HISTORY:  Past Medical History:   Diagnosis Date    Abnormal stress test     Carotid arterial disease (H24) 9/18/2017    CKD (chronic kidney disease) stage 3,  GFR 30-59 ml/min (H) 12/27/2011    DM2 (diabetes mellitus, type 2) (H)     Pre diabetic    LUNA (dyspnea on exertion)     Essential hypertension, benign     Gout 11/14/2002     Problem list name updated by automated process. Provider to review    HTN (hypertension) 6/29/2009     (Problem list name updated by automated process. Provider to review and confirm.)    Hyperlipidemia LDL goal <100 10/31/2010    Mixed hyperlipidemia 11/14/2002    PVD (peripheral vascular disease) (H24) 9/8/2017    S/P CABG (coronary artery bypass graft) 10/26/2018    Type 2 diabetes mellitus without complication (H) 11/30/2015       MEDICATIONS:  Current Outpatient Medications   Medication Sig Dispense Refill    acetaminophen (TYLENOL) 325 MG tablet Take 2 tablets (650 mg) by mouth every 4 hours as needed for mild pain 100 tablet     amLODIPine (NORVASC) 5 MG tablet Take 1 tablet (5 mg) by mouth daily 90 tablet 3    aspirin 325 MG EC tablet Take 1 tablet (325 mg) by mouth daily 40 tablet     atenolol (TENORMIN) 100 MG tablet Take 1 tablet (100 mg) by mouth daily 90 tablet 3    atorvastatin (LIPITOR) 40 MG tablet Take 1 tablet (40 mg) by mouth daily 90 tablet 1    blood glucose calibration (ACCU-CHEK PRISCILLA) solution Use to calibrate blood glucose monitor as needed as directed. 1 Bottle 0    blood glucose monitoring (ACCU-CHEK PRISCILLA) test strip Use to test blood sugars 1 times daily or as directed. 100 each 1    blood glucose monitoring (ACCU-CHEK MULTICLIX) lancets Use to test blood sugar 1 times daily or as directed. 1 Box 1    blood glucose monitoring (NO BRAND SPECIFIED) meter device kit Use to test blood sugar 1 times daily or as directed. 1 kit 0    fluticasone (FLONASE) 50 MCG/ACT nasal spray Spray 1-2 sprays into both nostrils daily as needed for rhinitis or allergies 16 g 4    hydrALAZINE (APRESOLINE) 25 MG tablet Take 1 tablet (25 mg) by mouth 3 times daily 270 tablet 3    hydrochlorothiazide (HYDRODIURIL) 25 MG tablet Take 1 tablet  (25 mg) by mouth daily 90 tablet 3    lisinopril (ZESTRIL) 30 MG tablet Take 1 tablet (30 mg) by mouth daily 90 tablet 3    metFORMIN (GLUCOPHAGE) 500 MG tablet Take 1 tablet (500 mg) by mouth 2 times daily (with meals) 180 tablet 3    multivitamin w/minerals (THERA-VIT-M) tablet Take 1 tablet by mouth every morning      nitroGLYcerin (NITROSTAT) 0.4 MG sublingual tablet For chest pain place 1 tablet under the tongue every 5 minutes for 3 doses. If symptoms persist 5 minutes after 1st dose call 911. 25 tablet 3    tamsulosin (FLOMAX) 0.4 MG capsule Take 1 capsule (0.4 mg) by mouth daily 90 capsule 3     No current facility-administered medications for this visit.       SOCIAL HISTORY:  I have reviewed this patient's social history and updated it with pertinent information if needed. Merlin J Anderson  reports that he has never smoked. He has never used smokeless tobacco. He reports that he does not currently use alcohol. He reports that he does not use drugs.    PHYSICAL EXAM:  Pulse:  [53] 53  BP: (168-172)/(68-70) 168/70  SpO2:  [98 %] 98 %  194 lbs 0 oz    Constitutional: alert, no distress  Respiratory: Good bilateral air entry  Cardiovascular: Regular rate and rhythm  GI: nondistended  Neuropsychiatric: appropriate affact    ASSESSMENT/PLAN:  Pertinent issues addressed/ reviewed during this cardiology visit  Hypertension -uncontrolled.  Add hydralazine 25 mg 3 times per day.  Continue lisinopril, hydrochlorothiazide, atenolol and amlodipine.  Monitor blood pressures daily with a goal of less than 140/90.  Strict low-sodium diet  Coronary artery disease -status post CABG x 4 in 2018 using a LIMA/LAD, SVG/RPDA, SVG /OM 2 and SVG to PLB.  Echocardiogram showed a normal ejection fraction estimated at 55 to 60%, moderate concentric LVH, mildly dilated RV with normal RV systolic function and no valvular disease.  He does not endorse any ischemic symptoms, continue current medical therapy.  Encourage  exercise.  Hyperlipidemia -LDL at goal, continue atorvastatin  Bilateral carotid disease - Carotid ultrasound on 3/25/2024 showed 50 to 69% stenosis bilaterally.  Follows with Dr. Scott (vascular). Recommended for follow up carotid US in 6 months.  Continue aspirin and statin.  Type 2 diabetes -A1c 6.1, on metformin    Follow-up in 1 month with SHAYLA for blood pressure recheck.    It was a pleasure seeing this patient in clinic today. Please do not hesitate to contact me with any future questions.     DIRK Atwood, CNP  Cardiology - [facility]  04/08/2024    Review of the result(s) of each unique test -  echocardiogram     The level of medical decision making during this visit was of moderate complexity.    This note was completed in part using dictation via the Dragon voice recognition software. Some word and grammatical errors may occur and must be interpreted in the appropriate clinical context.  If there are any questions pertaining to this issue, please contact me for further clarification.

## 2024-04-26 ENCOUNTER — TELEPHONE (OUTPATIENT)
Dept: CARDIOLOGY | Facility: CLINIC | Age: 84
End: 2024-04-26
Payer: COMMERCIAL

## 2024-04-26 DIAGNOSIS — I10 ESSENTIAL HYPERTENSION, BENIGN: ICD-10-CM

## 2024-04-26 DIAGNOSIS — I10 BENIGN ESSENTIAL HYPERTENSION: ICD-10-CM

## 2024-04-26 DIAGNOSIS — R60.0 BILATERAL LEG EDEMA: ICD-10-CM

## 2024-04-26 RX ORDER — HYDRALAZINE HYDROCHLORIDE 25 MG/1
25 TABLET, FILM COATED ORAL 2 TIMES DAILY
Qty: 270 TABLET | Refills: 3 | Status: SHIPPED | OUTPATIENT
Start: 2024-04-26 | End: 2024-04-30

## 2024-04-26 NOTE — TELEPHONE ENCOUNTER
Patient reports that since being placed on hydralazine at last OV 4/8/24 he has started to develop bilateral feet/ankle swelling.  Does not have any SOB.  BP's have been 140's systolic.  Per last OV note:    Hypertension -uncontrolled.  Add hydralazine 25 mg 3 times per day.  Continue lisinopril, hydrochlorothiazide, atenolol and amlodipine.  Monitor blood pressures daily with a goal of less than 140/90.  Strict low-sodium diet      Will route to provider to advise further  Milla Saleem RN on 4/26/2024 at 11:57 AM  .

## 2024-04-26 NOTE — TELEPHONE ENCOUNTER
Returned call to patient and discussed recommendations.  Patient verbalized understanding and will update clinic early next week with symptoms.  Milla Saleem RN on 4/26/2024 at 4:48 PM

## 2024-04-30 RX ORDER — HYDRALAZINE HYDROCHLORIDE 25 MG/1
50 TABLET, FILM COATED ORAL 3 TIMES DAILY
Qty: 180 TABLET | Refills: 11 | Status: SHIPPED | OUTPATIENT
Start: 2024-04-30 | End: 2024-05-17

## 2024-04-30 NOTE — TELEPHONE ENCOUNTER
Davi Rebolledo MD Lidke, Jen M RN  Caller: Unspecified (4 days ago, 11:57 AM)  I understand that it was meant that he decrease hydralazine to twice daily rather than hydrochlorothiazide.  He is on amlodipine that can also cause edema.  I recommend actually increasing hydralazine to 50 mg 3 times daily and stop amlodipine and see if this helps improve edema.    Thank you  Davi    Yes, correction previously meant to state Hydralazine.  Reviewed with patient who will increase hydralazine to 50 mg 3 times daily and stop Amlodipine.  Patient will return call in one week with BP and symptom update.  Milla Saleem, JAQUAN on 4/30/2024 at 3:28 PM

## 2024-04-30 NOTE — TELEPHONE ENCOUNTER
Patient returned call to update RN on symptoms.  Since decreasing hydrochlorothiazide down to BID patient reports BP has been 150's/60's.  BLE swelling continues and is now moving up into lower legs.  Will route to primary cardiologist to advise.  Milla Saleem RN on 4/30/2024 at 2:02 PM

## 2024-05-16 NOTE — TELEPHONE ENCOUNTER
Patient called after stopping Amlodipine and increasing Hydralazine stating BP is still the same at about 160/53 pretty consistently. Concerns is feet/ankles and left lower leg are still swollen as well.     Will route to Mitzy to update.

## 2024-05-16 NOTE — TELEPHONE ENCOUNTER
Recommend increasing hydralazine to 75 mg 3x/day.   Can we confirm if patient is wearing compression stockings or using wraps to help with leg edema.   Recommend a follow up BMP to reassess renal function before adding additional diuretic therapy.     Thanks  Mitzy

## 2024-05-17 RX ORDER — HYDRALAZINE HYDROCHLORIDE 25 MG/1
75 TABLET, FILM COATED ORAL 3 TIMES DAILY
Qty: 270 TABLET | Refills: 11 | Status: SHIPPED | OUTPATIENT
Start: 2024-05-17 | End: 2024-06-10

## 2024-05-17 NOTE — TELEPHONE ENCOUNTER
Called pt and reviewed recommendations from RACHEL Forrester. Prescription for hydralazine 75 mg TID sent to pharmacy. Per pt he is not using leg wraps or compression stockings, reviewed Mitzy recommends using these to help his swelling. Discussed Mitzy would like a BMP prior to adjusting any diuretics, order placed. Requested pt call to schedule a lab appointment. Pt verbalized understanding and agreement with plan.

## 2024-05-21 ENCOUNTER — LAB (OUTPATIENT)
Dept: LAB | Facility: CLINIC | Age: 84
End: 2024-05-21
Payer: COMMERCIAL

## 2024-05-21 DIAGNOSIS — I10 BENIGN ESSENTIAL HYPERTENSION: ICD-10-CM

## 2024-05-21 DIAGNOSIS — R60.0 BILATERAL LEG EDEMA: ICD-10-CM

## 2024-05-21 DIAGNOSIS — I10 ESSENTIAL HYPERTENSION, BENIGN: ICD-10-CM

## 2024-05-21 PROCEDURE — 36415 COLL VENOUS BLD VENIPUNCTURE: CPT

## 2024-05-21 PROCEDURE — 80048 BASIC METABOLIC PNL TOTAL CA: CPT

## 2024-05-22 LAB
ANION GAP SERPL CALCULATED.3IONS-SCNC: 12 MMOL/L (ref 7–15)
BUN SERPL-MCNC: 55 MG/DL (ref 8–23)
CALCIUM SERPL-MCNC: 9.2 MG/DL (ref 8.8–10.2)
CHLORIDE SERPL-SCNC: 108 MMOL/L (ref 98–107)
CREAT SERPL-MCNC: 2.7 MG/DL (ref 0.67–1.17)
DEPRECATED HCO3 PLAS-SCNC: 18 MMOL/L (ref 22–29)
EGFRCR SERPLBLD CKD-EPI 2021: 23 ML/MIN/1.73M2
GLUCOSE SERPL-MCNC: 133 MG/DL (ref 70–99)
POTASSIUM SERPL-SCNC: 4.8 MMOL/L (ref 3.4–5.3)
SODIUM SERPL-SCNC: 138 MMOL/L (ref 135–145)

## 2024-05-24 DIAGNOSIS — R94.4 RENAL FUNCTION TEST ABNORMAL: Primary | ICD-10-CM

## 2024-06-10 ENCOUNTER — OFFICE VISIT (OUTPATIENT)
Dept: CARDIOLOGY | Facility: CLINIC | Age: 84
End: 2024-06-10
Attending: NURSE PRACTITIONER
Payer: COMMERCIAL

## 2024-06-10 VITALS
BODY MASS INDEX: 28.23 KG/M2 | HEIGHT: 72 IN | HEART RATE: 50 BPM | SYSTOLIC BLOOD PRESSURE: 158 MMHG | WEIGHT: 208.4 LBS | DIASTOLIC BLOOD PRESSURE: 58 MMHG

## 2024-06-10 DIAGNOSIS — I10 BENIGN ESSENTIAL HYPERTENSION: ICD-10-CM

## 2024-06-10 PROCEDURE — 99214 OFFICE O/P EST MOD 30 MIN: CPT | Performed by: NURSE PRACTITIONER

## 2024-06-10 RX ORDER — ISOSORBIDE MONONITRATE 30 MG/1
30 TABLET, EXTENDED RELEASE ORAL DAILY
Qty: 90 TABLET | Refills: 3 | Status: SHIPPED | OUTPATIENT
Start: 2024-06-10 | End: 2024-06-18

## 2024-06-10 RX ORDER — HYDRALAZINE HYDROCHLORIDE 100 MG/1
100 TABLET, FILM COATED ORAL 3 TIMES DAILY
Qty: 270 TABLET | Refills: 3 | Status: SHIPPED | OUTPATIENT
Start: 2024-06-10 | End: 2024-06-18

## 2024-06-10 NOTE — PATIENT INSTRUCTIONS
Thanks for participating in a office visit with the [facility] today.    Blood pressure not well controlled  Increase hydralazine to 100 mg 3x/day  Add low dose isosorbide 30 mg daily.   Continue all other medications  Monitor blood pressure daily, 2 hrs after am medication  Goal < 140/90  Worsening leg swelling, encourage compression stockings.   Follow up in 1 month with SHAYLA       Please call my nurse at  [phone] with any questions or concerns.    Scheduling phone number: [phone]  Reminder: Please bring in all current medications, over the counter supplements and vitamin bottles to your next appointment.

## 2024-06-10 NOTE — LETTER
6/10/2024    Sujit Duke MD  [address] [facility] [address]    RE: Merlin J Anderson       Dear Colleague,     I had the pleasure of seeing Merlin J Anderson in the [facility].  CARDIOLOGY CLINIC NOTE    PRIMARY CARDIOLOGIST  Dr. Rebolledo     PRIMARY CARE PHYSICIAN:  Sujit Duke    HISTORY OF PRESENT ILLNESS:  Merlin Anderson is a very pleasant 84-year-old male with a past medical history significant for coronary artery disease status post urgent CABG in 2018 using a LIMA/LAD,SVG/RPDA, SVG /OM 2 and SVG to PLB, moderate carotid artery disease, hypertension, hyperlipidemia, and type 2 diabetes.     He was last seen on 4/8/2024 in follow up to uncontrolled blood pressures. Please refer to that office visit for a more complete HPI. Blood pressures remained uncontrolled despite the addition of amlodipine to his current regimen of atenolol, hydrochlorothiazide and lisinopril. He was initiated on hydralazine 25 mg 3x/day. Amlodipine was subsequently stopped after worsening leg swelling and hydralazine was further uptitrated to 75 mg 3x/day.     He returns to the office today accompanied by his wife for follow up. He continues to remain hypertension, despite multiple medication changes. Blood pressure are elevated today at 158/58, consistent with home pressures. He denies chest pain, shortness of breath, palpitations, orthopnea, presyncope or syncope. He does have bilateral LE edema L>R. He has multiple non cardiac complaints including, cold feeling, bruising to left forarm, fatigue and bilateral LE rash. He was recommended to follow up with PCP.     Echocardiogram on 4/1/2024 showed a normal ejection fraction estimated at 55 to 60%, moderate concentric LVH, mildly dilated RV with normal RV systolic function and no valvular abnormalities.    Last BMP showed a sodium of 138, potassium 4.8, BUN 55, creatinine 2.7, and GFR 23, he has been referred to nephrology.      PAST MEDICAL  HISTORY:  Past Medical History:   Diagnosis Date    Abnormal stress test     Carotid arterial disease (H24) 9/18/2017    CKD (chronic kidney disease) stage 3, GFR 30-59 ml/min (H) 12/27/2011    DM2 (diabetes mellitus, type 2) (H)     Pre diabetic    LUNA (dyspnea on exertion)     Essential hypertension, benign     Gout 11/14/2002     Problem list name updated by automated process. Provider to review    HTN (hypertension) 6/29/2009     (Problem list name updated by automated process. Provider to review and confirm.)    Hyperlipidemia LDL goal <100 10/31/2010    Mixed hyperlipidemia 11/14/2002    PVD (peripheral vascular disease) (H24) 9/8/2017    S/P CABG (coronary artery bypass graft) 10/26/2018    Type 2 diabetes mellitus without complication (H) 11/30/2015       MEDICATIONS:  Current Outpatient Medications   Medication Sig Dispense Refill    acetaminophen (TYLENOL) 325 MG tablet Take 2 tablets (650 mg) by mouth every 4 hours as needed for mild pain 100 tablet     aspirin 325 MG EC tablet Take 1 tablet (325 mg) by mouth daily 40 tablet     atenolol (TENORMIN) 100 MG tablet Take 1 tablet (100 mg) by mouth daily 90 tablet 3    atorvastatin (LIPITOR) 40 MG tablet Take 1 tablet (40 mg) by mouth daily 90 tablet 1    blood glucose calibration (ACCU-CHEK PRISCILLA) solution Use to calibrate blood glucose monitor as needed as directed. 1 Bottle 0    blood glucose monitoring (ACCU-CHEK PRISCILLA) test strip Use to test blood sugars 1 times daily or as directed. 100 each 1    blood glucose monitoring (ACCU-CHEK MULTICLIX) lancets Use to test blood sugar 1 times daily or as directed. 1 Box 1    blood glucose monitoring (NO BRAND SPECIFIED) meter device kit Use to test blood sugar 1 times daily or as directed. 1 kit 0    fluticasone (FLONASE) 50 MCG/ACT nasal spray Spray 1-2 sprays into both nostrils daily as needed for rhinitis or allergies 16 g 4    hydrALAZINE (APRESOLINE) 25 MG tablet Take 3 tablets (75 mg) by mouth 3 times daily  270 tablet 11    hydrochlorothiazide (HYDRODIURIL) 25 MG tablet Take 1 tablet (25 mg) by mouth daily 90 tablet 3    lisinopril (ZESTRIL) 30 MG tablet Take 1 tablet (30 mg) by mouth daily 90 tablet 3    metFORMIN (GLUCOPHAGE) 500 MG tablet Take 1 tablet (500 mg) by mouth 2 times daily (with meals) 180 tablet 3    multivitamin w/minerals (THERA-VIT-M) tablet Take 1 tablet by mouth every morning      nitroGLYcerin (NITROSTAT) 0.4 MG sublingual tablet For chest pain place 1 tablet under the tongue every 5 minutes for 3 doses. If symptoms persist 5 minutes after 1st dose call 911. 25 tablet 3    tamsulosin (FLOMAX) 0.4 MG capsule Take 1 capsule (0.4 mg) by mouth daily 90 capsule 3     No current facility-administered medications for this visit.       SOCIAL HISTORY:  I have reviewed this patient's social history and updated it with pertinent information if needed. Merlin J Anderson  reports that he has never smoked. He has never used smokeless tobacco. He reports that he does not currently use alcohol. He reports that he does not use drugs.    PHYSICAL EXAM:  Pulse:  [50] 50  BP: (158)/(58) 158/58  208 lbs 6.4 oz    Constitutional: alert, no distress  Respiratory: Good bilateral air entry  Cardiovascular: Regular rate and rhythm   GI: nondistended  Neuropsychiatric: appropriate affact    ASSESSMENT/PLAN:  Pertinent issues addressed/ reviewed during this cardiology visit  Uncontrolled blood pressures - Elevated today, consistent with home pressure monitoring. Increase hydralazine to 100 mg 3x/day. Add amlodipine 30 mg daily. Recommend a renal ultrasound to rule out renal stenosis. Continue atenolol, hydrochlorothiazide, and lisinopril. Monitor blood pressures daily with a goal of < 140/90.   CKD - Worsening renal function, creatinine 2.7, BUN 55. Referral sent to nephrology. Encouraged to call for sooner appointment.   Leg edema - Amlodipine stopped. Encourage leg elevation, compression stockings, low sodium diet.  Continue hydrochlorothiazide     Follow up in 1 month with SHAYLA     It was a pleasure seeing this patient in clinic today. Please do not hesitate to contact me with any future questions.     DIRK Atwood, CNP  Cardiology - [facility]  06/10/2024       The level of medical decision making during this visit was of moderate complexity.    This note was completed in part using dictation via the Dragon voice recognition software. Some word and grammatical errors may occur and must be interpreted in the appropriate clinical context.  If there are any questions pertaining to this issue, please contact me for further clarification.      Thank you for allowing me to participate in the care of your patient.      Sincerely,     DIRK Atwood CNP     [facility]  cc:   DIRK Atwood CNP  [address] [place] [address]

## 2024-06-10 NOTE — PROGRESS NOTES
CARDIOLOGY CLINIC NOTE    PRIMARY CARDIOLOGIST  Dr. Rebolledo     PRIMARY CARE PHYSICIAN:  Sujit Duke    HISTORY OF PRESENT ILLNESS:  Merlin Anderson is a very pleasant 84-year-old male with a past medical history significant for coronary artery disease status post urgent CABG in 2018 using a LIMA/LAD,SVG/RPDA, SVG /OM 2 and SVG to PLB, moderate carotid artery disease, hypertension, hyperlipidemia, and type 2 diabetes.     He was last seen on 4/8/2024 in follow up to uncontrolled blood pressures. Please refer to that office visit for a more complete HPI. Blood pressures remained uncontrolled despite the addition of amlodipine to his current regimen of atenolol, hydrochlorothiazide and lisinopril. He was initiated on hydralazine 25 mg 3x/day. Amlodipine was subsequently stopped after worsening leg swelling and hydralazine was further uptitrated to 75 mg 3x/day.     He returns to the office today accompanied by his wife for follow up. He continues to remain hypertension, despite multiple medication changes. Blood pressure are elevated today at 158/58, consistent with home pressures. He denies chest pain, shortness of breath, palpitations, orthopnea, presyncope or syncope. He does have bilateral LE edema L>R. He has multiple non cardiac complaints including, cold feeling, bruising to left forarm, fatigue and bilateral LE rash. He was recommended to follow up with PCP.     Echocardiogram on 4/1/2024 showed a normal ejection fraction estimated at 55 to 60%, moderate concentric LVH, mildly dilated RV with normal RV systolic function and no valvular abnormalities.    Last BMP showed a sodium of 138, potassium 4.8, BUN 55, creatinine 2.7, and GFR 23, he has been referred to nephrology.      PAST MEDICAL HISTORY:  Past Medical History:   Diagnosis Date    Abnormal stress test     Carotid arterial disease (H24) 9/18/2017    CKD (chronic kidney disease) stage 3, GFR 30-59 ml/min (H) 12/27/2011    DM2 (diabetes mellitus,  type 2) (H)     Pre diabetic    LUNA (dyspnea on exertion)     Essential hypertension, benign     Gout 11/14/2002     Problem list name updated by automated process. Provider to review    HTN (hypertension) 6/29/2009     (Problem list name updated by automated process. Provider to review and confirm.)    Hyperlipidemia LDL goal <100 10/31/2010    Mixed hyperlipidemia 11/14/2002    PVD (peripheral vascular disease) (H24) 9/8/2017    S/P CABG (coronary artery bypass graft) 10/26/2018    Type 2 diabetes mellitus without complication (H) 11/30/2015       MEDICATIONS:  Current Outpatient Medications   Medication Sig Dispense Refill    acetaminophen (TYLENOL) 325 MG tablet Take 2 tablets (650 mg) by mouth every 4 hours as needed for mild pain 100 tablet     aspirin 325 MG EC tablet Take 1 tablet (325 mg) by mouth daily 40 tablet     atenolol (TENORMIN) 100 MG tablet Take 1 tablet (100 mg) by mouth daily 90 tablet 3    atorvastatin (LIPITOR) 40 MG tablet Take 1 tablet (40 mg) by mouth daily 90 tablet 1    blood glucose calibration (ACCU-CHEK PRISCILLA) solution Use to calibrate blood glucose monitor as needed as directed. 1 Bottle 0    blood glucose monitoring (ACCU-CHEK PRISCILLA) test strip Use to test blood sugars 1 times daily or as directed. 100 each 1    blood glucose monitoring (ACCU-CHEK MULTICLIX) lancets Use to test blood sugar 1 times daily or as directed. 1 Box 1    blood glucose monitoring (NO BRAND SPECIFIED) meter device kit Use to test blood sugar 1 times daily or as directed. 1 kit 0    fluticasone (FLONASE) 50 MCG/ACT nasal spray Spray 1-2 sprays into both nostrils daily as needed for rhinitis or allergies 16 g 4    hydrALAZINE (APRESOLINE) 25 MG tablet Take 3 tablets (75 mg) by mouth 3 times daily 270 tablet 11    hydrochlorothiazide (HYDRODIURIL) 25 MG tablet Take 1 tablet (25 mg) by mouth daily 90 tablet 3    lisinopril (ZESTRIL) 30 MG tablet Take 1 tablet (30 mg) by mouth daily 90 tablet 3    metFORMIN  (GLUCOPHAGE) 500 MG tablet Take 1 tablet (500 mg) by mouth 2 times daily (with meals) 180 tablet 3    multivitamin w/minerals (THERA-VIT-M) tablet Take 1 tablet by mouth every morning      nitroGLYcerin (NITROSTAT) 0.4 MG sublingual tablet For chest pain place 1 tablet under the tongue every 5 minutes for 3 doses. If symptoms persist 5 minutes after 1st dose call 911. 25 tablet 3    tamsulosin (FLOMAX) 0.4 MG capsule Take 1 capsule (0.4 mg) by mouth daily 90 capsule 3     No current facility-administered medications for this visit.       SOCIAL HISTORY:  I have reviewed this patient's social history and updated it with pertinent information if needed. Merlin J Anderson  reports that he has never smoked. He has never used smokeless tobacco. He reports that he does not currently use alcohol. He reports that he does not use drugs.    PHYSICAL EXAM:  Pulse:  [50] 50  BP: (158)/(58) 158/58  208 lbs 6.4 oz    Constitutional: alert, no distress  Respiratory: Good bilateral air entry  Cardiovascular: Regular rate and rhythm   GI: nondistended  Neuropsychiatric: appropriate affact    ASSESSMENT/PLAN:  Pertinent issues addressed/ reviewed during this cardiology visit  Uncontrolled blood pressures - Elevated today, consistent with home pressure monitoring. Increase hydralazine to 100 mg 3x/day. Add isosorbide 30 mg daily. Recommend a renal ultrasound to rule out renal stenosis. Continue atenolol, hydrochlorothiazide, and lisinopril. Monitor blood pressures daily with a goal of < 140/90.   CKD - Worsening renal function, creatinine 2.7, BUN 55. Referral sent to nephrology. Encouraged to call for sooner appointment.   Leg edema - Amlodipine stopped. Encourage leg elevation, compression stockings, low sodium diet. Continue hydrochlorothiazide     Follow up in 1 month with SHAYLA     It was a pleasure seeing this patient in clinic today. Please do not hesitate to contact me with any future questions.     DIRK Atwood,  [facility]  Cardiology - [facility]  06/10/2024       The level of medical decision making during this visit was of moderate complexity.    This note was completed in part using dictation via the Dragon voice recognition software. Some word and grammatical errors may occur and must be interpreted in the appropriate clinical context.  If there are any questions pertaining to this issue, please contact me for further clarification.

## 2024-06-11 ENCOUNTER — TELEPHONE (OUTPATIENT)
Dept: CARDIOLOGY | Facility: CLINIC | Age: 84
End: 2024-06-11
Payer: COMMERCIAL

## 2024-06-11 ENCOUNTER — TELEPHONE (OUTPATIENT)
Dept: INTERNAL MEDICINE | Facility: CLINIC | Age: 84
End: 2024-06-11
Payer: COMMERCIAL

## 2024-06-11 NOTE — TELEPHONE ENCOUNTER
Mitzy Mo, DIRK CNP  [facility] Heart Nursing Team  Hi,  Patient was seen in the clinic today for follow up of persistent hypertension. I recommend he get a renal ultrasound to rule out renal stenosis. Could we please inform patient of the recommendation. Order has been placed.    Thanks  Mitzy    Order already in place for ultrasound. Contacted patient to review Mitzy's recommendations. Patient verbalized understanding and agreed with plan of care. Message sent to scheduling to reach out to patient to schedule renal US.

## 2024-06-11 NOTE — TELEPHONE ENCOUNTER
Call to pts wife and advised. Scheduled for next week.     There is [facility] [place] [facility] [place].     [facility]  [address]  [address]  [place] [address]    Phone: [phone]  Fax: [phone]

## 2024-06-11 NOTE — TELEPHONE ENCOUNTER
Patient's wife is calling to say Merlin cannot get into nephrology until December.  They want to know if there is another place they can get referred to and get in sooner.

## 2024-06-18 ENCOUNTER — OFFICE VISIT (OUTPATIENT)
Dept: INTERNAL MEDICINE | Facility: CLINIC | Age: 84
End: 2024-06-18
Payer: COMMERCIAL

## 2024-06-18 VITALS
TEMPERATURE: 97.5 F | RESPIRATION RATE: 16 BRPM | WEIGHT: 205.1 LBS | HEART RATE: 60 BPM | OXYGEN SATURATION: 96 % | HEIGHT: 72 IN | SYSTOLIC BLOOD PRESSURE: 121 MMHG | BODY MASS INDEX: 27.78 KG/M2 | DIASTOLIC BLOOD PRESSURE: 46 MMHG

## 2024-06-18 DIAGNOSIS — I10 ESSENTIAL HYPERTENSION, BENIGN: ICD-10-CM

## 2024-06-18 DIAGNOSIS — R60.0 BILATERAL LEG EDEMA: ICD-10-CM

## 2024-06-18 DIAGNOSIS — L27.0 GENERALIZED SKIN ERUPTION DUE TO MEDICATION TAKEN INTERNALLY: Primary | ICD-10-CM

## 2024-06-18 LAB
ALBUMIN SERPL BCG-MCNC: 3.8 G/DL (ref 3.5–5.2)
ALBUMIN UR-MCNC: >=300 MG/DL
ALP SERPL-CCNC: 90 U/L (ref 40–150)
ALT SERPL W P-5'-P-CCNC: 44 U/L (ref 0–70)
ANION GAP SERPL CALCULATED.3IONS-SCNC: 10 MMOL/L (ref 7–15)
APPEARANCE UR: ABNORMAL
AST SERPL W P-5'-P-CCNC: 35 U/L (ref 0–45)
BACTERIA #/AREA URNS HPF: ABNORMAL /HPF
BASOPHILS # BLD AUTO: 0 10E3/UL (ref 0–0.2)
BASOPHILS NFR BLD AUTO: 1 %
BILIRUB SERPL-MCNC: 0.2 MG/DL
BILIRUB UR QL STRIP: NEGATIVE
BUN SERPL-MCNC: 54.4 MG/DL (ref 8–23)
CALCIUM SERPL-MCNC: 8.9 MG/DL (ref 8.8–10.2)
CHLORIDE SERPL-SCNC: 109 MMOL/L (ref 98–107)
COLOR UR AUTO: YELLOW
CREAT SERPL-MCNC: 3.03 MG/DL (ref 0.67–1.17)
DEPRECATED HCO3 PLAS-SCNC: 18 MMOL/L (ref 22–29)
EGFRCR SERPLBLD CKD-EPI 2021: 20 ML/MIN/1.73M2
EOSINOPHIL # BLD AUTO: 0.3 10E3/UL (ref 0–0.7)
EOSINOPHIL NFR BLD AUTO: 6 %
ERYTHROCYTE [DISTWIDTH] IN BLOOD BY AUTOMATED COUNT: 12.6 % (ref 10–15)
GLUCOSE SERPL-MCNC: 162 MG/DL (ref 70–99)
GLUCOSE UR STRIP-MCNC: NEGATIVE MG/DL
HCT VFR BLD AUTO: 27.6 % (ref 40–53)
HGB BLD-MCNC: 9.1 G/DL (ref 13.3–17.7)
HGB UR QL STRIP: NEGATIVE
IMM GRANULOCYTES # BLD: 0 10E3/UL
IMM GRANULOCYTES NFR BLD: 0 %
KETONES UR STRIP-MCNC: NEGATIVE MG/DL
LEUKOCYTE ESTERASE UR QL STRIP: ABNORMAL
LYMPHOCYTES # BLD AUTO: 0.5 10E3/UL (ref 0.8–5.3)
LYMPHOCYTES NFR BLD AUTO: 10 %
MCH RBC QN AUTO: 32.2 PG (ref 26.5–33)
MCHC RBC AUTO-ENTMCNC: 33 G/DL (ref 31.5–36.5)
MCV RBC AUTO: 98 FL (ref 78–100)
MONOCYTES # BLD AUTO: 0.6 10E3/UL (ref 0–1.3)
MONOCYTES NFR BLD AUTO: 13 %
NEUTROPHILS # BLD AUTO: 3.2 10E3/UL (ref 1.6–8.3)
NEUTROPHILS NFR BLD AUTO: 70 %
NITRATE UR QL: NEGATIVE
PH UR STRIP: 5.5 [PH] (ref 5–7)
PLATELET # BLD AUTO: 119 10E3/UL (ref 150–450)
POTASSIUM SERPL-SCNC: 4.9 MMOL/L (ref 3.4–5.3)
PROT SERPL-MCNC: 6.1 G/DL (ref 6.4–8.3)
RBC # BLD AUTO: 2.83 10E6/UL (ref 4.4–5.9)
RBC #/AREA URNS AUTO: ABNORMAL /HPF
SODIUM SERPL-SCNC: 137 MMOL/L (ref 135–145)
SP GR UR STRIP: 1.02 (ref 1–1.03)
SQUAMOUS #/AREA URNS AUTO: ABNORMAL /LPF
UROBILINOGEN UR STRIP-ACNC: 0.2 E.U./DL
WBC # BLD AUTO: 4.6 10E3/UL (ref 4–11)
WBC #/AREA URNS AUTO: ABNORMAL /HPF

## 2024-06-18 PROCEDURE — 85025 COMPLETE CBC W/AUTO DIFF WBC: CPT | Performed by: INTERNAL MEDICINE

## 2024-06-18 PROCEDURE — 80053 COMPREHEN METABOLIC PANEL: CPT | Performed by: INTERNAL MEDICINE

## 2024-06-18 PROCEDURE — 99214 OFFICE O/P EST MOD 30 MIN: CPT | Performed by: INTERNAL MEDICINE

## 2024-06-18 PROCEDURE — 81001 URINALYSIS AUTO W/SCOPE: CPT | Performed by: INTERNAL MEDICINE

## 2024-06-18 PROCEDURE — 36415 COLL VENOUS BLD VENIPUNCTURE: CPT | Performed by: INTERNAL MEDICINE

## 2024-06-18 RX ORDER — RESPIRATORY SYNCYTIAL VIRUS VACCINE 120MCG/0.5
0.5 KIT INTRAMUSCULAR ONCE
Qty: 1 EACH | Refills: 0 | Status: CANCELLED | OUTPATIENT
Start: 2024-06-18 | End: 2024-06-18

## 2024-06-18 RX ORDER — LORATADINE 10 MG/1
10 TABLET ORAL DAILY
Qty: 10 TABLET | Refills: 0 | Status: SHIPPED | OUTPATIENT
Start: 2024-06-18

## 2024-06-18 RX ORDER — PREDNISONE 10 MG/1
TABLET ORAL
Qty: 20 TABLET | Refills: 0 | Status: SHIPPED | OUTPATIENT
Start: 2024-06-18

## 2024-06-18 RX ORDER — HYDROCHLOROTHIAZIDE 25 MG/1
25 TABLET ORAL DAILY
Qty: 90 TABLET | Refills: 3 | Status: SHIPPED | OUTPATIENT
Start: 2024-06-18

## 2024-06-18 ASSESSMENT — PAIN SCALES - GENERAL: PAINLEVEL: NO PAIN (0)

## 2024-06-18 NOTE — PROGRESS NOTES
Assessment & Plan     Generalized skin eruption due to medication taken internally  Stop Hydralazine and Isosorbide  Skin reaction likely related to Hydralazine  Assess lab work   Start Prednisone and Claritin   - CBC with platelets and differential  - Comprehensive metabolic panel (BMP + Alb, Alk Phos, ALT, AST, Total. Bili, TP)  - UA with Microscopic reflex to Culture - lab collect  - loratadine (CLARITIN) 10 MG tablet; Take 1 tablet (10 mg) by mouth daily  - predniSONE (DELTASONE) 10 MG tablet; Take 3 tabs by mouth daily x 3 days, then 2 tabs daily x 3 days, then 1 tab daily x 3 days, then 1/2 tab daily x 3 days.  - UA Microscopic with Reflex to Culture    Bilateral leg edema  Likely related to reaction to medication, assess also renal function , keep legs elevated   Continue diuretic   - hydrochlorothiazide (HYDRODIURIL) 25 MG tablet; Take 1 tablet (25 mg) by mouth daily    Essential hypertension, benign  Will monitor BP, recheck in 3 days in clinic and he will be monitoring at home   Will try Carvedilol , to replace Atenolol if needed   - hydrochlorothiazide (HYDRODIURIL) 25 MG tablet; Take 1 tablet (25 mg) by mouth daily            See Patient Instructions    Subjective Merlin is a 84 year old, presenting for the following health issues:  Derm Problem (Pt has rash all over legs and stomach and back x2 weeks; also has swollen feet and legs; chills, itching, no appetite and fatigue, just doesn't feel like doing any thing; started hydralazine about a month ago, imdur x8 days ago)        6/18/2024     1:17 PM   Additional Questions   Roomed by Augustina TRAN   Accompanied by wife     HPI       Chief Complaint   Patient presents with    Derm Problem     Pt has rash all over legs and stomach and back x2 weeks; also has swollen feet and legs; chills, itching, no appetite and fatigue, just doesn't feel like doing any thing; started hydralazine about a month ago, imdur x8 days ago     Presents with symptoms of  generalized skin rash associated with LE edema, pruritus , chills.   Symptoms started after being on Hydralazin, dose titrated up and started on Isosorbide.   Has had difficult to control HTN. Now BP is improved. On Amlodipine, Atenolol and Lisinopril   Has history of CKD and anemia or renal disease. Drinks fluids. Does not use NSAIDs.         Review of Systems  Constitutional, HEENT, cardiovascular, pulmonary, gi and gu systems are negative, except as otherwise noted.      Objective    /46 (BP Location: Left arm, Cuff Size: Adult Regular)   Pulse 60   Temp 97.5  F (36.4  C) (Tympanic)   Resp 16   Ht 1.829 m (6')   Wt 93 kg (205 lb 1.6 oz)   SpO2 96%   BMI 27.82 kg/m    Body mass index is 27.82 kg/m .  Physical Exam   GENERAL: alert and no distress, frail elderly  EYES: Eyes grossly normal to inspection, PERRL and conjunctivae and sclerae normal  HENT: ear canals and TM's normal, nose and mouth without ulcers or lesions  NECK: no adenopathy, no asymmetry, masses, or scars  RESP: lungs clear to auscultation - no rales, rhonchi or wheezes  CV: regular rate and rhythm, normal S1 S2, no S3 or S4, no murmur, click or rub  ABDOMEN: soft, nontender, no hepatosplenomegaly, no masses and bowel sounds normal  MS: no gross musculoskeletal defects noted, 1+ LE edema  Skin: maculopapular erythematous skin rash - on he legs, torso and arms , with excoriations     Lab on 05/21/2024   Component Date Value Ref Range Status    Sodium 05/21/2024 138  135 - 145 mmol/L Final    Reference intervals for this test were updated on 09/26/2023 to more accurately reflect our healthy population. There may be differences in the flagging of prior results with similar values performed with this method. Interpretation of those prior results can be made in the context of the updated reference intervals.     Potassium 05/21/2024 4.8  3.4 - 5.3 mmol/L Final    Chloride 05/21/2024 108 (H)  98 - 107 mmol/L Final    Carbon Dioxide (CO2)  05/21/2024 18 (L)  22 - 29 mmol/L Final    Anion Gap 05/21/2024 12  7 - 15 mmol/L Final    Urea Nitrogen 05/21/2024 55.0 (H)  8.0 - 23.0 mg/dL Final    Creatinine 05/21/2024 2.70 (H)  0.67 - 1.17 mg/dL Final    GFR Estimate 05/21/2024 23 (L)  >60 mL/min/1.73m2 Final    Calcium 05/21/2024 9.2  8.8 - 10.2 mg/dL Final    Glucose 05/21/2024 133 (H)  70 - 99 mg/dL Final           Signed Electronically by: Sujit Duke MD

## 2024-06-21 ENCOUNTER — OFFICE VISIT (OUTPATIENT)
Dept: INTERNAL MEDICINE | Facility: CLINIC | Age: 84
End: 2024-06-21
Payer: COMMERCIAL

## 2024-06-21 VITALS
BODY MASS INDEX: 27.9 KG/M2 | OXYGEN SATURATION: 98 % | SYSTOLIC BLOOD PRESSURE: 186 MMHG | TEMPERATURE: 97.9 F | RESPIRATION RATE: 16 BRPM | WEIGHT: 206 LBS | HEART RATE: 59 BPM | HEIGHT: 72 IN | DIASTOLIC BLOOD PRESSURE: 66 MMHG

## 2024-06-21 DIAGNOSIS — T50.905D ADVERSE EFFECT OF DRUG, SUBSEQUENT ENCOUNTER: ICD-10-CM

## 2024-06-21 DIAGNOSIS — N18.4 ACUTE RENAL FAILURE SUPERIMPOSED ON STAGE 4 CHRONIC KIDNEY DISEASE, UNSPECIFIED ACUTE RENAL FAILURE TYPE (H): ICD-10-CM

## 2024-06-21 DIAGNOSIS — I10 ESSENTIAL HYPERTENSION, BENIGN: Primary | ICD-10-CM

## 2024-06-21 DIAGNOSIS — N17.9 ACUTE RENAL FAILURE SUPERIMPOSED ON STAGE 4 CHRONIC KIDNEY DISEASE, UNSPECIFIED ACUTE RENAL FAILURE TYPE (H): ICD-10-CM

## 2024-06-21 LAB
ALBUMIN UR-MCNC: >=300 MG/DL
ANION GAP SERPL CALCULATED.3IONS-SCNC: 10 MMOL/L (ref 7–15)
APPEARANCE UR: CLEAR
BACTERIA #/AREA URNS HPF: ABNORMAL /HPF
BILIRUB UR QL STRIP: NEGATIVE
BUN SERPL-MCNC: 53.3 MG/DL (ref 8–23)
CALCIUM SERPL-MCNC: 9.2 MG/DL (ref 8.8–10.2)
CHLORIDE SERPL-SCNC: 107 MMOL/L (ref 98–107)
COLOR UR AUTO: YELLOW
CREAT SERPL-MCNC: 2.58 MG/DL (ref 0.67–1.17)
DEPRECATED HCO3 PLAS-SCNC: 20 MMOL/L (ref 22–29)
EGFRCR SERPLBLD CKD-EPI 2021: 24 ML/MIN/1.73M2
ERYTHROCYTE [DISTWIDTH] IN BLOOD BY AUTOMATED COUNT: 12.6 % (ref 10–15)
GLUCOSE SERPL-MCNC: 147 MG/DL (ref 70–99)
GLUCOSE UR STRIP-MCNC: NEGATIVE MG/DL
HCT VFR BLD AUTO: 27.7 % (ref 40–53)
HGB BLD-MCNC: 9.1 G/DL (ref 13.3–17.7)
HGB UR QL STRIP: NEGATIVE
KETONES UR STRIP-MCNC: NEGATIVE MG/DL
LEUKOCYTE ESTERASE UR QL STRIP: ABNORMAL
MCH RBC QN AUTO: 32.4 PG (ref 26.5–33)
MCHC RBC AUTO-ENTMCNC: 32.9 G/DL (ref 31.5–36.5)
MCV RBC AUTO: 99 FL (ref 78–100)
NITRATE UR QL: NEGATIVE
PH UR STRIP: 5.5 [PH] (ref 5–7)
PLATELET # BLD AUTO: 127 10E3/UL (ref 150–450)
POTASSIUM SERPL-SCNC: 4.9 MMOL/L (ref 3.4–5.3)
RBC # BLD AUTO: 2.81 10E6/UL (ref 4.4–5.9)
RBC #/AREA URNS AUTO: ABNORMAL /HPF
SODIUM SERPL-SCNC: 137 MMOL/L (ref 135–145)
SP GR UR STRIP: 1.02 (ref 1–1.03)
UROBILINOGEN UR STRIP-ACNC: 0.2 E.U./DL
WBC # BLD AUTO: 5.8 10E3/UL (ref 4–11)
WBC #/AREA URNS AUTO: ABNORMAL /HPF

## 2024-06-21 PROCEDURE — 81001 URINALYSIS AUTO W/SCOPE: CPT | Performed by: INTERNAL MEDICINE

## 2024-06-21 PROCEDURE — 99214 OFFICE O/P EST MOD 30 MIN: CPT | Performed by: INTERNAL MEDICINE

## 2024-06-21 PROCEDURE — 80048 BASIC METABOLIC PNL TOTAL CA: CPT | Performed by: INTERNAL MEDICINE

## 2024-06-21 PROCEDURE — 36415 COLL VENOUS BLD VENIPUNCTURE: CPT | Performed by: INTERNAL MEDICINE

## 2024-06-21 PROCEDURE — G2211 COMPLEX E/M VISIT ADD ON: HCPCS | Performed by: INTERNAL MEDICINE

## 2024-06-21 PROCEDURE — 85027 COMPLETE CBC AUTOMATED: CPT | Performed by: INTERNAL MEDICINE

## 2024-06-21 RX ORDER — RESPIRATORY SYNCYTIAL VIRUS VACCINE 120MCG/0.5
0.5 KIT INTRAMUSCULAR ONCE
Qty: 1 EACH | Refills: 0 | Status: CANCELLED | OUTPATIENT
Start: 2024-06-21 | End: 2024-06-21

## 2024-06-21 RX ORDER — CARVEDILOL 12.5 MG/1
12.5 TABLET ORAL 2 TIMES DAILY WITH MEALS
Qty: 60 TABLET | Refills: 1 | Status: SHIPPED | OUTPATIENT
Start: 2024-06-21 | End: 2024-07-17

## 2024-06-21 NOTE — PROGRESS NOTES
Assessment & Plan     Essential hypertension, benign  Will change from Atenolol to Carvedilol, BP is not well controlled  Continue Lisinopril, hydrochlorothiazide, monitor BP  Off Hydralazine, because of generalized rash   - carvedilol (COREG) 12.5 MG tablet; Take 1 tablet (12.5 mg) by mouth 2 times daily (with meals)    Acute renal failure superimposed on stage 4 chronic kidney disease, unspecified acute renal failure type (H)  Recheck renal function, keep hydrated   - Basic metabolic panel  (Ca, Cl, CO2, Creat, Gluc, K, Na, BUN)  - CBC with platelets  - UA with Microscopic reflex to Culture - lab collect    Adverse effect of drug, subsequent encounter  Continue Prednisone taper and Claritin , improved rash and LE edema             See Patient Instructions    Subjective Merlin is a 84 year old, presenting for the following health issues:  Follow Up (Blood pressure)        6/21/2024    11:28 AM   Additional Questions   Roomed by Julia REZA CMA   Accompanied by Fatou, spouse     History of Present Illness       Hypertension: He presents for follow up of hypertension.  He does check blood pressure  regularly outside of the clinic. Outside blood pressures have been over 140/90. He follows a low salt diet.     He eats 2-3 servings of fruits and vegetables daily.He consumes 0 sweetened beverage(s) daily.He exercises with enough effort to increase his heart rate 20 to 29 minutes per day.  He exercises with enough effort to increase his heart rate 7 days per week.   He is taking medications regularly.         Patient is seen for a follow up visit.  Has difficult to control HTN. Has been on Hydralazine , that caused generalized rash , now of off treatment.   BP started to go up, at home 170/70.   Takes Atenolol, hydrochlorothiazide and Lisinopril   Has h/o CRF. Monitoring BP, BG, medications, avoiding OTC NSAIDs. Needs periodic recheck of kidney function.  Kidney function is worsened on his latest lab work. Has had  increased edema of the legs.   Now rash and edema is improved.         Review of Systems  Constitutional, HEENT, cardiovascular, pulmonary, gi and gu systems are negative, except as otherwise noted.      Objective    BP (!) 186/66   Pulse 59   Temp 97.9  F (36.6  C) (Tympanic)   Resp 16   Ht 1.829 m (6')   Wt 93.4 kg (206 lb)   SpO2 98%   BMI 27.94 kg/m    Body mass index is 27.94 kg/m .  Physical Exam   GENERAL: alert and no distress  NECK: no adenopathy, no asymmetry, masses, or scars  RESP: lungs clear to auscultation - no rales, rhonchi or wheezes  CV: regular rate and rhythm, normal S1 S2, no S3 or S4, no murmur, click or rub, no peripheral edema  ABDOMEN: soft, nontender, no hepatosplenomegaly, no masses and bowel sounds normal  MS: no gross musculoskeletal defects noted, 1+ LE edema, improved from previous check   Skin rash on the LE - macular erythematous , fading     Office Visit on 06/18/2024   Component Date Value Ref Range Status    Sodium 06/18/2024 137  135 - 145 mmol/L Final    Reference intervals for this test were updated on 09/26/2023 to more accurately reflect our healthy population. There may be differences in the flagging of prior results with similar values performed with this method. Interpretation of those prior results can be made in the context of the updated reference intervals.     Potassium 06/18/2024 4.9  3.4 - 5.3 mmol/L Final    Carbon Dioxide (CO2) 06/18/2024 18 (L)  22 - 29 mmol/L Final    Anion Gap 06/18/2024 10  7 - 15 mmol/L Final    Urea Nitrogen 06/18/2024 54.4 (H)  8.0 - 23.0 mg/dL Final    Creatinine 06/18/2024 3.03 (H)  0.67 - 1.17 mg/dL Final    GFR Estimate 06/18/2024 20 (L)  >60 mL/min/1.73m2 Final    eGFR calculated using 2021 CKD-EPI equation.    Calcium 06/18/2024 8.9  8.8 - 10.2 mg/dL Final    Chloride 06/18/2024 109 (H)  98 - 107 mmol/L Final    Glucose 06/18/2024 162 (H)  70 - 99 mg/dL Final    Alkaline Phosphatase 06/18/2024 90  40 - 150 U/L Final    AST  06/18/2024 35  0 - 45 U/L Final    Reference intervals for this test were updated on 6/12/2023 to more accurately reflect our healthy population. There may be differences in the flagging of prior results with similar values performed with this method. Interpretation of those prior results can be made in the context of the updated reference intervals.    ALT 06/18/2024 44  0 - 70 U/L Final    Reference intervals for this test were updated on 6/12/2023 to more accurately reflect our healthy population. There may be differences in the flagging of prior results with similar values performed with this method. Interpretation of those prior results can be made in the context of the updated reference intervals.      Protein Total 06/18/2024 6.1 (L)  6.4 - 8.3 g/dL Final    Albumin 06/18/2024 3.8  3.5 - 5.2 g/dL Final    Bilirubin Total 06/18/2024 0.2  <=1.2 mg/dL Final    Color Urine 06/18/2024 Yellow  Colorless, Straw, Light Yellow, Yellow Final    Appearance Urine 06/18/2024 Turbid (A)  Clear Final    Glucose Urine 06/18/2024 Negative  Negative mg/dL Final    Bilirubin Urine 06/18/2024 Negative  Negative Final    Ketones Urine 06/18/2024 Negative  Negative mg/dL Final    Specific Gravity Urine 06/18/2024 1.025  1.003 - 1.035 Final    Blood Urine 06/18/2024 Negative  Negative Final    pH Urine 06/18/2024 5.5  5.0 - 7.0 Final    Protein Albumin Urine 06/18/2024 >=300 (A)  Negative mg/dL Final    Urobilinogen Urine 06/18/2024 0.2  0.2, 1.0 E.U./dL Final    Nitrite Urine 06/18/2024 Negative  Negative Final    Leukocyte Esterase Urine 06/18/2024 Small (A)  Negative Final    WBC Count 06/18/2024 4.6  4.0 - 11.0 10e3/uL Final    RBC Count 06/18/2024 2.83 (L)  4.40 - 5.90 10e6/uL Final    Hemoglobin 06/18/2024 9.1 (L)  13.3 - 17.7 g/dL Final    Hematocrit 06/18/2024 27.6 (L)  40.0 - 53.0 % Final    MCV 06/18/2024 98  78 - 100 fL Final    MCH 06/18/2024 32.2  26.5 - 33.0 pg Final    MCHC 06/18/2024 33.0  31.5 - 36.5 g/dL Final     RDW 06/18/2024 12.6  10.0 - 15.0 % Final    Platelet Count 06/18/2024 119 (L)  150 - 450 10e3/uL Final    % Neutrophils 06/18/2024 70  % Final    % Lymphocytes 06/18/2024 10  % Final    % Monocytes 06/18/2024 13  % Final    % Eosinophils 06/18/2024 6  % Final    % Basophils 06/18/2024 1  % Final    % Immature Granulocytes 06/18/2024 0  % Final    Absolute Neutrophils 06/18/2024 3.2  1.6 - 8.3 10e3/uL Final    Absolute Lymphocytes 06/18/2024 0.5 (L)  0.8 - 5.3 10e3/uL Final    Absolute Monocytes 06/18/2024 0.6  0.0 - 1.3 10e3/uL Final    Absolute Eosinophils 06/18/2024 0.3  0.0 - 0.7 10e3/uL Final    Absolute Basophils 06/18/2024 0.0  0.0 - 0.2 10e3/uL Final    Absolute Immature Granulocytes 06/18/2024 0.0  <=0.4 10e3/uL Final    Bacteria Urine 06/18/2024 Few (A)  None Seen /HPF Final    RBC Urine 06/18/2024 0-2  0-2 /HPF /HPF Final    WBC Urine 06/18/2024 5-10 (A)  0-5 /HPF /HPF Final    Squamous Epithelials Urine 06/18/2024 Few (A)  None Seen /LPF Final           Signed Electronically by: Sujit Duke MD

## 2024-06-21 NOTE — LETTER
June 24, 2024      Merlin J Anderson  [address] [place] [address]        Dear ,    We are writing to inform you of your test results.    Decreased kidney functions and chronic anemia, no change.   Follow up in 1 month.     Resulted Orders   Basic metabolic panel  (Ca, Cl, CO2, Creat, Gluc, K, Na, BUN)   Result Value Ref Range    Sodium 137 135 - 145 mmol/L      Comment:      Reference intervals for this test were updated on 09/26/2023 to more accurately reflect our healthy population. There may be differences in the flagging of prior results with similar values performed with this method. Interpretation of those prior results can be made in the context of the updated reference intervals.     Potassium 4.9 3.4 - 5.3 mmol/L    Chloride 107 98 - 107 mmol/L    Carbon Dioxide (CO2) 20 (L) 22 - 29 mmol/L    Anion Gap 10 7 - 15 mmol/L    Urea Nitrogen 53.3 (H) 8.0 - 23.0 mg/dL    Creatinine 2.58 (H) 0.67 - 1.17 mg/dL    GFR Estimate 24 (L) >60 mL/min/1.73m2      Comment:      eGFR calculated using 2021 CKD-EPI equation.    Calcium 9.2 8.8 - 10.2 mg/dL    Glucose 147 (H) 70 - 99 mg/dL   CBC with platelets   Result Value Ref Range    WBC Count 5.8 4.0 - 11.0 10e3/uL    RBC Count 2.81 (L) 4.40 - 5.90 10e6/uL    Hemoglobin 9.1 (L) 13.3 - 17.7 g/dL    Hematocrit 27.7 (L) 40.0 - 53.0 %    MCV 99 78 - 100 fL    MCH 32.4 26.5 - 33.0 pg    MCHC 32.9 31.5 - 36.5 g/dL    RDW 12.6 10.0 - 15.0 %    Platelet Count 127 (L) 150 - 450 10e3/uL   UA with Microscopic reflex to Culture - lab collect   Result Value Ref Range    Color Urine Yellow Colorless, Straw, Light Yellow, Yellow    Appearance Urine Clear Clear    Glucose Urine Negative Negative mg/dL    Bilirubin Urine Negative Negative    Ketones Urine Negative Negative mg/dL    Specific Gravity Urine 1.020 1.003 - 1.035    Blood Urine Negative Negative    pH Urine 5.5 5.0 - 7.0    Protein Albumin Urine >=300 (A) Negative mg/dL    Urobilinogen Urine 0.2 0.2,  1.0 E.U./dL    Nitrite Urine Negative Negative    Leukocyte Esterase Urine Trace (A) Negative   UA Microscopic with Reflex to Culture   Result Value Ref Range    Bacteria Urine Few (A) None Seen /HPF    RBC Urine 0-2 0-2 /HPF /HPF    WBC Urine 5-10 (A) 0-5 /HPF /HPF    Narrative    Urine Culture not indicated       If you have any questions or concerns, please call the clinic at the number listed above.       Sincerely,      Sujit Duke MD

## 2024-07-02 ENCOUNTER — HOSPITAL ENCOUNTER (OUTPATIENT)
Dept: CARDIOLOGY | Facility: CLINIC | Age: 84
Discharge: HOME | End: 2024-07-02
Attending: NURSE PRACTITIONER | Admitting: NURSE PRACTITIONER
Payer: COMMERCIAL

## 2024-07-02 DIAGNOSIS — I10 BENIGN ESSENTIAL HYPERTENSION: ICD-10-CM

## 2024-07-02 PROCEDURE — 76770 US EXAM ABDO BACK WALL COMP: CPT | Mod: 26 | Performed by: INTERNAL MEDICINE

## 2024-07-02 PROCEDURE — 93975 VASCULAR STUDY: CPT

## 2024-07-02 PROCEDURE — 93975 VASCULAR STUDY: CPT | Mod: 26 | Performed by: INTERNAL MEDICINE

## 2024-07-16 ENCOUNTER — MYC REFILL (OUTPATIENT)
Dept: INTERNAL MEDICINE | Facility: CLINIC | Age: 84
End: 2024-07-16
Payer: COMMERCIAL

## 2024-07-16 DIAGNOSIS — I10 ESSENTIAL HYPERTENSION, BENIGN: ICD-10-CM

## 2024-07-16 RX ORDER — CARVEDILOL 12.5 MG/1
12.5 TABLET ORAL 2 TIMES DAILY WITH MEALS
Qty: 60 TABLET | Refills: 1 | OUTPATIENT
Start: 2024-07-16

## 2024-07-16 RX ORDER — CARVEDILOL 12.5 MG/1
TABLET ORAL
Qty: 60 TABLET | Refills: 0 | OUTPATIENT
Start: 2024-07-16

## 2024-07-17 RX ORDER — CARVEDILOL 12.5 MG/1
25 TABLET ORAL 2 TIMES DAILY WITH MEALS
Qty: 360 TABLET | Refills: 0 | Status: SHIPPED | OUTPATIENT
Start: 2024-07-17 | End: 2024-10-07

## 2024-07-17 NOTE — TELEPHONE ENCOUNTER
"Patient's wife calling.  Received [employer] message from refill RN stating Carvedilol refill request was denied due to \"duplicate\".    Wife states primary care provider wanted patient to increase his dose of Carvedilol 12.5mg to 2 tabs BID (from 1 tab BID).    See [employer] message 6/25/24:  primary care provider states:    \"Let's increase Carvedilol to 25 mg bid- he can take 2 of the 12.5 mg tablets at a time.\"     Patient only has #10 tabs left.  Would like a 90 day supply.    Asking for new prescription to be e-scribed to pharmacy.    Prescription pended.    Please advise, thanks.  "

## 2024-07-29 ENCOUNTER — OFFICE VISIT (OUTPATIENT)
Dept: CARDIOLOGY | Facility: CLINIC | Age: 84
End: 2024-07-29
Payer: COMMERCIAL

## 2024-07-29 VITALS
HEIGHT: 72 IN | HEART RATE: 59 BPM | SYSTOLIC BLOOD PRESSURE: 142 MMHG | DIASTOLIC BLOOD PRESSURE: 70 MMHG | BODY MASS INDEX: 25.6 KG/M2 | WEIGHT: 189 LBS | OXYGEN SATURATION: 98 %

## 2024-07-29 DIAGNOSIS — I65.23 BILATERAL CAROTID ARTERY STENOSIS: ICD-10-CM

## 2024-07-29 DIAGNOSIS — N18.4 CKD (CHRONIC KIDNEY DISEASE) STAGE 4, GFR 15-29 ML/MIN (H): ICD-10-CM

## 2024-07-29 DIAGNOSIS — I10 BENIGN ESSENTIAL HYPERTENSION: Primary | ICD-10-CM

## 2024-07-29 DIAGNOSIS — I25.810 CORONARY ARTERY DISEASE INVOLVING CORONARY BYPASS GRAFT OF NATIVE HEART WITHOUT ANGINA PECTORIS: ICD-10-CM

## 2024-07-29 DIAGNOSIS — E78.5 HYPERLIPIDEMIA LDL GOAL <100: ICD-10-CM

## 2024-07-29 PROCEDURE — 99214 OFFICE O/P EST MOD 30 MIN: CPT | Performed by: NURSE PRACTITIONER

## 2024-07-29 NOTE — PATIENT INSTRUCTIONS
Thanks for participating in a office visit with the [facility] clinic today.    Blood pressures have significantly improved.   Continue with current medical therapy.   Strict low sodium diet   Encourage increased activity. 30 min walking daily     Follow up in 6 months with Dr. Rebolledo    Please call my nurse at  [phone] with any questions or concerns.    Scheduling phone number: [phone]  Reminder: Please bring in all current medications, over the counter supplements and vitamin bottles to your next appointment.

## 2024-07-29 NOTE — LETTER
7/29/2024    Sujit Duke MD  [address] [facility] [address]    RE: Merlin J Anderson       Dear Colleague,     I had the pleasure of seeing Merlin J Anderson in the [facility].  CARDIOLOGY CLINIC NOTE    PRIMARY CARDIOLOGIST  Dr. Rebolledo     PRIMARY CARE PHYSICIAN:  Sujit Duke    HISTORY OF PRESENT ILLNESS:  Merlin Anderson is a very pleasant 84-year-old male with a past medical history significant for coronary artery disease status post CABG x4 in 2018 , carotid artery disease s/p right carotid endarterectomy (2017), hypertension, hyperlipidemia, CKD and type 2 diabetes.     In review, patient was seen in 2018 by Dr. Rebolledo for evaluation of abnormal stress test. He subsequently underwent coronary angiogram that showed MVD including distal LM disease. He underwent CABG x 4 using a LIMA/LAD,SVG/RPDA, SVG /OM 2 and SVG to PLB. He was seen in 2020 and then lost to follow up until 2/20/2024 when he re-established care due to uncontrolled hypertension. He has had multiple medication adjustments including adding Amlodipine to his regimen of atenolol, hydrochlorothiazide and lisinopril. However, he developed worsening leg edema and amlodipine stopped. He was initiated on hydralazine /isosorbide but stopped duet to rash to lower legs and back. He was recently seen by his PCP who switched atenolol to carvedilol with uptitration to 25 mg BID.     He returns to the office today accompanied by his wife for follow up. Blood pressures have significantly improved with average home pressure in the 140's systolic. Blood pressure today is 142/70. He has had a complete resolution of rash after stopping hydralazine/Imdur. He denies chest pain, shortness of breath, palpitations, orthopnea, presyncope or syncope. Upon exam, lungs are clear bilaterally, heart rate and rhythm regular, no palpitations, and 1+ left LE edema. His primary complaint is fatigue.     Echocardiogram on 4/1/2024 showed a  normal ejection fraction estimated at 55 to 60%, moderate concentric LVH, mildly dilated RV with normal RV systolic function and no valvular abnormalities.    Carotid US 3/25 shows moderate bilateral 50-69% stenosis L>R ICA.     In review of recent labs, sodium 137, potassium 4.9, BUN 53.3, Creatinine 2.58, GFR 24. Hgb 9.1, hematocrit 27.7, Plt 127. Lipid panel is excellent with a total cholesterol 125, HDL 50, LDL 48, and .     He does not engage in any routine exercise, primarily sedentary.   Diet is high in sodium   Compliant with all medications.       PAST MEDICAL HISTORY:  Past Medical History:   Diagnosis Date    Abnormal stress test     Carotid arterial disease (H24) 9/18/2017    CKD (chronic kidney disease) stage 3, GFR 30-59 ml/min (H) 12/27/2011    DM2 (diabetes mellitus, type 2) (H)     Pre diabetic    LUNA (dyspnea on exertion)     Essential hypertension, benign     Gout 11/14/2002     Problem list name updated by automated process. Provider to review    HTN (hypertension) 6/29/2009     (Problem list name updated by automated process. Provider to review and confirm.)    Hyperlipidemia LDL goal <100 10/31/2010    Mixed hyperlipidemia 11/14/2002    PVD (peripheral vascular disease) (H24) 9/8/2017    S/P CABG (coronary artery bypass graft) 10/26/2018    Type 2 diabetes mellitus without complication (H) 11/30/2015       MEDICATIONS:  Current Outpatient Medications   Medication Sig Dispense Refill    acetaminophen (TYLENOL) 325 MG tablet Take 2 tablets (650 mg) by mouth every 4 hours as needed for mild pain 100 tablet     aspirin 325 MG EC tablet Take 1 tablet (325 mg) by mouth daily 40 tablet     atorvastatin (LIPITOR) 40 MG tablet Take 1 tablet (40 mg) by mouth daily 90 tablet 1    blood glucose calibration (ACCU-CHEK PRISCILLA) solution Use to calibrate blood glucose monitor as needed as directed. 1 Bottle 0    blood glucose monitoring (ACCU-CHEK PRISCILLA) test strip Use to test blood sugars 1 times daily  or as directed. 100 each 1    blood glucose monitoring (ACCU-CHEK MULTICLIX) lancets Use to test blood sugar 1 times daily or as directed. 1 Box 1    blood glucose monitoring (NO BRAND SPECIFIED) meter device kit Use to test blood sugar 1 times daily or as directed. 1 kit 0    carvedilol (COREG) 12.5 MG tablet Take 2 tablets (25 mg) by mouth 2 times daily (with meals) 360 tablet 0    fluticasone (FLONASE) 50 MCG/ACT nasal spray Spray 1-2 sprays into both nostrils daily as needed for rhinitis or allergies 16 g 4    hydrochlorothiazide (HYDRODIURIL) 25 MG tablet Take 1 tablet (25 mg) by mouth daily 90 tablet 3    lisinopril (ZESTRIL) 30 MG tablet Take 1 tablet (30 mg) by mouth daily 90 tablet 3    loratadine (CLARITIN) 10 MG tablet Take 1 tablet (10 mg) by mouth daily 10 tablet 0    metFORMIN (GLUCOPHAGE) 500 MG tablet Take 1 tablet (500 mg) by mouth 2 times daily (with meals) 180 tablet 3    multivitamin w/minerals (THERA-VIT-M) tablet Take 1 tablet by mouth every morning      nitroGLYcerin (NITROSTAT) 0.4 MG sublingual tablet For chest pain place 1 tablet under the tongue every 5 minutes for 3 doses. If symptoms persist 5 minutes after 1st dose call 911. 25 tablet 3    predniSONE (DELTASONE) 10 MG tablet Take 3 tabs by mouth daily x 3 days, then 2 tabs daily x 3 days, then 1 tab daily x 3 days, then 1/2 tab daily x 3 days. 20 tablet 0    tamsulosin (FLOMAX) 0.4 MG capsule Take 1 capsule (0.4 mg) by mouth daily 90 capsule 3     No current facility-administered medications for this visit.       SOCIAL HISTORY:  I have reviewed this patient's social history and updated it with pertinent information if needed. Merlin CHAU Warner  reports that he has never smoked. He has never used smokeless tobacco. He reports that he does not currently use alcohol. He reports that he does not use drugs.    PHYSICAL EXAM:  Pulse:  [59] 59  BP: (142)/(70) 142/70  SpO2:  [98 %] 98 %  189 lbs 0 oz    Constitutional: alert, no  distress  Respiratory: Good bilateral air entry  Cardiovascular: Regular rate and rhythm   GI: nondistended  Neuropsychiatric: appropriate affact    ASSESSMENT/PLAN:  Pertinent issues addressed/ reviewed during this cardiology visit  Hypertension - Consistent in the 140's/70s. Will continue with current medical therapy. Educated on need for strict low sodium diet.   CKD - Creatinine 2.58.( baseline 2.0) Consultation with nephrology in October.   CAD - status post CABG x4 in 2018, LIMA/LAD,SVG/RPDA, SVG /OM 2 and SVG to PLB. No symptoms of ischemia. Continue aspirin, statin, carvedilol, and lisinopril. Encourage activity.   Carotid disease - s/p Right CEA. Recent carotid US shows moderate bilateral 50-69% stenosis L>R ICA. Follows with Dr. Scott ( vascular).   Hyperlipidemia - LDL at goal, continue atorvastatin.     Follow up in 6 months with Dr. Rebolledo     It was a pleasure seeing this patient in clinic today. Please do not hesitate to contact me with any future questions.     DIRK Atwood, CNP  Cardiology - [facility]  07/29/2024       The level of medical decision making during this visit was of moderate complexity.    This note was completed in part using dictation via the Dragon voice recognition software. Some word and grammatical errors may occur and must be interpreted in the appropriate clinical context.  If there are any questions pertaining to this issue, please contact me for further clarification.  Thank you for allowing me to participate in the care of your patient.      Sincerely,     DIRK Atwood CNP     [facility]  cc:   DIRK Atwood CNP  [address] [place] [address]

## 2024-07-29 NOTE — PROGRESS NOTES
CARDIOLOGY CLINIC NOTE    PRIMARY CARDIOLOGIST  Dr. Rebolledo     PRIMARY CARE PHYSICIAN:  Sujit Duke    HISTORY OF PRESENT ILLNESS:  Merlin Anderson is a very pleasant 84-year-old male with a past medical history significant for coronary artery disease status post CABG x4 in 2018 , carotid artery disease s/p right carotid endarterectomy (2017), hypertension, hyperlipidemia, CKD and type 2 diabetes.     In review, patient was seen in 2018 by Dr. Rebolledo for evaluation of abnormal stress test. He subsequently underwent coronary angiogram that showed MVD including distal LM disease. He underwent CABG x 4 using a LIMA/LAD,SVG/RPDA, SVG /OM 2 and SVG to PLB. He was seen in 2020 and then lost to follow up until 2/20/2024 when he re-established care due to uncontrolled hypertension. He has had multiple medication adjustments including adding Amlodipine to his regimen of atenolol, hydrochlorothiazide and lisinopril. However, he developed worsening leg edema and amlodipine stopped. He was initiated on hydralazine /isosorbide but stopped duet to rash to lower legs and back. He was recently seen by his PCP who switched atenolol to carvedilol with uptitration to 25 mg BID.     He returns to the office today accompanied by his wife for follow up. Blood pressures have significantly improved with average home pressure in the 140's systolic. Blood pressure today is 142/70. He has had a complete resolution of rash after stopping hydralazine/Imdur. He denies chest pain, shortness of breath, palpitations, orthopnea, presyncope or syncope. Upon exam, lungs are clear bilaterally, heart rate and rhythm regular, no palpitations, and 1+ left LE edema. His primary complaint is fatigue.     Echocardiogram on 4/1/2024 showed a normal ejection fraction estimated at 55 to 60%, moderate concentric LVH, mildly dilated RV with normal RV systolic function and no valvular abnormalities.    Carotid US 3/25 shows moderate bilateral 50-69% stenosis  L>R ICA.     In review of recent labs, sodium 137, potassium 4.9, BUN 53.3, Creatinine 2.58, GFR 24. Hgb 9.1, hematocrit 27.7, Plt 127. Lipid panel is excellent with a total cholesterol 125, HDL 50, LDL 48, and .     He does not engage in any routine exercise, primarily sedentary.   Diet is high in sodium   Compliant with all medications.       PAST MEDICAL HISTORY:  Past Medical History:   Diagnosis Date    Abnormal stress test     Carotid arterial disease (H24) 9/18/2017    CKD (chronic kidney disease) stage 3, GFR 30-59 ml/min (H) 12/27/2011    DM2 (diabetes mellitus, type 2) (H)     Pre diabetic    LUNA (dyspnea on exertion)     Essential hypertension, benign     Gout 11/14/2002     Problem list name updated by automated process. Provider to review    HTN (hypertension) 6/29/2009     (Problem list name updated by automated process. Provider to review and confirm.)    Hyperlipidemia LDL goal <100 10/31/2010    Mixed hyperlipidemia 11/14/2002    PVD (peripheral vascular disease) (H24) 9/8/2017    S/P CABG (coronary artery bypass graft) 10/26/2018    Type 2 diabetes mellitus without complication (H) 11/30/2015       MEDICATIONS:  Current Outpatient Medications   Medication Sig Dispense Refill    acetaminophen (TYLENOL) 325 MG tablet Take 2 tablets (650 mg) by mouth every 4 hours as needed for mild pain 100 tablet     aspirin 325 MG EC tablet Take 1 tablet (325 mg) by mouth daily 40 tablet     atorvastatin (LIPITOR) 40 MG tablet Take 1 tablet (40 mg) by mouth daily 90 tablet 1    blood glucose calibration (ACCU-CHEK PRISCILLA) solution Use to calibrate blood glucose monitor as needed as directed. 1 Bottle 0    blood glucose monitoring (ACCU-CHEK PRISCILLA) test strip Use to test blood sugars 1 times daily or as directed. 100 each 1    blood glucose monitoring (ACCU-CHEK MULTICLIX) lancets Use to test blood sugar 1 times daily or as directed. 1 Box 1    blood glucose monitoring (NO BRAND SPECIFIED) meter device kit Use  to test blood sugar 1 times daily or as directed. 1 kit 0    carvedilol (COREG) 12.5 MG tablet Take 2 tablets (25 mg) by mouth 2 times daily (with meals) 360 tablet 0    fluticasone (FLONASE) 50 MCG/ACT nasal spray Spray 1-2 sprays into both nostrils daily as needed for rhinitis or allergies 16 g 4    hydrochlorothiazide (HYDRODIURIL) 25 MG tablet Take 1 tablet (25 mg) by mouth daily 90 tablet 3    lisinopril (ZESTRIL) 30 MG tablet Take 1 tablet (30 mg) by mouth daily 90 tablet 3    loratadine (CLARITIN) 10 MG tablet Take 1 tablet (10 mg) by mouth daily 10 tablet 0    metFORMIN (GLUCOPHAGE) 500 MG tablet Take 1 tablet (500 mg) by mouth 2 times daily (with meals) 180 tablet 3    multivitamin w/minerals (THERA-VIT-M) tablet Take 1 tablet by mouth every morning      nitroGLYcerin (NITROSTAT) 0.4 MG sublingual tablet For chest pain place 1 tablet under the tongue every 5 minutes for 3 doses. If symptoms persist 5 minutes after 1st dose call 911. 25 tablet 3    predniSONE (DELTASONE) 10 MG tablet Take 3 tabs by mouth daily x 3 days, then 2 tabs daily x 3 days, then 1 tab daily x 3 days, then 1/2 tab daily x 3 days. 20 tablet 0    tamsulosin (FLOMAX) 0.4 MG capsule Take 1 capsule (0.4 mg) by mouth daily 90 capsule 3     No current facility-administered medications for this visit.       SOCIAL HISTORY:  I have reviewed this patient's social history and updated it with pertinent information if needed. Merlin J Anderson  reports that he has never smoked. He has never used smokeless tobacco. He reports that he does not currently use alcohol. He reports that he does not use drugs.    PHYSICAL EXAM:  Pulse:  [59] 59  BP: (142)/(70) 142/70  SpO2:  [98 %] 98 %  189 lbs 0 oz    Constitutional: alert, no distress  Respiratory: Good bilateral air entry  Cardiovascular: Regular rate and rhythm   GI: nondistended  Neuropsychiatric: appropriate affact    ASSESSMENT/PLAN:  Pertinent issues addressed/ reviewed during this cardiology  visit  Hypertension - Consistent in the 140's/70s. Will continue with current medical therapy. Educated on need for strict low sodium diet.   CKD - Creatinine 2.58.( baseline 2.0) Consultation with nephrology in October.   CAD - status post CABG x4 in 2018, LIMA/LAD,SVG/RPDA, SVG /OM 2 and SVG to PLB. No symptoms of ischemia. Continue aspirin, statin, carvedilol, and lisinopril. Encourage activity.   Carotid disease - s/p Right CEA. Recent carotid US shows moderate bilateral 50-69% stenosis L>R ICA. Follows with Dr. Scott ( vascular).   Hyperlipidemia - LDL at goal, continue atorvastatin.     Follow up in 6 months with Dr. Rebolledo     It was a pleasure seeing this patient in clinic today. Please do not hesitate to contact me with any future questions.     DIRK Atwood, CNP  Cardiology - [facility]  07/29/2024       The level of medical decision making during this visit was of moderate complexity.    This note was completed in part using dictation via the Dragon voice recognition software. Some word and grammatical errors may occur and must be interpreted in the appropriate clinical context.  If there are any questions pertaining to this issue, please contact me for further clarification.

## 2024-08-10 ENCOUNTER — HEALTH MAINTENANCE LETTER (OUTPATIENT)
Age: 84
End: 2024-08-10

## 2024-08-12 DIAGNOSIS — E78.5 HYPERLIPIDEMIA LDL GOAL <100: ICD-10-CM

## 2024-08-12 DIAGNOSIS — I77.9 RIGHT-SIDED CAROTID ARTERY DISEASE, UNSPECIFIED TYPE (H): ICD-10-CM

## 2024-08-13 RX ORDER — ATORVASTATIN CALCIUM 40 MG/1
40 TABLET, FILM COATED ORAL DAILY
Qty: 90 TABLET | Refills: 1 | Status: SHIPPED | OUTPATIENT
Start: 2024-08-13

## 2024-08-13 NOTE — TELEPHONE ENCOUNTER
LDL Cholesterol Calculated   Date Value Ref Range Status   01/22/2024 48 <=100 mg/dL Final   12/11/2020 43 <100 mg/dL Final     Comment:     Desirable:       <100 mg/dl

## 2024-08-15 ENCOUNTER — HOSPITAL ENCOUNTER (OUTPATIENT)
Facility: CLINIC | Age: 84
Setting detail: OBSERVATION
Discharge: HOME | End: 2024-08-16
Attending: EMERGENCY MEDICINE | Admitting: INTERNAL MEDICINE
Payer: COMMERCIAL

## 2024-08-15 ENCOUNTER — APPOINTMENT (OUTPATIENT)
Dept: GENERAL RADIOLOGY | Facility: CLINIC | Age: 84
End: 2024-08-15
Attending: EMERGENCY MEDICINE
Payer: COMMERCIAL

## 2024-08-15 DIAGNOSIS — J06.9 UPPER RESPIRATORY TRACT INFECTION, UNSPECIFIED TYPE: ICD-10-CM

## 2024-08-15 DIAGNOSIS — R53.1 GENERALIZED WEAKNESS: ICD-10-CM

## 2024-08-15 DIAGNOSIS — R50.9 FEVER, UNSPECIFIED FEVER CAUSE: ICD-10-CM

## 2024-08-15 LAB
ALBUMIN SERPL BCG-MCNC: 3.9 G/DL (ref 3.5–5.2)
ALBUMIN UR-MCNC: 300 MG/DL
ALP SERPL-CCNC: 74 U/L (ref 40–150)
ALT SERPL W P-5'-P-CCNC: 28 U/L (ref 0–70)
ANION GAP SERPL CALCULATED.3IONS-SCNC: 14 MMOL/L (ref 7–15)
APPEARANCE UR: CLEAR
AST SERPL W P-5'-P-CCNC: 24 U/L (ref 0–45)
BASOPHILS # BLD AUTO: 0 10E3/UL (ref 0–0.2)
BASOPHILS NFR BLD AUTO: 0 %
BILIRUB SERPL-MCNC: 0.6 MG/DL
BILIRUB UR QL STRIP: NEGATIVE
BUN SERPL-MCNC: 31.9 MG/DL (ref 8–23)
CALCIUM SERPL-MCNC: 9.4 MG/DL (ref 8.8–10.4)
CHLORIDE SERPL-SCNC: 104 MMOL/L (ref 98–107)
COLOR UR AUTO: ABNORMAL
CREAT SERPL-MCNC: 2.44 MG/DL (ref 0.67–1.17)
EGFRCR SERPLBLD CKD-EPI 2021: 25 ML/MIN/1.73M2
EOSINOPHIL # BLD AUTO: 0.1 10E3/UL (ref 0–0.7)
EOSINOPHIL NFR BLD AUTO: 1 %
ERYTHROCYTE [DISTWIDTH] IN BLOOD BY AUTOMATED COUNT: 12.6 % (ref 10–15)
FLUAV RNA SPEC QL NAA+PROBE: NEGATIVE
FLUBV RNA RESP QL NAA+PROBE: NEGATIVE
GLUCOSE SERPL-MCNC: 156 MG/DL (ref 70–99)
GLUCOSE UR STRIP-MCNC: 50 MG/DL
HCO3 SERPL-SCNC: 19 MMOL/L (ref 22–29)
HCT VFR BLD AUTO: 32.8 % (ref 40–53)
HGB BLD-MCNC: 11 G/DL (ref 13.3–17.7)
HGB UR QL STRIP: ABNORMAL
HOLD SPECIMEN: NORMAL
HOLD SPECIMEN: NORMAL
IMM GRANULOCYTES # BLD: 0.1 10E3/UL
IMM GRANULOCYTES NFR BLD: 1 %
KETONES UR STRIP-MCNC: NEGATIVE MG/DL
LACTATE SERPL-SCNC: 1.1 MMOL/L (ref 0.7–2)
LEUKOCYTE ESTERASE UR QL STRIP: NEGATIVE
LYMPHOCYTES # BLD AUTO: 0.3 10E3/UL (ref 0.8–5.3)
LYMPHOCYTES NFR BLD AUTO: 4 %
MCH RBC QN AUTO: 31.4 PG (ref 26.5–33)
MCHC RBC AUTO-ENTMCNC: 33.5 G/DL (ref 31.5–36.5)
MCV RBC AUTO: 94 FL (ref 78–100)
MONOCYTES # BLD AUTO: 0.6 10E3/UL (ref 0–1.3)
MONOCYTES NFR BLD AUTO: 7 %
MUCOUS THREADS #/AREA URNS LPF: PRESENT /LPF
NEUTROPHILS # BLD AUTO: 8.4 10E3/UL (ref 1.6–8.3)
NEUTROPHILS NFR BLD AUTO: 88 %
NITRATE UR QL: NEGATIVE
NRBC # BLD AUTO: 0 10E3/UL
NRBC BLD AUTO-RTO: 0 /100
NT-PROBNP SERPL-MCNC: 3024 PG/ML (ref 0–1800)
PH UR STRIP: 6 [PH] (ref 5–7)
PLATELET # BLD AUTO: 83 10E3/UL (ref 150–450)
POTASSIUM SERPL-SCNC: 4.7 MMOL/L (ref 3.4–5.3)
PROT SERPL-MCNC: 6.5 G/DL (ref 6.4–8.3)
RBC # BLD AUTO: 3.5 10E6/UL (ref 4.4–5.9)
RBC URINE: 1 /HPF
RSV RNA SPEC NAA+PROBE: NEGATIVE
SARS-COV-2 RNA RESP QL NAA+PROBE: NEGATIVE
SODIUM SERPL-SCNC: 137 MMOL/L (ref 135–145)
SP GR UR STRIP: 1.02 (ref 1–1.03)
SQUAMOUS EPITHELIAL: <1 /HPF
TROPONIN T SERPL HS-MCNC: 38 NG/L
TROPONIN T SERPL HS-MCNC: 38 NG/L
UROBILINOGEN UR STRIP-MCNC: NORMAL MG/DL
WBC # BLD AUTO: 9.5 10E3/UL (ref 4–11)
WBC URINE: 4 /HPF

## 2024-08-15 PROCEDURE — 81001 URINALYSIS AUTO W/SCOPE: CPT | Performed by: EMERGENCY MEDICINE

## 2024-08-15 PROCEDURE — 36415 COLL VENOUS BLD VENIPUNCTURE: CPT | Performed by: EMERGENCY MEDICINE

## 2024-08-15 PROCEDURE — 99285 EMERGENCY DEPT VISIT HI MDM: CPT | Mod: 25

## 2024-08-15 PROCEDURE — 85025 COMPLETE CBC W/AUTO DIFF WBC: CPT | Performed by: EMERGENCY MEDICINE

## 2024-08-15 PROCEDURE — 87633 RESP VIRUS 12-25 TARGETS: CPT | Performed by: INTERNAL MEDICINE

## 2024-08-15 PROCEDURE — 83605 ASSAY OF LACTIC ACID: CPT | Performed by: EMERGENCY MEDICINE

## 2024-08-15 PROCEDURE — 87040 BLOOD CULTURE FOR BACTERIA: CPT | Performed by: EMERGENCY MEDICINE

## 2024-08-15 PROCEDURE — 84484 ASSAY OF TROPONIN QUANT: CPT | Performed by: EMERGENCY MEDICINE

## 2024-08-15 PROCEDURE — 87637 SARSCOV2&INF A&B&RSV AMP PRB: CPT | Performed by: EMERGENCY MEDICINE

## 2024-08-15 PROCEDURE — 258N000003 HC RX IP 258 OP 636: Performed by: EMERGENCY MEDICINE

## 2024-08-15 PROCEDURE — 80053 COMPREHEN METABOLIC PANEL: CPT | Performed by: EMERGENCY MEDICINE

## 2024-08-15 PROCEDURE — 83880 ASSAY OF NATRIURETIC PEPTIDE: CPT | Performed by: EMERGENCY MEDICINE

## 2024-08-15 PROCEDURE — 71046 X-RAY EXAM CHEST 2 VIEWS: CPT

## 2024-08-15 PROCEDURE — 93005 ELECTROCARDIOGRAM TRACING: CPT

## 2024-08-15 PROCEDURE — 96360 HYDRATION IV INFUSION INIT: CPT

## 2024-08-15 PROCEDURE — 250N000013 HC RX MED GY IP 250 OP 250 PS 637: Performed by: EMERGENCY MEDICINE

## 2024-08-15 RX ORDER — ACETAMINOPHEN 500 MG
1000 TABLET ORAL ONCE
Status: COMPLETED | OUTPATIENT
Start: 2024-08-15 | End: 2024-08-15

## 2024-08-15 RX ADMIN — SODIUM CHLORIDE 1000 ML: 9 INJECTION, SOLUTION INTRAVENOUS at 20:37

## 2024-08-15 RX ADMIN — ACETAMINOPHEN 1000 MG: 500 TABLET, FILM COATED ORAL at 20:37

## 2024-08-15 ASSESSMENT — COLUMBIA-SUICIDE SEVERITY RATING SCALE - C-SSRS
1. IN THE PAST MONTH, HAVE YOU WISHED YOU WERE DEAD OR WISHED YOU COULD GO TO SLEEP AND NOT WAKE UP?: NO
2. HAVE YOU ACTUALLY HAD ANY THOUGHTS OF KILLING YOURSELF IN THE PAST MONTH?: NO
6. HAVE YOU EVER DONE ANYTHING, STARTED TO DO ANYTHING, OR PREPARED TO DO ANYTHING TO END YOUR LIFE?: NO

## 2024-08-15 ASSESSMENT — ACTIVITIES OF DAILY LIVING (ADL)
ADLS_ACUITY_SCORE: 36
ADLS_ACUITY_SCORE: 38

## 2024-08-15 NOTE — ED TRIAGE NOTES
Pt comes in with cough since Monday.  Per son pt has had fever, chills and today was unable to get himself out of bed due to weakness.  EMS was contacted and pt was transported to the ED     Triage Assessment (Adult)       Row Name 08/15/24 2185          Triage Assessment    Airway WDL WDL        Respiratory WDL    Respiratory WDL cough     Cough Frequency frequent     Cough Type congested

## 2024-08-15 NOTE — ED NOTES
EMS states pt may have a possible UTI, he smells of urine but denies any burning or difficulty urinating.

## 2024-08-15 NOTE — CONFIDENTIAL NOTE
DIAGNOSIS:   Renal function test abnormal    DATE RECEIVED: 10.101.2024    NOTES STATUS DETAILS   OFFICE NOTE from referring provider Internal 05.24.2024 Mitzy Mo APRN CNP    MEDICATION LIST Internal    IMAGING  (NEED IMAGES AND REPORTS)     KIDNEY ULTRASOUND Internal 07.02.2024 US Renal Complete    LABS     CBC Internal 06.21.2024   CMP Internal 06.21.2024   BMP Internal 06.21.2024   UA Internal 06.21.2024   URINE PROTEIN Internal 06.21.2024

## 2024-08-16 VITALS
BODY MASS INDEX: 26.51 KG/M2 | DIASTOLIC BLOOD PRESSURE: 73 MMHG | HEART RATE: 77 BPM | WEIGHT: 200 LBS | RESPIRATION RATE: 18 BRPM | OXYGEN SATURATION: 96 % | TEMPERATURE: 100 F | HEIGHT: 73 IN | SYSTOLIC BLOOD PRESSURE: 169 MMHG

## 2024-08-16 LAB
ATRIAL RATE - MUSE: 83 BPM
BASOPHILS # BLD AUTO: 0 10E3/UL (ref 0–0.2)
BASOPHILS NFR BLD AUTO: 0 %
C PNEUM DNA SPEC QL NAA+PROBE: NOT DETECTED
DIASTOLIC BLOOD PRESSURE - MUSE: NORMAL MMHG
EOSINOPHIL # BLD AUTO: 0.1 10E3/UL (ref 0–0.7)
EOSINOPHIL NFR BLD AUTO: 1 %
ERYTHROCYTE [DISTWIDTH] IN BLOOD BY AUTOMATED COUNT: 12.6 % (ref 10–15)
FLUAV H1 2009 PAND RNA SPEC QL NAA+PROBE: NOT DETECTED
FLUAV H1 RNA SPEC QL NAA+PROBE: NOT DETECTED
FLUAV H3 RNA SPEC QL NAA+PROBE: NOT DETECTED
FLUAV RNA SPEC QL NAA+PROBE: NOT DETECTED
FLUBV RNA SPEC QL NAA+PROBE: NOT DETECTED
GLUCOSE BLDC GLUCOMTR-MCNC: 174 MG/DL (ref 70–99)
HADV DNA SPEC QL NAA+PROBE: NOT DETECTED
HCOV PNL SPEC NAA+PROBE: DETECTED
HCT VFR BLD AUTO: 26.7 % (ref 40–53)
HGB BLD-MCNC: 8.9 G/DL (ref 13.3–17.7)
HMPV RNA SPEC QL NAA+PROBE: NOT DETECTED
HPIV1 RNA SPEC QL NAA+PROBE: NOT DETECTED
HPIV2 RNA SPEC QL NAA+PROBE: NOT DETECTED
HPIV3 RNA SPEC QL NAA+PROBE: NOT DETECTED
HPIV4 RNA SPEC QL NAA+PROBE: NOT DETECTED
IMM GRANULOCYTES # BLD: 0.1 10E3/UL
IMM GRANULOCYTES NFR BLD: 1 %
INTERPRETATION ECG - MUSE: NORMAL
LYMPHOCYTES # BLD AUTO: 0.5 10E3/UL (ref 0.8–5.3)
LYMPHOCYTES NFR BLD AUTO: 7 %
M PNEUMO DNA SPEC QL NAA+PROBE: NOT DETECTED
MCH RBC QN AUTO: 31.6 PG (ref 26.5–33)
MCHC RBC AUTO-ENTMCNC: 33.3 G/DL (ref 31.5–36.5)
MCV RBC AUTO: 95 FL (ref 78–100)
MONOCYTES # BLD AUTO: 0.6 10E3/UL (ref 0–1.3)
MONOCYTES NFR BLD AUTO: 8 %
NEUTROPHILS # BLD AUTO: 6.3 10E3/UL (ref 1.6–8.3)
NEUTROPHILS NFR BLD AUTO: 84 %
NRBC # BLD AUTO: 0 10E3/UL
NRBC BLD AUTO-RTO: 0 /100
P AXIS - MUSE: 47 DEGREES
PATH REPORT.COMMENTS IMP SPEC: NORMAL
PATH REPORT.FINAL DX SPEC: NORMAL
PATH REPORT.MICROSCOPIC SPEC OTHER STN: NORMAL
PATH REPORT.MICROSCOPIC SPEC OTHER STN: NORMAL
PATH REPORT.RELEVANT HX SPEC: NORMAL
PLATELET # BLD AUTO: 73 10E3/UL (ref 150–450)
PR INTERVAL - MUSE: 252 MS
QRS DURATION - MUSE: 104 MS
QT - MUSE: 364 MS
QTC - MUSE: 427 MS
R AXIS - MUSE: 72 DEGREES
RBC # BLD AUTO: 2.82 10E6/UL (ref 4.4–5.9)
RETICS # AUTO: 0.04 10E6/UL (ref 0.03–0.1)
RETICS/RBC NFR AUTO: 1.6 % (ref 0.5–2)
RSV RNA SPEC QL NAA+PROBE: NOT DETECTED
RSV RNA SPEC QL NAA+PROBE: NOT DETECTED
RV+EV RNA SPEC QL NAA+PROBE: NOT DETECTED
SYSTOLIC BLOOD PRESSURE - MUSE: NORMAL MMHG
T AXIS - MUSE: 49 DEGREES
VENTRICULAR RATE- MUSE: 83 BPM
WBC # BLD AUTO: 7.5 10E3/UL (ref 4–11)

## 2024-08-16 PROCEDURE — 99235 HOSP IP/OBS SAME DATE MOD 70: CPT | Performed by: INTERNAL MEDICINE

## 2024-08-16 PROCEDURE — 85025 COMPLETE CBC W/AUTO DIFF WBC: CPT | Performed by: INTERNAL MEDICINE

## 2024-08-16 PROCEDURE — 99207 PR APP CREDIT; MD BILLING SHARED VISIT: CPT | Performed by: INTERNAL MEDICINE

## 2024-08-16 PROCEDURE — 99207 BLOOD MORPHOLOGY PATHOLOGIST REVIEW: CPT | Performed by: PATHOLOGY

## 2024-08-16 PROCEDURE — G0378 HOSPITAL OBSERVATION PER HR: HCPCS

## 2024-08-16 PROCEDURE — 85045 AUTOMATED RETICULOCYTE COUNT: CPT | Performed by: INTERNAL MEDICINE

## 2024-08-16 PROCEDURE — 36415 COLL VENOUS BLD VENIPUNCTURE: CPT | Performed by: INTERNAL MEDICINE

## 2024-08-16 PROCEDURE — 82962 GLUCOSE BLOOD TEST: CPT

## 2024-08-16 RX ORDER — FLUTICASONE PROPIONATE 50 MCG
2 SPRAY, SUSPENSION (ML) NASAL DAILY
Status: DISCONTINUED | OUTPATIENT
Start: 2024-08-16 | End: 2024-08-16 | Stop reason: HOSPADM

## 2024-08-16 RX ORDER — LISINOPRIL 10 MG/1
30 TABLET ORAL DAILY
Status: DISCONTINUED | OUTPATIENT
Start: 2024-08-16 | End: 2024-08-16 | Stop reason: HOSPADM

## 2024-08-16 RX ORDER — GUAIFENESIN/DEXTROMETHORPHAN 100-10MG/5
10 SYRUP ORAL EVERY 4 HOURS PRN
Qty: 473 ML | Refills: 0 | Status: SHIPPED | OUTPATIENT
Start: 2024-08-16

## 2024-08-16 RX ORDER — ONDANSETRON 2 MG/ML
4 INJECTION INTRAMUSCULAR; INTRAVENOUS EVERY 6 HOURS PRN
Status: DISCONTINUED | OUTPATIENT
Start: 2024-08-16 | End: 2024-08-16 | Stop reason: HOSPADM

## 2024-08-16 RX ORDER — IPRATROPIUM BROMIDE AND ALBUTEROL SULFATE 2.5; .5 MG/3ML; MG/3ML
3 SOLUTION RESPIRATORY (INHALATION) EVERY 4 HOURS PRN
Status: DISCONTINUED | OUTPATIENT
Start: 2024-08-16 | End: 2024-08-16 | Stop reason: HOSPADM

## 2024-08-16 RX ORDER — AMOXICILLIN 250 MG
2 CAPSULE ORAL 2 TIMES DAILY PRN
Status: DISCONTINUED | OUTPATIENT
Start: 2024-08-16 | End: 2024-08-16 | Stop reason: HOSPADM

## 2024-08-16 RX ORDER — ACETAMINOPHEN 325 MG/1
650 TABLET ORAL EVERY 4 HOURS PRN
Status: SHIPPED
Start: 2024-08-16

## 2024-08-16 RX ORDER — CARVEDILOL 6.25 MG/1
25 TABLET ORAL 2 TIMES DAILY WITH MEALS
Status: DISCONTINUED | OUTPATIENT
Start: 2024-08-16 | End: 2024-08-16 | Stop reason: HOSPADM

## 2024-08-16 RX ORDER — NICOTINE POLACRILEX 4 MG
15-30 LOZENGE BUCCAL
Status: DISCONTINUED | OUTPATIENT
Start: 2024-08-16 | End: 2024-08-16 | Stop reason: HOSPADM

## 2024-08-16 RX ORDER — GUAIFENESIN/DEXTROMETHORPHAN 100-10MG/5
10 SYRUP ORAL EVERY 4 HOURS PRN
Status: DISCONTINUED | OUTPATIENT
Start: 2024-08-16 | End: 2024-08-16 | Stop reason: HOSPADM

## 2024-08-16 RX ORDER — ONDANSETRON 4 MG/1
4 TABLET, ORALLY DISINTEGRATING ORAL EVERY 6 HOURS PRN
Status: DISCONTINUED | OUTPATIENT
Start: 2024-08-16 | End: 2024-08-16 | Stop reason: HOSPADM

## 2024-08-16 RX ORDER — ACETAMINOPHEN 650 MG/1
650 SUPPOSITORY RECTAL EVERY 4 HOURS PRN
Status: DISCONTINUED | OUTPATIENT
Start: 2024-08-16 | End: 2024-08-16 | Stop reason: HOSPADM

## 2024-08-16 RX ORDER — ATORVASTATIN CALCIUM 40 MG/1
40 TABLET, FILM COATED ORAL DAILY
Status: DISCONTINUED | OUTPATIENT
Start: 2024-08-16 | End: 2024-08-16 | Stop reason: HOSPADM

## 2024-08-16 RX ORDER — ACETAMINOPHEN 325 MG/1
650 TABLET ORAL EVERY 4 HOURS PRN
Status: DISCONTINUED | OUTPATIENT
Start: 2024-08-16 | End: 2024-08-16 | Stop reason: HOSPADM

## 2024-08-16 RX ORDER — LORATADINE 10 MG/1
10 TABLET ORAL DAILY
Status: DISCONTINUED | OUTPATIENT
Start: 2024-08-16 | End: 2024-08-16 | Stop reason: HOSPADM

## 2024-08-16 RX ORDER — AMOXICILLIN 250 MG
1 CAPSULE ORAL 2 TIMES DAILY PRN
Status: DISCONTINUED | OUTPATIENT
Start: 2024-08-16 | End: 2024-08-16 | Stop reason: HOSPADM

## 2024-08-16 RX ORDER — DEXTROSE MONOHYDRATE 25 G/50ML
25-50 INJECTION, SOLUTION INTRAVENOUS
Status: DISCONTINUED | OUTPATIENT
Start: 2024-08-16 | End: 2024-08-16 | Stop reason: HOSPADM

## 2024-08-16 RX ORDER — TAMSULOSIN HYDROCHLORIDE 0.4 MG/1
0.4 CAPSULE ORAL DAILY
Status: DISCONTINUED | OUTPATIENT
Start: 2024-08-16 | End: 2024-08-16 | Stop reason: HOSPADM

## 2024-08-16 ASSESSMENT — ACTIVITIES OF DAILY LIVING (ADL)
ADLS_ACUITY_SCORE: 40
ADLS_ACUITY_SCORE: 44
ADLS_ACUITY_SCORE: 44
ADLS_ACUITY_SCORE: 40
ADLS_ACUITY_SCORE: 43

## 2024-08-16 NOTE — ED NOTES
[facility]  ED Nurse Handoff Report    ED Chief complaint: Generalized Weakness, Fever, and Cough  . ED Diagnosis:   Final diagnoses:   None       Allergies:   Allergies   Allergen Reactions    Hydralazine Rash    Amlodipine Swelling     Ankle swelling     Imdur [Isosorbide Nitrate] Rash       Code Status: Full Code    Activity level - Baseline/Home:  standby.  Activity Level - Current:   assist of 1 wlaker  Lift room needed: No.   Bariatric: No   Needed: No   Isolation: No.   Infection: Not Applicable.     Respiratory status: Room air    Vital Signs (within 30 minutes):   Vitals:    08/15/24 2200 08/15/24 2206 08/15/24 2230 08/15/24 2300   BP:  (!) 156/67 (!) 142/70 (!) 154/66   Pulse: 83  83 80   Resp:       Temp:  99.4  F (37.4  C)     TempSrc:  Oral     SpO2: 96% 95%     Weight:       Height:           Cardiac Rhythm:  ,      Pain level:    Patient confused: No.   Patient Falls Risk: patient and family education and activity supervised.   Elimination Status: Has voided     Patient Report - Initial Complaint: weakness/fevers.   Focused Assessment: infection work up     Abnormal Results:   Labs Ordered and Resulted from Time of ED Arrival to Time of ED Departure   COMPREHENSIVE METABOLIC PANEL - Abnormal       Result Value    Sodium 137      Potassium 4.7      Carbon Dioxide (CO2) 19 (*)     Anion Gap 14      Urea Nitrogen 31.9 (*)     Creatinine 2.44 (*)     GFR Estimate 25 (*)     Calcium 9.4      Chloride 104      Glucose 156 (*)     Alkaline Phosphatase 74      AST 24      ALT 28      Protein Total 6.5      Albumin 3.9      Bilirubin Total 0.6     TROPONIN T, HIGH SENSITIVITY - Abnormal    Troponin T, High Sensitivity 38 (*)    NT PROBNP INPATIENT - Abnormal    N terminal Pro BNP Inpatient 3,024 (*)    ROUTINE UA WITH MICROSCOPIC REFLEX TO CULTURE - Abnormal    Color Urine Light Yellow      Appearance Urine Clear      Glucose Urine 50 (*)     Bilirubin Urine Negative       Ketones Urine Negative      Specific Gravity Urine 1.017      Blood Urine Small (*)     pH Urine 6.0      Protein Albumin Urine 300 (*)     Urobilinogen Urine Normal      Nitrite Urine Negative      Leukocyte Esterase Urine Negative      Mucus Urine Present (*)     RBC Urine 1      WBC Urine 4      Squamous Epithelials Urine <1     CBC WITH PLATELETS AND DIFFERENTIAL - Abnormal    WBC Count 9.5      RBC Count 3.50 (*)     Hemoglobin 11.0 (*)     Hematocrit 32.8 (*)     MCV 94      MCH 31.4      MCHC 33.5      RDW 12.6      Platelet Count 83 (*)     % Neutrophils 88      % Lymphocytes 4      % Monocytes 7      % Eosinophils 1      % Basophils 0      % Immature Granulocytes 1      NRBCs per 100 WBC 0      Absolute Neutrophils 8.4 (*)     Absolute Lymphocytes 0.3 (*)     Absolute Monocytes 0.6      Absolute Eosinophils 0.1      Absolute Basophils 0.0      Absolute Immature Granulocytes 0.1      Absolute NRBCs 0.0     INFLUENZA A/B, RSV, & SARS-COV2 PCR - Normal    Influenza A PCR Negative      Influenza B PCR Negative      RSV PCR Negative      SARS CoV2 PCR Negative     LACTIC ACID WHOLE BLOOD WITH 1X REPEAT IN 2 HR WHEN >2 - Normal    Lactic Acid, Initial 1.1     TROPONIN T, HIGH SENSITIVITY   BLOOD CULTURE        XR Chest 2 Views   Final Result   IMPRESSION: Negative chest.          Treatments provided: fluids, obs admit,   Family Comments: wife and son supportive  OBS brochure/video discussed/provided to patient:  Yes  ED Medications:   Medications   sodium chloride 0.9% BOLUS 1,000 mL (0 mLs Intravenous Stopped 8/15/24 2132)   acetaminophen (TYLENOL) tablet 1,000 mg (1,000 mg Oral $Given 8/15/24 2037)       Drips infusing:  No  For the majority of the shift this patient was Green.   Interventions performed were na.    Sepsis treatment initiated: No    Cares/treatment/interventions/medications to be completed following ED care: see orders,     ED Nurse Name: Fadi DONG Son, RN  11:20 PM

## 2024-08-16 NOTE — H&P
[facility]       Hospitalist History & Physical     Assessment & Plan     ASSESSMENT    84M with history of hypertension, NIDDM Type II, CKD stage IV, and CAD s/p CABG presents with generalized weakness and fevers suspect due to viral URI.  Workup and treatment per below.    PLAN    Viral Upper Respiratory Infection  -Presents with nonproductive cough, fever to 100.3, rhinorrhea, and sore throat  -No leukocytosis and CXR negative, suspect viral URI  -COVID/influenza/RSV negative, will obtain viral respiratory panel for further workup  PLAN  -Viral respiratory panel  -Follow-up blood cultures  -Supportive care with cough suppressants, Tylenol, and as needed nebulizers    Generalized Weakness & Ambulatory Dysfunction  -Suspect due to viral infection above  -Physical therapy consultation    Acute on Chronic Thrombocytopenia  -Platelet count of 83, baseline ~110  -No evidence of TTP, HUS, DIC, or HIT  -Suspect due to a viral infection above but will obtain manual smear for further workup    Other issues  -CKD stage IV: Creatinine at baseline  -Essential hypertension: Home antihypertensives  -Type II MI: Suspect due to pathology above, treatment per above  -NIDDM Type II: MDSS  -Elevated BNP: No clinical signs of CHF, can consider echo on an outpatient basis  -CAD s/p CABG: Home medications    DVT Prophy  -SCDs    Disposition  -Observation unit      Chava Carlos MD    History of Present Illness     Merlin J Anderson is a 84 year old with history of hypertension, NIDDM Type II, CKD stage IV, and CAD s/p CABG presents with generalized weakness and fevers.  Patient was in his normal state of health until about 3 days ago when he started experiencing a nonproductive cough.  Also with subjective fevers and chills.  Today unable to get himself out of bed due to generalized weakness so family brought him to the ED.  Patient also endorses sore throat and rhinorrhea but denies shortness of breath, abdominal pain,  nausea, vomiting, or diarrhea.  No sick contacts.  In the ED, temperature 100.3.  Other VSS. COVID/influenza/RSV negative.  CXR negative.  Admitted to medicine.    Review of Systems     A Comprehensive greater than 10 system review of systems was carried out.  Pertinent positives and negatives are noted above.  Otherwise negative for contributory information.     Past Medical History     Past Medical History:   Diagnosis Date    Abnormal stress test     Carotid arterial disease (H24) 9/18/2017    CKD (chronic kidney disease) stage 3, GFR 30-59 ml/min (H) 12/27/2011    DM2 (diabetes mellitus, type 2) (H)     Pre diabetic    LUNA (dyspnea on exertion)     Essential hypertension, benign     Gout 11/14/2002     Problem list name updated by automated process. Provider to review    HTN (hypertension) 6/29/2009     (Problem list name updated by automated process. Provider to review and confirm.)    Hyperlipidemia LDL goal <100 10/31/2010    Mixed hyperlipidemia 11/14/2002    PVD (peripheral vascular disease) (H24) 9/8/2017    S/P CABG (coronary artery bypass graft) 10/26/2018    Type 2 diabetes mellitus without complication (H) 11/30/2015     Medications     Current Outpatient Medications   Medication Sig Dispense Refill    acetaminophen (TYLENOL) 325 MG tablet Take 2 tablets (650 mg) by mouth every 4 hours as needed for mild pain 100 tablet     aspirin 325 MG EC tablet Take 1 tablet (325 mg) by mouth daily 40 tablet     atorvastatin (LIPITOR) 40 MG tablet TAKE ONE TABLET BY MOUTH ONE TIME DAILY 90 tablet 1    blood glucose calibration (ACCU-CHEK PRISCILLA) solution Use to calibrate blood glucose monitor as needed as directed. 1 Bottle 0    blood glucose monitoring (ACCU-CHEK PRISCILLA) test strip Use to test blood sugars 1 times daily or as directed. 100 each 1    blood glucose monitoring (ACCU-CHEK MULTICLIX) lancets Use to test blood sugar 1 times daily or as directed. 1 Box 1    blood glucose monitoring (NO BRAND SPECIFIED)  meter device kit Use to test blood sugar 1 times daily or as directed. 1 kit 0    carvedilol (COREG) 12.5 MG tablet Take 2 tablets (25 mg) by mouth 2 times daily (with meals) 360 tablet 0    fluticasone (FLONASE) 50 MCG/ACT nasal spray Spray 1-2 sprays into both nostrils daily as needed for rhinitis or allergies 16 g 4    hydrochlorothiazide (HYDRODIURIL) 25 MG tablet Take 1 tablet (25 mg) by mouth daily 90 tablet 3    lisinopril (ZESTRIL) 30 MG tablet Take 1 tablet (30 mg) by mouth daily 90 tablet 3    loratadine (CLARITIN) 10 MG tablet Take 1 tablet (10 mg) by mouth daily 10 tablet 0    metFORMIN (GLUCOPHAGE) 500 MG tablet Take 1 tablet (500 mg) by mouth 2 times daily (with meals) 180 tablet 3    multivitamin w/minerals (THERA-VIT-M) tablet Take 1 tablet by mouth every morning      nitroGLYcerin (NITROSTAT) 0.4 MG sublingual tablet For chest pain place 1 tablet under the tongue every 5 minutes for 3 doses. If symptoms persist 5 minutes after 1st dose call 911. 25 tablet 3    predniSONE (DELTASONE) 10 MG tablet Take 3 tabs by mouth daily x 3 days, then 2 tabs daily x 3 days, then 1 tab daily x 3 days, then 1/2 tab daily x 3 days. 20 tablet 0    tamsulosin (FLOMAX) 0.4 MG capsule Take 1 capsule (0.4 mg) by mouth daily 90 capsule 3      Past Surgical History     Past Surgical History:   Procedure Laterality Date    BYPASS GRAFT ARTERY CORONARY N/A 10/26/2018    Procedure: CORONARY ARTERY BYPASS GRAFTING X 4 WITH LEFT LEG ENDOSCOPIC VEIN HARVESTING LIMA - LAD  SV- RIGHT PDA, OM2, OM1 ON PUMP/BETH IAPB PLACED PER CATH LAB;  Surgeon: Heath Kessler MD;  Location: [facility] OR    COLONOSCOPY      DECOMPRESSION, FUSION LUMBAR POSTERIOR ONE LEVEL, COMBINED  8/24/2012    Procedure: COMBINED DECOMPRESSION, FUSION LUMBAR POSTERIOR ONE LEVEL;  Posterior Fusion wtih Decompression L4-5;  Surgeon: Rod Carbajal MD;  Location: [facility] OR    ENDARTERECTOMY CAROTID Right 9/18/2017    Procedure: ENDARTERECTOMY CAROTID;  RIGHT  "CAROTID ENDARTERECTOMY, WITH PATCH ANGIOPLASTY, WITH ELECTOENCEPHALOGAM            (EEG);  Surgeon: Catalino Scott MD;  Location: [facility] OR    HERNIA REPAIR      [facility] NONSPECIFIC PROCEDURE  04/97    Neg. colonoscopy.    [facility] NONSPECIFIC PROCEDURE      S/P ing. hernia.     Family History     Family History   Problem Relation Age of Onset    Diabetes Father     Unknown/Adopted Mother      Allergies     Allergies   Allergen Reactions    Hydralazine Rash    Amlodipine Swelling     Ankle swelling     Imdur [Isosorbide Nitrate] Rash     Social History     Social History     Tobacco Use    Smoking status: Never    Smokeless tobacco: Never   Substance Use Topics    Alcohol use: Not Currently     Physical Exam   Blood pressure (!) 154/66, pulse 80, temperature 99.4  F (37.4  C), temperature source Oral, resp. rate 17, height 1.854 m (6' 1\"), weight 90.7 kg (200 lb), SpO2 95%.    General: Ill appearing, cooperative with exam, in NAD.  HEENT: Atraumatic. No erythema in posterior pharynx.  Lymph: No cervical or inguinal lymphadenopathy.  Cardiac: RRR. No murmurs.  Lungs: CTAB. Nl WOB.  Abd: Non-tender. No rebound or gaurding. Nl bowel sounds.  Ext: No edema. 2+ pulses.  Skin: No rashes, abrasions, or contusions.  Psych: A&Ox3. Nl affect.  Neuro: 5/5 strength. Sensation intact.    Labs & Imaging     Reviewed and Pertinent results discussed in assessment and plan.      "

## 2024-08-16 NOTE — DISCHARGE SUMMARY
[facility]  Hospitalist Discharge Summary      Date of Admission:  8/15/2024  Date of Discharge:  8/16/2024  8:20 AM  Discharging Provider: Waldo Ortega MD  Discharge Service: Hospitalist Service  Primary Care Physician   Sujit Duke    Discharge Diagnoses   Upper respiratory infection due to coronavirus  Generalized weakness  Thrombocytopenia  Stage IV chronic kidney disease  Essential hypertension type 2 diabetes  History of coronary disease  Polypharmacy    Hospital Course     84M with history of hypertension, NIDDM Type II, CKD stage IV, and CAD s/p CABG presents with generalized weakness and fevers s found to have a viral URI due to coronavirus.  Workup and treatment per below.     PLAN     Viral Upper Respiratory Infection  -Presented with nonproductive cough, fever to 100.3, rhinorrhea, and sore throat  -No leukocytosis and CXR negative, suspect viral URI  -COVID/influenza/RSV negative,   -viral respiratory panel was positive for coronavirus  -Patient did quite well with supportive care with cough suppressants, Tylenol, and as needed nebulizers  -Patient home with Robitussin and Tylenol.     Generalized Weakness & Ambulatory Dysfunction  -Suspect due to viral infection above       Acute on Chronic Thrombocytopenia  -Platelet count of 83, baseline ~110  -No evidence of TTP, HUS, DIC, or HIT  -Suspect due to a viral infection above but will obtain manual smear for further workup    Polypharmacy  -Patient is at high risk for polypharmacy given his age and frailty.  Medications can will certainly be contributing factor to side effects.  His primary care provider needs to look at his medications and see both get rid of.     Other issues  -CKD stage IV: Creatinine at baseline  -Essential hypertension: Home antihypertensives  -Type II MI: Suspect due to pathology above, treatment per above  -NIDDM Type II: MDSS  -Elevated BNP: No clinical signs of CHF, can consider echo on an  "outpatient basis  -CAD s/p CABG: Home medications       Clinically Significant Risk Factors     # Overweight: Estimated body mass index is 26.39 kg/m  as calculated from the following:    Height as of this encounter: 1.854 m (6' 1\").    Weight as of this encounter: 90.7 kg (200 lb).       Significant Results and Procedures   Most Recent 3 CBC's:  Recent Labs   Lab Test 08/16/24  0107 08/15/24  2017 06/21/24  1214   WBC 7.5 9.5 5.8   HGB 8.9* 11.0* 9.1*   MCV 95 94 99   PLT 73* 83* 127*     Most Recent 3 BMP's:  Recent Labs   Lab Test 08/16/24  0142 08/15/24  2017 06/21/24  1214 06/18/24  1415   NA  --  137 137 137   POTASSIUM  --  4.7 4.9 4.9   CHLORIDE  --  104 107 109*   CO2  --  19* 20* 18*   BUN  --  31.9* 53.3* 54.4*   CR  --  2.44* 2.58* 3.03*   ANIONGAP  --  14 10 10   VICKY  --  9.4 9.2 8.9   * 156* 147* 162*   ,   Results for orders placed or performed during the hospital encounter of 08/15/24   XR Chest 2 Views    Narrative    EXAM: XR CHEST 2 VIEWS  LOCATION: [facility]  DATE: 8/15/2024    INDICATION: Cough, fever, weakness.  COMPARISON: Chest x-ray 10/30/2018.      Impression    IMPRESSION: Negative chest.             Follow up/instructions: Patient to follow-up with primary care provider as needed if he is not feeling better in the next couple days.    Pending test results at discharge:   Unresulted Labs Ordered in the Past 30 Days of this Admission       Date and Time Order Name Status Description    8/16/2024  1:07 AM Bld morphology pathology review In process     8/15/2024 11:20 PM Blood Culture Hand, Right In process             Discharge Orders      Reason for your hospital stay    Upper respiratory virus infection     Follow-up and recommended labs and tests     Follow up with primary care provider, Sujit Duke, within 7 days as needed, for hospital follow- up.     Activity    Your activity upon discharge: activity as tolerated     Diet    Follow this diet upon " discharge: Orders Placed This Encounter      Moderate Consistent Carb (60 g CHO per Meal) Diet       Discharge Disposition   Discharged to home  Condition at discharge: Stable      Consultations This Hospital Stay   PHYSICAL THERAPY ADULT IP CONSULT    Code Status   Prior    Time Spent on this Encounter   I, Waldo Ortega MD, personally saw the patient today and spent less than or equal to 30 minutes discharging this patient.  Patient was new to me.  Discussed with nursing in the emergency room          This document was created using voice recognition technology.  Please excuse any typographical errors that may have occurred.  Please call with any questions.       Waldo Ortega MD  [facility] EMERGENCY DEPT  [address] [place] [address]  Phone: [phone]  Fax: [phone]  ______________________________________________________________________    Physical Exam   Vital Signs: Temp: 100  F (37.8  C) Temp src: Oral BP: (!) 169/73 Pulse: 77   Resp: 18 SpO2: 96 % O2 Device: None (Room air)    Weight: 200 lbs 0 oz    General appearance: Patient is alert and oriented x3, no apparent distress, pleasant and conversing normally, speaking in full sentences, appears stated age, patient actually appears better than he did on admission  HEENT:   Mucous membranes are moist  RESPIRATORY: Clear to auscultation bilateral, good air movement  CARDIOVASCULAR: Regular rate and rhythm, normal S1/S2, no murmurs  GASTROINTESTINAL: Non-distended, non-tender, soft, bowel sounds present throughout  NEUROLOGIC:  Cranial nerves II-XII intact, without any focal deficits, strength 5/5 throughout  EXTREMITIES:  Moves all extremities, no clubbing, cyanosis, nor edema  :  Garcia not present         Discharge Medications   Discharge Medication List as of 8/16/2024  6:18 AM        START taking these medications    Details   !! acetaminophen (TYLENOL) 325 MG tablet Take 2 tablets (650 mg) by mouth every 4  hours as needed for mild pain or other (and adjunct with moderate or severe pain or per patient request), No Print Out      guaiFENesin-dextromethorphan (ROBITUSSIN DM) 100-10 MG/5ML syrup Take 10 mLs by mouth every 4 hours as needed for cough, Disp-473 mL, R-0, E-Prescribe       !! - Potential duplicate medications found. Please discuss with provider.        CONTINUE these medications which have NOT CHANGED    Details   !! acetaminophen (TYLENOL) 325 MG tablet Take 2 tablets (650 mg) by mouth every 4 hours as needed for mild pain, Disp-100 tablet, Transitional      aspirin 325 MG EC tablet Take 1 tablet (325 mg) by mouth daily, Disp-40 tablet, Transitional      atorvastatin (LIPITOR) 40 MG tablet TAKE ONE TABLET BY MOUTH ONE TIME DAILY, Disp-90 tablet, R-1, E-Prescribe      blood glucose calibration (ACCU-CHEK PRISCILLA) solution Use to calibrate blood glucose monitor as needed as directed., Disp-1 Bottle, R-0, E-Prescribe      blood glucose monitoring (ACCU-CHEK PRISCILLA) test strip Use to test blood sugars 1 times daily or as directed., Disp-100 each, R-1, E-Prescribe      blood glucose monitoring (ACCU-CHEK MULTICLIX) lancets Use to test blood sugar 1 times daily or as directed.Disp-1 Box, [id number]      blood glucose monitoring (NO BRAND SPECIFIED) meter device kit Use to test blood sugar 1 times daily or as directed.Disp-1 kit, [id number]      carvedilol (COREG) 12.5 MG tablet Take 2 tablets (25 mg) by mouth 2 times daily (with meals), Disp-360 tablet, R-0, E-Prescribe      fluticasone (FLONASE) 50 MCG/ACT nasal spray Spray 1-2 sprays into both nostrils daily as needed for rhinitis or allergies, Disp-16 g,R-4, E-Prescribe      hydrochlorothiazide (HYDRODIURIL) 25 MG tablet Take 1 tablet (25 mg) by mouth daily, Disp-90 tablet, R-3, E-Prescribe      lisinopril (ZESTRIL) 30 MG tablet Take 1 tablet (30 mg) by mouth daily, Disp-90 tablet, R-3, E-Prescribe      loratadine (CLARITIN) 10 MG tablet Take 1 tablet (10  mg) by mouth daily, Disp-10 tablet, R-0, E-Prescribe      metFORMIN (GLUCOPHAGE) 500 MG tablet Take 1 tablet (500 mg) by mouth 2 times daily (with meals), Disp-180 tablet, R-3, E-Prescribe      multivitamin w/minerals (THERA-VIT-M) tablet Take 1 tablet by mouth every morning, Historical      nitroGLYcerin (NITROSTAT) 0.4 MG sublingual tablet For chest pain place 1 tablet under the tongue every 5 minutes for 3 doses. If symptoms persist 5 minutes after 1st dose call 911., Disp-25 tablet, R-3, E-Prescribe      predniSONE (DELTASONE) 10 MG tablet Take 3 tabs by mouth daily x 3 days, then 2 tabs daily x 3 days, then 1 tab daily x 3 days, then 1/2 tab daily x 3 days., Disp-20 tablet, R-0, E-Prescribe      tamsulosin (FLOMAX) 0.4 MG capsule Take 1 capsule (0.4 mg) by mouth daily, Disp-90 capsule, R-3, E-Prescribe       !! - Potential duplicate medications found. Please discuss with provider.        Allergies   Allergies   Allergen Reactions    Hydralazine Rash    Amlodipine Swelling     Ankle swelling     Imdur [Isosorbide Nitrate] Rash

## 2024-08-16 NOTE — ED PROVIDER NOTES
"  Emergency Department Note      History of Present Illness     Chief Complaint   Generalized Weakness, Fever, and Cough      HPI   Merlin J Anderson is a 84 year old male with a history as noted below who presents to the ED today for evaluation of generalized weakness and a cough. The patient's family member reports Merlin was at a [employer] game five days ago where he overheated and had to be taken away by ambulance. He started to feel sick with chills, body aches, a cough, and overall weakness three days ago to the point where he couldn't walk on his own. The patient reports his throat hurts at presentation. He hasn't taken any Tylenol today. He denies any abdominal pain or pain with urination. Also denies any hemoptysis, vomiting, or diarrhea. No one else at home is sick. The patient is not anticoagulated and has no history of UTIs. He does mention that he has a history of heart blocks and he has kidney disease.     Independent Historian   None    Review of External Notes       Past Medical History     Medical History and Problem List   Abnormal stress test  Carotid arterial disease  CKD stage 3  Type 2 diabetes mellitus   LUNA  Hypertension   Gout  Hyperlipidemia   PVD    Medications   Aspirin 325 mg   Lipitor  Carvedilol   Fluticasone   Hydrochlorothiazide   Lisinopri   Loratadine   Metformin   Nitroglycerin   Prednisone   Tamsulosin   Comirnaty     Surgical History   CABG  Lumbar fusion  Carotid endarterectomy  Hernia repair    Physical Exam     Patient Vitals for the past 24 hrs:   BP Temp Temp src Pulse Resp SpO2 Height Weight   08/15/24 2206 (!) 156/67 99.4  F (37.4  C) Oral -- -- 95 % -- --   08/15/24 2200 -- -- -- 83 -- 96 % -- --   08/15/24 2136 (!) 166/76 -- -- -- -- 96 % -- --   08/15/24 2110 (!) 169/78 -- -- 85 -- 95 % -- --   08/15/24 1846 (!) 170/77 100.3  F (37.9  C) Temporal 82 17 95 % 1.854 m (6' 1\") 90.7 kg (200 lb)     Physical Exam  Constitutional: Nontoxic appearing.  HEENT: Atraumatic.  PERRL. "  EOMI.  Moist mucous membranes.  Neck: Soft.  Supple.  No JVD.  Cardiac: Regular rate and rhythm.  No murmur or rub.  Respiratory: Clear to auscultation bilaterally.  No respiratory distress.  No wheezing, rhonchi, or rales.  Abdomen: Soft and nontender.  No guarding.  Nondistended.  Musculoskeletal: No edema.  Normal range of motion.  Neurologic: Alert and oriented x3.  Normal tone and bulk.  No facial drooping.  Normal speech.  4/5 strength in bilateral upper and lower extremities.   Skin: No rashes.  No edema.  Psych: Normal affect.  Normal behavior.              Diagnostics     Lab Results   Labs Ordered and Resulted from Time of ED Arrival to Time of ED Departure   COMPREHENSIVE METABOLIC PANEL - Abnormal       Result Value    Sodium 137      Potassium 4.7      Carbon Dioxide (CO2) 19 (*)     Anion Gap 14      Urea Nitrogen 31.9 (*)     Creatinine 2.44 (*)     GFR Estimate 25 (*)     Calcium 9.4      Chloride 104      Glucose 156 (*)     Alkaline Phosphatase 74      AST 24      ALT 28      Protein Total 6.5      Albumin 3.9      Bilirubin Total 0.6     TROPONIN T, HIGH SENSITIVITY - Abnormal    Troponin T, High Sensitivity 38 (*)    NT PROBNP INPATIENT - Abnormal    N terminal Pro BNP Inpatient 3,024 (*)    ROUTINE UA WITH MICROSCOPIC REFLEX TO CULTURE - Abnormal    Color Urine Light Yellow      Appearance Urine Clear      Glucose Urine 50 (*)     Bilirubin Urine Negative      Ketones Urine Negative      Specific Gravity Urine 1.017      Blood Urine Small (*)     pH Urine 6.0      Protein Albumin Urine 300 (*)     Urobilinogen Urine Normal      Nitrite Urine Negative      Leukocyte Esterase Urine Negative      Mucus Urine Present (*)     RBC Urine 1      WBC Urine 4      Squamous Epithelials Urine <1     CBC WITH PLATELETS AND DIFFERENTIAL - Abnormal    WBC Count 9.5      RBC Count 3.50 (*)     Hemoglobin 11.0 (*)     Hematocrit 32.8 (*)     MCV 94      MCH 31.4      MCHC 33.5      RDW 12.6      Platelet Count  83 (*)     % Neutrophils 88      % Lymphocytes 4      % Monocytes 7      % Eosinophils 1      % Basophils 0      % Immature Granulocytes 1      NRBCs per 100 WBC 0      Absolute Neutrophils 8.4 (*)     Absolute Lymphocytes 0.3 (*)     Absolute Monocytes 0.6      Absolute Eosinophils 0.1      Absolute Basophils 0.0      Absolute Immature Granulocytes 0.1      Absolute NRBCs 0.0     INFLUENZA A/B, RSV, & SARS-COV2 PCR - Normal    Influenza A PCR Negative      Influenza B PCR Negative      RSV PCR Negative      SARS CoV2 PCR Negative     LACTIC ACID WHOLE BLOOD WITH 1X REPEAT IN 2 HR WHEN >2 - Normal    Lactic Acid, Initial 1.1         Imaging   XR Chest 2 Views   Final Result   IMPRESSION: Negative chest.          EKG   ECG taken at 2037  Sinus rhythm with 1st degree AV block  Anterior infarct, age undetermined   Rate 83 bpm. SC interval 252 ms. QRS duration 104 ms. QT/QTc 364/427 ms. P-R-T axes 47 72 49.    Independent Interpretation   CXR: No infiltrate.    ED Course      Medications Administered   Medications   sodium chloride 0.9% BOLUS 1,000 mL (0 mLs Intravenous Stopped 8/15/24 2132)   acetaminophen (TYLENOL) tablet 1,000 mg (1,000 mg Oral $Given 8/15/24 2037)       Discussion of Management   Admitting HospitalistAlyson    ED Course   ED Course as of 08/15/24 2233   Thu Aug 15, 2024   2005 I obtained history and examined the patient as noted above.        Additional Documentation  None    Medical Decision Making / Diagnosis     CMS Diagnoses: None    MIPS       None    MDM   Merlin J Anderson is a 84 year old male who is febrile but otherwise hemodynamically stable.  He is nontoxic-appearing.  Broad workup is initiated.  Chest x-ray without acute infiltrate.  He has no leukocytosis.  UA without obvious infection.  Given his fever and cough, this could be secondary to viral URI leading to his weakness.  He does have mildly elevated troponin, however, it appears similar to his troponins when he was here few  months ago.  BNP is elevated of unclear etiology as he does not appear volume overloaded.  Given his underlying weakness and unable to ambulate, plan for admission and he is in agreement.  Spoke with hospital service who accepts for admission and he is in stable condition at time of admission.    Disposition   The patient was admitted to the hospital.     Diagnosis     ICD-10-CM    1. Generalized weakness  R53.1       2. Fever, unspecified fever cause  R50.9       3. Upper respiratory tract infection, unspecified type  J06.9              Scribe Disclosure:  I, Fallon Rushing, am serving as a scribe at 8:23 PM on 8/15/2024 to document services personally performed by Trey Gray MD based on my observations and the provider's statements to me.        Trey Gray MD  08/16/24 1106

## 2024-08-16 NOTE — PLAN OF CARE
"Goal Outcome Evaluation:      Plan of Care Reviewed With: patient    Overall Patient Progress: no changeOverall Patient Progress: no change    Outcome Evaluation: Patient slept between cares. Coronavirus detected . Isolation precautions in place. RA. Denies SOB. Ambulated to the bathroom with A1.     BP (!) 154/73   Pulse 74   Temp 99  F (37.2  C) (Oral)   Resp 16   Ht 1.854 m (6' 1\")   Wt 90.7 kg (200 lb)   SpO2 97%   BMI 26.39 kg/m      Problem: Adult Inpatient Plan of Care  Goal: Plan of Care Review  Description: The Plan of Care Review/Shift note should be completed every shift.  The Outcome Evaluation is a brief statement about your assessment that the patient is improving, declining, or no change.  This information will be displayed automatically on your shift  note.  Outcome: Not Progressing  Flowsheets (Taken 8/16/2024 0544)  Outcome Evaluation: Patient slept between cares. Coronavirus detected . Isolation precautions in place. RA. Denies SOB. Ambulated to the bathroom with A1.  Plan of Care Reviewed With: patient  Overall Patient Progress: no change  Goal: Patient-Specific Goal (Individualized)  Description: You can add care plan individualizations to a care plan. Examples of Individualization might be:  \"Parent requests to be called daily at 9am for status\", \"I have a hard time hearing out of my right ear\", or \"Do not touch me to wake me up as it startles  me\".  Outcome: Not Progressing  Goal: Absence of Hospital-Acquired Illness or Injury  Outcome: Not Progressing  Goal: Optimal Comfort and Wellbeing  Outcome: Not Progressing  Goal: Readiness for Transition of Care  Outcome: Not Progressing     "

## 2024-08-19 ENCOUNTER — PATIENT OUTREACH (OUTPATIENT)
Dept: INTERNAL MEDICINE | Facility: CLINIC | Age: 84
End: 2024-08-19
Payer: COMMERCIAL

## 2024-08-19 NOTE — TELEPHONE ENCOUNTER
Spoke to wife, she said patient was doing better. They do not want to follow-up with primary care at this time. Will call back if symptoms do not resolve       Transitions of Care Outreach  Chief Complaint   Patient presents with    Hospital F/U       Most Recent Admission Date: 8/15/2024   Most Recent Admission Diagnosis:      Most Recent Discharge Date: 8/16/2024   Most Recent Discharge Diagnosis: Generalized weakness - R53.1  Fever, unspecified fever cause - R50.9  Upper respiratory tract infection, unspecified type - J06.9     Transitions of Care Assessment    Discharge Assessment  How are you doing now that you are home?: doing better, not as weak  How are your symptoms? (Red Flag symptoms escalate to triage hotline per guidelines): Improved  Do you know how to contact your clinic care team if you have future questions or changes to your health status? : Yes  Does the patient have their discharge instructions? : Yes  Does the patient have questions regarding their discharge instructions? : No  Were you started on any new medications or were there changes to any of your previous medications? : No  Does the patient have all of their medications?: Yes  Do you have questions regarding any of your medications? : No  Do you have all of your needed medical supplies or equipment (DME)?  (i.e. oxygen tank, CPAP, cane, etc.): Yes    Follow up Plan     Discharge Follow-Up  Discharge follow up appointment scheduled in alignment with recommended follow up timeframe or Transitions of Risk Category? (Low = within 30 days; Moderate= within 14 days; High= within 7 days): No  Patient's follow up appointment not scheduled: Patient declined scheduling support. Education on the importance of transitions of care follow up. Provided scheduling phone number.    Future Appointments   Date Time Provider Department Center   10/11/2024  8:00 AM Elinor Key MD [facility]   12/10/2024  8:30 AM VIKA SANDY   12/17/2024   2:30 PM Zane Tucker MD [facility]       Outpatient Plan as outlined on AVS reviewed with patient.    For any urgent concerns, please contact our 24 hour nurse triage line: [phone] ([phone])       JAQUAN Glover RN 5:07 PM August 19, 2024   [facility]

## 2024-08-21 LAB — BACTERIA BLD CULT: NO GROWTH

## 2024-10-02 DIAGNOSIS — N18.4 CKD (CHRONIC KIDNEY DISEASE) STAGE 4, GFR 15-29 ML/MIN (H): Primary | ICD-10-CM

## 2024-10-03 NOTE — NURSING NOTE
"Vital signs:  Temp: 99.5  F (37.5  C) Temp src: Oral BP: 138/60 Pulse: 83   Resp: 12 SpO2: 94 %     Height: 185.4 cm (6' 1\") Weight: 90.3 kg (199 lb)  Estimated body mass index is 26.25 kg/m  as calculated from the following:    Height as of this encounter: 1.854 m (6' 1\").    Weight as of this encounter: 90.3 kg (199 lb).          " (0) blue, pale

## 2024-10-04 ENCOUNTER — TELEPHONE (OUTPATIENT)
Dept: INTERNAL MEDICINE | Facility: CLINIC | Age: 84
End: 2024-10-04
Payer: COMMERCIAL

## 2024-10-04 NOTE — TELEPHONE ENCOUNTER
Reason for Call:  Appointment Request    Patient requesting this type of appt:  pt wife Fatou for Merlin    Requested provider: Sujit Duke    Reason patient unable to be scheduled: Not within requested timeframe    When does patient want to be seen/preferred time: 1-2 days    Comments: pt has diarrhea - wife concerned it could be due to all the meds he takes. Please contact pt wife back for apt - hoping to get in asap.    Could we send this information to you in [employer] or would you prefer to receive a phone call?:   Patient would prefer a phone call   Okay to leave a detailed message?: Yes at Home number on file [phone] (home)    Call taken on 10/4/2024 at 11:10 AM by Krista Warner

## 2024-10-05 ENCOUNTER — HOSPITAL ENCOUNTER (EMERGENCY)
Facility: CLINIC | Age: 84
Discharge: HOME | End: 2024-10-05
Payer: COMMERCIAL

## 2024-10-05 ENCOUNTER — APPOINTMENT (OUTPATIENT)
Dept: CT IMAGING | Facility: CLINIC | Age: 84
End: 2024-10-05
Payer: COMMERCIAL

## 2024-10-05 VITALS
WEIGHT: 181 LBS | HEART RATE: 59 BPM | RESPIRATION RATE: 18 BRPM | TEMPERATURE: 98.1 F | SYSTOLIC BLOOD PRESSURE: 173 MMHG | OXYGEN SATURATION: 100 % | HEIGHT: 73 IN | BODY MASS INDEX: 23.99 KG/M2 | DIASTOLIC BLOOD PRESSURE: 72 MMHG

## 2024-10-05 DIAGNOSIS — R19.7 DIARRHEA: ICD-10-CM

## 2024-10-05 DIAGNOSIS — K57.30 DIVERTICULOSIS OF LARGE INTESTINE WITHOUT HEMORRHAGE: ICD-10-CM

## 2024-10-05 LAB
ALBUMIN SERPL BCG-MCNC: 3.9 G/DL (ref 3.5–5.2)
ALBUMIN UR-MCNC: 100 MG/DL
ALP SERPL-CCNC: 68 U/L (ref 40–150)
ALT SERPL W P-5'-P-CCNC: 16 U/L (ref 0–70)
ANION GAP SERPL CALCULATED.3IONS-SCNC: 14 MMOL/L (ref 7–15)
APPEARANCE UR: CLEAR
AST SERPL W P-5'-P-CCNC: 19 U/L (ref 0–45)
BASOPHILS # BLD AUTO: 0 10E3/UL (ref 0–0.2)
BASOPHILS NFR BLD AUTO: 0 %
BILIRUB SERPL-MCNC: 0.4 MG/DL
BILIRUB UR QL STRIP: NEGATIVE
BUN SERPL-MCNC: 49.4 MG/DL (ref 8–23)
CALCIUM SERPL-MCNC: 8.9 MG/DL (ref 8.8–10.4)
CHLORIDE SERPL-SCNC: 103 MMOL/L (ref 98–107)
COLOR UR AUTO: ABNORMAL
CREAT SERPL-MCNC: 2.88 MG/DL (ref 0.67–1.17)
EGFRCR SERPLBLD CKD-EPI 2021: 21 ML/MIN/1.73M2
EOSINOPHIL # BLD AUTO: 0.2 10E3/UL (ref 0–0.7)
EOSINOPHIL NFR BLD AUTO: 5 %
ERYTHROCYTE [DISTWIDTH] IN BLOOD BY AUTOMATED COUNT: 12.5 % (ref 10–15)
FLUAV RNA SPEC QL NAA+PROBE: NEGATIVE
FLUBV RNA RESP QL NAA+PROBE: NEGATIVE
GLUCOSE SERPL-MCNC: 170 MG/DL (ref 70–99)
GLUCOSE UR STRIP-MCNC: NEGATIVE MG/DL
HCO3 SERPL-SCNC: 19 MMOL/L (ref 22–29)
HCT VFR BLD AUTO: 30.7 % (ref 40–53)
HGB BLD-MCNC: 10.5 G/DL (ref 13.3–17.7)
HGB UR QL STRIP: NEGATIVE
HYALINE CASTS: 1 /LPF
IMM GRANULOCYTES # BLD: 0 10E3/UL
IMM GRANULOCYTES NFR BLD: 0 %
KETONES UR STRIP-MCNC: NEGATIVE MG/DL
LACTATE SERPL-SCNC: 0.8 MMOL/L (ref 0.7–2)
LEUKOCYTE ESTERASE UR QL STRIP: ABNORMAL
LIPASE SERPL-CCNC: 21 U/L (ref 13–60)
LYMPHOCYTES # BLD AUTO: 0.7 10E3/UL (ref 0.8–5.3)
LYMPHOCYTES NFR BLD AUTO: 17 %
MAGNESIUM SERPL-MCNC: 1.7 MG/DL (ref 1.7–2.3)
MCH RBC QN AUTO: 32.1 PG (ref 26.5–33)
MCHC RBC AUTO-ENTMCNC: 34.2 G/DL (ref 31.5–36.5)
MCV RBC AUTO: 94 FL (ref 78–100)
MONOCYTES # BLD AUTO: 0.4 10E3/UL (ref 0–1.3)
MONOCYTES NFR BLD AUTO: 9 %
MUCOUS THREADS #/AREA URNS LPF: PRESENT /LPF
NEUTROPHILS # BLD AUTO: 3.1 10E3/UL (ref 1.6–8.3)
NEUTROPHILS NFR BLD AUTO: 69 %
NITRATE UR QL: NEGATIVE
NRBC # BLD AUTO: 0 10E3/UL
NRBC BLD AUTO-RTO: 0 /100
PH UR STRIP: 5.5 [PH] (ref 5–7)
PLATELET # BLD AUTO: 90 10E3/UL (ref 150–450)
POTASSIUM SERPL-SCNC: 4.3 MMOL/L (ref 3.4–5.3)
PROT SERPL-MCNC: 6.1 G/DL (ref 6.4–8.3)
RBC # BLD AUTO: 3.27 10E6/UL (ref 4.4–5.9)
RBC URINE: 3 /HPF
RSV RNA SPEC NAA+PROBE: NEGATIVE
SARS-COV-2 RNA RESP QL NAA+PROBE: NEGATIVE
SODIUM SERPL-SCNC: 136 MMOL/L (ref 135–145)
SP GR UR STRIP: 1.01 (ref 1–1.03)
SQUAMOUS EPITHELIAL: 1 /HPF
UROBILINOGEN UR STRIP-MCNC: NORMAL MG/DL
WBC # BLD AUTO: 4.5 10E3/UL (ref 4–11)
WBC URINE: 32 /HPF

## 2024-10-05 PROCEDURE — 36415 COLL VENOUS BLD VENIPUNCTURE: CPT

## 2024-10-05 PROCEDURE — 87637 SARSCOV2&INF A&B&RSV AMP PRB: CPT

## 2024-10-05 PROCEDURE — 258N000003 HC RX IP 258 OP 636

## 2024-10-05 PROCEDURE — 82040 ASSAY OF SERUM ALBUMIN: CPT

## 2024-10-05 PROCEDURE — 99284 EMERGENCY DEPT VISIT MOD MDM: CPT | Mod: 25

## 2024-10-05 PROCEDURE — 74176 CT ABD & PELVIS W/O CONTRAST: CPT

## 2024-10-05 PROCEDURE — 96360 HYDRATION IV INFUSION INIT: CPT

## 2024-10-05 PROCEDURE — 83690 ASSAY OF LIPASE: CPT

## 2024-10-05 PROCEDURE — 87086 URINE CULTURE/COLONY COUNT: CPT

## 2024-10-05 PROCEDURE — 83605 ASSAY OF LACTIC ACID: CPT

## 2024-10-05 PROCEDURE — 83735 ASSAY OF MAGNESIUM: CPT

## 2024-10-05 PROCEDURE — 85004 AUTOMATED DIFF WBC COUNT: CPT

## 2024-10-05 PROCEDURE — 81001 URINALYSIS AUTO W/SCOPE: CPT

## 2024-10-05 RX ADMIN — SODIUM CHLORIDE 500 ML: 9 INJECTION, SOLUTION INTRAVENOUS at 14:30

## 2024-10-05 ASSESSMENT — COLUMBIA-SUICIDE SEVERITY RATING SCALE - C-SSRS
1. IN THE PAST MONTH, HAVE YOU WISHED YOU WERE DEAD OR WISHED YOU COULD GO TO SLEEP AND NOT WAKE UP?: NO
6. HAVE YOU EVER DONE ANYTHING, STARTED TO DO ANYTHING, OR PREPARED TO DO ANYTHING TO END YOUR LIFE?: NO
2. HAVE YOU ACTUALLY HAD ANY THOUGHTS OF KILLING YOURSELF IN THE PAST MONTH?: NO

## 2024-10-05 ASSESSMENT — ACTIVITIES OF DAILY LIVING (ADL)
ADLS_ACUITY_SCORE: 37

## 2024-10-05 NOTE — ED PROVIDER NOTES
Emergency Department Note      History of Present Illness     Chief Complaint   Diarrhea      HPI   Merlin J Anderson is a 84 year old male with a history of CAD, chronic kidney disease, hypertension, hyperlipidemia, and type 2 diabetes mellitus who presents to the ED with diarrhea. The patient reports he has been experiencing diarrhea with fast-moving loose watery bowel movements. He states he gets these episodes once or twice a day usually after dinner or before bed. He states he has not had normal bowel movement previously for awhile but states his current condition is new. He endorses loss of appetite. He denies bloody stools, recent antibiotics, recent travel, abdominal pain, nausea, vomiting, chest pain, shortness of breath, back pain, dysuria, hematuria, or cough/cold symptoms. The patient's wife reports he has been weaker and has lost about 20 pounds over the past few weeks. She notes the patient was here a month ago for coronavirus infection. The patient reports he had back surgery around 10 years ago with no complications and coronary artery bypass 6 years ago with no complications.    Independent Historian   Wife as detailed above.    Review of External Notes   8/15/24: Patient evaluated in the ED for generalized weakness, admitted due to weakness.     Past Medical History     Medical History and Problem List   Abnormal stress test  Arthrodesis status  Carotid arterial disease   Chronic kidney disease  LUNA  Gout  Hypertension  Hyperlipidemia  Lumbar radiculopathy  PVD  Season allergic rhinitis  Type 2 diabetes mellitus    Medications   Aspirin - 325 mg  Atorvastatin   Carvedilol   Fluticasone   Guaifenesin-dextromethorphan   Hydrochlorothiazide   Lisinopril   Loratadine   Metformin  Nitroglycerin  Prednisone  Tamsulosin     Surgical History   Bypass graft coronary artery  Colonoscopy  Decompression, fusion lumbar posterior one level, combined  Endarterectomy carotid  Hernia repair, inguinal    Physical  "Exam     Patient Vitals for the past 24 hrs:   BP Temp Temp src Pulse Resp SpO2 Height Weight   10/05/24 1516 -- -- -- -- -- 100 % -- --   10/05/24 1515 (!) 173/72 -- -- 59 -- -- -- --   10/05/24 1353 -- -- -- -- -- 99 % -- --   10/05/24 1308 -- -- -- -- -- 98 % -- --   10/05/24 1230 (!) 153/63 -- -- 56 -- -- -- --   10/05/24 1200 (!) 159/71 -- -- 58 -- -- -- --   10/05/24 1130 (!) 149/63 -- -- 57 18 98 % -- --   10/05/24 1010 (!) 173/69 98.1  F (36.7  C) Oral 64 18 98 % 1.854 m (6' 1\") 82.1 kg (181 lb)     Physical Exam  General: Alert, well developed, well nourished. Cooperative.     In mild distress  HEENT:  Head:  Atraumatic  Ears:  External ears are normal  Eyes:   Conjunctivae normal and EOM are normal. No scleral icterus.    Pupils are equal, round, and reactive to light.   Neck:   Normal range of motion. Neck supple.  CV:  Normal rate, regular rhythm, normal heart sounds and radial pulses are 2+ and symmetric.  No murmur.  Resp:  Breath sounds are clear bilaterally    Non-labored, no retractions or accessory muscle use  GI:  Mild TTP over the LLQ. Abdomen is soft, no distension. No rebound or guarding.  MS:  Normal range of motion. No edema.    Back atraumatic.    No midline cervical, thoracic, or lumbar tenderness. Well healed surgical incision over the lumbar spine.   Skin:  Warm and dry.  No rash or lesions noted.  Neuro:   Alert. Normal strength.   Psych: Normal mood and affect.    Diagnostics     Lab Results   Labs Ordered and Resulted from Time of ED Arrival to Time of ED Departure   COMPREHENSIVE METABOLIC PANEL - Abnormal       Result Value    Sodium 136      Potassium 4.3      Carbon Dioxide (CO2) 19 (*)     Anion Gap 14      Urea Nitrogen 49.4 (*)     Creatinine 2.88 (*)     GFR Estimate 21 (*)     Calcium 8.9      Chloride 103      Glucose 170 (*)     Alkaline Phosphatase 68      AST 19      ALT 16      Protein Total 6.1 (*)     Albumin 3.9      Bilirubin Total 0.4     ROUTINE UA WITH MICROSCOPIC " "REFLEX TO CULTURE - Abnormal    Color Urine Light Yellow      Appearance Urine Clear      Glucose Urine Negative      Bilirubin Urine Negative      Ketones Urine Negative      Specific Gravity Urine 1.014      Blood Urine Negative      pH Urine 5.5      Protein Albumin Urine 100 (*)     Urobilinogen Urine Normal      Nitrite Urine Negative      Leukocyte Esterase Urine Large (*)     Mucus Urine Present (*)     RBC Urine 3 (*)     WBC Urine 32 (*)     Squamous Epithelials Urine 1      Hyaline Casts Urine 1     CBC WITH PLATELETS AND DIFFERENTIAL - Abnormal    WBC Count 4.5      RBC Count 3.27 (*)     Hemoglobin 10.5 (*)     Hematocrit 30.7 (*)     MCV 94      MCH 32.1      MCHC 34.2      RDW 12.5      Platelet Count 90 (*)     % Neutrophils 69      % Lymphocytes 17      % Monocytes 9      % Eosinophils 5      % Basophils 0      % Immature Granulocytes 0      NRBCs per 100 WBC 0      Absolute Neutrophils 3.1      Absolute Lymphocytes 0.7 (*)     Absolute Monocytes 0.4      Absolute Eosinophils 0.2      Absolute Basophils 0.0      Absolute Immature Granulocytes 0.0      Absolute NRBCs 0.0     LIPASE - Normal    Lipase 21     LACTIC ACID WHOLE BLOOD WITH 1X REPEAT IN 2 HR WHEN >2 - Normal    Lactic Acid, Initial 0.8     MAGNESIUM - Normal    Magnesium 1.7     INFLUENZA A/B, RSV, & SARS-COV2 PCR - Normal    Influenza A PCR Negative      Influenza B PCR Negative      RSV PCR Negative      SARS CoV2 PCR Negative     URINE CULTURE       Imaging   CT Abdomen Pelvis w/o Contrast   Final Result   Addendum (preliminary) 1 of 1   CLINICAL ADDENDUM:    Clinical information in this report has been modified from the previous    version as follows: There is a typo in the origina report. Impression    should read \"Extensive colonic diverticulosis WITHOUT CT evidence of acute    diverticulitis\"      Findings discussed with ABDOUL Ruelas at 2:22 PM on 10/5/2024 by Dr. Ontiveros      END ADDENDUM      Final   IMPRESSION:    1.  No " definite visualized explanation for patient's symptoms. Extensive colonic diverticulosis with CT evidence of acute diverticulitis.           Independent Interpretation   None    ED Course      Medications Administered   Medications   sodium chloride 0.9% BOLUS 500 mL (0 mLs Intravenous Stopped 10/5/24 9615)       Procedures   Procedures     Discussion of Management   None    ED Course   ED Course as of 10/05/24 1264   Sat Oct 05, 2024   1016 I obtained history and examined the patient as noted above.       Additional Documentation  None    Medical Decision Making / Diagnosis     CMS Diagnoses: None    MIPS       None    [facility]   Merlin J Anderson is a 84 year old male with a history of CAD, chronic kidney disease, hypertension, hyperlipidemia, and type 2 diabetes mellitus who presents to the ED with diarrhea.  On exam, the patient has no abdominal tenderness and has regular rate and rhythm.  Vital signs show elevated blood pressure without fever, tachycardia, or hypoxia. UA shows possible evidence of a UTI, however, given patient's current diarrhea we elected to wait on culture results prior to starting antibiotics as he has had previous UA samples with a small amount of WBC and does not have any urinary symptoms today.  Blood work shows chronic anemia and chronic kidney disease without leukocytosis or other acute abnormalities.  COVID, influenza, and RSV test are negative.  Lipase is within normal limits.  Magnesium is normal.  We attempted to obtain a stool sample, however, the patient was not able to pass stool while in the emergency department.  He has not had any recent travel or antibiotic use.  Lactate is within normal limits.  CT of the abdomen and pelvis shows evidence of diverticulosis without diverticulitis or any other acute abdominal pathology.  Given these findings, I recommended the patient manage symptoms with grains and starchy foods and avoidance of high fat foods and dairy and use Imodium as needed.   He was advised to follow-up with his primary care provider and GI for further evaluation and ongoing management.  He was advised to continue to monitor symptoms closely and return for fever, abdominal pain, vomiting, bloody stool, or any other new concerns.  The patient and his wife are comfortable with this plan and all questions were answered.    Disposition   The patient was discharged.     Diagnosis     ICD-10-CM    1. Diarrhea  R19.7 Adult GI  Referral - Consult Only      2. Diverticulosis of large intestine without hemorrhage  K57.30            Scribe Disclosure:  I, José Luis Alaniz, am serving as a scribe at 10:32 AM on 10/5/2024 to document services personally performed by Yelena Ruelas PA-C based on my observations and the provider's statements to me.        Yelena Ruelas PA-C  10/05/24 1754       Yelena Ruelas PA-C  10/05/24 1757

## 2024-10-05 NOTE — ED TRIAGE NOTES
Patient reports diarrhea for 1 week. Patient states the diarrhea is worse after eating and at times he is unable to control it.  Patient denies pain.      Triage Assessment (Adult)       Row Name 10/05/24 1009          Triage Assessment    Airway WDL WDL        Respiratory WDL    Respiratory WDL WDL        Skin Circulation/Temperature WDL    Skin Circulation/Temperature WDL WDL        Cardiac WDL    Cardiac WDL WDL        Peripheral/Neurovascular WDL    Peripheral Neurovascular WDL WDL        Cognitive/Neuro/Behavioral WDL    Cognitive/Neuro/Behavioral WDL WDL

## 2024-10-05 NOTE — DISCHARGE INSTRUCTIONS
Take imodium if needed for diarrhea, use sparingly  Eat starches, avoid high fat foods and dairy  Return for fever, increased diarrhea, abdominal pain, bloody stool, vomiting, or any other new concerns

## 2024-10-06 LAB — BACTERIA UR CULT: NORMAL

## 2024-10-06 NOTE — RESULT ENCOUNTER NOTE
Final urine culture report is negative.  Adult: Negative urine culture parameters per protocol: Any # urogenital aquiles, single or mixed   [facility] Emergency Dept discharge antibiotic prescribed (If applicable): None  Treatment recommendations per [facility] ED Lab Result Urine Culture protocol: No change in plan of care.

## 2024-10-07 ENCOUNTER — TELEPHONE (OUTPATIENT)
Dept: NEPHROLOGY | Facility: CLINIC | Age: 84
End: 2024-10-07

## 2024-10-07 ENCOUNTER — PATIENT OUTREACH (OUTPATIENT)
Dept: NEPHROLOGY | Facility: CLINIC | Age: 84
End: 2024-10-07
Payer: COMMERCIAL

## 2024-10-07 DIAGNOSIS — I10 ESSENTIAL HYPERTENSION, BENIGN: ICD-10-CM

## 2024-10-07 RX ORDER — CARVEDILOL 12.5 MG/1
25 TABLET ORAL 2 TIMES DAILY WITH MEALS
Qty: 360 TABLET | Refills: 0 | Status: SHIPPED | OUTPATIENT
Start: 2024-10-07

## 2024-10-07 NOTE — PROGRESS NOTES
10/7 Called pts wife Fatou in regards to upcomming appt on Friday. She stated he may not be well enough and may need to cancel. No answer. LVM to return my call.  Gregorio SANDOVAL RN  Nephrology care coordinator  [facility]

## 2024-10-07 NOTE — TELEPHONE ENCOUNTER
PT was contacted by phone after not reading the [employer] message. Wife is listed as authorized as Consent to Communicate and the information of the appointment and the labs along with directions to the clinic were provided.     PT's wife said she was expecting a call from the primary because PT is experiencing diarrhea and stomach pains. According to wife PT has been experiencing this for two weeks. PT was taken to the ER by Wife and was told to take some anti-diarrhea medicine.    I informed the wife that I would pass this information along to the nurse.    Juan Whaley CMA on 10/7/2024 at 12:11 PM

## 2024-10-07 NOTE — TELEPHONE ENCOUNTER
Called wife and she took him to the ER and they will follow up with GI as directed this week.  She did not want to schedule a ED f/up at this time.   Augustina Radford MA

## 2024-10-08 ENCOUNTER — TELEPHONE (OUTPATIENT)
Dept: GASTROENTEROLOGY | Facility: CLINIC | Age: 84
End: 2024-10-08
Payer: COMMERCIAL

## 2024-10-09 ENCOUNTER — TELEPHONE (OUTPATIENT)
Dept: INTERNAL MEDICINE | Facility: CLINIC | Age: 84
End: 2024-10-09
Payer: COMMERCIAL

## 2024-10-09 NOTE — TELEPHONE ENCOUNTER
Called wife.  Assisted in scheduling appointment for Monday.    Next 5 appointments (look out 90 days)      Oct 14, 2024 11:30 AM  (Arrive by 11:10 AM)  Provider Visit with Sujit Duke MD  [facility] ([facility] ) [address]  [address]  [facility] [address]  [phone]

## 2024-10-09 NOTE — TELEPHONE ENCOUNTER
Reason for Call:  Appointment Request    Patient requesting this type of appt:  Hospital/ED Follow-Up     Requested provider: Sujit Duke    Reason patient unable to be scheduled: Wife is concerned about  losing too much weight.  No available appt until Nov, Pt is losing a lot of weight and needs to be seen.    was emergency room [place] 10/5 for severe diarhea     When does patient want to be seen/preferred time:  asap     Comments: PA recommended pt see a gastrologist/ colonoscopy    Please call pt and discuss a sooner appt and possible referral     Could we send this information to you in [employer] [facility] [employer] [facility] or would you prefer to receive a phone call?:   Patient would prefer a phone call   Okay to leave a detailed message?: Yes at Home number on file [phone] (home)    Call taken on 10/9/2024 at 9:12 AM by Judy Santacruz

## 2024-10-09 NOTE — TELEPHONE ENCOUNTER
Call to wife who states patient only wants to see Dr. Duke.     Patient has lost 20lbs since Christmas of 2023. Wife is concerned patient is dropping to much weight. Wife states patient is still having diarrhea still and about 2 diarrhea episodes per day. Patient will sometimes soil himself before he can make it to the toilet in time.     Wife is asking for primary care provider to work patient in to see him.     Thank you,  Peter Casiano, Triage RN [facility]  10:53 AM 10/9/2024

## 2024-10-11 ENCOUNTER — PRE VISIT (OUTPATIENT)
Dept: NEPHROLOGY | Facility: CLINIC | Age: 84
End: 2024-10-11
Payer: COMMERCIAL

## 2024-10-11 ENCOUNTER — LAB (OUTPATIENT)
Dept: LAB | Facility: CLINIC | Age: 84
End: 2024-10-11
Payer: COMMERCIAL

## 2024-10-11 ENCOUNTER — OFFICE VISIT (OUTPATIENT)
Dept: NEPHROLOGY | Facility: CLINIC | Age: 84
End: 2024-10-11
Attending: INTERNAL MEDICINE
Payer: COMMERCIAL

## 2024-10-11 VITALS
DIASTOLIC BLOOD PRESSURE: 54 MMHG | OXYGEN SATURATION: 97 % | BODY MASS INDEX: 24.52 KG/M2 | SYSTOLIC BLOOD PRESSURE: 128 MMHG | WEIGHT: 185 LBS | HEIGHT: 73 IN | HEART RATE: 58 BPM

## 2024-10-11 DIAGNOSIS — E11.59 TYPE 2 DIABETES MELLITUS WITH OTHER CIRCULATORY COMPLICATIONS (H): ICD-10-CM

## 2024-10-11 DIAGNOSIS — N18.4 CKD (CHRONIC KIDNEY DISEASE) STAGE 4, GFR 15-29 ML/MIN (H): Primary | ICD-10-CM

## 2024-10-11 LAB
ALBUMIN SERPL BCG-MCNC: 3.7 G/DL (ref 3.5–5.2)
ANION GAP SERPL CALCULATED.3IONS-SCNC: 10 MMOL/L (ref 7–15)
BUN SERPL-MCNC: 38.8 MG/DL (ref 8–23)
CALCIUM SERPL-MCNC: 9.3 MG/DL (ref 8.8–10.4)
CHLORIDE SERPL-SCNC: 107 MMOL/L (ref 98–107)
CREAT SERPL-MCNC: 2.99 MG/DL (ref 0.67–1.17)
EGFRCR SERPLBLD CKD-EPI 2021: 20 ML/MIN/1.73M2
ERYTHROCYTE [DISTWIDTH] IN BLOOD BY AUTOMATED COUNT: 12.8 % (ref 10–15)
EST. AVERAGE GLUCOSE BLD GHB EST-MCNC: 131 MG/DL
GLUCOSE SERPL-MCNC: 147 MG/DL (ref 70–99)
HBA1C MFR BLD: 6.2 %
HCO3 SERPL-SCNC: 22 MMOL/L (ref 22–29)
HCT VFR BLD AUTO: 29.6 % (ref 40–53)
HGB BLD-MCNC: 10 G/DL (ref 13.3–17.7)
MCH RBC QN AUTO: 31.7 PG (ref 26.5–33)
MCHC RBC AUTO-ENTMCNC: 33.8 G/DL (ref 31.5–36.5)
MCV RBC AUTO: 94 FL (ref 78–100)
PHOSPHATE SERPL-MCNC: 3.5 MG/DL (ref 2.5–4.5)
PLATELET # BLD AUTO: 97 10E3/UL (ref 150–450)
POTASSIUM SERPL-SCNC: 4.7 MMOL/L (ref 3.4–5.3)
PTH-INTACT SERPL-MCNC: 60 PG/ML (ref 15–65)
RBC # BLD AUTO: 3.15 10E6/UL (ref 4.4–5.9)
SODIUM SERPL-SCNC: 139 MMOL/L (ref 135–145)
VIT D+METAB SERPL-MCNC: 27 NG/ML (ref 20–50)
WBC # BLD AUTO: 4.7 10E3/UL (ref 4–11)

## 2024-10-11 PROCEDURE — 99205 OFFICE O/P NEW HI 60 MIN: CPT | Mod: GC

## 2024-10-11 PROCEDURE — 99000 SPECIMEN HANDLING OFFICE-LAB: CPT | Performed by: PATHOLOGY

## 2024-10-11 PROCEDURE — 36415 COLL VENOUS BLD VENIPUNCTURE: CPT | Performed by: PATHOLOGY

## 2024-10-11 PROCEDURE — 85027 COMPLETE CBC AUTOMATED: CPT | Performed by: PATHOLOGY

## 2024-10-11 PROCEDURE — 83970 ASSAY OF PARATHORMONE: CPT | Performed by: PATHOLOGY

## 2024-10-11 PROCEDURE — 80069 RENAL FUNCTION PANEL: CPT | Performed by: PATHOLOGY

## 2024-10-11 PROCEDURE — 82306 VITAMIN D 25 HYDROXY: CPT

## 2024-10-11 PROCEDURE — 83036 HEMOGLOBIN GLYCOSYLATED A1C: CPT | Performed by: INTERNAL MEDICINE

## 2024-10-11 PROCEDURE — G0463 HOSPITAL OUTPT CLINIC VISIT: HCPCS

## 2024-10-11 ASSESSMENT — PAIN SCALES - GENERAL: PAINLEVEL: NO PAIN (0)

## 2024-10-11 NOTE — NURSING NOTE
"Chief Complaint   Patient presents with    New Patient     /54   Pulse 58   Ht 1.854 m (6' 1\")   Wt 83.9 kg (185 lb)   SpO2 97%   BMI 24.41 kg/m    Ninoska Hanna MA on 10/11/2024 at 8:02 AM    "

## 2024-10-11 NOTE — PATIENT INSTRUCTIONS
- Discuss alternatives for metformin  - Enroll on CKD journey   - Labs  and urine tests in two weeks  - Follow up in three months

## 2024-10-11 NOTE — PROGRESS NOTES
Physician Attestation   I, Allison Dick, saw and evaluated Merlin J Anderson with Resident/Fellow. I have reviewed and discussed with the Resident/Fellow their history, physical and plan.    I personally reviewed the vital signs, medications, labs, and imaging.    In brief, the patient is a pleasant 84-year-old male with history of well-controlled type 2 diabetes, well-controlled hypertension and chronic kidney disease stage IV who is here to establish care for his chronic kidney disease stage IV.  The patient has been diagnosed with hypertension for many many years, at least 40 years and has been well-controlled.  On the other hand, his diabetes was diagnosed for about 10 years.  Currently he takes metformin 500 mg twice a day with A1c always in the 5-6 range.  He has no complication from diabetes such as retinopathy or apathy.  Regard to kidney function, the patient has been noted to have creatinine 1.4 at least since 2002.  It started to increase to 1.4-1.5 since 2015.  Since 2017 creatinine was 1.6 and has remained at 1.6 until December 2020.  In July 2021 creatinine was 1.81.  In May 2022 creatinine was 2.03.  In June 2023 creatinine was 2.63.  In January 2024 creatinine 2.20 and in May 2024 creatinine was 2.7.  Since June 2024 creatinine has fluctuated widely between 2.5-3.  The patient never noted he has kidney disease until now.  His UA has shown protein albumin positive at least in 2017.  Most of his UA does not have blood except the most recent one at 10/5/2024 in which she has white blood cell 32 and RBC 3 but he has no signs or symptoms of UTI.  He used to have lower extremity edema but after amlodipine was stopped his edema went away.  His blood pressure today is 128/54 and he feels fine.  We have a long discussion about renal replacement therapy in which he prefer in center modality.  He lives in [place].  Of note, he has a strong family history of end-stage kidney disease in 2 of his sister  but both of them receive dialysis when they were at Old age.     Labs today show creatinine 2.99 with GFR 20.  He does not provide us any urine sample today.  We do not have any urine protein check at all prior to this visit.  Cannot provide us urine sample today.  Review CT scanning and July 2024 showed that he has high burden of calcification in his abdominal aorta.  The kidney size are normal but cortex seems to be a little bit irregular.  # CKD 4 unclear etiology  # HTN for 40 year  # DM2 10 years, well control  # Anemia  # Thrombocytopenia  While hypertension may cause chronic kidney disease and some patient, we have to ensure that we rule out other causes of his chronic kidney disease due to the fact that he has proteinuria and intermittent microscopic hematuria.  He also have recent onset thrombocytopenia and downtrending anemia.  While anemia could be a manifestation of chronic kidney disease, thrombocytopenia is usually not from chronic kidney disease.  Dr. Key will order serology to see whether he has any other causes of chronic kidney disease or not.  The utility of kidney biopsy remain unclear at this time given that he has prolonged CKD.  If serologies negative, perhaps the biopsy may not be helpful but if serology is positive, we may have to proceed with a kidney biopsy.    The patient would like to follow at a closer location and [facility] is a preferred location for him.  He does not want to come here at this location due to logistic issue.     Dr. Key will send an email to Gregorio to enroll him into CKD journey.  He will get labs done in a couple weeks after his GI workup is complete.    Rest per the resident/fellow's note.   60 minutes spent on the date of the encounter doing chart review, history and exam, documentation and further activities per the note.    Allison Dick  Date of Service (when I saw the patient): October 11, 2024

## 2024-10-11 NOTE — PROGRESS NOTES
Kidney and Blood Pressure Clinic Note    Encounter Date: Oct 11, 2024     Assessment and Plan:     #HTN #  Hypertensive over 40 years. Has strong family history. Currently he is taking lisinopril 30 mg ,hydrochlorothiazide 25 mg ,Coreg 12.5 mg bid and tamsulosin 0.4 mg.  Amlodipine caused leg edema and discontinued.  Home blood pressure reported well-controlled with a systolic blood pressure ranging 110- 130.  No dizziness or lightheadedness.  He is following low-salt diet.  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 Recent UA showed  large blood, 3 rbc and 32 wbc however urine culture was negative. Unable to give urine sample today, previous UACR showed significant albuminuria. CT abd on 10/5 /24 showed normal size kidneys without hydronephrosis. It appears that kidney dysfunction started years before the development diabetes.hence Its unlikely to be DKD. CKD probably caused by long standing hypertension, however given his bicytopenia and recent unintentional weight loss would be reasonable to screen him for for monoclonal protein. Acid -base and electrolytes are stable.  - Continue optimal control of sugar and BP   - Continue ACEI   - Hesitant to start SGLT2i as he has advanced CKD with GFR 20.  - SPEP,UPEP, Light chains,immunofixation,C3,C4,KATHY,ANCA.  - Hesistant to consider kidney biopsy as he is having advanced CKD,may not be yielding.  - Enroll him in CKD journey   - Patient would like to follow closer location to his home,prefers [facility].  # BMD   Ca, Phos, Vit D and PTH in normal range.  Will continue to monitor  # Anemia of renal disease   HB - 10.0, at goal.  - Check Iron studies  # DM2  Diabetic for over 10 years, recent A1C < 7. On metformin 500mg bid.  - Management per primary.    Discussed with   Kapil Key MD  Division of Renal Disease and Hypertension  [facility]  tsephane [employer] Web [employer]      Subjective:     Chief Complaint: CKD    History of Present Illness:   A 84 year old male with history of hypertension, DM2, CKD stage IV, and CAD s/p CABG x4 in 2018,carotid artery disease s/p right carotid endarterectomy hyperlipidemia, presents to the clinic to establish schedule follow-up care.  Hypertensive for over four decades , currently on cockatils of antihypertensives ,lisinopril, Coreg, hydrochlorothiazide and tamsulosin.  Home blood pressure record showed at goal blood pressure control.  He is also diabetic for over 10 years who is hemoglobin A1c persistently below 7 with metformin 500 mg twice daily.  He has strong family history of hypertension and end-stage renal disease in his siblings.   He endorsed progressive weight loss recently over 20 pounds within few months and and has intermittent diarrhea.  No blood in stool.  He has schedule with GI bone 10/14/2024.  No lower leg swelling or shortness of breath.  He is not using NSAID.  No history of kidney stone or recurrent UTI.  He does not have any voiding difficulty or urinary complaint.  Home BP:        Review of Systems:   All organ systems were reviewed and are negative except as noted in the HPI above.    Past Medical History:   Diagnosis Date    Abnormal stress test     Carotid arterial disease (H) 9/18/2017    CKD (chronic kidney disease) stage 3, GFR 30-59 ml/min (H) 12/27/2011    DM2 (diabetes mellitus, type 2) (H)     Pre diabetic    LUNA (dyspnea on exertion)     Essential hypertension, benign     Gout 11/14/2002     Problem list name updated by automated process. Provider to review    HTN (hypertension) 6/29/2009     (Problem list name updated by automated process. Provider to review and confirm.)    Hyperlipidemia LDL goal <100 10/31/2010    Mixed hyperlipidemia 11/14/2002    PVD (peripheral vascular disease) (H) 9/8/2017     S/P CABG (coronary artery bypass graft) 10/26/2018    Type 2 diabetes mellitus without complication (H) 11/30/2015     Past Surgical History:   Procedure Laterality Date    BYPASS GRAFT ARTERY CORONARY N/A 10/26/2018    Procedure: CORONARY ARTERY BYPASS GRAFTING X 4 WITH LEFT LEG ENDOSCOPIC VEIN HARVESTING LIMA - LAD  SV- RIGHT PDA, OM2, OM1 ON PUMP/BETH IAPB PLACED PER CATH LAB;  Surgeon: Heath Kessler MD;  Location: [facility] OR    COLONOSCOPY      DECOMPRESSION, FUSION LUMBAR POSTERIOR ONE LEVEL, COMBINED  8/24/2012    Procedure: COMBINED DECOMPRESSION, FUSION LUMBAR POSTERIOR ONE LEVEL;  Posterior Fusion wtih Decompression L4-5;  Surgeon: Rod Carbajal MD;  Location: [facility] OR    ENDARTERECTOMY CAROTID Right 9/18/2017    Procedure: ENDARTERECTOMY CAROTID;  RIGHT CAROTID ENDARTERECTOMY, WITH PATCH ANGIOPLASTY, WITH ELECTOENCEPHALOGAM            (EEG);  Surgeon: Catalino Scott MD;  Location: [facility] OR    HERNIA REPAIR      RUST NONSPECIFIC PROCEDURE  04/97    Neg. colonoscopy.    RUST NONSPECIFIC PROCEDURE      S/P ing. hernia.     Allergies   Allergen Reactions    Hydralazine Rash    Amlodipine Swelling     Ankle swelling     Imdur [Isosorbide Nitrate] Rash     Patient's Medications   New Prescriptions    No medications on file   Previous Medications    ACETAMINOPHEN (TYLENOL) 325 MG TABLET    Take 2 tablets (650 mg) by mouth every 4 hours as needed for mild pain    ACETAMINOPHEN (TYLENOL) 325 MG TABLET    Take 2 tablets (650 mg) by mouth every 4 hours as needed for mild pain or other (and adjunct with moderate or severe pain or per patient request)    ASPIRIN 325 MG EC TABLET    Take 1 tablet (325 mg) by mouth daily    ATORVASTATIN (LIPITOR) 40 MG TABLET    TAKE ONE TABLET BY MOUTH ONE TIME DAILY    BLOOD GLUCOSE CALIBRATION (ACCU-CHEK PRISCILLA) SOLUTION    Use to calibrate blood glucose monitor as needed as directed.    BLOOD GLUCOSE MONITORING (ACCU-CHEK PRISCILLA) TEST STRIP    Use to test blood sugars 1  times daily or as directed.    BLOOD GLUCOSE MONITORING (ACCU-CHEK MULTICLIX) LANCETS    Use to test blood sugar 1 times daily or as directed.    BLOOD GLUCOSE MONITORING (NO BRAND SPECIFIED) METER DEVICE KIT    Use to test blood sugar 1 times daily or as directed.    CARVEDILOL (COREG) 12.5 MG TABLET    TAKE TWO TABLETS BY MOUTH TWICE DAILY WITH MEALS    FLUTICASONE (FLONASE) 50 MCG/ACT NASAL SPRAY    Spray 1-2 sprays into both nostrils daily as needed for rhinitis or allergies    GUAIFENESIN-DEXTROMETHORPHAN (ROBITUSSIN DM) 100-10 MG/5ML SYRUP    Take 10 mLs by mouth every 4 hours as needed for cough    HYDROCHLOROTHIAZIDE (HYDRODIURIL) 25 MG TABLET    Take 1 tablet (25 mg) by mouth daily    LISINOPRIL (ZESTRIL) 30 MG TABLET    Take 1 tablet (30 mg) by mouth daily    LORATADINE (CLARITIN) 10 MG TABLET    Take 1 tablet (10 mg) by mouth daily    METFORMIN (GLUCOPHAGE) 500 MG TABLET    Take 1 tablet (500 mg) by mouth 2 times daily (with meals)    MULTIVITAMIN W/MINERALS (THERA-VIT-M) TABLET    Take 1 tablet by mouth every morning    NITROGLYCERIN (NITROSTAT) 0.4 MG SUBLINGUAL TABLET    For chest pain place 1 tablet under the tongue every 5 minutes for 3 doses. If symptoms persist 5 minutes after 1st dose call 911.    PREDNISONE (DELTASONE) 10 MG TABLET    Take 3 tabs by mouth daily x 3 days, then 2 tabs daily x 3 days, then 1 tab daily x 3 days, then 1/2 tab daily x 3 days.    TAMSULOSIN (FLOMAX) 0.4 MG CAPSULE    Take 1 capsule (0.4 mg) by mouth daily   Modified Medications    No medications on file   Discontinued Medications    No medications on file     Family History   Problem Relation Age of Onset    Diabetes Father     Unknown/Adopted Mother      Family Status   Relation Name Status    Fa      Mo     No partnership data on file     Social History     Social History Narrative    Not on file     Social History     Tobacco Use    Smoking status: Never    Smokeless tobacco: Never   Substance Use  "Topics    Alcohol use: Not Currently       Objective:     /54   Pulse 58   Ht 1.854 m (6' 1\")   Wt 83.9 kg (185 lb)   SpO2 97%   BMI 24.41 kg/m      Wt Readings from Last 3 Encounters:   10/11/24 83.9 kg (185 lb)   10/05/24 82.1 kg (181 lb)   08/15/24 90.7 kg (200 lb)       Constitutional:  NAD  Head: Atraumatic, normocephalic  Eyes:  Anicteric  ENT: MMM  CV: RRR, no m/r/g  Respiratory: CTA bilaterally  GI: Abdomen soft, non-tender  : No baldwin, no major abnormalities noted  Musculoskeletal:  Extremities warm and well perfused.  No edema  Skin: No jaundice  Neurologic: Speech normal.  Gait normal.  Strength intact upper and lower extremities.  Psychiatric: AOx3  Hematologic/lymphatic/immunologic:  No petechiae noted      Results:    Lab on 10/11/2024   Component Date Value Ref Range Status    Sodium 10/11/2024 139  135 - 145 mmol/L Final    Potassium 10/11/2024 4.7  3.4 - 5.3 mmol/L Final    Chloride 10/11/2024 107  98 - 107 mmol/L Final    Carbon Dioxide (CO2) 10/11/2024 22  22 - 29 mmol/L Final    Anion Gap 10/11/2024 10  7 - 15 mmol/L Final    Glucose 10/11/2024 147 (H)  70 - 99 mg/dL Final    Urea Nitrogen 10/11/2024 38.8 (H)  8.0 - 23.0 mg/dL Final    Creatinine 10/11/2024 2.99 (H)  0.67 - 1.17 mg/dL Final    GFR Estimate 10/11/2024 20 (L)  >60 mL/min/1.73m2 Final    Calcium 10/11/2024 9.3  8.8 - 10.4 mg/dL Final    Albumin 10/11/2024 3.7  3.5 - 5.2 g/dL Final    Phosphorus 10/11/2024 3.5  2.5 - 4.5 mg/dL Final    WBC Count 10/11/2024 4.7  4.0 - 11.0 10e3/uL Final    RBC Count 10/11/2024 3.15 (L)  4.40 - 5.90 10e6/uL Final    Hemoglobin 10/11/2024 10.0 (L)  13.3 - 17.7 g/dL Final    Hematocrit 10/11/2024 29.6 (L)  40.0 - 53.0 % Final    MCV 10/11/2024 94  78 - 100 fL Final    MCH 10/11/2024 31.7  26.5 - 33.0 pg Final    MCHC 10/11/2024 33.8  31.5 - 36.5 g/dL Final    RDW 10/11/2024 12.8  10.0 - 15.0 % Final    Platelet Count 10/11/2024 97 (L)  150 - 450 10e3/uL Final    Parathyroid Hormone Intact " 10/11/2024 60  15 - 65 pg/mL Final   Admission on 10/05/2024, Discharged on 10/05/2024   Component Date Value Ref Range Status    Sodium 10/05/2024 136  135 - 145 mmol/L Final    Potassium 10/05/2024 4.3  3.4 - 5.3 mmol/L Final    Carbon Dioxide (CO2) 10/05/2024 19 (L)  22 - 29 mmol/L Final    Anion Gap 10/05/2024 14  7 - 15 mmol/L Final    Urea Nitrogen 10/05/2024 49.4 (H)  8.0 - 23.0 mg/dL Final    Creatinine 10/05/2024 2.88 (H)  0.67 - 1.17 mg/dL Final    GFR Estimate 10/05/2024 21 (L)  >60 mL/min/1.73m2 Final    Calcium 10/05/2024 8.9  8.8 - 10.4 mg/dL Final    Chloride 10/05/2024 103  98 - 107 mmol/L Final    Glucose 10/05/2024 170 (H)  70 - 99 mg/dL Final    Alkaline Phosphatase 10/05/2024 68  40 - 150 U/L Final    AST 10/05/2024 19  0 - 45 U/L Final    ALT 10/05/2024 16  0 - 70 U/L Final    Protein Total 10/05/2024 6.1 (L)  6.4 - 8.3 g/dL Final    Albumin 10/05/2024 3.9  3.5 - 5.2 g/dL Final    Bilirubin Total 10/05/2024 0.4  <=1.2 mg/dL Final    Lipase 10/05/2024 21  13 - 60 U/L Final    Lactic Acid, Initial 10/05/2024 0.8  0.7 - 2.0 mmol/L Final    Magnesium 10/05/2024 1.7  1.7 - 2.3 mg/dL Final    Color Urine 10/05/2024 Light Yellow  Colorless, Straw, Light Yellow, Yellow Final    Appearance Urine 10/05/2024 Clear  Clear Final    Glucose Urine 10/05/2024 Negative  Negative mg/dL Final    Bilirubin Urine 10/05/2024 Negative  Negative Final    Ketones Urine 10/05/2024 Negative  Negative mg/dL Final    Specific Gravity Urine 10/05/2024 1.014  1.003 - 1.035 Final    Blood Urine 10/05/2024 Negative  Negative Final    pH Urine 10/05/2024 5.5  5.0 - 7.0 Final    Protein Albumin Urine 10/05/2024 100 (A)  Negative mg/dL Final    Urobilinogen Urine 10/05/2024 Normal  Normal, 2.0 mg/dL Final    Nitrite Urine 10/05/2024 Negative  Negative Final    Leukocyte Esterase Urine 10/05/2024 Large (A)  Negative Final    Mucus Urine 10/05/2024 Present (A)  None Seen /LPF Final    RBC Urine 10/05/2024 3 (H)  <=2 /HPF Final     WBC Urine 10/05/2024 32 (H)  <=5 /HPF Final    Squamous Epithelials Urine 10/05/2024 1  <=1 /HPF Final    Hyaline Casts Urine 10/05/2024 1  <=2 /LPF Final    Influenza A PCR 10/05/2024 Negative  Negative Final    Influenza B PCR 10/05/2024 Negative  Negative Final    RSV PCR 10/05/2024 Negative  Negative Final    SARS CoV2 PCR 10/05/2024 Negative  Negative Final    WBC Count 10/05/2024 4.5  4.0 - 11.0 10e3/uL Final    RBC Count 10/05/2024 3.27 (L)  4.40 - 5.90 10e6/uL Final    Hemoglobin 10/05/2024 10.5 (L)  13.3 - 17.7 g/dL Final    Hematocrit 10/05/2024 30.7 (L)  40.0 - 53.0 % Final    MCV 10/05/2024 94  78 - 100 fL Final    MCH 10/05/2024 32.1  26.5 - 33.0 pg Final    MCHC 10/05/2024 34.2  31.5 - 36.5 g/dL Final    RDW 10/05/2024 12.5  10.0 - 15.0 % Final    Platelet Count 10/05/2024 90 (L)  150 - 450 10e3/uL Final    % Neutrophils 10/05/2024 69  % Final    % Lymphocytes 10/05/2024 17  % Final    % Monocytes 10/05/2024 9  % Final    % Eosinophils 10/05/2024 5  % Final    % Basophils 10/05/2024 0  % Final    % Immature Granulocytes 10/05/2024 0  % Final    NRBCs per 100 WBC 10/05/2024 0  <1 /100 Final    Absolute Neutrophils 10/05/2024 3.1  1.6 - 8.3 10e3/uL Final    Absolute Lymphocytes 10/05/2024 0.7 (L)  0.8 - 5.3 10e3/uL Final    Absolute Monocytes 10/05/2024 0.4  0.0 - 1.3 10e3/uL Final    Absolute Eosinophils 10/05/2024 0.2  0.0 - 0.7 10e3/uL Final    Absolute Basophils 10/05/2024 0.0  0.0 - 0.2 10e3/uL Final    Absolute Immature Granulocytes 10/05/2024 0.0  <=0.4 10e3/uL Final    Absolute NRBCs 10/05/2024 0.0  10e3/uL Final    Culture 10/05/2024 <10,000 CFU/mL Mixture of Urogenital Shari   Final

## 2024-10-14 ENCOUNTER — OFFICE VISIT (OUTPATIENT)
Dept: INTERNAL MEDICINE | Facility: CLINIC | Age: 84
End: 2024-10-14
Payer: COMMERCIAL

## 2024-10-14 VITALS
DIASTOLIC BLOOD PRESSURE: 64 MMHG | RESPIRATION RATE: 16 BRPM | HEART RATE: 58 BPM | WEIGHT: 184.1 LBS | HEIGHT: 73 IN | SYSTOLIC BLOOD PRESSURE: 128 MMHG | TEMPERATURE: 96.8 F | OXYGEN SATURATION: 99 % | BODY MASS INDEX: 24.4 KG/M2

## 2024-10-14 DIAGNOSIS — I10 PRIMARY HYPERTENSION: ICD-10-CM

## 2024-10-14 DIAGNOSIS — Z12.11 SCREEN FOR COLON CANCER: ICD-10-CM

## 2024-10-14 DIAGNOSIS — E78.5 HYPERLIPIDEMIA LDL GOAL <100: ICD-10-CM

## 2024-10-14 DIAGNOSIS — N18.4 CKD (CHRONIC KIDNEY DISEASE) STAGE 4, GFR 15-29 ML/MIN (H): ICD-10-CM

## 2024-10-14 DIAGNOSIS — E11.59 TYPE 2 DIABETES MELLITUS WITH OTHER CIRCULATORY COMPLICATIONS (H): ICD-10-CM

## 2024-10-14 DIAGNOSIS — Z29.11 NEED FOR VACCINATION AGAINST RESPIRATORY SYNCYTIAL VIRUS: ICD-10-CM

## 2024-10-14 DIAGNOSIS — R19.7 DIARRHEA OF PRESUMED INFECTIOUS ORIGIN: Primary | ICD-10-CM

## 2024-10-14 DIAGNOSIS — Z23 NEED FOR SHINGLES VACCINE: ICD-10-CM

## 2024-10-14 PROCEDURE — 99214 OFFICE O/P EST MOD 30 MIN: CPT | Performed by: INTERNAL MEDICINE

## 2024-10-14 ASSESSMENT — PAIN SCALES - GENERAL: PAINLEVEL: NO PAIN (0)

## 2024-10-14 NOTE — LETTER
October 21, 2024      Merlin J Anderson  [address] [place] [id number]        Dear ,    We are writing to inform you of your test results.    Your results are normal.    Resulted Orders   Enteric Bacteria and Virus Panel by TO[facility] Stool   Result Value Ref Range    Campylobacter species Negative Negative    Salmonella species Negative Negative    Vibrio species Negative Negative    Vibrio cholerae Negative Negative    Yersinia enterocolitica Negative Negative    Enteropathogenic E. coli (EPEC) Negative Negative, NA    Shiga-like toxin-producing E. coli (STEC) Negative Negative    Shigella/Enteroinvasive E. coli (EIEC) Negative Negative    Cryptosporidium species Negative Negative    Giardia lamblia Negative Negative    Norovirus Gl/Gll Negative Negative    Rotavirus A Negative Negative    Plesiomonas shigelloides Negative Negative    Enteroaggregative E. coli (EAEC) Negative Negative    Enterotoxigenic E. coli (ETEC) Negative Negative    E. coli O157 NA Negative, NA    Cyclospora cayetanensis Negative Negative    Entamoeba histolytica Negative Negative    Adenovirus F40/41 Negative Negative    Astrovirus Negative Negative    Sapovirus Negative Negative    Narrative    Assay performed using the FDA-cleared FilmArray GI Panel from [employer]  A negative result should not rule out infection in patients with a probability for gastrointestinal infection. The assay does not test for all potential infectious agents of diarrheal disease.  Positive results do not distinguish between a viable or replicating organism and the presence of a nonviable organism or nucleic acid, nor do they exclude the possibility of coinfection by organisms not in the panel.  Results are intended to aid in the diagnosis of illness and are meant to be used in conjunction with other clinical findings.  This test has been verified and is performed by the Infectious Diseases Diagnostic Laboratory at [facility]  [place]. This laboratory is certified under the Clinical Laboratory Improvement Amendments of 1988 (CLIA-88) as qualified to perform high complexity clinical laboratory testing.   Fat Stool Qual Random Collection   Result Value Ref Range    Neutral Fat Fecal Normal Normal    Split Fat Fecal Normal Normal      Comment:      INTERPRETIVE INFORMATION: Fecal Fat Qualitative    Neutral fats include the monoglycerides, diglycerides, and  triglycerides while split fats are the free fatty acids  that are liberated from them. Impaired synthesis or  secretion of pancreatic enzymes or bile may cause an  increase in neutral fats while an increase in split fats   suggests impaired absorption of nutrients.  Performed By: [facility]  [address] [place] [address]  : Aldo Rodriguez MD, PhD  CLIA Number: [id number]       If you have any questions or concerns, please call the clinic at the number listed above.       Sincerely,      Sujit Duke MD

## 2024-10-14 NOTE — PROGRESS NOTES
Assessment & Plan       Diarrhea of presumed infectious origin  Improved with holding Metformin  Assess lab work for infection, malabsorption , inflammation   Consider colonoscopy if persistent   - Enteric Bacteria and Virus Panel by [facility] Stool; Future  - C. difficile Toxin B PCR with reflex to C. difficile Antigen and Toxins A/B EIA; Future  - Fecal Lactoferrin; Future  - Fat Stool Qual Random Collection; Future  - Enteric Bacteria and Virus Panel by [facility] Stool  - C. difficile Toxin B PCR with reflex to C. difficile Antigen and Toxins A/B EIA  - Fecal Lactoferrin  - Fat Stool Qual Random Collection    Type 2 diabetes mellitus with other circulatory complications (H)  Off Metformin, monitor, keep diet  Recheck in 3 months, consider Jardiance     HYPERLIPIDEMIA LDL GOAL <100  Controlled on treatment     Primary hypertension  Controlled  Continue medications     CKD (chronic kidney disease) stage 4, GFR 15-29 ml/min (H)  Slowly progressive, follow up with nephrology  Consider Jardiance             See Patient Instructions    Subjective Merlin is a 84 year old, presenting for the following health issues:  Diarrhea (Pt states he was having loose and uncontrollable bowels, kidney doc advised to stop metformin and the bowel issue is gone and he feels so much better)        10/14/2024    11:31 AM   Additional Questions   Roomed by Augustina TRAN   Accompanied by wife     HPI       Chief Complaint   Patient presents with    Diarrhea     Pt states he was having loose and uncontrollable bowels, kidney doc advised to stop metformin and the bowel issue is gone and he feels so much better     Patient is seen for a follow up visit.  Has had diarrheal stools for 6 weeks. 2-3 times a day, loose to watery.   No fever, abdominal pain, nausea, vomiting. No blood in the stools.   Has h/o CKD, seen nephrology, advised to stop Metformin. Since then diarrhea is improving. Has 1-2 BMs daily, more formed.   Has h/o HTN. on medical treatment. BP  "has been controlled. No side effects from medications. No CP, HA, dizziness. good compliance with medications and low salt diet.  Has H/O DM. On diet , exercise , Metformin, now stopped. Blood sugars are controlled. No parestesias. No hypoglycemias.  Has h/o CRF. Monitoring BP, BG, medications, avoiding OTC NSAIDs. Needs periodic recheck of kidney function.          Review of Systems  Constitutional, HEENT, cardiovascular, pulmonary, gi and gu systems are negative, except as otherwise noted.      Objective    /64 (BP Location: Left arm, Cuff Size: Adult Regular)   Pulse 58   Temp 96.8  F (36  C) (Tympanic)   Resp 16   Ht 1.854 m (6' 1\")   Wt 83.5 kg (184 lb 1.6 oz)   SpO2 99%   BMI 24.29 kg/m    Body mass index is 24.29 kg/m .  Physical Exam   GENERAL: alert and no distress  NECK: no adenopathy, no asymmetry, masses, or scars  RESP: lungs clear to auscultation - no rales, rhonchi or wheezes  CV: regular rate and rhythm, normal S1 S2, no S3 or S4, no murmur, click or rub, no peripheral edema  ABDOMEN: soft, nontender, no hepatosplenomegaly, no masses and bowel sounds normal  MS: no gross musculoskeletal defects noted, no edema    Lab on 10/11/2024   Component Date Value Ref Range Status    Vitamin D, Total (25-Hydroxy) 10/11/2024 27  20 - 50 ng/mL Final    optimum levels    Sodium 10/11/2024 139  135 - 145 mmol/L Final    Potassium 10/11/2024 4.7  3.4 - 5.3 mmol/L Final    Chloride 10/11/2024 107  98 - 107 mmol/L Final    Carbon Dioxide (CO2) 10/11/2024 22  22 - 29 mmol/L Final    Anion Gap 10/11/2024 10  7 - 15 mmol/L Final    Glucose 10/11/2024 147 (H)  70 - 99 mg/dL Final    Urea Nitrogen 10/11/2024 38.8 (H)  8.0 - 23.0 mg/dL Final    Creatinine 10/11/2024 2.99 (H)  0.67 - 1.17 mg/dL Final    GFR Estimate 10/11/2024 20 (L)  >60 mL/min/1.73m2 Final    eGFR calculated using 2021 CKD-EPI equation.    Calcium 10/11/2024 9.3  8.8 - 10.4 mg/dL Final    Reference intervals for this test were updated on " 7/16/2024 to reflect our healthy population more accurately. There may be differences in the flagging of prior results with similar values performed with this method. Those prior results can be interpreted in the context of the updated reference intervals.    Albumin 10/11/2024 3.7  3.5 - 5.2 g/dL Final    Phosphorus 10/11/2024 3.5  2.5 - 4.5 mg/dL Final    WBC Count 10/11/2024 4.7  4.0 - 11.0 10e3/uL Final    RBC Count 10/11/2024 3.15 (L)  4.40 - 5.90 10e6/uL Final    Hemoglobin 10/11/2024 10.0 (L)  13.3 - 17.7 g/dL Final    Hematocrit 10/11/2024 29.6 (L)  40.0 - 53.0 % Final    MCV 10/11/2024 94  78 - 100 fL Final    MCH 10/11/2024 31.7  26.5 - 33.0 pg Final    MCHC 10/11/2024 33.8  31.5 - 36.5 g/dL Final    RDW 10/11/2024 12.8  10.0 - 15.0 % Final    Platelet Count 10/11/2024 97 (L)  150 - 450 10e3/uL Final    Parathyroid Hormone Intact 10/11/2024 60  15 - 65 pg/mL Final    Estimated Average Glucose 10/11/2024 131 (H)  <117 mg/dL Final    Hemoglobin A1C 10/11/2024 6.2 (H)  <5.7 % Final    Normal <5.7%   Prediabetes 5.7-6.4%    Diabetes 6.5% or higher     Note: Adopted from ADA consensus guidelines.           Signed Electronically by: Sujit Duke MD

## 2024-10-15 NOTE — TELEPHONE ENCOUNTER
Clinic Navigator sent a [employer] message to inform the Pt that Pt is on the wait list for a sooner appointment. Clinic Navigator will reach out to the Pt via phone call or [facility] when a sooner appointment becomes available.

## 2024-10-17 PROCEDURE — 99000 SPECIMEN HANDLING OFFICE-LAB: CPT | Performed by: INTERNAL MEDICINE

## 2024-10-17 PROCEDURE — 87507 IADNA-DNA/RNA PROBE TQ 12-25: CPT | Mod: GZ | Performed by: INTERNAL MEDICINE

## 2024-10-17 PROCEDURE — 82705 FATS/LIPIDS FECES QUAL: CPT | Mod: 90 | Performed by: INTERNAL MEDICINE

## 2024-10-18 LAB
ADV 40+41 DNA STL QL NAA+NON-PROBE: NEGATIVE
ASTRO TYP 1-8 RNA STL QL NAA+NON-PROBE: NEGATIVE
C CAYETANENSIS DNA STL QL NAA+NON-PROBE: NEGATIVE
CAMPYLOBACTER DNA SPEC NAA+PROBE: NEGATIVE
CRYPTOSP DNA STL QL NAA+NON-PROBE: NEGATIVE
E COLI O157 DNA STL QL NAA+NON-PROBE: NORMAL
E HISTOLYT DNA STL QL NAA+NON-PROBE: NEGATIVE
EAEC ASTA GENE ISLT QL NAA+PROBE: NEGATIVE
EC STX1+STX2 GENES STL QL NAA+NON-PROBE: NEGATIVE
EPEC EAE GENE STL QL NAA+NON-PROBE: NEGATIVE
ETEC LTA+ST1A+ST1B TOX ST NAA+NON-PROBE: NEGATIVE
G LAMBLIA DNA STL QL NAA+NON-PROBE: NEGATIVE
NOROVIRUS GI+II RNA STL QL NAA+NON-PROBE: NEGATIVE
P SHIGELLOIDES DNA STL QL NAA+NON-PROBE: NEGATIVE
RVA RNA STL QL NAA+NON-PROBE: NEGATIVE
SALMONELLA SP RPOD STL QL NAA+PROBE: NEGATIVE
SAPO I+II+IV+V RNA STL QL NAA+NON-PROBE: NEGATIVE
SHIGELLA SP+EIEC IPAH ST NAA+NON-PROBE: NEGATIVE
V CHOLERAE DNA SPEC QL NAA+PROBE: NEGATIVE
VIBRIO DNA SPEC NAA+PROBE: NEGATIVE
Y ENTEROCOL DNA STL QL NAA+PROBE: NEGATIVE

## 2024-10-20 LAB
FAT STL QL: NORMAL
NEUTRAL FAT STL QL: NORMAL

## 2024-10-22 ENCOUNTER — PATIENT OUTREACH (OUTPATIENT)
Dept: NEPHROLOGY | Facility: CLINIC | Age: 84
End: 2024-10-22
Payer: COMMERCIAL

## 2024-10-22 NOTE — PROGRESS NOTES
Nephrology Note: Patient Outreach Encounter    REASON FOR CALL:     REASON FOR CALL: No chief complaint on file.                                  SITUATION/BACKROUND:   Patient is being treated for CKD Stage 4.        Patient Journey Status:  Active:   Neph Tracking Flowsheet Last Filled Values       Kidney Smart CKD Basics Referral 10/22/24    Kidney Smart Modality Education Referral 10/22/24    Patient's Referral Dates Auto Populate Patient's Referral Dates    Journey Referral 10/11/24            ASSESSMENT:         Neph Assessments:  No assessment indicated    PLAN:     Education:  Method:  general discussion/verbal explanation  Discussed: n/a  These interventions were used: Goal: Attend CKD education  Education material provided and patient was given an opportunity to ask questions.      Follow Up:   Patient to follow up as scheduled at next appointment         Nelly Goins RN

## 2024-10-24 ENCOUNTER — PATIENT OUTREACH (OUTPATIENT)
Dept: NEPHROLOGY | Facility: CLINIC | Age: 84
End: 2024-10-24
Payer: COMMERCIAL

## 2024-10-24 NOTE — PROGRESS NOTES
Received email from Carrie Bran, Kidney Smart Coordinator. Carrie reports pt has refused education. Wife requested Carrie update Nephrology RNCC that she 'does not want to be bothered at this time and will discuss education at pt's upcoming Nephrology Appointment in [place].     Per Carrie, new education referral will need to be placed if pt and spouse change their mind and would like to attend education.     Next Nephrology appointment: 12/17/24 with Dr. Tucker.     Routed to Nephrology RNCC. Neph tracking updated.     Neph Tracking Flowsheet Last Filled Values       Kidney Smart CKD Basics Referral --  10/24/24: Pt refused education    Kidney Smart Modality Education Referral 10/22/24    Patient's Referral Dates Auto Populate Patient's Referral Dates    Journey Referral 10/11/24    Transplant Status  Not Referred  age          Blanca Merritt, RN, BSN  Nephrology Supervisor  [facility]

## 2024-12-03 DIAGNOSIS — N18.4 CKD (CHRONIC KIDNEY DISEASE) STAGE 4, GFR 15-29 ML/MIN (H): Primary | ICD-10-CM

## 2024-12-10 ENCOUNTER — LAB (OUTPATIENT)
Dept: LAB | Facility: CLINIC | Age: 84
End: 2024-12-10
Payer: COMMERCIAL

## 2024-12-10 DIAGNOSIS — N18.4 CKD (CHRONIC KIDNEY DISEASE) STAGE 4, GFR 15-29 ML/MIN (H): ICD-10-CM

## 2024-12-10 LAB
ANION GAP SERPL CALCULATED.3IONS-SCNC: 7 MMOL/L (ref 7–15)
BUN SERPL-MCNC: 42.9 MG/DL (ref 8–23)
CALCIUM SERPL-MCNC: 8.9 MG/DL (ref 8.8–10.4)
CHLORIDE SERPL-SCNC: 107 MMOL/L (ref 98–107)
CREAT SERPL-MCNC: 2.94 MG/DL (ref 0.67–1.17)
EGFRCR SERPLBLD CKD-EPI 2021: 20 ML/MIN/1.73M2
GLUCOSE SERPL-MCNC: 129 MG/DL (ref 70–99)
HCO3 SERPL-SCNC: 25 MMOL/L (ref 22–29)
POTASSIUM SERPL-SCNC: 4.8 MMOL/L (ref 3.4–5.3)
SODIUM SERPL-SCNC: 139 MMOL/L (ref 135–145)

## 2024-12-10 PROCEDURE — 36415 COLL VENOUS BLD VENIPUNCTURE: CPT

## 2024-12-10 PROCEDURE — 80048 BASIC METABOLIC PNL TOTAL CA: CPT

## 2024-12-17 ENCOUNTER — OFFICE VISIT (OUTPATIENT)
Dept: NEPHROLOGY | Facility: CLINIC | Age: 84
End: 2024-12-17
Attending: NURSE PRACTITIONER
Payer: COMMERCIAL

## 2024-12-17 VITALS
BODY MASS INDEX: 25.45 KG/M2 | WEIGHT: 192 LBS | DIASTOLIC BLOOD PRESSURE: 71 MMHG | HEART RATE: 53 BPM | HEIGHT: 73 IN | SYSTOLIC BLOOD PRESSURE: 185 MMHG | OXYGEN SATURATION: 99 %

## 2024-12-17 DIAGNOSIS — N18.4 CKD (CHRONIC KIDNEY DISEASE) STAGE 4, GFR 15-29 ML/MIN (H): Primary | ICD-10-CM

## 2024-12-17 DIAGNOSIS — R94.4 RENAL FUNCTION TEST ABNORMAL: ICD-10-CM

## 2024-12-17 RX ORDER — HYDROCHLOROTHIAZIDE 12.5 MG/1
12.5 TABLET ORAL DAILY
Qty: 30 TABLET | Refills: 5 | Status: SHIPPED | OUTPATIENT
Start: 2024-12-17

## 2024-12-17 ASSESSMENT — PAIN SCALES - GENERAL: PAINLEVEL_OUTOF10: NO PAIN (0)

## 2024-12-17 NOTE — PATIENT INSTRUCTIONS
It was a pleasure taking care of you today.  I've included a brief summary of our discussion and care plan from today's visit below.      My recommendations are summarized as follows:  - Start hydrochlorothiazide 12.5 mg orally daily  - Check your blood pressure at home. Goal blood pressure is 130/80. Call my office if your blood pressure readings are running higher than that.  - Complete your laboratory work that was ordered in October by Dr. Dick. I will let you know when I get the results  - Referral to anemia management clinic  - Uremic symptoms are signs that your kidneys are not working properly. They can include feeling tired or weak, loss of appetite, nausea or vomiting, trouble sleeping, changes in your mental state like confusion or difficulty concentrating, shortness of breath, and swelling in your hands or feet. These symptoms can occur when waste products build up in your blood because your kidneys can't remove them. If you experience any of these signs, it might mean that you are getting closer to needing dialysis. Call my office if you experience any such symptoms.  - Return to Nephrology Clinic in 3-6 to review your progress.     Who do I call with any questions after my visit?  Please be in touch if there are any further questions that arise following today's visit.  There are multiple ways to contact your nephrology care team.      During business hours, you may reach your Nephrology RN Care Coordinator, Nelly Goins at [phone]. For urgent/emergent questions after business hours, you may reach the on-call Nephrology Fellow by contacting the [facility]  at [phone]. You can always send a secure message through [employer].  [employer] messages are answered by your nurse or doctor typically within 24 hours.  Please allow extra time on weekends and holidays.      How do I schedule appointments?  To schedule or reschedule an appointment, please call [phone]. To schedule  imaging please call [phone]. To schedule your lab appointment at [facility] 1st floor lab call [phone].    How will I get the results of any tests ordered?    You will receive all of your results.  If you have signed up for [employer], any tests ordered at your visit will be available to you after your physician reviews them.  Typically this takes 1-2 weeks.  If there are urgent results that require a change in your care plan, your physician or nurse will call you to discuss the next steps.      What is [employer]?  [employer] is a secure way for you to access all of your healthcare records from the [facility].  It is a web based computer program, so you can sign on to it from any location.  It also allows you to send secure messages to your care team.  I recommend signing up for [employer] access if you have not already done so and are comfortable with using a computer.      Sincerely,    Zane Tucker MD  [facility]  Division of Nephrology and Hypertension

## 2024-12-17 NOTE — PROGRESS NOTES
"    Nephrology Outpatient Visit Note (12/17/2024)    Chief Complain/Reason for Visit  Chronic kidney disease    History of Present Illness  This is an 84-year-old male who was seen by my colleague Dr. Dick back in October for workup and management of chronic kidney disease.  The patient's past medical history is notable for coronary artery disease status post CABG in 2018, type 2 diabetes mellitus, hypertension, gout, and hyperlipidemia.    With regards to his kidney function, we have laboratory data going back to 2009.  Back then his serum creatinine ranged between 1.3 to 1.5 mg/dL.  His serum creatinine measurement gradually trended up, and over the course of the last 2 years he has been running between 2 to 3 mg/dL.    His most recent laboratory evaluation was earlier this month.  His serum creatinine was 2.9 mg/dL with an estimated GFR of 20 mL/min.  Electrolytes were within a normal range.  Serum albumin is normal.  He performed a urinalysis a couple of months ago.  This was notable for proteinuria with    The following portions of the patient's history were reviewed and updated as appropriate: allergies, current medications, past family history, past medical history, past social history, past surgical history, and problem list.    Subjective   Review of Systems  A comprehensive review of systems was performed. Pertinent positives and negatives are described above.    Social and Family History  Patient reports that he has never smoked. He has never used smokeless tobacco. He reports that he does not currently use alcohol. He reports that he does not use drugs.  family history includes Diabetes in his father; Unknown/Adopted in his mother.     Examination  Blood pressure (!) 185/71, pulse 53, height 1.854 m (6' 1\"), weight 87.1 kg (192 lb), SpO2 99%. Body mass index is 25.33 kg/m .  General:  Elderly. Pleasant.   Eyes: conjunctiva clear, EOM intact, PERRL.   Throat:  No erythema, exudates or lesions.   Neck:  " neck supple, no masses  Heart:  RRR, no murmur   Lungs:  CTA bilaterally, no wheezes, rhonchi, rales.  Breathing unlabored.   Abdomen:  Soft, NT/ND, no HSM, no masses.   Extremities: No deformities, clubbing, cyanosis, or edema.   Neurologic: A/O x 3. No focal deficits. Gait is slow.    Objective   Pertinent Laboratory and Imaging Results  Most Recent Serum Chemistries  Lab Results   Component Value Date    WBC 4.7 10/11/2024    HGB 10.0 (L) 10/11/2024    HCT 29.6 (L) 10/11/2024    PLT 97 (L) 10/11/2024     12/10/2024    POTASSIUM 4.8 12/10/2024    CHLORIDE 107 12/10/2024    CO2 25 12/10/2024    BUN 42.9 (H) 12/10/2024    CR 2.94 (H) 12/10/2024     (H) 12/10/2024    SED 14 12/19/2018    DD 0.48 08/10/2022    NTBNPI 3,024 (H) 08/15/2024    AST 19 10/05/2024    ALT 16 10/05/2024    ALKPHOS 68 10/05/2024    INR 1.76 (H) 10/26/2018     Most Recent Urinalysis  Lab Results   Component Value Date    COLOR Light Yellow 10/05/2024    APPEARANCE Clear 10/05/2024    URINEGLC Negative 10/05/2024    URINEBILI Negative 10/05/2024    URINEKETONE Negative 10/05/2024    SG 1.014 10/05/2024    URINEPH 5.5 10/05/2024    PROTEIN 100 (A) 10/05/2024    UROBILINOGEN 0.2 06/21/2024    NITRITE Negative 10/05/2024    LEUKEST Large (A) 10/05/2024     Current Outpatient Medications   Medication Sig Dispense Refill    acetaminophen (TYLENOL) 325 MG tablet Take 2 tablets (650 mg) by mouth every 4 hours as needed for mild pain 100 tablet     aspirin 325 MG EC tablet Take 1 tablet (325 mg) by mouth daily 40 tablet     atorvastatin (LIPITOR) 40 MG tablet TAKE ONE TABLET BY MOUTH ONE TIME DAILY 90 tablet 1    blood glucose calibration (ACCU-CHEK PRISCILLA) solution Use to calibrate blood glucose monitor as needed as directed. 1 Bottle 0    blood glucose monitoring (ACCU-CHEK PRISCILLA) test strip Use to test blood sugars 1 times daily or as directed. 100 each 1    blood glucose monitoring (ACCU-CHEK MULTICLIX) lancets Use to test blood sugar 1  times daily or as directed. 1 Box 1    blood glucose monitoring (NO BRAND SPECIFIED) meter device kit Use to test blood sugar 1 times daily or as directed. 1 kit 0    carvedilol (COREG) 12.5 MG tablet TAKE TWO TABLETS BY MOUTH TWICE DAILY WITH MEALS 360 tablet 0    fluticasone (FLONASE) 50 MCG/ACT nasal spray Spray 1-2 sprays into both nostrils daily as needed for rhinitis or allergies 16 g 4    guaiFENesin-dextromethorphan (ROBITUSSIN DM) 100-10 MG/5ML syrup Take 10 mLs by mouth every 4 hours as needed for cough 473 mL 0    hydrochlorothiazide (HYDRODIURIL) 25 MG tablet Take 1 tablet (25 mg) by mouth daily 90 tablet 3    hydroCHLOROthiazide 12.5 MG tablet Take 1 tablet (12.5 mg) by mouth daily. 30 tablet 5    lisinopril (ZESTRIL) 30 MG tablet Take 1 tablet (30 mg) by mouth daily 90 tablet 3    multivitamin w/minerals (THERA-VIT-M) tablet Take 1 tablet by mouth every morning      tamsulosin (FLOMAX) 0.4 MG capsule Take 1 capsule (0.4 mg) by mouth daily 90 capsule 3    acetaminophen (TYLENOL) 325 MG tablet Take 2 tablets (650 mg) by mouth every 4 hours as needed for mild pain or other (and adjunct with moderate or severe pain or per patient request)      loratadine (CLARITIN) 10 MG tablet Take 1 tablet (10 mg) by mouth daily (Patient not taking: Reported on 12/17/2024) 10 tablet 0    nitroGLYcerin (NITROSTAT) 0.4 MG sublingual tablet For chest pain place 1 tablet under the tongue every 5 minutes for 3 doses. If symptoms persist 5 minutes after 1st dose call 911. 25 tablet 3    predniSONE (DELTASONE) 10 MG tablet Take 3 tabs by mouth daily x 3 days, then 2 tabs daily x 3 days, then 1 tab daily x 3 days, then 1/2 tab daily x 3 days. 20 tablet 0     No current facility-administered medications for this visit.        Assessment & Plan   Patient Active Problem List   Diagnosis    Mixed hyperlipidemia    Essential hypertension, benign    Allergic rhinitis due to pollen    Gout    HTN (hypertension)    HYPERLIPIDEMIA LDL  GOAL <100    Lumbar radiculopathy    Lumbago    Arthrodesis status    Seasonal allergic rhinitis    Type 2 diabetes mellitus with other circulatory complications (H)    PVD (peripheral vascular disease) (H)    Carotid arterial disease (H)    Carotid artery disease (H)    S/P CABG (coronary artery bypass graft)    Coronary artery disease involving coronary bypass graft of native heart    CKD (chronic kidney disease) stage 4, GFR 15-29 ml/min (H)    Generalized weakness     The patient has chronic kidney disease likely due to chronic hypertension as well as to diabetes. Of note, he has a strong family history of end-stage kidney disease in 2 of his sister but both of them receive dialysis when they were at old age.      With regards to workup, I recommend that the patient completes a serological evaluation for parenchymal kidney disease.  As a matter fact, the lab work was ordered when he was seen by my colleague, Dr. Dick, earlier in October.  I added to the lab work evaluation for medical gammopathy and also given her thrombocytopenia I added an LDH and haptoglobin.    With regards to management, I indicated the importance of controlling his blood pressure in order to slow down the progression of his chronic kidney disease.  I would like to start him on hydrochlorothiazide.    I indicated the patient that he has chronic kidney disease that is fairly advanced.  I discussed with him that this may progress towards end-stage renal disease.  The patient is possibly interested in pursuing dialysis in the future.  I discussed with him management options, and given his age, I instructed him to consider supportive care as a measurement.  He was referred to the CKD class in the previous visit.    My recommendations are the following:  - Start hydrochlorothiazide 12.5 mg orally daily  - Check your blood pressure at home. Goal blood pressure is 130/80. Call my office if your blood pressure readings are running higher than  that.  - Complete your laboratory work that was ordered in October by Dr. Dick. I will let you know when I get the results  - Referral to anemia management clinic  - Uremic symptoms are signs that your kidneys are not working properly. They can include feeling tired or weak, loss of appetite, nausea or vomiting, trouble sleeping, changes in your mental state like confusion or difficulty concentrating, shortness of breath, and swelling in your hands or feet. These symptoms can occur when waste products build up in your blood because your kidneys can't remove them. If you experience any of these signs, it might mean that you are getting closer to needing dialysis. Call my office if you experience any such symptoms.  - Return to Nephrology Clinic in 3-6 to review your progress.     Total time was of this encounter on the date of service was 30 minutes. All questions were answered to the patient's satisfaction.  Chart documentation was completed, in part, with [employer] voice-recognition software. Even though reviewed, some grammatical, spelling, and word errors may remain.    Orders placed today:  Orders Placed This Encounter   Procedures    Basic metabolic panel    Hemoglobin and hematocrit    Iron and iron binding capacity    Ferritin    Renal panel    Parathyroid Hormone Intact    Vitamin D Deficiency    UA with Microscopic reflex to Culture    Albumin Random Urine Quantitative with Creat Ratio    Protein Immunofixation Serum    Kappa and lambda light chain    Lactate Dehydrogenase    Haptoglobin    Anemia Referral (PharmD)     Zane Tucker MD  Division of Nephrology and Hypertension  [employer] ([employer])   Vocera Web Console ([employer])

## 2024-12-18 ENCOUNTER — PATIENT OUTREACH (OUTPATIENT)
Dept: CARE COORDINATION | Facility: CLINIC | Age: 84
End: 2024-12-18
Payer: COMMERCIAL

## 2024-12-18 NOTE — PROGRESS NOTES
Anemia Management Note - Enrollment    SUBJECTIVE/OBJECTIVE:    Referred by Dr. Zane Tucker on 2024  Primary Diagnosis: Anemia in Chronic Kidney Disease (N18.4, D63.1)     Secondary Diagnosis: Chronic Kidney Disease, Stage 4 (N18.4)     Hgb goal range: 9-10  Epo/Darbo: TBD- need updated labs done  Iron regimen: TBD- need updated labs done    Labs : TBD  RX/TX plans : TBD  Recent VICTORINO use, transfusion, IV iron: NA    No history of stroke, MI, and blood clots or cancers. HTN    Contact: Ok to leave message regarding scheduling, billing and medical information  per consent to communicate dated [id number]. OK to speak with Fatou Warner, wife regarding scheduling, billing and medical information per consent to communicate dated [id number]        Latest Ref Rng & Units 2024 2024 2024 8/15/2024 2024 10/5/2024 10/11/2024   Anemia   Hemoglobin 13.3 - 17.7 g/dL 11.8  9.1  9.1  11.0  8.9  10.5  10.0         BP Readings from Last 3 Encounters:   24 (!) 185/71   10/14/24 128/64   10/11/24 128/54     Wt Readings from Last 2 Encounters:   24 87.1 kg (192 lb)   10/14/24 83.5 kg (184 lb 1.6 oz)     Current Outpatient Medications   Medication Sig Dispense Refill    acetaminophen (TYLENOL) 325 MG tablet Take 2 tablets (650 mg) by mouth every 4 hours as needed for mild pain or other (and adjunct with moderate or severe pain or per patient request)      acetaminophen (TYLENOL) 325 MG tablet Take 2 tablets (650 mg) by mouth every 4 hours as needed for mild pain 100 tablet     aspirin 325 MG EC tablet Take 1 tablet (325 mg) by mouth daily 40 tablet     atorvastatin (LIPITOR) 40 MG tablet TAKE ONE TABLET BY MOUTH ONE TIME DAILY 90 tablet 1    blood glucose calibration (ACCU-CHEK PRISCILLA) solution Use to calibrate blood glucose monitor as needed as directed. 1 Bottle 0    blood glucose monitoring (ACCU-CHEK PRISCILLA) test strip Use to test blood sugars 1 times daily or as directed. 100 each 1    blood  glucose monitoring (ACCU-CHEK MULTICLIX) lancets Use to test blood sugar 1 times daily or as directed. 1 Box 1    blood glucose monitoring (NO BRAND SPECIFIED) meter device kit Use to test blood sugar 1 times daily or as directed. 1 kit 0    carvedilol (COREG) 12.5 MG tablet TAKE TWO TABLETS BY MOUTH TWICE DAILY WITH MEALS 360 tablet 0    fluticasone (FLONASE) 50 MCG/ACT nasal spray Spray 1-2 sprays into both nostrils daily as needed for rhinitis or allergies 16 g 4    guaiFENesin-dextromethorphan (ROBITUSSIN DM) 100-10 MG/5ML syrup Take 10 mLs by mouth every 4 hours as needed for cough 473 mL 0    hydrochlorothiazide (HYDRODIURIL) 25 MG tablet Take 1 tablet (25 mg) by mouth daily 90 tablet 3    hydroCHLOROthiazide 12.5 MG tablet Take 1 tablet (12.5 mg) by mouth daily. 30 tablet 5    lisinopril (ZESTRIL) 30 MG tablet Take 1 tablet (30 mg) by mouth daily 90 tablet 3    loratadine (CLARITIN) 10 MG tablet Take 1 tablet (10 mg) by mouth daily (Patient not taking: Reported on 12/17/2024) 10 tablet 0    multivitamin w/minerals (THERA-VIT-M) tablet Take 1 tablet by mouth every morning      nitroGLYcerin (NITROSTAT) 0.4 MG sublingual tablet For chest pain place 1 tablet under the tongue every 5 minutes for 3 doses. If symptoms persist 5 minutes after 1st dose call 911. 25 tablet 3    predniSONE (DELTASONE) 10 MG tablet Take 3 tabs by mouth daily x 3 days, then 2 tabs daily x 3 days, then 1 tab daily x 3 days, then 1/2 tab daily x 3 days. 20 tablet 0    tamsulosin (FLOMAX) 0.4 MG capsule Take 1 capsule (0.4 mg) by mouth daily 90 capsule 3       ASSESSMENT:  Hgb : Waiting on updated labs   Ferritin: Due for labs  TSat: Due for labs  Iron regimen recommended: TBD  Recommended VICTORINO regimen: TBD  Blood Pressure: hx. HTN, monitor closely   Goals Addressed    None         PLAN:  1. Patient called today for enrollment in Anemia Management Service.  2. Discussed: anemia overview, monitoring service, and goal hemoglobin range and  rationale and risks of VICTORINO blood clots, stroke, and increase in blood pressure  3. Dose location: [facility]  4. Labs: Paicines  5. Pharmacy: [facility]    Merlin would like to wait on labs until his wellness appt  on 012425. He denies any sx of anemia at this time. Writer will plan to call in a few weeks to check in and he was invited to call anytime if concerns arise. Gave him call back number. He verbalized understanding and agreement of the plan    Next call date:  010824    Carrie Leopold, RN BSN  Anemia Services  [facility]  [email or web address]  Office: [phone]  Fax [phone]

## 2025-01-08 ENCOUNTER — PATIENT OUTREACH (OUTPATIENT)
Dept: CARE COORDINATION | Facility: CLINIC | Age: 85
End: 2025-01-08
Payer: COMMERCIAL

## 2025-01-08 NOTE — PROGRESS NOTES
Anemia Management Note -   Follow-up with anemia management service:    LVM to check in. Asked him to call back if any anemia questions/concerns.  He previously requested waiting until his OV on 012425 for the labs. Directed him to ask lab staff to draw all labs ordered under Dr. Tucker        Latest Ref Rng & Units 1/22/2024 6/18/2024 6/21/2024 8/15/2024 8/16/2024 10/5/2024 10/11/2024   Anemia   Hemoglobin 13.3 - 17.7 g/dL 11.8  9.1  9.1  11.0  8.9  10.5  10.0        Follow-up call date: 012725, labs on 012425    Carrie Leopold, RN BSN  Anemia Services  [facility]  [email or web address]  Office: [phone]  Fax [phone]

## 2025-01-14 DIAGNOSIS — I10 ESSENTIAL HYPERTENSION, BENIGN: ICD-10-CM

## 2025-01-15 RX ORDER — CARVEDILOL 12.5 MG/1
25 TABLET ORAL 2 TIMES DAILY WITH MEALS
Qty: 360 TABLET | Refills: 0 | Status: SHIPPED | OUTPATIENT
Start: 2025-01-15

## 2025-01-24 ENCOUNTER — OFFICE VISIT (OUTPATIENT)
Dept: INTERNAL MEDICINE | Facility: CLINIC | Age: 85
End: 2025-01-24
Payer: COMMERCIAL

## 2025-01-24 VITALS
SYSTOLIC BLOOD PRESSURE: 145 MMHG | WEIGHT: 187.8 LBS | HEIGHT: 72 IN | BODY MASS INDEX: 25.44 KG/M2 | HEART RATE: 55 BPM | TEMPERATURE: 96.2 F | OXYGEN SATURATION: 98 % | RESPIRATION RATE: 16 BRPM | DIASTOLIC BLOOD PRESSURE: 68 MMHG

## 2025-01-24 DIAGNOSIS — E11.59 TYPE 2 DIABETES MELLITUS WITH OTHER CIRCULATORY COMPLICATIONS (H): ICD-10-CM

## 2025-01-24 DIAGNOSIS — Z00.00 ENCOUNTER FOR PREVENTATIVE ADULT HEALTH CARE EXAMINATION: Primary | ICD-10-CM

## 2025-01-24 DIAGNOSIS — Z29.11 NEED FOR VACCINATION AGAINST RESPIRATORY SYNCYTIAL VIRUS: ICD-10-CM

## 2025-01-24 DIAGNOSIS — I10 ESSENTIAL HYPERTENSION: ICD-10-CM

## 2025-01-24 DIAGNOSIS — I25.810 CORONARY ARTERY DISEASE INVOLVING CORONARY BYPASS GRAFT OF NATIVE HEART WITHOUT ANGINA PECTORIS: ICD-10-CM

## 2025-01-24 DIAGNOSIS — N18.4 CKD (CHRONIC KIDNEY DISEASE) STAGE 4, GFR 15-29 ML/MIN (H): ICD-10-CM

## 2025-01-24 DIAGNOSIS — Z23 NEED FOR SHINGLES VACCINE: ICD-10-CM

## 2025-01-24 DIAGNOSIS — R09.89 BILATERAL CAROTID BRUITS: ICD-10-CM

## 2025-01-24 LAB
ALBUMIN SERPL BCG-MCNC: 3.9 G/DL (ref 3.5–5.2)
ALBUMIN UR-MCNC: >=300 MG/DL
ALT SERPL W P-5'-P-CCNC: 30 U/L (ref 0–70)
ANION GAP SERPL CALCULATED.3IONS-SCNC: 8 MMOL/L (ref 7–15)
APPEARANCE UR: CLEAR
AST SERPL W P-5'-P-CCNC: 27 U/L (ref 0–45)
BACTERIA #/AREA URNS HPF: ABNORMAL /HPF
BILIRUB UR QL STRIP: NEGATIVE
BUN SERPL-MCNC: 38.3 MG/DL (ref 8–23)
CALCIUM SERPL-MCNC: 9.5 MG/DL (ref 8.8–10.4)
CHLORIDE SERPL-SCNC: 107 MMOL/L (ref 98–107)
CHOLEST SERPL-MCNC: 117 MG/DL
COLOR UR AUTO: YELLOW
CREAT SERPL-MCNC: 2.93 MG/DL (ref 0.67–1.17)
EGFRCR SERPLBLD CKD-EPI 2021: 20 ML/MIN/1.73M2
EST. AVERAGE GLUCOSE BLD GHB EST-MCNC: 134 MG/DL
FASTING STATUS PATIENT QL REPORTED: YES
FERRITIN SERPL-MCNC: 99 NG/ML (ref 31–409)
GLUCOSE SERPL-MCNC: 127 MG/DL (ref 70–99)
GLUCOSE UR STRIP-MCNC: NEGATIVE MG/DL
HAPTOGLOB SERPL-MCNC: 155 MG/DL (ref 30–200)
HBA1C MFR BLD: 6.3 % (ref 0–5.6)
HCO3 SERPL-SCNC: 24 MMOL/L (ref 22–29)
HDLC SERPL-MCNC: 50 MG/DL
HGB UR QL STRIP: NEGATIVE
HYALINE CASTS #/AREA URNS LPF: ABNORMAL /LPF
IRON BINDING CAPACITY (ROCHE): 256 UG/DL (ref 240–430)
IRON SATN MFR SERPL: 32 % (ref 15–46)
IRON SERPL-MCNC: 83 UG/DL (ref 61–157)
KETONES UR STRIP-MCNC: NEGATIVE MG/DL
LDH SERPL L TO P-CCNC: 106 U/L (ref 0–250)
LDLC SERPL CALC-MCNC: 49 MG/DL
LEUKOCYTE ESTERASE UR QL STRIP: ABNORMAL
MUCOUS THREADS #/AREA URNS LPF: PRESENT /LPF
NITRATE UR QL: NEGATIVE
NONHDLC SERPL-MCNC: 67 MG/DL
PH UR STRIP: 5.5 [PH] (ref 5–7)
PHOSPHATE SERPL-MCNC: 3.5 MG/DL (ref 2.5–4.5)
POTASSIUM SERPL-SCNC: 5.1 MMOL/L (ref 3.4–5.3)
PTH-INTACT SERPL-MCNC: 50 PG/ML (ref 15–65)
RBC #/AREA URNS AUTO: ABNORMAL /HPF
SODIUM SERPL-SCNC: 139 MMOL/L (ref 135–145)
SP GR UR STRIP: 1.02 (ref 1–1.03)
SQUAMOUS #/AREA URNS AUTO: ABNORMAL /LPF
TRIGL SERPL-MCNC: 88 MG/DL
TSH SERPL DL<=0.005 MIU/L-ACNC: 2.47 UIU/ML (ref 0.3–4.2)
UROBILINOGEN UR STRIP-ACNC: 0.2 E.U./DL
VIT D+METAB SERPL-MCNC: 24 NG/ML (ref 20–50)
WBC #/AREA URNS AUTO: ABNORMAL /HPF

## 2025-01-24 PROCEDURE — 82043 UR ALBUMIN QUANTITATIVE: CPT | Performed by: INTERNAL MEDICINE

## 2025-01-24 PROCEDURE — 83540 ASSAY OF IRON: CPT | Performed by: INTERNAL MEDICINE

## 2025-01-24 PROCEDURE — 80069 RENAL FUNCTION PANEL: CPT | Performed by: INTERNAL MEDICINE

## 2025-01-24 PROCEDURE — G0439 PPPS, SUBSEQ VISIT: HCPCS | Performed by: INTERNAL MEDICINE

## 2025-01-24 PROCEDURE — 81001 URINALYSIS AUTO W/SCOPE: CPT | Performed by: INTERNAL MEDICINE

## 2025-01-24 PROCEDURE — 83036 HEMOGLOBIN GLYCOSYLATED A1C: CPT | Performed by: INTERNAL MEDICINE

## 2025-01-24 PROCEDURE — 82728 ASSAY OF FERRITIN: CPT | Performed by: INTERNAL MEDICINE

## 2025-01-24 PROCEDURE — 83521 IG LIGHT CHAINS FREE EACH: CPT | Performed by: INTERNAL MEDICINE

## 2025-01-24 PROCEDURE — 83010 ASSAY OF HAPTOGLOBIN QUANT: CPT | Performed by: INTERNAL MEDICINE

## 2025-01-24 PROCEDURE — 84460 ALANINE AMINO (ALT) (SGPT): CPT | Performed by: INTERNAL MEDICINE

## 2025-01-24 PROCEDURE — 99214 OFFICE O/P EST MOD 30 MIN: CPT | Mod: 25 | Performed by: INTERNAL MEDICINE

## 2025-01-24 PROCEDURE — 83970 ASSAY OF PARATHORMONE: CPT | Performed by: INTERNAL MEDICINE

## 2025-01-24 PROCEDURE — 84450 TRANSFERASE (AST) (SGOT): CPT | Performed by: INTERNAL MEDICINE

## 2025-01-24 PROCEDURE — 82306 VITAMIN D 25 HYDROXY: CPT | Performed by: INTERNAL MEDICINE

## 2025-01-24 PROCEDURE — 83615 LACTATE (LD) (LDH) ENZYME: CPT | Performed by: INTERNAL MEDICINE

## 2025-01-24 PROCEDURE — 80061 LIPID PANEL: CPT | Performed by: INTERNAL MEDICINE

## 2025-01-24 PROCEDURE — 36415 COLL VENOUS BLD VENIPUNCTURE: CPT | Performed by: INTERNAL MEDICINE

## 2025-01-24 PROCEDURE — 82570 ASSAY OF URINE CREATININE: CPT | Performed by: INTERNAL MEDICINE

## 2025-01-24 PROCEDURE — 83550 IRON BINDING TEST: CPT | Performed by: INTERNAL MEDICINE

## 2025-01-24 PROCEDURE — 84443 ASSAY THYROID STIM HORMONE: CPT | Performed by: INTERNAL MEDICINE

## 2025-01-24 RX ORDER — LISINOPRIL 30 MG/1
30 TABLET ORAL DAILY
Qty: 90 TABLET | Refills: 3 | Status: SHIPPED | OUTPATIENT
Start: 2025-01-24

## 2025-01-24 SDOH — HEALTH STABILITY: PHYSICAL HEALTH: ON AVERAGE, HOW MANY MINUTES DO YOU ENGAGE IN EXERCISE AT THIS LEVEL?: 20 MIN

## 2025-01-24 SDOH — HEALTH STABILITY: PHYSICAL HEALTH: ON AVERAGE, HOW MANY DAYS PER WEEK DO YOU ENGAGE IN MODERATE TO STRENUOUS EXERCISE (LIKE A BRISK WALK)?: 3 DAYS

## 2025-01-24 ASSESSMENT — SOCIAL DETERMINANTS OF HEALTH (SDOH): HOW OFTEN DO YOU GET TOGETHER WITH FRIENDS OR RELATIVES?: ONCE A WEEK

## 2025-01-24 ASSESSMENT — PAIN SCALES - GENERAL: PAINLEVEL_OUTOF10: NO PAIN (0)

## 2025-01-24 NOTE — PROGRESS NOTES
Preventive Care Visit  [facility]  Sujit Duke MD, Internal Medicine  Jan 24, 2025      Assessment & Plan     Need for shingles vaccine        Need for vaccination against respiratory syncytial virus        Coronary artery disease involving coronary bypass graft of native heart without angina pectoris  Continue treatment  Keep diet and exercise   - Lipid panel reflex to direct LDL Fasting  - OFFICE/OUTPT VISIT,EST,LEVL IV    Type 2 diabetes mellitus with other circulatory complications (H)  Assess diabetic control  Keep diet , medications   - Hemoglobin A1c  - OFFICE/OUTPT VISIT,EST,LEVL IV    CKD (chronic kidney disease) stage 4, GFR 15-29 ml/min (H)  Monitor renal function  Keep hydrated  Avoid NSAID use   - Haptoglobin  - Lactate Dehydrogenase  - Kappa and lambda light chain  - Albumin Random Urine Quantitative with Creat Ratio  - UA with Microscopic reflex to Culture  - Vitamin D Deficiency  - Parathyroid Hormone Intact  - Renal panel  - Ferritin  - Iron and iron binding capacity  - UA Microscopic with Reflex to Culture  - OFFICE/OUTPT VISIT,EST,LEVL IV    Essential hypertension  Controlled  Continue treatment  - lisinopril (ZESTRIL) 30 MG tablet; Take 1 tablet (30 mg) by mouth daily.  - US Carotid Bilateral; Future  - Hemoglobin A1c  - AST  - ALT  - TSH with free T4 reflex  - OFFICE/OUTPT VISIT,EST,LEVL IV    Encounter for preventative adult health care examination  advised regular aerobic activity, low cholesterol, low salt diet, wearing seat belt,  self examinations, sunscreen protection.Obtain screening cholesterol, immunizations reviewed.    - US Carotid Bilateral; Future  - Hemoglobin A1c  - AST  - ALT  - TSH with free T4 reflex    Bilateral carotid bruits  Assess US for follow up on carotid stenosis   - US Carotid Bilateral; Future  - OFFICE/OUTPT VISIT,EST,LEVL IV    Patient has been advised of split billing requirements and indicates understanding:  Yes        BMI  Estimated body mass index is 25.47 kg/m  as calculated from the following:    Height as of this encounter: 1.829 m (6').    Weight as of this encounter: 85.2 kg (187 lb 12.8 oz).       Counseling  Appropriate preventive services were addressed with this patient via screening, questionnaire, or discussion as appropriate for fall prevention, nutrition, physical activity, Tobacco-use cessation, social engagement, weight loss and cognition.  Checklist reviewing preventive services available has been given to the patient.  Reviewed patient's diet, addressing concerns and/or questions.   He is at risk for lack of exercise and has been provided with information to increase physical activity for the benefit of his well-being.   The patient was instructed to see the dentist every 6 months.   The patient was provided with written information regarding signs of hearing loss.   Information on urinary incontinence and treatment options given to patient.       See Patient Instructions    Subjective Merlin is a 84 year old, presenting for the following:  Wellness Visit        1/24/2025     1:43 PM   Additional Questions   Roomed by Augutsina TRAN   Accompanied by wife           HPI    Has h/o HTN. on medical treatment. BP has been controlled. No side effects from medications. No CP, HA, dizziness. good compliance with medications and low salt diet.  Has h/o ischemic heart disease, asymptomatic regarding chest pains, SOB,palpitations. Has good compliance with treatment, diet and exercise.  Has H/O hyperlipidemia. On medical treatment and diet. No side effects. No muscle weakness, myalgias or upset stomach.   Has  history of  DM. On diet , exercise and oral treatment. Blood sugars are controlled. Checking 1 time daily. No paresthesias. No hypoglycemias.  Has h/o CRF. Monitoring BP, BG, medications, avoiding OTC NSAIDs. Needs periodic recheck of kidney function.  Has h/o carotid stenosis, post surgery. No symptoms of  dizziness, neurologic deficits.         Health Care Directive  Patient does not have a Health Care Directive: Discussed advance care planning with patient; however, patient declined at this time.      1/24/2025   General Health   How would you rate your overall physical health? Good   Feel stress (tense, anxious, or unable to sleep) Not at all         1/24/2025   Nutrition   Diet: Low salt    Low fat/cholesterol       Multiple values from one day are sorted in reverse-chronological order         1/24/2025   Exercise   Days per week of moderate/strenous exercise 3 days   Average minutes spent exercising at this level 20 min         1/24/2025   Social Factors   Frequency of gathering with friends or relatives Once a week   Worry food won't last until get money to buy more No   Food not last or not have enough money for food? No   Do you have housing? (Housing is defined as stable permanent housing and does not include staying ouside in a car, in a tent, in an abandoned building, in an overnight shelter, or couch-surfing.) Yes   Are you worried about losing your housing? No   Lack of transportation? No   Unable to get utilities (heat,electricity)? No         1/24/2025   Fall Risk   Fallen 2 or more times in the past year? Yes    Yes   Trouble with walking or balance? Yes    Yes   Gait Speed Test (Document in seconds) 5.84   Gait Speed Test Interpretation Greater than 5.01 seconds - ABNORMAL       Multiple values from one day are sorted in reverse-chronological order          1/24/2025   Activities of Daily Living- Home Safety   Needs help with the following daily activites None of the above   Safety concerns in the home None of the above         1/24/2025   Dental   Dentist two times every year? (!) NO         1/24/2025   Hearing Screening   Hearing concerns? (!) I FEEL THAT PEOPLE ARE MUMBLING OR NOT SPEAKING CLEARLY.    (!) IT'S HARDER TO UNDERSTAND WOMEN'S VOICES THAN MEN'S VOICES.    (!) IT'S HARD TO FOLLOW A  CONVERSATION IN A NOISY RESTAURANT OR CROWDED ROOM.    (!) TROUBLE UNDERSTANDING SOFT OR WHISPERED SPEECH.       Multiple values from one day are sorted in reverse-chronological order         1/24/2025   Driving Risk Screening   Patient/family members have concerns about driving No         1/24/2025   General Alertness/Fatigue Screening   Have you been more tired than usual lately? No         1/24/2025   Urinary Incontinence Screening   Bothered by leaking urine in past 6 months Yes         1/24/2025   TB Screening   Were you born outside of the [place]? No         Today's PHQ-2 Score:       1/24/2025    10:55 AM   PHQ-2 ( 1999 [employer])   Q1: Little interest or pleasure in doing things 0   Q2: Feeling down, depressed or hopeless 0   PHQ-2 Score 0    Q1: Little interest or pleasure in doing things Not at all   Q2: Feeling down, depressed or hopeless Not at all   PHQ-2 Score 0       Patient-reported           1/24/2025   Substance Use   Alcohol more than 3/day or more than 7/wk No   Do you have a current opioid prescription? No   How severe/bad is pain from 1 to 10? 0/10 (No Pain)   Do you use any other substances recreationally? No     Social History     Tobacco Use    Smoking status: Never    Smokeless tobacco: Never   Vaping Use    Vaping status: Never Used   Substance Use Topics    Alcohol use: Yes     Comment: occasional    Drug use: No                 Reviewed and updated as needed this visit by Provider                    Lab work is in process  Labs reviewed in EPIC  Current providers sharing in care for this patient include:  Patient Care Team:  Sujit Duke MD as PCP - General (Internal Medicine)  Sujit Duke MD as Assigned PCP  Davi Rebolledo MD as MD (Cardiovascular Disease)  Mitzy Mo APRN CNP as Assigned Heart and Vascular Provider  Zane Tucker MD as MD (Nephrology)  Leopold, Carrie A, RN as Specialty Care Coordinator (Pharmacy)  Zane Tucker MD as Assigned Nephrology Provider    The  following health maintenance items are reviewed in Epic and correct as of today:  Health Maintenance   Topic Date Due    ZOSTER IMMUNIZATION (2 of 3) 10/19/2011    RSV VACCINE (1 - 1-dose 75+ series) Never done    COLORECTAL CANCER SCREENING  10/17/2018    DIABETIC FOOT EXAM  07/07/2022    MICROALBUMIN  04/22/2024    LIPID  01/22/2025    MEDICARE ANNUAL WELLNESS VISIT  01/22/2025    EYE EXAM  02/15/2025    HEMOGLOBIN  04/11/2025    ANNUAL REVIEW OF HM ORDERS  06/18/2025    BMP  03/10/2025    A1C  04/11/2025    DTAP/TDAP/TD IMMUNIZATION (2 - Td or Tdap) 12/16/2025    FALL RISK ASSESSMENT  01/24/2026    ADVANCE CARE PLANNING  01/24/2030    PARATHYROID  Completed    PHOSPHORUS  Completed    PHQ-2 (once per calendar year)  Completed    INFLUENZA VACCINE  Completed    Pneumococcal Vaccine: 50+ Years  Completed    URINALYSIS  Completed    ALK PHOS  Completed    COVID-19 Vaccine  Completed    HPV IMMUNIZATION  Aged Out    MENINGITIS IMMUNIZATION  Aged Out    RSV MONOCLONAL ANTIBODY  Aged Out         Review of Systems  Constitutional, HEENT, cardiovascular, pulmonary, GI, , musculoskeletal, neuro, skin, endocrine and psych systems are negative, except as otherwise noted.     Objective    Exam  BP (!) 145/68 (BP Location: Left arm, Cuff Size: Adult Regular)   Pulse 55   Temp (!) 96.2  F (35.7  C) (Tympanic)   Resp 16   Ht 1.829 m (6')   Wt 85.2 kg (187 lb 12.8 oz)   SpO2 98%   BMI 25.47 kg/m     Estimated body mass index is 25.47 kg/m  as calculated from the following:    Height as of this encounter: 1.829 m (6').    Weight as of this encounter: 85.2 kg (187 lb 12.8 oz).    Physical Exam  GENERAL: alert and no distress  EYES: Eyes grossly normal to inspection, PERRL and conjunctivae and sclerae normal  HENT: ear canals and TM's normal, nose and mouth without ulcers or lesions  NECK: no adenopathy, no asymmetry, masses, or scars  RESP: lungs clear to auscultation - no rales, rhonchi or wheezes  CV: regular rate and  rhythm, normal S1 S2, no S3 or S4, no murmur, click or rub, bilateral carotid bruits   ABDOMEN: soft, nontender, no hepatosplenomegaly, no masses and bowel sounds normal  MS: no gross musculoskeletal defects noted, trace LE edema  SKIN: no suspicious lesions or rashes  NEURO: Normal strength and tone, mentation intact and speech normal  PSYCH: mentation appears normal, affect normal/bright         1/24/2025   Mini Cog   Clock Draw Score 2 Normal   3 Item Recall 2 objects recalled   Mini Cog Total Score 4              Signed Electronically by: Sujit Duke MD

## 2025-01-25 LAB
CREAT UR-MCNC: 106 MG/DL
MICROALBUMIN UR-MCNC: 974 MG/L
MICROALBUMIN/CREAT UR: 918.87 MG/G CR (ref 0–17)

## 2025-01-27 ENCOUNTER — PATIENT OUTREACH (OUTPATIENT)
Dept: CARE COORDINATION | Facility: CLINIC | Age: 85
End: 2025-01-27
Payer: COMMERCIAL

## 2025-01-27 DIAGNOSIS — N18.4 ANEMIA OF CHRONIC RENAL FAILURE, STAGE 4 (SEVERE) (H): ICD-10-CM

## 2025-01-27 DIAGNOSIS — N18.4 CKD (CHRONIC KIDNEY DISEASE) STAGE 4, GFR 15-29 ML/MIN (H): Primary | ICD-10-CM

## 2025-01-27 DIAGNOSIS — D63.1 ANEMIA OF CHRONIC RENAL FAILURE, STAGE 4 (SEVERE) (H): ICD-10-CM

## 2025-01-27 LAB
KAPPA LC FREE SER-MCNC: 4.06 MG/DL (ref 0.33–1.94)
KAPPA LC FREE/LAMBDA FREE SER NEPH: 1.37 {RATIO} (ref 0.26–1.65)
LAMBDA LC FREE SERPL-MCNC: 2.96 MG/DL (ref 0.57–2.63)

## 2025-01-27 NOTE — PROGRESS NOTES
Opened in error-disregard    Carrie Leopold, RN BSN  Anemia Services  [facility]  [email or web address]  Office: [phone]  Fax [phone]

## 2025-01-27 NOTE — PROGRESS NOTES
Anemia Management Note - Reminder     Follow-up with anemia management service:    Merlin had labs done on 012425, however hemoglobin was not done.   Called [place] clinic/lab about adding Hgb orders to existing specimens. Spoke to Augustina at the lab. Specimen is no longer available to run hgb.    [employer]BOX message sent to pt/wife re: having labs done with upcoming clinic appt on 1/31/25          Latest Ref Rng & Units 6/18/2024 6/21/2024 8/15/2024 8/16/2024 10/5/2024 10/11/2024 1/24/2025   Anemia   Hemoglobin 13.3 - 17.7 g/dL 9.1  9.1  11.0  8.9  10.5  10.0     TSAT 15 - 46 %       32    Ferritin 31 - 409 ng/mL       99        Follow-up call date: 020325    Carrie Leopold, RN BSN  Anemia Services  [facility]  [email or web address]  Office: [phone]  Fax [phone]             I have reviewed seen, interviewed and examined the patient. There has been no change in the findings since my last note on this patient dated 12/8/2022.

## 2025-02-10 DIAGNOSIS — E78.5 HYPERLIPIDEMIA LDL GOAL <100: ICD-10-CM

## 2025-02-10 DIAGNOSIS — I77.9 RIGHT-SIDED CAROTID ARTERY DISEASE, UNSPECIFIED TYPE: ICD-10-CM

## 2025-02-10 RX ORDER — ATORVASTATIN CALCIUM 40 MG/1
40 TABLET, FILM COATED ORAL DAILY
Qty: 90 TABLET | Refills: 3 | Status: SHIPPED | OUTPATIENT
Start: 2025-02-10

## 2025-02-17 ENCOUNTER — PATIENT OUTREACH (OUTPATIENT)
Dept: CARE COORDINATION | Facility: CLINIC | Age: 85
End: 2025-02-17
Payer: COMMERCIAL

## 2025-02-17 NOTE — PROGRESS NOTES
Anemia Management Note - Reminder     Follow-up with anemia management service:    Merlin last had anemia labs done on 101125. Hgb was not ordered with labs done on 02425 and he/wife did not see the [employer] message about getting it done when at cardiology clinic on 013125.    Spoke to Merlin. Advised that Hgb was unable to be added to his last lab draw and asked him to have hgb/hematocrit drawn in the next few weeks. Once result is reviewed, RN will reach out if any interventions are necessary. He verbalized understanding            Latest Ref Rng & Units 6/18/2024 6/21/2024 8/15/2024 8/16/2024 10/5/2024 10/11/2024 1/24/2025   Anemia   Hemoglobin 13.3 - 17.7 g/dL 9.1  9.1  11.0  8.9  10.5  10.0     TSAT 15 - 46 %       32    Ferritin 31 - 409 ng/mL       99        Follow-up call date: 030525    Carrie Leopold, RN BSN  Anemia Services  [facility]  [email or web address]  Office: [phone]  Fax [phone]

## 2025-02-19 ENCOUNTER — TELEPHONE (OUTPATIENT)
Dept: MULTI SPECIALTY CLINIC | Facility: CLINIC | Age: 85
End: 2025-02-19
Payer: COMMERCIAL

## 2025-02-19 NOTE — TELEPHONE ENCOUNTER
[facility] Call Center    Phone Message    May a detailed message be left on voicemail: no     Reason for Call: Other: Spouse called to advise that they cancelled the 2.11.25 lab appointment. Writer notes that the appointment with Nephrology was rescheduled to 4.16.25 with lab appointment on 4.9.25.Fatou just wanted to advise, no call back necessary. Labs orders were entered 1.27.25 and writer assumes that these labs will be drawn in April. If labs are needed before the April appointment please contact patient.       Action Taken:  Kiera Nepharnold  Nephrology    Travel Screening: Not Applicable     Date of Service:

## 2025-03-26 ENCOUNTER — HOSPITAL ENCOUNTER (OUTPATIENT)
Dept: ULTRASOUND IMAGING | Facility: CLINIC | Age: 85
Discharge: HOME | End: 2025-03-26
Attending: INTERNAL MEDICINE
Payer: COMMERCIAL

## 2025-03-26 DIAGNOSIS — I10 ESSENTIAL HYPERTENSION: ICD-10-CM

## 2025-03-26 DIAGNOSIS — R09.89 BILATERAL CAROTID BRUITS: ICD-10-CM

## 2025-03-26 DIAGNOSIS — Z00.00 ENCOUNTER FOR PREVENTATIVE ADULT HEALTH CARE EXAMINATION: ICD-10-CM

## 2025-03-26 PROCEDURE — 93880 EXTRACRANIAL BILAT STUDY: CPT

## 2025-03-27 ENCOUNTER — TELEPHONE (OUTPATIENT)
Dept: OTHER | Facility: CLINIC | Age: 85
End: 2025-03-27
Payer: COMMERCIAL

## 2025-03-27 NOTE — TELEPHONE ENCOUNTER
Referral received via [employer] on 3/26/25.    Referred by Sujit Duke MD for bilateral carotid artery stenosis.    Previous imaging completed (pertinent to referral):  3/26/25 Bilateral carotid US notes:  IMPRESSION:  1.  Per sonographic criteria, there is a 50-69% stenosis in the right internal carotid artery and a greater than or equal to 70% stenosis in the left internal carotid artery.  2.  Flow within the vertebral arteries is antegrade.    Per scheduling guidelines, patient needs CTA head/neck.  However recent kidney function includes:  Component      Latest Ref Rng 1/24/2025  2:57 PM   Creatinine      0.67 - 1.17 mg/dL 2.93 (H)    GFR Estimate      >60 mL/min/1.73m2 20 (L)       Legend:  (H) High  (L) Low    No additional imaging ordered.    Routing to scheduling to coordinate the following:  NEW VASCULAR PATIENT consult with Vascular Surgery (Dr. Burroughs or Dr Figueroa at [facility])  Please schedule this at next available    Appt note:  Ref by Dr. Sujit Duke for bilateral carotid artery stenosis; abnormal kidney function, no CTA ordered; carotid US and notes in Epic.    Beverly Lynne, BSN, RN, CV-BC, CNOR  [facility]

## 2025-04-03 DIAGNOSIS — N18.4 CKD (CHRONIC KIDNEY DISEASE) STAGE 4, GFR 15-29 ML/MIN (H): Primary | ICD-10-CM

## 2025-04-07 DIAGNOSIS — I10 ESSENTIAL HYPERTENSION, BENIGN: ICD-10-CM

## 2025-04-07 RX ORDER — CARVEDILOL 12.5 MG/1
25 TABLET ORAL 2 TIMES DAILY WITH MEALS
Qty: 360 TABLET | Refills: 2 | Status: SHIPPED | OUTPATIENT
Start: 2025-04-07

## 2025-04-09 ENCOUNTER — LAB (OUTPATIENT)
Dept: LAB | Facility: CLINIC | Age: 85
End: 2025-04-09
Payer: COMMERCIAL

## 2025-04-09 DIAGNOSIS — N18.4 ANEMIA OF CHRONIC RENAL FAILURE, STAGE 4 (SEVERE) (H): ICD-10-CM

## 2025-04-09 DIAGNOSIS — D63.1 ANEMIA OF CHRONIC RENAL FAILURE, STAGE 4 (SEVERE) (H): ICD-10-CM

## 2025-04-09 DIAGNOSIS — N18.4 CKD (CHRONIC KIDNEY DISEASE) STAGE 4, GFR 15-29 ML/MIN (H): ICD-10-CM

## 2025-04-09 LAB
ALBUMIN MFR UR ELPH: 125 MG/DL
ALBUMIN SERPL BCG-MCNC: 3.8 G/DL (ref 3.5–5.2)
ALBUMIN UR-MCNC: 100 MG/DL
ANION GAP SERPL CALCULATED.3IONS-SCNC: 9 MMOL/L (ref 7–15)
APPEARANCE UR: CLEAR
BILIRUB UR QL STRIP: NEGATIVE
BUN SERPL-MCNC: 43.5 MG/DL (ref 8–23)
CALCIUM SERPL-MCNC: 9 MG/DL (ref 8.8–10.4)
CHLORIDE SERPL-SCNC: 106 MMOL/L (ref 98–107)
COLOR UR AUTO: ABNORMAL
CREAT SERPL-MCNC: 2.86 MG/DL (ref 0.67–1.17)
CREAT UR-MCNC: 90.5 MG/DL
CREAT UR-MCNC: 92.3 MG/DL
EGFRCR SERPLBLD CKD-EPI 2021: 21 ML/MIN/1.73M2
FERRITIN SERPL-MCNC: 96 NG/ML (ref 31–409)
GLUCOSE SERPL-MCNC: 149 MG/DL (ref 70–99)
GLUCOSE UR STRIP-MCNC: NEGATIVE MG/DL
HCO3 SERPL-SCNC: 22 MMOL/L (ref 22–29)
HCT VFR BLD AUTO: 29.1 % (ref 40–53)
HGB BLD-MCNC: 9.9 G/DL (ref 13.3–17.7)
HGB UR QL STRIP: NEGATIVE
IRON BINDING CAPACITY (ROCHE): 261 UG/DL (ref 240–430)
IRON SATN MFR SERPL: 35 % (ref 15–46)
IRON SERPL-MCNC: 91 UG/DL (ref 61–157)
KETONES UR STRIP-MCNC: NEGATIVE MG/DL
LEUKOCYTE ESTERASE UR QL STRIP: ABNORMAL
MICROALBUMIN UR-MCNC: 851 MG/L
MICROALBUMIN/CREAT UR: 940.33 MG/G CR (ref 0–17)
NITRATE UR QL: NEGATIVE
PH UR STRIP: 5.5 [PH] (ref 5–7)
PHOSPHATE SERPL-MCNC: 3.7 MG/DL (ref 2.5–4.5)
POTASSIUM SERPL-SCNC: 4.6 MMOL/L (ref 3.4–5.3)
PROT/CREAT 24H UR: 1.35 MG/MG CR (ref 0–0.2)
RBC URINE: 1 /HPF
SODIUM SERPL-SCNC: 137 MMOL/L (ref 135–145)
SP GR UR STRIP: 1.01 (ref 1–1.03)
SQUAMOUS EPITHELIAL: <1 /HPF
UROBILINOGEN UR STRIP-MCNC: NORMAL MG/DL
WBC URINE: 2 /HPF

## 2025-04-09 PROCEDURE — 85018 HEMOGLOBIN: CPT

## 2025-04-09 PROCEDURE — 82728 ASSAY OF FERRITIN: CPT

## 2025-04-09 PROCEDURE — 81001 URINALYSIS AUTO W/SCOPE: CPT

## 2025-04-09 PROCEDURE — 82040 ASSAY OF SERUM ALBUMIN: CPT

## 2025-04-09 PROCEDURE — 83550 IRON BINDING TEST: CPT

## 2025-04-09 PROCEDURE — 83540 ASSAY OF IRON: CPT

## 2025-04-09 PROCEDURE — 84156 ASSAY OF PROTEIN URINE: CPT

## 2025-04-09 PROCEDURE — 36415 COLL VENOUS BLD VENIPUNCTURE: CPT

## 2025-04-09 PROCEDURE — 82043 UR ALBUMIN QUANTITATIVE: CPT

## 2025-04-09 PROCEDURE — 84100 ASSAY OF PHOSPHORUS: CPT

## 2025-04-10 ENCOUNTER — OFFICE VISIT (OUTPATIENT)
Dept: SURGERY | Facility: CLINIC | Age: 85
End: 2025-04-10
Payer: COMMERCIAL

## 2025-04-10 VITALS
SYSTOLIC BLOOD PRESSURE: 156 MMHG | HEART RATE: 53 BPM | HEIGHT: 73 IN | BODY MASS INDEX: 24.25 KG/M2 | WEIGHT: 183 LBS | RESPIRATION RATE: 16 BRPM | OXYGEN SATURATION: 99 % | DIASTOLIC BLOOD PRESSURE: 68 MMHG

## 2025-04-10 DIAGNOSIS — I65.23 ASYMPTOMATIC BILATERAL CAROTID ARTERY STENOSIS: Primary | ICD-10-CM

## 2025-04-10 DIAGNOSIS — R33.9 URINARY RETENTION: ICD-10-CM

## 2025-04-10 RX ORDER — HYDRALAZINE HYDROCHLORIDE 25 MG/1
TABLET, FILM COATED ORAL
COMMUNITY
Start: 2025-01-13

## 2025-04-10 RX ORDER — TAMSULOSIN HYDROCHLORIDE 0.4 MG/1
0.4 CAPSULE ORAL DAILY
Qty: 90 CAPSULE | Refills: 2 | Status: SHIPPED | OUTPATIENT
Start: 2025-04-10

## 2025-04-10 NOTE — PROGRESS NOTES
VASCULAR SURGERY CLINIC NOTE    LOCATION:  [facility] V[facility] Surgery [facility]     Merlin J Anderson  Medical Record #: [id number]  YOB: 1940  Age: 84 year old     DATE OF SERVICE: 4/10/2025    PRIMARY CARE PROVIDER: Sujit Duke    REASON FOR VISIT: Follow-up for carotid stenosis    IMPRESSION AND PLAN:    84-year-old male with a history of coronary artery disease, hypertension and CKD 4 who presents for bilateral carotid surveillance having previously undergone a right CEA by Dr. Scott in 2017.  Carotid ultrasound today demonstrates approximately just over 50% stenosis on the right and somewhere between 70 and 80% stenosis on the left based on peak systolic velocity.  The end-diastolic velocity and ratio suggest 50 to 69% stenosis.  Reviewed the imaging findings with the patient and his wife who is present with him at this visit and discussed that while this is slightly elevated from previous, overall remains relatively stable compared to previous and since his last ultrasound in 2021.  I explained to him that in that operative candidates with life expectancy greater than 3 years it is reasonable to consider repair.  I did also discuss that given his age as well as comorbidities it is hard to predict how this will benefit him and that since it is not changed significantly over the course of the last few years that ongoing surveillance with best medical therapy is a reasonable strategy.  The patient agreed that he would not like to undergo or pursue surgery at this time and feels comfortable continuing on medical management and surveillance.  I have explained to him symptoms that would warrant presentation to the ER that would be consistent with a stroke or TIA he has verbalized understanding.  I will plan to see him back in 1 year with a bilateral carotid ultrasound.    HPI:  Merlin J Anderson is a 84 year old male who presents to clinic for surveillance for bilateral carotid  stenosis.  Patient has history of right carotid endarterectomy in 2017 by Dr. Scott.  He was last seen in 2020 but appears to have been lost to follow-up.  In the interim he said he has had an issue with heart block not requiring intervention has been being followed for hypertension and chronic kidney disease 4 (GFR 21).  He otherwise denies symptoms of CVA or TIA including unilateral weakness or numbness, difficulty with fine motor task, disturbances speech or visual changes consistent with amaurosis fugax.  He is continue to take an baby aspirin daily and remains on a statin medication with good lipid control.    REVIEW OF SYSTEMS:    A 12 point ROS was reviewed and is negative except for what is listed above in HPI or that which is listed below.    PHH:    Past Medical History:   Diagnosis Date    Abnormal stress test     Arthritis 2012    Carotid arterial disease 09/18/2017    CKD (chronic kidney disease) stage 3, GFR 30-59 ml/min (H) 12/27/2011    DM2 (diabetes mellitus, type 2) (H)     Pre diabetic    LUNA (dyspnea on exertion)     Essential hypertension, benign     Gout 11/14/2002     Problem list name updated by automated process. Provider to review    HTN (hypertension) 06/29/2009     (Problem list name updated by automated process. Provider to review and confirm.)    Hyperlipidemia LDL goal <100 10/31/2010    Mixed hyperlipidemia 11/14/2002    PVD (peripheral vascular disease) 09/08/2017    S/P CABG (coronary artery bypass graft) 10/26/2018    Type 2 diabetes mellitus without complication (H) 11/30/2015          Past Surgical History:   Procedure Laterality Date    ABDOMEN SURGERY  1995    hernia    BYPASS GRAFT ARTERY CORONARY N/A 10/26/2018    Procedure: CORONARY ARTERY BYPASS GRAFTING X 4 WITH LEFT LEG ENDOSCOPIC VEIN HARVESTING LIMA - LAD  SV- RIGHT PDA, OM2, OM1 ON PUMP/BETH IAPB PLACED PER CATH LAB;  Surgeon: Heath Kessler MD;  Location: [facility] OR    COLONOSCOPY      DECOMPRESSION, FUSION  LUMBAR POSTERIOR ONE LEVEL, COMBINED  08/24/2012    Procedure: COMBINED DECOMPRESSION, FUSION LUMBAR POSTERIOR ONE LEVEL;  Posterior Fusion wtih Decompression L4-5;  Surgeon: Rod Carbajal MD;  Location: [facility] OR    ENDARTERECTOMY CAROTID Right 09/18/2017    Procedure: ENDARTERECTOMY CAROTID;  RIGHT CAROTID ENDARTERECTOMY, WITH PATCH ANGIOPLASTY, WITH ELECTOENCEPHALOGAM            (EEG);  Surgeon: Catalino Scott MD;  Location: [facility] OR    EYE SURGERY  2015    catarac    HEAD & NECK SURGERY  9/23/2017    carodid artery surgery    HERNIA REPAIR      [facility] NONSPECIFIC PROCEDURE  04/1997    Neg. colonoscopy.    [facility] NONSPECIFIC PROCEDURE      S/P ing. hernia.       ALLERGIES:  Hydralazine, Amlodipine, and Imdur [isosorbide nitrate]    MEDS:    Current Outpatient Medications:     acetaminophen (TYLENOL) 325 MG tablet, Take 2 tablets (650 mg) by mouth every 4 hours as needed for mild pain, Disp: 100 tablet, Rfl:     aspirin 325 MG EC tablet, Take 1 tablet (325 mg) by mouth daily, Disp: 40 tablet, Rfl:     atorvastatin (LIPITOR) 40 MG tablet, TAKE ONE TABLET BY MOUTH ONE TIME DAILY, Disp: 90 tablet, Rfl: 3    blood glucose calibration (ACCU-CHEK PRISCILLA) solution, Use to calibrate blood glucose monitor as needed as directed., Disp: 1 Bottle, Rfl: 0    blood glucose monitoring (ACCU-CHEK PRISCILLA) test strip, Use to test blood sugars 1 times daily or as directed., Disp: 100 each, Rfl: 1    blood glucose monitoring (ACCU-CHEK MULTICLIX) lancets, Use to test blood sugar 1 times daily or as directed., Disp: 1 Box, Rfl: 1    blood glucose monitoring (NO BRAND SPECIFIED) meter device kit, Use to test blood sugar 1 times daily or as directed., Disp: 1 kit, Rfl: 0    carvedilol (COREG) 12.5 MG tablet, Take 2 tablets (25 mg) by mouth 2 times daily (with meals)., Disp: 360 tablet, Rfl: 2    fluticasone (FLONASE) 50 MCG/ACT nasal spray, Spray 1-2 sprays into both nostrils daily as needed for rhinitis or allergies, Disp: 16 g, Rfl: 4    " guaiFENesin-dextromethorphan (ROBITUSSIN DM) 100-10 MG/5ML syrup, Take 10 mLs by mouth every 4 hours as needed for cough, Disp: 473 mL, Rfl: 0    hydrochlorothiazide (HYDRODIURIL) 25 MG tablet, Take 1 tablet (25 mg) by mouth daily, Disp: 90 tablet, Rfl: 3    lisinopril (ZESTRIL) 30 MG tablet, Take 1 tablet (30 mg) by mouth daily., Disp: 90 tablet, Rfl: 3    loratadine (CLARITIN) 10 MG tablet, Take 1 tablet (10 mg) by mouth daily (Patient taking differently: Take 10 mg by mouth as needed.), Disp: 10 tablet, Rfl: 0    multivitamin w/minerals (THERA-VIT-M) tablet, Take 1 tablet by mouth every morning, Disp: , Rfl:     nitroGLYcerin (NITROSTAT) 0.4 MG sublingual tablet, For chest pain place 1 tablet under the tongue every 5 minutes for 3 doses. If symptoms persist 5 minutes after 1st dose call 911., Disp: 25 tablet, Rfl: 3    tamsulosin (FLOMAX) 0.4 MG capsule, Take 1 capsule (0.4 mg) by mouth daily., Disp: 90 capsule, Rfl: 2    hydrALAZINE (APRESOLINE) 25 MG tablet, , Disp: , Rfl:     predniSONE (DELTASONE) 10 MG tablet, Take 3 tabs by mouth daily x 3 days, then 2 tabs daily x 3 days, then 1 tab daily x 3 days, then 1/2 tab daily x 3 days. (Patient not taking: Reported on 4/10/2025), Disp: 20 tablet, Rfl: 0    SOCIAL HABITS:    History   Smoking Status    Never   Smokeless Tobacco    Never     Social History    Substance and Sexual Activity      Alcohol use: Yes        Comment: occasional      History   Drug Use No       FAMILY HISTORY:    Family History   Problem Relation Age of Onset    Diabetes Father     Unknown/Adopted Mother        PE:  BP (!) 156/68   Pulse 53   Resp 16   Ht 1.854 m (6' 1\")   Wt 83 kg (183 lb)   SpO2 99%   BMI 24.14 kg/m    Wt Readings from Last 1 Encounters:   04/10/25 83 kg (183 lb)     Body mass index is 24.14 kg/m .    EXAM:  GENERAL: well-developed 84 year old male who appears his stated age  CARDIAC: normal rate, capillary refill is brisk.  CHEST/LUNG: normal respiratory effort on " room air  MUSCULOSKELETAL: grossly normal and both lower extremities are intact, no lower extremity edema  NEUROLOGIC: focally intact, alert and oriented x 3  PSYCH: appropriate mood affect  VASCULAR: Radial pulses are palpable bilaterally.  Range of motion in neck extension is somewhat limited but side-to-side motion is adequate.  Well-healed right carotid endarterectomy scar appreciated.    DIAGNOSTIC STUDIES:     Images:  I personally reviewed the images and my interpretation is l approximately 50 to 69% stenosis of the right ICA based on peak systolic velocity, but he is just over this threshold with a peak systolic velocity of 183.  On the left side approximately 70% stenosis.     LABS:      LDL Cholesterol Calculated   Date Value Ref Range Status   01/24/2025 49 <100 mg/dL Final   01/22/2024 48 <=100 mg/dL Final   06/06/2023 47 <=100 mg/dL Final   12/11/2020 43 <100 mg/dL Final     Comment:     Desirable:       <100 mg/dl   12/09/2019 29 <100 mg/dL Final     Comment:     Desirable:       <100 mg/dl   06/05/2019 42 <100 mg/dL Final     Comment:     Desirable:       <100 mg/dl       30 minutes spent on the day of encounter doing chart review, history and exam, documentation, and further activities as noted.     Josie Figueroa MD, [facility]  Vascular and Endovascular Surgery        *This document has been dictated using Dragon voice recognition software which may result in some transcription errors*

## 2025-04-15 ENCOUNTER — PATIENT OUTREACH (OUTPATIENT)
Dept: CARE COORDINATION | Facility: CLINIC | Age: 85
End: 2025-04-15
Payer: COMMERCIAL

## 2025-04-15 NOTE — PROGRESS NOTES
Anemia Management Note - Discharge    Referred by Dr. Zane Tucker on 12/17/2024    Hgb has been stable since 10/5/24 with no intervention.    Patient meets criteria for discharge from Anemia Clinic with at least 3 months without VICTORINO Therapy or IV Iron, and/or no Oral Iron therapy for 6 months.        Latest Ref Rng & Units 6/21/2024 8/15/2024 8/16/2024 10/5/2024 10/11/2024 1/24/2025 4/9/2025   Anemia   Hemoglobin 13.3 - 17.7 g/dL 9.1  11.0  8.9  10.5  10.0   9.9    TSAT 15 - 46 %      32  35    Ferritin 31 - 409 ng/mL      99  96       Goals Addressed    None         Anemia labs discontinued/outside lab/clinic notified (if applicable), Rx discontinued/Therapy Plans cancelled, removed from active patient list, patient and/or caregiver and referring provider notified as appropriate.     Carrie Leopold, RN BSN  Anemia Services  [facility]  [email or web address]  Office: [phone]  Fax [phone]

## 2025-04-16 ENCOUNTER — OFFICE VISIT (OUTPATIENT)
Dept: NEPHROLOGY | Facility: CLINIC | Age: 85
End: 2025-04-16
Payer: COMMERCIAL

## 2025-04-16 VITALS
DIASTOLIC BLOOD PRESSURE: 63 MMHG | SYSTOLIC BLOOD PRESSURE: 127 MMHG | WEIGHT: 192 LBS | BODY MASS INDEX: 25.33 KG/M2 | HEART RATE: 54 BPM | OXYGEN SATURATION: 98 %

## 2025-04-16 DIAGNOSIS — N18.4 CKD (CHRONIC KIDNEY DISEASE) STAGE 4, GFR 15-29 ML/MIN (H): Primary | ICD-10-CM

## 2025-04-16 DIAGNOSIS — D63.1 ANEMIA DUE TO STAGE 4 CHRONIC KIDNEY DISEASE (H): ICD-10-CM

## 2025-04-16 DIAGNOSIS — R60.9 EDEMA, UNSPECIFIED TYPE: ICD-10-CM

## 2025-04-16 DIAGNOSIS — N18.4 ANEMIA DUE TO STAGE 4 CHRONIC KIDNEY DISEASE (H): ICD-10-CM

## 2025-04-16 DIAGNOSIS — I10 HYPERTENSION, ESSENTIAL: ICD-10-CM

## 2025-04-16 PROCEDURE — 99215 OFFICE O/P EST HI 40 MIN: CPT | Performed by: PHYSICIAN ASSISTANT

## 2025-04-16 PROCEDURE — 1126F AMNT PAIN NOTED NONE PRSNT: CPT | Performed by: PHYSICIAN ASSISTANT

## 2025-04-16 PROCEDURE — 3078F DIAST BP <80 MM HG: CPT | Performed by: PHYSICIAN ASSISTANT

## 2025-04-16 PROCEDURE — 3074F SYST BP LT 130 MM HG: CPT | Performed by: PHYSICIAN ASSISTANT

## 2025-04-16 ASSESSMENT — PAIN SCALES - GENERAL: PAINLEVEL_OUTOF10: NO PAIN (0)

## 2025-04-16 NOTE — PATIENT INSTRUCTIONS
No changes today.   Bring blood pressure cuff to next in person visit to see if it's accurate.   Avoid ibuprofen/aleve.   Continue good hydration. Decrease sodium intake.   Labs and follow up in 4 months.

## 2025-04-16 NOTE — PROGRESS NOTES
Nephrology Outpatient Visit Note (04/16/2025)    Chief Complain/Reason for Visit  Chronic kidney disease    History of Present Illness  This is an 84-year-old male who was seen by my colleague Dr. Dick back in October for workup and management of chronic kidney disease.  The patient's past medical history is notable for coronary artery disease status post CABG in 2018, type 2 diabetes mellitus, hypertension, gout, and hyperlipidemia.    With regards to his kidney function, we have laboratory data going back to 2009.  Back then his serum creatinine ranged between 1.3 to 1.5 mg/dL.  His serum creatinine measurement gradually trended up, and over the course of the last 2 years he has been running between 2 to 3 mg/dL.    His most recent laboratory evaluation was earlier this month.  His serum creatinine was 2.86 mg/dL with an estimated GFR of 21 mL/min.  Electrolytes were within a normal range. Bicarb 22.  Serum albumin is normal. Calcium and phos are normal. Hgb 9.9,  iron sat 35%. Ferritin 96.  UACR 940 mg/g UPCR 1.35 mg/mg. UA with 100 protein, small leukocytes, negative microscopy.  Most recent HgbA1c 6.3.     He is overall feeling well today. He has a good appetite, no n/v/d. He has no LE edema, no shortness of breath. Hydrochlorothiazide was increased since last visit to 25 mg daily. BP's are at goal in clinic though he says they are closer to 150/ systolic at home. He was recently seen by cardiology, no medication changes noted.     BP meds: carvedilol 12.5 mg BID, hydrochlorothiazide 25 mg daily, lisinopril 30 mg daily, hydralazine 25 mg daily    The following portions of the patient's history were reviewed and updated as appropriate: allergies, current medications, past family history, past medical history, past social history, past surgical history, and problem list.    Subjective   Review of Systems  A comprehensive review of systems was performed. Pertinent positives and negatives are described  above.    Social and Family History  Patient reports that he has never smoked. He has never used smokeless tobacco. He reports current alcohol use. He reports that he does not use drugs.  family history includes Diabetes in his father; Unknown/Adopted in his mother.     Examination  Blood pressure 127/63, pulse 54, weight 87.1 kg (192 lb), SpO2 98%. Body mass index is 25.33 kg/m .  General:  Elderly. Pleasant.   Skin: warm and dry, no visible rash  Heart:  RRR, no murmur   Lungs:  CTA bilaterally  Extremities: No deformities, clubbing, cyanosis, or edema.   Neurologic: A/O x 3. No focal deficits. Gait is slow.    Objective   Pertinent Laboratory and Imaging Results  Most Recent Serum Chemistries  Lab Results   Component Value Date    WBC 4.7 10/11/2024    HGB 9.9 (L) 04/09/2025    HCT 29.1 (L) 04/09/2025    PLT 97 (L) 10/11/2024     04/09/2025    POTASSIUM 4.6 04/09/2025    CHLORIDE 106 04/09/2025    CO2 22 04/09/2025    BUN 43.5 (H) 04/09/2025    CR 2.86 (H) 04/09/2025     (H) 04/09/2025    SED 14 12/19/2018    DD 0.48 08/10/2022    NTBNPI 3,024 (H) 08/15/2024    AST 27 01/24/2025    ALT 30 01/24/2025    ALKPHOS 68 10/05/2024    INR 1.76 (H) 10/26/2018     Most Recent Urinalysis  Lab Results   Component Value Date    COLOR Light Yellow 04/09/2025    APPEARANCE Clear 04/09/2025    URINEGLC Negative 04/09/2025    URINEBILI Negative 04/09/2025    URINEKETONE Negative 04/09/2025    SG 1.013 04/09/2025    URINEPH 5.5 04/09/2025    PROTEIN 100 (A) 04/09/2025    UROBILINOGEN 0.2 01/24/2025    NITRITE Negative 04/09/2025    LEUKEST Small (A) 04/09/2025     Current Outpatient Medications   Medication Sig Dispense Refill    acetaminophen (TYLENOL) 325 MG tablet Take 2 tablets (650 mg) by mouth every 4 hours as needed for mild pain 100 tablet     aspirin 325 MG EC tablet Take 1 tablet (325 mg) by mouth daily 40 tablet     atorvastatin (LIPITOR) 40 MG tablet TAKE ONE TABLET BY MOUTH ONE TIME DAILY 90 tablet 3     blood glucose calibration (ACCU-CHEK PRISCILLA) solution Use to calibrate blood glucose monitor as needed as directed. 1 Bottle 0    blood glucose monitoring (ACCU-CHEK PRISCILLA) test strip Use to test blood sugars 1 times daily or as directed. 100 each 1    blood glucose monitoring (ACCU-CHEK MULTICLIX) lancets Use to test blood sugar 1 times daily or as directed. 1 Box 1    blood glucose monitoring (NO BRAND SPECIFIED) meter device kit Use to test blood sugar 1 times daily or as directed. 1 kit 0    carvedilol (COREG) 12.5 MG tablet Take 2 tablets (25 mg) by mouth 2 times daily (with meals). 360 tablet 2    fluticasone (FLONASE) 50 MCG/ACT nasal spray Spray 1-2 sprays into both nostrils daily as needed for rhinitis or allergies 16 g 4    guaiFENesin-dextromethorphan (ROBITUSSIN DM) 100-10 MG/5ML syrup Take 10 mLs by mouth every 4 hours as needed for cough 473 mL 0    hydrALAZINE (APRESOLINE) 25 MG tablet       hydrochlorothiazide (HYDRODIURIL) 25 MG tablet Take 1 tablet (25 mg) by mouth daily 90 tablet 3    lisinopril (ZESTRIL) 30 MG tablet Take 1 tablet (30 mg) by mouth daily. 90 tablet 3    loratadine (CLARITIN) 10 MG tablet Take 1 tablet (10 mg) by mouth daily (Patient taking differently: Take 10 mg by mouth as needed.) 10 tablet 0    multivitamin w/minerals (THERA-VIT-M) tablet Take 1 tablet by mouth every morning      nitroGLYcerin (NITROSTAT) 0.4 MG sublingual tablet For chest pain place 1 tablet under the tongue every 5 minutes for 3 doses. If symptoms persist 5 minutes after 1st dose call 911. 25 tablet 3    predniSONE (DELTASONE) 10 MG tablet Take 3 tabs by mouth daily x 3 days, then 2 tabs daily x 3 days, then 1 tab daily x 3 days, then 1/2 tab daily x 3 days. (Patient not taking: Reported on 4/10/2025) 20 tablet 0    tamsulosin (FLOMAX) 0.4 MG capsule Take 1 capsule (0.4 mg) by mouth daily. 90 capsule 2     No current facility-administered medications for this visit.        Assessment & Plan     #CKD 4   The  patient has chronic kidney disease likely due to chronic hypertension as well as to diabetes. Of note, he has a strong family history of end-stage kidney disease in 2 of his sister but both of them receive dialysis when they were at old age.    Serological eval including LDH and haptoglobin was negative. Kappa and lambda light chains were elevated, ratio normal.     With regards to management, I indicated the importance of controlling his blood pressure in order to slow down the progression of his chronic kidney disease.      I indicated the patient that he has chronic kidney disease that is fairly advanced.  I discussed with him that this may progress towards end-stage renal disease.  The patient is possibly interested in pursuing dialysis in the future.  I discussed with him management options, and given his age, I instructed him to consider supportive care as a measurement.  He was referred to the CKD class in the previous visit.  Declined kidney smart class    #HTN  BP well controlled in clinic  - no changes today  - bring home BP monitor to next in person visit to check for accuracy.     #Electrolytes  K, Na, and bicarb at goal    #Anemia  Hgb and iron sat stable  - follows with anemia clinic    #Bone/Mineral  Calcium and phos at goal, PTH and vit D normal   - no indication for phos binder or vitamin D  - check vit D and PTH next visit    #Disposition: labs and follow up in 4 months.     Ninoska Colunga PA-C    Visit length 20 minutes. An additional 20 minutes were spent on date of service in chart review, documentation, and other activities as noted.

## 2025-06-30 DIAGNOSIS — R60.0 BILATERAL LEG EDEMA: ICD-10-CM

## 2025-06-30 DIAGNOSIS — I10 ESSENTIAL HYPERTENSION, BENIGN: ICD-10-CM

## 2025-06-30 RX ORDER — HYDROCHLOROTHIAZIDE 25 MG/1
25 TABLET ORAL DAILY
Qty: 90 TABLET | Refills: 0 | Status: SHIPPED | OUTPATIENT
Start: 2025-06-30

## 2025-07-03 DIAGNOSIS — I10 ESSENTIAL HYPERTENSION, BENIGN: ICD-10-CM

## 2025-07-03 DIAGNOSIS — R60.0 BILATERAL LEG EDEMA: ICD-10-CM

## 2025-07-03 RX ORDER — HYDROCHLOROTHIAZIDE 25 MG/1
25 TABLET ORAL DAILY
Qty: 90 TABLET | Refills: 0 | OUTPATIENT
Start: 2025-07-03

## 2025-07-26 ENCOUNTER — HEALTH MAINTENANCE LETTER (OUTPATIENT)
Age: 85
End: 2025-07-26

## 2025-07-28 ENCOUNTER — OFFICE VISIT (OUTPATIENT)
Dept: INTERNAL MEDICINE | Facility: CLINIC | Age: 85
End: 2025-07-28
Attending: INTERNAL MEDICINE
Payer: COMMERCIAL

## 2025-07-28 VITALS
WEIGHT: 187.7 LBS | SYSTOLIC BLOOD PRESSURE: 130 MMHG | BODY MASS INDEX: 24.76 KG/M2 | TEMPERATURE: 96.3 F | HEART RATE: 56 BPM | DIASTOLIC BLOOD PRESSURE: 60 MMHG

## 2025-07-28 DIAGNOSIS — I10 ESSENTIAL HYPERTENSION, BENIGN: Primary | ICD-10-CM

## 2025-07-28 DIAGNOSIS — Z95.1 S/P CABG (CORONARY ARTERY BYPASS GRAFT): ICD-10-CM

## 2025-07-28 DIAGNOSIS — E11.59 TYPE 2 DIABETES MELLITUS WITH OTHER CIRCULATORY COMPLICATIONS (H): ICD-10-CM

## 2025-07-28 DIAGNOSIS — N18.4 CKD (CHRONIC KIDNEY DISEASE) STAGE 4, GFR 15-29 ML/MIN (H): ICD-10-CM

## 2025-07-28 DIAGNOSIS — E78.5 HYPERLIPIDEMIA LDL GOAL <100: ICD-10-CM

## 2025-07-28 LAB
ERYTHROCYTE [DISTWIDTH] IN BLOOD BY AUTOMATED COUNT: 12.8 % (ref 10–15)
EST. AVERAGE GLUCOSE BLD GHB EST-MCNC: 126 MG/DL
HBA1C MFR BLD: 6 % (ref 0–5.6)
HCT VFR BLD AUTO: 29.6 % (ref 40–53)
HGB BLD-MCNC: 9.9 G/DL (ref 13.3–17.7)
MCH RBC QN AUTO: 32.1 PG (ref 26.5–33)
MCHC RBC AUTO-ENTMCNC: 33.4 G/DL (ref 31.5–36.5)
MCV RBC AUTO: 96 FL (ref 78–100)
PLATELET # BLD AUTO: 94 10E3/UL (ref 150–450)
RBC # BLD AUTO: 3.08 10E6/UL (ref 4.4–5.9)
WBC # BLD AUTO: 4.9 10E3/UL (ref 4–11)

## 2025-07-28 PROCEDURE — 3044F HG A1C LEVEL LT 7.0%: CPT | Performed by: INTERNAL MEDICINE

## 2025-07-28 PROCEDURE — 3078F DIAST BP <80 MM HG: CPT | Performed by: INTERNAL MEDICINE

## 2025-07-28 PROCEDURE — 83036 HEMOGLOBIN GLYCOSYLATED A1C: CPT | Performed by: INTERNAL MEDICINE

## 2025-07-28 PROCEDURE — 85027 COMPLETE CBC AUTOMATED: CPT | Performed by: INTERNAL MEDICINE

## 2025-07-28 PROCEDURE — 3075F SYST BP GE 130 - 139MM HG: CPT | Performed by: INTERNAL MEDICINE

## 2025-07-28 PROCEDURE — 36415 COLL VENOUS BLD VENIPUNCTURE: CPT | Performed by: INTERNAL MEDICINE

## 2025-07-28 PROCEDURE — 1126F AMNT PAIN NOTED NONE PRSNT: CPT | Performed by: INTERNAL MEDICINE

## 2025-07-28 PROCEDURE — 99214 OFFICE O/P EST MOD 30 MIN: CPT | Performed by: INTERNAL MEDICINE

## 2025-07-28 PROCEDURE — 80053 COMPREHEN METABOLIC PANEL: CPT | Performed by: INTERNAL MEDICINE

## 2025-07-28 RX ORDER — HYDRALAZINE HYDROCHLORIDE 25 MG/1
25 TABLET, FILM COATED ORAL 2 TIMES DAILY
Qty: 180 TABLET | Refills: 3 | COMMUNITY
Start: 2025-07-28

## 2025-07-28 ASSESSMENT — PAIN SCALES - GENERAL: PAINLEVEL_OUTOF10: NO PAIN (0)

## 2025-07-28 NOTE — PROGRESS NOTES
Assessment & Plan     Essential hypertension, benign  Continue treatment   Taking Hydralazine once a day, will increase to bid, if not controlled to tid   - CBC with platelets    CKD (chronic kidney disease) stage 4, GFR 15-29 ml/min (H)  Monitor renal function, keep hydrated , avoid NSAID use   - Comprehensive metabolic panel (BMP + Alb, Alk Phos, ALT, AST, Total. Bili, TP)    Type 2 diabetes mellitus with other circulatory complications (H)  Diet controlled, monitor   - Hemoglobin A1c    S/P CABG (coronary artery bypass graft)  No anginal symptoms, on aspirin, statin     HYPERLIPIDEMIA LDL GOAL <100  Controlled on high intensity statin treatment     Follow-up    Follow-up Visit   Expected date:  Jan 28, 2026 (Approximate)      Follow Up Appointment Details:     Follow-up with whom?: Me    Follow-Up for what?: Adult Preventive    How?: In Person                 Subjective   Merlin is a 85 year old, presenting for the following health issues:  Hypertension (Blood pressure check, diabetes check, and kidney, and prostrate, )      7/28/2025    10:21 AM   Additional Questions   Roomed by Wallace MUNOZ   Accompanied by Wife         7/28/2025    10:21 AM   Patient Reported Additional Medications   Patient reports taking the following new medications No     HPI        Has h/o HTN. on medical treatment. BP has been controlled. No side effects from medications. No CP, HA, dizziness. good compliance with medications and low salt diet.  Has  history of  DM. On diet , exercise. Blood sugars are controlled. No paresthesias. No hypoglycemias.  Has H/O hyperlipidemia. On medical treatment and diet. No side effects. No muscle weakness, myalgias or upset stomach.   Has h/o ischemic heart disease, asymptomatic regarding chest pains, SOB,palpitations. Has good compliance with treatment, diet and exercise.  Has h/o CRF. Monitoring BP, BG, medications, avoiding OTC NSAIDs. Needs periodic recheck of kidney function.          Review of  Systems  Constitutional, HEENT, cardiovascular, pulmonary, gi and gu systems are negative, except as otherwise noted.      Objective    BP (!) 144/70 (BP Location: Right arm, Patient Position: Sitting, Cuff Size: Adult Regular)   Pulse 56   Temp (!) 96.3  F (35.7  C) (Tympanic)   Wt 85.1 kg (187 lb 11.2 oz)   BMI 24.76 kg/m    Body mass index is 24.76 kg/m .  Physical Exam   GENERAL: alert and no distress  EYES: Eyes grossly normal to inspection, PERRL and conjunctivae and sclerae normal  HENT: ear canals and TM's normal, nose and mouth without ulcers or lesions  NECK: no adenopathy, no asymmetry, masses, or scars  RESP: lungs clear to auscultation - no rales, rhonchi or wheezes  CV: regular rate and rhythm, normal S1 S2, no S3 or S4, no murmur, click or rub, no peripheral edema  ABDOMEN: soft, nontender, no hepatosplenomegaly, no masses and bowel sounds normal  MS: no gross musculoskeletal defects noted, no edema  SKIN: no suspicious lesions or rashes  NEURO: Normal strength and tone, mentation intact and speech normal  PSYCH: mentation appears normal, affect normal/bright    Lab on 04/09/2025   Component Date Value Ref Range Status    Hemoglobin 04/09/2025 9.9 (L)  13.3 - 17.7 g/dL Final    Hematocrit 04/09/2025 29.1 (L)  40.0 - 53.0 % Final    Ferritin 04/09/2025 96  31 - 409 ng/mL Final    Iron 04/09/2025 91  61 - 157 ug/dL Final    Iron Binding Capacity 04/09/2025 261  240 - 430 ug/dL Final    Iron Sat Index 04/09/2025 35  15 - 46 % Final    Sodium 04/09/2025 137  135 - 145 mmol/L Final    Potassium 04/09/2025 4.6  3.4 - 5.3 mmol/L Final    Chloride 04/09/2025 106  98 - 107 mmol/L Final    Carbon Dioxide (CO2) 04/09/2025 22  22 - 29 mmol/L Final    Anion Gap 04/09/2025 9  7 - 15 mmol/L Final    Glucose 04/09/2025 149 (H)  70 - 99 mg/dL Final    Urea Nitrogen 04/09/2025 43.5 (H)  8.0 - 23.0 mg/dL Final    Creatinine 04/09/2025 2.86 (H)  0.67 - 1.17 mg/dL Final    GFR Estimate 04/09/2025 21 (L)  >60  mL/min/1.73m2 Final    eGFR calculated using 2021 CKD-EPI equation.    Calcium 04/09/2025 9.0  8.8 - 10.4 mg/dL Final    Albumin 04/09/2025 3.8  3.5 - 5.2 g/dL Final    Phosphorus 04/09/2025 3.7  2.5 - 4.5 mg/dL Final    Color Urine 04/09/2025 Light Yellow  Colorless, Straw, Light Yellow, Yellow Final    Appearance Urine 04/09/2025 Clear  Clear Final    Glucose Urine 04/09/2025 Negative  Negative mg/dL Final    Bilirubin Urine 04/09/2025 Negative  Negative Final    Ketones Urine 04/09/2025 Negative  Negative mg/dL Final    Specific Gravity Urine 04/09/2025 1.013  1.003 - 1.035 Final    Blood Urine 04/09/2025 Negative  Negative Final    pH Urine 04/09/2025 5.5  5.0 - 7.0 Final    Protein Albumin Urine 04/09/2025 100 (A)  Negative mg/dL Final    Urobilinogen Urine 04/09/2025 Normal  Normal mg/dL Final    Nitrite Urine 04/09/2025 Negative  Negative Final    Leukocyte Esterase Urine 04/09/2025 Small (A)  Negative Final    RBC Urine 04/09/2025 1  <=2 /HPF Final    WBC Urine 04/09/2025 2  <=5 /HPF Final    Squamous Epithelials Urine 04/09/2025 <1  <=1 /HPF Final    Total Protein Urine mg/dL 04/09/2025 125.0    mg/dL Final    The reference ranges have not been established in urine protein. The results should be integrated into the clinical context for interpretation.    Total Protein Urine mg/mg Creat 04/09/2025 1.35 (H)  0.00 - 0.20 mg/mg Cr Final    Creatinine Urine mg/dL 04/09/2025 92.3  mg/dL Final    The reference ranges have not been established in urine creatinine. The results should be integrated into the clinical context for interpretation.    Creatinine Urine mg/dL 04/09/2025 90.5  mg/dL Final    The reference ranges have not been established in urine creatinine. The results should be integrated into the clinical context for interpretation.    Albumin Urine mg/L 04/09/2025 851.0  mg/L Final    The reference ranges have not been established in urine albumin. The results should be integrated into the clinical  context for interpretation.    Albumin Urine mg/g Cr 04/09/2025 940.33 (H)  0.00 - 17.00 mg/g Cr Final    Microalbuminuria is defined as an albumin:creatinine ratio of 17 to 299 for males and 25 to 299 for females. A ratio of albumin:creatinine of 300 or higher is indicative of overt proteinuria.  Due to biologic variability, positive results should be confirmed by a second, first-morning random or 24-hour timed urine specimen. If there is discrepancy, a third specimen is recommended. When 2 out of 3 results are in the microalbuminuria range, this is evidence for incipient nephropathy and warrants increased efforts at glucose control, blood pressure control, and institution of therapy with an angiotensin-converting-enzyme (ACE) inhibitor (if the patient can tolerate it).             Signed Electronically by: Sujit Duke MD

## 2025-07-29 LAB
ALBUMIN SERPL BCG-MCNC: 4 G/DL (ref 3.5–5.2)
ALP SERPL-CCNC: 69 U/L (ref 40–150)
ALT SERPL W P-5'-P-CCNC: 24 U/L (ref 0–70)
ANION GAP SERPL CALCULATED.3IONS-SCNC: 12 MMOL/L (ref 7–15)
AST SERPL W P-5'-P-CCNC: 25 U/L (ref 0–45)
BILIRUB SERPL-MCNC: 0.4 MG/DL
BUN SERPL-MCNC: 46.4 MG/DL (ref 8–23)
CALCIUM SERPL-MCNC: 9.3 MG/DL (ref 8.8–10.4)
CHLORIDE SERPL-SCNC: 108 MMOL/L (ref 98–107)
CREAT SERPL-MCNC: 3.08 MG/DL (ref 0.67–1.17)
EGFRCR SERPLBLD CKD-EPI 2021: 19 ML/MIN/1.73M2
GLUCOSE SERPL-MCNC: 120 MG/DL (ref 70–99)
HCO3 SERPL-SCNC: 19 MMOL/L (ref 22–29)
POTASSIUM SERPL-SCNC: 5.1 MMOL/L (ref 3.4–5.3)
PROT SERPL-MCNC: 6.2 G/DL (ref 6.4–8.3)
SODIUM SERPL-SCNC: 139 MMOL/L (ref 135–145)

## 2025-08-22 ENCOUNTER — DOCUMENTATION ONLY (OUTPATIENT)
Dept: NEPHROLOGY | Facility: CLINIC | Age: 85
End: 2025-08-22
Payer: COMMERCIAL

## 2025-08-22 DIAGNOSIS — N18.4 ANEMIA DUE TO STAGE 4 CHRONIC KIDNEY DISEASE (H): ICD-10-CM

## 2025-08-22 DIAGNOSIS — D63.1 ANEMIA DUE TO STAGE 4 CHRONIC KIDNEY DISEASE (H): ICD-10-CM

## 2025-08-22 DIAGNOSIS — N18.4 CKD (CHRONIC KIDNEY DISEASE) STAGE 4, GFR 15-29 ML/MIN (H): Primary | ICD-10-CM

## 2025-09-03 ENCOUNTER — LAB (OUTPATIENT)
Dept: LAB | Facility: CLINIC | Age: 85
End: 2025-09-03
Payer: COMMERCIAL

## 2025-09-03 DIAGNOSIS — N18.4 CKD (CHRONIC KIDNEY DISEASE) STAGE 4, GFR 15-29 ML/MIN (H): ICD-10-CM

## 2025-09-03 DIAGNOSIS — D63.1 ANEMIA DUE TO STAGE 4 CHRONIC KIDNEY DISEASE (H): ICD-10-CM

## 2025-09-03 DIAGNOSIS — N18.4 ANEMIA DUE TO STAGE 4 CHRONIC KIDNEY DISEASE (H): ICD-10-CM

## 2025-09-03 LAB
HGB BLD-MCNC: 9.2 G/DL (ref 13.3–17.7)
MCV RBC AUTO: 98.3 FL (ref 78–100)

## 2025-09-03 PROCEDURE — 83540 ASSAY OF IRON: CPT

## 2025-09-03 PROCEDURE — 83550 IRON BINDING TEST: CPT

## 2025-09-03 PROCEDURE — 80069 RENAL FUNCTION PANEL: CPT

## 2025-09-03 PROCEDURE — 36415 COLL VENOUS BLD VENIPUNCTURE: CPT

## 2025-09-03 PROCEDURE — 82728 ASSAY OF FERRITIN: CPT

## 2025-09-03 PROCEDURE — 85018 HEMOGLOBIN: CPT

## 2025-09-04 LAB
ALBUMIN SERPL BCG-MCNC: 3.7 G/DL (ref 3.5–5.2)
ANION GAP SERPL CALCULATED.3IONS-SCNC: 7 MMOL/L (ref 7–15)
BUN SERPL-MCNC: 48.5 MG/DL (ref 8–23)
CALCIUM SERPL-MCNC: 9.3 MG/DL (ref 8.8–10.4)
CHLORIDE SERPL-SCNC: 110 MMOL/L (ref 98–107)
CREAT SERPL-MCNC: 2.96 MG/DL (ref 0.67–1.17)
EGFRCR SERPLBLD CKD-EPI 2021: 20 ML/MIN/1.73M2
FERRITIN SERPL-MCNC: 123 NG/ML (ref 31–409)
GLUCOSE SERPL-MCNC: 119 MG/DL (ref 70–99)
HCO3 SERPL-SCNC: 20 MMOL/L (ref 22–29)
IRON BINDING CAPACITY (ROCHE): 243 UG/DL (ref 240–430)
IRON SATN MFR SERPL: 28 % (ref 15–46)
IRON SERPL-MCNC: 67 UG/DL (ref 61–157)
PHOSPHATE SERPL-MCNC: 3.6 MG/DL (ref 2.5–4.5)
POTASSIUM SERPL-SCNC: 5.7 MMOL/L (ref 3.4–5.3)
SODIUM SERPL-SCNC: 137 MMOL/L (ref 135–145)

## (undated) DEVICE — PACK UNIVERSAL SPLIT 29131

## (undated) DEVICE — PREP CHLORAPREP W/ORANGE TINT 10.5ML 260715

## (undated) DEVICE — SU VICRYL 3-0 FS-1 27" J442H

## (undated) DEVICE — SU PROLENE 4-0 RB-1DA 36" 8557H

## (undated) DEVICE — SU MONOCRYL 4-0 PS-2 18" UND Y496G

## (undated) DEVICE — ADPT PERFUSION MULTIPLE

## (undated) DEVICE — DRAPE SLUSH/WARMER 66X44" ORS-320

## (undated) DEVICE — SU SILK 3-0 SH CR 8X18" C013D

## (undated) DEVICE — KIT WASH CELL SAVING ATL2001

## (undated) DEVICE — SOL NACL 0.9% IRRIG 1000ML BOTTLE 07138-09

## (undated) DEVICE — BONE WAX 2.5GM W31G

## (undated) DEVICE — SU PROLENE 3-0 SHDA 36" 8522H

## (undated) DEVICE — SU ETHIBOND 0 CT-1 CR 8X18" CX21D

## (undated) DEVICE — DECANTER BAG 2002S

## (undated) DEVICE — SU VICRYL 3-0 CT-1 36" J338H

## (undated) DEVICE — SU PROLENE 8-0 BV130-5 24" M8732

## (undated) DEVICE — BLOWER/MISTER CLEARVIEW 22150

## (undated) DEVICE — SYR EAR BULB 3OZ 0035830

## (undated) DEVICE — SPONGE PEANUT

## (undated) DEVICE — SU VICRYL 2-0 SH 27" J317H

## (undated) DEVICE — SU VICRYL 2-0 CT-1 27" UND J259H

## (undated) DEVICE — CLIP HORIZON MULTI SM YELLOW 001204

## (undated) DEVICE — LINEN TOWEL PACK X6 WHITE 5487

## (undated) DEVICE — CONNECTOR DRAIN CHEST Y EXTENSION SET 19909

## (undated) DEVICE — SU ETHIBOND 2-0 SHDA 30" X563H

## (undated) DEVICE — KIT ENDO VASOVIEW HEMOPRO 2 VH-4000

## (undated) DEVICE — GLOVE PROTEXIS POWDER FREE 8.0 ORTHOPEDIC 2D73ET80

## (undated) DEVICE — SPONGE RAY-TEC 4X8" 7318

## (undated) DEVICE — RX SURGIFLO HEMOSTATIC MATRIX W/THROMBIN 8ML 2994

## (undated) DEVICE — SU PROLENE 5-0 RB-1DA 36"  8556H

## (undated) DEVICE — CATH TRAY FOLEY COUDE SURESTEP 16FR W/URNE MTR STLK A304716A

## (undated) DEVICE — ESU GROUND PAD UNIVERSAL W/O CORD

## (undated) DEVICE — DRSG KERLIX 4 1/2"X4YDS ROLL 6715

## (undated) DEVICE — SU SILK 3-0 TIE 24" SA74H

## (undated) DEVICE — BLADE KNIFE BEAVER MINI BEAVER6400

## (undated) DEVICE — PROTECTOR ARM ONE-STEP TRENDELENBURG 40418

## (undated) DEVICE — LINEN TOWEL PACK X5 5464

## (undated) DEVICE — CANNULA PERFUSION AORTIC ROOT 22FR 20012

## (undated) DEVICE — PACK TUBING MINI VAC CUSTOM 1/2X3/8T BB9J78R4

## (undated) DEVICE — LINEN TOWEL PACK X30 5481

## (undated) DEVICE — SUCTION CATH AIRLIFE TRI-FLO W/CONTROL PORT 14FR  T60C

## (undated) DEVICE — LINEN LEG ROLL 5489

## (undated) DEVICE — SU PLEDGET SOFT TFE 3/8"X3/26"X1/16" PCP40

## (undated) DEVICE — SOL NACL 0.9% INJ 250ML BAG 2B1322Q

## (undated) DEVICE — PACK VASCULAR SCV15VAFSB

## (undated) DEVICE — COVER TABLE POLY 65X90" 8186

## (undated) DEVICE — CLIP HORIZON MULTI MED BLUE 002204

## (undated) DEVICE — SUCTION CANISTER MEDIVAC LINER 3000ML W/LID 65651-530

## (undated) DEVICE — PACK OPEN HEART PV12OH524

## (undated) DEVICE — RESERVOIR CELL SAVING BLOOD COLLECTION EL2120

## (undated) DEVICE — SURGICEL POWDER ABSORBABLE HEMOSTAT 3GM 3013SP

## (undated) DEVICE — TUBING SUCTION 12"X1/4" N612

## (undated) DEVICE — DEVICE ASSEMBLY SUCTION/ANTI COAG BTC93

## (undated) DEVICE — SU ETHIBOND 3-0 BBDA 36" X588H

## (undated) DEVICE — SU PROLENE 7-0 BV-1DA 24" 8702H

## (undated) DEVICE — DRSG TELFA ISLAND 4X8" 7541

## (undated) DEVICE — POSITIONER ASSIST ESSTECH 3S T401210S

## (undated) DEVICE — ESU ELEC BLADE 2.75" COATED/INSULATED E1455

## (undated) DEVICE — SU PROLENE 6-0 C-1DA 30" M8706

## (undated) DEVICE — CANNULA VENOUS 2 STAGE 32-40FR

## (undated) DEVICE — SURGICEL HEMOSTAT 2X14" 1951

## (undated) DEVICE — SU PROLENE 4-0 SHDA 36" 8521H

## (undated) DEVICE — CANNULA PERFUSION ARTERIAL 20FR 12" 77420

## (undated) DEVICE — SU PROLENE 7-0 BV-1DA 4X24" M8702

## (undated) DEVICE — Device

## (undated) DEVICE — NDL 19GA 1.5"

## (undated) DEVICE — LEAD ELEC MYOCARDIO PACING TEMPORARY MEDTRONIC

## (undated) DEVICE — SU SILK 1 TIE 10X30" SA87G

## (undated) DEVICE — SU STEEL 6 CCS 4X18" M654G

## (undated) DEVICE — CONNECTOR PERFUSION STR 1/2X1/2" W/O LL 6025

## (undated) DEVICE — PACK MINI VAC CUSTOM CARDOPULMONARY BB5Z97R15

## (undated) DEVICE — SU VICRYL 0 CTX 36" J370H

## (undated) DEVICE — SU PROLENE 7-0 BV-1DA 4X30" M8703

## (undated) DEVICE — CONNECTOR BLAKE DRAIN SGL BCC1

## (undated) DEVICE — SOL WATER IRRIG 1000ML BOTTLE 2F7114

## (undated) DEVICE — SU SILK 2-0 TIE 24" SA75H

## (undated) DEVICE — DRAPE IOBAN INCISE 36X23" 6651EZ

## (undated) DEVICE — SYR 05ML SLIP TIP W/O NDL 309647

## (undated) DEVICE — PUNCH AORTIC 4.0MM

## (undated) DEVICE — DRSG STERI STRIP 1/2X4" R1547

## (undated) DEVICE — SU PROLENE 6-0 C-1DA 30" 8706H

## (undated) DEVICE — DRSG TELFA ISLAND 4X10"

## (undated) RX ORDER — VASOPRESSIN 20 U/ML
INJECTION PARENTERAL
Status: DISPENSED
Start: 2018-10-26

## (undated) RX ORDER — BUPIVACAINE HYDROCHLORIDE AND EPINEPHRINE 5; 5 MG/ML; UG/ML
INJECTION, SOLUTION EPIDURAL; INTRACAUDAL; PERINEURAL
Status: DISPENSED
Start: 2017-09-18

## (undated) RX ORDER — BUPIVACAINE HYDROCHLORIDE 5 MG/ML
INJECTION, SOLUTION EPIDURAL; INTRACAUDAL
Status: DISPENSED
Start: 2017-09-18

## (undated) RX ORDER — PROPOFOL 10 MG/ML
INJECTION, EMULSION INTRAVENOUS
Status: DISPENSED
Start: 2017-09-18

## (undated) RX ORDER — VERAPAMIL HYDROCHLORIDE 2.5 MG/ML
INJECTION, SOLUTION INTRAVENOUS
Status: DISPENSED
Start: 2018-10-26

## (undated) RX ORDER — CEFAZOLIN SODIUM 1 G/3ML
INJECTION, POWDER, FOR SOLUTION INTRAMUSCULAR; INTRAVENOUS
Status: DISPENSED
Start: 2017-09-18

## (undated) RX ORDER — CEFAZOLIN SODIUM 1 G/3ML
INJECTION, POWDER, FOR SOLUTION INTRAMUSCULAR; INTRAVENOUS
Status: DISPENSED
Start: 2018-10-26

## (undated) RX ORDER — FENTANYL CITRATE 50 UG/ML
INJECTION, SOLUTION INTRAMUSCULAR; INTRAVENOUS
Status: DISPENSED
Start: 2017-09-18

## (undated) RX ORDER — GLYCOPYRROLATE 0.2 MG/ML
INJECTION, SOLUTION INTRAMUSCULAR; INTRAVENOUS
Status: DISPENSED
Start: 2017-09-18

## (undated) RX ORDER — NITROGLYCERIN 5 MG/ML
VIAL (ML) INTRAVENOUS
Status: DISPENSED
Start: 2018-10-26

## (undated) RX ORDER — ASPIRIN 600 MG/1
SUPPOSITORY RECTAL
Status: DISPENSED
Start: 2017-09-18

## (undated) RX ORDER — PAPAVERINE HYDROCHLORIDE 30 MG/ML
INJECTION INTRAMUSCULAR; INTRAVENOUS
Status: DISPENSED
Start: 2018-10-26

## (undated) RX ORDER — LIDOCAINE HYDROCHLORIDE 20 MG/ML
INJECTION, SOLUTION EPIDURAL; INFILTRATION; INTRACAUDAL; PERINEURAL
Status: DISPENSED
Start: 2017-09-18

## (undated) RX ORDER — HEPARIN SODIUM 1000 [USP'U]/ML
INJECTION, SOLUTION INTRAVENOUS; SUBCUTANEOUS
Status: DISPENSED
Start: 2017-09-18

## (undated) RX ORDER — ALBUMIN, HUMAN INJ 5% 5 %
SOLUTION INTRAVENOUS
Status: DISPENSED
Start: 2018-10-26

## (undated) RX ORDER — LIDOCAINE HYDROCHLORIDE 10 MG/ML
INJECTION, SOLUTION EPIDURAL; INFILTRATION; INTRACAUDAL; PERINEURAL
Status: DISPENSED
Start: 2018-10-26

## (undated) RX ORDER — HEPARIN SODIUM 1000 [USP'U]/ML
INJECTION, SOLUTION INTRAVENOUS; SUBCUTANEOUS
Status: DISPENSED
Start: 2018-10-26

## (undated) RX ORDER — FENTANYL CITRATE 0.05 MG/ML
INJECTION, SOLUTION INTRAMUSCULAR; INTRAVENOUS
Status: DISPENSED
Start: 2018-10-26

## (undated) RX ORDER — FENTANYL CITRATE 50 UG/ML
INJECTION, SOLUTION INTRAMUSCULAR; INTRAVENOUS
Status: DISPENSED
Start: 2018-10-26

## (undated) RX ORDER — PROTAMINE SULFATE 10 MG/ML
INJECTION, SOLUTION INTRAVENOUS
Status: DISPENSED
Start: 2018-10-26

## (undated) RX ORDER — LIDOCAINE HYDROCHLORIDE 10 MG/ML
INJECTION, SOLUTION INFILTRATION; PERINEURAL
Status: DISPENSED
Start: 2017-09-18

## (undated) RX ORDER — PROPOFOL 10 MG/ML
INJECTION, EMULSION INTRAVENOUS
Status: DISPENSED
Start: 2018-10-26

## (undated) RX ORDER — CEFAZOLIN SODIUM 2 G/100ML
INJECTION, SOLUTION INTRAVENOUS
Status: DISPENSED
Start: 2017-09-18